# Patient Record
Sex: FEMALE | Race: WHITE | NOT HISPANIC OR LATINO | Employment: OTHER | URBAN - METROPOLITAN AREA
[De-identification: names, ages, dates, MRNs, and addresses within clinical notes are randomized per-mention and may not be internally consistent; named-entity substitution may affect disease eponyms.]

---

## 2017-02-15 ENCOUNTER — HOSPITAL ENCOUNTER (EMERGENCY)
Facility: CLINIC | Age: 67
Discharge: HOME | End: 2017-02-15
Attending: FAMILY MEDICINE | Admitting: FAMILY MEDICINE
Payer: MEDICARE

## 2017-02-15 ENCOUNTER — TELEPHONE (OUTPATIENT)
Dept: FAMILY MEDICINE | Facility: OTHER | Age: 67
End: 2017-02-15

## 2017-02-15 ENCOUNTER — APPOINTMENT (OUTPATIENT)
Dept: GENERAL RADIOLOGY | Facility: CLINIC | Age: 67
End: 2017-02-15
Attending: FAMILY MEDICINE
Payer: MEDICARE

## 2017-02-15 VITALS
RESPIRATION RATE: 14 BRPM | OXYGEN SATURATION: 96 % | HEIGHT: 64 IN | BODY MASS INDEX: 25.61 KG/M2 | TEMPERATURE: 97.4 F | WEIGHT: 150 LBS | DIASTOLIC BLOOD PRESSURE: 89 MMHG | SYSTOLIC BLOOD PRESSURE: 146 MMHG

## 2017-02-15 DIAGNOSIS — M79.642 PAIN OF LEFT HAND: ICD-10-CM

## 2017-02-15 PROCEDURE — 99282 EMERGENCY DEPT VISIT SF MDM: CPT | Performed by: FAMILY MEDICINE

## 2017-02-15 PROCEDURE — 73130 X-RAY EXAM OF HAND: CPT | Mod: TC,LT

## 2017-02-15 PROCEDURE — 99283 EMERGENCY DEPT VISIT LOW MDM: CPT

## 2017-02-15 PROCEDURE — A9270 NON-COVERED ITEM OR SERVICE: HCPCS | Mod: GY | Performed by: FAMILY MEDICINE

## 2017-02-15 PROCEDURE — 25000132 ZZH RX MED GY IP 250 OP 250 PS 637: Mod: GY | Performed by: FAMILY MEDICINE

## 2017-02-15 RX ORDER — ACETAMINOPHEN 325 MG/1
975 TABLET ORAL ONCE
Status: COMPLETED | OUTPATIENT
Start: 2017-02-15 | End: 2017-02-15

## 2017-02-15 RX ORDER — CYCLOSPORINE 0.5 MG/ML
1 EMULSION OPHTHALMIC 2 TIMES DAILY
COMMUNITY

## 2017-02-15 RX ADMIN — ACETAMINOPHEN 975 MG: 325 TABLET ORAL at 13:53

## 2017-02-15 ASSESSMENT — ENCOUNTER SYMPTOMS
VOMITING: 0
NAUSEA: 0
WEAKNESS: 0
FACIAL SWELLING: 0
DIZZINESS: 0
ARTHRALGIAS: 0
EYE PAIN: 0
DIFFICULTY URINATING: 0
LIGHT-HEADEDNESS: 0
ACTIVITY CHANGE: 0
MYALGIAS: 0
SHORTNESS OF BREATH: 0
ABDOMINAL PAIN: 0

## 2017-02-15 NOTE — DISCHARGE INSTRUCTIONS
Niesha    I recommend that you use a splint on the finger to help with the pain.  I hope that this will continue to improve with time.    Thank you for choosing our Emergency Department for your care.     Sincerely,    Dr Shane Garcia M.D.

## 2017-02-15 NOTE — ED AVS SNAPSHOT
[facility] Emergency Department    [address] [place] [address]    Phone:  [phone]    Fax:  [phone]                                       Niesha Adhikari   MRN: [id number]    Department:  [facility] Emergency Department   Date of Visit:  2/15/2017           Patient Information     Date Of Birth          1950        Your diagnoses for this visit were:     Pain of left hand        You were seen by David Garcia MD.      Follow-up Information     Schedule an appointment as soon as possible for a visit with Tanika Clark MD.    Specialty:  Family Practice    Why:  As needed    Contact information:    [facility]  [address]  [place] [address]  [phone]          Discharge Instructions       Niesha    I recommend that you use a splint on the finger to help with the pain.  I hope that this will continue to improve with time.    Thank you for choosing our Emergency Department for your care.     Sincerely,    Dr Shane Garcia M.D.      Future Appointments        Provider Department Dept Phone Center    2/22/2017 9:40 AM Apolinar Cho MD [facility] Emergency Room [phone] [facility]      24 Hour Appointment Hotline       To make an appointment at any [facility], call [phone] ([phone]). If you don't have a family doctor or clinic, we will help you find one. [facility] (formerly [facility] are conveniently located to serve the needs of you and your family.             Review of your medicines      Our records show that you are taking the medicines listed below. If these are incorrect, please call your family doctor or clinic.        Dose / Directions Last dose taken    celecoxib 200 MG capsule   Commonly known as:  celeBREX   Quantity:  180 capsule        TAKE ONE CAPSULE BY MOUTH EVERY DAY   Refills:  1        clonazePAM 0.5 MG tablet   Commonly known as:  klonoPIN   Dose:  1 mg   Quantity:  180 tablet        Take 2 tablets (1  mg) by mouth At Bedtime   Refills:  0        cycloSPORINE 0.05 % ophthalmic emulsion   Commonly known as:  RESTASIS   Dose:  1 drop        1 drop 2 times daily   Refills:  0        Fiber 0.52 G Caps   Quantity:  540 capsule        4 tabs in pm   Refills:  0        montelukast 10 MG tablet   Commonly known as:  SINGULAIR   Dose:  1 tablet   Quantity:  90 tablet        Take 1 tablet (10 mg) by mouth daily as needed Seasonal (August and September)   Refills:  0        nystatin 410950 UNIT/GM Powd   Commonly known as:  MYCOSTATIN   Quantity:  60 g        Apply topically 3 times daily as needed   Refills:  1        ondansetron 8 MG tablet   Commonly known as:  ZOFRAN   Dose:  8 mg   Quantity:  90 tablet        Take 1 tablet (8 mg) by mouth every 8 hours as needed for nausea   Refills:  1        PROVIGIL PO        Take by mouth 2 times daily Patient unsure which dose   Refills:  0        ranitidine 300 MG tablet   Commonly known as:  ZANTAC   Quantity:  90 tablet        TAKE ONE TABLET BY MOUTH EVERY DAY   Refills:  3        sertraline 100 MG tablet   Commonly known as:  ZOLOFT   Dose:  100 mg        Take 100 mg by mouth daily Takes 2 at bedtime   Refills:  0        VYVANSE 20 MG capsule   Dose:  40 mg   Quantity:  30 capsule   Generic drug:  lisdexamfetamine        Take 2 capsules (40 mg) by mouth every morning   Refills:  0                Procedures and tests performed during your visit     XR Hand Left G/E 3 Views      Orders Needing Specimen Collection     None      Pending Results     Date and Time Order Name Status Description    2/15/2017 1200 XR Hand Left G/E 3 Views Preliminary             Pending Culture Results     No orders found from 2/13/2017 to 2/16/2017.            Thank you for choosing [facility]       Thank you for choosing [facility] for your care. Our goal is always to provide you with excellent care. Hearing back from our patients is one way we can continue to improve our services. Please take a few  minutes to complete the written survey that you may receive in the mail after you visit with us. Thank you!        [employer] Information     [facility] gives you secure access to your electronic health record. If you see a primary care provider, you can also send messages to your care team and make appointments. If you have questions, please call your primary care clinic.  If you do not have a primary care provider, please call [phone] and they will assist you.        Care EveryWhere ID     This is your Care EveryWhere ID. This could be used by other organizations to access your [facility] medical records  [id number]        After Visit Summary       This is your record. Keep this with you and show to your community pharmacist(s) and doctor(s) at your next visit.

## 2017-02-15 NOTE — TELEPHONE ENCOUNTER
Reason for call:  Symptom  Reason for call:  Patient reporting a symptom    Symptom or request: fell on ground    Duration (how long have symptoms been present): last night    Have you been treated for this before? No    Additional comments: patient fell on ground last night, injured L hand and feel that they broke a finger or too. Also, has minor rib pain    Phone Number patient can be reached at:  Home number on file [phone] (home)    Best Time:  any    Can we leave a detailed message on this number:  YES    Call taken on 2/15/2017 at 9:02 AM by Rhoda Floyd

## 2017-02-15 NOTE — ED PROVIDER NOTES
"  History     Chief Complaint   Patient presents with     Hand Injury     The history is provided by the patient.     Niesha Adhikari is a 66 year old female who came in today with left hand pain.  She was walking in her house last night and tripped over the dog gate.  She fell forward and hit her left hand on the bookcase.  She does not have any other injuries.  She had no other injuries last night.  She did not have any loss of consciousness.    I have reviewed the Medications, Allergies, Past Medical and Surgical History, and Social History in the Epic system.    Review of Systems   Constitutional: Negative for activity change.   HENT: Negative for dental problem, ear pain, facial swelling and nosebleeds.    Eyes: Negative for pain.   Respiratory: Negative for shortness of breath.    Cardiovascular: Negative for chest pain.   Gastrointestinal: Negative for abdominal pain, nausea and vomiting.   Genitourinary: Negative for decreased urine volume and difficulty urinating.   Musculoskeletal: Negative for arthralgias and myalgias.   Neurological: Negative for dizziness, syncope, weakness and light-headedness.   All other systems reviewed and are negative.      Physical Exam   BP: 146/89  Heart Rate: 78  Temp: 97.4  F (36.3  C)  Resp: 14  Height: 162.6 cm (5' 4\")  Weight: 68 kg (150 lb)  SpO2: 96 %    Physical Exam   Constitutional: She is oriented to person, place, and time. She appears well-developed and well-nourished. No distress.   HENT:   Head: Normocephalic and atraumatic.   Eyes: Conjunctivae and EOM are normal.   Neck: Normal range of motion.   Pulmonary/Chest: Effort normal. No respiratory distress.   Musculoskeletal:   Exam of the left hand shows tenderness over the 4th and 5th MP joint.  She has a small, shallow skin abrasion over the 5th metatarsal with no bleeding.  She has limited range of motion of the left 5th finger because of pain.  There is no obvious deformity.  She has normal capillary refill " at the fingernails.  She has no pain of the left hand except as noted above.  No pain of the left wrist.   Neurological: She is alert and oriented to person, place, and time.   Skin: Skin is warm and dry. No rash noted.   Nursing note and vitals reviewed.      ED Course     ED Course     Procedures        Results for orders placed or performed during the hospital encounter of 02/15/17 (from the past 24 hour(s))   XR Hand Left G/E 3 Views    Narrative    HAND LEFT THREE OR MORE VIEWS February 15, 2017 12:25 PM     HISTORY: Trauma.    COMPARISON: None.      FINDINGS: There is no acute fracture, malalignment, erosion or  significant joint space loss. There is mild osteopenia.      Impression    IMPRESSION: Mild osteopenia. No acute fracture or malalignment is  identified.    TIM BEDOYA MD       Assessments & Plan (with Medical Decision Making)  Niesha came to the emergency department today after she tripped and fell last night at home.  She had minor injury to her left hand, but had quite a bit of pain.  She was concerned that she broke something or dislocated her finger.  Exam shows no deformity or significant injury.  She has a very small skin abrasion which is not bleeding.  X-ray shows no evidence of foreign body, fracture or malalignment.  We were able to reassure the patient that she does not have a fracture.  We splinted her finger in a position of comfort and she was discharged home.       I have reviewed the nursing notes.    I have reviewed the findings, diagnosis, plan and need for follow up with the patient.    Discharge Medication List as of 2/15/2017  1:35 PM          Final diagnoses:   Pain of left hand       2/15/2017   [facility] EMERGENCY DEPARTMENT     David Garcia MD  02/15/17 1726

## 2017-02-15 NOTE — ED AVS SNAPSHOT
[facility] Emergency Department    [address] [place] [address]    Phone:  [phone]    Fax:  [phone]                                       Niesha Adhikari   MRN: [id number]    Department:  [facility] Emergency Department   Date of Visit:  2/15/2017           After Visit Summary Signature Page     I have received my discharge instructions, and my questions have been answered. I have discussed any challenges I see with this plan with the nurse or doctor.    ..........................................................................................................................................  Patient/Patient Representative Signature      ..........................................................................................................................................  Patient Representative Print Name and Relationship to Patient    ..................................................               ................................................  Date                                            Time    ..........................................................................................................................................  Reviewed by Signature/Title    ...................................................              ..............................................  Date                                                            Time

## 2017-02-15 NOTE — TELEPHONE ENCOUNTER
"Niesha Adhikari is a 66 year old female who calls with hand/finger injury.    NURSING ASSESSMENT:  Description:  Fell last night around 1030 pm after tripping over a dog gate. Hit her bookshelf with her hand and fingers. Cut the fingers, bleeding; has since stopped. Pinky and ring finger on left hand affected. Painful to move, cannot bend. Wrapped with gauze and tape.   Onset/duration:  2/14  Precip. factors:  fall  Associated symptoms:  See above  Improves/worsens symptoms:  Wrapping helped.   Pain scale (0-10)   5/10  LMP/preg/breast feeding:  n/a  Last exam/Treatment:  11/25/2016  Allergies:   Allergies   Allergen Reactions     Tylenol Pm Extra Other (See Comments) and Nausea     Nausea and stomach pain       Abilify Discmelt Other (See Comments)     Disoriented     Antivert [Meclizine Hcl]      Compazine      Cymbalta Other (See Comments)     Disoriented, trouble sleeping     Effexor [Venlafaxine] Other (See Comments)     Disoriented, trouble sleeping     Elavil [Amitriptyline Hcl] Other (See Comments)     \"didn't feel right on it-med was stopped right away\"     Ferrous Sulfate Nausea and Vomiting     Food Difficulty breathing     cilantro     Indomethacin      indocin sensativity \"Severe h.a\"     Seasonal Allergies Other (See Comments) and Difficulty breathing     Philip Gold Aug-Sept, rag weed, sneezing     Thiopental Sodium      PENTOTHAL/rigidity and fight response     Animal Dander Difficulty breathing and Rash     sneezing,resp. distress     Bupropion Anxiety       MEDICATIONS:   Taking medication(s) as prescribed? N/A  Taking over the counter medication(s?) N/A  Any medication side effects? Not Applicable    Any barriers to taking medication(s) as prescribed?  N/A  Medication(s) improving/managing symptoms?  N/A  Medication reconciliation completed: N/A      NURSING PLAN: Nursing advice to patient seek care within 2-4 hrs    RECOMMENDED DISPOSITION:  See in 4 hours, another person to drive - patient " does not want to drive to other locations, no openings in [place] or [place]. Will go to local ED/UC.  Will comply with recommendation: Yes  If further questions/concerns or if symptoms do not improve, worsen or new symptoms develop, call your PCP or [facility] as soon as possible.      Guideline used:  Telephone Triage Protocols for Nurses, Fourth Edition, Deana Pollard  Hand/Writs problems  Finger and Toe Problems  Falls     Jigna Daily RN

## 2017-02-15 NOTE — ED NOTES
"Presents with pain to left hand. \"I think I broke it\" states she tripped last night and feel into the bookcase. She awoke around 0400 with increase pain to hand. Currently has a dressing over hand which she applied at home  "

## 2017-02-20 NOTE — PROGRESS NOTES
SUBJECTIVE:                                                    Niesha Adhikari is a 66 year old female who presents to clinic today for the following health issues:    Rash     Onset: x 5-7 days    Description:   Location: Under both breast and stomach, and neck and chest  Character: round, blotchy, raised, red  Itching (Pruritis): YES- severe    Progression of Symptoms:  worsening    Accompanying Signs & Symptoms:  Fever: YES- mild didn't taken temp  Body aches or joint pain: no   Sore throat symptoms: no   Recent cold symptoms: no    History:   Previous similar rash: YES    Precipitating factors:   Exposure to similar rash: no   New exposures: None   Recent travel: no     Alleviating factors:  no     Therapies Tried and outcome: Nystatin- not helping and normally it does.           Problem list and histories reviewed & adjusted, as indicated.  Additional history: as documented    Patient Active Problem List   Diagnosis     Allergic rhinitis     Esophageal reflux     Pernicious anemia     Anxiety state     Migraine     Essential and other specified forms of tremor     Fibromyalgia     Moderate recurrent major depression (H)     Advanced directives, counseling/discussion     Narcolepsy     Internal hemorrhoids with other complication     Hepatitis C     AIN (anal intraepithelial neoplasia) anal canal     Rectal bleeding     Sciatica     Senile osteoporosis     Past Surgical History   Procedure Laterality Date      colonoscopy w/wo brush/wash  08/22/05      ugi endoscopy, simple exam  08/08/07     Colonoscopy  8/25/2009      ugi endoscopy diag w biopsy  10/01/09     Colonoscopy  2/14/2011     COLONOSCOPY performed by CRISTIN LAGUNAS at  GI     Laparoscopic salpingo-oophorectomy  2/28/2011     LAPAROSCOPIC SALPINGO-OOPHORECTOMY performed by CAYLA FLOR at  OR     Cystoscopy  2/28/2011     CYSTOSCOPY performed by CAYLA FLOR at  OR     Endoscopy  05/21/12     Upper GI - [facility]     Hemorrhoidectomy  06/25/12     [facility]     Biopsy anal canal  1/21/13     [facility]      Tonsillectomy & adenoidectomy  1965     Breast biopsy, rt/lt Left 1975     Breat Biopsy RT/LT     C nonspecific procedure  1965     Removed bone left index finger knuckle, casts broken bones       Social History   Substance Use Topics     Smoking status: Former Smoker     Packs/day: 0.75     Years: 10.00     Types: Cigarettes     Start date: 11/1/1968     Quit date: 11/1/1978     Smokeless tobacco: Never Used      Comment: No exposure at home     Alcohol use No     Family History   Problem Relation Age of Onset     Hypertension Mother      Breast Cancer Mother      Coronary Artery Disease Mother      CEREBROVASCULAR DISEASE Mother      KIDNEY DISEASE Mother      Hypertension Brother      Respiratory Brother      emphysema     Lipids Brother      HEART DISEASE Brother      stents, 12/2011; has had about 6 MIs, last one 1/2014     C.A.D. Sister      MI at age 63     Hypertension Sister      GASTROINTESTINAL DISEASE Sister      gallbladder     Circulatory Sister      brain aneurysm at 63     Genitourinary Problems Sister      1 kidney/bladder     Hypertension Sister      Obesity Sister      Unknown/Adopted Paternal Uncle      Blood Disease Son      Lymes/7/11     Hypertension Father      Lymphoma Father      Glaucoma Father      Coronary Artery Disease Other 49     niece     DIABETES Other      cousin     Coronary Artery Disease Sister      Coronary Artery Disease Brother      Hyperlipidemia Brother      Hypertension Son      CEREBROVASCULAR DISEASE Maternal Grandmother      CEREBROVASCULAR DISEASE Paternal Grandmother      Liver Cancer Cousin      Glaucoma Paternal Grandfather          Current Outpatient Prescriptions   Medication Sig Dispense Refill     econazole nitrate 1 % cream Apply topically 2 times daily for 14 days 30 g 1     hydrocortisone 1 % ointment Apply sparingly to affected area three  "times daily for 14 days. 30 g 0     cycloSPORINE (RESTASIS) 0.05 % ophthalmic emulsion 1 drop 2 times daily       Modafinil (PROVIGIL PO) Take 200 mg by mouth 2 times daily Patient unsure which dose        celecoxib (CELEBREX) 200 MG capsule TAKE ONE CAPSULE BY MOUTH EVERY  capsule 1     ranitidine (ZANTAC) 300 MG tablet TAKE ONE TABLET BY MOUTH EVERY DAY 90 tablet 3     lisdexamfetamine (VYVANSE) 20 MG capsule Take 2 capsules (40 mg) by mouth every morning 30 capsule 0     nystatin (MYCOSTATIN) 354482 UNIT/GM POWD Apply topically 3 times daily as needed 60 g 1     sertraline (ZOLOFT) 100 MG tablet Take 100 mg by mouth daily Takes 2 at bedtime       clonazePAM (KLONOPIN) 0.5 MG tablet Take 1 mg by mouth At Bedtime Takes 1/8 of 0.5mg tab. 180 tablet      Psyllium (FIBER) 0.52 G CAPS 4 tabs in pm 540 capsule      vitamin D (ERGOCALCIFEROL) 68920 UNIT capsule Take 1 capsule (50,000 Units) by mouth every 7 days 12 capsule 1     montelukast (SINGULAIR) 10 MG tablet Take 1 tablet (10 mg) by mouth daily as needed Seasonal (August and September) 90 tablet 0     Allergies   Allergen Reactions     Abilify Discmelt Other (See Comments)     Disoriented     Antivert [Meclizine Hcl]      Compazine      Cymbalta Other (See Comments)     Disoriented, trouble sleeping     Diphenhydramine Nausea     And abdominal pain     Effexor [Venlafaxine] Other (See Comments)     Disoriented, trouble sleeping     Elavil [Amitriptyline Hcl] Other (See Comments)     \"didn't feel right on it-med was stopped right away\"     Ferrous Sulfate Nausea and Vomiting     Food Difficulty breathing     cilantro     Indomethacin      indocin sensativity \"Severe h.a\"     Seasonal Allergies Other (See Comments) and Difficulty breathing     Philip Gold Aug-Sept, rag weed, sneezing     Thiopental Sodium      PENTOTHAL/rigidity and fight response     Animal Dander Difficulty breathing and Rash     sneezing,resp. distress     Bupropion Anxiety     BP Readings " "from Last 3 Encounters:   02/22/17 (!) 155/91   02/21/17 126/76   02/15/17 146/89    Wt Readings from Last 3 Encounters:   02/22/17 148 lb 6.4 oz (67.3 kg)   02/21/17 148 lb 12.8 oz (67.5 kg)   02/15/17 150 lb (68 kg)                  Labs reviewed in EPIC  Problem list, Medication list, Allergies, and Medical/Social/Surgical histories reviewed in [facility] and updated as appropriate.    ROS:  C: NEGATIVE for fever, chills, change in weight  INTEGUMENTARY/SKIN: POSITIVE for rash beneath the breast     OBJECTIVE:                                                    /76 (BP Location: Right arm, Patient Position: Chair, Cuff Size: Adult Regular)  Pulse 64  Temp 98.7  F (37.1  C) (Temporal)  Resp 16  Ht 5' 4\" (1.626 m)  Wt 148 lb 12.8 oz (67.5 kg)  LMP 11/27/2003  Breastfeeding? No  BMI 25.54 kg/m2  Body mass index is 25.54 kg/(m^2).   GENERAL:  alert, well nourished, well hydrated, no distress  SKIN:  Very scant faded rash noted beneath the breast bilaterally , no inflammation noted     Diagnostic test results:  Diagnostic Test Results:  none      ASSESSMENT/PLAN:                                                    1. Rash and nonspecific skin eruption  Likely Intertrigo improving though she reports the Nystatin not helping as much . Will try a different anti fungal , otc hydrocortisone to help with itching   - econazole nitrate 1 % cream; Apply topically 2 times daily for 14 days  Dispense: 30 g; Refill: 1  - hydrocortisone 1 % ointment; Apply sparingly to affected area three times daily for 14 days.  Dispense: 30 g; Refill: 0      Follow up with Provider - terryn      Erendira Merida MD, MD  [facility] [place]    "

## 2017-02-21 ENCOUNTER — OFFICE VISIT (OUTPATIENT)
Dept: FAMILY MEDICINE | Facility: OTHER | Age: 67
End: 2017-02-21
Payer: COMMERCIAL

## 2017-02-21 VITALS
HEART RATE: 64 BPM | DIASTOLIC BLOOD PRESSURE: 76 MMHG | RESPIRATION RATE: 16 BRPM | HEIGHT: 64 IN | BODY MASS INDEX: 25.4 KG/M2 | WEIGHT: 148.8 LBS | SYSTOLIC BLOOD PRESSURE: 126 MMHG | TEMPERATURE: 98.7 F

## 2017-02-21 DIAGNOSIS — R21 RASH AND NONSPECIFIC SKIN ERUPTION: Primary | ICD-10-CM

## 2017-02-21 PROCEDURE — 99213 OFFICE O/P EST LOW 20 MIN: CPT | Performed by: FAMILY MEDICINE

## 2017-02-21 RX ORDER — DIAPER,BRIEF,INFANT-TODD,DISP
EACH MISCELLANEOUS
Qty: 30 G | Refills: 0 | Status: SHIPPED | OUTPATIENT
Start: 2017-02-21 | End: 2017-09-19

## 2017-02-21 RX ORDER — ECONAZOLE NITRATE 10 MG/G
CREAM TOPICAL 2 TIMES DAILY
Qty: 30 G | Refills: 1 | Status: SHIPPED | OUTPATIENT
Start: 2017-02-21 | End: 2017-03-07

## 2017-02-21 ASSESSMENT — PAIN SCALES - GENERAL: PAINLEVEL: MODERATE PAIN (5)

## 2017-02-21 NOTE — MR AVS SNAPSHOT
After Visit Summary   2/21/2017    Niesha Adhikari    MRN: [id number]           Patient Information     Date Of Birth          1950        Visit Information        Provider Department      2/21/2017 4:20 PM Erendira Merida MD [facility]        Today's Diagnoses     Rash and nonspecific skin eruption    -  1       Follow-ups after your visit        Your next 10 appointments already scheduled     Feb 22, 2017  9:40 AM CST   [facility] Visit with Apolinar Cho [facility]   [facility]    [address] [facility] [id number] [phone]              Who to contact     If you have questions or need follow up information about today's clinic visit or your schedule please contact [facility] directly at [phone].  Normal or non-critical lab and imaging results will be communicated to you by MyChart, letter or phone within 4 business days after the clinic has received the results. If you do not hear from us within 7 days, please contact the clinic through MyChart or phone. If you have a critical or abnormal lab result, we will notify you by phone as soon as possible.  Submit refill requests through [employer] or call your pharmacy and they will forward the refill request to us. Please allow 3 business days for your refill to be completed.          Additional Information About Your Visit        MyChart Information     [employer] gives you secure access to your electronic health record. If you see a primary care provider, you can also send messages to your care team and make appointments. If you have questions, please call your primary care clinic.  If you do not have a primary care provider, please call [phone] and they will assist you.        Care EveryWhere ID     This is your Care EveryWhere ID. This could be used by other organizations to access your [place] medical records  [id number]        Your Vitals Were     Pulse  "Temperature Respirations Height Last Period Breastfeeding?    64 98.7  F (37.1  C) (Temporal) 16 5' 4\" (1.626 m) 11/27/2003 No    BMI (Body Mass Index)                   25.54 kg/m2            Blood Pressure from Last 3 Encounters:   02/21/17 126/76   02/15/17 146/89   11/25/16 118/70    Weight from Last 3 Encounters:   02/21/17 148 lb 12.8 oz (67.5 kg)   02/15/17 150 lb (68 kg)   11/25/16 151 lb (68.5 kg)              Today, you had the following     No orders found for display         Today's Medication Changes          These changes are accurate as of: 2/21/17  4:58 PM.  If you have any questions, ask your nurse or doctor.               Start taking these medicines.        Dose/Directions    econazole nitrate 1 % cream   Used for:  Rash and nonspecific skin eruption   Started by:  Erendira Merida MD        Apply topically 2 times daily for 14 days   Quantity:  30 g   Refills:  1       hydrocortisone 1 % ointment   Used for:  Rash and nonspecific skin eruption   Started by:  Erendira Merida MD        Apply sparingly to affected area three times daily for 14 days.   Quantity:  30 g   Refills:  0            Where to get your medicines      These medications were sent to [facility]  [place] - [address]   [address] [place] [address]     Phone:  [phone]     econazole nitrate 1 % cream    hydrocortisone 1 % ointment                Primary Care Provider Office Phone # Fax #    Tanika Clark [phone]       [facility] [place] [address]  [facility] [address]        Thank you!     Thank you for choosing [facility]  for your care. Our goal is always to provide you with excellent care. Hearing back from our patients is one way we can continue to improve our services. Please take a few minutes to complete the written survey that you may receive in the mail after your visit with us. Thank you!           "   Your Updated Medication List - Protect others around you: Learn how to safely use, store and throw away your medicines at www.disposemymeds.org.          This list is accurate as of: 2/21/17  4:58 PM.  Always use your most recent med list.                   Brand Name Dispense Instructions for use    celecoxib 200 MG capsule    celeBREX    180 capsule    TAKE ONE CAPSULE BY MOUTH EVERY DAY       clonazePAM 0.5 MG tablet    klonoPIN    180 tablet    Take 1 mg by mouth At Bedtime Takes 1/8 of 0.5mg tab.       cycloSPORINE 0.05 % ophthalmic emulsion    RESTASIS     1 drop 2 times daily       econazole nitrate 1 % cream     30 g    Apply topically 2 times daily for 14 days       Fiber 0.52 G Caps     540 capsule    4 tabs in pm       hydrocortisone 1 % ointment     30 g    Apply sparingly to affected area three times daily for 14 days.       montelukast 10 MG tablet    SINGULAIR    90 tablet    Take 1 tablet (10 mg) by mouth daily as needed Seasonal (August and September)       nystatin 298567 UNIT/GM Powd    MYCOSTATIN    60 g    Apply topically 3 times daily as needed       PROVIGIL PO      Take 200 mg by mouth 2 times daily Patient unsure which dose       ranitidine 300 MG tablet    ZANTAC    90 tablet    TAKE ONE TABLET BY MOUTH EVERY DAY       sertraline 100 MG tablet    ZOLOFT     Take 100 mg by mouth daily Takes 2 at bedtime       VYVANSE 20 MG capsule   Generic drug:  lisdexamfetamine     30 capsule    Take 2 capsules (40 mg) by mouth every morning

## 2017-02-21 NOTE — NURSING NOTE
"Chief Complaint   Patient presents with     Derm Problem     Panel Management     honoring choices, phq9       Initial /76 (BP Location: Right arm, Patient Position: Chair, Cuff Size: Adult Regular)  Pulse 64  Temp 98.7  F (37.1  C) (Temporal)  Resp 16  Ht 5' 4\" (1.626 m)  Wt 148 lb 12.8 oz (67.5 kg)  LMP 11/27/2003  Breastfeeding? No  BMI 25.54 kg/m2 Estimated body mass index is 25.54 kg/(m^2) as calculated from the following:    Height as of this encounter: 5' 4\" (1.626 m).    Weight as of this encounter: 148 lb 12.8 oz (67.5 kg).  Medication Reconciliation: complete    "

## 2017-02-22 ENCOUNTER — OFFICE VISIT (OUTPATIENT)
Dept: RHEUMATOLOGY | Facility: CLINIC | Age: 67
End: 2017-02-22
Payer: COMMERCIAL

## 2017-02-22 VITALS
HEART RATE: 72 BPM | TEMPERATURE: 97.3 F | DIASTOLIC BLOOD PRESSURE: 91 MMHG | OXYGEN SATURATION: 97 % | BODY MASS INDEX: 25.33 KG/M2 | WEIGHT: 148.4 LBS | HEIGHT: 64 IN | SYSTOLIC BLOOD PRESSURE: 155 MMHG

## 2017-02-22 DIAGNOSIS — M81.0 OSTEOPOROSIS: Primary | ICD-10-CM

## 2017-02-22 PROCEDURE — 99214 OFFICE O/P EST MOD 30 MIN: CPT | Performed by: INTERNAL MEDICINE

## 2017-02-22 ASSESSMENT — ANXIETY QUESTIONNAIRES
7. FEELING AFRAID AS IF SOMETHING AWFUL MIGHT HAPPEN: MORE THAN HALF THE DAYS
3. WORRYING TOO MUCH ABOUT DIFFERENT THINGS: MORE THAN HALF THE DAYS
GAD7 TOTAL SCORE: 12
5. BEING SO RESTLESS THAT IT IS HARD TO SIT STILL: SEVERAL DAYS
1. FEELING NERVOUS, ANXIOUS, OR ON EDGE: MORE THAN HALF THE DAYS
2. NOT BEING ABLE TO STOP OR CONTROL WORRYING: MORE THAN HALF THE DAYS
6. BECOMING EASILY ANNOYED OR IRRITABLE: SEVERAL DAYS
IF YOU CHECKED OFF ANY PROBLEMS ON THIS QUESTIONNAIRE, HOW DIFFICULT HAVE THESE PROBLEMS MADE IT FOR YOU TO DO YOUR WORK, TAKE CARE OF THINGS AT HOME, OR GET ALONG WITH OTHER PEOPLE: VERY DIFFICULT

## 2017-02-22 ASSESSMENT — PATIENT HEALTH QUESTIONNAIRE - PHQ9: 5. POOR APPETITE OR OVEREATING: MORE THAN HALF THE DAYS
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
## 2017-02-22 NOTE — NURSING NOTE
"Chief Complaint   Patient presents with     Consult     patient states she feels pretty bad, has not had any medications  for a couple years, she has osteoporosis. Stiffness on right side of neck, head and skull \"clicks\" all the time, all bones hurts.       Initial BP (!) 155/91 (BP Location: Left arm, Patient Position: Chair, Cuff Size: Adult Regular)  Pulse 72  Temp 97.3  F (36.3  C) (Oral)  Ht 1.626 m (5' 4\")  Wt 67.3 kg (148 lb 6.4 oz)  LMP 11/27/2003  SpO2 97%  BMI 25.47 kg/m2 Estimated body mass index is 25.47 kg/(m^2) as calculated from the following:    Height as of this encounter: 1.626 m (5' 4\").    Weight as of this encounter: 67.3 kg (148 lb 6.4 oz).  BP completed using cuff size: regular         RAPID3 (0-30) Cumulative Score  17.0          RAPID3 Weighted Score (divide #4 by 3 and that is the weighted score)  5.67         "

## 2017-02-22 NOTE — MR AVS SNAPSHOT
After Visit Summary   2/22/2017    Niesha Adhikari    MRN: [id number]           Patient Information     Date Of Birth          1950        Visit Information        Provider Department      2/22/2017 9:40 AM Apolinar Cho MD [place] [facility]        Today's Diagnoses     Osteoporosis    -  1      Care Instructions    Schedule your bone density scan to be done at [facility]     Please schedule your blood work to be done on the same day as your bone density scan    [address] [place] [address]  [phone]        Follow-ups after your visit        Your next 10 appointments already scheduled     Aug 23, 2017  8:40 AM CDT   Return Visit with Apolinar Cho MD   [facility] [place] ([facility])    [address] [place] [address]  [place] [address] [phone]              Future tests that were ordered for you today     Open Future Orders        Priority Expected Expires Ordered    DX Hip/Pelvis/Spine Routine  2/23/2018 2/22/2017    Vitamin D Deficiency Routine 2/22/2017 4/28/2017 2/22/2017    Comprehensive metabolic panel Routine 2/22/2017 4/28/2017 2/22/2017            Who to contact     If you have questions or need follow up information about today's clinic visit or your schedule please contact [facility] [place] directly at [phone].  Normal or non-critical lab and imaging results will be communicated to you by MyChart, letter or phone within 4 business days after the clinic has received the results. If you do not hear from us within 7 days, please contact the clinic through MyChart or phone. If you have a critical or abnormal lab result, we will notify you by phone as soon as possible.  Submit refill requests through [employer] or call your pharmacy and they will forward the refill request to us. Please allow 3 business days for your refill to be completed.          Additional Information About Your Visit        [facility]  "Information     [employer]ice gives you secure access to your electronic health record. If you see a primary care provider, you can also send messages to your care team and make appointments. If you have questions, please call your primary care clinic.  If you do not have a primary care provider, please call [phone] and they will assist you.        Care EveryWhere ID     This is your Care EveryWhere ID. This could be used by other organizations to access your [facility] medical records  [id number]        Your Vitals Were     Pulse Temperature Height Last Period Pulse Oximetry BMI (Body Mass Index)    72 97.3  F (36.3  C) (Oral) 1.626 m (5' 4\") 11/27/2003 97% 25.47 kg/m2       Blood Pressure from Last 3 Encounters:   02/22/17 (!) 155/91   02/21/17 126/76   02/15/17 146/89    Weight from Last 3 Encounters:   02/22/17 67.3 kg (148 lb 6.4 oz)   02/21/17 67.5 kg (148 lb 12.8 oz)   02/15/17 68 kg (150 lb)               Primary Care Provider Office Phone # Fax #    Tanika QUIROZ MD Eduardo [phone]       [facility] [address]  [facility] [address]        Thank you!     Thank you for choosing [facility]  for your care. Our goal is always to provide you with excellent care. Hearing back from our patients is one way we can continue to improve our services. Please take a few minutes to complete the written survey that you may receive in the mail after your visit with us. Thank you!             Your Updated Medication List - Protect others around you: Learn how to safely use, store and throw away your medicines at www.disposemymeds.org.          This list is accurate as of: 2/22/17 10:27 AM.  Always use your most recent med list.                   Brand Name Dispense Instructions for use    celecoxib 200 MG capsule    celeBREX    180 capsule    TAKE ONE CAPSULE BY MOUTH EVERY DAY       clonazePAM 0.5 MG tablet    klonoPIN    180 tablet    Take 1 mg by mouth At Bedtime Takes 1/8 " of 0.5mg tab.       cycloSPORINE 0.05 % ophthalmic emulsion    RESTASIS     1 drop 2 times daily       econazole nitrate 1 % cream     30 g    Apply topically 2 times daily for 14 days       Fiber 0.52 G Caps     540 capsule    4 tabs in pm       hydrocortisone 1 % ointment     30 g    Apply sparingly to affected area three times daily for 14 days.       montelukast 10 MG tablet    SINGULAIR    90 tablet    Take 1 tablet (10 mg) by mouth daily as needed Seasonal (August and September)       nystatin 757002 UNIT/GM Powd    MYCOSTATIN    60 g    Apply topically 3 times daily as needed       PROVIGIL PO      Take 200 mg by mouth 2 times daily Patient unsure which dose       ranitidine 300 MG tablet    ZANTAC    90 tablet    TAKE ONE TABLET BY MOUTH EVERY DAY       sertraline 100 MG tablet    ZOLOFT     Take 100 mg by mouth daily Takes 2 at bedtime       VYVANSE 20 MG capsule   Generic drug:  lisdexamfetamine     30 capsule    Take 2 capsules (40 mg) by mouth every morning

## 2017-02-22 NOTE — PATIENT INSTRUCTIONS
Schedule your bone density scan to be done at [facility]     Please schedule your blood work to be done on the same day as your bone density scan    [facility]  [address] [place], [place] [id number] [phone]

## 2017-02-23 ENCOUNTER — HOSPITAL ENCOUNTER (OUTPATIENT)
Dept: BONE DENSITY | Facility: CLINIC | Age: 67
Discharge: HOME | End: 2017-02-23
Attending: INTERNAL MEDICINE | Admitting: INTERNAL MEDICINE
Payer: MEDICARE

## 2017-02-23 DIAGNOSIS — M81.0 OSTEOPOROSIS: ICD-10-CM

## 2017-02-23 LAB
ALBUMIN SERPL-MCNC: 4 G/DL (ref 3.4–5)
ALP SERPL-CCNC: 77 U/L (ref 40–150)
ALT SERPL W P-5'-P-CCNC: 25 U/L (ref 0–50)
ANION GAP SERPL CALCULATED.3IONS-SCNC: 5 MMOL/L (ref 3–14)
AST SERPL W P-5'-P-CCNC: 22 U/L (ref 0–45)
BILIRUB SERPL-MCNC: 0.5 MG/DL (ref 0.2–1.3)
BUN SERPL-MCNC: 12 MG/DL (ref 7–30)
CALCIUM SERPL-MCNC: 9 MG/DL (ref 8.5–10.1)
CHLORIDE SERPL-SCNC: 104 MMOL/L (ref 94–109)
CO2 SERPL-SCNC: 32 MMOL/L (ref 20–32)
CREAT SERPL-MCNC: 0.62 MG/DL (ref 0.52–1.04)
DEPRECATED CALCIDIOL+CALCIFEROL SERPL-MC: 10 UG/L (ref 20–75)
GFR SERPL CREATININE-BSD FRML MDRD: NORMAL ML/MIN/1.7M2
GLUCOSE SERPL-MCNC: 87 MG/DL (ref 70–99)
POTASSIUM SERPL-SCNC: 4.1 MMOL/L (ref 3.4–5.3)
PROT SERPL-MCNC: 7.4 G/DL (ref 6.8–8.8)
PTH-INTACT SERPL-MCNC: 103 PG/ML (ref 12–72)
SODIUM SERPL-SCNC: 141 MMOL/L (ref 133–144)

## 2017-02-23 PROCEDURE — 77080 DXA BONE DENSITY AXIAL: CPT

## 2017-02-23 PROCEDURE — 36415 COLL VENOUS BLD VENIPUNCTURE: CPT | Performed by: INTERNAL MEDICINE

## 2017-02-23 PROCEDURE — 80053 COMPREHEN METABOLIC PANEL: CPT | Performed by: INTERNAL MEDICINE

## 2017-02-23 PROCEDURE — 82306 VITAMIN D 25 HYDROXY: CPT | Performed by: INTERNAL MEDICINE

## 2017-02-23 PROCEDURE — 83970 ASSAY OF PARATHORMONE: CPT | Performed by: INTERNAL MEDICINE

## 2017-02-23 ASSESSMENT — ANXIETY QUESTIONNAIRES: GAD7 TOTAL SCORE: 12

## 2017-02-23 ASSESSMENT — PATIENT HEALTH QUESTIONNAIRE - PHQ9: SUM OF ALL RESPONSES TO PHQ QUESTIONS 1-9: 9

## 2017-02-26 DIAGNOSIS — E55.9 VITAMIN D DEFICIENCY: Primary | ICD-10-CM

## 2017-02-26 DIAGNOSIS — M81.0 OSTEOPOROSIS: ICD-10-CM

## 2017-02-26 RX ORDER — ERGOCALCIFEROL 1.25 MG/1
50000 CAPSULE, LIQUID FILLED ORAL
Qty: 12 CAPSULE | Refills: 1 | Status: SHIPPED | OUTPATIENT
Start: 2017-02-26 | End: 2017-08-30

## 2017-02-26 NOTE — PROGRESS NOTES
"[employer] message sent:  \"Ms. Adhikari,    Your vitamin D level is very low and needs to be replaced. Your parathyroid hormone is elevated and I suspect that this is due to the low vitamin D.   To raise your vitamin D level, I have prescribed high dose vitamin D supplementation: ergocalciferol 50,000 Units once weekly.  Please have repeat labs on approximately May 15, 2017.    Your bone density scan (DEXA) showed osteoporosis, but no significant change since the previous scan.  Because of the very low vitamin D, Boniva may not be started until the vitamin D level is normalized.      Please let me know if you have any questions.    Sincerely,  Apolinar Cho MD  2/26/2017 7:41 AM\""

## 2017-04-07 ENCOUNTER — TELEPHONE (OUTPATIENT)
Dept: FAMILY MEDICINE | Facility: OTHER | Age: 67
End: 2017-04-07

## 2017-04-07 NOTE — TELEPHONE ENCOUNTER
"Neisha Adhikari is a 66 year old female who calls with cough.    NURSING ASSESSMENT:  Description:  Dry cough,nonproductive. Chest congestion. Worse in evenings and morning. Denies fever, sob, chest pain, dizziness, ear issues, sore throat, nausea, vomiting, bowel or urination issues  Onset/duration:  Over 1 week  Precip. factors:  See above  Associated symptoms:  See above  Improves/worsens symptoms:  n/a  Pain scale (0-10)   0/10  LMP/preg/breast feeding:  n/a  Last exam/Treatment:  2/21/17  Allergies:   Allergies   Allergen Reactions     Abilify Discmelt Other (See Comments)     Disoriented     Antivert [Meclizine Hcl]      Compazine      Cymbalta Other (See Comments)     Disoriented, trouble sleeping     Diphenhydramine Nausea     And abdominal pain     Effexor [Venlafaxine] Other (See Comments)     Disoriented, trouble sleeping     Elavil [Amitriptyline Hcl] Other (See Comments)     \"didn't feel right on it-med was stopped right away\"     Ferrous Sulfate Nausea and Vomiting     Food Difficulty breathing     cilantro     Indomethacin      indocin sensativity \"Severe h.a\"     Seasonal Allergies Other (See Comments) and Difficulty breathing     Philip Gold Aug-Sept, rag weed, sneezing     Thiopental Sodium      PENTOTHAL/rigidity and fight response     Animal Dander Difficulty breathing and Rash     sneezing,resp. distress     Bupropion Anxiety       MEDICATIONS:   Taking medication(s) as prescribed? N/A  Taking over the counter medication(s?) N/A  Any medication side effects? Not Applicable    Any barriers to taking medication(s) as prescribed?  N/A  Medication(s) improving/managing symptoms?  N/A  Medication reconciliation completed: N/A      NURSING PLAN: Nursing advice to patient scheduled 4/11. home cares for the weekend. UC/ED with new/worsening symptoms during the weekend    RECOMMENDED DISPOSITION:  Home care advice - per protocol  Will comply with recommendation: Yes  If further questions/concerns or if " symptoms do not improve, worsen or new symptoms develop, call your PCP or [facility] as soon as possible.      Guideline used:  Telephone Triage Protocols for Nurses, Fourth Edition, Deana Pollard  Cough    NOTES:  Disposition was determined by the first positive assessment question, therefore all previous assessment questions were negative      Jigna Daily RN

## 2017-04-07 NOTE — TELEPHONE ENCOUNTER
RN Tickbite Protocol: Ages 8 and older  Niesha Adhikari is a 66 year old female is being assessed for indication of tick bite.     ASSESSMENT/PLAN:   1.  Patient instructions without treatment.   2.  Education: How to identify a deer tick, Important time frames related to tick bite and Preventing tick bites  3.  Follow-up:   4.  Patient/parent verbalized understanding of plan and is agreeable.     SUBJECTIVE/OBJECTIVE:  Removal of tick that has been attached at least 36 hours? no   Tick is smaller, redder, no white striping on back and more triangular in shape? yes   Tick was removed within the last 72 hours? yes   Location on body of suspected tick bite: left side   Symptoms:  none  Complicating factors:  Reports: none   Denies: Allergy to Doxycycline, Age less than 8, Breast feeding, Pregnant and Greater than 72 hours since tick removed    NURSING PLAN: Nursing advice to patient home care. watch for symptoms. no abx needed at this time    RECOMMENDED DISPOSITION:  Home care advice - per protocol  Will comply with recommendation: Yes    Encounter handled by: Nurse Triage.    Jigna Daily RN

## 2017-04-07 NOTE — TELEPHONE ENCOUNTER
Pt coming in on 04/17/17 at 11:40am.  Seeing FF for embedded deer tick and also cough and congestion since 03/24/17 and not improving.  Please triage.  Didier Mesa, CMA

## 2017-04-12 ENCOUNTER — OFFICE VISIT (OUTPATIENT)
Dept: FAMILY MEDICINE | Facility: OTHER | Age: 67
End: 2017-04-12
Payer: COMMERCIAL

## 2017-04-12 VITALS
SYSTOLIC BLOOD PRESSURE: 122 MMHG | RESPIRATION RATE: 14 BRPM | OXYGEN SATURATION: 98 % | TEMPERATURE: 98.6 F | DIASTOLIC BLOOD PRESSURE: 74 MMHG | HEIGHT: 64 IN | BODY MASS INDEX: 25.56 KG/M2 | HEART RATE: 62 BPM | WEIGHT: 149.7 LBS

## 2017-04-12 DIAGNOSIS — Z71.89 ADVANCED DIRECTIVES, COUNSELING/DISCUSSION: ICD-10-CM

## 2017-04-12 DIAGNOSIS — R05.9 COUGH: Primary | ICD-10-CM
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
## 2017-04-12 PROCEDURE — 86618 LYME DISEASE ANTIBODY: CPT | Performed by: NURSE PRACTITIONER

## 2017-04-12 PROCEDURE — 36415 COLL VENOUS BLD VENIPUNCTURE: CPT | Performed by: NURSE PRACTITIONER

## 2017-04-12 PROCEDURE — 99214 OFFICE O/P EST MOD 30 MIN: CPT | Performed by: NURSE PRACTITIONER

## 2017-04-12 ASSESSMENT — PAIN SCALES - GENERAL: PAINLEVEL: NO PAIN (0)

## 2017-04-12 NOTE — MR AVS SNAPSHOT
After Visit Summary   4/12/2017    Niesha Adhikari    MRN: [id number]           Patient Information     Date Of Birth          1950        Visit Information        Provider Department      4/12/2017 11:40 AM Jigna Ireland APRN CNP [facility]        Today's Diagnoses     Cough    -  1    Advanced directives, counseling/discussion        Tick bite, initial encounter           Follow-ups after your visit        Your next 10 appointments already scheduled     Apr 17, 2017 11:40 AM CDT   [facility] with Erendira Merida MD   [facility]    [address] [address] [phone]            Aug 23, 2017  8:40 AM CDT   [facility] Visit with [facility]olinar Cho MD   [facility]    [address]  [place] [address] [phone]              Future tests that were ordered for you today     Open Future Orders        Priority Expected Expires Ordered    **Lyme Disease Jailyn with reflex to WB Serum FUTURE 14d Routine 5/31/2017 3/26/2018 4/12/2017            Who to contact     If you have questions or need follow up information about today's clinic visit or your schedule please contact [facility] directly at [phone].  Normal or non-critical lab and imaging results will be communicated to you by MyChart, letter or phone within 4 business days after the clinic has received the results. If you do not hear from us within 7 days, please contact the clinic through MyChart or phone. If you have a critical or abnormal lab result, we will notify you by phone as soon as possible.  Submit refill requests through [employer] or call your pharmacy and they will forward the refill request to us. Please allow 3 business days for your refill to be completed.          Additional Information About Your Visit        [employer] Information     [employer] gives you secure access to your  "electronic health record. If you see a primary care provider, you can also send messages to your care team and make appointments. If you have questions, please call your primary care clinic.  If you do not have a primary care provider, please call [phone] and they will assist you.        Care EveryWhere ID     This is your Care EveryWhere ID. This could be used by other organizations to access your [facility] medical records  [id number]        Your Vitals Were     Pulse Temperature Respirations Height Last Period Pulse Oximetry    62 98.6  F (37  C) (Temporal) 14 5' 4\" (1.626 m) 11/27/2003 98%    BMI (Body Mass Index)                   25.7 kg/m2            Blood Pressure from Last 3 Encounters:   04/12/17 122/74   02/22/17 (!) 155/91   02/21/17 126/76    Weight from Last 3 Encounters:   04/12/17 149 lb 11.2 oz (67.9 kg)   02/22/17 148 lb 6.4 oz (67.3 kg)   02/21/17 148 lb 12.8 oz (67.5 kg)              We Performed the Following     Lyme Disease Jailyn with reflex to WB Serum        Primary Care Provider Office Phone # Fax #    Tanika QUIROZ MD Eduardo [phone]       [facility] [address] [address]        Thank you!     Thank you for choosing [facility]  for your care. Our goal is always to provide you with excellent care. Hearing back from our patients is one way we can continue to improve our services. Please take a few minutes to complete the written survey that you may receive in the mail after your visit with us. Thank you!             Your Updated Medication List - Protect others around you: Learn how to safely use, store and throw away your medicines at www.disposemymeds.org.          This list is accurate as of: 4/12/17  2:35 PM.  Always use your most recent med list.                   Brand Name Dispense Instructions for use    celecoxib 200 MG capsule    celeBREX    180 capsule    TAKE ONE CAPSULE BY MOUTH EVERY DAY       clonazePAM 0.5 MG " tablet    klonoPIN    180 tablet    Take 1 mg by mouth At Bedtime Reported on 4/12/2017       cycloSPORINE 0.05 % ophthalmic emulsion    RESTASIS     1 drop 2 times daily       Fiber 0.52 G Caps     540 capsule    4 tabs in pm       hydrocortisone 1 % ointment     30 g    Apply sparingly to affected area three times daily for 14 days.       montelukast 10 MG tablet    SINGULAIR    90 tablet    Take 1 tablet (10 mg) by mouth daily as needed Seasonal (August and September)       nystatin 032161 UNIT/GM Powd    MYCOSTATIN    60 g    Apply topically 3 times daily as needed       PROVIGIL PO      Take 200 mg by mouth 2 times daily Patient unsure which dose       ranitidine 300 MG tablet    ZANTAC    90 tablet    TAKE ONE TABLET BY MOUTH EVERY DAY       sertraline 100 MG tablet    ZOLOFT     Take 100 mg by mouth daily Takes 2 at bedtime       vitamin D 13605 UNIT capsule    ERGOCALCIFEROL    12 capsule    Take 1 capsule (50,000 Units) by mouth every 7 days       VYVANSE 20 MG capsule   Generic drug:  lisdexamfetamine     30 capsule    Take 2 capsules (40 mg) by mouth every morning

## 2017-04-12 NOTE — PROGRESS NOTES
SUBJECTIVE:                                                    Niesha Adhikari is a 66 year old female who presents to clinic today for the following health issues:      Acute Illness   Acute illness concerns: cough   Onset: 3 weeks ago     Fever: YES- low grade    Chills/Sweats: no     Headache (location?): YES    Sinus Pressure:YES    Conjunctivitis:  no    Ear Pain: no    Rhinorrhea: no     Congestion: YES    Sore Throat: YES     Cough: YES-non-productive    Wheeze: YES    Decreased Appetite: no     Nausea: Yes    Vomiting: no     Diarrhea:  no     Dysuria/Freq.: no     Fatigue/Achiness: YES    Sick/Strep Exposure: no      Therapies Tried and outcome: Patient has not tried anything. Patient states she is increasing fluids and running a humidifier and that has helped. Pt. Does not think it is influenza.       Patient would like a tick bite checked if time permits.  Had bite about 10 days ago on left lateral chest, scab has formed, thinks tick was on for less than 1 day. Also had another bite on LE- for a few hours.   Has fibromyalgia, no large joint complaints, chronic mild malaise. No bullseye rash or other rashes.           Problem list and histories reviewed & adjusted, as indicated.  Additional history: as documented    Patient Active Problem List   Diagnosis     Allergic rhinitis     Esophageal reflux     Pernicious anemia     Anxiety state     Migraine     Essential and other specified forms of tremor     Fibromyalgia     Moderate recurrent major depression (H)     Advanced directives, counseling/discussion     Narcolepsy     Internal hemorrhoids with other complication     Hepatitis C     AIN (anal intraepithelial neoplasia) anal canal     Rectal bleeding     Sciatica     Senile osteoporosis     Past Surgical History:   Procedure Laterality Date     BIOPSY ANAL CANAL  1/21/13    [facility]      BREAST BIOPSY, RT/LT Left 1975    Breat Biopsy RT/LT     C NONSPECIFIC PROCEDURE  1965    Removed bone  left index finger knuckle, casts broken bones     COLONOSCOPY  8/25/2009     COLONOSCOPY  2/14/2011    COLONOSCOPY performed by CRISTIN LAGUNAS at  GI     CYSTOSCOPY  2/28/2011    CYSTOSCOPY performed by CAYLA FLOR at  OR     ENDOSCOPY  05/21/12    [facility]      COLONOSCOPY W/WO BRUSH/WASH  08/22/05      UGI ENDOSCOPY DIAG W BIOPSY  10/01/09     [facility] ENDOSCOPY, SIMPLE EXAM  08/08/07     HEMORRHOIDECTOMY  06/25/12    [facility]     LAPAROSCOPIC SALPINGO-OOPHORECTOMY  2/28/2011    LAPAROSCOPIC SALPINGO-OOPHORECTOMY performed by CAYLA FLOR at  OR     TONSILLECTOMY & ADENOIDECTOMY  1965       Social History   Substance Use Topics     Smoking status: Former Smoker     Packs/day: 0.75     Years: 10.00     Types: Cigarettes     Start date: 11/1/1968     Quit date: 11/1/1978     Smokeless tobacco: Never Used      Comment: No exposure at home     Alcohol use No     Family History   Problem Relation Age of Onset     Hypertension Mother      Breast Cancer Mother      Coronary Artery Disease Mother      CEREBROVASCULAR DISEASE Mother      KIDNEY DISEASE Mother      Hypertension Brother      Respiratory Brother      emphysema     Lipids Brother      HEART DISEASE Brother      stents, 12/2011; has had about 6 MIs, last one 1/2014     C.A.D. Sister      MI at age 63     Hypertension Sister      GASTROINTESTINAL DISEASE Sister      gallbladder     Circulatory Sister      brain aneurysm at 63     Genitourinary Problems Sister      1 kidney/bladder     Hypertension Sister      Obesity Sister      Unknown/Adopted Paternal Uncle      Blood Disease Son      Lymes/7/11     Hypertension Father      Lymphoma Father      Glaucoma Father      Coronary Artery Disease Other 49     niece     DIABETES Other      cousin     Coronary Artery Disease Sister      Coronary Artery Disease Brother      Hyperlipidemia Brother      Hypertension Son      CEREBROVASCULAR DISEASE Maternal  "Grandmother      CEREBROVASCULAR DISEASE Paternal Grandmother      Liver Cancer Cousin      Glaucoma Paternal Grandfather            Reviewed and updated as needed this visit by clinical staff       Reviewed and updated as needed this visit by Provider         ROS:  Constitutional, HEENT, cardiovascular, pulmonary, gi and gu systems are negative, except as otherwise noted.    OBJECTIVE:                                                    /74  Pulse 62  Temp 98.6  F (37  C) (Temporal)  Resp 14  Ht 5' 4\" (1.626 m)  Wt 149 lb 11.2 oz (67.9 kg)  LMP 11/27/2003  SpO2 98%  BMI 25.7 kg/m2  Body mass index is 25.7 kg/(m^2).   GENERAL APPEARANCE: healthy, alert and no distress  EYES: Eyes grossly normal to inspection and conjunctivae and sclerae normal  HENT: ear canals and TM's normal, nose and mouth without ulcers or lesions and nasal mucosa edematous with mild rhinorrhea  NECK: no adenopathy, no asymmetry, masses, or scars and thyroid normal to palpation  RESP: lungs clear to auscultation - no rales, rhonchi or wheezes  CV: regular rates and rhythm, normal S1 S2, no S3 or S4 and no murmur, click or rub   SKIN: no suspicious lesions or rashes and tick bite/scab approximately 4 mm on left lateral chest , mild erythema surrounding lesion.          ASSESSMENT/PLAN:                                                        ICD-10-CM    1. Cough R05    2. Advanced directives, counseling/discussion Z71.89    3. Tick bite, initial encounter W57.XXXA Lyme Disease Jailyn with reflex to WB Serum     **Lyme Disease Jailyn with reflex to WB Serum FUTURE 14d       Cough, likely viral bronchitis. Home supportive cares.   Lyme testing- likely normal, can have repeat testing in 6 weeks as [place] endemic [place].   Advanced directive- on file, pt is updating and asked for more recent copy.   Return to clinic with any new or worsening symptoms, and as needed.     YOSEPH [facility]    "

## 2017-04-12 NOTE — NURSING NOTE
"Chief Complaint   Patient presents with     Cough     Panel Management     honoring choices       Initial /74  Pulse 62  Temp 98.6  F (37  C) (Temporal)  Resp 14  Ht 5' 4\" (1.626 m)  Wt 149 lb 11.2 oz (67.9 kg)  LMP 11/27/2003  SpO2 98%  BMI 25.7 kg/m2 Estimated body mass index is 25.7 kg/(m^2) as calculated from the following:    Height as of this encounter: 5' 4\" (1.626 m).    Weight as of this encounter: 149 lb 11.2 oz (67.9 kg).  Medication Reconciliation: complete     Sana Garcia MA    "

## 2017-04-13 LAB — B BURGDOR IGG+IGM SER QL: NORMAL (ref 0–0.89)

## 2017-05-11 DIAGNOSIS — M81.0 OSTEOPOROSIS: ICD-10-CM

## 2017-05-11 DIAGNOSIS — E55.9 VITAMIN D DEFICIENCY: ICD-10-CM
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
## 2017-05-11 LAB — PTH-INTACT SERPL-MCNC: 76 PG/ML (ref 12–72)

## 2017-05-11 PROCEDURE — 36415 COLL VENOUS BLD VENIPUNCTURE: CPT | Performed by: FAMILY MEDICINE

## 2017-05-11 PROCEDURE — 86618 LYME DISEASE ANTIBODY: CPT | Performed by: FAMILY MEDICINE

## 2017-05-11 PROCEDURE — 83970 ASSAY OF PARATHORMONE: CPT | Performed by: FAMILY MEDICINE

## 2017-05-11 PROCEDURE — 82306 VITAMIN D 25 HYDROXY: CPT | Performed by: FAMILY MEDICINE

## 2017-05-12 LAB
B BURGDOR IGG+IGM SER QL: NORMAL (ref 0–0.89)
DEPRECATED CALCIDIOL+CALCIFEROL SERPL-MC: 30 UG/L (ref 20–75)

## 2017-05-29 ENCOUNTER — MYC MEDICAL ADVICE (OUTPATIENT)
Dept: RHEUMATOLOGY | Facility: CLINIC | Age: 67
End: 2017-05-29

## 2017-05-29 DIAGNOSIS — E55.9 VITAMIN D DEFICIENCY: ICD-10-CM

## 2017-05-29 DIAGNOSIS — M81.0 OSTEOPOROSIS: Primary | ICD-10-CM

## 2017-05-29 NOTE — PROGRESS NOTES
"[employer] message sent:  \"Ms. Adhikari,    Your vitamin D is just at the normal level and should be higher prior to starting Boniva.  The parathyroid hormone is coming down appropriately but is still elevated, showing the that vitamin D needs to be higher.  Therefore, continue ergocalciferol 50,000 Units once weekly for now and have labs a few days before I see you back in August.      Please let me know if you have any questions.    Sincerely,  Apolinar Cho MD  5/29/2017 9:16 AM\""

## 2017-05-30 NOTE — TELEPHONE ENCOUNTER
RN huddled with provider.  would like to see patient in clinic for an office visit at patients earliest convenience. Patient notified via mychart and left message on voicemail. Patient wrote back stating she will schedule appointment. Appointment was made.   Closing encounter.   Mirtha Jaimes RN............. 5/30/2017....12:52 PM

## 2017-06-07 ENCOUNTER — OFFICE VISIT (OUTPATIENT)
Dept: RHEUMATOLOGY | Facility: CLINIC | Age: 67
End: 2017-06-07
Payer: COMMERCIAL

## 2017-06-07 ENCOUNTER — RADIANT APPOINTMENT (OUTPATIENT)
Dept: GENERAL RADIOLOGY | Facility: CLINIC | Age: 67
End: 2017-06-07
Attending: INTERNAL MEDICINE
Payer: COMMERCIAL

## 2017-06-07 VITALS
BODY MASS INDEX: 25.4 KG/M2 | WEIGHT: 148.8 LBS | HEART RATE: 75 BPM | OXYGEN SATURATION: 96 % | DIASTOLIC BLOOD PRESSURE: 98 MMHG | HEIGHT: 64 IN | SYSTOLIC BLOOD PRESSURE: 152 MMHG | TEMPERATURE: 98.1 F

## 2017-06-07 DIAGNOSIS — Z79.899 HIGH RISK MEDICATION USE: ICD-10-CM

## 2017-06-07 DIAGNOSIS — M06.4 INFLAMMATORY POLYARTHROPATHY (H): ICD-10-CM

## 2017-06-07 DIAGNOSIS — M06.4 INFLAMMATORY POLYARTHROPATHY (H): Primary | ICD-10-CM

## 2017-06-07 DIAGNOSIS — M81.0 OSTEOPOROSIS: ICD-10-CM

## 2017-06-07 LAB
BASOPHILS # BLD AUTO: 0 10E9/L (ref 0–0.2)
BASOPHILS NFR BLD AUTO: 0.3 %
DIFFERENTIAL METHOD BLD: ABNORMAL
EOSINOPHIL # BLD AUTO: 0.2 10E9/L (ref 0–0.7)
EOSINOPHIL NFR BLD AUTO: 4.9 %
ERYTHROCYTE [DISTWIDTH] IN BLOOD BY AUTOMATED COUNT: 14.2 % (ref 10–15)
HBV SURFACE AG SERPL QL IA: NONREACTIVE
HCT VFR BLD AUTO: 43.1 % (ref 35–47)
HCV AB SERPL QL IA: ABNORMAL
HGB BLD-MCNC: 14.1 G/DL (ref 11.7–15.7)
LYMPHOCYTES # BLD AUTO: 1.1 10E9/L (ref 0.8–5.3)
LYMPHOCYTES NFR BLD AUTO: 30.2 %
MCH RBC QN AUTO: 29.1 PG (ref 26.5–33)
MCHC RBC AUTO-ENTMCNC: 32.7 G/DL (ref 31.5–36.5)
MCV RBC AUTO: 89 FL (ref 78–100)
MONOCYTES # BLD AUTO: 0.4 10E9/L (ref 0–1.3)
MONOCYTES NFR BLD AUTO: 10 %
NEUTROPHILS # BLD AUTO: 2 10E9/L (ref 1.6–8.3)
NEUTROPHILS NFR BLD AUTO: 54.6 %
PLATELET # BLD AUTO: 156 10E9/L (ref 150–450)
RBC # BLD AUTO: 4.85 10E12/L (ref 3.8–5.2)
RHEUMATOID FACT SER NEPH-ACNC: <20 IU/ML (ref 0–20)
WBC # BLD AUTO: 3.7 10E9/L (ref 4–11)

## 2017-06-07 PROCEDURE — 87340 HEPATITIS B SURFACE AG IA: CPT | Performed by: INTERNAL MEDICINE

## 2017-06-07 PROCEDURE — 87522 HEPATITIS C REVRS TRNSCRPJ: CPT | Performed by: INTERNAL MEDICINE

## 2017-06-07 PROCEDURE — 86431 RHEUMATOID FACTOR QUANT: CPT | Performed by: INTERNAL MEDICINE

## 2017-06-07 PROCEDURE — 85025 COMPLETE CBC W/AUTO DIFF WBC: CPT | Performed by: INTERNAL MEDICINE

## 2017-06-07 PROCEDURE — 73630 X-RAY EXAM OF FOOT: CPT | Mod: LT

## 2017-06-07 PROCEDURE — 36415 COLL VENOUS BLD VENIPUNCTURE: CPT | Performed by: INTERNAL MEDICINE

## 2017-06-07 PROCEDURE — 99214 OFFICE O/P EST MOD 30 MIN: CPT | Performed by: INTERNAL MEDICINE

## 2017-06-07 PROCEDURE — 86706 HEP B SURFACE ANTIBODY: CPT | Performed by: INTERNAL MEDICINE

## 2017-06-07 PROCEDURE — 86704 HEP B CORE ANTIBODY TOTAL: CPT | Performed by: INTERNAL MEDICINE

## 2017-06-07 PROCEDURE — 86705 HEP B CORE ANTIBODY IGM: CPT | Performed by: INTERNAL MEDICINE

## 2017-06-07 PROCEDURE — 86803 HEPATITIS C AB TEST: CPT | Performed by: INTERNAL MEDICINE

## 2017-06-07 PROCEDURE — 86200 CCP ANTIBODY: CPT | Performed by: INTERNAL MEDICINE

## 2017-06-07 PROCEDURE — 73130 X-RAY EXAM OF HAND: CPT | Mod: RT

## 2017-06-07 RX ORDER — HYDROXYCHLOROQUINE SULFATE 200 MG/1
200 TABLET, FILM COATED ORAL 2 TIMES DAILY
Qty: 60 TABLET | Refills: 3 | Status: SHIPPED | OUTPATIENT
Start: 2017-06-07 | End: 2017-08-30

## 2017-06-07 RX ORDER — PREDNISONE 5 MG/1
TABLET ORAL
Qty: 120 TABLET | Refills: 0 | Status: SHIPPED | OUTPATIENT
Start: 2017-06-07 | End: 2017-11-28

## 2017-06-07 NOTE — MR AVS SNAPSHOT
After Visit Summary   6/7/2017    Niesha Adhkiari    MRN: [id number]           Patient Information     Date Of Birth          1950        Visit Information        Provider Department      6/7/2017 9:20 AM Apolinar Cho MD [facility]        Today's Diagnoses     Inflammatory polyarthropathy (H)    -  1    High risk medication use          Care Instructions    You have an inflammatory arthritis that is either hepatitis C related arthralgias or rheumatoid arthritis.      Start hydroxychloroquine (aka Plaquenil) 400mg daily.  Start prednisone taper, ending with 5mg daily.          Follow-ups after your visit        Additional Services     OPHTHALMOLOGY ADULT REFERRAL       Your provider has referred you to: Ophthalmology     Reason for referral: Hydroxychloroquine (plaquenil) toxicity monitoring.     Please be aware that coverage of these services is subject to the terms and limitations of your health insurance plan.  Call member services at your health plan with any benefit or coverage questions.      Please bring the following to your appointment:  >>   Any x-rays, CTs or MRIs which have been performed.  Contact the facility where they were done to arrange for  prior to your scheduled appointment.  Any new CT, MRI or other procedures ordered by your specialist must be performed at a [place] facility or coordinated by your clinic's referral office.    >>   List of current medications   >>   This referral request   >>   Any documents/labs given to you for this referral                  Your next 10 appointments already scheduled     Aug 18, 2017  7:45 AM CDT   LAB with NL LAB C   [facility] ([facility]    [address] [facility] [address] [phone]           Patient must bring picture ID.  Patient should be prepared to give a urine specimen  Please do not eat 10-12 hours before your appointment if you are coming in fasting for  labs on lipids, cholesterol, or glucose (sugar).  Pregnant women should follow their Care Team instructions. Water with medications is okay. Do not drink coffee or other fluids.   If you have concerns about taking  your medications, please ask at office or if scheduling via [employer], send a message by clicking on Secure Messaging, Message Your Care Team.            Aug 23, 2017  8:40 AM CDT   Return Visit with Apolinar Cho MD   [facility] [place] ([facility])    [address] [place] [address]  [place] [id number] [phone]              Future tests that were ordered for you today     Open Future Orders        Priority Expected Expires Ordered    XR Foot Bilateral G/E 3 Views Routine 6/7/2017 6/7/2018 6/7/2017    CBC with platelets differential Routine 8/1/2017 8/30/2017 6/7/2017    XR Hand Right G/E 3 Views Routine 6/7/2017 6/7/2018 6/7/2017            Who to contact     If you have questions or need follow up information about today's clinic visit or your schedule please contact [facility] directly at [phone].  Normal or non-critical lab and imaging results will be communicated to you by [employer], letter or phone within 4 business days after the clinic has received the results. If you do not hear from us within 7 days, please contact the clinic through [employer] or phone. If you have a critical or abnormal lab result, we will notify you by phone as soon as possible.  Submit refill requests through [employer] or call your pharmacy and they will forward the refill request to us. Please allow 3 business days for your refill to be completed.          Additional Information About Your Visit        [employer] Information     [employer] gives you secure access to your electronic health record. If you see a primary care provider, you can also send messages to your care team and make appointments. If you have questions, please call your primary care clinic.  If you do not have a primary care  "provider, please call [phone] and they will assist you.        Care EveryWhere ID     This is your Care EveryWhere ID. This could be used by other organizations to access your [facility] medical records  [id number]        Your Vitals Were     Pulse Temperature Height Last Period Pulse Oximetry BMI (Body Mass Index)    75 98.1  F (36.7  C) (Oral) 1.626 m (5' 4\") 11/27/2003 96% 25.54 kg/m2       Blood Pressure from Last 3 Encounters:   06/07/17 (!) 152/98   04/12/17 122/74   02/22/17 (!) 155/91    Weight from Last 3 Encounters:   06/07/17 67.5 kg (148 lb 12.8 oz)   04/12/17 67.9 kg (149 lb 11.2 oz)   02/22/17 67.3 kg (148 lb 6.4 oz)              We Performed the Following     CBC with platelets differential     Cyclic Citrullinated Peptide Antibody IgG     Hepatitis B core antibody     Hepatitis B surface antigen     Hepatitis C Screen Reflex to HCV RNA Quant and Genotype     OPHTHALMOLOGY ADULT REFERRAL     Rheumatoid factor          Today's Medication Changes          These changes are accurate as of: 6/7/17  9:54 AM.  If you have any questions, ask your nurse or doctor.               Start taking these medicines.        Dose/Directions    hydroxychloroquine 200 MG tablet   Commonly known as:  PLAQUENIL   Used for:  Inflammatory polyarthropathy (H)   Started by:  Apolinar Cho MD        Dose:  200 mg   Take 1 tablet (200 mg) by mouth 2 times daily   Quantity:  60 tablet   Refills:  3       predniSONE 5 MG tablet   Commonly known as:  DELTASONE   Used for:  Inflammatory polyarthropathy (H)   Started by:  Apolinar Cho MD        Prednisone 20mg daily x5days, then 15mg daily x5days, then 10mg daily x5days, then 5mg daily thereafter.   Quantity:  120 tablet   Refills:  0            Where to get your medicines      These medications were sent to [facility] [place], [place] - [address]  [address], [facility] [address]     Phone:  [phone]     hydroxychloroquine 200 MG tablet    " predniSONE 5 MG tablet                Primary Care Provider Office Phone # Fax #    Tanika QUIROZ MD Eduardo [phone]       [facility] [address]  [facility] [address]        Thank you!     Thank you for choosing [facility]  for your care. Our goal is always to provide you with excellent care. Hearing back from our patients is one way we can continue to improve our services. Please take a few minutes to complete the written survey that you may receive in the mail after your visit with us. Thank you!             Your Updated Medication List - Protect others around you: Learn how to safely use, store and throw away your medicines at www.disposemymeds.org.          This list is accurate as of: 6/7/17  9:54 AM.  Always use your most recent med list.                   Brand Name Dispense Instructions for use    celecoxib 200 MG capsule    celeBREX    180 capsule    TAKE ONE CAPSULE BY MOUTH EVERY DAY       clonazePAM 0.5 MG tablet    klonoPIN    180 tablet    Take 1 mg by mouth At Bedtime Reported on 4/12/2017       cycloSPORINE 0.05 % ophthalmic emulsion    RESTASIS     1 drop 2 times daily       Fiber 0.52 G Caps     540 capsule    4 tabs in pm       hydrocortisone 1 % ointment     30 g    Apply sparingly to affected area three times daily for 14 days.       hydroxychloroquine 200 MG tablet    PLAQUENIL    60 tablet    Take 1 tablet (200 mg) by mouth 2 times daily       montelukast 10 MG tablet    SINGULAIR    90 tablet    Take 1 tablet (10 mg) by mouth daily as needed Seasonal (August and September)       nystatin 734678 UNIT/GM Powd    MYCOSTATIN    60 g    Apply topically 3 times daily as needed       predniSONE 5 MG tablet    DELTASONE    120 tablet    Prednisone 20mg daily x5days, then 15mg daily x5days, then 10mg daily x5days, then 5mg daily thereafter.       PROVIGIL PO      Take 200 mg by mouth 2 times daily Patient unsure which dose       ranitidine 300 MG  tablet    ZANTAC    90 tablet    TAKE ONE TABLET BY MOUTH EVERY DAY       sertraline 100 MG tablet    ZOLOFT     Take 100 mg by mouth daily Takes 2 at bedtime       vitamin D 34761 UNIT capsule    ERGOCALCIFEROL    12 capsule    Take 1 capsule (50,000 Units) by mouth every 7 days       VYVANSE 20 MG capsule   Generic drug:  lisdexamfetamine     30 capsule    Take 40 mg by mouth every morning Patient is taking 30 mg every day

## 2017-06-07 NOTE — NURSING NOTE
Blood pressure rechecked after visit   152/98  Anabella Garcia CMA  6/7/2017 9:52 AM

## 2017-06-07 NOTE — PROGRESS NOTES
Rheumatology Clinic Visit      Niesha Adhikari MRN# [id number]   YOB: 1950 Age: 66 year old      Date of visit: 6/07/17   PCP: Dr. Tanika Clark  Hepatologist: Dr. Peres at [facility] in [place]    Chief Complaint   Patient presents with:  RECHECK: patient states all her fingers are stiff and swollen, drops things more. Also has fatigue really bad, has been falling more often      Assessment and Plan     1. Inflammatory polyarthropathy: Hepatitis C related arthralgias versus rheumatoid arthritis. Symmetric synovitis on exam today; morning stiffness >1hour.  The diagnosis and treatment options were discussed in detail. Start prednisone taper. Start hydroxychloroquine.  - Labs today: RF, CCP, CBC, hepatitis B, hepatitis C PCR  - X-rays today: Right hand, bilateral feet  - Ophthalmology referral for hydroxychloroquine toxicity monitoring  - Hydroxychloroquine 200 mg twice daily  - Start prednisone 20mg daily x5days, then 15mg daily x5days, then 10mg daily x5days, then 5mg daily thereafter  - Labs 2-3 days prior to the next rheumatology clinic visit: CBC, CMP, ESR, CRP    # Hydroxychloroquine (Plaquenil) Risks and Benefits:  The risks and benefits of hydroxychloroquine were discussed in detail and the patient verbalized understanding; the patient also verbalized agreement to get the required ophthalmologic toxicity monitoring.  The risks discussed include, but are not limited to, the risk for hypersensitivity, anaphylaxis, anaphylactoid reactions, irreversible retinal damage, rare hematologic reactions, and rare cardiomyopathy.  Patients with G6PD deficiency or hepatic impairment may be at an increased risk for adverse effects.  I encouraged reviewing the package insert and asking any questions about the medication.      # Prednisone Risks and Benefits: The risks and benefits of prednisone were discussed in detail and the patient verbalized understanding.  The risks discussed include, but are not  "limited to, weight gain, fluid retention, impaired wound healing, hyperglycemia, adrenal suppression, GI upset, peptic ulcer, hepatotoxicity, aseptic necrosis of the femoral and humeral heads, osteoporosis, myopathy, tendon rupture (particularly Achilles tendon), ocular changes including an increased intraocular pressure.  I encouraged reviewing the package insert and asking any questions about the medication.      2. Osteoporosis: Previously treated with Fosamax (GERD), PO Boniva (reportedly ineffective), and IV Boniva (reportedly effective). Prolia was being considered by a previous rheumatologist but not used because she wanted \"clearance\" from the patient's gastroenterologist because she has hepatitis C; I do not see a contraindication for Prolia in the setting of hepatitis C. The patient reports that her gastroenterologist reported no contraindication for Prolia either. Patient reports having no osteoporosis treatment for about 2 years now and her last bone density scan showed osteoporosis. She would like to restart IV Boniva. She does not want to use Prolia . DEXA was repeated on 2/23/2017 that showed osteoporosis. Vitamin D was low and therefore it is being replaced currently. She is going to have labs rechecked in August to determine if it is safe at that time to restart IV boniva.  - Labs in August: CMP, vitamin D, PTH    3. Hypertersion: Blood pressure checked this clinic visit and was elevated.  I advised her to follow-up with her PCP for management, ASAP.      4. Hepatitis C: Following with Dr. Peres at [facility] in [place]; recently had Axel but per patient her viral loads are still detected after being undetectable for a while.     Ms. Adhikari verbalized agreement with and understanding of the rational for the diagnosis and treatment plan.  All questions were answered to best of my ability and the patient's satisfaction. Ms. Adhikari was advised to contact the clinic with any questions " that may arise after the clinic visit.      Thank you for involving me in the care of the patient    Return to clinic: August 2017      HPI   Niesha Adhikari is a 66 year old female with a medical history significant for hepatitis C, hemorrhoids, narcolepsy, fibromyalgia, migraines, anxiety, pernicious anemia, GERD, allergic rhinitis, and osteoporosis who presents earlier than previously scheduled because of hand pain and stiffness.     Ms. Adihkari was previously followed in the rheumatology clinic by Dr. Luciano and Dr. Marc.  A 10/29/2015 clinic note documents osteoporosis that was first diagnosed in 2001. Initially she was on Fosamax but was limited because of GERD. Reportedly treatment failure to oral and IV Boniva. Prolia was being considered but Dr. Luciano documents that she wanted clearance from gastroenterology because of her high hepatitis C viral load.    Regarding hepatitis C, she is followed at the Hepatology Department at [facility] in [place] by Shannon Sher and Dr. Peres.  A letter dated 10/29/2016 from Shannon Sher states that Ms. Adhikari has completed a 12 week course of hepatitis C therapy with [facility] and she is responding to treatment, based on an undetectable hepatitis B viral load at the end of therapy.    Today, she presents earlier than previously scheduled because of bilateral hand pain and stiffness. She reports that her right fifth PIP started to become painful and swollen, then more stiff. This then progressed to include all of her MCPs and PIPs. Currently with morning stiffness that lasts all day, but is worse over several hours. Reduced  strength and is finding that she is dropping items. Pain is worse when she lifts heavy objects but if she has more active in general that she feels a little bit better. Inactivity worsens or stiffness. NSAIDs generally causes a rash, but she does use Celebrex on a daily basis already. Tylenol causes a rash.    Regarding  osteoporosis, she continues to use high-dose vitamin D with plans to repeat labs prior to her next clinic visit to determine if she has sufficient calcium and vitamin D to use a bisphosphonate. She reports having great dentition.     Denies fevers, chills, nausea, vomiting, constipation, diarrhea. No abdominal pain. No chest pain/pressure, palpitations, or shortness of breath. No LE swelling. No neck pain. No oral or nasal sores.  No rash.    Tobacco: quit in 1978  EtOH: none  Drugs: none  Occupation: retired    ROS   GEN: No fevers, chills, night sweats, or weight change  SKIN: No itching, rashes, sores  HEENT: No epistaxis. No oral or nasal ulcers.  CV: No chest pain, pressure, palpitations, or dyspnea on exertion.  PULM: No SOB, wheeze, cough.  GI: No nausea, vomiting, constipation, diarrhea. No blood in stool. No abdominal pain.  : No blood in urine.  MSK: See HPI.  NEURO: No numbness, tingling, or weakness.  ENDO: No heat/cold intolerance.  EXT: No LE swelling  PSYCH: See history of present illness    Active Problem List     Patient Active Problem List   Diagnosis     Allergic rhinitis     Esophageal reflux     Pernicious anemia     Anxiety state     Migraine     Essential and other specified forms of tremor     Fibromyalgia     Moderate recurrent major depression (H)     Advanced directives, counseling/discussion     Narcolepsy     Internal hemorrhoids with other complication     Hepatitis C     AIN (anal intraepithelial neoplasia) anal canal     Rectal bleeding     Sciatica     Senile osteoporosis     Past Medical History     Past Medical History:   Diagnosis Date     ABUSE BY SPOUSE/PARTNER 7/27/2005     Degeneration of lumbar or lumbosacral intervertebral disc     DDD L5/S1     HELICOBACTER PYLORI INFECTION 1/28/2005     Hepatitis C      Hypertension      Malignant neoplasm (H)     ACIN     Osteoporosis      Other and unspecified alcohol dependence, unspecified drinking behavior     Sober as 1/21/1987      "Other malaise and fatigue      Past Surgical History     Past Surgical History:   Procedure Laterality Date     BIOPSY ANAL CANAL  1/21/13    [facility]      BREAST BIOPSY, RT/LT Left 1975    Breat Biopsy RT/LT     C NONSPECIFIC PROCEDURE  1965    Removed bone left index finger knuckle, casts broken bones     COLONOSCOPY  8/25/2009     COLONOSCOPY  2/14/2011    COLONOSCOPY performed by CRISTIN LAGUNAS at  GI     CYSTOSCOPY  2/28/2011    CYSTOSCOPY performed by CAYLA FLOR at  OR     ENDOSCOPY  05/21/12    [facility]     HC COLONOSCOPY W/WO BRUSH/WASH  08/22/05     HC UGI ENDOSCOPY DIAG W BIOPSY  10/01/09     HC UGI ENDOSCOPY, SIMPLE EXAM  08/08/07     HEMORRHOIDECTOMY  06/25/12    [facility]     LAPAROSCOPIC SALPINGO-OOPHORECTOMY  2/28/2011    LAPAROSCOPIC SALPINGO-OOPHORECTOMY performed by CAYLA FLOR at  OR     TONSILLECTOMY & ADENOIDECTOMY  1965     Allergy     Allergies   Allergen Reactions     Abilify Discmelt Other (See Comments)     Disoriented     Antivert [Meclizine Hcl]      Compazine      Cymbalta Other (See Comments)     Disoriented, trouble sleeping     Diphenhydramine Nausea     And abdominal pain     Effexor [Venlafaxine] Other (See Comments)     Disoriented, trouble sleeping     Elavil [Amitriptyline Hcl] Other (See Comments)     \"didn't feel right on it-med was stopped right away\"     Ferrous Sulfate Nausea and Vomiting     Food Difficulty breathing     cilantro     Indomethacin      indocin sensativity \"Severe h.a\"     Seasonal Allergies Other (See Comments) and Difficulty breathing     Philip Gold Aug-Sept, rag weed, sneezing     Thiopental Sodium      PENTOTHAL/rigidity and fight response     Animal Dander Difficulty breathing and Rash     sneezing,resp. distress     Bupropion Anxiety     Current Medication List     Current Outpatient Prescriptions   Medication Sig     vitamin D (ERGOCALCIFEROL) 74305 UNIT capsule Take 1 capsule (50,000 " Units) by mouth every 7 days     hydrocortisone 1 % ointment Apply sparingly to affected area three times daily for 14 days.     cycloSPORINE (RESTASIS) 0.05 % ophthalmic emulsion 1 drop 2 times daily     Modafinil (PROVIGIL PO) Take 200 mg by mouth 2 times daily Patient unsure which dose      celecoxib (CELEBREX) 200 MG capsule TAKE ONE CAPSULE BY MOUTH EVERY DAY     ranitidine (ZANTAC) 300 MG tablet TAKE ONE TABLET BY MOUTH EVERY DAY     montelukast (SINGULAIR) 10 MG tablet Take 1 tablet (10 mg) by mouth daily as needed Seasonal (August and September)     lisdexamfetamine (VYVANSE) 20 MG capsule Take 40 mg by mouth every morning Patient is taking 30 mg every day     nystatin (MYCOSTATIN) 414663 UNIT/GM POWD Apply topically 3 times daily as needed     sertraline (ZOLOFT) 100 MG tablet Take 100 mg by mouth daily Takes 2 at bedtime     Psyllium (FIBER) 0.52 G CAPS 4 tabs in pm     clonazePAM (KLONOPIN) 0.5 MG tablet Take 1 mg by mouth At Bedtime Reported on 4/12/2017     No current facility-administered medications for this visit.          Social History   See HPI    Family History     Family History   Problem Relation Age of Onset     Hypertension Mother      Breast Cancer Mother      Coronary Artery Disease Mother      CEREBROVASCULAR DISEASE Mother      KIDNEY DISEASE Mother      Hypertension Brother      Respiratory Brother      emphysema     Lipids Brother      HEART DISEASE Brother      stents, 12/2011; has had about 6 MIs, last one 1/2014     C.A.D. Sister      MI at age 63     Hypertension Sister      GASTROINTESTINAL DISEASE Sister      gallbladder     Circulatory Sister      brain aneurysm at 63     Genitourinary Problems Sister      1 kidney/bladder     Hypertension Sister      Obesity Sister      Unknown/Adopted Paternal Uncle      Blood Disease Son      Lymes/7/11     Hypertension Father      Lymphoma Father      Glaucoma Father      Coronary Artery Disease Other 49     niece     DIABETES Other       "cousin     Coronary Artery Disease Sister      Coronary Artery Disease Brother      Hyperlipidemia Brother      Hypertension Son      CEREBROVASCULAR DISEASE Maternal Grandmother      CEREBROVASCULAR DISEASE Paternal Grandmother      Liver Cancer Cousin      Glaucoma Paternal Grandfather      Physical Exam     Temp Readings from Last 3 Encounters:   06/07/17 98.1  F (36.7  C) (Oral)   04/12/17 98.6  F (37  C) (Temporal)   02/22/17 97.3  F (36.3  C) (Oral)     BP Readings from Last 5 Encounters:   06/07/17 (!) 152/98   04/12/17 122/74   02/22/17 (!) 155/91   02/21/17 126/76   02/15/17 146/89     Pulse Readings from Last 1 Encounters:   06/07/17 75     Resp Readings from Last 1 Encounters:   04/12/17 14     Estimated body mass index is 25.54 kg/(m^2) as calculated from the following:    Height as of this encounter: 1.626 m (5' 4\").    Weight as of this encounter: 67.5 kg (148 lb 12.8 oz).    GEN: NAD  HEENT: MMM. No oral lesions.  Good dentition.  Anicteric, noninjected sclera  CV: S1, S2. RRR. No m/r/g.  PULM: CTA bilaterally. No w/c.  MSK: Synovial swelling and tenderness to palpation of the bilateral second-fourth MCPs in the bilateral second-fifth PIPs. Heberden's and Philippe's nodes present. Wrists, elbows, and shoulders without swelling or tenderness to palpation. Knee is mildly tender to palpation at medial joint line but no effusion, increased warmth, or overlying erythema. Ankles and feet without swelling or tenderness to palpation. Hips nontender to direct palpation.     NEURO: UE and LE strengths 5/5 and equal bilaterally.   SKIN: No rash  EXT: No LE edema  PSYCH: Alert. Appropriate.    Labs / Imaging (select studies)     CBC  Recent Labs   Lab Test  11/17/16   0755  10/07/16   0738  06/03/16   0929  05/25/16   1525  07/15/15   1607  02/06/15   0958   08/19/13   1635  07/01/13   0919   WBC   --    --    --   6.7  5.1  3.9*   < >  4.5  4.7   RBC   --    --    --   4.81  4.74  5.17   < >  4.47  4.77   HGB   " --    --    --   13.5  14.1  14.7   < >  13.5  14.5   HCT   --    --    --   41.4  42.2  43.9   < >  40.1  42.7   MCV   --    --    --   86  89  85   < >  90  90   RDW   --    --    --   13.9  13.2  13.8   < >  13.2  13.0   PLT  136*  140*  149*  139*  158  154   < >  142*  151   MCH   --    --    --   28.1  29.7  28.4   < >  30.2  30.4   MCHC   --    --    --   32.6  33.4  33.5   < >  33.7  34.0   NEUTROPHIL   --    --    --   69.3   --    --    --   49.9  50.9   LYMPH   --    --    --   19.6   --    --    --   32.5  30.9   MONOCYTE   --    --    --   8.7   --    --    --   8.6  9.9   EOSINOPHIL   --    --    --   2.1   --    --    --   8.4  7.9   BASOPHIL   --    --    --   0.3   --    --    --   0.4  0.4   ANEU   --    --    --   4.6   --    --    --   2.3  2.4   ALYM   --    --    --   1.3   --    --    --   1.5  1.4   FREDY   --    --    --   0.6   --    --    --   0.4  0.5   AEOS   --    --    --   0.1   --    --    --   0.4  0.4   ABAS   --    --    --   0.0   --    --    --   0.0  0.0    < > = values in this interval not displayed.     CMP  Recent Labs   Lab Test  02/23/17   0726  10/07/16   0738  07/15/15   1607   02/06/15   0958   NA  141  138  137   --   139   POTASSIUM  4.1  4.2  3.9   --   4.4   CHLORIDE  104  103  101   --   103   CO2  32  32  29   --   29   ANIONGAP  5  3  7   --   7   GLC  87  84  68*   --   86   BUN  12  18  11   --   11   CR  0.62  0.72  0.65   < >  0.62   GFRESTIMATED  >90  Non  GFR Calc    81  >90  Non  GFR Calc     < >  >90  Non  GFR Calc     GFRESTBLACK  >90   GFR Calc    >90   GFR Calc    >90   GFR Calc     < >  >90   GFR Calc     ELIZABETH  9.0  9.0  8.9   < >  9.1   BILITOTAL  0.5   --   0.5   --   0.5   ALBUMIN  4.0   --   4.0   --   4.0   PROTTOTAL  7.4   --   7.5   --   7.4   ALKPHOS  77   --   59   --   60   AST  22   --   29   --   30   ALT  25   --   34   --   31     < > = values in this interval not displayed.     Iron Studies  Recent Labs   Lab Test  07/15/15   1607  12/05/12   0756  08/29/12   0844   MARTÍNEZ   --   354*  10   IRON  106  119  21*   FEB  439*  315  420   IRONSAT  24  38  5*     Calcium/VitaminD  Recent Labs   Lab Test  05/11/17   0830  02/23/17   0726  10/07/16   0738  09/30/15   0951  07/15/15   1607   06/01/09   1525   ELIZABETH   --   9.0  9.0   --   8.9   < >   --    D3VIT   --    --    --    --    --    --   34   VITDT  30  10*   --   37  81*   < >   --     < > = values in this interval not displayed.     TSH/T4  Recent Labs   Lab Test  10/07/16   0738  07/15/15   1607  02/05/14   1131   03/29/11   1523  05/26/09   1206   TSH  1.90  0.86  0.84   < >  1.01  1.18   T4   --    --    --    --   0.90  0.72    < > = values in this interval not displayed.     Lipid Panel  Recent Labs   Lab Test  11/25/16   1217  10/12/11   1110  03/17/10   1021   CHOL  276*  174  200   TRIG  88  71  125   HDL  65  58  47*   LDL  193*  102  128   VLDL   --   14  25   CHOLHDLRATIO   --   3.0  4.0   NHDL  211*   --    --      Hepatitis B  Recent Labs   Lab Test  09/30/15   0951   HEPBANG  Nonreactive     Hepatitis C  Recent Labs   Lab Test  09/30/15   0951  02/06/15   0958  11/19/13   1226   10/12/11   1110   HCVAB  Reactive   A reactive result indicates one of the following 1) current HCV infection 2)   past HCV infection that has resolved or 3) false positivity. The CDC recommends   that a reactive result should be followed by Nucleic acid testing for HCV RNA.  If HCV RNA is detected, that indicates current HCV infection. If HCV RNA is not   detected, that indicates either past, resolved HCV infection, or false HCV   antibody positivity.   Assay performance characteristics have not been established for newborns,   infants, and children  *   --    --    --   Positive  High sample/cutoff ratio, confirmatory testing available.*   HCVRNA  103220*  647822*  411612*   < >   --     < > = values in  this interval not displayed.     Lyme ab screening  Recent Labs   Lab Test  05/11/17   0830  04/12/17   1211  07/24/15   1047   LYMEGM  <0.01  Negative, Absence of detectable Borrelia burdorferi antibodies. A negative   result does not exclude the possibility of Borrelia burgdorferi infection. If   early Lyme disease is suspected, a second sample should be collected and tested   2 to 4 weeks later.    <0.01  Negative, Absence of detectable Borrelia burdorferi antibodies. A negative   result does not exclude the possibility of Borrelia burgdorferi infection. If   early Lyme disease is suspected, a second sample should be collected and tested   2 to 4 weeks later.    0.11     Tuberculosis Screening  Recent Labs   Lab Test  09/30/15   0952   TBRSLT  Negative   TBAGN  0.05     UA  Recent Labs   Lab Test  08/19/13   1750  03/29/11   1524  03/09/10   1720   COLOR  Yellow  Yellow  Yellow   APPEARANCE  Clear  Clear  Clear   URINEGLC  Negative  Negative  Negative   URINEBILI  Negative  Negative  Negative   SG  1.010  1.020  1.020   URINEPH  7.0  7.0  6.0   PROTEIN  Negative  Negative  Negative   UROBILINOGEN  0.2  0.2  0.2   NITRITE  Negative  Negative  Negative   UBLD  Negative  Trace*  Negative   LEUKEST  Small*  Negative  Negative   WBCU  5-10*  O - 2   --    RBCU  O - 2  O - 2   --      Urine Microscopic  Recent Labs   Lab Test  08/19/13   1750  03/29/11   1524   WBCU  5-10*  O - 2   RBCU  O - 2  O - 2     Urine Protein  Recent Labs   Lab Test  02/27/13   1606   UCRR  119     Immunization History     Immunization History   Administered Date(s) Administered     HPVQuadrivalent 08/13/2012, 09/25/2012, 01/24/2013     Hepatitis A Vac Ped/Adol-2 Dose 04/18/2000, 09/26/2000     Hepatitis B 11/15/2011, 12/19/2011     Influenza (H1N1) 01/07/2010     Influenza (IIV3) 01/03/2005, 10/16/2006, 11/14/2007, 10/28/2008, 09/29/2009, 09/27/2010, 10/04/2011, 09/25/2012, 09/21/2015     Influenza Vaccine IM 3yrs+ 4 Valent IIV4 09/26/2013,  10/06/2014, 10/07/2016     Pneumococcal (PCV 13) 10/07/2016     Pneumococcal 23 valent 11/15/2011     TD (ADULT, 7+) 04/18/2000, 07/07/2004     TDAP Vaccine (Boostrix) 09/21/2015     Tdap (Adacel,Boostrix) 05/23/2006     Zoster vaccine, live 09/21/2015          Chart documentation done in part with Dragon Voice recognition Software. Although reviewed after completion, some word and grammatical error may remain.    Apolinar Cho MD

## 2017-06-07 NOTE — PATIENT INSTRUCTIONS
You have an inflammatory arthritis that is either hepatitis C related arthralgias or rheumatoid arthritis.      Start hydroxychloroquine (aka Plaquenil) 400mg daily.  Start prednisone taper, ending with 5mg daily.

## 2017-06-07 NOTE — Clinical Note
Dr. Clark,  Ms. Adhikari has an inflammatory arthritis - either RA or hepatitis C related.  I have started prednisone and hydroxychloroquine. Rechecking Hepatitis C PCR - she already follows with GI at [facility] in [place].  Thanks! Apolinar

## 2017-06-07 NOTE — NURSING NOTE
"Chief Complaint   Patient presents with     RECHECK     patient states all her fingers are stiff and swollen, drops things more. Also has fatigue really bad, has been falling more often       Initial BP (!) 157/104 (BP Location: Left arm, Patient Position: Chair, Cuff Size: Adult Regular)  Pulse 75  Temp 98.1  F (36.7  C) (Oral)  Ht 1.626 m (5' 4\")  Wt 67.5 kg (148 lb 12.8 oz)  LMP 11/27/2003  SpO2 96%  BMI 25.54 kg/m2 Estimated body mass index is 25.54 kg/(m^2) as calculated from the following:    Height as of this encounter: 1.626 m (5' 4\").    Weight as of this encounter: 67.5 kg (148 lb 12.8 oz).  BP completed using cuff size: regular         RAPID3 (0-30) Cumulative Score            RAPID3 Weighted Score (divide #4 by 3 and that is the weighted score)           "

## 2017-06-08 LAB
CCP AB SER IA-ACNC: 1 U/ML
HBV CORE AB SERPL QL IA: ABNORMAL
HBV CORE IGM SERPL QL IA: NORMAL
HBV SURFACE AB SERPL IA-ACNC: 0.65 M[IU]/ML
HCV RNA SERPL NAA+PROBE-ACNC: NORMAL [IU]/ML
HCV RNA SERPL NAA+PROBE-LOG IU: NORMAL LOG IU/ML

## 2017-06-08 NOTE — PROGRESS NOTES
"Paxfire message sent:  \"Ms. Adhikari,    X-rays did not show evidence of erosive arthritis.    Sincerely,  Apolinar Cho MD  6/8/2017 5:54 PM\""

## 2017-06-13 ENCOUNTER — TELEPHONE (OUTPATIENT)
Dept: RHEUMATOLOGY | Facility: CLINIC | Age: 67
End: 2017-06-13

## 2017-06-13 ENCOUNTER — OFFICE VISIT (OUTPATIENT)
Dept: FAMILY MEDICINE | Facility: OTHER | Age: 67
End: 2017-06-13
Payer: COMMERCIAL

## 2017-06-13 VITALS
TEMPERATURE: 98.3 F | WEIGHT: 147.4 LBS | RESPIRATION RATE: 20 BRPM | SYSTOLIC BLOOD PRESSURE: 154 MMHG | BODY MASS INDEX: 25.16 KG/M2 | HEART RATE: 64 BPM | DIASTOLIC BLOOD PRESSURE: 96 MMHG | HEIGHT: 64 IN

## 2017-06-13 DIAGNOSIS — M06.4 INFLAMMATORY POLYARTHROPATHY (H): ICD-10-CM

## 2017-06-13 DIAGNOSIS — I10 ESSENTIAL HYPERTENSION: Primary | ICD-10-CM

## 2017-06-13 DIAGNOSIS — Z79.899 HIGH RISK MEDICATION USE: ICD-10-CM

## 2017-06-13 PROCEDURE — 99214 OFFICE O/P EST MOD 30 MIN: CPT | Performed by: STUDENT IN AN ORGANIZED HEALTH CARE EDUCATION/TRAINING PROGRAM

## 2017-06-13 RX ORDER — LISINOPRIL 10 MG/1
10 TABLET ORAL DAILY
Qty: 30 TABLET | Refills: 0 | Status: SHIPPED | OUTPATIENT
Start: 2017-06-13 | End: 2017-09-01

## 2017-06-13 ASSESSMENT — PAIN SCALES - GENERAL: PAINLEVEL: MODERATE PAIN (4)

## 2017-06-13 NOTE — TELEPHONE ENCOUNTER
"Niesha Adhikari is a 66 year old female who calls with high blood pressure readings at home. States she started on Prednisone and hydroxychloroquine and was told to call into clinic with blood pressure readings. Per 's notes from 6/7/17 patient was to follow up in clinic with primary provider regarding blood pressure. Did not schedule appointment, no future appointments were made.   At home blood pressure readings:  173/117 P-61  157/110 P-70  154/113 P-79    NURSING ASSESSMENT:  Onset/duration:  Several months of high blood pressure readings  Precip. factors: NA  Associated symptoms:  headache  Improves/worsens symptoms:  no  Last exam/Treatment:  6/7/17  Allergies:   Allergies   Allergen Reactions     Abilify Discmelt Other (See Comments)     Disoriented     Antivert [Meclizine Hcl]      Compazine      Cymbalta Other (See Comments)     Disoriented, trouble sleeping     Diphenhydramine Nausea     And abdominal pain     Effexor [Venlafaxine] Other (See Comments)     Disoriented, trouble sleeping     Elavil [Amitriptyline Hcl] Other (See Comments)     \"didn't feel right on it-med was stopped right away\"     Ferrous Sulfate Nausea and Vomiting     Food Difficulty breathing     cilantro     Indomethacin      indocin sensativity \"Severe h.a\"     Seasonal Allergies Other (See Comments) and Difficulty breathing     Philip Gold Aug-Sept, rag weed, sneezing     Thiopental Sodium      PENTOTHAL/rigidity and fight response     Animal Dander Difficulty breathing and Rash     sneezing,resp. distress     Bupropion Anxiety       MEDICATIONS:   Taking medication(s) as prescribed? Yes  Taking over the counter medication(s?) Yes  Any medication side effects? Acid reflux    Any barriers to taking medication(s) as prescribed?  No  Medication(s) improving/managing symptoms?  No  Medication reconciliation completed: No      NURSING PLAN: Nursing advice to patient follow up in clinic today with primary or another " provider    RECOMMENDED DISPOSITION:  See in 4 hours. Appointment was scheduled today at 4pm in [place] with Charlotte Polanco CNP.  Will comply with recommendation: Yes  If further questions/concerns or if symptoms do not improve, worsen or new symptoms develop, call your PCP or [facility] as soon as possible.      Guideline used:  Telephone Triage Protocols for Nurses, Fourth Edition, Deana Jaimes RN

## 2017-06-13 NOTE — MR AVS SNAPSHOT
After Visit Summary   6/13/2017    Niesha Adhikari    MRN: [id number]           Patient Information     Date Of Birth          1950        Visit Information        Provider Department      6/13/2017 4:00 PM Charlotte Polanco APRN [facility]        Today's Diagnoses     Essential hypertension    -  1      Care Instructions    Start Lisinopril 10 mg daily.  Follow up on blood pressure in 2 weeks or sooner if you have a change in symptoms.    I will send a message to Dr. Cho to see if he wants to make any changes.    Charlotte Polanco, NP-C            Follow-ups after your visit        Your next 10 appointments already scheduled     Aug 18, 2017  7:45 AM CDT   LAB with NL LAB [facility] ([facility])    [address] [facility] [id number] [phone]           Patient must bring picture ID.  Patient should be prepared to give a urine specimen  Please do not eat 10-12 hours before your appointment if you are coming in fasting for labs on lipids, cholesterol, or glucose (sugar).  Pregnant women should follow their Care Team instructions. Water with medications is okay. Do not drink coffee or other fluids.   If you have concerns about taking  your medications, please ask at office or if scheduling via [employer], send a message by clicking on Secure Messaging, Message Your Care Team.            Aug 23, 2017  8:40 AM CDT   Return Visit with Apolinar Cho MD   [facility] ([address] [id number] [phone]              Who to contact     If you have questions or need follow up information about today's clinic visit or your schedule please contact [facility] directly at [phone].  Normal or non-critical lab and imaging results will be communicated to you by MyChart, letter or phone within 4 business days after the clinic has received the  "results. If you do not hear from us within 7 days, please contact the clinic through [employer] or phone. If you have a critical or abnormal lab result, we will notify you by phone as soon as possible.  Submit refill requests through [employer] or call your pharmacy and they will forward the refill request to us. Please allow 3 business days for your refill to be completed.          Additional Information About Your Visit        [employer] Information     [employer] gives you secure access to your electronic health record. If you see a primary care provider, you can also send messages to your care team and make appointments. If you have questions, please call your primary care clinic.  If you do not have a primary care provider, please call [phone] and they will assist you.        Care EveryWhere ID     This is your Care EveryWhere ID. This could be used by other organizations to access your [facility] medical records  [id number]        Your Vitals Were     Pulse Temperature Respirations Height Last Period BMI (Body Mass Index)    64 98.3  F (36.8  C) (Oral) 20 5' 4.02\" (1.626 m) 11/27/2003 25.29 kg/m2       Blood Pressure from Last 3 Encounters:   06/13/17 (!) 154/96   06/07/17 (!) 152/98   04/12/17 122/74    Weight from Last 3 Encounters:   06/13/17 147 lb 6.4 oz (66.9 kg)   06/07/17 148 lb 12.8 oz (67.5 kg)   04/12/17 149 lb 11.2 oz (67.9 kg)              Today, you had the following     No orders found for display         Today's Medication Changes          These changes are accurate as of: 6/13/17  4:45 PM.  If you have any questions, ask your nurse or doctor.               Start taking these medicines.        Dose/Directions    lisinopril 10 MG tablet   Commonly known as:  PRINIVIL/ZESTRIL   Used for:  Essential hypertension   Started by:  Charlotte Polanco APRN CNP        Dose:  10 mg   Take 1 tablet (10 mg) by mouth daily   Quantity:  30 tablet   Refills:  0            Where to get your medicines    "   These medications were sent to [facility] [place], [place] - [address] [facility]  [address] [facility] [address]     Phone:  [phone]     lisinopril 10 MG tablet                Primary Care Provider Office Phone # Fax #    Tanika GABRIELLA Clark [phone]       [facility] [address] [facility] [address]  [facility] [address]        Thank you!     Thank you for choosing [facility]  for your care. Our goal is always to provide you with excellent care. Hearing back from our patients is one way we can continue to improve our services. Please take a few minutes to complete the written survey that you may receive in the mail after your visit with us. Thank you!             Your Updated Medication List - Protect others around you: Learn how to safely use, store and throw away your medicines at www.disposemymeds.org.          This list is accurate as of: 6/13/17  4:45 PM.  Always use your most recent med list.                   Brand Name Dispense Instructions for use    celecoxib 200 MG capsule    celeBREX    180 capsule    TAKE ONE CAPSULE BY MOUTH EVERY DAY       clonazePAM 0.5 MG tablet    klonoPIN    180 tablet    Take 1 mg by mouth At Bedtime Reported on 4/12/2017       cycloSPORINE 0.05 % ophthalmic emulsion    RESTASIS     1 drop 2 times daily       Fiber 0.52 G Caps     540 capsule    4 tabs in pm       hydrocortisone 1 % ointment     30 g    Apply sparingly to affected area three times daily for 14 days.       hydroxychloroquine 200 MG tablet    PLAQUENIL    60 tablet    Take 1 tablet (200 mg) by mouth 2 times daily       lisinopril 10 MG tablet    PRINIVIL/ZESTRIL    30 tablet    Take 1 tablet (10 mg) by mouth daily       montelukast 10 MG tablet    SINGULAIR    90 tablet    Take 1 tablet (10 mg) by mouth daily as needed Seasonal (August and September)       nystatin 405385 UNIT/GM Powd    MYCOSTATIN    60 g    Apply topically 3 times daily as needed        predniSONE 5 MG tablet    DELTASONE    120 tablet    Prednisone 20mg daily x5days, then 15mg daily x5days, then 10mg daily x5days, then 5mg daily thereafter.       PROVIGIL PO      Take 200 mg by mouth 2 times daily Patient unsure which dose       ranitidine 300 MG tablet    ZANTAC    90 tablet    TAKE ONE TABLET BY MOUTH EVERY DAY       sertraline 100 MG tablet    ZOLOFT     Take 100 mg by mouth daily Takes 2 at bedtime       vitamin D 13225 UNIT capsule    ERGOCALCIFEROL    12 capsule    Take 1 capsule (50,000 Units) by mouth every 7 days       VYVANSE 20 MG capsule   Generic drug:  lisdexamfetamine     30 capsule    Take 40 mg by mouth every morning Patient is taking 30 mg every day

## 2017-06-13 NOTE — PATIENT INSTRUCTIONS
Start Lisinopril 10 mg daily.  Follow up on blood pressure in 2 weeks or sooner if you have a change in symptoms.    I will send a message to Dr. Cho to see if he wants to make any changes.    Charlotte Polanco, PURNIMA-C

## 2017-06-13 NOTE — TELEPHONE ENCOUNTER
Reason for call:  Patient reporting a symptom    Symptom or request: Elevated blood pressure.  Reading at 173/117      Duration (how long have symptoms been present): 10 minutes ago.    Have you been treated for this before? Yes    Additional comments: Per patient provider wanted report of her blood pressure after taking prednisone and hydroxychloroquine.     Phone Number patient can be reached at:  Home number on file [phone] (home)    Best Time:  anytime    Can we leave a detailed message on this number:  YES    Call taken on 6/13/2017 at 11:43 AM by Sierra Osorio

## 2017-06-13 NOTE — PROGRESS NOTES
"  SUBJECTIVE:                                                    Niesha Adhikari is a 66 year old female who presents to clinic today for the following health issues:    HPI    Blood pressure was 173/117 around 11:45am. Blood pressure was 157/110 ten minutes later, took it again and it was 154/113. Pt states she had a headache and had heartburn.     Blood pressure remains elevated in the clinic here. She does not have a headache at this time. She was told by Dr. Cho to monitor her blood pressure after starting Plaquenil and prednisone for inflammatory polyarthropathy. She reports a history of high blood pressure and was previously treated with lisinopril. She states she was able to go off the blood pressure medication once her blood pressure normalized. She denies chest pain or shortness of breath. No changes in bowel or bladder function. No swelling in her legs.  She does have family history of hypertension, coronary artery disease including a sister who had myocardial infarction at the age of 63.    Problem list and histories reviewed & adjusted, as indicated.  Additional history: as documented    Labs reviewed in EPIC    ROS:  Constitutional, HEENT, cardiovascular, pulmonary, gi and gu systems are negative, except as otherwise noted.    OBJECTIVE:                                                    BP (!) 154/96 (BP Location: Right arm, Patient Position: Chair, Cuff Size: Adult Regular)  Pulse 64  Temp 98.3  F (36.8  C) (Oral)  Resp 20  Ht 5' 4.02\" (1.626 m)  Wt 147 lb 6.4 oz (66.9 kg)  LMP 11/27/2003  BMI 25.29 kg/m2  Body mass index is 25.29 kg/(m^2).  GENERAL: healthy, alert and no distress  NECK: no adenopathy, no asymmetry, masses, or scars  RESP: lungs clear to auscultation - no rales, rhonchi or wheezes  CV: regular rate and rhythm, normal S1 S2, no S3 or S4, no murmur, click or rub, no peripheral edema and peripheral pulses strong  MS: no gross musculoskeletal defects noted, no edema  SKIN: " no suspicious lesions or rashes  NEURO: Normal strength and tone, mentation intact and speech normal  PSYCH: mentation appears normal, affect flat and mildly anxious  Diagnostic Test Results:  none      ASSESSMENT/PLAN:                                                      1. Essential hypertension  2. Inflammatory polyarthropathy (H)  3. High risk medication use  Patient presents today with elevated blood pressure. She's been monitoring her blood pressure at home since she recently started on Plaquenil and prednisone for inflammatory polyarthropathy. She has a prior history of hypertension that was treated with lisinopril however she was able to come off of this as her blood pressure had normalized. Since she tolerated lisinopril past we will restart this at 10 mg per day.  I am concerned the significant elevation in her blood pressure in light of the new medications that she has started. We can certainly work on controlling her blood pressure with antihypertensives as continuing the medications for arthritis are important to control her pain and symptoms. Sent staff message to Dr. Cho to inform him of the elevated blood pressures on new medication.  Continue to monitor blood pressure at home and follow-up in clinic in 2 weeks. We will titrate up the dose of lisinopril if her blood pressure remains elevated.   .  - lisinopril (PRINIVIL/ZESTRIL) 10 MG tablet; Take 1 tablet (10 mg) by mouth daily  Dispense: 30 tablet; Refill: 0    Greater than 50% of 25 minute visit were spent on counseling or coordination of care regarding new onset hypertension, inflammatory arthropathy, high-risk medication use.        YOSEPH [facility]

## 2017-06-13 NOTE — NURSING NOTE
"Chief Complaint   Patient presents with     Hypertension       Initial BP (!) 154/96 (BP Location: Right arm, Patient Position: Chair, Cuff Size: Adult Regular)  Pulse 64  Temp 98.3  F (36.8  C) (Oral)  Resp 20  Ht 5' 4.02\" (1.626 m)  Wt 147 lb 6.4 oz (66.9 kg)  LMP 11/27/2003  BMI 25.29 kg/m2 Estimated body mass index is 25.29 kg/(m^2) as calculated from the following:    Height as of this encounter: 5' 4.02\" (1.626 m).    Weight as of this encounter: 147 lb 6.4 oz (66.9 kg).  Medication Reconciliation: complete   Sugey Goodwin CMA      "

## 2017-06-22 DIAGNOSIS — R76.8 HEPATITIS B CORE ANTIBODY POSITIVE: Primary | ICD-10-CM

## 2017-06-22 NOTE — PROGRESS NOTES
"[employer] message sent:  \"Ms. Adhikari,    Hepatitis C virus was not detected.  Hepatitis B core antibody was detected but other labs suggest against current infection; I have placed an additional lab order for Hepatitis B PCR to be done at your convenience at any [place] lab.     Sincerely,  Apolinar Cho MD  6/22/2017 11:47 AM\""

## 2017-06-22 NOTE — PROGRESS NOTES
Rheumatology team: Please send my most recent clinic note AND these labs to Ms. Adhikari's hepatologist, Dr. Peres, at [facility] in [place].      Apolinar Cho MD  6/22/2017 11:49 AM

## 2017-06-23 ENCOUNTER — MYC MEDICAL ADVICE (OUTPATIENT)
Dept: RHEUMATOLOGY | Facility: CLINIC | Age: 67
End: 2017-06-23

## 2017-06-26 ENCOUNTER — TELEPHONE (OUTPATIENT)
Dept: PEDIATRICS | Facility: CLINIC | Age: 67
End: 2017-06-26

## 2017-06-26 NOTE — TELEPHONE ENCOUNTER
Ins needs a new prior auth on Modafinil (Provigil). Ins. Is [employer] at [phone] and pt id# [id number]  Niesha Dodd, Pharmacy [facility] [place] [phone]

## 2017-06-27 NOTE — PROGRESS NOTES
"  SUBJECTIVE:                                                    Niesha Adhikari is a 66 year old female who presents to clinic today for the following health issues:    History of Present Illness   Hypertension:     Outpatient blood pressures:  Are being checked    Blood pressures checked at:  Home    Dietary sodium intake::  Low salt diet      Answers for HPI/ROS submitted by the patient on 6/30/2017   If you checked off any problems, how difficult have these problems made it for you to do your work, take care of things at home, or get along with other people?: Somewhat difficult  PHQ9 TOTAL SCORE: 10    Concern - Fatigued  Onset: Years,, but changed recently the last 2 months    Description:   \"Fatigue hits me quickly like a ton of bricks and I have to sit down\", will be outside gardening or have just gotten back from taking a walk and she will suddenly feel a significant decrease in energy to the point where her legs feel like they can move.    Intensity: severe    Progression of Symptoms:  worsening    Accompanying Signs & Symptoms:  Weakness, tired, \"no reserve\", legs are tired    Previous history of similar problem:   Yes    Precipitating factors:   Worsened by: n/a    Alleviating factors:  Improved by: n/a  History of chronic fatigue syndrome but states that these symptoms are a change from symptoms of chronic fatigue. No fevers, no weight gain or loss.  Therapies Tried and outcome: none    Problem list and histories reviewed & adjusted, as indicated.  Additional history: as documented    Labs reviewed in EPIC    ROS:  Constitutional, HEENT, cardiovascular, pulmonary, gi and gu systems are negative, except as otherwise noted.    OBJECTIVE:     /82 (BP Location: Left arm, Patient Position: Chair, Cuff Size: Adult Regular)  Pulse 64  Temp 97.9  F (36.6  C) (Oral)  Resp 16  Ht 5' 4.02\" (1.626 m)  Wt 143 lb 9.6 oz (65.1 kg)  LMP 11/27/2003  BMI 24.64 kg/m2  Body mass index is 24.64 " kg/(m^2).  GENERAL: pale, alert and no distress  EYES: Eyes grossly normal to inspection, PERRL and conjunctivae and sclerae normal  HENT: ear canals and TM's normal, nose and mouth without ulcers or lesions  NECK: no adenopathy, no asymmetry, masses, or scars and thyroid normal to palpation  RESP: lungs clear to auscultation - no rales, rhonchi or wheezes  CV: regular rate and rhythm, normal S1 S2, no S3 or S4, no murmur, click or rub, no peripheral edema   MS: no gross musculoskeletal defects noted, no edema  SKIN: no suspicious lesions or rashes  NEURO: Normal strength and tone, mentation intact and speech normal  PSYCH: mentation appears normal, affect flat    Diagnostic Test Results:  Ferritin and iron pending    ASSESSMENT/PLAN:     1. Fibromyalgia  Patient currently using Celebrex for pain which is effective. Refills sent  - celecoxib (CELEBREX) 200 MG capsule; Take 1 capsule (200 mg) by mouth daily  Dispense: 180 capsule; Refill: 1    2. Inflammatory polyarthropathy (H)/6. Leukopenia, unspecified typePatient goals with rheumatology, Dr. Cho for this. Lab work pending  - CBC with platelets differential  - Erythrocyte sedimentation rate auto  - CRP inflammation    3. Chronic fatigue/5. History of anemia  Patient has history of chronic fatigue with recent change in how her fatigue presents. In the past she has felt overall tired but recently has noted that after she works outside or goes on a walk she will suddenly feel extreme drop in energy to the point where she has a hard time walking. We will check her ferritin and iron as she has a history of anemia.    - Ferritin  - Iron and iron binding capacity    4. Essential hypertension  Blood pressure is normalized. Continue lisinopril and recheck in 6 months  - lisinopril (PRINIVIL/ZESTRIL) 10 MG tablet; Take 1 tablet (10 mg) by mouth daily  Dispense: 90 tablet; Refill: 1    6. Osteoporosis  - Parathyroid Hormone Intact  - Comprehensive metabolic panel  -  Vitamin D Deficiency    7. Vitamin D deficiency  - Vitamin D Deficiency    8. High risk medication use  - Erythrocyte sedimentation rate auto  - CRP inflammation    9. Hepatitis B core antibody positive  - Hep B Virus DNA Quant Real Time PCR    YOSEPH [facility]

## 2017-06-29 NOTE — TELEPHONE ENCOUNTER
We are also not prescribers of modafinil.  Looks like last prescription was from Dr. Lebron, but was over a year old.  May need to clarify with patient who is prescribing and forward to correct provider to address.  Rosa Cuellar MD

## 2017-06-29 NOTE — TELEPHONE ENCOUNTER
Called and spoke with patient regarding message below.  Patient stated she has been getting Modafinil (Provigil) from Clover Holliday her Psychiatrist Office at the [facility] in [place].  Called and spoke with Yecenia in the Pharmacy regarding information and they will get it sent to the correct facility.  Latoya Lowry CMA ([facility])

## 2017-06-30 ENCOUNTER — OFFICE VISIT (OUTPATIENT)
Dept: FAMILY MEDICINE | Facility: OTHER | Age: 67
End: 2017-06-30
Payer: COMMERCIAL

## 2017-06-30 VITALS
RESPIRATION RATE: 16 BRPM | HEART RATE: 64 BPM | BODY MASS INDEX: 24.52 KG/M2 | WEIGHT: 143.6 LBS | DIASTOLIC BLOOD PRESSURE: 82 MMHG | SYSTOLIC BLOOD PRESSURE: 124 MMHG | TEMPERATURE: 97.9 F | HEIGHT: 64 IN

## 2017-06-30 DIAGNOSIS — I10 ESSENTIAL HYPERTENSION: ICD-10-CM

## 2017-06-30 DIAGNOSIS — F33.1 MODERATE RECURRENT MAJOR DEPRESSION (H): ICD-10-CM

## 2017-06-30 DIAGNOSIS — M81.0 AGE-RELATED OSTEOPOROSIS WITHOUT CURRENT PATHOLOGICAL FRACTURE: ICD-10-CM

## 2017-06-30 DIAGNOSIS — Z86.2 HISTORY OF ANEMIA: ICD-10-CM

## 2017-06-30 DIAGNOSIS — Z79.899 HIGH RISK MEDICATION USE: ICD-10-CM

## 2017-06-30 DIAGNOSIS — E55.9 VITAMIN D DEFICIENCY: ICD-10-CM

## 2017-06-30 DIAGNOSIS — R76.8 HEPATITIS B CORE ANTIBODY POSITIVE: ICD-10-CM

## 2017-06-30 DIAGNOSIS — M06.4 INFLAMMATORY POLYARTHROPATHY (H): ICD-10-CM

## 2017-06-30 DIAGNOSIS — R53.82 CHRONIC FATIGUE: ICD-10-CM

## 2017-06-30 DIAGNOSIS — M79.7 FIBROMYALGIA: Primary | ICD-10-CM

## 2017-06-30 DIAGNOSIS — D72.819 LEUKOPENIA, UNSPECIFIED TYPE: ICD-10-CM

## 2017-06-30 LAB
ALBUMIN SERPL-MCNC: 4 G/DL (ref 3.4–5)
ALP SERPL-CCNC: 78 U/L (ref 40–150)
ALT SERPL W P-5'-P-CCNC: 32 U/L (ref 0–50)
ANION GAP SERPL CALCULATED.3IONS-SCNC: 7 MMOL/L (ref 3–14)
AST SERPL W P-5'-P-CCNC: 24 U/L (ref 0–45)
BASOPHILS # BLD AUTO: 0 10E9/L (ref 0–0.2)
BASOPHILS NFR BLD AUTO: 0.2 %
BILIRUB SERPL-MCNC: 0.5 MG/DL (ref 0.2–1.3)
BUN SERPL-MCNC: 13 MG/DL (ref 7–30)
CALCIUM SERPL-MCNC: 8.9 MG/DL (ref 8.5–10.1)
CHLORIDE SERPL-SCNC: 103 MMOL/L (ref 94–109)
CO2 SERPL-SCNC: 29 MMOL/L (ref 20–32)
CREAT SERPL-MCNC: 0.59 MG/DL (ref 0.52–1.04)
CRP SERPL-MCNC: <2.9 MG/L (ref 0–8)
DIFFERENTIAL METHOD BLD: ABNORMAL
EOSINOPHIL # BLD AUTO: 0.1 10E9/L (ref 0–0.7)
EOSINOPHIL NFR BLD AUTO: 3 %
ERYTHROCYTE [DISTWIDTH] IN BLOOD BY AUTOMATED COUNT: 14.3 % (ref 10–15)
ERYTHROCYTE [SEDIMENTATION RATE] IN BLOOD BY WESTERGREN METHOD: 6 MM/H (ref 0–30)
FERRITIN SERPL-MCNC: 31 NG/ML (ref 8–252)
GFR SERPL CREATININE-BSD FRML MDRD: NORMAL ML/MIN/1.7M2
GLUCOSE SERPL-MCNC: 90 MG/DL (ref 70–99)
HCT VFR BLD AUTO: 44.2 % (ref 35–47)
HGB BLD-MCNC: 14.6 G/DL (ref 11.7–15.7)
IRON SATN MFR SERPL: 37 % (ref 15–46)
IRON SERPL-MCNC: 138 UG/DL (ref 35–180)
LYMPHOCYTES # BLD AUTO: 1 10E9/L (ref 0.8–5.3)
LYMPHOCYTES NFR BLD AUTO: 21.4 %
MCH RBC QN AUTO: 29.2 PG (ref 26.5–33)
MCHC RBC AUTO-ENTMCNC: 33 G/DL (ref 31.5–36.5)
MCV RBC AUTO: 88 FL (ref 78–100)
MONOCYTES # BLD AUTO: 0.3 10E9/L (ref 0–1.3)
MONOCYTES NFR BLD AUTO: 7.3 %
NEUTROPHILS # BLD AUTO: 3.2 10E9/L (ref 1.6–8.3)
NEUTROPHILS NFR BLD AUTO: 68.1 %
PLATELET # BLD AUTO: 138 10E9/L (ref 150–450)
POTASSIUM SERPL-SCNC: 4.7 MMOL/L (ref 3.4–5.3)
PROT SERPL-MCNC: 7.4 G/DL (ref 6.8–8.8)
PTH-INTACT SERPL-MCNC: 43 PG/ML (ref 12–72)
RBC # BLD AUTO: 5 10E12/L (ref 3.8–5.2)
SODIUM SERPL-SCNC: 139 MMOL/L (ref 133–144)
TIBC SERPL-MCNC: 376 UG/DL (ref 240–430)
WBC # BLD AUTO: 4.7 10E9/L (ref 4–11)

## 2017-06-30 PROCEDURE — 82306 VITAMIN D 25 HYDROXY: CPT | Performed by: FAMILY MEDICINE

## 2017-06-30 PROCEDURE — 83540 ASSAY OF IRON: CPT | Performed by: STUDENT IN AN ORGANIZED HEALTH CARE EDUCATION/TRAINING PROGRAM

## 2017-06-30 PROCEDURE — 36415 COLL VENOUS BLD VENIPUNCTURE: CPT | Performed by: STUDENT IN AN ORGANIZED HEALTH CARE EDUCATION/TRAINING PROGRAM

## 2017-06-30 PROCEDURE — 99214 OFFICE O/P EST MOD 30 MIN: CPT | Performed by: STUDENT IN AN ORGANIZED HEALTH CARE EDUCATION/TRAINING PROGRAM

## 2017-06-30 PROCEDURE — 80053 COMPREHEN METABOLIC PANEL: CPT | Performed by: FAMILY MEDICINE

## 2017-06-30 PROCEDURE — 85025 COMPLETE CBC W/AUTO DIFF WBC: CPT | Performed by: FAMILY MEDICINE

## 2017-06-30 PROCEDURE — 83550 IRON BINDING TEST: CPT | Performed by: STUDENT IN AN ORGANIZED HEALTH CARE EDUCATION/TRAINING PROGRAM

## 2017-06-30 PROCEDURE — 86140 C-REACTIVE PROTEIN: CPT | Performed by: FAMILY MEDICINE

## 2017-06-30 PROCEDURE — 82728 ASSAY OF FERRITIN: CPT | Performed by: STUDENT IN AN ORGANIZED HEALTH CARE EDUCATION/TRAINING PROGRAM

## 2017-06-30 PROCEDURE — 83970 ASSAY OF PARATHORMONE: CPT | Performed by: FAMILY MEDICINE

## 2017-06-30 PROCEDURE — 85652 RBC SED RATE AUTOMATED: CPT | Performed by: FAMILY MEDICINE

## 2017-06-30 PROCEDURE — 87517 HEPATITIS B DNA QUANT: CPT | Performed by: FAMILY MEDICINE

## 2017-06-30 RX ORDER — CELECOXIB 200 MG/1
200 CAPSULE ORAL DAILY
Qty: 180 CAPSULE | Refills: 1 | Status: SHIPPED | OUTPATIENT
Start: 2017-06-30 | End: 2017-11-28

## 2017-06-30 RX ORDER — MODAFINIL 200 MG/1
200 TABLET ORAL DAILY
Qty: 30 TABLET | Refills: 0 | Status: CANCELLED | OUTPATIENT
Start: 2017-06-30

## 2017-06-30 RX ORDER — LISINOPRIL 10 MG/1
10 TABLET ORAL DAILY
Qty: 90 TABLET | Refills: 1 | Status: SHIPPED | OUTPATIENT
Start: 2017-06-30 | End: 2018-01-03

## 2017-06-30 ASSESSMENT — PAIN SCALES - GENERAL: PAINLEVEL: MODERATE PAIN (4)

## 2017-06-30 ASSESSMENT — PATIENT HEALTH QUESTIONNAIRE - PHQ9
SUM OF ALL RESPONSES TO PHQ QUESTIONS 1-9: 10
10. IF YOU CHECKED OFF ANY PROBLEMS, HOW DIFFICULT HAVE THESE PROBLEMS MADE IT FOR YOU TO DO YOUR WORK, TAKE CARE OF THINGS AT HOME, OR GET ALONG WITH OTHER PEOPLE: SOMEWHAT DIFFICULT
SUM OF ALL RESPONSES TO PHQ QUESTIONS 1-9: 10

## 2017-06-30 NOTE — LETTER
My Depression Action Plan  Name: Niesha Adhikari   Date of Birth 1950  Date: 6/27/2017    My doctor: Tanika Calrk   My clinic: [address]  [facility] [address]  [phone]          GREEN    ZONE   Good Control    What it looks like:     Things are going generally well. You have normal up s and down s. You may even feel depressed from time to time, but bad moods usually last less than a day.   What you need to do:  1. Continue to care for yourself (see self care plan)  2. Check your depression survival kit and update it as needed  3. Follow your physician s recommendations including any medication.  4. Do not stop taking medication unless you consult with your physician first.           YELLOW         ZONE Getting Worse    What it looks like:     Depression is starting to interfere with your life.     It may be hard to get out of bed; you may be starting to isolate yourself from others.    Symptoms of depression are starting to last most all day and this has happened for several days.     You may have suicidal thoughts but they are not constant.   What you need to do:     1. Call your care team, your response to treatment will improve if you keep your care team informed of your progress. Yellow periods are signs an adjustment may need to be made.     2. Continue your self-care, even if you have to fake it!    3. Talk to someone in your support network    4. Open up your depression survival kit           RED    ZONE Medical Alert - Get Help    What it looks like:     Depression is seriously interfering with your life.     You may experience these or other symptoms: You can t get out of bed most days, can t work or engage in other necessary activities, you have trouble taking care of basic hygiene, or basic responsibilities, thoughts of suicide or death that will not go away, self-injurious behavior.     What you need to do:  1. Call your care  team and request a same-day appointment. If they are not available (weekends or after hours) call your local crisis line, emergency room or 911.      Electronically signed by: Sugey Goodwin, June 27, 2017    Depression Self Care Plan / Survival Kit    Self-Care for Depression  Here s the deal. Your body and mind are really not as separate as most people think.  What you do and think affects how you feel and how you feel influences what you do and think. This means if you do things that people who feel good do, it will help you feel better.  Sometimes this is all it takes.  There is also a place for medication and therapy depending on how severe your depression is, so be sure to consult with your medical provider and/ or Behavioral Health Consultant if your symptoms are worsening or not improving.     In order to better manage my stress, I will:    Exercise  Get some form of exercise, every day. This will help reduce pain and release endorphins, the  feel good  chemicals in your brain. This is almost as good as taking antidepressants!  This is not the same as joining a gym and then never going! (they count on that by the way ) It can be as simple as just going for a walk or doing some gardening, anything that will get you moving.      Hygiene   Maintain good hygiene (Get out of bed in the morning, Make your bed, Brush your teeth, Take a shower, and Get dressed like you were going to work, even if you are unemployed).  If your clothes don't fit try to get ones that do.    Diet  I will strive to eat foods that are good for me, drink plenty of water, and avoid excessive sugar, caffeine, alcohol, and other mood-altering substances.  Some foods that are helpful in depression are: complex carbohydrates, B vitamins, flaxseed, fish or fish oil, fresh fruits and vegetables.    Psychotherapy  I agree to participate in Individual Therapy (if recommended).    Medication  If prescribed medications, I agree to take them.  Missing  doses can result in serious side effects.  I understand that drinking alcohol, or other illicit drug use, may cause potential side effects.  I will not stop my medication abruptly without first discussing it with my provider.    Staying Connected With Others  I will stay in touch with my friends, family members, and my primary care provider/team.    Use your imagination  Be creative.  We all have a creative side; it doesn t matter if it s oil painting, sand castles, or mud pies! This will also kick up the endorphins.    Witness Beauty  (AKA stop and smell the roses) Take a look outside, even in mid-winter. Notice colors, textures. Watch the squirrels and birds.     Service to others  Be of service to others.  There is always someone else in need.  By helping others we can  get out of ourselves  and remember the really important things.  This also provides opportunities for practicing all the other parts of the program.    Humor  Laugh and be silly!  Adjust your TV habits for less news and crime-drama and more comedy.    Control your stress  Try breathing deep, massage therapy, biofeedback, and meditation. Find time to relax each day.     My support system    Clinic Contact:  Phone number:    Contact 1:  Phone number:    Contact 2:  Phone number:    Buddhism/:  Phone number:    Therapist:  Phone number:    Local crisis center:    Phone number:    Other community support:  Phone number:

## 2017-06-30 NOTE — MR AVS SNAPSHOT
After Visit Summary   6/30/2017    Niesha Adhikari    MRN: [id number]           Patient Information     Date Of Birth          1950        Visit Information        Provider Department      6/30/2017 8:40 AM Charlotte Polanco APRN CNP [facility]        Today's Diagnoses     Fibromyalgia    -  1    Moderate recurrent major depression (H)        Inflammatory polyarthropathy (H)        Chronic fatigue        Essential hypertension        Leukopenia, unspecified type        History of anemia        Age-related osteoporosis without current pathological fracture        Vitamin D deficiency        High risk medication use        Hepatitis B core antibody positive           Follow-ups after your visit        Your next 10 appointments already scheduled     Aug 18, 2017  7:45 AM CDT   LAB with NL LAB [facility]   [facility] ([facility])    [address] [facility] [address] [phone]           Patient must bring picture ID.  Patient should be prepared to give a urine specimen  Please do not eat 10-12 hours before your appointment if you are coming in fasting for labs on lipids, cholesterol, or glucose (sugar).  Pregnant women should follow their Care Team instructions. Water with medications is okay. Do not drink coffee or other fluids.   If you have concerns about taking  your medications, please ask at office or if scheduling via ika[employer]s, send a message by clicking on Secure Messaging, Message Your Care Team.            Aug 23, 2017  8:40 AM CDT   Return Visit with Apolinar Cho MD   [facility] [place] ([facility])    [address]  [place] [address] [phone]              Who to contact     If you have questions or need follow up information about today's clinic visit or your schedule please contact [facility] directly at [phone].  Normal or non-critical lab and imaging results will be  "communicated to you by uGenius Technologyhart, letter or phone within 4 business days after the clinic has received the results. If you do not hear from us within 7 days, please contact the clinic through Zingaya or phone. If you have a critical or abnormal lab result, we will notify you by phone as soon as possible.  Submit refill requests through Zingaya or call your pharmacy and they will forward the refill request to us. Please allow 3 business days for your refill to be completed.          Additional Information About Your Visit        Zingaya Information     Zingaya gives you secure access to your electronic health record. If you see a primary care provider, you can also send messages to your care team and make appointments. If you have questions, please call your primary care clinic.  If you do not have a primary care provider, please call [phone] and they will assist you.        Care EveryWhere ID     This is your Care EveryWhere ID. This could be used by other organizations to access your [facility] medical records  [id number]        Your Vitals Were     Pulse Temperature Respirations Height Last Period BMI (Body Mass Index)    64 97.9  F (36.6  C) (Oral) 16 5' 4.02\" (1.626 m) 11/27/2003 24.64 kg/m2       Blood Pressure from Last 3 Encounters:   06/30/17 124/82   06/13/17 (!) 154/96   06/07/17 (!) 152/98    Weight from Last 3 Encounters:   06/30/17 143 lb 9.6 oz (65.1 kg)   06/13/17 147 lb 6.4 oz (66.9 kg)   06/07/17 148 lb 12.8 oz (67.5 kg)              We Performed the Following     CBC with platelets differential     Comprehensive metabolic panel     CRP inflammation     Erythrocyte sedimentation rate auto     Ferritin     Hep B Virus DNA Quant Real Time PCR     Iron and iron binding capacity     Parathyroid Hormone Intact     Vitamin D Deficiency          Today's Medication Changes          These changes are accurate as of: 6/30/17  1:38 PM.  If you have any questions, ask your nurse or doctor.             "   These medicines have changed or have updated prescriptions.        Dose/Directions    celecoxib 200 MG capsule   Commonly known as:  celeBREX   This may have changed:  See the new instructions.   Used for:  Fibromyalgia   Changed by:  Charlotte Polanco APRN CNP        Dose:  200 mg   Take 1 capsule (200 mg) by mouth daily   Quantity:  180 capsule   Refills:  1       * lisinopril 10 MG tablet   Commonly known as:  PRINIVIL/ZESTRIL   This may have changed:  Another medication with the same name was added. Make sure you understand how and when to take each.   Used for:  Essential hypertension   Changed by:  Charlotte Polanco APRN CNP        Dose:  10 mg   Take 1 tablet (10 mg) by mouth daily   Quantity:  30 tablet   Refills:  0       * lisinopril 10 MG tablet   Commonly known as:  PRINIVIL/ZESTRIL   This may have changed:  You were already taking a medication with the same name, and this prescription was added. Make sure you understand how and when to take each.   Used for:  Essential hypertension   Changed by:  Charlotte Polanco APRN CNP        Dose:  10 mg   Take 1 tablet (10 mg) by mouth daily   Quantity:  90 tablet   Refills:  1       * Notice:  This list has 2 medication(s) that are the same as other medications prescribed for you. Read the directions carefully, and ask your doctor or other care provider to review them with you.         Where to get your medicines      These medications were sent to [facility] [place] - [address] [facility]  [address], [facility] [address]     Phone:  [phone]     celecoxib 200 MG capsule    lisinopril 10 MG tablet                Primary Care Provider Office Phone # Fax #    Tanikazeke Clark [phone]       [facility] [address]  [facility] [address]        Equal Access to Services     WADE STAUFFER : emery De Leon qaybta kaalmada adeegyada, waxay  neda templedora rice'aan ah. So [facility] [phone].    ATENCIÓN: Si maxwell sanchez, tiene a salazar disposición servicios gratuitos de asistencia lingüística. Ricky al [phone].    We comply with applicable federal civil rights laws and [place] laws. We do not discriminate on the basis of race, color, national origin, age, disability sex, sexual orientation or gender identity.            Thank you!     Thank you for choosing [facility]  for your care. Our goal is always to provide you with excellent care. Hearing back from our patients is one way we can continue to improve our services. Please take a few minutes to complete the written survey that you may receive in the mail after your visit with us. Thank you!             Your Updated Medication List - Protect others around you: Learn how to safely use, store and throw away your medicines at www.disposemymeds.org.          This list is accurate as of: 6/30/17  1:38 PM.  Always use your most recent med list.                   Brand Name Dispense Instructions for use Diagnosis    celecoxib 200 MG capsule    celeBREX    180 capsule    Take 1 capsule (200 mg) by mouth daily    Fibromyalgia       cycloSPORINE 0.05 % ophthalmic emulsion    RESTASIS     1 drop 2 times daily        Fiber 0.52 G Caps     540 capsule    4 tabs in pm        hydrocortisone 1 % ointment     30 g    Apply sparingly to affected area three times daily for 14 days.    Rash and nonspecific skin eruption       hydroxychloroquine 200 MG tablet    PLAQUENIL    60 tablet    Take 1 tablet (200 mg) by mouth 2 times daily    Inflammatory polyarthropathy (H)       * lisinopril 10 MG tablet    PRINIVIL/ZESTRIL    30 tablet    Take 1 tablet (10 mg) by mouth daily    Essential hypertension       * lisinopril 10 MG tablet    PRINIVIL/ZESTRIL    90 tablet    Take 1 tablet (10 mg) by mouth daily    Essential hypertension       montelukast 10 MG tablet    SINGULAIR    90 tablet    Take 1  tablet (10 mg) by mouth daily as needed Seasonal (August and September)    Other seasonal allergic rhinitis       nystatin 296198 UNIT/GM Powd    MYCOSTATIN    60 g    Apply topically 3 times daily as needed    Intertrigo       predniSONE 5 MG tablet    DELTASONE    120 tablet    Prednisone 20mg daily x5days, then 15mg daily x5days, then 10mg daily x5days, then 5mg daily thereafter.    Inflammatory polyarthropathy (H)       PROVIGIL PO      Take 200 mg by mouth 2 times daily Patient unsure which dose        ranitidine 300 MG tablet    ZANTAC    90 tablet    TAKE ONE TABLET BY MOUTH EVERY DAY    Gastroesophageal reflux disease without esophagitis       sertraline 100 MG tablet    ZOLOFT     Take 100 mg by mouth daily Takes 2 at bedtime        vitamin D 11394 UNIT capsule    ERGOCALCIFEROL    12 capsule    Take 1 capsule (50,000 Units) by mouth every 7 days    Vitamin D deficiency, Osteoporosis       VYVANSE 20 MG capsule   Generic drug:  lisdexamfetamine     30 capsule    Take 40 mg by mouth every morning Patient is taking 30 mg every day    Primary narcolepsy without cataplexy       * Notice:  This list has 2 medication(s) that are the same as other medications prescribed for you. Read the directions carefully, and ask your doctor or other care provider to review them with you.

## 2017-06-30 NOTE — NURSING NOTE
"Chief Complaint   Patient presents with     Hypertension     Follow up     Fatigue     Panel Management     DAP, PHQ9       Initial /82 (BP Location: Left arm, Patient Position: Chair, Cuff Size: Adult Regular)  Pulse 64  Temp 97.9  F (36.6  C) (Oral)  Resp 16  Ht 5' 4.02\" (1.626 m)  Wt 143 lb 9.6 oz (65.1 kg)  LMP 11/27/2003  BMI 24.64 kg/m2 Estimated body mass index is 24.64 kg/(m^2) as calculated from the following:    Height as of this encounter: 5' 4.02\" (1.626 m).    Weight as of this encounter: 143 lb 9.6 oz (65.1 kg).  Medication Reconciliation: complete   Sugey Goodwin CMA      "

## 2017-07-01 LAB — DEPRECATED CALCIDIOL+CALCIFEROL SERPL-MC: 23 UG/L (ref 20–75)

## 2017-07-01 ASSESSMENT — PATIENT HEALTH QUESTIONNAIRE - PHQ9: SUM OF ALL RESPONSES TO PHQ QUESTIONS 1-9: 10

## 2017-07-04 LAB
HBV DNA SERPL NAA+PROBE-ACNC: NORMAL [IU]/ML
HBV DNA SERPL NAA+PROBE-LOG IU: NORMAL {LOG_IU}/ML

## 2017-07-14 DIAGNOSIS — M81.0 AGE-RELATED OSTEOPOROSIS WITHOUT CURRENT PATHOLOGICAL FRACTURE: Primary | ICD-10-CM

## 2017-07-14 NOTE — PROGRESS NOTES
"Neptune message sent:  \"Ms. Adhikari,    Hepatitis B PCR was negative. Vitamin D was rechecked and was still low, so continue the high-dose vitamin D. The parathyroid hormone is now in the normal range, indicating that your vitamin D is improving overall.    Apolinar Cho MD  7/14/2017 6:48 AM\""

## 2017-08-28 DIAGNOSIS — M81.0 AGE-RELATED OSTEOPOROSIS WITHOUT CURRENT PATHOLOGICAL FRACTURE: ICD-10-CM

## 2017-08-28 PROCEDURE — 82306 VITAMIN D 25 HYDROXY: CPT | Performed by: INTERNAL MEDICINE

## 2017-08-28 PROCEDURE — 82565 ASSAY OF CREATININE: CPT | Performed by: INTERNAL MEDICINE

## 2017-08-28 PROCEDURE — 36415 COLL VENOUS BLD VENIPUNCTURE: CPT | Performed by: INTERNAL MEDICINE

## 2017-08-28 PROCEDURE — 82310 ASSAY OF CALCIUM: CPT | Performed by: INTERNAL MEDICINE

## 2017-08-29 LAB
CALCIUM SERPL-MCNC: 9.2 MG/DL (ref 8.5–10.1)
CREAT SERPL-MCNC: 0.63 MG/DL (ref 0.52–1.04)
DEPRECATED CALCIDIOL+CALCIFEROL SERPL-MC: 19 UG/L (ref 20–75)
GFR SERPL CREATININE-BSD FRML MDRD: >90 ML/MIN/1.7M2

## 2017-08-30 ENCOUNTER — OFFICE VISIT (OUTPATIENT)
Dept: RHEUMATOLOGY | Facility: CLINIC | Age: 67
End: 2017-08-30
Payer: COMMERCIAL

## 2017-08-30 ENCOUNTER — RADIANT APPOINTMENT (OUTPATIENT)
Dept: GENERAL RADIOLOGY | Facility: CLINIC | Age: 67
End: 2017-08-30
Attending: INTERNAL MEDICINE
Payer: COMMERCIAL

## 2017-08-30 VITALS
HEIGHT: 64 IN | BODY MASS INDEX: 24.52 KG/M2 | SYSTOLIC BLOOD PRESSURE: 133 MMHG | DIASTOLIC BLOOD PRESSURE: 79 MMHG | OXYGEN SATURATION: 98 % | TEMPERATURE: 97.9 F | WEIGHT: 143.6 LBS | HEART RATE: 73 BPM

## 2017-08-30 DIAGNOSIS — M54.2 NECK PAIN: ICD-10-CM

## 2017-08-30 DIAGNOSIS — R20.0 NUMBNESS IN BOTH HANDS: ICD-10-CM

## 2017-08-30 DIAGNOSIS — E55.9 VITAMIN D DEFICIENCY: ICD-10-CM

## 2017-08-30 DIAGNOSIS — M81.8 OTHER OSTEOPOROSIS, UNSPECIFIED PATHOLOGICAL FRACTURE PRESENCE: ICD-10-CM

## 2017-08-30 DIAGNOSIS — M06.4 INFLAMMATORY POLYARTHROPATHY (H): ICD-10-CM

## 2017-08-30 DIAGNOSIS — K92.1 BLACK STOOLS: ICD-10-CM

## 2017-08-30 DIAGNOSIS — R20.0 NUMBNESS IN FEET: ICD-10-CM

## 2017-08-30 DIAGNOSIS — M06.4 INFLAMMATORY POLYARTHROPATHY (H): Primary | ICD-10-CM

## 2017-08-30 DIAGNOSIS — K21.9 GASTROESOPHAGEAL REFLUX DISEASE, ESOPHAGITIS PRESENCE NOT SPECIFIED: ICD-10-CM

## 2017-08-30 LAB
BASOPHILS # BLD AUTO: 0 10E9/L (ref 0–0.2)
BASOPHILS NFR BLD AUTO: 0.5 %
DIFFERENTIAL METHOD BLD: ABNORMAL
EOSINOPHIL # BLD AUTO: 0.1 10E9/L (ref 0–0.7)
EOSINOPHIL NFR BLD AUTO: 2.6 %
ERYTHROCYTE [DISTWIDTH] IN BLOOD BY AUTOMATED COUNT: 13.8 % (ref 10–15)
HCT VFR BLD AUTO: 40.5 % (ref 35–47)
HGB BLD-MCNC: 14.1 G/DL (ref 11.7–15.7)
LYMPHOCYTES # BLD AUTO: 1.1 10E9/L (ref 0.8–5.3)
LYMPHOCYTES NFR BLD AUTO: 27.6 %
MCH RBC QN AUTO: 31.3 PG (ref 26.5–33)
MCHC RBC AUTO-ENTMCNC: 34.8 G/DL (ref 31.5–36.5)
MCV RBC AUTO: 90 FL (ref 78–100)
MONOCYTES # BLD AUTO: 0.3 10E9/L (ref 0–1.3)
MONOCYTES NFR BLD AUTO: 8.4 %
NEUTROPHILS # BLD AUTO: 2.3 10E9/L (ref 1.6–8.3)
NEUTROPHILS NFR BLD AUTO: 60.9 %
PLATELET # BLD AUTO: 154 10E9/L (ref 150–450)
RBC # BLD AUTO: 4.51 10E12/L (ref 3.8–5.2)
WBC # BLD AUTO: 3.8 10E9/L (ref 4–11)

## 2017-08-30 PROCEDURE — 99214 OFFICE O/P EST MOD 30 MIN: CPT | Performed by: INTERNAL MEDICINE

## 2017-08-30 PROCEDURE — 85025 COMPLETE CBC W/AUTO DIFF WBC: CPT | Performed by: INTERNAL MEDICINE

## 2017-08-30 PROCEDURE — 72050 X-RAY EXAM NECK SPINE 4/5VWS: CPT

## 2017-08-30 PROCEDURE — 36415 COLL VENOUS BLD VENIPUNCTURE: CPT | Performed by: INTERNAL MEDICINE

## 2017-08-30 RX ORDER — OMEPRAZOLE 40 MG/1
40 CAPSULE, DELAYED RELEASE ORAL DAILY
Qty: 30 CAPSULE | Refills: 0 | Status: SHIPPED | OUTPATIENT
Start: 2017-08-30 | End: 2017-09-29

## 2017-08-30 RX ORDER — HYDROXYCHLOROQUINE SULFATE 200 MG/1
200 TABLET, FILM COATED ORAL DAILY
Qty: 30 TABLET | Refills: 3 | Status: SHIPPED | OUTPATIENT
Start: 2017-08-30 | End: 2017-11-29

## 2017-08-30 RX ORDER — ERGOCALCIFEROL 1.25 MG/1
CAPSULE, LIQUID FILLED ORAL
Qty: 24 CAPSULE | Refills: 1 | Status: SHIPPED | OUTPATIENT
Start: 2017-08-30 | End: 2017-11-28

## 2017-08-30 NOTE — NURSING NOTE
"Chief Complaint   Patient presents with     RECHECK     patient states she is tired and has nausea. Climbs stairs and is wiped out.  Waking up in the middle of the night sick to her stomache. Hands swelling has gone down, she is able to grasp things. No apetite which is new to her. Brusing easy, ringing in ears and gets dizzy. Numbness in toes and fingers, feels like arms are falling asleep. Can't feel end of toes.       Initial /79 (BP Location: Left arm, Patient Position: Chair, Cuff Size: Adult Regular)  Pulse 73  Temp 97.9  F (36.6  C) (Oral)  Ht 1.626 m (5' 4.02\")  Wt 65.1 kg (143 lb 9.6 oz)  LMP 11/27/2003  SpO2 98%  BMI 24.63 kg/m2 Estimated body mass index is 24.63 kg/(m^2) as calculated from the following:    Height as of this encounter: 1.626 m (5' 4.02\").    Weight as of this encounter: 65.1 kg (143 lb 9.6 oz).  BP completed using cuff size: regular         RAPID3 (0-30) Cumulative Score            RAPID3 Weighted Score (divide #4 by 3 and that is the weighted score)           "

## 2017-08-30 NOTE — MR AVS SNAPSHOT
After Visit Summary   8/30/2017    Niesha Adhikari    MRN: [id number]           Patient Information     Date Of Birth          1950        Visit Information        Provider Department      8/30/2017 7:40 AM Apolinar Cho MD [facility]        Today's Diagnoses     Inflammatory polyarthropathy (H)    -  1    Black stools        Vitamin D deficiency        Other osteoporosis, unspecified pathological fracture presence        Gastroesophageal reflux disease, esophagitis presence not specified        Neck pain        Numbness in both hands        Numbness in feet          Care Instructions    Please make an appointment with your primary care doctor and with GI as soon as possible.          Follow-ups after your visit        Additional Services     NEUROLOGY ADULT REFERRAL       Your provider has referred you for the following:   Consult at [facility]: [facility] [phone]   [email or web address]  [facility]: [place] [facility] [phone]   [email or web address]  [facility]: [place] [facility] [phone]   [email or web address]    Please be aware that coverage of these services is subject to the terms and limitations of your health insurance plan.  Call member services at your health plan with any benefit or coverage questions.      Please bring the following with you to your appointment:    (1) Any X-Rays, CTs or MRIs which have been performed.  Contact the facility where they were done to arrange for  prior to your scheduled appointment.    (2) List of current medications  (3) This referral request   (4) Any documents/labs given to you for this referral                  Your next 10 appointments already scheduled     Nov 21, 2017  7:45 AM CST   LAB with NL LAB [facility]   [facility]    [address] [facility] [address]    [phone]           Patient must bring picture ID. Patient should be prepared to give a urine specimen  Please do not eat 10-12 hours before your appointment if you are coming in fasting for labs on lipids, cholesterol, or glucose (sugar). Pregnant women should follow their Care Team instructions. Water with medications is okay. Do not drink coffee or other fluids. If you have concerns about taking  your medications, please ask at office or if scheduling via [facility], send a message by clicking on Secure Messaging, Message Your Care Team.            Nov 29, 2017  8:00 AM CST   Return Visit with Apolinar Cho MD   [facility] ([facility])    [address] [address] [phone]              Future tests that were ordered for you today     Open Future Orders        Priority Expected Expires Ordered    Vitamin D Deficiency Routine 11/20/2017 11/30/2017 8/30/2017    Calcium Routine 11/20/2017 11/30/2017 8/30/2017    Basic metabolic panel  (Ca, Cl, CO2, Creat, Gluc, K, Na, BUN) Routine 11/20/2017 11/30/2017 8/30/2017    XR Cervical Spine G/E 4 Views Routine 8/30/2017 8/31/2018 8/30/2017            Who to contact     If you have questions or need follow up information about today's clinic visit or your schedule please contact [facility] directly at [phone].  Normal or non-critical lab and imaging results will be communicated to you by MyChart, letter or phone within 4 business days after the clinic has received the results. If you do not hear from us within 7 days, please contact the clinic through Angella Joyhart or phone. If you have a critical or abnormal lab result, we will notify you by phone as soon as possible.  Submit refill requests through [facility] or call your pharmacy and they will forward the refill request to us. Please allow 3 business days for your refill to be completed.          Additional Information About Your Visit        [facility]  "Information     Alice gives you secure access to your electronic health record. If you see a primary care provider, you can also send messages to your care team and make appointments. If you have questions, please call your primary care clinic.  If you do not have a primary care provider, please call [phone] and they will assist you.        Care EveryWhere ID     This is your Care EveryWhere ID. This could be used by other organizations to access your [facility] medical records  [id number]        Your Vitals Were     Pulse Temperature Height Last Period Pulse Oximetry BMI (Body Mass Index)    73 97.9  F (36.6  C) (Oral) 1.626 m (5' 4.02\") 11/27/2003 98% 24.63 kg/m2       Blood Pressure from Last 3 Encounters:   08/30/17 133/79   06/30/17 124/82   06/13/17 (!) 154/96    Weight from Last 3 Encounters:   08/30/17 65.1 kg (143 lb 9.6 oz)   06/30/17 65.1 kg (143 lb 9.6 oz)   06/13/17 66.9 kg (147 lb 6.4 oz)              We Performed the Following     CBC with platelets differential     NEUROLOGY ADULT REFERRAL          Today's Medication Changes          These changes are accurate as of: 8/30/17 10:23 AM.  If you have any questions, ask your nurse or doctor.               Start taking these medicines.        Dose/Directions    omeprazole 40 MG capsule   Commonly known as:  priLOSEC   Used for:  Black stools, Gastroesophageal reflux disease, esophagitis presence not specified   Started by:  Apolinar Cho MD        Dose:  40 mg   Take 1 capsule (40 mg) by mouth daily . Take 30-60 minutes before a meal.   Quantity:  30 capsule   Refills:  0         These medicines have changed or have updated prescriptions.        Dose/Directions    vitamin D 36473 UNIT capsule   Commonly known as:  ERGOCALCIFEROL   This may have changed:    - how much to take  - how to take this  - when to take this  - additional instructions   Used for:  Vitamin D deficiency, Other osteoporosis, unspecified pathological fracture presence "   Changed by:  Apolinar Cho MD        Take 1 capsule (50,000 Units) by mouth every Monday and Friday.   Quantity:  24 capsule   Refills:  1            Where to get your medicines      These medications were sent to [facility] [place] - [address]  [address] [facility] [address]     Phone:  [phone]     omeprazole 40 MG capsule    vitamin D 50843 UNIT capsule                Primary Care Provider Office Phone # Fax #    Tanika QUIROZ MD Eduardo [phone]       [address]  [facility] [address]        Equal Access to Services     [facility]: Hadii chely newell hadasho Soomaali, waaxda luqadaha, qaybta kaalmada adeegyada, ramses mcnulty . So [facility] [phone].    ATENCIÓN: Si habla español, tiene a salazar disposición servicios gratuitos de asistencia lingüística. LlKettering Health [facility] [phone].    We comply with applicable federal civil rights laws and [place] laws. We do not discriminate on the basis of race, color, national origin, age, disability sex, sexual orientation or gender identity.            Thank you!     Thank you for choosing [facility]  for your care. Our goal is always to provide you with excellent care. Hearing back from our patients is one way we can continue to improve our services. Please take a few minutes to complete the written survey that you may receive in the mail after your visit with us. Thank you!             Your Updated Medication List - Protect others around you: Learn how to safely use, store and throw away your medicines at www.disposemymeds.org.          This list is accurate as of: 8/30/17 10:23 AM.  Always use your most recent med list.                   Brand Name Dispense Instructions for use Diagnosis    celecoxib 200 MG capsule    celeBREX    180 capsule    Take 1 capsule (200 mg) by mouth daily    Fibromyalgia       cycloSPORINE 0.05 % ophthalmic emulsion    RESTASIS     1 drop 2 times  daily        Fiber 0.52 G Caps     540 capsule    4 tabs in pm        hydrocortisone 1 % ointment     30 g    Apply sparingly to affected area three times daily for 14 days.    Rash and nonspecific skin eruption       hydroxychloroquine 200 MG tablet    PLAQUENIL    60 tablet    Take 1 tablet (200 mg) by mouth 2 times daily    Inflammatory polyarthropathy (H)       * lisinopril 10 MG tablet    PRINIVIL/ZESTRIL    30 tablet    Take 1 tablet (10 mg) by mouth daily    Essential hypertension       * lisinopril 10 MG tablet    PRINIVIL/ZESTRIL    90 tablet    Take 1 tablet (10 mg) by mouth daily    Essential hypertension       montelukast 10 MG tablet    SINGULAIR    90 tablet    Take 1 tablet (10 mg) by mouth daily as needed Seasonal (August and September)    Other seasonal allergic rhinitis       nystatin 395398 UNIT/GM Powd    MYCOSTATIN    60 g    Apply topically 3 times daily as needed    Intertrigo       omeprazole 40 MG capsule    priLOSEC    30 capsule    Take 1 capsule (40 mg) by mouth daily . Take 30-60 minutes before a meal.    Black stools, Gastroesophageal reflux disease, esophagitis presence not specified       predniSONE 5 MG tablet    DELTASONE    120 tablet    Prednisone 20mg daily x5days, then 15mg daily x5days, then 10mg daily x5days, then 5mg daily thereafter.    Inflammatory polyarthropathy (H)       PROVIGIL PO      Take 200 mg by mouth 2 times daily Patient unsure which dose        ranitidine 300 MG tablet    ZANTAC    90 tablet    TAKE ONE TABLET BY MOUTH EVERY DAY    Gastroesophageal reflux disease without esophagitis       sertraline 100 MG tablet    ZOLOFT     Take 100 mg by mouth daily Takes 2 at bedtime        vitamin D 12070 UNIT capsule    ERGOCALCIFEROL    24 capsule    Take 1 capsule (50,000 Units) by mouth every Monday and Friday.    Vitamin D deficiency, Other osteoporosis, unspecified pathological fracture presence       VYVANSE 20 MG capsule   Generic drug:  lisdexamfetamine     30  capsule    Take 40 mg by mouth every morning Patient is taking 30 mg every day    Primary narcolepsy without cataplexy       * Notice:  This list has 2 medication(s) that are the same as other medications prescribed for you. Read the directions carefully, and ask your doctor or other care provider to review them with you.

## 2017-08-30 NOTE — PROGRESS NOTES
"Rheumatology Clinic Visit      Niesha Adhikari MRN# [id number]   YOB: 1950 Age: 67 year old      Date of visit: 8/30/17   PCP: Dr. Tanika Clark  Hepatologist: Dr. Peres at [facility] in [place]    Chief Complaint   Patient presents with:  RECHECK: patient states she is tired and has nausea. Climbs stairs and is wiped out.  Waking up in the middle of the night sick to her stomache. Hands swelling has gone down, she is able to grasp things. No apetite which is new to her. Brusing easy, ringing in ears and gets dizzy. Numbness in toes and fingers, feels like arms are falling asleep. Can't feel end of toes.      Assessment and Plan     1. Inflammatory polyarthropathy: Hepatitis C related arthralgias versus rheumatoid arthritis. Initially with symmetric synovitis on exam and morning stiffness for more than one hour. Currently with morning stiffness for no more than 30 minutes. No synovitis on exam today. Doing well with hydroxychloroquine 400 mg daily.   - Reduce to hydroxychloroquine to 200 mg daily  - Encouraged her to have the baseline Plaquenil toxicity monitoring exam    2. Osteoporosis: Previously treated with Fosamax (GERD), PO Boniva (reportedly ineffective), and IV Boniva (reportedly effective). Prolia was being considered by a previous rheumatologist but not used because she wanted \"clearance\" from the patient's gastroenterologist because she has hepatitis C; I do not see a contraindication for Prolia in the setting of hepatitis C. The patient reports that her gastroenterologist reported no contraindication for Prolia either. Patient reports having no osteoporosis treatment for about 2 years now and her last bone density scan showed osteoporosis. She would like to restart IV Boniva. She does not want to use Prolia . DEXA was repeated on 2/23/2017 that showed osteoporosis. Vitamin D was low and therefore it is being replaced currently but has not responded well. Increase ergocalciferol " today.  - Increase ergocalciferol to 50,000 units twice weekly  - Labs 2-3 days prior to her next rheumatology clinic visit: Calcium, vitamin D, BMP    3. Neck pain: Worsening neck pain and in the setting of inflammatory arthritis will check exercise of the cervical spine including flexion and extension views.  - X-ray: Cervical spine    4. Bilateral upper and lower showed a neuropathy: Present for about 2 months. Unclear etiology. Refer to neurology for evaluation.  - Neurology referral    5. Tarry Stools, fatigue, GERD: Start omeprazole 40mg daily. Check CBC.  I advised her to follow up with her PCP or gastroenterologist ASAP.  Expressed concern for GI bleed.  If worsening symptoms she is to go to the ED.     6. Hepatitis C: Following with Dr. Peres at [facility] in [place]; recently had Axel but per patient her viral loads are still detected after being undetectable for a while.     Ms. Adhikari verbalized agreement with and understanding of the rational for the diagnosis and treatment plan.  All questions were answered to best of my ability and the patient's satisfaction. Ms. Adhikari was advised to contact the clinic with any questions that may arise after the clinic visit.      Thank you for involving me in the care of the patient    Return to clinic: 3 months      HPI   Niesha Adhikari is a 67 year old female with a medical history significant for hepatitis C, hemorrhoids, narcolepsy, fibromyalgia, migraines, anxiety, pernicious anemia, GERD, allergic rhinitis, and osteoporosis who presents earlier than previously scheduled because of hand pain and stiffness.     Ms. Adhikari was previously followed in the rheumatology clinic by Dr. Luciano and Dr. Marc.  A 10/29/2015 clinic note documents osteoporosis that was first diagnosed in 2001. Initially she was on Fosamax but was limited because of GERD. Reportedly treatment failure to oral and IV Boniva. Prolia was being considered but Dr. Luciano  documents that she wanted clearance from gastroenterology because of her high hepatitis C viral load.    Regarding hepatitis C, she is followed at the Hepatology Department at [facility] in [place] by Shannon Sher and Dr. Peres.  A letter dated 10/29/2016 from Shannon Sher states that Ms. Adhikari has completed a 12 week course of hepatitis C therapy with [facility] and she is responding to treatment, based on an undetectable hepatitis B viral load at the end of therapy.    Today, she presents with several issues. She reports that she has some fatigue and nausea. She climbs stairs and feels wiped out. She has some stomach upset. Her hand swelling is gone significantly better. She has difficulty with maintaining her appetite. She also reports having black tarry stools for the last couple weeks. She uses ranitidine for GERD. She has used omeprazole in the past. She also reports having worsening neck pain. She reports having numbness and tingling in her bilateral upper and lower extremities that has been present for approximately 2 months.    Regarding osteoporosis, she continues to use high-dose vitamin D. She reports having great dentition.     Denies fevers, chills, nausea, vomiting, constipation, diarrhea. No abdominal pain. No chest pain/pressure, palpitations, or shortness of breath. No LE swelling. No neck pain. No oral or nasal sores.  No rash.    Tobacco: quit in 1978  EtOH: none  Drugs: none  Occupation: retired    ROS   GEN: No fevers, chills, night sweats, or weight change  SKIN: No itching, rashes, sores  HEENT: No epistaxis. No oral or nasal ulcers.  CV: No chest pain, pressure, palpitations, or dyspnea on exertion.  PULM: No SOB, wheeze, cough.  GI: No nausea, vomiting, constipation, diarrhea. No blood in stool. No abdominal pain.  : No blood in urine.  MSK: See HPI.  NEURO: No numbness, tingling, or weakness.  ENDO: No heat/cold intolerance.  EXT: No LE swelling  PSYCH: See history of  present illness    Active Problem List     Patient Active Problem List   Diagnosis     Allergic rhinitis     Esophageal reflux     Pernicious anemia     Anxiety state     Migraine     Essential and other specified forms of tremor     Fibromyalgia     Moderate recurrent major depression (H)     Advanced directives, counseling/discussion     Narcolepsy     Internal hemorrhoids with other complication     Hepatitis C     AIN (anal intraepithelial neoplasia) anal canal     Rectal bleeding     Sciatica     Senile osteoporosis     Osteoporosis     Inflammatory polyarthropathy (H)     High risk medication use     Past Medical History     Past Medical History:   Diagnosis Date     ABUSE BY SPOUSE/PARTNER 7/27/2005     Degeneration of lumbar or lumbosacral intervertebral disc     DDD L5/S1     HELICOBACTER PYLORI INFECTION 1/28/2005     Hepatitis C      Hypertension      Malignant neoplasm (H)     ACIN     Osteoporosis      Other and unspecified alcohol dependence, unspecified drinking behavior     Sober as 1/21/1987     Other malaise and fatigue      Past Surgical History     Past Surgical History:   Procedure Laterality Date     BIOPSY ANAL CANAL  1/21/13    [facility]      BREAST BIOPSY, RT/LT Left 1975    Breat Biopsy RT/LT     C NONSPECIFIC PROCEDURE  1965    Removed bone left index finger knuckle, casts broken bones     COLONOSCOPY  8/25/2009     COLONOSCOPY  2/14/2011    COLONOSCOPY performed by CRISTIN LAGUNAS at  GI     CYSTOSCOPY  2/28/2011    CYSTOSCOPY performed by CAYLA FLOR at  OR     ENDOSCOPY  05/21/12    Upper GI - [facility]      COLONOSCOPY W/WO BRUSH/WASH  08/22/05     [facility] UGI ENDOSCOPY DIAG W BIOPSY  10/01/09     [facility] UGI ENDOSCOPY, SIMPLE EXAM  08/08/07     HEMORRHOIDECTOMY  06/25/12    [facility]     LAPAROSCOPIC SALPINGO-OOPHORECTOMY  2/28/2011    LAPAROSCOPIC SALPINGO-OOPHORECTOMY performed by CAYLA FLOR at  OR     TONSILLECTOMY & ADENOIDECTOMY  1965  "    Allergy     Allergies   Allergen Reactions     Abilify Discmelt Other (See Comments)     Disoriented     Antivert [Meclizine Hcl]      Compazine      Cymbalta Other (See Comments)     Disoriented, trouble sleeping     Diphenhydramine Nausea     And abdominal pain     Effexor [Venlafaxine] Other (See Comments)     Disoriented, trouble sleeping     Elavil [Amitriptyline Hcl] Other (See Comments)     \"didn't feel right on it-med was stopped right away\"     Ferrous Sulfate Nausea and Vomiting     Food Difficulty breathing     cilantro     Indomethacin      indocin sensativity \"Severe h.a\"     Seasonal Allergies Other (See Comments) and Difficulty breathing     Philip Gold Aug-Sept, rag weed, sneezing     Thiopental Sodium      PENTOTHAL/rigidity and fight response     Animal Dander Difficulty breathing and Rash     sneezing,resp. distress     Bupropion Anxiety     Ibuprofen Rash     Tylenol [Acetaminophen] Rash     Current Medication List     Current Outpatient Prescriptions   Medication Sig     omeprazole (PRILOSEC) 40 MG capsule Take 1 capsule (40 mg) by mouth daily . Take 30-60 minutes before a meal.     vitamin D (ERGOCALCIFEROL) 88221 UNIT capsule Take 1 capsule (50,000 Units) by mouth every Monday and Friday.     lisinopril (PRINIVIL/ZESTRIL) 10 MG tablet Take 1 tablet (10 mg) by mouth daily     celecoxib (CELEBREX) 200 MG capsule Take 1 capsule (200 mg) by mouth daily     lisinopril (PRINIVIL/ZESTRIL) 10 MG tablet Take 1 tablet (10 mg) by mouth daily     hydroxychloroquine (PLAQUENIL) 200 MG tablet Take 1 tablet (200 mg) by mouth 2 times daily     predniSONE (DELTASONE) 5 MG tablet Prednisone 20mg daily x5days, then 15mg daily x5days, then 10mg daily x5days, then 5mg daily thereafter.     hydrocortisone 1 % ointment Apply sparingly to affected area three times daily for 14 days.     cycloSPORINE (RESTASIS) 0.05 % ophthalmic emulsion 1 drop 2 times daily     Modafinil (PROVIGIL PO) Take 200 mg by mouth 2 times " daily Patient unsure which dose      ranitidine (ZANTAC) 300 MG tablet TAKE ONE TABLET BY MOUTH EVERY DAY     montelukast (SINGULAIR) 10 MG tablet Take 1 tablet (10 mg) by mouth daily as needed Seasonal (August and September)     lisdexamfetamine (VYVANSE) 20 MG capsule Take 40 mg by mouth every morning Patient is taking 30 mg every day     nystatin (MYCOSTATIN) 868838 UNIT/GM POWD Apply topically 3 times daily as needed     sertraline (ZOLOFT) 100 MG tablet Take 100 mg by mouth daily Takes 2 at bedtime     Psyllium (FIBER) 0.52 G CAPS 4 tabs in pm     No current facility-administered medications for this visit.          Social History   See HPI    Family History     Family History   Problem Relation Age of Onset     Hypertension Mother      Breast Cancer Mother      Coronary Artery Disease Mother      CEREBROVASCULAR DISEASE Mother      KIDNEY DISEASE Mother      Hypertension Brother      Respiratory Brother      emphysema     Lipids Brother      HEART DISEASE Brother      stents, 12/2011; has had about 6 MIs, last one 1/2014     C.A.D. Sister      MI at age 63     Hypertension Sister      GASTROINTESTINAL DISEASE Sister      gallbladder     Circulatory Sister      brain aneurysm at 63     Genitourinary Problems Sister      1 kidney/bladder     Hypertension Sister      Obesity Sister      Unknown/Adopted Paternal Uncle      Blood Disease Son      Lymes/7/11     Hypertension Father      Lymphoma Father      Glaucoma Father      Coronary Artery Disease Other 49     niece     DIABETES Other      cousin     Coronary Artery Disease Sister      Coronary Artery Disease Brother      Hyperlipidemia Brother      Hypertension Son      CEREBROVASCULAR DISEASE Maternal Grandmother      CEREBROVASCULAR DISEASE Paternal Grandmother      Liver Cancer Cousin      Glaucoma Paternal Grandfather      Physical Exam     Temp Readings from Last 3 Encounters:   08/30/17 97.9  F (36.6  C) (Oral)   06/30/17 97.9  F (36.6  C) (Oral)  "  06/13/17 98.3  F (36.8  C) (Oral)     BP Readings from Last 5 Encounters:   08/30/17 133/79   06/30/17 124/82   06/13/17 (!) 154/96   06/07/17 (!) 152/98   04/12/17 122/74     Pulse Readings from Last 1 Encounters:   08/30/17 73     Resp Readings from Last 1 Encounters:   06/30/17 16     Estimated body mass index is 24.63 kg/(m^2) as calculated from the following:    Height as of this encounter: 1.626 m (5' 4.02\").    Weight as of this encounter: 65.1 kg (143 lb 9.6 oz).    GEN: NAD  HEENT: MMM. No oral lesions.  Good dentition.  Anicteric, noninjected sclera  CV: S1, S2. RRR. No m/r/g.  PULM: CTA bilaterally. No w/c.  MSK: MCPs, PIPs, wrists, elbows, shoulders, knees, ankles, and MTPs without swelling or tenderness to palpation. Heberden's and Philippe's nodes  present. Hips nontender to direct palpation.     NEURO: UE and LE strengths 5/5 and equal bilaterally.   SKIN: No rash  EXT: No LE edema  PSYCH: Alert. Appropriate.    Labs / Imaging (select studies)   RF/CCP  Recent Labs   Lab Test  06/07/17   0955   CCPIGG  1   RHF  <20     CBC  Recent Labs   Lab Test  06/30/17   0925  06/07/17   0955  11/17/16   0755   05/25/16   1525   WBC  4.7  3.7*   --    --   6.7   RBC  5.00  4.85   --    --   4.81   HGB  14.6  14.1   --    --   13.5   HCT  44.2  43.1   --    --   41.4   MCV  88  89   --    --   86   RDW  14.3  14.2   --    --   13.9   PLT  138*  156  136*   < >  139*   MCH  29.2  29.1   --    --   28.1   MCHC  33.0  32.7   --    --   32.6   NEUTROPHIL  68.1  54.6   --    --   69.3   LYMPH  21.4  30.2   --    --   19.6   MONOCYTE  7.3  10.0   --    --   8.7   EOSINOPHIL  3.0  4.9   --    --   2.1   BASOPHIL  0.2  0.3   --    --   0.3   ANEU  3.2  2.0   --    --   4.6   ALYM  1.0  1.1   --    --   1.3   FREDY  0.3  0.4   --    --   0.6   AEOS  0.1  0.2   --    --   0.1   ABAS  0.0  0.0   --    --   0.0    < > = values in this interval not displayed.     CMP  Recent Labs   Lab Test  08/28/17   1457  06/30/17   0925  " 02/23/17   0726  10/07/16   0738  07/15/15   1607   02/06/15   0958   11/19/13   1226   NA   --   139  141  138  137   --   139   --   138   POTASSIUM   --   4.7  4.1  4.2  3.9   --   4.4   --   4.4   CHLORIDE   --   103  104  103  101   --   103   --   99   CO2   --   29  32  32  29   --   29   --   31   ANIONGAP   --   7  5  3  7   --   7   --   8.4   GLC   --   90  87  84  68*   --   86   --   90   BUN   --   13  12  18  11   --   11   --   10   CR  0.63  0.59  0.62  0.72  0.65   < >  0.62   < >  0.59   GFRESTIMATED  >90  >90  Non African American GFR Calc    >90  Non  GFR Calc    81  >90  Non  GFR Calc     < >  >90  Non  GFR Calc     < >  >90   GFRESTBLACK  >90  >90  African American GFR Calc    >90   GFR Calc    >90   GFR Calc    >90   GFR Calc     < >  >90   GFR Calc     < >  >90   ELIZABETH  9.2  8.9  9.0  9.0  8.9   < >  9.1   < >  9.3   BILITOTAL   --   0.5  0.5   --   0.5   --   0.5   < >  0.6   ALBUMIN   --   4.0  4.0   --   4.0   --   4.0   < >  4.3   PROTTOTAL   --   7.4  7.4   --   7.5   --   7.4   < >  7.3   ALKPHOS   --   78  77   --   59   --   60   < >  64   AST   --   24  22   --   29   --   30   < >  52*   ALT   --   32  25   --   34   --   31   < >  44    < > = values in this interval not displayed.     Iron Studies  Recent Labs   Lab Test  06/30/17   0922  07/15/15   1607  12/05/12   0756  08/29/12   0844   MARTÍNEZ  31   --   354*  10   IRON  138  106  119  21*   FEB  376  439*  315  420   IRONSAT  37  24  38  5*     Calcium/VitaminD  Recent Labs   Lab Test  08/28/17   1457  06/30/17   0925  05/11/17   0830  02/23/17   0726   ELIZABETH  9.2  8.9   --   9.0   VITDT  19*  23  30  10*     ESR/CRP  Recent Labs   Lab Test  06/30/17   0925   SED  6   CRP  <2.9     TSH/T4  Recent Labs   Lab Test  10/07/16   0738  07/15/15   1607  02/05/14   1131   03/29/11   1523   TSH  1.90  0.86  0.84   < >  1.01   T4    --    --    --    --   0.90    < > = values in this interval not displayed.     Lipid Panel  Recent Labs   Lab Test  11/25/16   1217  10/12/11   1110  03/17/10   1021   CHOL  276*  174  200   TRIG  88  71  125   HDL  65  58  47*   LDL  193*  102  128   VLDL   --   14  25   CHOLHDLRATIO   --   3.0  4.0   NHDL  211*   --    --      Hepatitis B  Recent Labs   Lab Test  06/30/17   0925  06/07/17   0955  09/30/15   0951   AUSAB   --   0.65   --    HBCAB   --   Reactive   A reactive result indicates acute, chronic or past/resolved hepatitis B   infection.  *   --    HEPBANG   --   Nonreactive  Nonreactive   HBQLOG  Not Calculated   --    --      Hepatitis C  Recent Labs   Lab Test  06/07/17   0955  09/30/15   0951  02/06/15   0958   10/12/11   1110   HCVAB  Reactive   A reactive result indicates one of the following 1) current HCV infection 2)   past HCV infection that has resolved or 3) false positivity. The CDC recommends   that a reactive result should be followed by Nucleic acid testing for HCV RNA.  If HCV RNA is detected, that indicates current HCV infection. If HCV RNA is not   detected, that indicates either past, resolved HCV infection, or false HCV   antibody positivity.   Assay performance characteristics have not been established for newborns,   infants, and children  *  Reactive   A reactive result indicates one of the following 1) current HCV infection 2)   past HCV infection that has resolved or 3) false positivity. The CDC recommends   that a reactive result should be followed by Nucleic acid testing for HCV RNA.  If HCV RNA is detected, that indicates current HCV infection. If HCV RNA is not   detected, that indicates either past, resolved HCV infection, or false HCV   antibody positivity.   Assay performance characteristics have not been established for newborns,   infants, and children  *   --    --   Positive  High sample/cutoff ratio, confirmatory testing available.*   HCVRNA  HCV RNA Not Detected    The [employer]O AmpliPrep/LUIS ARMANDO TaqMan HCV Test is an FDA-approved in vitro nucleic   acid amplification test for the quantitation of HCV RNA in human plasma (ETDA   plasma) or serum using the [employer]O AmpliPrep Instrument for automated viral   nucleic acid extraction and the [employer]ANDO TaqMan Analyzer or [employer] ARMANDO TaqMan for   automated Real Time PCR amplification and detection of the viral nucleic acid   target.   Titer results are reported in International Units/mL (IU/mL) using the 1st WHO   International standard for HCV for Nucleic Acid Amplification based assays.    292389*  168425*   < >   --     < > = values in this interval not displayed.     Lyme ab screening  Recent Labs   Lab Test  05/11/17   0830  04/12/17   1211  07/24/15   1047   LYMEGM  <0.01  Negative, Absence of detectable Borrelia burdorferi antibodies. A negative   result does not exclude the possibility of Borrelia burgdorferi infection. If   early Lyme disease is suspected, a second sample should be collected and tested   2 to 4 weeks later.    <0.01  Negative, Absence of detectable Borrelia burdorferi antibodies. A negative   result does not exclude the possibility of Borrelia burgdorferi infection. If   early Lyme disease is suspected, a second sample should be collected and tested   2 to 4 weeks later.    0.11     Tuberculosis Screening  Recent Labs   Lab Test  09/30/15   0952   TBRSLT  Negative   TBAGN  0.05     UA  Recent Labs   Lab Test  08/19/13   1750  03/29/11   1524  03/09/10   1720   COLOR  Yellow  Yellow  Yellow   APPEARANCE  Clear  Clear  Clear   URINEGLC  Negative  Negative  Negative   URINEBILI  Negative  Negative  Negative   SG  1.010  1.020  1.020   URINEPH  7.0  7.0  6.0   PROTEIN  Negative  Negative  Negative   UROBILINOGEN  0.2  0.2  0.2   NITRITE  Negative  Negative  Negative   UBLD  Negative  Trace*  Negative   LEUKEST  Small*  Negative  Negative   WBCU  5-10*  O - 2   --    RBCU  O - 2  O - 2   --      Urine Microscopic  Recent Labs   Lab  Test  08/19/13   1750  03/29/11   1524   WBCU  5-10*  O - 2   RBCU  O - 2  O - 2     Urine Protein  Recent Labs   Lab Test  02/27/13   1606   UCRR  119     Immunization History     Immunization History   Administered Date(s) Administered     HPVQuadrivalent 08/13/2012, 09/25/2012, 01/24/2013     HepA-Ped 2 dose 04/18/2000, 09/26/2000     HepB-Peds 11/15/2011, 12/19/2011     Influenza (H1N1) 01/07/2010     Influenza (IIV3) 01/03/2005, 10/16/2006, 11/14/2007, 10/28/2008, 09/29/2009, 09/27/2010, 10/04/2011, 09/25/2012, 09/21/2015     Influenza Vaccine IM 3yrs+ 4 Valent IIV4 09/26/2013, 10/06/2014, 10/07/2016     Pneumococcal (PCV 13) 10/07/2016     Pneumococcal 23 valent 11/15/2011     TD (ADULT, 7+) 04/18/2000, 07/07/2004     TDAP Vaccine (Boostrix) 09/21/2015     Tdap (Adacel,Boostrix) 05/23/2006     Zoster vaccine, live 09/21/2015          Chart documentation done in part with Dragon Voice recognition Software. Although reviewed after completion, some word and grammatical error may remain.    Apolinar Cho MD

## 2017-08-30 NOTE — Clinical Note
GI bleed?  Having tarry stools.  I started a PPI and she says that she will see either you or her gastroenterologist.  She appears stable. Apolinar

## 2017-08-31 ENCOUNTER — MYC MEDICAL ADVICE (OUTPATIENT)
Dept: RHEUMATOLOGY | Facility: CLINIC | Age: 67
End: 2017-08-31

## 2017-08-31 DIAGNOSIS — M54.2 NECK PAIN: Primary | ICD-10-CM

## 2017-08-31 NOTE — PROGRESS NOTES
"Applyful message sent:  \"Ms. Adhikari,    Your hemoglobin is stable.  Platelets are at the low end of the normal range.  White blood cell count is slightly low, similar to previous labs.    The C-spine x-rays showed degenerative changes so I have referred you to physical therapy; they should call you within the next day to schedule an appointment.  If you do not get a call please let us know.    Please ensure that you are still seen for the GI issue / black stool that are concerning for bleeding.    Sincerely,  Apolinar Cho MD  8/31/2017 6:41 AM\""

## 2017-08-31 NOTE — PROGRESS NOTES
SUBJECTIVE:                                                    Niesha Adhikari is a 67 year old female who presents to clinic today for the following health issues:      History of Present Illness   Arthritis     Patient has not been able to restart Boniva as Vit D is low, is replacing this so then she can restart Boniva.  Saw Rheumatology a few days ago and is following her for this.  She remains on Plaquenil and prednisone 5mg daily. She has found that it has helped with the swelling and pain.    She has also been having numbness and tingling in her extremities, dizzy spells, dark bruising, and aural fullness. Rheumatology has recommended she return to Neurology.      She complains of a few months of nausea, wakes up in the middle of the night with foul taste in her mouth, dark stools.  Will be scheduling an appointment with colorectal specialist.    Still following in [place] for Hep C, currently not taking any medications for it.  Still has a viral load and still be monitoring and will be following up in October.    Still following with Psychiatry and psychology (every 6 weeks with Dr. Holliday), feels she is staying on top of it.    Despite all her health issues, she states she is feeling well.    Problem list and histories reviewed & adjusted, as indicated.  Additional history: as documented    Gastrointestinal symptoms      Duration: multiple years     Description:           Tarry stools    Intensity:  moderate    Accompanying signs and symptoms:  nausea    History  Previous {similar problem: YES  Previous evaluation:  GI consultation    Aggravating factors: none    Alleviating factors: lifestyle change including healthy diet/exercise    Other Therapies tried: None        Patient Active Problem List   Diagnosis     Allergic rhinitis     Esophageal reflux     Pernicious anemia     Anxiety state     Migraine     Essential and other specified forms of tremor     Fibromyalgia     Moderate recurrent major  depression (H)     Advanced directives, counseling/discussion     Narcolepsy     Internal hemorrhoids with other complication     Hepatitis C     AIN (anal intraepithelial neoplasia) anal canal     Rectal bleeding     Sciatica     Senile osteoporosis     Osteoporosis     Inflammatory polyarthropathy (H)     High risk medication use     Past Surgical History:   Procedure Laterality Date     BIOPSY ANAL CANAL  1/21/13    [facility]      BREAST BIOPSY, RT/LT Left 1975    Breat Biopsy RT/LT     C NONSPECIFIC PROCEDURE  1965    Removed bone left index finger knuckle, casts broken bones     COLONOSCOPY  8/25/2009     COLONOSCOPY  2/14/2011    COLONOSCOPY performed by CRISTIN LAGUNAS at  GI     CYSTOSCOPY  2/28/2011    CYSTOSCOPY performed by CAYLA FLOR at  OR     ENDOSCOPY  05/21/12    Upper GI - [facility]     HC COLONOSCOPY W/WO BRUSH/WASH  08/22/05     HC UGI ENDOSCOPY DIAG W BIOPSY  10/01/09      UGI ENDOSCOPY, SIMPLE EXAM  08/08/07     HEMORRHOIDECTOMY  06/25/12    [facility]     LAPAROSCOPIC SALPINGO-OOPHORECTOMY  2/28/2011    LAPAROSCOPIC SALPINGO-OOPHORECTOMY performed by CAYLA FLOR at  OR     TONSILLECTOMY & ADENOIDECTOMY  1965       Social History   Substance Use Topics     Smoking status: Former Smoker     Packs/day: 0.75     Years: 10.00     Types: Cigarettes     Start date: 11/1/1968     Quit date: 11/1/1978     Smokeless tobacco: Never Used      Comment: No exposure at home     Alcohol use No     Family History   Problem Relation Age of Onset     Hypertension Mother      Breast Cancer Mother      Coronary Artery Disease Mother      CEREBROVASCULAR DISEASE Mother      KIDNEY DISEASE Mother      Hypertension Brother      Respiratory Brother      emphysema     Lipids Brother      HEART DISEASE Brother      stents, 12/2011; has had about 6 MIs, last one 1/2014     C.A.D. Sister      MI at age 63     Hypertension Sister      GASTROINTESTINAL DISEASE Sister       gallbladder     Circulatory Sister      brain aneurysm at 63     Genitourinary Problems Sister      1 kidney/bladder     Hypertension Sister      Obesity Sister      Unknown/Adopted Paternal Uncle      Blood Disease Son      Lymes/7/11     Hypertension Father      Lymphoma Father      Glaucoma Father      Coronary Artery Disease Other 49     niece     DIABETES Other      cousin     Coronary Artery Disease Sister      Coronary Artery Disease Brother      Hyperlipidemia Brother      Hypertension Son      CEREBROVASCULAR DISEASE Maternal Grandmother      CEREBROVASCULAR DISEASE Paternal Grandmother      Liver Cancer Cousin      Glaucoma Paternal Grandfather            ROS:  C: NEGATIVE for fever, chills, change in weight  E/M: NEGATIVE for ear, mouth and throat problems  R: NEGATIVE for significant cough or SOB  CV: NEGATIVE for chest pain, palpitations or peripheral edema  GI: as above    OBJECTIVE:     /64 (BP Location: Left arm, Patient Position: Chair, Cuff Size: Adult Regular)  Pulse 70  Temp 98.6  F (37  C) (Temporal)  Resp 18  Wt 141 lb (64 kg)  LMP 11/27/2003  SpO2 97%  BMI 24.19 kg/m2  Body mass index is 24.19 kg/(m^2).  Gen: no apparent distress  NECK: no adenopathy, no asymmetry, no masses  Chest: clear to auscultation without wheeze, rale or rhonchi  Cor: regular rate and rhythm without murmur  ABDOMEN: soft, nontender, no masses and bowel sounds normal  Ext: warm and dry without edema  Psych: Alert and oriented times 3; coherent speech, normal   rate and volume, able to articulate logical thoughts, able   to abstract reason, no tangential thoughts, no hallucinations   or delusions  Her affect is neutral     ASSESSMENT/PLAN:     1. Benign essential hypertension  Well controlled on lisinopril.    2. Chronic hepatitis C without hepatic coma (H)  Following with specialist in [place], will be having repeat labs in October.    3. Rectal bleeding  Ongoing for months, states stools are dark and  lanie, also has history of AIN, hemoglobin normal and stable, will return her back to Colorectal surgery for further evaluation.    - COLORECTAL SURGERY REFERRAL    4. Inflammatory polyarthropathy (H)  Following with Rheumatology     5. Age-related osteoporosis without current pathological fracture  Following with Rheumatology     6. Pernicious anemia  Hasn't had B12 checked in a couple years, will recheck.    - Vitamin B12    7. Moderate recurrent major depression (H)  Stable, following closely with psychology and Psychiatry     Tanika Clark MD  [facility]

## 2017-09-01 ENCOUNTER — TELEPHONE (OUTPATIENT)
Dept: FAMILY MEDICINE | Facility: CLINIC | Age: 67
End: 2017-09-01

## 2017-09-01 ENCOUNTER — OFFICE VISIT (OUTPATIENT)
Dept: FAMILY MEDICINE | Facility: OTHER | Age: 67
End: 2017-09-01
Payer: COMMERCIAL

## 2017-09-01 VITALS
RESPIRATION RATE: 18 BRPM | TEMPERATURE: 98.6 F | SYSTOLIC BLOOD PRESSURE: 110 MMHG | OXYGEN SATURATION: 97 % | DIASTOLIC BLOOD PRESSURE: 64 MMHG | WEIGHT: 141 LBS | HEART RATE: 70 BPM | BODY MASS INDEX: 24.19 KG/M2

## 2017-09-01 DIAGNOSIS — I10 BENIGN ESSENTIAL HYPERTENSION: Primary | ICD-10-CM

## 2017-09-01 DIAGNOSIS — B18.2 CHRONIC HEPATITIS C WITHOUT HEPATIC COMA (H): ICD-10-CM

## 2017-09-01 DIAGNOSIS — D51.0 PERNICIOUS ANEMIA: ICD-10-CM

## 2017-09-01 DIAGNOSIS — K62.5 RECTAL BLEEDING: ICD-10-CM

## 2017-09-01 DIAGNOSIS — M06.4 INFLAMMATORY POLYARTHROPATHY (H): ICD-10-CM

## 2017-09-01 DIAGNOSIS — M81.0 AGE-RELATED OSTEOPOROSIS WITHOUT CURRENT PATHOLOGICAL FRACTURE: ICD-10-CM

## 2017-09-01 DIAGNOSIS — F33.1 MODERATE RECURRENT MAJOR DEPRESSION (H): ICD-10-CM

## 2017-09-01 LAB — VIT B12 SERPL-MCNC: 156 PG/ML (ref 193–986)

## 2017-09-01 PROCEDURE — 36415 COLL VENOUS BLD VENIPUNCTURE: CPT | Performed by: FAMILY MEDICINE

## 2017-09-01 PROCEDURE — 82607 VITAMIN B-12: CPT | Performed by: FAMILY MEDICINE

## 2017-09-01 PROCEDURE — 99214 OFFICE O/P EST MOD 30 MIN: CPT | Performed by: FAMILY MEDICINE

## 2017-09-01 NOTE — MR AVS SNAPSHOT
After Visit Summary   9/1/2017    Niesha Adhikari    MRN: [id number]           Patient Information     Date Of Birth          1950        Visit Information        Provider Department      9/1/2017 7:20 AM Tanika Clark MD [facility]        Today's Diagnoses     Benign essential hypertension    -  1    Chronic hepatitis C without hepatic coma (H)        Rectal bleeding        Inflammatory polyarthropathy (H)        Age-related osteoporosis without current pathological fracture        Pernicious anemia        Moderate recurrent major depression (H)           Follow-ups after your visit        Additional Services     COLORECTAL SURGERY REFERRAL       Your provider has referred you to: [facility]: Colon and Rectal Surgery Clinic [facility] [phone]   [email or web address]    Referral Reason(s): Rectal Bleeding  Special Concerns:   This referral is: Elective  It is OK to leave a message on patient's voicemail.    Please be aware that coverage of these services is subject to the terms and limitations of your health insurance plan.  Call member services at your health plan with any benefit or coverage questions.      Please bring the following with you to your appointment:    (1) Any X-Rays, CTs or MRIs which have been performed.  Contact the facility where they were done to arrange for  prior to your scheduled appointment.    (2) List of current medications  (3) This referral request   (4) Any documents/labs given to you for this referral                  Your next 10 appointments already scheduled     Nov 21, 2017  7:45 AM CST   LAB with [facility]   [facility]    [address] [facility] [address] [phone]           Patient must bring picture ID. Patient should be prepared to give a urine specimen  Please do not eat 10-12 hours before your appointment if you are  coming in fasting for labs on lipids, cholesterol, or glucose (sugar). Pregnant women should follow their Care Team instructions. Water with medications is okay. Do not drink coffee or other fluids. If you have concerns about taking  your medications, please ask at office or if scheduling via [employer], send a message by clicking on Secure Messaging, Message Your Care Team.            Nov 29, 2017  8:00 AM CST   Return Visit with Apolinar Cho MD   [facility] ([facility])    [address]  [place] [address] [phone]              Who to contact     If you have questions or need follow up information about today's clinic visit or your schedule please contact [facility] [place] directly at [phone].  Normal or non-critical lab and imaging results will be communicated to you by TeamDynamixhart, letter or phone within 4 business days after the clinic has received the results. If you do not hear from us within 7 days, please contact the clinic through Team[employer] or phone. If you have a critical or abnormal lab result, we will notify you by phone as soon as possible.  Submit refill requests through [employer] or call your pharmacy and they will forward the refill request to us. Please allow 3 business days for your refill to be completed.          Additional Information About Your Visit        [employer] Information     [employer] gives you secure access to your electronic health record. If you see a primary care provider, you can also send messages to your care team and make appointments. If you have questions, please call your primary care clinic.  If you do not have a primary care provider, please call [phone] and they will assist you.        Care EveryWhere ID     This is your Care EveryWhere ID. This could be used by other organizations to access your [place] medical records  [id number]        Your Vitals Were     Pulse Temperature Respirations Last Period Pulse Oximetry  BMI (Body Mass Index)    70 98.6  F (37  C) (Temporal) 18 11/27/2003 97% 24.19 kg/m2       Blood Pressure from Last 3 Encounters:   09/01/17 110/64   08/30/17 133/79   06/30/17 124/82    Weight from Last 3 Encounters:   09/01/17 141 lb (64 kg)   08/30/17 143 lb 9.6 oz (65.1 kg)   06/30/17 143 lb 9.6 oz (65.1 kg)              We Performed the Following     COLORECTAL SURGERY REFERRAL     Vitamin B12        Primary Care Provider Office Phone # Fax #    Tanika QUIROZ MD Eduardo [phone]       [address]  [facility] [address]        Equal Access to Services     WADE STAUFFER : Lonny Rao, waaxda luqadaha, qaybta kaalmada adeegyabonnie, ramses mcnulty . So [facility] [phone].    ATENCIÓN: Si habla español, tiene a salazar disposición servicios gratuitos de asistencia lingüística. [facility] [phone].    We comply with applicable federal civil rights laws and [place] laws. We do not discriminate on the basis of race, color, national origin, age, disability sex, sexual orientation or gender identity.            Thank you!     Thank you for choosing [facility]  for your care. Our goal is always to provide you with excellent care. Hearing back from our patients is one way we can continue to improve our services. Please take a few minutes to complete the written survey that you may receive in the mail after your visit with us. Thank you!             Your Updated Medication List - Protect others around you: Learn how to safely use, store and throw away your medicines at www.disposemymeds.org.          This list is accurate as of: 9/1/17  8:00 AM.  Always use your most recent med list.                   Brand Name Dispense Instructions for use Diagnosis    celecoxib 200 MG capsule    celeBREX    180 capsule    Take 1 capsule (200 mg) by mouth daily    Fibromyalgia       cycloSPORINE 0.05 % ophthalmic emulsion    RESTASIS     1 drop 2 times daily         Fiber 0.52 G Caps     540 capsule    4 tabs in pm        hydrocortisone 1 % ointment     30 g    Apply sparingly to affected area three times daily for 14 days.    Rash and nonspecific skin eruption       hydroxychloroquine 200 MG tablet    PLAQUENIL    30 tablet    Take 1 tablet (200 mg) by mouth daily    Inflammatory polyarthropathy (H)       lisinopril 10 MG tablet    PRINIVIL/ZESTRIL    90 tablet    Take 1 tablet (10 mg) by mouth daily    Essential hypertension       montelukast 10 MG tablet    SINGULAIR    90 tablet    Take 1 tablet (10 mg) by mouth daily as needed Seasonal (August and September)    Other seasonal allergic rhinitis       nystatin 528256 UNIT/GM Powd    MYCOSTATIN    60 g    Apply topically 3 times daily as needed    Intertrigo       omeprazole 40 MG capsule    priLOSEC    30 capsule    Take 1 capsule (40 mg) by mouth daily . Take 30-60 minutes before a meal.    Black stools, Gastroesophageal reflux disease, esophagitis presence not specified       predniSONE 5 MG tablet    DELTASONE    120 tablet    Prednisone 20mg daily x5days, then 15mg daily x5days, then 10mg daily x5days, then 5mg daily thereafter.    Inflammatory polyarthropathy (H)       PROVIGIL PO      Take 200 mg by mouth 2 times daily Patient unsure which dose        sertraline 100 MG tablet    ZOLOFT     Take 100 mg by mouth daily Takes 2 at bedtime        vitamin D 56459 UNIT capsule    ERGOCALCIFEROL    24 capsule    Take 1 capsule (50,000 Units) by mouth every Monday and Friday.    Vitamin D deficiency, Other osteoporosis, unspecified pathological fracture presence       VYVANSE 20 MG capsule   Generic drug:  lisdexamfetamine     30 capsule    Take 40 mg by mouth every morning Patient is taking 30 mg every day    Primary narcolepsy without cataplexy

## 2017-09-01 NOTE — LETTER
My Depression Action Plan  Name: Niesha Adhikari   Date of Birth 1950  Date: 8/31/2017    My doctor: Tanika Clark   My clinic: [address]  [facility] [address]  [phone]          GREEN    ZONE   Good Control    What it looks like:     Things are going generally well. You have normal up s and down s. You may even feel depressed from time to time, but bad moods usually last less than a day.   What you need to do:  1. Continue to care for yourself (see self care plan)  2. Check your depression survival kit and update it as needed  3. Follow your physician s recommendations including any medication.  4. Do not stop taking medication unless you consult with your physician first.           YELLOW         ZONE Getting Worse    What it looks like:     Depression is starting to interfere with your life.     It may be hard to get out of bed; you may be starting to isolate yourself from others.    Symptoms of depression are starting to last most all day and this has happened for several days.     You may have suicidal thoughts but they are not constant.   What you need to do:     1. Call your care team, your response to treatment will improve if you keep your care team informed of your progress. Yellow periods are signs an adjustment may need to be made.     2. Continue your self-care, even if you have to fake it!    3. Talk to someone in your support network    4. Open up your depression survival kit           RED    ZONE Medical Alert - Get Help    What it looks like:     Depression is seriously interfering with your life.     You may experience these or other symptoms: You can t get out of bed most days, can t work or engage in other necessary activities, you have trouble taking care of basic hygiene, or basic responsibilities, thoughts of suicide or death that will not go away, self-injurious behavior.     What you need to do:  1. Call your care  team and request a same-day appointment. If they are not available (weekends or after hours) call your local crisis line, emergency room or 911.      Electronically signed by: Tammy Sandhu, August 31, 2017    Depression Self Care Plan / Survival Kit    Self-Care for Depression  Here s the deal. Your body and mind are really not as separate as most people think.  What you do and think affects how you feel and how you feel influences what you do and think. This means if you do things that people who feel good do, it will help you feel better.  Sometimes this is all it takes.  There is also a place for medication and therapy depending on how severe your depression is, so be sure to consult with your medical provider and/ or Behavioral Health Consultant if your symptoms are worsening or not improving.     In order to better manage my stress, I will:    Exercise  Get some form of exercise, every day. This will help reduce pain and release endorphins, the  feel good  chemicals in your brain. This is almost as good as taking antidepressants!  This is not the same as joining a gym and then never going! (they count on that by the way ) It can be as simple as just going for a walk or doing some gardening, anything that will get you moving.      Hygiene   Maintain good hygiene (Get out of bed in the morning, Make your bed, Brush your teeth, Take a shower, and Get dressed like you were going to work, even if you are unemployed).  If your clothes don't fit try to get ones that do.    Diet  I will strive to eat foods that are good for me, drink plenty of water, and avoid excessive sugar, caffeine, alcohol, and other mood-altering substances.  Some foods that are helpful in depression are: complex carbohydrates, B vitamins, flaxseed, fish or fish oil, fresh fruits and vegetables.    Psychotherapy  I agree to participate in Individual Therapy (if recommended).    Medication  If prescribed medications, I agree to take them.  Missing  doses can result in serious side effects.  I understand that drinking alcohol, or other illicit drug use, may cause potential side effects.  I will not stop my medication abruptly without first discussing it with my provider.    Staying Connected With Others  I will stay in touch with my friends, family members, and my primary care provider/team.    Use your imagination  Be creative.  We all have a creative side; it doesn t matter if it s oil painting, sand castles, or mud pies! This will also kick up the endorphins.    Witness Beauty  (AKA stop and smell the roses) Take a look outside, even in mid-winter. Notice colors, textures. Watch the squirrels and birds.     Service to others  Be of service to others.  There is always someone else in need.  By helping others we can  get out of ourselves  and remember the really important things.  This also provides opportunities for practicing all the other parts of the program.    Humor  Laugh and be silly!  Adjust your TV habits for less news and crime-drama and more comedy.    Control your stress  Try breathing deep, massage therapy, biofeedback, and meditation. Find time to relax each day.     My support system    Clinic Contact:  Phone number:    Contact 1:  Phone number:    Contact 2:  Phone number:    Religion/:  Phone number:    Therapist:  Phone number:    Local crisis center:    Phone number:    Other community support:  Phone number:

## 2017-09-01 NOTE — NURSING NOTE
"Chief Complaint   Patient presents with     Referral     Panel Management       Initial /64 (BP Location: Left arm, Patient Position: Chair, Cuff Size: Adult Regular)  Pulse 70  Temp 98.6  F (37  C) (Temporal)  Resp 18  Wt 141 lb (64 kg)  LMP 11/27/2003  SpO2 97%  BMI 24.19 kg/m2 Estimated body mass index is 24.19 kg/(m^2) as calculated from the following:    Height as of 8/30/17: 5' 4.02\" (1.626 m).    Weight as of this encounter: 141 lb (64 kg).  Medication Reconciliation: complete   Joceline Faust CMA      "

## 2017-09-06 ENCOUNTER — THERAPY VISIT (OUTPATIENT)
Dept: PHYSICAL THERAPY | Facility: CLINIC | Age: 67
End: 2017-09-06
Payer: MEDICARE

## 2017-09-06 DIAGNOSIS — M54.2 CERVICALGIA: Primary | ICD-10-CM

## 2017-09-06 PROCEDURE — 97110 THERAPEUTIC EXERCISES: CPT | Mod: GP | Performed by: PHYSICAL THERAPIST

## 2017-09-06 PROCEDURE — G8982 BODY POS GOAL STATUS: HCPCS | Mod: GP | Performed by: PHYSICAL THERAPIST

## 2017-09-06 PROCEDURE — 97161 PT EVAL LOW COMPLEX 20 MIN: CPT | Mod: GP | Performed by: PHYSICAL THERAPIST

## 2017-09-06 PROCEDURE — G8981 BODY POS CURRENT STATUS: HCPCS | Mod: GP | Performed by: PHYSICAL THERAPIST

## 2017-09-06 NOTE — PROGRESS NOTES
[facility] Initial Evaluation      Subjective:    Patient is a 67 year old female presenting with rehab cervical spine hpi.   Niesha Adhikari is a 67 year old female with a cervical spine condition.  Occurance: been getting sharp twinges in her neck with rotating her head on a regular basis. Has had multiple head and neck injuries in past.   Condition occurred: for unknown reasons.  This is a chronic condition  8/31/17 Date of MD order.    Patient reports pain:  Cervical left side and cervical right side (base of skull, ).  Radiates to:  Shoulder right and shoulder left.  Pain is described as sharp and is intermittent and reported as 9/10.  Associated symptoms:  Tingling, loss of motion/stiffness and dizziness. Pain is the same all the time.  Exacerbated by: rotating head to the left,  Relieved by: relaxation, pacing self with physical activities.   Since onset symptoms are gradually worsening.  Special tests:  X-ray (DDD C5-C6 and C4-C5).      General health as reported by patient is fair.  Pertinent medical history includes:  Rheumatoid arthritis, osteoporosis, history of fractures, cancer, high blood pressure, depression, fibromyalgia, anemia, hepatitis and sleep disorder/apnea (low WBS and platelets.).  Medical allergies: yes (rag weed, petroleum products).  Other surgeries include:  Cancer surgery, orthopedic surgery and other (ACIN, hemmroids, broken leg , borken ankle, tonsil and adenoids removed. ).  Current medications:  High blood pressure medication, anti-depressants and steroids.  Current occupation is Retired .    Employment tasks: daily household activities, cutting wood,     Barriers include:  None as reported by the patient.    Red flags:  None as reported by the patient.                        Objective:    System              Cervical/Thoracic Evaluation      Cervical Myotomes:  normal                  DTR's:  normal          Cervical Dermatomes:   normal                    Cervical Palpation:    Tenderness present at Left:    Sternocleidomstoid; Rhomboids; Upper Trap; Levator and Suboccipitals  Tenderness not present at Left:   Scalenes or Erector Spinae  Tenderness present at Right:    Sternocleidomstoid; Rhomboids; Upper Trap; Levator and Suboccipitals  Tenderness not present at Right:      Scalenes or Erector Spinae                                                  [facility] Cervical Evaluation    Posture:  Sitting: poor  Standing: fair  Protruding Head: yes  Wry Neck: no  Correction of Posture: no effect    Movement Loss:  Protrusion (PRO): nil  Flexion (Flex): nil  Retraction (RET): nil  Extension (EXT): mod and pain  Lateral Flexion Right (LF R): mod  Lateral Flexion Left (LF L): mod  Rotation Right (ROT R): min  Rotation Left (ROT L): min  Test Movements:  Pretest Pain Sitting: general stiffness throughout anterior and posterior cervical spine.   PRO: During: no effect  After: no effect    Repeat PRO: During: no effect  After: no effect    RET: During: no effect  After: no effect  Mechanical Response: no effect  Repeat RET: During: no effect  After: no effect  Mechanical Response: IncROM  RET EXT: During: increases  After: no worse  Mechanical Response: no effect  Repeat RET EXT: During: increases  After: worse  Mechanical Response: no effect                        Conclusion: derangement                                           ROS    Assessment/Plan:      Patient is a 67 year old female with cervical complaints.    Patient has the following significant findings with corresponding treatment plan.                Diagnosis 1:  Cervical pain   Pain -  hot/cold therapy, electric stimulation, manual therapy, self management, education, directional preference exercise and home program  Decreased ROM/flexibility - manual therapy, therapeutic exercise, therapeutic activity and home program  Decreased joint mobility - manual therapy, therapeutic exercise,  therapeutic activity and home program  Decreased function - therapeutic activities and home program  Impaired posture - neuro re-education, therapeutic activities and home program    Therapy Evaluation Codes:   1) History comprised of:   Personal factors that impact the plan of care:      Age and Anxiety.    Comorbidity factors that impact the plan of care are:      High blood pressure and Mental illness.     Medications impacting care: High blood pressure and Muscle relaxant.  2) Examination of Body Systems comprised of:   Body structures and functions that impact the plan of care:      Cervical spine and Shoulder.   Activity limitations that impact the plan of care are:      Driving.  3) Clinical presentation characteristics are:   Stable/Uncomplicated.  4) Decision-Making    Low complexity using standardized patient assessment instrument and/or measureable assessment of functional outcome.  Cumulative Therapy Evaluation is: Low complexity.    Previous and current functional limitations:  (See Goal Flow Sheet for this information)    Short term and Long term goals: (See Goal Flow Sheet for this information)     Communication ability:  Patient appears to be able to clearly communicate and understand verbal and written communication and follow directions correctly.  Treatment Explanation - The following has been discussed with the patient:   RX ordered/plan of care  Anticipated outcomes  Possible risks and side effects  This patient would benefit from PT intervention to resume normal activities.   Rehab potential is good.    Frequency:  1 X week, once daily  Duration:  for 8 weeks  Discharge Plan:  Achieve all LTG.  Independent in home treatment program.  Reach maximal therapeutic benefit.    Please refer to the daily flowsheet for treatment today, total treatment time and time spent performing 1:1 timed codes.

## 2017-09-06 NOTE — LETTER
DEPARTMENT OF HEALTH AND HUMAN SERVICES  CENTERS FOR MEDICARE & MEDICAID SERVICES    PLAN/UPDATED PLAN OF PROGRESS FOR OUTPATIENT REHABILITATION    PATIENTS NAME:  Niesha Adhikari     : 1950    PROVIDER NUMBER:    [id number]    [id number]     PROVIDER NAME: [facility]    MEDICAL RECORD NUMBER: [id number]     START OF CARE DATE:  SOC Date: 17   TYPE:  PT    PRIMARY/TREATMENT DIAGNOSIS: (Pertinent Medical Diagnosis)  Cervicalgia    VISITS FROM START OF CARE:  Rxs Used: 1     [facility] Initial Evaluation    Subjective:    Patient is a 67 year old female presenting with rehab cervical spine hpi.   Niesha Adhikari is a 67 year old female with a cervical spine condition.  Occurance: been getting sharp twinges in her neck with rotating her head on a regular basis. Has had multiple head and neck injuries in past.   Condition occurred: for unknown reasons.  This is a chronic condition  17 Date of MD order.    Patient reports pain:  Cervical left side and cervical right side (base of skull, ).  Radiates to:  Shoulder right and shoulder left.  Pain is described as sharp and is intermittent and reported as 9/10.  Associated symptoms:  Tingling, loss of motion/stiffness and dizziness. Pain is the same all the time.  Exacerbated by: rotating head to the left,  Relieved by: relaxation, pacing self with physical activities.   Since onset symptoms are gradually worsening.  Special tests:  X-ray (DDD C5-C6 and C4-C5).      General health as reported by patient is fair.  Pertinent medical history includes:  Rheumatoid arthritis, osteoporosis, history of fractures, cancer, high blood pressure, depression, fibromyalgia, anemia, hepatitis and sleep disorder/apnea (low WBS and platelets.).  Medical allergies: yes (rag weed, petroleum products).  Other surgeries include:  Cancer surgery, orthopedic surgery and other (ACIN, hemmroids, broken  leg , borken ankle, tonsil and adenoids removed. ).  Current medications:  High blood pressure medication, anti-depressants and steroids.  Current occupation is Retired .    Employment tasks: daily household activities, cutting wood,     Barriers include:  None as reported by the patient.    Red flags:  None as reported by the patient.                        Objective:       Cervical/Thoracic Evaluation    Cervical Myotomes:  normal    DTR's:  normal    Cervical Dermatomes:  normal    Cervical Palpation:    Tenderness present at Left:    Sternocleidomstoid; Rhomboids; Upper Trap; Levator and Suboccipitals  Tenderness not present at Left:   Scalenes or Erector Spinae  Tenderness present at Right:    Sternocleidomstoid; Rhomboids; Upper Trap; Levator and Suboccipitals  Tenderness not present at Right:      Scalenes or Erector Spinae    [facility] Cervical Evaluation    Posture:  Sitting: poor  Standing: fair  Protruding Head: yes  Wry Neck: no  Correction of Posture: no effect    Movement Loss:  Protrusion (PRO): nil  Flexion (Flex): nil  Retraction (RET): nil  Extension (EXT): mod and pain  Lateral Flexion Right (LF R): mod  Lateral Flexion Left (LF L): mod  Rotation Right (ROT R): min  Rotation Left (ROT L): min    Test Movements:  Pretest Pain Sitting: general stiffness throughout anterior and posterior cervical spine.   PRO: During: no effect  After: no effect    Repeat PRO: During: no effect  After: no effect    RET: During: no effect  After: no effect  Mechanical Response: no effect  Repeat RET: During: no effect  After: no effect  Mechanical Response: IncROM  RET EXT: During: increases  After: no worse  Mechanical Response: no effect  Repeat RET EXT: During: increases  After: worse  Mechanical Response: no effect    Conclusion: derangement      Assessment/Plan:      Patient is a 67 year old female with cervical complaints.    Patient has the following significant findings with corresponding treatment  plan.                Diagnosis 1:  Cervical pain   Pain -  hot/cold therapy, electric stimulation, manual therapy, self management, education, directional preference exercise and home program  Decreased ROM/flexibility - manual therapy, therapeutic exercise, therapeutic activity and home program  Decreased joint mobility - manual therapy, therapeutic exercise, therapeutic activity and home program  Decreased function - therapeutic activities and home program  Impaired posture - neuro re-education, therapeutic activities and home program    Therapy Evaluation Codes:   1) History comprised of:   Personal factors that impact the plan of care:      Age and Anxiety.    Comorbidity factors that impact the plan of care are:      High blood pressure and Mental illness.     Medications impacting care: High blood pressure and Muscle relaxant.  2) Examination of Body Systems comprised of:   Body structures and functions that impact the plan of care:      Cervical spine and Shoulder.   Activity limitations that impact the plan of care are:      Driving.  3) Clinical presentation characteristics are:   Stable/Uncomplicated.  4) Decision-Making    Low complexity using standardized patient assessment instrument and/or measureable assessment of functional outcome.  Cumulative Therapy Evaluation is: Low complexity.    Previous and current functional limitations:  (See Goal Flow Sheet for this information)    Short term and Long term goals: (See Goal Flow Sheet for this information)     Communication ability:  Patient appears to be able to clearly communicate and understand verbal and written communication and follow directions correctly.  Treatment Explanation - The following has been discussed with the patient:   RX ordered/plan of care  Anticipated outcomes  Possible risks and side effects  This patient would benefit from PT intervention to resume normal activities.   Rehab potential is good.    Frequency:  1 X week, once  "daily  Duration:  for 8 weeks  Discharge Plan:  Achieve all LTG.  Independent in home treatment program.  Reach maximal therapeutic benefit.    Caregiver Signature/Credentials _____________________________ Date ________       Treating Provider: Laith Jaimes DPT   I have reviewed and certified the need for these services and plan of treatment while under my care.        PHYSICIAN'S SIGNATURE:   _________________________________________  Date___________   Apolinar Cho MD    Certification period:  Beginning of Cert date period: 09/06/17 to  End of Cert period date: 12/04/17     Functional Level Progress Report: Please see attached \"Goal Flow sheet for Functional level.\"    ____X____ Continue Services or       ________ DC Services                Service dates: From  SOC Date: 09/06/17 date to present                         "

## 2017-09-06 NOTE — MR AVS SNAPSHOT
After Visit Summary   9/6/2017    Niesha Adhikari    MRN: [id number]           Patient Information     Date Of Birth          1950        Visit Information        Provider Department      9/6/2017 9:30 AM Laith Jaimes PT [facility]        Today's Diagnoses     Cervicalgia    -  1       Follow-ups after your visit        Your next 10 appointments already scheduled     Sep 13, 2017  8:10 AM CDT   CONCETTA Spine with Laith Jaimes PT   [facility] ([facility]  )    [address]  [facility] [address]   [phone]            Sep 20, 2017  8:10 AM CDT   CONCETTA Spine with Laith Jaimes PT   [facility] ([facility]  )    [address]  [facility] [address]   [phone]            Nov 21, 2017  7:45 AM CST   LAB with NL LAB [facility] ([facility])    [address] [facility] [address]   [phone]           Patient must bring picture ID. Patient should be prepared to give a urine specimen  Please do not eat 10-12 hours before your appointment if you are coming in fasting for labs on lipids, cholesterol, or glucose (sugar). Pregnant women should follow their Care Team instructions. Water with medications is okay. Do not drink coffee or other fluids. If you have concerns about taking  your medications, please ask at office or if scheduling via [facility], send a message by clicking on Secure Messaging, Message Your Care Team.            Nov 29, 2017  8:00 AM CST   Return Visit with Apolinar Cho MD   [facility] [place] ([facility])    [address] [place] [address]  [place] [id number] [phone]              Who to contact     If you have questions or need follow up information about today's clinic visit or your schedule please contact [facility] directly at [phone].  Normal or non-critical lab and imaging results will be communicated to you by MyChart, letter or phone within 4 business days after the clinic has received the results. If you do not hear from us within 7 days, please contact the clinic through zandahart or phone. If you have a critical or abnormal lab result, we will notify you by phone as soon as possible.  Submit refill requests through [email or web address] or call your pharmacy and they will forward the refill request to us. Please allow 3 business days for your refill to be completed.          Additional Information About Your Visit        zandaharblinkbox music Information     [email or web address] gives you secure access to your electronic health record. If you see a primary care provider, you can also send messages to your care team and make appointments. If you have questions, please call your primary care clinic.  If you do not have a primary care provider, please call [phone] and they will assist you.        Care EveryWhere ID     This is your Care EveryWhere ID. This could be used by other organizations to access your [facility] medical records  [id number]        Your Vitals Were     Last Period                   11/27/2003            Blood Pressure from Last 3 Encounters:   09/01/17 110/64   08/30/17 133/79   06/30/17 124/82    Weight from Last 3 Encounters:   09/01/17 64 kg (141 lb)   08/30/17 65.1 kg (143 lb 9.6 oz)   06/30/17 65.1 kg (143 lb 9.6 oz)              We Performed the Following     HC PT EVAL, LOW COMPLEXITY     CONCETTA CERT REPORT     CONCETTA INITIAL EVAL REPORT     THERAPEUTIC EXERCISES        Primary Care Provider Office Phone # Fax #    Tanika QUIROZ MD Eduardo [phone]       [address]  [facility] [address]        Equal Access to Services     WADE STAUFFER : Lonny Rao, emery fernández, ramses funes. So [facility]  [phone].    ATENCIÓN: Si maxwell sanchez, tiene a salazar disposición servicios gratuitos de asistencia lingüística. Ricky bernard [phone].    We comply with applicable federal civil rights laws and [place] laws. We do not discriminate on the basis of race, color, national origin, age, disability sex, sexual orientation or gender identity.            Thank you!     Thank you for choosing [place] [facility]  for your care. Our goal is always to provide you with excellent care. Hearing back from our patients is one way we can continue to improve our services. Please take a few minutes to complete the written survey that you may receive in the mail after your visit with us. Thank you!             Your Updated Medication List - Protect others around you: Learn how to safely use, store and throw away your medicines at www.disposemymeds.org.          This list is accurate as of: 9/6/17  2:00 PM.  Always use your most recent med list.                   Brand Name Dispense Instructions for use Diagnosis    celecoxib 200 MG capsule    celeBREX    180 capsule    Take 1 capsule (200 mg) by mouth daily    Fibromyalgia       cycloSPORINE 0.05 % ophthalmic emulsion    RESTASIS     1 drop 2 times daily        Fiber 0.52 G Caps     540 capsule    4 tabs in pm        hydrocortisone 1 % ointment     30 g    Apply sparingly to affected area three times daily for 14 days.    Rash and nonspecific skin eruption       hydroxychloroquine 200 MG tablet    PLAQUENIL    30 tablet    Take 1 tablet (200 mg) by mouth daily    Inflammatory polyarthropathy (H)       lisinopril 10 MG tablet    PRINIVIL/ZESTRIL    90 tablet    Take 1 tablet (10 mg) by mouth daily    Essential hypertension       montelukast 10 MG tablet    SINGULAIR    90 tablet    Take 1 tablet (10 mg) by mouth daily as needed Seasonal (August and September)    Other seasonal allergic rhinitis       nystatin 210527 UNIT/GM Powd    MYCOSTATIN     60 g    Apply topically 3 times daily as needed    Intertrigo       omeprazole 40 MG capsule    priLOSEC    30 capsule    Take 1 capsule (40 mg) by mouth daily . Take 30-60 minutes before a meal.    Black stools, Gastroesophageal reflux disease, esophagitis presence not specified       predniSONE 5 MG tablet    DELTASONE    120 tablet    Prednisone 20mg daily x5days, then 15mg daily x5days, then 10mg daily x5days, then 5mg daily thereafter.    Inflammatory polyarthropathy (H)       PROVIGIL PO      Take 200 mg by mouth 2 times daily Patient unsure which dose        sertraline 100 MG tablet    ZOLOFT     Take 100 mg by mouth daily Takes 2 at bedtime        vitamin D 94814 UNIT capsule    ERGOCALCIFEROL    24 capsule    Take 1 capsule (50,000 Units) by mouth every Monday and Friday.    Vitamin D deficiency, Other osteoporosis, unspecified pathological fracture presence       VYVANSE 20 MG capsule   Generic drug:  lisdexamfetamine     30 capsule    Take 40 mg by mouth every morning Patient is taking 30 mg every day    Primary narcolepsy without cataplexy

## 2017-09-11 DIAGNOSIS — J30.2 OTHER SEASONAL ALLERGIC RHINITIS: ICD-10-CM

## 2017-09-11 NOTE — TELEPHONE ENCOUNTER
singulair      Last Written Prescription Date: 08/19/16  Last Fill Quantity: 90,  # refills: 0   Last Office Visit with [facility], [facility] or [facility] prescribing provider: 09/01/17                                         Next 5 appointments (look out 90 days)     Nov 29, 2017  8:00 AM CST   Return Visit with Apolinar Cho MD   [facility]    [address] [facility]  [place] [address]   [phone]

## 2017-09-13 ENCOUNTER — THERAPY VISIT (OUTPATIENT)
Dept: PHYSICAL THERAPY | Facility: CLINIC | Age: 67
End: 2017-09-13
Payer: MEDICARE

## 2017-09-13 DIAGNOSIS — M54.2 CERVICALGIA: ICD-10-CM

## 2017-09-13 PROCEDURE — 97140 MANUAL THERAPY 1/> REGIONS: CPT | Mod: GP | Performed by: PHYSICAL THERAPIST

## 2017-09-13 PROCEDURE — 97110 THERAPEUTIC EXERCISES: CPT | Mod: GP | Performed by: PHYSICAL THERAPIST

## 2017-09-13 RX ORDER — MONTELUKAST SODIUM 10 MG/1
TABLET ORAL
Qty: 90 TABLET | Refills: 0 | Status: SHIPPED | OUTPATIENT
Start: 2017-09-13 | End: 2019-08-09

## 2017-09-13 NOTE — MR AVS SNAPSHOT
After Visit Summary   9/13/2017    Niesha Adhikari    MRN: [id number]           Patient Information     Date Of Birth          1950        Visit Information        Provider Department      9/13/2017 8:10 AM Laith Jamies, PT [facility]        Today's Diagnoses     Cervicalgia           Follow-ups after your visit        Your next 10 appointments already scheduled     Sep 20, 2017  8:10 AM CDT   CON[facility]ET[facility] Spine with Laith Jaimes PT   [facility] ([facility]  )    [address]  [facility] [address]   [phone]            Sep 25, 2017  8:10 AM CDT   CONCETTA Spine with Laith Jaimes PT   [facility] ([facility]  )    [address]  [facility] [address]   [phone]            Oct 04, 2017  8:10 AM CDT   CONCETTA Spine with Laith Jaimes PT   [facility] [facility] ([facility]  )    [address]  [facility] [address]   [phone]            Oct 11, 2017  8:10 AM CDT   CONCETTA Spine with Laith Jaimes PT   [facility] ([facility]  )    [address]  [facility] [address]   [phone]            Oct 18, 2017  8:10 AM CDT   CONCETTA Spine with Laith Jaimes PT   [place] [facility] ([facility]  )    [address]  [facility] [address]   [phone]            Nov 21, 2017  7:45 AM CST   LAB with NL LAB [facility] ([facility])    [address] [facility] [address]   [phone]           Patient must bring picture ID. Patient should be prepared to give a urine specimen  Please do not eat 10-12 hours before your appointment if you are coming in fasting for labs on lipids, cholesterol, or glucose  (sugar). Pregnant women should follow their Care Team instructions. Water with medications is okay. Do not drink coffee or other fluids. If you have concerns about taking  your medications, please ask at office or if scheduling via [employer], send a message by clicking on Secure Messaging, Message Your Care Team.            Nov 29, 2017  8:00 AM CST   Return Visit with Apolinar Cho MD   [facility] ([facility])    [address]  [place] [address] [phone]              Who to contact     If you have questions or need follow up information about today's clinic visit or your schedule please contact [facility] directly at [phone].  Normal or non-critical lab and imaging results will be communicated to you by [employer] The[employer]ies[employer], letter or phone within 4 business days after the clinic has received the results. If you do not hear from us within 7 days, please contact the clinic through [employer] or phone. If you have a critical or abnormal lab result, we will notify you by phone as soon as possible.  Submit refill requests through [employer] or call your pharmacy and they will forward the refill request to us. Please allow 3 business days for your refill to be completed.          Additional Information About Your Visit        [employer] Information     [employer] gives you secure access to your electronic health record. If you see a primary care provider, you can also send messages to your care team and make appointments. If you have questions, please call your primary care clinic.  If you do not have a primary care provider, please call [phone] and they will assist you.        Care EveryWhere ID     This is your Care EveryWhere ID. This could be used by other organizations to access your [facility] medical records  [id number]        Your Vitals Were     Last Period                   11/27/2003            Blood Pressure from Last 3  Encounters:   09/01/17 110/64   08/30/17 133/79   06/30/17 124/82    Weight from Last 3 Encounters:   09/01/17 64 kg (141 lb)   08/30/17 65.1 kg (143 lb 9.6 oz)   06/30/17 65.1 kg (143 lb 9.6 oz)              We Performed the Following     MANUAL THER TECH,1+REGIONS,EA 15 MIN     THERAPEUTIC EXERCISES        Primary Care Provider Office Phone # Fax #    Tanika GABRIELLA Clark [phone]       [address]  [facility] [address]        Equal Access to Services     [facility]: Hadii aad ku hadasho Soomaali, waaxda luqadaha, qaybta kaalmada adeegyada, ramses mcnulty . So [facility] [phone].    ATENCIÓN: Si habla español, tiene a salazar disposición servicios gratuitos de asistencia lingüística. Llame al [phone].    We comply with applicable federal civil rights laws and [place] laws. We do not discriminate on the basis of race, color, national origin, age, disability sex, sexual orientation or gender identity.            Thank you!     Thank you for choosing [facility]  for your care. Our goal is always to provide you with excellent care. Hearing back from our patients is one way we can continue to improve our services. Please take a few minutes to complete the written survey that you may receive in the mail after your visit with us. Thank you!             Your Updated Medication List - Protect others around you: Learn how to safely use, store and throw away your medicines at www.disposemymeds.org.          This list is accurate as of: 9/13/17  9:12 AM.  Always use your most recent med list.                   Brand Name Dispense Instructions for use Diagnosis    celecoxib 200 MG capsule    celeBREX    180 capsule    Take 1 capsule (200 mg) by mouth daily    Fibromyalgia       cycloSPORINE 0.05 % ophthalmic emulsion    RESTASIS     1 drop 2 times daily        Fiber 0.52 G Caps     540 capsule    4 tabs in pm         hydrocortisone 1 % ointment     30 g    Apply sparingly to affected area three times daily for 14 days.    Rash and nonspecific skin eruption       hydroxychloroquine 200 MG tablet    PLAQUENIL    30 tablet    Take 1 tablet (200 mg) by mouth daily    Inflammatory polyarthropathy (H)       lisinopril 10 MG tablet    PRINIVIL/ZESTRIL    90 tablet    Take 1 tablet (10 mg) by mouth daily    Essential hypertension       montelukast 10 MG tablet    SINGULAIR    90 tablet    TAKE ONE TABLET BY MOUTH EVERY DAY AS NEEDED SEASONALLY (AUGUST AND SEPTEMBER)    Other seasonal allergic rhinitis       nystatin 456576 UNIT/GM Powd    MYCOSTATIN    60 g    Apply topically 3 times daily as needed    Intertrigo       omeprazole 40 MG capsule    priLOSEC    30 capsule    Take 1 capsule (40 mg) by mouth daily . Take 30-60 minutes before a meal.    Black stools, Gastroesophageal reflux disease, esophagitis presence not specified       predniSONE 5 MG tablet    DELTASONE    120 tablet    Prednisone 20mg daily x5days, then 15mg daily x5days, then 10mg daily x5days, then 5mg daily thereafter.    Inflammatory polyarthropathy (H)       PROVIGIL PO      Take 200 mg by mouth 2 times daily Patient unsure which dose        sertraline 100 MG tablet    ZOLOFT     Take 100 mg by mouth daily Takes 2 at bedtime        vitamin D 03046 UNIT capsule    ERGOCALCIFEROL    24 capsule    Take 1 capsule (50,000 Units) by mouth every Monday and Friday.    Vitamin D deficiency, Other osteoporosis, unspecified pathological fracture presence       VYVANSE 20 MG capsule   Generic drug:  lisdexamfetamine     30 capsule    Take 40 mg by mouth every morning Patient is taking 30 mg every day    Primary narcolepsy without cataplexy

## 2017-09-13 NOTE — LETTER
[facility]  [address]  [facility] [address] [phone]    2017    Re: Niesha Adhikari   :   1950  MRN:  [id number]   REFERRING PHYSICIAN:   Apolinar Cho    [facility]    Date of Initial Evaluation:  ***  Visits:  Rxs Used: 2  Reason for Referral:  Cervicalgia    EVALUATION SUMMARY    Subjective:    HPI                    Objective:    System    Physical Exam    General     ROS    Assessment/Plan:      DISCHARGE REPORT    Progress reporting period is from 17 to 17.       SUBJECTIVE  Patient reports pulling lindsey down from tree so that they don't kill her trees her neck is a little more sore. seated retraction / extension seems to be making her nauseas.     Current Pain level: 2/10.     Initial Pain level: 7/10.   Changes in function:  None  Adverse reaction to treatment or activity: None    OBJECTIVE  Changes noted in objective findings:  Patient has failed to return to therapy so current objective findings are unknown.  Objective as of 17: CROM see below: Patient reporting fullness in ears and feeling slightly dizzy. Has notified her doctor and is scheduled with neurologist and GI doctor in beginning of October. + palpation throughout bilateral erector and subocciptals, dermatomes and myotomes remain normal.      ASSESSMENT/PLAN  Updated problem list and treatment plan: Diagnosis 1:  Cervical pain  Pain -  home program  Decreased ROM/flexibility - home program  Decreased joint mobility - home program  Decreased function - home program  Impaired posture - home program  STG/LTGs have been met or progress has been made towards goals:  None  Assessment of Progress: Patient has not returned to therapy.  Current status is unknown and discharge G code cannot be reported.  Self Management Plans:  Patient is independent in a home treatment program.  I have  re-evaluated this patient and find that the nature, scope, duration and intensity of the therapy is appropriate for the medical condition of the patient.  Niesha continues to require the following intervention to meet STG and LTG's:  HEP    Recommendations:  Discharge Physical Therapy with home exercise program.           Thank you for your referral.    INQUIRIES  Therapist:   [facility]  [address]  [facility] [address]  Phone: [phone]  Fax: [phone]

## 2017-09-13 NOTE — TELEPHONE ENCOUNTER
Prescription approved per [facility] Refill Protocol.  Elena Giles, RN, BSN

## 2017-09-17 ENCOUNTER — MYC MEDICAL ADVICE (OUTPATIENT)
Dept: FAMILY MEDICINE | Facility: OTHER | Age: 67
End: 2017-09-17

## 2017-09-17 DIAGNOSIS — R21 RASH AND NONSPECIFIC SKIN ERUPTION: ICD-10-CM

## 2017-09-17 DIAGNOSIS — L30.4 INTERTRIGO: ICD-10-CM

## 2017-09-19 RX ORDER — DIAPER,BRIEF,INFANT-TODD,DISP
EACH MISCELLANEOUS
Qty: 30 G | Refills: 0 | Status: SHIPPED | OUTPATIENT
Start: 2017-09-19 | End: 2017-11-28

## 2017-09-19 RX ORDER — NYSTATIN 100000 [USP'U]/G
POWDER TOPICAL 3 TIMES DAILY PRN
Qty: 60 G | Refills: 1 | Status: SHIPPED | OUTPATIENT
Start: 2017-09-19 | End: 2019-05-13

## 2017-10-12 ENCOUNTER — TELEPHONE (OUTPATIENT)
Dept: FAMILY MEDICINE | Facility: CLINIC | Age: 67
End: 2017-10-12

## 2017-10-12 NOTE — TELEPHONE ENCOUNTER
You placed a referral for patient to colon and rectal at [facility] on 9/1/17.  Patient has not scheduled as of yet.      Please review and forward to team if follow up with the patient is needed.     Thank you!  Niesha/Clinic Referrals Dyad II

## 2017-10-17 ENCOUNTER — ALLIED HEALTH/NURSE VISIT (OUTPATIENT)
Dept: FAMILY MEDICINE | Facility: OTHER | Age: 67
End: 2017-10-17
Payer: COMMERCIAL

## 2017-10-17 DIAGNOSIS — Z23 NEED FOR VACCINATION: Primary | ICD-10-CM

## 2017-10-17 PROCEDURE — 90732 PPSV23 VACC 2 YRS+ SUBQ/IM: CPT

## 2017-10-17 PROCEDURE — G0009 ADMIN PNEUMOCOCCAL VACCINE: HCPCS

## 2017-10-17 NOTE — PROGRESS NOTES
Screening Questionnaire for Adult Immunization    Are you sick today?   No   Do you have allergies to medications, food, a vaccine component or latex?   No   Have you ever had a serious reaction after receiving a vaccination?   No   Do you have a long-term health problem with heart disease, lung disease, asthma, kidney disease, metabolic disease (e.g. diabetes), anemia, or other blood disorder?   No   Do you have cancer, leukemia, HIV/AIDS, or any other immune system problem?   No   In the past 3 months, have you taken medications that affect  your immune system, such as prednisone, other steroids, or anticancer drugs; drugs for the treatment of rheumatoid arthritis, Crohn s disease, or psoriasis; or have you had radiation treatments?   Yes, prednisone for one month almost 3 months ago    Have you had a seizure, or a brain or other nervous system problem?   No   During the past year, have you received a transfusion of blood or blood     products, or been given immune (gamma) globulin or antiviral drug?   No   For women: Are you pregnant or is there a chance you could become        pregnant during the next month?   No   Have you received any vaccinations in the past 4 weeks?   No     Immunization questionnaire was positive for at least one answer.  Notified TC, was given the ok to administer.      Patient instructed to remain in clinic for 15 minutes afterwards, and to report any adverse reaction to me immediately.    Prior to injection verified patient identity using patient's name and date of birth.     Screening performed by Pily Kennedy on 10/17/2017 at 10:23 AM.

## 2017-10-17 NOTE — MR AVS SNAPSHOT
After Visit Summary   10/17/2017    Niesha Adhikari    MRN: [id number]           Patient Information     Date Of Birth          1950        Visit Information        Provider Department      10/17/2017 10:00 AM NL FLOAT TEAM B, [facility]        Today's Diagnoses     Need for vaccination    -  1       Follow-ups after your visit        Your next 10 appointments already scheduled     Nov 21, 2017  7:45 AM CST   LAB with NL LAB [facility] ([facility])    [address] [id number] [phone]           Patient must bring picture ID. Patient should be prepared to give a urine specimen  Please do not eat 10-12 hours before your appointment if you are coming in fasting for labs on lipids, cholesterol, or glucose (sugar). Pregnant women should follow their Care Team instructions. Water with medications is okay. Do not drink coffee or other fluids. If you have concerns about taking  your medications, please ask at office or if scheduling via [employer], send a message by clicking on Secure Messaging, Message Your Care Team.            Nov 28, 2017  8:40 AM CST   Office Visit with Tanika Clark MD   [facility]    [address] [address] [phone]           Bring a current list of meds and any records pertaining to this visit. For Physicals, please bring immunization records and any forms needing to be filled out. Please arrive 10 minutes early to complete paperwork.            Nov 29, 2017  8:00 AM CST   Return Visit with Apolinar Cho MD   [facility] ([facility] [place])    [address]  [place] [address] [phone]              Who to contact     If you have questions or need follow up information about today's clinic visit or your schedule please contact [facility] directly at [phone].  Normal  or non-critical lab and imaging results will be communicated to you by MyChart, letter or phone within 4 business days after the clinic has received the results. If you do not hear from us within 7 days, please contact the clinic through [employer] or phone. If you have a critical or abnormal lab result, we will notify you by phone as soon as possible.  Submit refill requests through [employer] or call your pharmacy and they will forward the refill request to us. Please allow 3 business days for your refill to be completed.          Additional Information About Your Visit        [employer] Information     [employer] gives you secure access to your electronic health record. If you see a primary care provider, you can also send messages to your care team and make appointments. If you have questions, please call your primary care clinic.  If you do not have a primary care provider, please call [phone] and they will assist you.        Care EveryWhere ID     This is your Care EveryWhere ID. This could be used by other organizations to access your [facility] medical records  [id number]        Your Vitals Were     Last Period                   11/27/2003            Blood Pressure from Last 3 Encounters:   09/01/17 110/64   08/30/17 133/79   06/30/17 124/82    Weight from Last 3 Encounters:   09/01/17 141 lb (64 kg)   08/30/17 143 lb 9.6 oz (65.1 kg)   06/30/17 143 lb 9.6 oz (65.1 kg)              We Performed the Following     ADMIN: Vaccine, Initial (50410)     Pneumococcal vaccine 23 valent PPSV23  (Pneumovax) [42887]        Primary Care Provider Office Phone # Fax #    Tanika QUIROZ MD Eduardo [phone]       [address]  [facility] [address]        Equal Access to Services     [facility]: Hadii chely Rao, emery fernández, sunithaybramses oates. So [facility] [phone].    ATENCIÓN: Si habla español, tiene a salazar disposición servicios gratuitos  de asistencia lingüística. Ricky bernard [phone].    We comply with applicable federal civil rights laws and [place] laws. We do not discriminate on the basis of race, color, national origin, age, disability, sex, sexual orientation, or gender identity.            Thank you!     Thank you for choosing [facility]  for your care. Our goal is always to provide you with excellent care. Hearing back from our patients is one way we can continue to improve our services. Please take a few minutes to complete the written survey that you may receive in the mail after your visit with us. Thank you!             Your Updated Medication List - Protect others around you: Learn how to safely use, store and throw away your medicines at www.disposemymeds.org.          This list is accurate as of: 10/17/17 10:25 AM.  Always use your most recent med list.                   Brand Name Dispense Instructions for use Diagnosis    celecoxib 200 MG capsule    celeBREX    180 capsule    Take 1 capsule (200 mg) by mouth daily    Fibromyalgia       cycloSPORINE 0.05 % ophthalmic emulsion    RESTASIS     1 drop 2 times daily        Fiber 0.52 G Caps     540 capsule    4 tabs in pm        hydrocortisone 1 % ointment     30 g    Apply sparingly to affected area three times daily for 14 days.    Rash and nonspecific skin eruption       hydroxychloroquine 200 MG tablet    PLAQUENIL    30 tablet    Take 1 tablet (200 mg) by mouth daily    Inflammatory polyarthropathy (H)       lisinopril 10 MG tablet    PRINIVIL/ZESTRIL    90 tablet    Take 1 tablet (10 mg) by mouth daily    Essential hypertension       montelukast 10 MG tablet    SINGULAIR    90 tablet    TAKE ONE TABLET BY MOUTH EVERY DAY AS NEEDED SEASONALLY (AUGUST AND SEPTEMBER)    Other seasonal allergic rhinitis       nystatin 589763 UNIT/GM Powd    MYCOSTATIN    60 g    Apply topically 3 times daily as needed    Intertrigo       predniSONE 5 MG tablet    DELTASONE    120  tablet    Prednisone 20mg daily x5days, then 15mg daily x5days, then 10mg daily x5days, then 5mg daily thereafter.    Inflammatory polyarthropathy (H)       PROVIGIL PO      Take 200 mg by mouth 2 times daily Patient unsure which dose        sertraline 100 MG tablet    ZOLOFT     Take 100 mg by mouth daily Takes 2 at bedtime        vitamin D 49273 UNIT capsule    ERGOCALCIFEROL    24 capsule    Take 1 capsule (50,000 Units) by mouth every Monday and Friday.    Vitamin D deficiency, Other osteoporosis, unspecified pathological fracture presence       VYVANSE 20 MG capsule   Generic drug:  lisdexamfetamine     30 capsule    Take 40 mg by mouth every morning Patient is taking 30 mg every day    Primary narcolepsy without cataplexy

## 2017-10-23 ENCOUNTER — TRANSFERRED RECORDS (OUTPATIENT)
Dept: HEALTH INFORMATION MANAGEMENT | Facility: CLINIC | Age: 67
End: 2017-10-23

## 2017-10-23 LAB — HEP C HIM: NORMAL

## 2017-11-01 ENCOUNTER — TRANSFERRED RECORDS (OUTPATIENT)
Dept: HEALTH INFORMATION MANAGEMENT | Facility: CLINIC | Age: 67
End: 2017-11-01

## 2017-11-21 DIAGNOSIS — E55.9 VITAMIN D DEFICIENCY: ICD-10-CM

## 2017-11-21 DIAGNOSIS — M81.8 OTHER OSTEOPOROSIS, UNSPECIFIED PATHOLOGICAL FRACTURE PRESENCE: ICD-10-CM

## 2017-11-21 LAB
ANION GAP SERPL CALCULATED.3IONS-SCNC: 6 MMOL/L (ref 3–14)
BUN SERPL-MCNC: 10 MG/DL (ref 7–30)
CALCIUM SERPL-MCNC: 9.4 MG/DL (ref 8.5–10.1)
CHLORIDE SERPL-SCNC: 101 MMOL/L (ref 94–109)
CO2 SERPL-SCNC: 30 MMOL/L (ref 20–32)
CREAT SERPL-MCNC: 0.58 MG/DL (ref 0.52–1.04)
DEPRECATED CALCIDIOL+CALCIFEROL SERPL-MC: 37 UG/L (ref 20–75)
GFR SERPL CREATININE-BSD FRML MDRD: >90 ML/MIN/1.7M2
GLUCOSE SERPL-MCNC: 82 MG/DL (ref 70–99)
POTASSIUM SERPL-SCNC: 4.3 MMOL/L (ref 3.4–5.3)
SODIUM SERPL-SCNC: 137 MMOL/L (ref 133–144)

## 2017-11-21 PROCEDURE — 36415 COLL VENOUS BLD VENIPUNCTURE: CPT | Performed by: INTERNAL MEDICINE

## 2017-11-21 PROCEDURE — 80048 BASIC METABOLIC PNL TOTAL CA: CPT | Performed by: INTERNAL MEDICINE

## 2017-11-21 PROCEDURE — 82306 VITAMIN D 25 HYDROXY: CPT | Performed by: INTERNAL MEDICINE

## 2017-11-21 NOTE — PROGRESS NOTES
SUBJECTIVE:                                                    Niesha Adhikari is a 67 year old female who presents to clinic today for the following health issues:      History of Present Illness     Diet:  Low salt, Low fat/cholesterol and Vegetarian/vegan  Frequency of exercise:  6-7 days/week  Duration of exercise:  Greater than 60 minutes  Taking medications regularly:  Yes  Medication side effects:  Other  Additional concerns today:  YES    Patient would like to update provider on multiple health concerns including a rash under both breasts that does not respond to medication, a bad toe, and her general health/medications.     10-17-17 had Pneumovax and arm was red and swollen for 1 week  11-1-17 saw [facility] surgeon for anoscopy and told doesn't need to be seen for 5 years.  Bleeding has greatly decreased.  11-3-17 was told her Hepatitis C has resolved.  Cleared the virus, test results will be scanned, no further follow up needed.  11-27-17 Vit D back to normal  11-29-17 will be seeing Rheumatology, considering restarting Boniva    Concerns--skin under breasts, seen in [place] and treated for mastitis and rash.  States the rash doesn't get better.  Has been using hydrocortisone and nystatin powder.    Also has pain of left second toe, it is swollen, no drainage, no known injury.  She tried to pop it, but nothing happened.  Has callosities of all toes, states this is a chronic issue for her as she is a runner.    Answers for HPI/ROS submitted by the patient on 11/23/2017   PHQ-2 Score: 1  If you checked off any problems, how difficult have these problems made it for you to do your work, take care of things at home, or get along with other people?: Somewhat difficult  PHQ9 TOTAL SCORE: 7      Problem list and histories reviewed & adjusted, as indicated.  Additional history: as documented    Patient Active Problem List   Diagnosis     Allergic rhinitis     Esophageal reflux     Pernicious anemia      Anxiety state     Migraine     Essential and other specified forms of tremor     Fibromyalgia     Moderate recurrent major depression (H)     Advanced directives, counseling/discussion     Narcolepsy     Internal hemorrhoids with other complication     AIN (anal intraepithelial neoplasia) anal canal     Sciatica     Osteoporosis     Inflammatory polyarthropathy (H)     High risk medication use     Benign essential hypertension     Past Surgical History:   Procedure Laterality Date     BIOPSY ANAL CANAL  1/21/13    [facility]      BREAST BIOPSY, RT/LT Left 1975    Breat Biopsy RT/LT     C NONSPECIFIC PROCEDURE  1965    Removed bone left index finger knuckle, casts broken bones     COLONOSCOPY  8/25/2009     COLONOSCOPY  2/14/2011    COLONOSCOPY performed by CRISTIN LAGUNAS at  GI     CYSTOSCOPY  2/28/2011    CYSTOSCOPY performed by CAYLA FLOR at  OR     ENDOSCOPY  05/21/12    [facility] - [facility]     HC COLONOSCOPY W/WO BRUSH/WASH  08/22/05     HC UGI ENDOSCOPY DIAG W BIOPSY  10/01/09      UGI ENDOSCOPY, SIMPLE EXAM  08/08/07     HEMORRHOIDECTOMY  06/25/12    [facility]     LAPAROSCOPIC SALPINGO-OOPHORECTOMY  2/28/2011    LAPAROSCOPIC SALPINGO-OOPHORECTOMY performed by CAYLA FLOR at  OR     TONSILLECTOMY & ADENOIDECTOMY  1965       Social History   Substance Use Topics     Smoking status: Former Smoker     Packs/day: 0.75     Years: 10.00     Types: Cigarettes     Start date: 11/1/1968     Quit date: 11/1/1978     Smokeless tobacco: Never Used      Comment: No exposure at home     Alcohol use No     Family History   Problem Relation Age of Onset     Hypertension Mother      Breast Cancer Mother      Coronary Artery Disease Mother      CEREBROVASCULAR DISEASE Mother      KIDNEY DISEASE Mother      Hypertension Brother      Respiratory Brother      emphysema     Lipids Brother      HEART DISEASE Brother      stents, 12/2011; has had about 6 MIs, last one 1/2014      C.AJennD. Sister      MI at age 63     Hypertension Sister      GASTROINTESTINAL DISEASE Sister      gallbladder     Circulatory Sister      brain aneurysm at 63     Genitourinary Problems Sister      1 kidney/bladder     Hypertension Sister      Obesity Sister      Coronary Artery Disease Sister      had valve surgery, MI, CHF     Unknown/Adopted Paternal Uncle      Blood Disease Son      Lymes/7/11     Hypertension Son      Hypertension Father      Lymphoma Father      Glaucoma Father      Coronary Artery Disease Other 49     niece     DIABETES Other      cousin     CEREBROVASCULAR DISEASE Maternal Grandmother      CEREBROVASCULAR DISEASE Paternal Grandmother      Liver Cancer Cousin      Glaucoma Paternal Grandfather              ROS:  C: NEGATIVE for fever, chills, change in weight  E/M: NEGATIVE for ear, mouth and throat problems  R: NEGATIVE for significant cough or SOB  CV: NEGATIVE for chest pain, palpitations or peripheral edema    OBJECTIVE:     /64 (BP Location: Right arm, Patient Position: Chair, Cuff Size: Adult Regular)  Pulse 70  Temp 97.8  F (36.6  C) (Temporal)  Resp 14  Wt 141 lb (64 kg)  LMP 11/27/2003  SpO2 97%  BMI 24.19 kg/m2  Body mass index is 24.19 kg/(m^2).  Gen: no apparent distress  Skin:  Under both breasts there are flat, intensely inflamed, well-defined, clustered bright red macules.  On left shoulder there is a 2 cm scaly area with a black center/foreign body (on questioning she states she had a tick there a few months ago and just wasn't able to remove it completely)--the black area was removed with a needle, no warmth, redness or purulent drainage  Toes:  Callosities on tips of all toes.  Left second toe, tip appears more swollen, no redness, no warmth, no blister, no drainage.  Capillary refill immediate.    ASSESSMENT/PLAN:     1. Pernicious anemia  She is taking oral supplement, will check B12 level.    - Vitamin B12    2. Cutaneous candidiasis  Stop the steroid cream,  will use clotrimazole instead.    - clotrimazole (LOTRIMIN) 1 % cream; Apply topically 2 times daily  Dispense: 30 g; Refill: 3    3. Tick bite, sequela  Suspect scarring/inflammation, use hydrocortisone on this area.    4. Pain of toe of left foot  Unclear reason for pain, will refer to Podiatry.     - ORTHO  REFERRAL    Tanika Clark MD  [facility]

## 2017-11-23 ASSESSMENT — PATIENT HEALTH QUESTIONNAIRE - PHQ9
SUM OF ALL RESPONSES TO PHQ QUESTIONS 1-9: 7
10. IF YOU CHECKED OFF ANY PROBLEMS, HOW DIFFICULT HAVE THESE PROBLEMS MADE IT FOR YOU TO DO YOUR WORK, TAKE CARE OF THINGS AT HOME, OR GET ALONG WITH OTHER PEOPLE: SOMEWHAT DIFFICULT
SUM OF ALL RESPONSES TO PHQ QUESTIONS 1-9: 7

## 2017-11-24 ASSESSMENT — PATIENT HEALTH QUESTIONNAIRE - PHQ9: SUM OF ALL RESPONSES TO PHQ QUESTIONS 1-9: 7

## 2017-11-27 PROBLEM — M54.2 CERVICALGIA: Status: RESOLVED | Noted: 2017-09-06 | Resolved: 2017-11-27

## 2017-11-27 NOTE — PROGRESS NOTES
Subjective:    HPI                    Objective:    System    Physical Exam    General     ROS    Assessment/Plan:      DISCHARGE REPORT    Progress reporting period is from 9/6/17 to 9/13/17.       SUBJECTIVE  Patient reports pulling lindsey down from tree so that they don't kill her trees her neck is a little more sore. seated retraction / extension seems to be making her nauseas.     Current Pain level: 2/10.     Initial Pain level: 7/10.   Changes in function:  None  Adverse reaction to treatment or activity: None    OBJECTIVE  Changes noted in objective findings:  Patient has failed to return to therapy so current objective findings are unknown.  Objective as of 9/13/17: CROM see below: Patient reporting fullness in ears and feeling slightly dizzy. Has notified her doctor and is scheduled with neurologist and GI doctor in beginning of October. + palpation throughout bilateral erector and subocciptals, dermatomes and myotomes remain normal.      ASSESSMENT/PLAN  Updated problem list and treatment plan: Diagnosis 1:  Cervical pain  Pain -  home program  Decreased ROM/flexibility - home program  Decreased joint mobility - home program  Decreased function - home program  Impaired posture - home program  STG/LTGs have been met or progress has been made towards goals:  None  Assessment of Progress: Patient has not returned to therapy.  Current status is unknown and discharge G code cannot be reported.  Self Management Plans:  Patient is independent in a home treatment program.  I have re-evaluated this patient and find that the nature, scope, duration and intensity of the therapy is appropriate for the medical condition of the patient.  Niesha continues to require the following intervention to meet STG and LTG's:  HEP    Recommendations:  Discharge Physical Therapy with home exercise program.       Please refer to the daily flowsheet for treatment today, total treatment time and time spent performing 1:1 timed  codes.

## 2017-11-28 ENCOUNTER — OFFICE VISIT (OUTPATIENT)
Dept: FAMILY MEDICINE | Facility: OTHER | Age: 67
End: 2017-11-28
Payer: COMMERCIAL

## 2017-11-28 VITALS
HEART RATE: 70 BPM | DIASTOLIC BLOOD PRESSURE: 64 MMHG | OXYGEN SATURATION: 97 % | SYSTOLIC BLOOD PRESSURE: 124 MMHG | RESPIRATION RATE: 14 BRPM | TEMPERATURE: 97.8 F | BODY MASS INDEX: 24.19 KG/M2 | WEIGHT: 141 LBS
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
## 2017-11-28 DIAGNOSIS — B37.2 CUTANEOUS CANDIDIASIS: ICD-10-CM

## 2017-11-28 DIAGNOSIS — D51.0 PERNICIOUS ANEMIA: Primary | ICD-10-CM

## 2017-11-28 DIAGNOSIS — M79.675 PAIN OF TOE OF LEFT FOOT: ICD-10-CM

## 2017-11-28 LAB — VIT B12 SERPL-MCNC: 616 PG/ML (ref 193–986)

## 2017-11-28 PROCEDURE — 82607 VITAMIN B-12: CPT | Performed by: FAMILY MEDICINE

## 2017-11-28 PROCEDURE — 99214 OFFICE O/P EST MOD 30 MIN: CPT | Performed by: FAMILY MEDICINE

## 2017-11-28 PROCEDURE — 36415 COLL VENOUS BLD VENIPUNCTURE: CPT | Performed by: FAMILY MEDICINE

## 2017-11-28 RX ORDER — IBUPROFEN 200 MG
200-800 TABLET ORAL
COMMUNITY
End: 2018-09-05

## 2017-11-28 RX ORDER — LANOLIN ALCOHOL/MO/W.PET/CERES
1000 CREAM (GRAM) TOPICAL EVERY OTHER DAY
COMMUNITY
End: 2018-11-16

## 2017-11-28 RX ORDER — CLOTRIMAZOLE 1 %
CREAM (GRAM) TOPICAL 2 TIMES DAILY
Qty: 30 G | Refills: 3 | Status: SHIPPED | OUTPATIENT
Start: 2017-11-28 | End: 2018-09-23

## 2017-11-28 ASSESSMENT — ANXIETY QUESTIONNAIRES
GAD7 TOTAL SCORE: 8
5. BEING SO RESTLESS THAT IT IS HARD TO SIT STILL: NOT AT ALL
3. WORRYING TOO MUCH ABOUT DIFFERENT THINGS: MORE THAN HALF THE DAYS
4. TROUBLE RELAXING: SEVERAL DAYS
7. FEELING AFRAID AS IF SOMETHING AWFUL MIGHT HAPPEN: SEVERAL DAYS
6. BECOMING EASILY ANNOYED OR IRRITABLE: SEVERAL DAYS
GAD7 TOTAL SCORE: 8
1. FEELING NERVOUS, ANXIOUS, OR ON EDGE: SEVERAL DAYS
7. FEELING AFRAID AS IF SOMETHING AWFUL MIGHT HAPPEN: SEVERAL DAYS
GAD7 TOTAL SCORE: 8
2. NOT BEING ABLE TO STOP OR CONTROL WORRYING: MORE THAN HALF THE DAYS

## 2017-11-28 NOTE — MR AVS SNAPSHOT
After Visit Summary   11/28/2017    Niesha Adhikari    MRN: [id number]           Patient Information     Date Of Birth          1950        Visit Information        Provider Department      11/28/2017 8:40 AM Tanika Clark MD [facility]        Today's Diagnoses     Pernicious anemia    -  1    Cutaneous candidiasis        Tick bite, sequela        Pain of toe of left foot           Follow-ups after your visit        Additional Services     ORTHO  REFERRAL       [facility] is referring you to the Orthopedic  Services at [facility].       The  Representative will assist you in the coordination of your Orthopedic and Musculoskeletal Care as prescribed by your physician.    The  Representative will call you within 1 business day to help schedule your appointment, or you may contact the  Representative at:    All areas ~ [phone]     Type of Referral : [facility]       Timeframe requested: Routine    Coverage of these services is subject to the terms and limitations of your health insurance plan.  Please call member services at your health plan with any benefit or coverage questions.      If X-rays, CT or MRI's have been performed, please contact the facility where they were done to arrange for , prior to your scheduled appointment.  Please bring this referral request to your appointment and present it to your specialist.                  Your next 10 appointments already scheduled     Nov 29, 2017  8:00 AM CST   Return Visit with MD G[facility] ([facility])    [address]  [place] [address] [phone]              Who to contact     If you have questions or need follow up information about today's clinic visit or your schedule please contact [facility] directly at  [phone].  Normal or non-critical lab and imaging results will be communicated to you by [employer], letter or phone within 4 business days after the clinic has received the results. If you do not hear from us within 7 days, please contact the clinic through [employer] or phone. If you have a critical or abnormal lab result, we will notify you by phone as soon as possible.  Submit refill requests through [employer] or call your pharmacy and they will forward the refill request to us. Please allow 3 business days for your refill to be completed.          Additional Information About Your Visit        [employer] Information     [employer] gives you secure access to your electronic health record. If you see a primary care provider, you can also send messages to your care team and make appointments. If you have questions, please call your primary care clinic.  If you do not have a primary care provider, please call [phone] and they will assist you.        Care EveryWhere ID     This is your Care EveryWhere ID. This could be used by other organizations to access your [facility] medical records  [id number]        Your Vitals Were     Pulse Temperature Respirations Last Period Pulse Oximetry BMI (Body Mass Index)    70 97.8  F (36.6  C) (Temporal) 14 11/27/2003 97% 24.19 kg/m2       Blood Pressure from Last 3 Encounters:   11/28/17 124/64   09/01/17 110/64   08/30/17 133/79    Weight from Last 3 Encounters:   11/28/17 141 lb (64 kg)   09/01/17 141 lb (64 kg)   08/30/17 143 lb 9.6 oz (65.1 kg)              We Performed the Following     ORTHO  REFERRAL     Vitamin B12          Today's Medication Changes          These changes are accurate as of: 11/28/17  9:30 AM.  If you have any questions, ask your nurse or doctor.               Start taking these medicines.        Dose/Directions    clotrimazole 1 % cream   Commonly known as:  LOTRIMIN   Used for:  Cutaneous candidiasis   Started by:  Tanika Clark MD         Apply topically 2 times daily   Quantity:  30 g   Refills:  3         Stop taking these medicines if you haven't already. Please contact your care team if you have questions.     celecoxib 200 MG capsule   Commonly known as:  celeBREX   Stopped by:  Tanika Clark MD           hydrocortisone 1 % ointment   Stopped by:  Tanika Clark MD                Where to get your medicines      These medications [place] [facility] [place] [address]  [address], [facility] [address]     Phone:  [phone]     clotrimazole 1 % cream                Primary Care Provider Office Phone # Fax #    Tanika Clark [phone]       [address]  [place] [address]        Equal Access to Services     BRIDGETT [facility] CenterMADELINE : Lonny rico Soprasanna, waaxda luqadaha, qaybta kaalmada adeegyada, ramses mcnulty . So [facility] [phone].    ATENCIÓN: Si habla español, tiene a salazar disposición servicios gratuitos de asistencia lingüística. LlThe [facility] [phone].    We comply with applicable federal civil rights laws and [place] laws. We do not discriminate on the basis of race, color, national origin, age, disability, sex, sexual orientation, or gender identity.            Thank you!     Thank you for choosing [facility]  for your care. Our goal is always to provide you with excellent care. Hearing back from our patients is one way we can continue to improve our services. Please take a few minutes to complete the written survey that you may receive in the mail after your visit with us. Thank you!             Your Updated Medication List - Protect others around you: Learn how to safely use, store and throw away your medicines at www.disposemymeds.org.          This list is accurate as of: 11/28/17  9:30 AM.  Always use your most recent med list.                   Brand Name Dispense Instructions for use Diagnosis     clotrimazole 1 % cream    LOTRIMIN    30 g    Apply topically 2 times daily    Cutaneous candidiasis       cyanocobalamin 1000 MCG tablet    vitamin  B-12     Take by mouth daily        cycloSPORINE 0.05 % ophthalmic emulsion    RESTASIS     1 drop 2 times daily        Fiber 0.52 G Caps     540 capsule    4 tabs in pm        hydroxychloroquine 200 MG tablet    PLAQUENIL    30 tablet    Take 1 tablet (200 mg) by mouth daily    Inflammatory polyarthropathy (H)       ibuprofen 200 MG tablet    ADVIL/MOTRIN     Take 200-600 mg by mouth nightly as needed        lisinopril 10 MG tablet    PRINIVIL/ZESTRIL    90 tablet    Take 1 tablet (10 mg) by mouth daily    Essential hypertension       montelukast 10 MG tablet    SINGULAIR    90 tablet    TAKE ONE TABLET BY MOUTH EVERY DAY AS NEEDED SEASONALLY (AUGUST AND SEPTEMBER)    Other seasonal allergic rhinitis       nystatin 887004 UNIT/GM Powd    MYCOSTATIN    60 g    Apply topically 3 times daily as needed    Intertrigo       PROVIGIL PO      Take 200 mg by mouth 2 times daily Patient unsure which dose        sertraline 100 MG tablet    ZOLOFT     Take 100 mg by mouth daily Takes 2 at bedtime        VYVANSE 20 MG capsule   Generic drug:  lisdexamfetamine     30 capsule    Take 20 mg by mouth every morning    Primary narcolepsy without cataplexy

## 2017-11-28 NOTE — NURSING NOTE
"Chief Complaint   Patient presents with     Derm Problem     Panel Management     phq9, csa       Initial /64 (BP Location: Right arm, Patient Position: Chair, Cuff Size: Adult Regular)  Pulse 70  Temp 97.8  F (36.6  C) (Temporal)  Resp 14  Wt 141 lb (64 kg)  LMP 11/27/2003  SpO2 97%  BMI 24.19 kg/m2 Estimated body mass index is 24.19 kg/(m^2) as calculated from the following:    Height as of 8/30/17: 5' 4.02\" (1.626 m).    Weight as of this encounter: 141 lb (64 kg).  Medication Reconciliation: complete   Joceline Faust CMA      "

## 2017-11-29 ENCOUNTER — OFFICE VISIT (OUTPATIENT)
Dept: RHEUMATOLOGY | Facility: CLINIC | Age: 67
End: 2017-11-29
Payer: COMMERCIAL

## 2017-11-29 VITALS
BODY MASS INDEX: 24.52 KG/M2 | OXYGEN SATURATION: 98 % | HEART RATE: 72 BPM | SYSTOLIC BLOOD PRESSURE: 137 MMHG | HEIGHT: 64 IN | WEIGHT: 143.6 LBS | DIASTOLIC BLOOD PRESSURE: 87 MMHG

## 2017-11-29 DIAGNOSIS — M06.4 INFLAMMATORY POLYARTHROPATHY (H): Primary | ICD-10-CM

## 2017-11-29 DIAGNOSIS — M81.8 OTHER OSTEOPOROSIS, UNSPECIFIED PATHOLOGICAL FRACTURE PRESENCE: ICD-10-CM

## 2017-11-29 DIAGNOSIS — E55.9 VITAMIN D DEFICIENCY: ICD-10-CM

## 2017-11-29 PROCEDURE — 99214 OFFICE O/P EST MOD 30 MIN: CPT | Performed by: INTERNAL MEDICINE

## 2017-11-29 RX ORDER — HYDROXYCHLOROQUINE SULFATE 200 MG/1
TABLET, FILM COATED ORAL
Qty: 135 TABLET | Refills: 1 | Status: SHIPPED | OUTPATIENT
Start: 2017-11-29 | End: 2018-04-05

## 2017-11-29 RX ORDER — ERGOCALCIFEROL 1.25 MG/1
CAPSULE, LIQUID FILLED ORAL
Qty: 24 CAPSULE | Refills: 1 | Status: SHIPPED | OUTPATIENT
Start: 2017-11-29 | End: 2018-04-05

## 2017-11-29 RX ORDER — HYDROXYCHLOROQUINE SULFATE 200 MG/1
200 TABLET, FILM COATED ORAL DAILY
Qty: 90 TABLET | Refills: 1 | Status: SHIPPED | OUTPATIENT
Start: 2017-11-29 | End: 2017-11-29

## 2017-11-29 ASSESSMENT — ANXIETY QUESTIONNAIRES: GAD7 TOTAL SCORE: 8

## 2017-11-29 NOTE — NURSING NOTE
"Chief Complaint   Patient presents with     RECHECK     patient states she is doing good. Still has swelling and numbness in digits in the morning especially in the morning also numbness in toes every day. Seen PCP yesterday and was referred to foot doctor       Initial /87 (BP Location: Left arm, Patient Position: Chair, Cuff Size: Adult Regular)  Pulse 72  Ht 1.626 m (5' 4.02\")  Wt 65.1 kg (143 lb 9.6 oz)  LMP 11/27/2003  SpO2 98%  BMI 24.63 kg/m2 Estimated body mass index is 24.63 kg/(m^2) as calculated from the following:    Height as of this encounter: 1.626 m (5' 4.02\").    Weight as of this encounter: 65.1 kg (143 lb 9.6 oz).  BP completed using cuff size: regular         RAPID3 (0-30) Cumulative Score            RAPID3 Weighted Score (divide #4 by 3 and that is the weighted score)           "

## 2017-11-29 NOTE — MR AVS SNAPSHOT
After Visit Summary   2017    Niesha Adhikari    MRN: [id number]           Patient Information     Date Of Birth          1950        Visit Information        Provider Department      2017 8:00 AM Apolinar Cho MD [facility]        Today's Diagnoses     Inflammatory polyarthropathy (H)    -  1    Vitamin D deficiency        Other osteoporosis, unspecified pathological fracture presence          Care Instructions    [facility]  [address] [facility] [place] [address]  Ph: [phone]  Monday - Friday 8am-430pm      Dr. Cho s Rheumatology Clinics  Locations Clinic Hours Telephone Number     [facility]  [address]  [place] [address]     Wednesday: 7:20AM - 4:00PM  Thursday:     7:20AM - 4:00PM     Friday:          7:20AM - 11:00AM       To schedule an appointment with  Dr. Cho,  please contact  Specialty [phone]       [facility]  [address]  [place] [address]       Monday:       7:20AM - 4:00PM        [facility]  [address] [place] [address]       Tuesday:      7:20AM - 4:00PM          Thank you!    Anabella Garcia CMA              Follow-ups after your visit        Your next 10 appointments already scheduled     Mar 07, 2018  8:00 AM CST   Return Visit with Apolinar Cho MD   [facility] ([facility])    [address] [facility] [facility] [id number] [phone]              Who to contact     If you have questions or need follow up information about today's clinic visit or your schedule please contact [facility] directly at [phone].  Normal or non-critical lab and imaging results will be communicated to you by MyChart, letter or phone within 4 business days after the clinic has received the results. If you do not hear from us within 7 days, please contact the clinic through MyChart or phone. If you have a  "critical or abnormal lab result, we will notify you by phone as soon as possible.  Submit refill requests through [employer] or call your pharmacy and they will forward the refill request to us. Please allow 3 business days for your refill to be completed.          Additional Information About Your Visit        [employer] Information     [employer] gives you secure access to your electronic health record. If you see a primary care provider, you can also send messages to your care team and make appointments. If you have questions, please call your primary care clinic.  If you do not have a primary care provider, please call [phone] and they will assist you.        Care EveryWhere ID     This is your Care EveryWhere ID. This could be used by other organizations to access your [facility] medical records  [id number]        Your Vitals Were     Pulse Height Last Period Pulse Oximetry BMI (Body Mass Index)       72 1.626 m (5' 4.02\") 11/27/2003 98% 24.63 kg/m2        Blood Pressure from Last 3 Encounters:   11/29/17 137/87   11/28/17 124/64   09/01/17 110/64    Weight from Last 3 Encounters:   11/29/17 65.1 kg (143 lb 9.6 oz)   11/28/17 64 kg (141 lb)   09/01/17 64 kg (141 lb)              Today, you had the following     No orders found for display         Today's Medication Changes          These changes are accurate as of: 11/29/17  8:43 AM.  If you have any questions, ask your nurse or doctor.               Start taking these medicines.        Dose/Directions    hydroxychloroquine 200 MG tablet   Commonly known as:  PLAQUENIL   Used for:  Inflammatory polyarthropathy (H)   Started by:  Aoplinar Cho MD        Hydroxychloroquine 200mg in the AM and 100mg in the PM.   Quantity:  135 tablet   Refills:  1       vitamin D 20575 UNIT capsule   Commonly known as:  ERGOCALCIFEROL   Used for:  Vitamin D deficiency, Other osteoporosis, unspecified pathological fracture presence   Started by:  Apolinar Cho MD        Take 1 " capsule (50,000 Units) by mouth every Monday and Friday.   Quantity:  24 capsule   Refills:  1            Where to get your medicines      These medications were sent to [facility] [place], [place] - [address] [place] [address]  [address] [facility] [address] [address]     Phone:  [phone]     hydroxychloroquine 200 MG tablet    vitamin D 86639 UNIT capsule                Primary Care Provider Office Phone # Fax #    Tanika Clark [phone]       [address]  [facility] [address]        Equal Access to Services     [facility]: Hadii chely ku hadasho Soomaali, waaxda luqadaha, qaybta kaalmada adeegyada, ramses mcnulty . So [facility] [phone].    ATENCIÓN: Si habla español, tiene a salazar disposición servicios gratuitos de asistencia lingüística. [facility] [phone].    We comply with applicable federal civil rights laws and [place] laws. We do not discriminate on the basis of race, color, national origin, age, disability, sex, sexual orientation, or gender identity.            Thank you!     Thank you for choosing [facility]  for your care. Our goal is always to provide you with excellent care. Hearing back from our patients is one way we can continue to improve our services. Please take a few minutes to complete the written survey that you may receive in the mail after your visit with us. Thank you!             Your Updated Medication List - Protect others around you: Learn how to safely use, store and throw away your medicines at www.disposemymeds.org.          This list is accurate as of: 11/29/17  8:43 AM.  Always use your most recent med list.                   Brand Name Dispense Instructions for use Diagnosis    clotrimazole 1 % cream    LOTRIMIN    30 g    Apply topically 2 times daily    Cutaneous candidiasis       cyanocobalamin 1000 MCG tablet    vitamin  B-12     Take by mouth daily        cycloSPORINE 0.05 % ophthalmic emulsion     RESTASIS     1 drop 2 times daily        Fiber 0.52 G Caps     540 capsule    4 tabs in pm        hydroxychloroquine 200 MG tablet    PLAQUENIL    135 tablet    Hydroxychloroquine 200mg in the AM and 100mg in the PM.    Inflammatory polyarthropathy (H)       ibuprofen 200 MG tablet    ADVIL/MOTRIN     Take 200-600 mg by mouth nightly as needed        lisinopril 10 MG tablet    PRINIVIL/ZESTRIL    90 tablet    Take 1 tablet (10 mg) by mouth daily    Essential hypertension       montelukast 10 MG tablet    SINGULAIR    90 tablet    TAKE ONE TABLET BY MOUTH EVERY DAY AS NEEDED SEASONALLY (AUGUST AND SEPTEMBER)    Other seasonal allergic rhinitis       nystatin 180490 UNIT/GM Powd    MYCOSTATIN    60 g    Apply topically 3 times daily as needed    Intertrigo       PROVIGIL PO      Take 200 mg by mouth 2 times daily Patient unsure which dose        sertraline 100 MG tablet    ZOLOFT     Take 100 mg by mouth daily Takes 2 at bedtime        vitamin D 69461 UNIT capsule    ERGOCALCIFEROL    24 capsule    Take 1 capsule (50,000 Units) by mouth every Monday and Friday.    Vitamin D deficiency, Other osteoporosis, unspecified pathological fracture presence       VYVANSE 20 MG capsule   Generic drug:  lisdexamfetamine     30 capsule    Take 20 mg by mouth every morning    Primary narcolepsy without cataplexy

## 2017-11-29 NOTE — PATIENT INSTRUCTIONS
[facility]  [address] [place] [address]  Ph: [phone]  Monday - Friday 8am-430pm      Dr. Cho s Rheumatology Clinics  Locations Clinic Hours Telephone Number     [facility]  [address] [place] [address]     Wednesday: 7:20AM - 4:00PM  Thursday:     7:20AM - 4:00PM     Friday:          7:20AM - 11:00AM       To schedule an appointment with  Dr. Cho,  please contact  Specialty [phone]       [facility] [place]  [address]  [place] [address]       Monday:       7:20AM - 4:00PM        [facility]  [address] [place] [address]       Tuesday:      7:20AM - 4:00PM          Thank you!    Anabella Garcia, RAJ

## 2017-11-29 NOTE — PROGRESS NOTES
"Rheumatology Clinic Visit      Niesha Adhikari MRN# [id number]   YOB: 1950 Age: 67 year old      Date of visit: 11/29/17   PCP: Dr. Tanika Clark  Hepatologist: Dr. Peres at [facility] in [place]  Ophthalmology: Dr. Higgins, [facility]    Chief Complaint   Patient presents with:  RECHECK: patient states she is doing good. Still has swelling and numbness in digits in the morning especially in the morning also numbness in toes every day. Seen PCP yesterday and was referred to foot doctor      Assessment and Plan     1. Inflammatory polyarthropathy: Hepatitis C related arthralgias versus rheumatoid arthritis (RF and CCP negative); hepatitis C has since been cleared. Initially with symmetric synovitis on exam and morning stiffness for more than one hour. She did very well with hydroxychloroquine 400 mg daily, with no joint pain/swelling, and morning stiffness for no more than 30 minutes. Hydroxychloroquine is reduced from 400 mg daily to 200 mg daily with worsening of her morning stiffness to 2 hours. Therefore, increase hydroxychloroquine to 200 mg in the morning and 100mg in the evening.   - Increase hydroxychloroquine to 200 mg in the AM and 100mg in the PM  - Request hydroxychloroquine toxicity monitoring records from Dr. Higgins, [facility], where she says that the hydroxychloroquine toxicity monitoring was performed and reportedly showed no evidence of toxicity.    2. Osteoporosis: Previously treated with Fosamax (GERD), PO Boniva (reportedly ineffective), and IV Boniva (reportedly effective). Prolia was being considered by a previous rheumatologist but not used because she wanted \"clearance\" from the patient's gastroenterologist because she has hepatitis C; I do not see a contraindication for Prolia in the setting of hepatitis C. The patient reports that her gastroenterologist reported no contraindication for Prolia either. Patient reports having no osteoporosis " treatment for about 2 years now and her last bone density scan showed osteoporosis. She would like to restart IV Boniva. She does not want to use Prolia . DEXA was repeated on 2/23/2017 that showed osteoporosis. Vitamin D was low and therefore has successfully been repleted.  Start boniva. Plan to recheck DEXA in 12/2019.  - Continue ergocalciferol 50,000 units twice weekly  - Start boniva 3mg IV every 90 days (she plans to received at the [facility])    # Bisphosphonate risks and side effects:  Risks and side effects include esophageal irritation, heartburn, osteonecrosis of the jaw (most often in people with dental disease or a recent dental procedure), and atypical femoral fractures.  IV bisphosphonates can cause a flu-like illness and bone pain lasting up to 2-3 days; premedication with acetaminophen will often prevent or lessen these symptoms. Unusual side effects that have been reported include occular symptoms such as uveitis, keartitis, optic neuritis, and orbital swelling.  Oral bisphosphonates should not be used in patients with esophageal problems (such as strictures, achalasia, or severe dysmotility, varices), malabsorption, or the inability to sit upright.  Oral and IV bisphosphonates should not be used if the CrCl is less than 25-40mL/min, or the patient is pregnant or breastfeeding.  Prior to starting a biosphosphonate, invasive dental work should be completed if needed and she says that she does not have any dental work coming up, and vitamin D level should be adequate as has been done.    3. Neck pain: Worsening neck pain and in the setting of inflammatory arthritis so x-rays were checked and she was referred to PT.  She is doing the exercises inconsistently at home with some improvement. Encouraged continued PT exercises.     4. Bilateral upper and lower showed a neuropathy: Present for about 5 months. Unclear etiology. She has not gone to neurology yet and was recently also  referred to podiatry for this issue.     Ms. Adhikari verbalized agreement with and understanding of the rational for the diagnosis and treatment plan.  All questions were answered to best of my ability and the patient's satisfaction. Ms. Adhikari was advised to contact the clinic with any questions that may arise after the clinic visit.      Thank you for involving me in the care of the patient    Return to clinic: 3 months      HPI   Niesha Adhikari is a 67 year old female with a medical history significant for hepatitis C, hemorrhoids, narcolepsy, fibromyalgia, migraines, anxiety, pernicious anemia, GERD, allergic rhinitis, and osteoporosis who presents earlier than previously scheduled because of hand pain and stiffness.     Ms. Adhikari was previously followed in the rheumatology clinic by Dr. Luciano and Dr. Marc.  A 10/29/2015 clinic note documents osteoporosis that was first diagnosed in 2001. Initially she was on Fosamax but was limited because of GERD. Reportedly treatment failure to oral and IV Boniva. Prolia was being considered but Dr. Lucaino documents that she wanted clearance from gastroenterology because of her high hepatitis C viral load.    Regarding hepatitis C, she is followed at the Hepatology Department at [facility] in [place] by Shannon Sher and Dr. Peres.  A letter dated 10/29/2016 from Shannon Sher states that Ms. Adhikari has completed a 12 week course of hepatitis C therapy with [facility] and she is responding to treatment, based on an undetectable hepatitis C viral load at the end of therapy.    Today, she reports that since reducing hydroxychloroquine from 400 mg daily to 200 mg daily, she had has had a slight increase in her joint symptoms. No joint pain or joint swelling, but her morning stiffness is lasting for approximately 2 hours now. Stiffness improves with activity and worsens with inactivity. She hopes to start on even if possible. She has been taking ergocalciferol  50,000 units twice weekly.  No longer having blood in her stools; no black stools; no GERD; no longer requiring omeprazole.     Denies fevers, chills, nausea, vomiting, constipation, diarrhea. No abdominal pain. No chest pain/pressure, palpitations, or shortness of breath. No LE swelling. No neck pain. No oral or nasal sores.  No rash.    Tobacco: quit in 1978  EtOH: none  Drugs: none  Occupation: retired    ROS   GEN: No fevers, chills, night sweats, or weight change  SKIN: No itching, rashes, sores  HEENT: No epistaxis. No oral or nasal ulcers.  CV: No chest pain, pressure, palpitations, or dyspnea on exertion.  PULM: No SOB, wheeze, cough.  GI: No nausea, vomiting, constipation, diarrhea. No blood in stool. No abdominal pain.  : No blood in urine.  MSK: See HPI.  NEURO: No numbness, tingling, or weakness.  EXT: No LE swelling  PSYCH: See history of present illness    Active Problem List     Patient Active Problem List   Diagnosis     Allergic rhinitis     Esophageal reflux     Pernicious anemia     Anxiety state     Migraine     Essential and other specified forms of tremor     Fibromyalgia     Moderate recurrent major depression (H)     Advanced directives, counseling/discussion     Narcolepsy     Internal hemorrhoids with other complication     AIN (anal intraepithelial neoplasia) anal canal     Sciatica     Osteoporosis     Inflammatory polyarthropathy (H)     High risk medication use     Benign essential hypertension     Past Medical History     Past Medical History:   Diagnosis Date     ABUSE BY SPOUSE/PARTNER 7/27/2005     Degeneration of lumbar or lumbosacral intervertebral disc     DDD L5/S1     HELICOBACTER PYLORI INFECTION 1/28/2005     Hepatitis C      Hypertension      Malignant neoplasm (H)     ACIN     Osteoporosis      Other and unspecified alcohol dependence, unspecified drinking behavior     Sober as 1/21/1987     Other malaise and fatigue      Past Surgical History     Past Surgical History:  "  Procedure Laterality Date     BIOPSY ANAL CANAL  1/21/13    [facility]      BREAST BIOPSY, RT/LT Left 1975    Breat Biopsy RT/LT     C NONSPECIFIC PROCEDURE  1965    Removed bone left index finger knuckle, casts broken bones     COLONOSCOPY  8/25/2009     COLONOSCOPY  2/14/2011    COLONOSCOPY performed by CRISTIN LAGUNAS at  GI     CYSTOSCOPY  2/28/2011    CYSTOSCOPY performed by CAYLA FLOR at  OR     ENDOSCOPY  05/21/12    Upper GI - [facility]      COLONOSCOPY W/WO BRUSH/WASH  08/22/05     [facility] UGI ENDOSCOPY DIAG W BIOPSY  10/01/09     HC UGI ENDOSCOPY, SIMPLE EXAM  08/08/07     HEMORRHOIDECTOMY  06/25/12    [facility]     LAPAROSCOPIC SALPINGO-OOPHORECTOMY  2/28/2011    LAPAROSCOPIC SALPINGO-OOPHORECTOMY performed by CAYLA FLOR at  OR     TONSILLECTOMY & ADENOIDECTOMY  1965     Allergy     Allergies   Allergen Reactions     Abilify Discmelt Other (See Comments)     Disoriented     Antivert [Meclizine Hcl]      Compazine      Cymbalta Other (See Comments)     Disoriented, trouble sleeping     Diphenhydramine Nausea     And abdominal pain     Effexor [Venlafaxine] Other (See Comments)     Disoriented, trouble sleeping     Elavil [Amitriptyline Hcl] Other (See Comments)     \"didn't feel right on it-med was stopped right away\"     Ferrous Sulfate Nausea and Vomiting     Food Difficulty breathing     cilantro     Indomethacin      indocin sensativity \"Severe h.a\"     Seasonal Allergies Other (See Comments) and Difficulty breathing     Philip Gold Aug-Sept, rag weed, sneezing     Thiopental Sodium      PENTOTHAL/rigidity and fight response     Animal Dander Difficulty breathing and Rash     sneezing,resp. distress     Bupropion Anxiety     Ibuprofen Rash     Tylenol [Acetaminophen] Rash     Current Medication List     Current Outpatient Prescriptions   Medication Sig     cyanocobalamin (VITAMIN  B-12) 1000 MCG tablet Take by mouth daily     clotrimazole (LOTRIMIN) 1 % " cream Apply topically 2 times daily     ibuprofen (ADVIL/MOTRIN) 200 MG tablet Take 200-600 mg by mouth nightly as needed     nystatin (MYCOSTATIN) 178231 UNIT/GM POWD Apply topically 3 times daily as needed     montelukast (SINGULAIR) 10 MG tablet TAKE ONE TABLET BY MOUTH EVERY DAY AS NEEDED SEASONALLY (AUGUST AND SEPTEMBER)     hydroxychloroquine (PLAQUENIL) 200 MG tablet Take 1 tablet (200 mg) by mouth daily     lisinopril (PRINIVIL/ZESTRIL) 10 MG tablet Take 1 tablet (10 mg) by mouth daily     cycloSPORINE (RESTASIS) 0.05 % ophthalmic emulsion 1 drop 2 times daily     Modafinil (PROVIGIL PO) Take 200 mg by mouth 2 times daily Patient unsure which dose      lisdexamfetamine (VYVANSE) 20 MG capsule Take 20 mg by mouth every morning     sertraline (ZOLOFT) 100 MG tablet Take 100 mg by mouth daily Takes 2 at bedtime     Psyllium (FIBER) 0.52 G CAPS 4 tabs in pm     No current facility-administered medications for this visit.          Social History   See HPI    Family History     Family History   Problem Relation Age of Onset     Hypertension Mother      Breast Cancer Mother      Coronary Artery Disease Mother      CEREBROVASCULAR DISEASE Mother      KIDNEY DISEASE Mother      Hypertension Brother      Respiratory Brother      emphysema     Lipids Brother      HEART DISEASE Brother      stents, 12/2011; has had about 6 MIs, last one 1/2014     C.A.D. Sister      MI at age 63     Hypertension Sister      GASTROINTESTINAL DISEASE Sister      gallbladder     Circulatory Sister      brain aneurysm at 63     Genitourinary Problems Sister      1 kidney/bladder     Hypertension Sister      Obesity Sister      Coronary Artery Disease Sister      had valve surgery, MI, CHF     Unknown/Adopted Paternal Uncle      Blood Disease Son      Lymes/7/11     Hypertension Son      Hypertension Father      Lymphoma Father      Glaucoma Father      Coronary Artery Disease Other 49     niece     DIABETES Other      cousin      "CEREBROVASCULAR DISEASE Maternal Grandmother      CEREBROVASCULAR DISEASE Paternal Grandmother      Liver Cancer Cousin      Glaucoma Paternal Grandfather      Physical Exam     Temp Readings from Last 3 Encounters:   11/28/17 97.8  F (36.6  C) (Temporal)   09/01/17 98.6  F (37  C) (Temporal)   08/30/17 97.9  F (36.6  C) (Oral)     BP Readings from Last 5 Encounters:   11/29/17 137/87   11/28/17 124/64   09/01/17 110/64   08/30/17 133/79   06/30/17 124/82     Pulse Readings from Last 1 Encounters:   11/29/17 72     Resp Readings from Last 1 Encounters:   11/28/17 14     Estimated body mass index is 24.63 kg/(m^2) as calculated from the following:    Height as of this encounter: 1.626 m (5' 4.02\").    Weight as of this encounter: 65.1 kg (143 lb 9.6 oz).    GEN: NAD  HEENT: MMM. No oral lesions. Good dentition.  Anicteric, noninjected sclera  CV: S1, S2. RRR. No m/r/g.  PULM: CTA bilaterally. No w/c.  MSK: MCPs, PIPs, wrists, elbows, shoulders, knees, ankles, and MTPs without swelling or tenderness to palpation. Heberden's and Philippe's nodes  present. Hips nontender to direct palpation.     NEURO: UE and LE strengths 5/5 and equal bilaterally.   SKIN: No rash  EXT: No LE edema  PSYCH: Alert. Appropriate.    Labs / Imaging (select studies)   RF/CCP  Recent Labs   Lab Test  06/07/17   0955   CCPIGG  1   RHF  <20     CBC  Recent Labs   Lab Test  08/30/17   1028  06/30/17   0925  06/07/17   0955   WBC  3.8*  4.7  3.7*   RBC  4.51  5.00  4.85   HGB  14.1  14.6  14.1   HCT  40.5  44.2  43.1   MCV  90  88  89   RDW  13.8  14.3  14.2   PLT  154  138*  156   MCH  31.3  29.2  29.1   MCHC  34.8  33.0  32.7   NEUTROPHIL  60.9  68.1  54.6   LYMPH  27.6  21.4  30.2   MONOCYTE  8.4  7.3  10.0   EOSINOPHIL  2.6  3.0  4.9   BASOPHIL  0.5  0.2  0.3   ANEU  2.3  3.2  2.0   ALYM  1.1  1.0  1.1   FREDY  0.3  0.3  0.4   AEOS  0.1  0.1  0.2   ABAS  0.0  0.0  0.0     CMP  Recent Labs   Lab Test  11/21/17   0744  08/28/17   1457  06/30/17   " 0925  02/23/17   0726  10/07/16   0738  07/15/15   1607   02/06/15   0958   NA  137   --   139  141  138  137   --   139   POTASSIUM  4.3   --   4.7  4.1  4.2  3.9   --   4.4   CHLORIDE  101   --   103  104  103  101   --   103   CO2  30   --   29  32  32  29   --   29   ANIONGAP  6   --   7  5  3  7   --   7   GLC  82   --   90  87  84  68*   --   86   BUN  10   --   13  12  18  11   --   11   CR  0.58  0.63  0.59  0.62  0.72  0.65   < >  0.62   GFRESTIMATED  >90  >90  >90  Non African American GFR Calc    >90  Non  GFR Calc    81  >90  Non  GFR Calc     < >  >90  Non  GFR Calc     GFRESTBLACK  >90  >90  >90  African American GFR Calc    >90   GFR Calc    >90   GFR Calc    >90   GFR Calc     < >  >90   GFR Calc     ELIZABETH  9.4  9.2  8.9  9.0  9.0  8.9   < >  9.1   BILITOTAL   --    --   0.5  0.5   --   0.5   --   0.5   ALBUMIN   --    --   4.0  4.0   --   4.0   --   4.0   PROTTOTAL   --    --   7.4  7.4   --   7.5   --   7.4   ALKPHOS   --    --   78  77   --   59   --   60   AST   --    --   24  22   --   29   --   30   ALT   --    --   32  25   --   34   --   31    < > = values in this interval not displayed.     Iron Studies  Recent Labs   Lab Test  06/30/17   0922  07/15/15   1607  12/05/12   0756  08/29/12   0844   MARTÍNEZ  31   --   354*  10   IRON  138  106  119  21*   FEB  376  439*  315  420   IRONSAT  37  24  38  5*     Calcium/VitaminD  Recent Labs   Lab Test  11/21/17   0744  08/28/17   1457  06/30/17   0925   ELIZABETH  9.4  9.2  8.9   VITDT  37  19*  23     ESR/CRP  Recent Labs   Lab Test  06/30/17   0925   SED  6   CRP  <2.9     TSH/T4  Recent Labs   Lab Test  10/07/16   0738  07/15/15   1607  02/05/14   1131   03/29/11   1523   TSH  1.90  0.86  0.84   < >  1.01   T4   --    --    --    --   0.90    < > = values in this interval not displayed.     Hepatitis B  Recent Labs   Lab Test  06/30/17   0909   06/07/17   0955  09/30/15   0951   AUSAB   --   0.65   --    HBCAB   --   Reactive   A reactive result indicates acute, chronic or past/resolved hepatitis B   infection.  *   --    HEPBANG   --   Nonreactive  Nonreactive   HBQLOG  Not Calculated   --    --      Hepatitis C  Recent Labs   Lab Test  06/07/17   0955  09/30/15   0951  02/06/15   0958   10/12/11   1110   HCVAB  Reactive   A reactive result indicates one of the following 1) current HCV infection 2)   past HCV infection that has resolved or 3) false positivity. The CDC recommends   that a reactive result should be followed by Nucleic acid testing for HCV RNA.  If HCV RNA is detected, that indicates current HCV infection. If HCV RNA is not   detected, that indicates either past, resolved HCV infection, or false HCV   antibody positivity.   Assay performance characteristics have not been established for newborns,   infants, and children  *  Reactive   A reactive result indicates one of the following 1) current HCV infection 2)   past HCV infection that has resolved or 3) false positivity. The CDC recommends   that a reactive result should be followed by Nucleic acid testing for HCV RNA.  If HCV RNA is detected, that indicates current HCV infection. If HCV RNA is not   detected, that indicates either past, resolved HCV infection, or false HCV   antibody positivity.   Assay performance characteristics have not been established for newborns,   infants, and children  *   --    --   Positive  High sample/cutoff ratio, confirmatory testing available.*   HCVRNA  HCV RNA Not Detected   The LUIS ARMANDO AmpliPrep/LUIS ARMANDO TaqMan HCV Test is an FDA-approved in vitro nucleic   acid amplification test for the quantitation of HCV RNA in human plasma (ETDA   plasma) or serum using the LUIS ARMANDO AmpliPrep Instrument for automated viral   nucleic acid extraction and the LUIS ARMANDO TaqMan Analyzer or Fixed - [employer] TaqMan for   automated Real Time PCR amplification and detection of the viral nucleic  acid   target.   Titer results are reported in International Units/mL (IU/mL) using the 1st WHO   International standard for HCV for Nucleic Acid Amplification based assays.    453308*  [id number]*   < >   --     < > = values in this interval not displayed.     Lyme ab screening  Recent Labs   Lab Test  05/11/17   0830  04/12/17   1211  07/24/15   1047   LYMEGM  <0.01  Negative, Absence of detectable Borrelia burdorferi antibodies. A negative   result does not exclude the possibility of Borrelia burgdorferi infection. If   early Lyme disease is suspected, a second sample should be collected and tested   2 to 4 weeks later.    <0.01  Negative, Absence of detectable Borrelia burdorferi antibodies. A negative   result does not exclude the possibility of Borrelia burgdorferi infection. If   early Lyme disease is suspected, a second sample should be collected and tested   2 to 4 weeks later.    0.11     Tuberculosis Screening  Recent Labs   Lab Test  09/30/15   0952   TBRSLT  Negative   TBAGN  0.05     Immunization History     Immunization History   Administered Date(s) Administered     HEPA 04/18/2000, 09/26/2000     HPV 08/13/2012, 09/25/2012, 01/24/2013     HepB 11/15/2011, 12/19/2011     Influenza (H1N1) 01/07/2010     Influenza (IIV3) PF 01/03/2005, 10/16/2006, 11/14/2007, 10/28/2008, 09/29/2009, 09/27/2010, 10/04/2011, 09/25/2012, 09/21/2015     Influenza Vaccine IM 3yrs+ 4 Valent IIV4 09/26/2013, 10/06/2014, 10/07/2016, 09/08/2017     Pneumococcal (PCV 13) 10/07/2016     Pneumococcal 23 valent 11/15/2011, 10/17/2017     TD (ADULT, 7+) 04/18/2000, 07/07/2004     TDAP Vaccine (Boostrix) 09/21/2015     Tdap (Adacel,Boostrix) 05/23/2006     Zoster vaccine, live 09/21/2015          Chart documentation done in part with Dragon Voice recognition Software. Although reviewed after completion, some word and grammatical error may remain.    Apolinar Cho MD

## 2017-12-01 DIAGNOSIS — Z92.89 PERSONAL HISTORY OF OTHER MEDICAL TREATMENT (CODE): Primary | ICD-10-CM

## 2017-12-05 ENCOUNTER — INFUSION THERAPY VISIT (OUTPATIENT)
Dept: INFUSION THERAPY | Facility: CLINIC | Age: 67
End: 2017-12-05
Attending: INTERNAL MEDICINE
Payer: MEDICARE

## 2017-12-05 VITALS
RESPIRATION RATE: 16 BRPM | BODY MASS INDEX: 24.24 KG/M2 | SYSTOLIC BLOOD PRESSURE: 134 MMHG | HEIGHT: 64 IN | WEIGHT: 142 LBS | TEMPERATURE: 98.8 F | DIASTOLIC BLOOD PRESSURE: 70 MMHG | OXYGEN SATURATION: 96 % | HEART RATE: 70 BPM

## 2017-12-05 DIAGNOSIS — Z92.89 PERSONAL HISTORY OF OTHER MEDICAL TREATMENT (CODE): Primary | ICD-10-CM

## 2017-12-05 DIAGNOSIS — M81.0 AGE-RELATED OSTEOPOROSIS WITHOUT CURRENT PATHOLOGICAL FRACTURE: ICD-10-CM

## 2017-12-05 LAB
CALCIUM SERPL-MCNC: 9.6 MG/DL (ref 8.5–10.1)
CREAT SERPL-MCNC: 0.64 MG/DL (ref 0.52–1.04)
GFR SERPL CREATININE-BSD FRML MDRD: >90 ML/MIN/1.7M2

## 2017-12-05 PROCEDURE — 82565 ASSAY OF CREATININE: CPT | Performed by: INTERNAL MEDICINE

## 2017-12-05 PROCEDURE — 36415 COLL VENOUS BLD VENIPUNCTURE: CPT | Performed by: INTERNAL MEDICINE

## 2017-12-05 PROCEDURE — 25000128 H RX IP 250 OP 636: Performed by: INTERNAL MEDICINE

## 2017-12-05 PROCEDURE — 82310 ASSAY OF CALCIUM: CPT | Performed by: INTERNAL MEDICINE

## 2017-12-05 PROCEDURE — 96374 THER/PROPH/DIAG INJ IV PUSH: CPT

## 2017-12-05 RX ORDER — IBANDRONATE SODIUM 3 MG/3 ML
3 SYRINGE (ML) INTRAVENOUS ONCE
Status: COMPLETED | OUTPATIENT
Start: 2017-12-05 | End: 2017-12-05

## 2017-12-05 RX ORDER — IBANDRONATE SODIUM 3 MG/3 ML
3 SYRINGE (ML) INTRAVENOUS ONCE
Status: CANCELLED | OUTPATIENT
Start: 2018-03-05 | End: 2018-03-05

## 2017-12-05 RX ADMIN — IBANDRONATE SODIUM 3 MG: 3 INJECTION, SOLUTION INTRAVENOUS at 14:58

## 2017-12-05 ASSESSMENT — PAIN SCALES - GENERAL: PAINLEVEL: NO PAIN (0)

## 2017-12-05 NOTE — PATIENT INSTRUCTIONS
Pt to return on 03/05/18 for Boniva. Copies of medication list and upcoming appointments given prior to discharge.

## 2017-12-05 NOTE — PROGRESS NOTES
Patient here for Boniva infusion today. Creat-0.64 and Ca-9.6. Infusion given and patient tolerated well. VSS and discharged to home in stable condition.

## 2017-12-05 NOTE — MR AVS SNAPSHOT
After Visit Summary   12/5/2017    Niesha Adhikari    MRN: [id number]           Patient Information     Date Of Birth          1950        Visit Information        Provider Department      12/5/2017 2:30 PM NL INFUSION CHAIR 5 [facility]        Today's Diagnoses     Personal history of other medical treatment (CODE)    -  1    Age-related osteoporosis without current pathological fracture          Care Instructions    Pt to return on 03/05/18 for [place]. Copies of medication list and upcoming appointments given prior to discharge.           Follow-ups after your visit        Follow-up notes from your care team     Return in about 3 months (around 3/5/2018).      Your next 10 appointments already scheduled     Mar 05, 2018  8:00 AM CST   Level 1 with NL INFUSION CHAIR 5   [facility] ([address] [place] [id number] [phone]            Mar 07, 2018  8:00 AM CST   Return Visit with Apolinar Cho MD   [facility] ([address]  [place] [id number] [phone]              Who to contact     If you have questions or need follow up information about today's clinic visit or your schedule please contact [facility] IN[facility]ION [facility] directly at [phone].  Normal or non-critical lab and imaging results will be communicated to you by MyChart, letter or phone within 4 business days after the clinic has received the results. If you do not hear from us within 7 days, please contact the clinic through MyChart or phone. If you have a critical or abnormal lab result, we will notify you by phone as soon as possible.  Submit refill requests through [email or web address] or call your pharmacy and they will forward the refill request to us. Please allow 3 business days for your refill to be completed.          Additional Information About Your Visit        [facility]  "Information     Alice gives you secure access to your electronic health record. If you see a primary care provider, you can also send messages to your care team and make appointments. If you have questions, please call your primary care clinic.  If you do not have a primary care provider, please call [phone] and they will assist you.        Care EveryWhere ID     This is your Care EveryWhere ID. This could be used by other organizations to access your [place] medical records  [id number]        Your Vitals Were     Pulse Temperature Respirations Height Last Period Pulse Oximetry    70 98.8  F (37.1  C) (Temporal) 16 1.626 m (5' 4\") 11/27/2003 96%    BMI (Body Mass Index)                   24.37 kg/m2            Blood Pressure from Last 3 Encounters:   12/05/17 134/70   11/29/17 137/87   11/28/17 124/64    Weight from Last 3 Encounters:   12/05/17 64.4 kg (142 lb)   11/29/17 65.1 kg (143 lb 9.6 oz)   11/28/17 64 kg (141 lb)              We Performed the Following     Calcium     Creatinine        Primary Care Provider Office Phone # Fax #    Tanika Clark [phone]       [address] [address]        Equal Access to Services     BRIDGETT STAUFFER : Hadii chely ku hadasho Soomaali, waaxda luqadaha, qaybta kaalmada adeegyada, ramsse mcnulty . So [facility] [phone].    ATENCIÓN: Si habla español, tiene a salazar disposición servicios gratuitos de asistencia lingüística. ame al [phone].    We comply with applicable federal civil rights laws and [place] laws. We do not discriminate on the basis of race, color, national origin, age, disability, sex, sexual orientation, or gender identity.            Thank you!     Thank you for choosing [facility]  for your care. Our goal is always to provide you with excellent care. Hearing back from our patients is one way we can continue to improve our services. Please take a few " minutes to complete the written survey that you may receive in the mail after your visit with us. Thank you!             Your Updated Medication List - Protect others around you: Learn how to safely use, store and throw away your medicines at www.disposemymeds.org.          This list is accurate as of: 12/5/17  4:24 PM.  Always use your most recent med list.                   Brand Name Dispense Instructions for use Diagnosis    clotrimazole 1 % cream    LOTRIMIN    30 g    Apply topically 2 times daily    Cutaneous candidiasis       cyanocobalamin 1000 MCG tablet    vitamin  B-12     Take by mouth daily        cycloSPORINE 0.05 % ophthalmic emulsion    RESTASIS     1 drop 2 times daily        Fiber 0.52 G Caps     540 capsule    2 tabs in am and pm        hydroxychloroquine 200 MG tablet    PLAQUENIL    135 tablet    Hydroxychloroquine 200mg in the AM and 100mg in the PM.    Inflammatory polyarthropathy (H)       ibuprofen 200 MG tablet    ADVIL/MOTRIN     Take 200-600 mg by mouth nightly as needed        lisinopril 10 MG tablet    PRINIVIL/ZESTRIL    90 tablet    Take 1 tablet (10 mg) by mouth daily    Essential hypertension       montelukast 10 MG tablet    SINGULAIR    90 tablet    TAKE ONE TABLET BY MOUTH EVERY DAY AS NEEDED SEASONALLY (AUGUST AND SEPTEMBER)    Other seasonal allergic rhinitis       nystatin 650729 UNIT/GM Powd    MYCOSTATIN    60 g    Apply topically 3 times daily as needed    Intertrigo       PROVIGIL PO      Take 100 mg by mouth Takes 1 1/2 tabs bid (150 mg bid)        sertraline 100 MG tablet    ZOLOFT     Take 100 mg by mouth daily Takes 2 at bedtime        vitamin D 32815 UNIT capsule    ERGOCALCIFEROL    24 capsule    Take 1 capsule (50,000 Units) by mouth every Monday and Friday.    Vitamin D deficiency, Other osteoporosis, unspecified pathological fracture presence       VYVANSE 20 MG capsule   Generic drug:  lisdexamfetamine     30 capsule    Take 20 mg by mouth every morning     Primary narcolepsy without cataplexy

## 2018-01-03 ENCOUNTER — TELEPHONE (OUTPATIENT)
Dept: FAMILY MEDICINE | Facility: OTHER | Age: 68
End: 2018-01-03

## 2018-01-03 DIAGNOSIS — I10 ESSENTIAL HYPERTENSION: ICD-10-CM

## 2018-01-03 NOTE — TELEPHONE ENCOUNTER
Summary:    Patient is due/failing the following:   PHQ9    Action needed:   Patient needs to do PHQ9.    Type of outreach:    Sent Cadigo message.    Questions for provider review:    None                                                                                                                                    Rebeca Roberto       Chart routed to Care Team .    Panel Management Review      Patient has the following on her problem list:     Depression / Dysthymia review    Measure:  Needs PHQ-9 score of 4 or less during index window.  Administer PHQ-9 and if score is 5 or more, send encounter to provider for next steps.        PHQ-9 SCORE 2/22/2017 6/30/2017 11/23/2017   Total Score - - -   Total Score MyChart - 10 (Moderate depression) 7 (Mild depression)   Total Score 9 10 7       If PHQ-9 recheck is 5 or more, route to provider for next steps.    Patient is due for:  PHQ9    Hypertension   Last three blood pressure readings:  BP Readings from Last 3 Encounters:   12/05/17 134/70   11/29/17 137/87   11/28/17 124/64     Blood pressure: Passed    HTN Guidelines:  Age 18-59 BP range:  Less than 140/90  Age 60-85 with Diabetes:  Less than 140/90  Age 60-85 without Diabetes:  less than 150/90          Composite cancer screening  Chart review shows that this patient is due/due soon for the following None

## 2018-01-04 RX ORDER — LISINOPRIL 10 MG/1
TABLET ORAL
Qty: 90 TABLET | Refills: 2 | Status: SHIPPED | OUTPATIENT
Start: 2018-01-04 | End: 2018-10-16

## 2018-01-04 NOTE — TELEPHONE ENCOUNTER
Requested Prescriptions   Pending Prescriptions Disp Refills     lisinopril (PRINIVIL/ZESTRIL) 10 MG tablet [Pharmacy Med Name: LISINOPRIL 10MG TABS] 90 tablet 1     Sig: TAKE 1 TABLET BY MOUTH DAILY    ACE Inhibitors (Including Combos) Protocol Passed    1/3/2018 11:35 AM       Passed - Blood pressure under 140/90    BP Readings from Last 3 Encounters:   12/05/17 134/70   11/29/17 137/87   11/28/17 124/64          Passed - Recent or future visit with authorizing provider's specialty    Patient had office visit in the last year or has a visit in the next 30 days with authorizing provider.  See chart review.          Passed - Patient is age 18 or older       Passed - No active pregnancy on record       Passed - Normal serum creatinine on file in past 12 months    Recent Labs   Lab Test  12/05/17   1409   CR  0.64          Passed - Normal serum potassium on file in past 12 months    Recent Labs   Lab Test  11/21/17   0744   POTASSIUM  4.3          Passed - No positive pregnancy test in past 12 months        lisinopril (PRINIVIL/ZESTRIL) 10 MG tablet  Prescription approved per [facility] Refill Protocol.    Wandy Fisher, RN, BSN

## 2018-01-20 DIAGNOSIS — R21 RASH AND NONSPECIFIC SKIN ERUPTION: ICD-10-CM

## 2018-01-20 RX ORDER — DIAPER,BRIEF,INFANT-TODD,DISP
EACH MISCELLANEOUS
Qty: 28 G | Refills: 0 | Status: SHIPPED | OUTPATIENT
Start: 2018-01-20 | End: 2018-11-26

## 2018-01-30 ENCOUNTER — RADIANT APPOINTMENT (OUTPATIENT)
Dept: GENERAL RADIOLOGY | Facility: CLINIC | Age: 68
End: 2018-01-30
Attending: PHYSICAL MEDICINE & REHABILITATION
Payer: COMMERCIAL

## 2018-01-30 ENCOUNTER — OFFICE VISIT (OUTPATIENT)
Dept: ORTHOPEDICS | Facility: CLINIC | Age: 68
End: 2018-01-30
Payer: COMMERCIAL

## 2018-01-30 VITALS — BODY MASS INDEX: 24.92 KG/M2 | WEIGHT: 146 LBS | HEIGHT: 64 IN

## 2018-01-30 DIAGNOSIS — S69.91XA: ICD-10-CM

## 2018-01-30 DIAGNOSIS — S62.650A CLOSED NONDISPLACED FRACTURE OF MIDDLE PHALANX OF RIGHT INDEX FINGER, INITIAL ENCOUNTER: Primary | ICD-10-CM

## 2018-01-30 PROCEDURE — 99203 OFFICE O/P NEW LOW 30 MIN: CPT | Performed by: PHYSICAL MEDICINE & REHABILITATION

## 2018-01-30 PROCEDURE — 73140 X-RAY EXAM OF FINGER(S): CPT | Mod: TC

## 2018-01-30 NOTE — MR AVS SNAPSHOT
After Visit Summary   1/30/2018    Niesha Adhikari    MRN: [id number]           Patient Information     Date Of Birth          1950        Visit Information        Provider Department      1/30/2018 1:20 PM Charlotte Rouse MD [facility]        Today's Diagnoses     Injury of right index finger    -  1      Care Instructions    Today's Plan of Care:  -Splint and buddy tape for 1 week  -Ice or ice massage 15-20 minutes for pain relief or swelling as needed  -Patient's preferred OTC medication as directed on packaging.    Follow Up: 1 week or sooner if symptoms fail to improve or worsen. Please call with any questions or concerns.             Follow-ups after your visit        Your next 10 appointments already scheduled     Mar 05, 2018  8:00 AM CST   Level 1 with NL INFUSION CHAIR 5   [facility] ([address] [place] [id number] [phone]            Mar 07, 2018  8:00 AM CST   Return Visit with Apolinar Cho MD   [facility] ([place] [id number] [facility]  [place] [id number] [phone]              Who to contact     If you have questions or need follow up information about today's clinic visit or your schedule please contact [facility] directly at [phone].  Normal or non-critical lab and imaging results will be communicated to you by MyChart, letter or phone within 4 business days after the clinic has received the results. If you do not hear from us within 7 days, please contact the clinic through MyChart or phone. If you have a critical or abnormal lab result, we will notify you by phone as soon as possible.  Submit refill requests through Revolights or call your pharmacy and they will forward the refill request to us. Please allow 3 business days for your refill to be completed.          Additional Information About Your Visit       "  MyChart Information     [employer] gives you secure access to your electronic health record. If you see a primary care provider, you can also send messages to your care team and make appointments. If you have questions, please call your primary care clinic.  If you do not have a primary care provider, please call [phone] and they will assist you.        Care EveryWhere ID     This is your Care EveryWhere ID. This could be used by other organizations to access your [place] medical records  [id number]        Your Vitals Were     Height Last Period BMI (Body Mass Index)             5' 4.02\" (1.626 m) 11/27/2003 25.05 kg/m2          Blood Pressure from Last 3 Encounters:   12/05/17 134/70   11/29/17 137/87   11/28/17 124/64    Weight from Last 3 Encounters:   01/30/18 146 lb (66.2 kg)   12/05/17 142 lb (64.4 kg)   11/29/17 143 lb 9.6 oz (65.1 kg)               Primary Care Provider Office Phone # Fax #    Tanika QUIROZ MD Eduardo [phone]       [address] [facility] [address]  [place] [address]        Equal Access to Services     BRIDGETT STAUFFER AH: Hadii aad ku hadasho Soomaali, waaxda luqadaha, qaybta kaalmada adeegyada, ramses phillipsn ah. So [facility] [phone].    ATENCIÓN: Si habla español, tiene a salazar disposición servicios gratuitos de asistencia lingüística. [facility] [phone].    We comply with applicable federal civil rights laws and [place] laws. We do not discriminate on the basis of race, color, national origin, age, disability, sex, sexual orientation, or gender identity.            Thank you!     Thank you for choosing [facility]  for your care. Our goal is always to provide you with excellent care. Hearing back from our patients is one way we can continue to improve our services. Please take a few minutes to complete the written survey that you may receive in the mail after your visit with us. Thank you!             Your Updated Medication List - " Protect others around you: Learn how to safely use, store and throw away your medicines at www.disposemymeds.org.          This list is accurate as of 1/30/18  1:52 PM.  Always use your most recent med list.                   Brand Name Dispense Instructions for use Diagnosis    clotrimazole 1 % cream    LOTRIMIN    30 g    Apply topically 2 times daily    Cutaneous candidiasis       cyanocobalamin 1000 MCG tablet    vitamin  B-12     Take by mouth daily        cycloSPORINE 0.05 % ophthalmic emulsion    RESTASIS     1 drop 2 times daily        Fiber 0.52 G Caps     540 capsule    2 tabs in am and pm        hydrocortisone 1 % ointment     28 g    APPLY SPARINGLY TO AFFECTED AREA THREE TIMES A DAY FOR 14 DAYS    Rash and nonspecific skin eruption       hydroxychloroquine 200 MG tablet    PLAQUENIL    135 tablet    Hydroxychloroquine 200mg in the AM and 100mg in the PM.    Inflammatory polyarthropathy (H)       ibuprofen 200 MG tablet    ADVIL/MOTRIN     Take 200-600 mg by mouth nightly as needed        lisinopril 10 MG tablet    PRINIVIL/ZESTRIL    90 tablet    TAKE 1 TABLET BY MOUTH DAILY    Essential hypertension       montelukast 10 MG tablet    SINGULAIR    90 tablet    TAKE ONE TABLET BY MOUTH EVERY DAY AS NEEDED SEASONALLY (AUGUST AND SEPTEMBER)    Other seasonal allergic rhinitis       nystatin 712894 UNIT/GM Powd    MYCOSTATIN    60 g    Apply topically 3 times daily as needed    Intertrigo       PROVIGIL PO      Take 100 mg by mouth Takes 1 1/2 tabs bid (150 mg bid)        sertraline 100 MG tablet    ZOLOFT     Take 100 mg by mouth daily Takes 2 at bedtime        vitamin D 54508 UNIT capsule    ERGOCALCIFEROL    24 capsule    Take 1 capsule (50,000 Units) by mouth every Monday and Friday.    Vitamin D deficiency, Other osteoporosis, unspecified pathological fracture presence       VYVANSE 20 MG capsule   Generic drug:  lisdexamfetamine     30 capsule    Take 20 mg by mouth every morning    Primary narcolepsy  without cataplexy

## 2018-01-30 NOTE — PATIENT INSTRUCTIONS
Today's Plan of Care:  -Splint and buddy tape for 1 week  -Ice or ice massage 15-20 minutes for pain relief or swelling as needed  -Patient's preferred OTC medication as directed on packaging.    Follow Up: 1 week or sooner if symptoms fail to improve or worsen. Please call with any questions or concerns.
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
## 2018-02-06 ENCOUNTER — RADIANT APPOINTMENT (OUTPATIENT)
Dept: GENERAL RADIOLOGY | Facility: CLINIC | Age: 68
End: 2018-02-06
Attending: PHYSICAL MEDICINE & REHABILITATION
Payer: COMMERCIAL

## 2018-02-06 ENCOUNTER — OFFICE VISIT (OUTPATIENT)
Dept: ORTHOPEDICS | Facility: CLINIC | Age: 68
End: 2018-02-06
Payer: COMMERCIAL

## 2018-02-06 VITALS
DIASTOLIC BLOOD PRESSURE: 77 MMHG | SYSTOLIC BLOOD PRESSURE: 125 MMHG | HEIGHT: 64 IN | BODY MASS INDEX: 24.92 KG/M2 | WEIGHT: 146 LBS | HEART RATE: 62 BPM

## 2018-02-06 DIAGNOSIS — S62.650D CLOSED NONDISPLACED FRACTURE OF MIDDLE PHALANX OF RIGHT INDEX FINGER WITH ROUTINE HEALING, SUBSEQUENT ENCOUNTER: Primary | ICD-10-CM

## 2018-02-06 DIAGNOSIS — S62.650A CLOSED NONDISPLACED FRACTURE OF MIDDLE PHALANX OF RIGHT INDEX FINGER, INITIAL ENCOUNTER: ICD-10-CM

## 2018-02-06 PROCEDURE — 73130 X-RAY EXAM OF HAND: CPT | Mod: TC

## 2018-02-06 PROCEDURE — 99213 OFFICE O/P EST LOW 20 MIN: CPT | Performed by: PHYSICAL MEDICINE & REHABILITATION

## 2018-02-06 NOTE — PATIENT INSTRUCTIONS
Today's Plan of Care:  -Continue splint and buddy tape  -Ice or ice massage 15-20 minutes for pain relief or swelling as needed  -Patient's preferred OTC medication as directed on packaging.    Follow Up: 4 weeks or sooner if symptoms fail to improve or worsen. Please call with any questions or concerns.
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
## 2018-03-02 ENCOUNTER — TELEPHONE (OUTPATIENT)
Dept: RHEUMATOLOGY | Facility: CLINIC | Age: 68
End: 2018-03-02

## 2018-03-02 NOTE — TELEPHONE ENCOUNTER
Reason for Call: Request for an order or referral:    Order or referral being requested: patient asking for a creatnin order. Patient is getting her blood drawn at 8AM at the [place] office. Please advise.    Date needed: as soon as possible    Has the patient been seen by the PCP for this problem? Not Applicable    Additional comments: NA    Phone number Patient can be reached at:  Home number on file [phone] (home)    Best Time:  Any time    Can we leave a detailed message on this number?  YES    Call taken on 3/2/2018 at 4:56 PM by Mary Valdez

## 2018-03-05 ENCOUNTER — INFUSION THERAPY VISIT (OUTPATIENT)
Dept: INFUSION THERAPY | Facility: CLINIC | Age: 68
End: 2018-03-05
Attending: INTERNAL MEDICINE
Payer: MEDICARE

## 2018-03-05 VITALS
TEMPERATURE: 98.9 F | OXYGEN SATURATION: 98 % | SYSTOLIC BLOOD PRESSURE: 123 MMHG | DIASTOLIC BLOOD PRESSURE: 64 MMHG | RESPIRATION RATE: 18 BRPM | HEART RATE: 70 BPM

## 2018-03-05 DIAGNOSIS — Z92.89 PERSONAL HISTORY OF OTHER MEDICAL TREATMENT (CODE): ICD-10-CM

## 2018-03-05 DIAGNOSIS — M81.0 AGE-RELATED OSTEOPOROSIS WITHOUT CURRENT PATHOLOGICAL FRACTURE: Primary | ICD-10-CM

## 2018-03-05 LAB
CALCIUM SERPL-MCNC: 9.1 MG/DL (ref 8.5–10.1)
CREAT SERPL-MCNC: 0.51 MG/DL (ref 0.52–1.04)
GFR SERPL CREATININE-BSD FRML MDRD: >90 ML/MIN/1.7M2

## 2018-03-05 PROCEDURE — 82565 ASSAY OF CREATININE: CPT | Performed by: INTERNAL MEDICINE

## 2018-03-05 PROCEDURE — 36415 COLL VENOUS BLD VENIPUNCTURE: CPT | Performed by: INTERNAL MEDICINE

## 2018-03-05 PROCEDURE — 25000128 H RX IP 250 OP 636: Performed by: INTERNAL MEDICINE

## 2018-03-05 PROCEDURE — 82310 ASSAY OF CALCIUM: CPT | Performed by: INTERNAL MEDICINE

## 2018-03-05 PROCEDURE — 96374 THER/PROPH/DIAG INJ IV PUSH: CPT

## 2018-03-05 RX ORDER — IBANDRONATE SODIUM 3 MG/3 ML
3 SYRINGE (ML) INTRAVENOUS ONCE
Status: COMPLETED | OUTPATIENT
Start: 2018-03-05 | End: 2018-03-05

## 2018-03-05 RX ORDER — IBANDRONATE SODIUM 3 MG/3 ML
3 SYRINGE (ML) INTRAVENOUS ONCE
Status: CANCELLED | OUTPATIENT
Start: 2018-03-05 | End: 2018-03-05

## 2018-03-05 RX ADMIN — IBANDRONATE SODIUM 3 MG: 3 INJECTION, SOLUTION INTRAVENOUS at 09:09

## 2018-03-05 ASSESSMENT — PAIN SCALES - GENERAL: PAINLEVEL: MODERATE PAIN (5)

## 2018-03-05 NOTE — PATIENT INSTRUCTIONS
Pt to return on 6/4/18 for lab/Boniva. Copies of medication list and upcoming appointments given prior to discharge.

## 2018-03-05 NOTE — MR AVS SNAPSHOT
After Visit Summary   3/5/2018    Niesha Adhikari    MRN: [id number]           Patient Information     Date Of Birth          1950        Visit Information        Provider Department      3/5/2018 8:00 AM NL INFUSION CHAIR 5 [facility]        Today's Diagnoses     Age-related osteoporosis without current pathological fracture    -  1    Personal history of other medical treatment (CODE)          Care Instructions    Pt to return on 6/4/18 for lab/Boniva. Copies of medication list and upcoming appointments given prior to discharge.           Follow-ups after your visit        Your next 10 appointments already scheduled     Mar 06, 2018  8:00 AM CST   [facility]    [address] [address] [phone]            Mar 07, 2018  8:00 AM CST   Return Visit with Apolinar Cho MD   [facility]    [address] [place] [address]  [place] [address]   [phone]            Jun 04, 2018  8:00 AM CDT   Level 1 with NL INFUSION CHAIR 3   [facility] ([facility])    [address] [place] [id number] [phone]              Who to contact     If you have questions or need follow up information about today's clinic visit or your schedule please contact [facility] directly at [phone].  Normal or non-critical lab and imaging results will be communicated to you by MyChart, letter or phone within 4 business days after the clinic has received the results. If you do not hear from us within 7 days, please contact the clinic through MyChart or phone. If you have a critical or abnormal lab result, we will notify you by phone as soon as possible.  Submit refill requests through [employer] or call your pharmacy and they will forward the refill request to us. Please  allow 3 business days for your refill to be completed.          Additional Information About Your Visit        MyChart Information     [employer] gives you secure access to your electronic health record. If you see a primary care provider, you can also send messages to your care team and make appointments. If you have questions, please call your primary care clinic.  If you do not have a primary care provider, please call [phone] and they will assist you.        Care EveryWhere ID     This is your Care EveryWhere ID. This could be used by other organizations to access your [place] medical records  [id number]        Your Vitals Were     Pulse Temperature Respirations Last Period Pulse Oximetry       70 98.9  F (37.2  C) (Temporal) 18 11/27/2003 98%        Blood Pressure from Last 3 Encounters:   03/05/18 123/64   02/06/18 125/77   12/05/17 134/70    Weight from Last 3 Encounters:   02/06/18 66.2 kg (146 lb)   01/30/18 66.2 kg (146 lb)   12/05/17 64.4 kg (142 lb)              We Performed the Following     Calcium     Creatinine        Primary Care Provider Office Phone # Fax #    Tanika QUIROZ MD Eduardo [phone]       [address] [address]        Equal Access to Services     WADE STAUFFER : Hadii chely ku hadasho Solucyali, waaxda luqadaha, qaybta kaalmada adeegyada, ramses mcnulty . So [facility] [phone].    ATENCIÓN: Si habla español, tiene a salazar disposición servicios gratuitos de asistencia lingüística. ame al [phone].    We comply with applicable federal civil rights laws and [place] laws. We do not discriminate on the basis of race, color, national origin, age, disability, sex, sexual orientation, or gender identity.            Thank you!     Thank you for choosing [facility]  for your care. Our goal is always to provide you with excellent care. Hearing back from our patients is one way we can continue to improve  our services. Please take a few minutes to complete the written survey that you may receive in the mail after your visit with us. Thank you!             Your Updated Medication List - Protect others around you: Learn how to safely use, store and throw away your medicines at www.disposemymeds.org.          This list is accurate as of 3/5/18 11:48 AM.  Always use your most recent med list.                   Brand Name Dispense Instructions for use Diagnosis    clotrimazole 1 % cream    LOTRIMIN    30 g    Apply topically 2 times daily    Cutaneous candidiasis       cyanocobalamin 1000 MCG tablet    vitamin  B-12     Take by mouth daily        cycloSPORINE 0.05 % ophthalmic emulsion    RESTASIS     1 drop 2 times daily        Fiber 0.52 G Caps     540 capsule    2 tabs in am and pm        hydrocortisone 1 % ointment     28 g    APPLY SPARINGLY TO AFFECTED AREA THREE TIMES A DAY FOR 14 DAYS    Rash and nonspecific skin eruption       hydroxychloroquine 200 MG tablet    PLAQUENIL    135 tablet    Hydroxychloroquine 200mg in the AM and 100mg in the PM.    Inflammatory polyarthropathy (H)       ibuprofen 200 MG tablet    ADVIL/MOTRIN     Take 200-600 mg by mouth nightly as needed        lisinopril 10 MG tablet    PRINIVIL/ZESTRIL    90 tablet    TAKE 1 TABLET BY MOUTH DAILY    Essential hypertension       montelukast 10 MG tablet    SINGULAIR    90 tablet    TAKE ONE TABLET BY MOUTH EVERY DAY AS NEEDED SEASONALLY (AUGUST AND SEPTEMBER)    Other seasonal allergic rhinitis       nystatin 971580 UNIT/GM Powd    MYCOSTATIN    60 g    Apply topically 3 times daily as needed    Intertrigo       PROVIGIL PO      Take 100 mg by mouth Takes 1 1/2 tabs bid (150 mg bid)        sertraline 100 MG tablet    ZOLOFT     Take 100 mg by mouth daily Takes 2 at bedtime        vitamin D 50651 UNIT capsule    ERGOCALCIFEROL    24 capsule    Take 1 capsule (50,000 Units) by mouth every Monday and Friday.    Vitamin D deficiency, Other  osteoporosis, unspecified pathological fracture presence       VYVANSE 20 MG capsule   Generic drug:  lisdexamfetamine     30 capsule    Take 20 mg by mouth every morning    Primary narcolepsy without cataplexy

## 2018-03-05 NOTE — PROGRESS NOTES
Patient here for Boniva. Labs done. Calcium-9.1 and Creatinine-0.51 Tolerated boniva well. Observed for 15 minutes after. Discharged in stable condition.

## 2018-03-06 ENCOUNTER — OFFICE VISIT (OUTPATIENT)
Dept: ORTHOPEDICS | Facility: CLINIC | Age: 68
End: 2018-03-06
Payer: COMMERCIAL

## 2018-03-06 ENCOUNTER — RADIANT APPOINTMENT (OUTPATIENT)
Dept: GENERAL RADIOLOGY | Facility: CLINIC | Age: 68
End: 2018-03-06
Attending: PHYSICAL MEDICINE & REHABILITATION
Payer: COMMERCIAL

## 2018-03-06 ENCOUNTER — TELEPHONE (OUTPATIENT)
Dept: RHEUMATOLOGY | Facility: CLINIC | Age: 68
End: 2018-03-06

## 2018-03-06 VITALS
DIASTOLIC BLOOD PRESSURE: 73 MMHG | SYSTOLIC BLOOD PRESSURE: 114 MMHG | WEIGHT: 142 LBS | HEIGHT: 64 IN | HEART RATE: 77 BPM | BODY MASS INDEX: 24.24 KG/M2

## 2018-03-06 DIAGNOSIS — S62.650D CLOSED NONDISPLACED FRACTURE OF MIDDLE PHALANX OF RIGHT INDEX FINGER WITH ROUTINE HEALING, SUBSEQUENT ENCOUNTER: ICD-10-CM

## 2018-03-06 DIAGNOSIS — M81.0 AGE-RELATED OSTEOPOROSIS WITHOUT CURRENT PATHOLOGICAL FRACTURE: ICD-10-CM

## 2018-03-06 DIAGNOSIS — M06.4 INFLAMMATORY POLYARTHROPATHY (H): ICD-10-CM

## 2018-03-06 DIAGNOSIS — S62.650D CLOSED NONDISPLACED FRACTURE OF MIDDLE PHALANX OF RIGHT INDEX FINGER WITH ROUTINE HEALING, SUBSEQUENT ENCOUNTER: Primary | ICD-10-CM

## 2018-03-06 DIAGNOSIS — Z79.899 HIGH RISK MEDICATION USE: ICD-10-CM

## 2018-03-06 PROCEDURE — 99213 OFFICE O/P EST LOW 20 MIN: CPT | Performed by: PHYSICAL MEDICINE & REHABILITATION

## 2018-03-06 PROCEDURE — 73140 X-RAY EXAM OF FINGER(S): CPT | Mod: TC
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
## 2018-03-06 NOTE — PATIENT INSTRUCTIONS
Today's Plan of Care:  -Discontinue use of splint. Use for protection as needed.   -Provided home exercises. Please do 5-6 days of exercises per week.    -We also discussed other future treatment options:  Formal therapy    Follow Up: 3-4 weeks or sooner if symptoms fail to improve or worsen. Please call with any questions or concerns.

## 2018-03-06 NOTE — MR AVS SNAPSHOT
After Visit Summary   3/6/2018    Niesha Adhikari    MRN: [id number]           Patient Information     Date Of Birth          1950        Visit Information        Provider Department      3/6/2018 1:20 PM Charlotte Rouse MD [place] [facility]        Today's Diagnoses     Closed nondisplaced fracture of middle phalanx of right index finger with routine healing, subsequent encounter    -  1      Care Instructions    Today's Plan of Care:  -Discontinue use of splint. Use for protection as needed.   -Provided home exercises. Please do 5-6 days of exercises per week.    -We also discussed other future treatment options:  Formal therapy    Follow Up: 3-4 weeks or sooner if symptoms fail to improve or worsen. Please call with any questions or concerns.               Follow-ups after your visit        Your next 10 appointments already scheduled     Apr 05, 2018 10:00 AM CDT   Return Visit with Apolinar Cho MD   [facility] ([address]  [place] [address] [phone]            Jun 04, 2018  8:00 AM CDT   Level 1 with NL INFUSION CHAIR 3   [facility] ([facility])    [address] [place] [id number] [phone]              Who to contact     If you have questions or need follow up information about today's clinic visit or your schedule please contact [facility] directly at [phone].  Normal or non-critical lab and imaging results will be communicated to you by MyChart, letter or phone within 4 business days after the clinic has received the results. If you do not hear from us within 7 days, please contact the clinic through MyChart or phone. If you have a critical or abnormal lab result, we will notify you by phone as soon as possible.  Submit refill requests through [employer] or call your pharmacy and they will forward the refill request to us. Please  "allow 3 business days for your refill to be completed.          Additional Information About Your Visit        MyChart Information     [facility] gives you secure access to your electronic health record. If you see a primary care provider, you can also send messages to your care team and make appointments. If you have questions, please call your primary care clinic.  If you do not have a primary care provider, please call [phone] and they will assist you.        Care EveryWhere ID     This is your Care EveryWhere ID. This could be used by other organizations to access your [place] medical records  [id number]        Your Vitals Were     Pulse Height Last Period BMI (Body Mass Index)          77 5' 4.02\" (1.626 m) 11/27/2003 24.36 kg/m2         Blood Pressure from Last 3 Encounters:   03/06/18 114/73   03/05/18 123/64   02/06/18 125/77    Weight from Last 3 Encounters:   03/06/18 142 lb (64.4 kg)   02/06/18 146 lb (66.2 kg)   01/30/18 146 lb (66.2 kg)               Primary Care Provider Office Phone # Fax #    Tanika QUIROZ MD [facility] [phone]       [address] [address]        Equal Access to Services     WADE STAUFFER AH: Hadii aad ku hadasho Soomaali, waaxda luqadaha, qaybta kaalmada adeegyada, waxay neda hayraffyn maria alejandra mohr lasuad . So [facility] [phone].    ATENCIÓN: Si habla español, tiene a salazar disposición servicios gratuitos de asistencia lingüística. Llame al [phone].    We comply with applicable federal civil rights laws and [place] laws. We do not discriminate on the basis of race, color, national origin, age, disability, sex, sexual orientation, or gender identity.            Thank you!     Thank you for choosing [facility]  for your care. Our goal is always to provide you with excellent care. Hearing back from our patients is one way we can continue to improve our services. Please take a few minutes to complete the written survey that you may " receive in the mail after your visit with us. Thank you!             Your Updated Medication List - Protect others around you: Learn how to safely use, store and throw away your medicines at www.disposemymeds.org.          This list is accurate as of 3/6/18  1:47 PM.  Always use your most recent med list.                   Brand Name Dispense Instructions for use Diagnosis    clotrimazole 1 % cream    LOTRIMIN    30 g    Apply topically 2 times daily    Cutaneous candidiasis       cyanocobalamin 1000 MCG tablet    vitamin  B-12     Take by mouth daily        cycloSPORINE 0.05 % ophthalmic emulsion    RESTASIS     1 drop 2 times daily        Fiber 0.52 G Caps     540 capsule    2 tabs in am and pm        hydrocortisone 1 % ointment     28 g    APPLY SPARINGLY TO AFFECTED AREA THREE TIMES A DAY FOR 14 DAYS    Rash and nonspecific skin eruption       hydroxychloroquine 200 MG tablet    PLAQUENIL    135 tablet    Hydroxychloroquine 200mg in the AM and 100mg in the PM.    Inflammatory polyarthropathy (H)       ibuprofen 200 MG tablet    ADVIL/MOTRIN     Take 200-600 mg by mouth nightly as needed        lisinopril 10 MG tablet    PRINIVIL/ZESTRIL    90 tablet    TAKE 1 TABLET BY MOUTH DAILY    Essential hypertension       montelukast 10 MG tablet    SINGULAIR    90 tablet    TAKE ONE TABLET BY MOUTH EVERY DAY AS NEEDED SEASONALLY (AUGUST AND SEPTEMBER)    Other seasonal allergic rhinitis       nystatin 001500 UNIT/GM Powd    MYCOSTATIN    60 g    Apply topically 3 times daily as needed    Intertrigo       PROVIGIL PO      Take 100 mg by mouth Takes 1 1/2 tabs bid (150 mg bid)        sertraline 100 MG tablet    ZOLOFT     Take 100 mg by mouth daily Takes 2 at bedtime        vitamin D 00483 UNIT capsule    ERGOCALCIFEROL    24 capsule    Take 1 capsule (50,000 Units) by mouth every Monday and Friday.    Vitamin D deficiency, Other osteoporosis, unspecified pathological fracture presence       VYVANSE 20 MG capsule   Generic  drug:  lisdexamfetamine     30 capsule    Take 20 mg by mouth every morning    Primary narcolepsy without cataplexy
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
## 2018-03-06 NOTE — TELEPHONE ENCOUNTER
Reason for Call:  Order    Detailed comments: Patient is requesting a lab order be entered so that she can be seen at the [facility] prior to seeing you on 4-5-2018. Please call.    Phone Number Patient can be reached at: Home number on file [phone] (home)    Best Time: any    Can we leave a detailed message on this number? YES    Call taken on 3/6/2018 at 11:17 AM by Padmini Westfall

## 2018-03-07 NOTE — TELEPHONE ENCOUNTER
Contacted Pt letting her know lab orders have been placed, and that she can obtain them at any [place] lab.  No questions or concerns, Pt agreed and understood.     Dayan Guthrie CMA  3/7/2018  8:41 AM

## 2018-03-12 DIAGNOSIS — Z79.899 HIGH RISK MEDICATION USE: ICD-10-CM

## 2018-03-12 DIAGNOSIS — M06.4 INFLAMMATORY POLYARTHROPATHY (H): ICD-10-CM

## 2018-03-12 DIAGNOSIS — M81.0 AGE-RELATED OSTEOPOROSIS WITHOUT CURRENT PATHOLOGICAL FRACTURE: ICD-10-CM

## 2018-03-12 LAB
ALBUMIN SERPL-MCNC: 4.1 G/DL (ref 3.4–5)
ALP SERPL-CCNC: 78 U/L (ref 40–150)
ALT SERPL W P-5'-P-CCNC: 25 U/L (ref 0–50)
AST SERPL W P-5'-P-CCNC: 27 U/L (ref 0–45)
BASOPHILS # BLD AUTO: 0 10E9/L (ref 0–0.2)
BASOPHILS NFR BLD AUTO: 0.3 %
BILIRUB DIRECT SERPL-MCNC: 0.1 MG/DL (ref 0–0.2)
BILIRUB SERPL-MCNC: 0.4 MG/DL (ref 0.2–1.3)
DIFFERENTIAL METHOD BLD: NORMAL
EOSINOPHIL # BLD AUTO: 0.1 10E9/L (ref 0–0.7)
EOSINOPHIL NFR BLD AUTO: 2.8 %
ERYTHROCYTE [DISTWIDTH] IN BLOOD BY AUTOMATED COUNT: 13.1 % (ref 10–15)
HCT VFR BLD AUTO: 40.5 % (ref 35–47)
HGB BLD-MCNC: 13.8 G/DL (ref 11.7–15.7)
LYMPHOCYTES # BLD AUTO: 1.2 10E9/L (ref 0.8–5.3)
LYMPHOCYTES NFR BLD AUTO: 31 %
MCH RBC QN AUTO: 30.9 PG (ref 26.5–33)
MCHC RBC AUTO-ENTMCNC: 34.1 G/DL (ref 31.5–36.5)
MCV RBC AUTO: 91 FL (ref 78–100)
MONOCYTES # BLD AUTO: 0.4 10E9/L (ref 0–1.3)
MONOCYTES NFR BLD AUTO: 10.8 %
NEUTROPHILS # BLD AUTO: 2.2 10E9/L (ref 1.6–8.3)
NEUTROPHILS NFR BLD AUTO: 55.1 %
PLATELET # BLD AUTO: 152 10E9/L (ref 150–450)
PROT SERPL-MCNC: 7.4 G/DL (ref 6.8–8.8)
RBC # BLD AUTO: 4.46 10E12/L (ref 3.8–5.2)
WBC # BLD AUTO: 4 10E9/L (ref 4–11)

## 2018-03-12 PROCEDURE — 82306 VITAMIN D 25 HYDROXY: CPT | Performed by: INTERNAL MEDICINE

## 2018-03-12 PROCEDURE — 85025 COMPLETE CBC W/AUTO DIFF WBC: CPT | Performed by: INTERNAL MEDICINE

## 2018-03-12 PROCEDURE — 36415 COLL VENOUS BLD VENIPUNCTURE: CPT | Performed by: INTERNAL MEDICINE

## 2018-03-12 PROCEDURE — 80076 HEPATIC FUNCTION PANEL: CPT | Performed by: INTERNAL MEDICINE

## 2018-03-13 LAB — DEPRECATED CALCIDIOL+CALCIFEROL SERPL-MC: 41 UG/L (ref 20–75)

## 2018-04-02 ENCOUNTER — RADIANT APPOINTMENT (OUTPATIENT)
Dept: GENERAL RADIOLOGY | Facility: CLINIC | Age: 68
End: 2018-04-02
Attending: PHYSICAL MEDICINE & REHABILITATION
Payer: COMMERCIAL

## 2018-04-02 ENCOUNTER — OFFICE VISIT (OUTPATIENT)
Dept: ORTHOPEDICS | Facility: CLINIC | Age: 68
End: 2018-04-02
Payer: MEDICARE

## 2018-04-02 VITALS — HEIGHT: 64 IN | SYSTOLIC BLOOD PRESSURE: 144 MMHG | DIASTOLIC BLOOD PRESSURE: 82 MMHG

## 2018-04-02 DIAGNOSIS — S62.650G: Primary | ICD-10-CM

## 2018-04-02 DIAGNOSIS — S62.650D CLOSED NONDISPLACED FRACTURE OF MIDDLE PHALANX OF RIGHT INDEX FINGER WITH ROUTINE HEALING, SUBSEQUENT ENCOUNTER: ICD-10-CM

## 2018-04-02 PROCEDURE — 73140 X-RAY EXAM OF FINGER(S): CPT | Mod: TC

## 2018-04-02 PROCEDURE — 99213 OFFICE O/P EST LOW 20 MIN: CPT | Performed by: PHYSICAL MEDICINE & REHABILITATION

## 2018-04-02 NOTE — PATIENT INSTRUCTIONS
Today's Plan of Care:  -Referral to an Hand Surgeon.   -Buddy tape and splint as needed.     Follow Up: As needed. Please call with any questions or concerns.

## 2018-04-02 NOTE — MR AVS SNAPSHOT
After Visit Summary   4/2/2018    Niesha Adhikari    MRN: [id number]           Patient Information     Date Of Birth          1950        Visit Information        Provider Department      4/2/2018 10:00 AM Charlotte Rouse MD [facility]        Today's Diagnoses     Closed nondisplaced fracture of middle phalanx of right index finger with routine healing, subsequent encounter    -  1      Care Instructions    Today's Plan of Care:  -Referral to an Hand Surgeon.   -Buddy tape and splint as needed.     Follow Up: As needed. Please call with any questions or concerns.               Follow-ups after your visit        Additional Services     ORTHO  REFERRAL       [facility] is referring you to the Orthopedic  Services at [facility].       The  Representative will assist you in the coordination of your Orthopedic and Musculoskeletal Care as prescribed by your physician.    The  Representative will call you within 24 hours to help schedule your appointment, or you may contact the  Representative at:    [facility] ~ [phone]  [facility] ~ [phone]  [facility] ~ [phone]    Type of Referral : Surgical / Specialist - Hand      Timeframe requested: 3 - 5 days     Coverage of these services is subject to the terms and limitations of your health insurance plan.  Please call member services at your health plan with any benefit or coverage questions.      If X-rays, CT or MRI's have been performed, please contact the facility where they were done to arrange for , prior to your scheduled appointment.  Please bring this referral request to your appointment and present it to your specialist.                  Your next 10 appointments already scheduled     Apr 05, 2018 10:00 AM CDT   Return Visit with Apolinar Cho MD   [facility]  [place] ([facility] [place])    [address]  [place] [address]   [phone]            Jun 04, 2018  8:00 AM CDT   Level 1 with NL INFUSION CHAIR 3   [facility] ([facility])    [address] [place] [id number] [phone]              Who to contact     If you have questions or need follow up information about today's clinic visit or your schedule please contact [facility] directly at [phone].  Normal or non-critical lab and imaging results will be communicated to you by [employer], letter or phone within 4 business days after the clinic has received the results. If you do not hear from us within 7 days, please contact the clinic through [employer] or phone. If you have a critical or abnormal lab result, we will notify you by phone as soon as possible.  Submit refill requests through [employer] or call your pharmacy and they will forward the refill request to us. Please allow 3 business days for your refill to be completed.          Additional Information About Your Visit        MyChart Information     [employer] gives you secure access to your electronic health record. If you see a primary care provider, you can also send messages to your care team and make appointments. If you have questions, please call your primary care clinic.  If you do not have a primary care provider, please call [phone] and they will assist you.        Care EveryWhere ID     This is your Care EveryWhere ID. This could be used by other organizations to access your [facility] medical records  [id number]        Your Vitals Were     Height Last Period                5' 4.02\" (1.626 m) 11/27/2003           Blood Pressure from Last 3 Encounters:   04/02/18 144/82   03/06/18 114/73   03/05/18 123/64    Weight from Last 3 Encounters:   03/06/18 142 lb (64.4 kg)   02/06/18 146 lb (66.2 kg)   01/30/18 146 lb (66.2 kg)              We Performed the Following     " [facility] REFERRAL        Primary Care Provider Office Phone # Fax #    Tanika QUIROZ MD Eduardo [phone]       [address] [place] [address] [address]  [facility] [address]        Equal Access to Services     WADE STAUFFER : Lonny newell jacky Soprasanna, waaxda luqadaha, qaybta kaalmada adeegyada, ramses mohr pricila abreu. So [facility] [phone].    ATENCIÓN: Si habla español, tiene a salazar disposición servicios gratuitos de asistencia lingüística. Llame al [phone].    We comply with applicable federal civil rights laws and [place] laws. We do not discriminate on the basis of race, color, national origin, age, disability, sex, sexual orientation, or gender identity.            Thank you!     Thank you for choosing [facility]  for your care. Our goal is always to provide you with excellent care. Hearing back from our patients is one way we can continue to improve our services. Please take a few minutes to complete the written survey that you may receive in the mail after your visit with us. Thank you!             Your Updated Medication List - Protect others around you: Learn how to safely use, store and throw away your medicines at www.disposemymeds.org.          This list is accurate as of 4/2/18 10:49 AM.  Always use your most recent med list.                   Brand Name Dispense Instructions for use Diagnosis    clotrimazole 1 % cream    LOTRIMIN    30 g    Apply topically 2 times daily    Cutaneous candidiasis       cyanocobalamin 1000 MCG tablet    vitamin  B-12     Take by mouth daily        cycloSPORINE 0.05 % ophthalmic emulsion    RESTASIS     1 drop 2 times daily        Fiber 0.52 G Caps     540 capsule    2 tabs in am and pm        hydrocortisone 1 % ointment     28 g    APPLY SPARINGLY TO AFFECTED AREA THREE TIMES A DAY FOR 14 DAYS    Rash and nonspecific skin eruption       hydroxychloroquine 200 MG tablet    PLAQUENIL    135 tablet    Hydroxychloroquine 200mg in  the AM and 100mg in the PM.    Inflammatory polyarthropathy (H)       ibuprofen 200 MG tablet    ADVIL/MOTRIN     Take 200-600 mg by mouth nightly as needed        lisinopril 10 MG tablet    PRINIVIL/ZESTRIL    90 tablet    TAKE 1 TABLET BY MOUTH DAILY    Essential hypertension       montelukast 10 MG tablet    SINGULAIR    90 tablet    TAKE ONE TABLET BY MOUTH EVERY DAY AS NEEDED SEASONALLY (AUGUST AND SEPTEMBER)    Other seasonal allergic rhinitis       nystatin 818019 UNIT/GM Powd    MYCOSTATIN    60 g    Apply topically 3 times daily as needed    Intertrigo       PROVIGIL PO      Take 100 mg by mouth Takes 1 1/2 tabs bid (150 mg bid)        sertraline 100 MG tablet    ZOLOFT     Take 100 mg by mouth daily Takes 2 at bedtime        vitamin D 01767 UNIT capsule    ERGOCALCIFEROL    24 capsule    Take 1 capsule (50,000 Units) by mouth every Monday and Friday.    Vitamin D deficiency, Other osteoporosis, unspecified pathological fracture presence       VYVANSE 20 MG capsule   Generic drug:  lisdexamfetamine     30 capsule    Take 20 mg by mouth every morning    Primary narcolepsy without cataplexy
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
## 2018-04-05 ENCOUNTER — OFFICE VISIT (OUTPATIENT)
Dept: RHEUMATOLOGY | Facility: CLINIC | Age: 68
End: 2018-04-05
Payer: COMMERCIAL

## 2018-04-05 VITALS
WEIGHT: 144.2 LBS | SYSTOLIC BLOOD PRESSURE: 130 MMHG | OXYGEN SATURATION: 95 % | RESPIRATION RATE: 14 BRPM | HEART RATE: 71 BPM | BODY MASS INDEX: 24.62 KG/M2 | HEIGHT: 64 IN | DIASTOLIC BLOOD PRESSURE: 78 MMHG

## 2018-04-05 DIAGNOSIS — Z79.899 HIGH RISK MEDICATION USE: ICD-10-CM

## 2018-04-05 DIAGNOSIS — M06.4 INFLAMMATORY POLYARTHROPATHY (H): Primary | ICD-10-CM

## 2018-04-05 DIAGNOSIS — M81.8 OTHER OSTEOPOROSIS, UNSPECIFIED PATHOLOGICAL FRACTURE PRESENCE: ICD-10-CM

## 2018-04-05 DIAGNOSIS — E55.9 VITAMIN D DEFICIENCY: ICD-10-CM

## 2018-04-05 PROCEDURE — 99213 OFFICE O/P EST LOW 20 MIN: CPT | Performed by: INTERNAL MEDICINE

## 2018-04-05 RX ORDER — HYDROXYCHLOROQUINE SULFATE 200 MG/1
TABLET, FILM COATED ORAL
Qty: 135 TABLET | Refills: 1 | Status: SHIPPED | OUTPATIENT
Start: 2018-04-05 | End: 2018-10-05

## 2018-04-05 RX ORDER — ERGOCALCIFEROL 1.25 MG/1
CAPSULE, LIQUID FILLED ORAL
Qty: 24 CAPSULE | Refills: 1 | Status: SHIPPED | OUTPATIENT
Start: 2018-04-05 | End: 2018-10-05
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
## 2018-04-05 NOTE — PATIENT INSTRUCTIONS
Rheumatology    Dr. Apolinar Cho         [facility]   (Monday)  [address]  [place] [address]       [facility]   (Tuesday)  [address]  [place] [address]    [facility]   (Wed., Thurs., and Friday)  [address] [place] [address]    Phone number: [phone]  Thank you for choosing [facility].  Anabella Garcia CMA

## 2018-04-05 NOTE — MR AVS SNAPSHOT
After Visit Summary   4/5/2018    Niesha Adhikari    MRN: [id number]           Patient Information     Date Of Birth          1950        Visit Information        Provider Department      4/5/2018 10:00 AM Apolinar Cho MD [facility]        Today's Diagnoses     Inflammatory polyarthropathy (H)    -  1    High risk medication use        Vitamin D deficiency        Other osteoporosis, unspecified pathological fracture presence          Care Instructions    Rheumatology    Dr. Apolinar Daniels [facility]   (Monday)  [address]  [place] [address]       [facility]   (Tuesday)  [address] [place] [address]    [facility]   (Wed., Thurs., and Friday)  [address]  [place] [address]    Phone number: [phone]  Thank you for choosing [place].  Anabella Garcia CMA            Follow-ups after your visit        Additional Services     OPHTHALMOLOGY ADULT REFERRAL       Your provider has referred you to:  N: [facility]  [place] [phone]  [email or web address]    Reason for referral: Hydroxychloroquine (plaquenil) toxicity monitoring.     Please be aware that coverage of these services is subject to the terms and limitations of your health insurance plan.  Call member services at your health plan with any benefit or coverage questions.      Please bring the following to your appointment:  >>   Any x-rays, CTs or MRIs which have been performed.  Contact the facility where they were done to arrange for  prior to your scheduled appointment.  Any new CT, MRI or other procedures ordered by your specialist must be performed at a [facility] or coordinated by your clinic's referral office.    >>   List of current medications   >>   This referral request   >>   Any documents/labs given to you for this referral                  Your next 10 appointments already scheduled     Apr 23,  2018 10:00 AM CDT   New Visit with Rosa Villarreal MD   [facility]    [address] [place] [id number] [phone]            Jun 04, 2018  8:00 AM CDT   Level 1 with NL INFUSION CHAIR 3   [facility] ([facility])    [address] [place] [address] [phone]            Sep 26, 2018  8:45 AM CDT   LAB with NL [facility]   [facility]    [address]  [facility] [address] [phone]           Please do not eat 10-12 hours before your appointment if you are coming in fasting for labs on lipids, cholesterol, or glucose (sugar). This does not apply to pregnant women. Water, hot tea and black coffee (with nothing added) are okay. Do not drink other fluids, diet soda or chew gum.            Oct 03, 2018 10:00 AM CDT   Return Visit with Apolinar Cho MD   [facility] [place] ([facility])    [address] [address] [phone]              Future tests that were ordered for you today     Open Future Orders        Priority Expected Expires Ordered    CBC with platelets differential Routine 10/1/2018 10/26/2018 4/5/2018    Comprehensive metabolic panel Routine 10/1/2018 10/26/2018 4/5/2018    Vitamin D Deficiency Routine 10/1/2018 10/26/2018 4/5/2018            Who to contact     If you have questions or need follow up information about today's clinic visit or your schedule please contact [facility] [place] directly at [phone].  Normal or non-critical lab and imaging results will be communicated to you by MyChart, letter or phone within 4 business days after the clinic has received the results. If you do not hear from us within 7 days, please contact the clinic through MyChart or phone. If you have a critical or abnormal lab result, we will notify you by phone as soon as possible.  Submit refill  "requests through RACTIV or call your pharmacy and they will forward the refill request to us. Please allow 3 business days for your refill to be completed.          Additional Information About Your Visit        DalloulNWhart Information     RACTIV gives you secure access to your electronic health record. If you see a primary care provider, you can also send messages to your care team and make appointments. If you have questions, please call your primary care clinic.  If you do not have a primary care provider, please call [phone] and they will assist you.        Care EveryWhere ID     This is your Care EveryWhere ID. This could be used by other organizations to access your [facility] medical records  [id number]        Your Vitals Were     Pulse Respirations Height Last Period Pulse Oximetry BMI (Body Mass Index)    71 14 1.626 m (5' 4.02\") 11/27/2003 95% 24.74 kg/m2       Blood Pressure from Last 3 Encounters:   04/05/18 140/84   04/02/18 144/82   03/06/18 114/73    Weight from Last 3 Encounters:   04/05/18 65.4 kg (144 lb 3.2 oz)   03/06/18 64.4 kg (142 lb)   02/06/18 66.2 kg (146 lb)              We Performed the Following     OPHTHALMOLOGY ADULT REFERRAL          Where to get your medicines      These medications were sent to [facility] [place], [place] - [address] [place] [address]  [address] [facility] [address]     Phone:  [phone]     hydroxychloroquine 200 MG tablet    vitamin D 85151 UNIT capsule          Primary Care Provider Office Phone # Fax #    Tanika GABRIELLA Clark [phone]       [address]  [place] [address]        Equal Access to Services     [facility] XIOMY : Hadii chely Rao, emery fernández, ramses funes. So [facility] [phone].    ATENCIÓN: Si habla español, tiene a salazar disposición servicios gratuitos de asistencia lingüística. Llame al [phone].    We comply with applicable federal " civil rights laws and [place] laws. We do not discriminate on the basis of race, color, national origin, age, disability, sex, sexual orientation, or gender identity.            Thank you!     Thank you for choosing [facility]  for your care. Our goal is always to provide you with excellent care. Hearing back from our patients is one way we can continue to improve our services. Please take a few minutes to complete the written survey that you may receive in the mail after your visit with us. Thank you!             Your Updated Medication List - Protect others around you: Learn how to safely use, store and throw away your medicines at www.disposemymeds.org.          This list is accurate as of 4/5/18 10:58 AM.  Always use your most recent med list.                   Brand Name Dispense Instructions for use Diagnosis    clotrimazole 1 % cream    LOTRIMIN    30 g    Apply topically 2 times daily    Cutaneous candidiasis       cyanocobalamin 1000 MCG tablet    vitamin  B-12     Take by mouth daily        cycloSPORINE 0.05 % ophthalmic emulsion    RESTASIS     1 drop 2 times daily        Fiber 0.52 G Caps     540 capsule    2 tabs in am and pm        hydrocortisone 1 % ointment     28 g    APPLY SPARINGLY TO AFFECTED AREA THREE TIMES A DAY FOR 14 DAYS    Rash and nonspecific skin eruption       hydroxychloroquine 200 MG tablet    PLAQUENIL    135 tablet    Hydroxychloroquine 200mg in the AM and 100mg in the PM.    Inflammatory polyarthropathy (H)       ibuprofen 200 MG tablet    ADVIL/MOTRIN     Take 200-600 mg by mouth nightly as needed        lisinopril 10 MG tablet    PRINIVIL/ZESTRIL    90 tablet    TAKE 1 TABLET BY MOUTH DAILY    Essential hypertension       montelukast 10 MG tablet    SINGULAIR    90 tablet    TAKE ONE TABLET BY MOUTH EVERY DAY AS NEEDED SEASONALLY (AUGUST AND SEPTEMBER)    Other seasonal allergic rhinitis       nystatin 872879 UNIT/GM Powd    MYCOSTATIN    60 g    Apply topically 3  times daily as needed    Intertrigo       PROVIGIL PO      Take 100 mg by mouth Takes 1 1/2 tabs bid (150 mg bid)        sertraline 100 MG tablet    ZOLOFT     Take 100 mg by mouth daily Takes 2 at bedtime        vitamin D 78115 UNIT capsule    ERGOCALCIFEROL    24 capsule    Take 1 capsule (50,000 Units) by mouth every Monday and Friday.    Vitamin D deficiency, Other osteoporosis, unspecified pathological fracture presence       VYVANSE 20 MG capsule   Generic drug:  lisdexamfetamine     30 capsule    Take 20 mg by mouth every morning    Primary narcolepsy without cataplexy

## 2018-04-05 NOTE — NURSING NOTE
Blood pressure rechecked after visit     130/78  Anabella Garcia CMA  4/5/2018 11:03 AM

## 2018-04-05 NOTE — NURSING NOTE
"Chief Complaint   Patient presents with     RECHECK     Patient states she is having some pain. Has fallen in January       Initial /84  Pulse 71  Resp 14  Ht 1.626 m (5' 4.02\")  Wt 65.4 kg (144 lb 3.2 oz)  LMP 11/27/2003  SpO2 95%  BMI 24.74 kg/m2 Estimated body mass index is 24.74 kg/(m^2) as calculated from the following:    Height as of this encounter: 1.626 m (5' 4.02\").    Weight as of this encounter: 65.4 kg (144 lb 3.2 oz).  BP completed using cuff size: regular         RAPID3 (0-30) Cumulative Score  11.5          RAPID3 Weighted Score (divide #4 by 3 and that is the weighted score)  3.83         "

## 2018-04-08 ENCOUNTER — MYC MEDICAL ADVICE (OUTPATIENT)
Dept: ORTHOPEDICS | Facility: CLINIC | Age: 68
End: 2018-04-08

## 2018-04-13 ENCOUNTER — TRANSFERRED RECORDS (OUTPATIENT)
Dept: HEALTH INFORMATION MANAGEMENT | Facility: CLINIC | Age: 68
End: 2018-04-13

## 2018-04-23 ENCOUNTER — OFFICE VISIT (OUTPATIENT)
Dept: ORTHOPEDICS | Facility: CLINIC | Age: 68
End: 2018-04-23
Payer: COMMERCIAL

## 2018-04-23 VITALS
BODY MASS INDEX: 24.59 KG/M2 | HEIGHT: 64 IN | HEART RATE: 68 BPM | WEIGHT: 144 LBS | SYSTOLIC BLOOD PRESSURE: 118 MMHG | OXYGEN SATURATION: 98 % | DIASTOLIC BLOOD PRESSURE: 70 MMHG

## 2018-04-23 DIAGNOSIS — S62.650A CLOSED NONDISPLACED FRACTURE OF MIDDLE PHALANX OF RIGHT INDEX FINGER, INITIAL ENCOUNTER: Primary | ICD-10-CM

## 2018-04-23 DIAGNOSIS — R20.0 NUMBNESS OF RIGHT HAND: ICD-10-CM

## 2018-04-23 PROCEDURE — 99203 OFFICE O/P NEW LOW 30 MIN: CPT | Performed by: ORTHOPAEDIC SURGERY

## 2018-04-23 ASSESSMENT — PAIN SCALES - GENERAL: PAINLEVEL: MODERATE PAIN (5)

## 2018-04-23 NOTE — PATIENT INSTRUCTIONS
Thanks for coming today.  Ortho/Sports Medicine Clinic  [address] [place] [address]    To schedule future appointments in Ortho Clinic, you may call [phone].    To schedule ordered imaging by your provider:   Call Central [facility] [phone]    To schedule an injection ordered by your provider:  Call [facility] Injection scheduling line: [phone]  Remedifyhart available online at:  [email or web address]    Please call if any further questions or concerns ([phone]).  Clinic hours 8 am to 5 pm.    Return to clinic (call) if symptoms worsen or fail to improve.

## 2018-04-23 NOTE — MR AVS SNAPSHOT
After Visit Summary   2018    Niesha Adhikari    MRN: [id number]           Patient Information     Date Of Birth          1950        Visit Information        Provider Department      2018 10:00 AM Rosa Villarreal MD [facility]        Today's Diagnoses     Closed nondisplaced fracture of middle phalanx of right index finger, initial encounter    -  1    Numbness of right hand          Care Instructions    Thanks for coming today.  Ortho[facility]Sports Medicine Clinic  [address] [place] [address]    To schedule future appointments in Ortho Clinic, you may call [phone].    To schedule ordered imaging by your provider:   Call [facility] [phone]    To schedule an injection ordered by your provider:  Call [facility] Injection scheduling line: [phone]  MyChart available online at:  [email or web address]    Please call if any further questions or concerns ([phone]).  Clinic hours 8 am to 5 pm.    Return to clinic (call) if symptoms worsen or fail to improve.            Follow-ups after your visit        Your next 10 appointments already scheduled     2018  8:00 AM CDT   Level 1 with NL INFUSION CHAIR 3   [facility] ([facility])    [address] [place] [address] [place] [id number] [phone]            2018  1:00 PM CDT   EMG with David Hameed MD   [facility] ([facility])    [address] [place] [address] [phone]            Sep 26, 2018  8:45 AM CDT   LAB with NL LAB [facility] ([facility])    [address] [facility] [id number] [phone]           Please do not eat 10-12 hours before your appointment if you are coming in fasting for labs on lipids, cholesterol, or glucose (sugar). This does not apply to pregnant women. Water, hot tea and black coffee (with  nothing added) are okay. Do not drink other fluids, diet soda or chew gum.            Oct 05, 2018 10:00 AM CDT   Return Visit with Apolinar Cho MD   [facility] [place] ([facility])    [address]  [place] [id number] [phone]              Who to contact     If you have questions or need follow up information about today's clinic visit or your schedule please contact [facility] directly at [phone].  Normal or non-critical lab and imaging results will be communicated to you by Surma Enterprisehart, letter or phone within 4 business days after the clinic has received the results. If you do not hear from us within 7 days, please contact the clinic through Txt4t or phone. If you have a critical or abnormal lab result, we will notify you by phone as soon as possible.  Submit refill requests through NXE or call your pharmacy and they will forward the refill request to us. Please allow 3 business days for your refill to be completed.          Additional Information About Your Visit        NXE Information     NXE gives you secure access to your electronic health record. If you see a primary care provider, you can also send messages to your care team and make appointments. If you have questions, please call your primary care clinic.  If you do not have a primary care provider, please call [phone] and they will assist you.      NXE is an electronic gateway that provides easy, online access to your medical records. With NXE, you can request a clinic appointment, read your test results, renew a prescription or communicate with your care team.     To access your existing account, please contact your [facility] or call [phone] for assistance.        Care EveryWhere ID     This is your Care EveryWhere ID. This could be used by other organizations to access your [facility] medical records  [id number]        Your Vitals  "Were     Pulse Height Last Period Pulse Oximetry BMI (Body Mass Index)       68 1.626 m (5' 4\") 11/27/2003 98% 24.72 kg/m2        Blood Pressure from Last 3 Encounters:   04/23/18 118/70   04/05/18 130/78   04/02/18 144/82    Weight from Last 3 Encounters:   04/23/18 65.3 kg (144 lb)   04/05/18 65.4 kg (144 lb 3.2 oz)   03/06/18 64.4 kg (142 lb)               Primary Care Provider Office Phone # Fax #    Tanika QUIROZ MD Eduardo [phone]       [address]  [facility] [address]        Equal Access to Services     WADE STAUFFER : Lonny maurero Soprasanna, waaxda luqadaha, qaybta kaalmada adeegyada, ramses mcnulty . So [facility] [phone].    ATENCIÓN: Si habla español, tiene a salazar disposición servicios gratuitos de asistencia lingüística. [facility] [phone].    We comply with applicable federal civil rights laws and [place] laws. We do not discriminate on the basis of race, color, national origin, age, disability, sex, sexual orientation, or gender identity.            Thank you!     Thank you for choosing [facility]  for your care. Our goal is always to provide you with excellent care. Hearing back from our patients is one way we can continue to improve our services. Please take a few minutes to complete the written survey that you may receive in the mail after your visit with us. Thank you!             Your Updated Medication List - Protect others around you: Learn how to safely use, store and throw away your medicines at www.disposemymeds.org.          This list is accurate as of 4/23/18 11:59 PM.  Always use your most recent med list.                   Brand Name Dispense Instructions for use Diagnosis    clotrimazole 1 % cream    LOTRIMIN    30 g    Apply topically 2 times daily    Cutaneous candidiasis       cyanocobalamin 1000 MCG tablet    vitamin  B-12     Take by mouth daily        cycloSPORINE 0.05 % ophthalmic emulsion    RESTASIS "     1 drop 2 times daily        Fiber 0.52 g Caps     540 capsule    2 tabs in am and pm        hydrocortisone 1 % ointment     28 g    APPLY SPARINGLY TO AFFECTED AREA THREE TIMES A DAY FOR 14 DAYS    Rash and nonspecific skin eruption       hydroxychloroquine 200 MG tablet    PLAQUENIL    135 tablet    Hydroxychloroquine 200mg in the AM and 100mg in the PM.    Inflammatory polyarthropathy (H)       ibuprofen 200 MG tablet    ADVIL/MOTRIN     Take 200-600 mg by mouth nightly as needed        lisinopril 10 MG tablet    PRINIVIL/ZESTRIL    90 tablet    TAKE 1 TABLET BY MOUTH DAILY    Essential hypertension       montelukast 10 MG tablet    SINGULAIR    90 tablet    TAKE ONE TABLET BY MOUTH EVERY DAY AS NEEDED SEASONALLY (AUGUST AND SEPTEMBER)    Other seasonal allergic rhinitis       nystatin 849314 UNIT/GM Powd    MYCOSTATIN    60 g    Apply topically 3 times daily as needed    Intertrigo       PROVIGIL PO      Take 100 mg by mouth Takes 1 1/2 tabs bid (150 mg bid)        sertraline 100 MG tablet    ZOLOFT     Take 100 mg by mouth daily Takes 2 at bedtime        vitamin D 23085 UNIT capsule    ERGOCALCIFEROL    24 capsule    Take 1 capsule (50,000 Units) by mouth every Monday and Friday.    Vitamin D deficiency, Other osteoporosis, unspecified pathological fracture presence       VYVANSE 20 MG capsule   Generic drug:  lisdexamfetamine     30 capsule    Take 20 mg by mouth every morning    Primary narcolepsy without cataplexy
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
## 2018-05-23 ENCOUNTER — TELEPHONE (OUTPATIENT)
Dept: RHEUMATOLOGY | Facility: CLINIC | Age: 68
End: 2018-05-23

## 2018-05-23 NOTE — TELEPHONE ENCOUNTER
Reason for Call:  appointment    Detailed comments: Patient stated she received a [email or web address] message from you requesting that she schedule an appointment next week to see you. No appointments available. Please call patient to advise.    Phone Number Patient can be reached at: Home number on file [phone] (home)    Best Time: any    Can we leave a detailed message on this number? YES    Call taken on 5/23/2018 at 7:52 AM by Marlena Terrell

## 2018-05-24 NOTE — TELEPHONE ENCOUNTER
Left message for patient to call me at [phone]. Have an appointment on hold for Thursday May 31st at 11:40 AM [facility].  Anabella Garcia CMA  5/24/2018 9:34 AM

## 2018-05-31 ENCOUNTER — OFFICE VISIT (OUTPATIENT)
Dept: RHEUMATOLOGY | Facility: CLINIC | Age: 68
End: 2018-05-31
Payer: COMMERCIAL

## 2018-05-31 VITALS
SYSTOLIC BLOOD PRESSURE: 134 MMHG | TEMPERATURE: 97.1 F | BODY MASS INDEX: 24.31 KG/M2 | HEIGHT: 64 IN | DIASTOLIC BLOOD PRESSURE: 78 MMHG | HEART RATE: 62 BPM | WEIGHT: 142.4 LBS | OXYGEN SATURATION: 97 %

## 2018-05-31 DIAGNOSIS — R20.0 NUMBNESS AND TINGLING OF RIGHT ARM: Primary | ICD-10-CM

## 2018-05-31 DIAGNOSIS — R20.2 NUMBNESS AND TINGLING OF RIGHT ARM: Primary | ICD-10-CM

## 2018-05-31 PROCEDURE — 99213 OFFICE O/P EST LOW 20 MIN: CPT | Performed by: INTERNAL MEDICINE

## 2018-05-31 ASSESSMENT — PAIN SCALES - GENERAL: PAINLEVEL: EXTREME PAIN (8)

## 2018-05-31 NOTE — NURSING NOTE
"Chief Complaint   Patient presents with     RECHECK       Initial /84  Pulse 62  Temp 97.1  F (36.2  C) (Oral)  Ht 1.618 m (5' 3.7\")  Wt 64.6 kg (142 lb 6.4 oz)  LMP 11/27/2003  SpO2 97%  BMI 24.67 kg/m2 Estimated body mass index is 24.67 kg/(m^2) as calculated from the following:    Height as of this encounter: 1.618 m (5' 3.7\").    Weight as of this encounter: 64.6 kg (142 lb 6.4 oz).  BP completed using cuff size: large         RAPID3 (0-30) Cumulative Score  15.2          RAPID3 Weighted Score (divide #4 by 3 and that is the weighted score)  5.06         "

## 2018-05-31 NOTE — MR AVS SNAPSHOT
After Visit Summary   5/31/2018    Niesha Adhikari    MRN: [id number]           Patient Information     Date Of Birth          1950        Visit Information        Provider Department      5/31/2018 11:40 AM Apolinar Cho MD [facility]        Care Instructions    Rheumatology    Dr. Apolinar [facility]   (Monday)  [address]  [place] [address]       [facility]   (Tuesday)  [address] [place] [address]    [facility]   (Wed., Thurs., and Friday)  [address] [facility] [place] [address]    Phone number: [phone]  Thank you for choosing [facility].  Anabella Garcia CMA            Follow-ups after your visit        Your next 10 appointments already scheduled     Jun 04, 2018  8:00 AM CDT   Level 1 with NL INFUSION CHAIR 3   [facility] ([facility])    [address] [place] [address] [address] [place] [address]   [phone]            Jun 08, 2018  1:00 PM CDT   [facility]    [address] [place] [id number] [phone]            Sep 26, 2018  8:45 AM CDT   LAB with NL LAB [facility] ([facility])    [address] [facility] [address]   [phone]           Please do not eat 10-12 hours before your appointment if you are coming in fasting for labs on lipids, cholesterol, or glucose (sugar). This does not apply to pregnant women. Water, hot tea and black coffee (with nothing added) are okay. Do not drink other fluids, diet soda or chew gum.            Oct 05, 2018 10:00 AM CDT   Return Visit with MD Gigi Aritaview Daryl Cortés ([facility])    [address] [facility] [address]   [phone]              Who to contact     If you have questions or need follow up information about today's clinic visit or your schedule please  "contact [facility] directly at [phone].  Normal or non-critical lab and imaging results will be communicated to you by MyChart, letter or phone within 4 business days after the clinic has received the results. If you do not hear from us within 7 days, please contact the clinic through MyChart or phone. If you have a critical or abnormal lab result, we will notify you by phone as soon as possible.  Submit refill requests through [employer] or call your pharmacy and they will forward the refill request to us. Please allow 3 business days for your refill to be completed.          Additional Information About Your Visit        [employer] Information     [employer] gives you secure access to your electronic health record. If you see a primary care provider, you can also send messages to your care team and make appointments. If you have questions, please call your primary care clinic.  If you do not have a primary care provider, please call [phone] and they will assist you.        Care EveryWhere ID     This is your Care EveryWhere ID. This could be used by other organizations to access your [facility] medical records  [id number]        Your Vitals Were     Pulse Temperature Height Last Period Pulse Oximetry BMI (Body Mass Index)    62 97.1  F (36.2  C) (Oral) 1.618 m (5' 3.7\") 11/27/2003 97% 24.67 kg/m2       Blood Pressure from Last 3 Encounters:   05/31/18 142/84   04/23/18 118/70   04/05/18 130/78    Weight from Last 3 Encounters:   05/31/18 64.6 kg (142 lb 6.4 oz)   04/23/18 65.3 kg (144 lb)   04/05/18 65.4 kg (144 lb 3.2 oz)              Today, you had the following     No orders found for display       Primary Care Provider Office Phone # Fax #    Tanika Clark [phone]       [address] [facility] [address]  [facility] [address]        Equal Access to Services     WADE STAUFFER AH: Lonny maurero Soprasanna, waaxda luqadaha, qaybta rani [place], ramses bess " rosykevinjeanne benjaminaamiley ah. So [facility] [phone].    ATENCIÓN: Si maxwell sanchez, tiene a salazar disposición servicios gratuitos de asistencia lingüística. Ricky bernard [phone].    We comply with applicable federal civil rights laws and [place] laws. We do not discriminate on the basis of race, color, national origin, age, disability, sex, sexual orientation, or gender identity.            Thank you!     Thank you for choosing [facility]  for your care. Our goal is always to provide you with excellent care. Hearing back from our patients is one way we can continue to improve our services. Please take a few minutes to complete the written survey that you may receive in the mail after your visit with us. Thank you!             Your Updated Medication List - Protect others around you: Learn how to safely use, store and throw away your medicines at www.disposemymeds.org.          This list is accurate as of 5/31/18 12:04 PM.  Always use your most recent med list.                   Brand Name Dispense Instructions for use Diagnosis    clotrimazole 1 % cream    LOTRIMIN    30 g    Apply topically 2 times daily    Cutaneous candidiasis       cyanocobalamin 1000 MCG tablet    vitamin  B-12     Take by mouth daily        cycloSPORINE 0.05 % ophthalmic emulsion    RESTASIS     1 drop 2 times daily        Fiber 0.52 g Caps     540 capsule    2 tabs in am and pm        hydrocortisone 1 % ointment     28 g    APPLY SPARINGLY TO AFFECTED AREA THREE TIMES A DAY FOR 14 DAYS    Rash and nonspecific skin eruption       hydroxychloroquine 200 MG tablet    PLAQUENIL    135 tablet    Hydroxychloroquine 200mg in the AM and 100mg in the PM.    Inflammatory polyarthropathy (H)       ibuprofen 200 MG tablet    ADVIL/MOTRIN     Take 200-600 mg by mouth nightly as needed        lisinopril 10 MG tablet    PRINIVIL/ZESTRIL    90 tablet    TAKE 1 TABLET BY MOUTH DAILY    Essential hypertension       montelukast 10 MG tablet    SINGULAIR    90  tablet    TAKE ONE TABLET BY MOUTH EVERY DAY AS NEEDED SEASONALLY (AUGUST AND SEPTEMBER)    Other seasonal allergic rhinitis       nystatin 255566 UNIT/GM Powd    MYCOSTATIN    60 g    Apply topically 3 times daily as needed    Intertrigo       PROVIGIL PO      Take 100 mg by mouth Takes 1 1/2 tabs bid (150 mg bid)        sertraline 100 MG tablet    ZOLOFT     Take 100 mg by mouth daily Takes 2 at bedtime        vitamin D 90883 UNIT capsule    ERGOCALCIFEROL    24 capsule    Take 1 capsule (50,000 Units) by mouth every Monday and Friday.    Vitamin D deficiency, Other osteoporosis, unspecified pathological fracture presence       VYVANSE 20 MG capsule   Generic drug:  lisdexamfetamine     30 capsule    Take 20 mg by mouth every morning    Primary narcolepsy without cataplexy

## 2018-05-31 NOTE — NURSING NOTE
Blood pressure rechecked after visit      134/78  Anabella Garcia CMA  5/31/2018 12:09 PM

## 2018-05-31 NOTE — PATIENT INSTRUCTIONS
Rheumatology    Dr. Apolinar Cho         [facility]   (Monday)  [address]  [place] [address]       [facility]   (Tuesday)  [address]  [place] [address]    [facility]   (Wed., Thurs., and Friday)  [address] [place] [address]    Phone number: [phone]  Thank you for choosing [facility].  Anabella Garcia CMA

## 2018-05-31 NOTE — PROGRESS NOTES
"Rheumatology Clinic Visit      Niesha Adhikari MRN# [id number]   YOB: 1950 Age: 67 year old      Date of visit: 5/31/18   PCP: Dr. Tanika Clark  Hepatologist: Dr. Peres at [facility] in [place]  Ophthalmology: Dr. Higgins, [facility]    Chief Complaint   Patient presents with:  RECHECK      Assessment and Plan     1. Inflammatory polyarthropathy: Hepatitis C related arthralgias versus rheumatoid arthritis (RF and CCP negative); hepatitis C has since been cleared. Initially with symmetric synovitis on exam and morning stiffness for more than one hour. NSAIDs and Tylenol associated with rash.  She did well with hydroxychloroquine 400 mg daily, with no joint pain/swelling, and morning stiffness for no more than 30 minutes. Hydroxychloroquine was reduced from 400 mg daily to 200 mg daily with worsening of her morning stiffness to 2 hours so it was increased to 300mg daily with improvement.   - Continue hydroxychloroquine 300 mg daily   - Hydroxychloroquine toxicity monitoring records from Dr. Higgins, [facility], have not been received.  I again asked the patient to get the records and send them to us.  I also gave her a referral in case it is needed.    -Labs 2-3 days prior to f/u in 6 mo: CBC, CMP    2. Osteoporosis: Previously treated with Fosamax (GERD), PO Boniva (reportedly ineffective), and IV Boniva (reportedly effective). Prolia was being considered by a previous rheumatologist but not used because she wanted \"clearance\" from the patient's gastroenterologist because she has hepatitis C; I do not see a contraindication for Prolia in the setting of hepatitis C. The patient reports that her gastroenterologist reported no contraindication for Prolia either.  She had not been on osteoporosis treatment for at least 2 years before restarting Boniva.  Boniva was restarted with the first dose given on 12/5/2017.  Plan to recheck DEXA in 12/2019.  - Continue " ergocalciferol 50,000 units twice weekly  - Calcium 1200 mg daily  - Continue Boniva 3mg IV every 90 days (she receives at the [facility])    3. Neck pain: Worsening neck pain and in the setting of inflammatory arthritis so x-rays were checked and she was referred to PT. she improves as long as she does physical therapy exercises that she was taught    4. Right index finger pain: s/p middle phalanx fx of the right index finger with continued pain and swelling. Numbness and tingling with writing that started after fx. Wearing a splint. Has followed with Dr. Rouse and Dr. Villarreal.  Concern for neuropathy but no obvious carpal tunnel or ulnar neuropathy based on exam or history.  She is scheduled to have a nerve conduction study next week that was ordered by her orthopedic surgeon.  No evidence of an active inflammatory arthritis on exam today to suggest that synovial swelling is causing a compressive neuropathy, and with her symptoms worsening during activities such as gripping the steering wheel or writing do not argue for an inflammatory arthritis etiology.  If no etiology found based on the nerve conduction study, could also consider an MRI to evaluate for active synovitis that was not apparent on exam today.  Overall she said that she felt much better talking about it today.    Ms. Adhikari verbalized agreement with and understanding of the rational for the diagnosis and treatment plan.  All questions were answered to best of my ability and the patient's satisfaction. Ms. Adhikari was advised to contact the clinic with any questions that may arise after the clinic visit.      Thank you for involving me in the care of the patient    Return to clinic: 6 months, sooner if needed      HPI   Niesha Adhikari is a 67 year old female with a medical history significant for hepatitis C, hemorrhoids, narcolepsy, fibromyalgia, migraines, anxiety, pernicious anemia, GERD, allergic rhinitis, and  osteoporosis who presents earlier than previously scheduled because of hand pain.     Ms. Adhikari was previously followed in the rheumatology clinic by Dr. Luciano and Dr. Marc.  A 10/29/2015 clinic note documents osteoporosis that was first diagnosed in 2001. Initially she was on Fosamax but was limited because of GERD. Reportedly treatment failure to oral and IV Boniva. Prolia was being considered but Dr. Luciano documents that she wanted clearance from gastroenterology because of her high hepatitis C viral load.    Regarding hepatitis C, she is followed at the Hepatology Department at [facility] in [place] by Shannon Sher and Dr. Peres.  A letter dated 10/29/2016 from Shannon Sher states that Ms. Adhikari has completed a 12 week course of hepatitis C therapy with [facility] and she is responding to treatment, based on an undetectable hepatitis C viral load at the end of therapy.    Previously, she reported that she was doing well with hydroxychloroquine and today she says she is still tolerating it well with good control of her arthritis.    Today, she reports that she has pain between the PIP and DIP of the right second finger where she had a fracture but now she is having numbness and tingling of that digit and of the entire hand with a tingling/burning/painful sensation all the way up to her right shoulder if she is doing any prolonged activity with her right hand such as gripping the steering wheel.  She is scheduled to have a nerve conduction study next week that was ordered by her orthopedic surgeon.    Denies fevers, chills, nausea, vomiting, constipation, diarrhea. No abdominal pain. No chest pain/pressure, palpitations, or shortness of breath. No LE swelling. No neck pain. No oral or nasal sores.  No rash.    Tobacco: quit in 1978  EtOH: none  Drugs: none  Occupation: retired    ROS   GEN: No fevers, chills, night sweats, or weight change  SKIN: No itching, rashes, sores  HEENT: No epistaxis.  No oral or nasal ulcers.  CV: No chest pain, pressure, palpitations, or dyspnea on exertion.  PULM: No SOB, wheeze, cough.  GI: No nausea, vomiting, constipation, diarrhea. No blood in stool. No abdominal pain.  : No blood in urine.  MSK: See HPI.  NEURO: See HPI  EXT: No LE swelling  PSYCH: See history of present illness    Active Problem List     Patient Active Problem List   Diagnosis     Allergic rhinitis     Esophageal reflux     Pernicious anemia     Anxiety state     Migraine     Essential and other specified forms of tremor     Fibromyalgia     Moderate recurrent major depression (H)     Advanced directives, counseling/discussion     Narcolepsy     Internal hemorrhoids with other complication     AIN (anal intraepithelial neoplasia) anal canal     Sciatica     Osteoporosis     Inflammatory polyarthropathy (H)     High risk medication use     Benign essential hypertension     Personal history of other medical treatment (CODE)     Past Medical History     Past Medical History:   Diagnosis Date     ABUSE BY SPOUSE/PARTNER 7/27/2005     Degeneration of lumbar or lumbosacral intervertebral disc     DDD L5/S1     HELICOBACTER PYLORI INFECTION 1/28/2005     Hepatitis C      Hypertension      Malignant neoplasm (H)     ACIN     Osteoporosis      Other and unspecified alcohol dependence, unspecified drinking behavior     Sober as 1/21/1987     Other malaise and fatigue      Past Surgical History     Past Surgical History:   Procedure Laterality Date     BIOPSY ANAL CANAL  1/21/13    [facility]      BREAST BIOPSY, RT/LT Left 1975    Breat Biopsy RT/LT     C NONSPECIFIC PROCEDURE  1965    Removed bone left index finger knuckle, casts broken bones     COLONOSCOPY  8/25/2009     COLONOSCOPY  2/14/2011    COLONOSCOPY performed by CRISTIN LAGUNAS at  GI     CYSTOSCOPY  2/28/2011    CYSTOSCOPY performed by CAYLA FLOR at  OR     ENDOSCOPY  05/21/12    Upper GI - [facility]       "COLONOSCOPY W/WO BRUSH/WASH  08/22/05      UGI ENDOSCOPY DIAG W BIOPSY  10/01/09      UGI ENDOSCOPY, SIMPLE EXAM  08/08/07     HEMORRHOIDECTOMY  06/25/12    [facility]     LAPAROSCOPIC SALPINGO-OOPHORECTOMY  2/28/2011    LAPAROSCOPIC SALPINGO-OOPHORECTOMY performed by CAYLA FLOR at  OR     TONSILLECTOMY & ADENOIDECTOMY  1965     Allergy     Allergies   Allergen Reactions     Abilify Discmelt Other (See Comments)     Disoriented     Antivert [Meclizine Hcl]      Compazine      Cymbalta Other (See Comments)     Disoriented, trouble sleeping     Diphenhydramine Nausea     And abdominal pain     Effexor [Venlafaxine] Other (See Comments)     Disoriented, trouble sleeping     Elavil [Amitriptyline Hcl] Other (See Comments)     \"didn't feel right on it-med was stopped right away\"     Ferrous Sulfate Nausea and Vomiting     Food Difficulty breathing     cilantro     Indomethacin      indocin sensativity \"Severe h.a\"     Seasonal Allergies Other (See Comments) and Difficulty breathing     Philip Gold Aug-Sept, rag weed, sneezing     Thiopental Sodium      PENTOTHAL/rigidity and fight response     Animal Dander Difficulty breathing and Rash     sneezing,resp. distress     Bupropion Anxiety     Tylenol [Acetaminophen] Rash     Current Medication List     Current Outpatient Prescriptions   Medication Sig     clotrimazole (LOTRIMIN) 1 % cream Apply topically 2 times daily     cyanocobalamin (VITAMIN  B-12) 1000 MCG tablet Take by mouth daily     cycloSPORINE (RESTASIS) 0.05 % ophthalmic emulsion 1 drop 2 times daily     hydrocortisone 1 % ointment APPLY SPARINGLY TO AFFECTED AREA THREE TIMES A DAY FOR 14 DAYS     hydroxychloroquine (PLAQUENIL) 200 MG tablet Hydroxychloroquine 200mg in the AM and 100mg in the PM.     ibuprofen (ADVIL/MOTRIN) 200 MG tablet Take 200-600 mg by mouth nightly as needed     lisdexamfetamine (VYVANSE) 20 MG capsule Take 20 mg by mouth every morning     lisinopril (PRINIVIL/ZESTRIL) 10 " MG tablet TAKE 1 TABLET BY MOUTH DAILY     Modafinil (PROVIGIL PO) Take 100 mg by mouth Takes 1 1/2 tabs bid (150 mg bid)     montelukast (SINGULAIR) 10 MG tablet TAKE ONE TABLET BY MOUTH EVERY DAY AS NEEDED SEASONALLY (AUGUST AND SEPTEMBER)     nystatin (MYCOSTATIN) 887684 UNIT/GM POWD Apply topically 3 times daily as needed     Psyllium (FIBER) 0.52 G CAPS 2 tabs in am and pm     sertraline (ZOLOFT) 100 MG tablet Take 100 mg by mouth daily Takes 2 at bedtime     vitamin D (ERGOCALCIFEROL) 82254 UNIT capsule Take 1 capsule (50,000 Units) by mouth every Monday and Friday.     No current facility-administered medications for this visit.          Social History   See HPI    Family History     Family History   Problem Relation Age of Onset     Hypertension Mother      Breast Cancer Mother      Coronary Artery Disease Mother      CEREBROVASCULAR DISEASE Mother      KIDNEY DISEASE Mother      Hypertension Brother      Respiratory Brother      emphysema     Lipids Brother      HEART DISEASE Brother      stents, 12/2011; has had about 6 MIs, last one 1/2014     C.A.D. Sister      MI at age 63     Hypertension Sister      GASTROINTESTINAL DISEASE Sister      gallbladder     Circulatory Sister      brain aneurysm at 63     Genitourinary Problems Sister      1 kidney/bladder     Hypertension Sister      Obesity Sister      Coronary Artery Disease Sister      had valve surgery, MI, CHF     Unknown/Adopted Paternal Uncle      Blood Disease Son      Lymes/7/11     Hypertension Son      Hypertension Father      Lymphoma Father      Glaucoma Father      Coronary Artery Disease Other 49     niece     DIABETES Other      cousin     CEREBROVASCULAR DISEASE Maternal Grandmother      CEREBROVASCULAR DISEASE Paternal Grandmother      Liver Cancer Cousin      Glaucoma Paternal Grandfather      Physical Exam     Temp Readings from Last 3 Encounters:   05/31/18 97.1  F (36.2  C) (Oral)   03/05/18 98.9  F (37.2  C) (Temporal)   12/05/17  "98.8  F (37.1  C) (Temporal)     BP Readings from Last 5 Encounters:   05/31/18 134/78   04/23/18 118/70   04/05/18 130/78   04/02/18 144/82   03/06/18 114/73     Pulse Readings from Last 1 Encounters:   05/31/18 62     Resp Readings from Last 1 Encounters:   04/05/18 14     Estimated body mass index is 24.67 kg/(m^2) as calculated from the following:    Height as of this encounter: 1.618 m (5' 3.7\").    Weight as of this encounter: 64.6 kg (142 lb 6.4 oz).    GEN: NAD  HEENT: MMM. No oral lesions. Good dentition.  Anicteric, noninjected sclera  CV: S1, S2. RRR. No m/r/g.  PULM: CTA bilaterally. No w/c.  MSK: The right second finger is swollen and tender to palpation between the PIP and DIP.  Palpation of the other fingers did not reveal synovial swelling or tenderness to palpation at the MCPs or PIPs.  Wrists, elbows, shoulders, knees, ankles, and MTPs without swelling or tenderness to palpation.  Hips nontender to direct palpation.     NEURO: UE and LE strengths 5/5 and equal bilaterally (except for the right 2nd finger that was not tested). Negative Tinel's on the right at the carpal tunnel.   SKIN: No rash  EXT: No LE edema  PSYCH: Alert. Appropriate.    Labs / Imaging (select studies)   RF/CCP  Recent Labs   Lab Test  06/07/17   0955   CCPIGG  1   RHF  <20     CBC  Recent Labs   Lab Test  03/12/18   1605  08/30/17   1028  06/30/17   0925   WBC  4.0  3.8*  4.7   RBC  4.46  4.51  5.00   HGB  13.8  14.1  14.6   HCT  40.5  40.5  44.2   MCV  91  90  88   RDW  13.1  13.8  14.3   PLT  152  154  138*   MCH  30.9  31.3  29.2   MCHC  34.1  34.8  33.0   NEUTROPHIL  55.1  60.9  68.1   LYMPH  31.0  27.6  21.4   MONOCYTE  10.8  8.4  7.3   EOSINOPHIL  2.8  2.6  3.0   BASOPHIL  0.3  0.5  0.2   ANEU  2.2  2.3  3.2   ALYM  1.2  1.1  1.0   FREDY  0.4  0.3  0.3   AEOS  0.1  0.1  0.1   ABAS  0.0  0.0  0.0     CMP  Recent Labs   Lab Test  03/12/18   1605  03/05/18   0811  12/05/17   1409  11/21/17   0744   06/30/17   0925  02/23/17   " 0726   NA   --    --    --   137   --   139  141   POTASSIUM   --    --    --   4.3   --   4.7  4.1   CHLORIDE   --    --    --   101   --   103  104   CO2   --    --    --   30   --   29  32   ANIONGAP   --    --    --   6   --   7  5   GLC   --    --    --   82   --   90  87   BUN   --    --    --   10   --   13  12   CR   --   0.51*  0.64  0.58   < >  0.59  0.62   GFRESTIMATED   --   >90  >90  >90   < >  >90  Non  GFR Calc    >90  Non  GFR Calc     GFRESTBLACK   --   >90  >90  >90   < >  >90   GFR Calc    >90   GFR Calc     ELIZABETH   --   9.1  9.6  9.4   < >  8.9  9.0   BILITOTAL  0.4   --    --    --    --   0.5  0.5   ALBUMIN  4.1   --    --    --    --   4.0  4.0   PROTTOTAL  7.4   --    --    --    --   7.4  7.4   ALKPHOS  78   --    --    --    --   78  77   AST  27   --    --    --    --   24  22   ALT  25   --    --    --    --   32  25    < > = values in this interval not displayed.     Calcium/VitaminD  Recent Labs   Lab Test  03/12/18   1605  03/05/18   0811  12/05/17   1409  11/21/17   0744  08/28/17   1457   ELIZABETH   --   9.1  9.6  9.4  9.2   VITDT  41   --    --   37  19*     ESR/CRP  Recent Labs   Lab Test  06/30/17   0925   SED  6   CRP  <2.9     Lipid Panel  Recent Labs   Lab Test  11/25/16   1217  10/12/11   1110  03/17/10   1021   CHOL  276*  174  200   TRIG  88  71  125   HDL  65  58  47*   LDL  193*  102  128   VLDL   --   14  25   CHOLHDLRATIO   --   3.0  4.0   NHDL  211*   --    --      Hepatitis B  Recent Labs   Lab Test  06/30/17   0925  06/07/17   0955  09/30/15   0951   AUSAB   --   0.65   --    HBCAB   --   Reactive   A reactive result indicates acute, chronic or past/resolved hepatitis B   infection.  *   --    HBCM   --   Nonreactive   A nonreactive result suggests lack of recent exposure to the virus in the   preceding 6 months.     --    HEPBANG   --   Nonreactive  Nonreactive   HBQLOG  Not Calculated   --    --      Hepatitis  C  Recent Labs   Lab Test  06/07/17   0955  09/30/15   0951  02/06/15   0958   10/12/11   1110   HCVAB  Reactive   A reactive result indicates one of the following 1) current HCV infection 2)   past HCV infection that has resolved or 3) false positivity. The CDC recommends   that a reactive result should be followed by Nucleic acid testing for HCV RNA.  If HCV RNA is detected, that indicates current HCV infection. If HCV RNA is not   detected, that indicates either past, resolved HCV infection, or false HCV   antibody positivity.   Assay performance characteristics have not been established for newborns,   infants, and children  *  Reactive   A reactive result indicates one of the following 1) current HCV infection 2)   past HCV infection that has resolved or 3) false positivity. The CDC recommends   that a reactive result should be followed by Nucleic acid testing for HCV RNA.  If HCV RNA is detected, that indicates current HCV infection. If HCV RNA is not   detected, that indicates either past, resolved HCV infection, or false HCV   antibody positivity.   Assay performance characteristics have not been established for newborns,   infants, and children  *   --    --   Positive  High sample/cutoff ratio, confirmatory testing available.*   HCVRNA  HCV RNA Not Detected   The [employer]O AmpliPrep/LUIS ARMANDO TaqMan HCV Test is an FDA-approved in vitro nucleic   acid amplification test for the quantitation of HCV RNA in human plasma (ETDA   plasma) or serum using the [employer] AmpliPrep Instrument for automated viral   nucleic acid extraction and the [employer]O TaqMan Analyzer or [employer]ANDO TaqMan for   automated Real Time PCR amplification and detection of the viral nucleic acid   target.   Titer results are reported in International Units/mL (IU/mL) using the 1st WHO   International standard for HCV for Nucleic Acid Amplification based assays.    [id number]*  [id number]*   < >   --     < > = values in this interval not displayed.     Lyme ab  screening  Recent Labs   Lab Test  05/11/17   0830  04/12/17   1211  07/24/15   1047   LYMEGM  <0.01  Negative, Absence of detectable Borrelia burdorferi antibodies. A negative   result does not exclude the possibility of Borrelia burgdorferi infection. If   early Lyme disease is suspected, a second sample should be collected and tested   2 to 4 weeks later.    <0.01  Negative, Absence of detectable Borrelia burdorferi antibodies. A negative   result does not exclude the possibility of Borrelia burgdorferi infection. If   early Lyme disease is suspected, a second sample should be collected and tested   2 to 4 weeks later.    0.11     Tuberculosis Screening  Recent Labs   Lab Test  09/30/15   0952   TBRSLT  Negative   TBAGN  0.05     Immunization History     Immunization History   Administered Date(s) Administered     HEPA 04/18/2000, 09/26/2000     HPV 08/13/2012, 09/25/2012, 01/24/2013     HepB 11/15/2011, 12/19/2011     Influenza (H1N1) 01/07/2010     Influenza (IIV3) PF 01/03/2005, 10/16/2006, 11/14/2007, 10/28/2008, 09/29/2009, 09/27/2010, 10/04/2011, 09/25/2012, 09/21/2015     Influenza Vaccine IM 3yrs+ 4 Valent IIV4 09/26/2013, 10/06/2014, 10/07/2016, 09/08/2017     Pneumo Conj 13-V (2010&after) 10/07/2016     Pneumococcal 23 valent 11/15/2011, 10/17/2017     TD (ADULT, 7+) 04/18/2000, 07/07/2004     TDAP Vaccine (Boostrix) 09/21/2015     Tdap (Adacel,Boostrix) 05/23/2006     Zoster vaccine, live 09/21/2015          Chart documentation done in part with Dragon Voice recognition Software. Although reviewed after completion, some word and grammatical error may remain.    Apolinar Cho MD

## 2018-06-04 ENCOUNTER — INFUSION THERAPY VISIT (OUTPATIENT)
Dept: INFUSION THERAPY | Facility: CLINIC | Age: 68
End: 2018-06-04
Attending: FAMILY MEDICINE
Payer: MEDICARE

## 2018-06-04 VITALS
DIASTOLIC BLOOD PRESSURE: 64 MMHG | SYSTOLIC BLOOD PRESSURE: 111 MMHG | BODY MASS INDEX: 24.78 KG/M2 | TEMPERATURE: 98.9 F | RESPIRATION RATE: 18 BRPM | OXYGEN SATURATION: 98 % | WEIGHT: 143 LBS | HEART RATE: 73 BPM

## 2018-06-04 DIAGNOSIS — Z92.89 PERSONAL HISTORY OF OTHER MEDICAL TREATMENT (CODE): Primary | ICD-10-CM

## 2018-06-04 DIAGNOSIS — M81.0 AGE-RELATED OSTEOPOROSIS WITHOUT CURRENT PATHOLOGICAL FRACTURE: ICD-10-CM

## 2018-06-04 LAB
CALCIUM SERPL-MCNC: 9.1 MG/DL (ref 8.5–10.1)
CREAT SERPL-MCNC: 0.65 MG/DL (ref 0.52–1.04)
GFR SERPL CREATININE-BSD FRML MDRD: >90 ML/MIN/1.7M2

## 2018-06-04 PROCEDURE — 82565 ASSAY OF CREATININE: CPT | Performed by: INTERNAL MEDICINE

## 2018-06-04 PROCEDURE — 96374 THER/PROPH/DIAG INJ IV PUSH: CPT

## 2018-06-04 PROCEDURE — 36415 COLL VENOUS BLD VENIPUNCTURE: CPT | Performed by: INTERNAL MEDICINE

## 2018-06-04 PROCEDURE — 25000128 H RX IP 250 OP 636: Performed by: INTERNAL MEDICINE

## 2018-06-04 PROCEDURE — 82310 ASSAY OF CALCIUM: CPT | Performed by: INTERNAL MEDICINE

## 2018-06-04 RX ORDER — IBANDRONATE SODIUM 3 MG/3 ML
3 SYRINGE (ML) INTRAVENOUS ONCE
Status: CANCELLED | OUTPATIENT
Start: 2018-06-04 | End: 2018-06-04

## 2018-06-04 RX ORDER — IBANDRONATE SODIUM 3 MG/3 ML
3 SYRINGE (ML) INTRAVENOUS ONCE
Status: COMPLETED | OUTPATIENT
Start: 2018-06-04 | End: 2018-06-04

## 2018-06-04 RX ADMIN — IBANDRONATE SODIUM 3 MG: 3 INJECTION, SOLUTION INTRAVENOUS at 08:36

## 2018-06-04 ASSESSMENT — PAIN SCALES - GENERAL: PAINLEVEL: MODERATE PAIN (5)

## 2018-06-04 NOTE — MR AVS SNAPSHOT
After Visit Summary   6/4/2018    Niesha Adhikari    MRN: [id number]           Patient Information     Date Of Birth          1950        Visit Information        Provider Department      6/4/2018 8:00 AM NL INFUSION CHAIR 3 [facility]        Today's Diagnoses     Personal history of other medical treatment (CODE)    -  1    Age-related osteoporosis without current pathological fracture           Follow-ups after your visit        Your next 10 appointments already scheduled     Jun 08, 2018  1:00 PM CDT   EMG with David Hameed MD, PROC RM 1 PEDS   [facility] ([facility])    [address] [place] [address]   [phone]            Sep 05, 2018  8:00 AM CDT   Level 1 with NL INFUSION CHAIR 4   [facility] ([facility])    [address] [place] [address] [place] [id number] [phone]            Sep 26, 2018  8:45 AM CDT   LAB with NL LAB [facility] ([facility])    [address] [facility] [address] [phone]           Please do not eat 10-12 hours before your appointment if you are coming in fasting for labs on lipids, cholesterol, or glucose (sugar). This does not apply to pregnant women. Water, hot tea and black coffee (with nothing added) are okay. Do not drink other fluids, diet soda or chew gum.            Oct 05, 2018 10:00 AM CDT   Return Visit with Apolinar Cho MD   [facility] ([facility]    [address] [place] [address]  [place] [id number] [phone]              Who to contact     If you have questions or need follow up information about today's clinic visit or your schedule please contact [facility] INFUSION SERVICES directly at [phone].  Normal or non-critical lab and imaging results will be communicated to you by MyChart, letter or phone within 4 business days after the clinic has  received the results. If you do not hear from us within 7 days, please contact the clinic through [employer] or phone. If you have a critical or abnormal lab result, we will notify you by phone as soon as possible.  Submit refill requests through [employer] or call your pharmacy and they will forward the refill request to us. Please allow 3 business days for your refill to be completed.          Additional Information About Your Visit        [email or web address] Information     [employer] gives you secure access to your electronic health record. If you see a primary care provider, you can also send messages to your care team and make appointments. If you have questions, please call your primary care clinic.  If you do not have a primary care provider, please call [phone] and they will assist you.        Care EveryWhere ID     This is your Care EveryWhere ID. This could be used by other organizations to access your [facility] medical records  [id number]        Your Vitals Were     Pulse Temperature Respirations Last Period Pulse Oximetry BMI (Body Mass Index)    73 98.9  F (37.2  C) (Temporal) 18 11/27/2003 98% 24.78 kg/m2       Blood Pressure from Last 3 Encounters:   06/04/18 111/64   05/31/18 134/78   04/23/18 118/70    Weight from Last 3 Encounters:   06/04/18 64.9 kg (143 lb)   05/31/18 64.6 kg (142 lb 6.4 oz)   04/23/18 65.3 kg (144 lb)              We Performed the Following     Calcium     Creatinine        Primary Care Provider Office Phone # Fax #    Tanika GABRIELLA MD Eduardo [phone]       [address] [place] [address] 100  [facility] [address]        Equal Access to Services     [facility]: Hadii chely newell hadasho Soprasanna, waaxda luqadaha, qaybta kaalmada ramses [place]. So [facility] [phone].    ATENCIÓN: Si habla español, tiene a salazar disposición servicios gratuitos de asistencia lingüística. Ricky al [phone].    We comply with applicable federal civil rights laws and  [place] laws. We do not discriminate on the basis of race, color, national origin, age, disability, sex, sexual orientation, or gender identity.            Thank you!     Thank you for choosing [facility]  for your care. Our goal is always to provide you with excellent care. Hearing back from our patients is one way we can continue to improve our services. Please take a few minutes to complete the written survey that you may receive in the mail after your visit with us. Thank you!             Your Updated Medication List - Protect others around you: Learn how to safely use, store and throw away your medicines at www.disposemymeds.org.          This list is accurate as of 6/4/18  9:05 AM.  Always use your most recent med list.                   Brand Name Dispense Instructions for use Diagnosis    clotrimazole 1 % cream    LOTRIMIN    30 g    Apply topically 2 times daily    Cutaneous candidiasis       cyanocobalamin 1000 MCG tablet    vitamin  B-12     Take by mouth daily        cycloSPORINE 0.05 % ophthalmic emulsion    RESTASIS     1 drop 2 times daily        Fiber 0.52 g Caps     540 capsule    2 tabs in am and pm        hydrocortisone 1 % ointment     28 g    APPLY SPARINGLY TO AFFECTED AREA THREE TIMES A DAY FOR 14 DAYS    Rash and nonspecific skin eruption       hydroxychloroquine 200 MG tablet    PLAQUENIL    135 tablet    Hydroxychloroquine 200mg in the AM and 100mg in the PM.    Inflammatory polyarthropathy (H)       ibuprofen 200 MG tablet    ADVIL/MOTRIN     Take 200-800 mg by mouth nightly as needed        lisinopril 10 MG tablet    PRINIVIL/ZESTRIL    90 tablet    TAKE 1 TABLET BY MOUTH DAILY    Essential hypertension       montelukast 10 MG tablet    SINGULAIR    90 tablet    TAKE ONE TABLET BY MOUTH EVERY DAY AS NEEDED SEASONALLY (AUGUST AND SEPTEMBER)    Other seasonal allergic rhinitis       nystatin 419399 UNIT/GM Powd    MYCOSTATIN    60 g    Apply topically 3 times  daily as needed    Intertrigo       PROVIGIL PO      Take 100 mg by mouth Takes 1 1/2 tabs bid (150 mg bid)        sertraline 100 MG tablet    ZOLOFT     Take 100 mg by mouth daily Takes 2 at bedtime        vitamin D 04341 UNIT capsule    ERGOCALCIFEROL    24 capsule    Take 1 capsule (50,000 Units) by mouth every Monday and Friday.    Vitamin D deficiency, Other osteoporosis, unspecified pathological fracture presence       VYVANSE 20 MG capsule   Generic drug:  lisdexamfetamine     30 capsule    Take 30 mg by mouth every morning    Primary narcolepsy without cataplexy

## 2018-06-04 NOTE — PROGRESS NOTES
Infusion Nursing Note:  Niesha Adhikari presents today for Labs/Boniva.    Patient seen by provider today: No   present during visit today: Not Applicable.    Note: Labs done today. Calcium-9.1 and creat-0.65    Intravenous Access:  Peripheral IV placed.    Treatment Conditions:  Lab Results   Component Value Date     11/21/2017                   Lab Results   Component Value Date    POTASSIUM 4.3 11/21/2017           Lab Results   Component Value Date    MAG 1.7 12/05/2006            Lab Results   Component Value Date    CR 0.65 06/04/2018                   Lab Results   Component Value Date    ELIZABETH 9.1 06/04/2018                Lab Results   Component Value Date    BILITOTAL 0.4 03/12/2018           Lab Results   Component Value Date    ALBUMIN 4.1 03/12/2018                    Lab Results   Component Value Date    ALT 25 03/12/2018           Lab Results   Component Value Date    AST 27 03/12/2018       Results reviewed, labs MET treatment parameters, ok to proceed with treatment.      Post Infusion Assessment:  Patient tolerated infusion without incident.  Patient observed for 15 minutes post Boniva per protocol.  Site patent and intact, free from redness, edema or discomfort.  Access discontinued per protocol.    Discharge Plan:   Discharge instructions reviewed with: Patient.  Patient and/or family verbalized understanding of discharge instructions and all questions answered.  Copy of AVS reviewed with patient and/or family.  Patient will return 9/5/18  for next appointment. Ambulated out.    Monika Garcia RN

## 2018-06-08 ENCOUNTER — OFFICE VISIT (OUTPATIENT)
Dept: NEUROLOGY | Facility: CLINIC | Age: 68
End: 2018-06-08
Payer: COMMERCIAL

## 2018-06-08 DIAGNOSIS — G56.03 BILATERAL CARPAL TUNNEL SYNDROME: Primary | ICD-10-CM

## 2018-06-08 DIAGNOSIS — R20.0 NUMBNESS OF RIGHT HAND: ICD-10-CM

## 2018-06-08 PROCEDURE — 95911 NRV CNDJ TEST 9-10 STUDIES: CPT | Performed by: PSYCHIATRY & NEUROLOGY

## 2018-06-08 PROCEDURE — 95886 MUSC TEST DONE W/N TEST COMP: CPT | Performed by: PSYCHIATRY & NEUROLOGY

## 2018-06-08 NOTE — MR AVS SNAPSHOT
After Visit Summary   6/8/2018    Niesha Adhikari    MRN: [id number]           Patient Information     Date Of Birth          1950        Visit Information        Provider Department      6/8/2018 1:00 PM David Hameed MD; PROC RM 1 PEDS [facility]        Today's Diagnoses     Bilateral carpal tunnel syndrome    -  1       Follow-ups after your visit        Your next 10 appointments already scheduled     Sep 05, 2018  8:00 AM CDT   Level 1 with NL INFUSION CHAIR 4   [facility] ([facility])    [address] [facility] [address] [place] [address]   [phone]            Sep 26, 2018  8:45 AM CDT   LAB with NL LAB [facility]   [facility]    [address] [facility] [id number] [phone]           Please do not eat 10-12 hours before your appointment if you are coming in fasting for labs on lipids, cholesterol, or glucose (sugar). This does not apply to pregnant women. Water, hot tea and black coffee (with nothing added) are okay. Do not drink other fluids, diet soda or chew gum.            Oct 05, 2018 10:00 AM CDT   Return Visit with Apolinar Cho MD   [facility]    [address] [place] [id number] [phone]              Future tests that were ordered for you today     Open Future Orders        Priority Expected Expires Ordered    Needle EMG Each Extremity w/Paraspinal Area Complete (94632) Routine  6/8/2019 6/8/2018    NCS Motor with or without F-Wave, 7-8 nerves Routine  6/8/2019 6/8/2018            Who to contact     If you have questions or need follow up information about today's clinic visit or your schedule please contact [facility] directly at [phone].  Normal or non-critical lab and imaging results will be communicated to you by MyChart, letter or phone within 4 business days after the clinic has received the  results. If you do not hear from us within 7 days, please contact the clinic through [employer] or phone. If you have a critical or abnormal lab result, we will notify you by phone as soon as possible.  Submit refill requests through [employer] or call your pharmacy and they will forward the refill request to us. Please allow 3 business days for your refill to be completed.          Additional Information About Your Visit        [employer] Information     [employer] gives you secure access to your electronic health record. If you see a primary care provider, you can also send messages to your care team and make appointments. If you have questions, please call your primary care clinic.  If you do not have a primary care provider, please call [phone] and they will assist you.      [employer]Loop is an electronic gateway that provides easy, online access to your medical records. With [employer], you can request a clinic appointment, read your test results, renew a prescription or communicate with your care team.     To access your existing account, please contact your [facility] or call [phone] for assistance.        Care EveryWhere ID     This is your Care EveryWhere ID. This could be used by other organizations to access your [place] [facility] medical records  [id number]        Your Vitals Were     Last Period                   11/27/2003            Blood Pressure from Last 3 Encounters:   06/04/18 111/64   05/31/18 134/78   04/23/18 118/70    Weight from Last 3 Encounters:   06/04/18 64.9 kg (143 lb)   05/31/18 64.6 kg (142 lb 6.4 oz)   04/23/18 65.3 kg (144 lb)               Primary Care Provider Office Phone # Fax #    Tanika Clark [phone]       [address]  [address]  [facility] [address]        Equal Access to Services     [facility] XIOMY : Lonny [place], emery fernández, ramses funes. So [facility]  [phone].    ATENCIÓN: Si maxwell sanchez, tiene a salazar disposición servicios gratuitos de asistencia lingüística. Ricky bernard [phone].    We comply with applicable federal civil rights laws and [place] laws. We do not discriminate on the basis of race, color, national origin, age, disability, sex, sexual orientation, or gender identity.            Thank you!     Thank you for choosing [facility]  for your care. Our goal is always to provide you with excellent care. Hearing back from our patients is one way we can continue to improve our services. Please take a few minutes to complete the written survey that you may receive in the mail after your visit with us. Thank you!             Your Updated Medication List - Protect others around you: Learn how to safely use, store and throw away your medicines at www.disposemymeds.org.          This list is accurate as of 6/8/18  2:50 PM.  Always use your most recent med list.                   Brand Name Dispense Instructions for use Diagnosis    clotrimazole 1 % cream    LOTRIMIN    30 g    Apply topically 2 times daily    Cutaneous candidiasis       cyanocobalamin 1000 MCG tablet    vitamin  B-12     Take by mouth daily        cycloSPORINE 0.05 % ophthalmic emulsion    RESTASIS     1 drop 2 times daily        Fiber 0.52 g Caps     540 capsule    2 tabs in am and pm        hydrocortisone 1 % ointment     28 g    APPLY SPARINGLY TO AFFECTED AREA THREE TIMES A DAY FOR 14 DAYS    Rash and nonspecific skin eruption       hydroxychloroquine 200 MG tablet    PLAQUENIL    135 tablet    Hydroxychloroquine 200mg in the AM and 100mg in the PM.    Inflammatory polyarthropathy (H)       ibuprofen 200 MG tablet    ADVIL/MOTRIN     Take 200-800 mg by mouth nightly as needed        lisinopril 10 MG tablet    PRINIVIL/ZESTRIL    90 tablet    TAKE 1 TABLET BY MOUTH DAILY    Essential hypertension       montelukast 10 MG tablet    SINGULAIR    90 tablet    TAKE ONE  TABLET BY MOUTH EVERY DAY AS NEEDED SEASONALLY (AUGUST AND SEPTEMBER)    Other seasonal allergic rhinitis       nystatin 627396 UNIT/GM Powd    MYCOSTATIN    60 g    Apply topically 3 times daily as needed    Intertrigo       PROVIGIL PO      Take 100 mg by mouth Takes 1 1/2 tabs bid (150 mg bid)        sertraline 100 MG tablet    ZOLOFT     Take 100 mg by mouth daily Takes 2 at bedtime        vitamin D 09997 UNIT capsule    ERGOCALCIFEROL    24 capsule    Take 1 capsule (50,000 Units) by mouth every Monday and Friday.    Vitamin D deficiency, Other osteoporosis, unspecified pathological fracture presence       VYVANSE 20 MG capsule   Generic drug:  lisdexamfetamine     30 capsule    Take 30 mg by mouth every morning    Primary narcolepsy without cataplexy

## 2018-06-08 NOTE — PROGRESS NOTES
[facility] EMG Laboratory      Nerve Conduction & EMG Report          Patient:       Niesha Adhikari  Patient ID:    [id number]  Gender:        Female  YOB: 1950  Age:           67 Years 10 Months      History and Examination:  Niesha Adhikari is a 67 year old woman with sensory symptoms in the right hand after a fracture of the second digit. At night symptoms spread to involve much of the hand and extend into the arm and forearm. She is referred for evaluation of possible median neuropathy at the wrist.     Techniques:  Motor conduction studies were done with surface recording electrodes. Sensory conduction studies were performed with surface electrodes, unless indicated otherwise by (n), designating the use of subdermal recording electrodes. Temperature was monitored and recorded throughout the study. Upper extremities were maintained at a temperature of 32 degrees Centigrade or higher. EMG was done with a concentric needle electrode.     Results:  Median sensory conduction studies demonstrated moderately severe slowing of conduction and mild to moderate attenuation of amplitude bilaterally. Bilateral ulnar sensory conduction studies were normal. Median motor conduction studies demonstrated mild to moderate prolongation of distal latencies, greater on the right. Bilateral ulnar motor conduction studies demonstrated mild conduction slowing across the right elbow and were otherwise normal. Electromyography was normal.     Interpretation:  This is an abnormal study, demonstrating electrophysiologic evidence of the followin. 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   2. 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     David Hameed MD                    Sensory NCS      Nerve / Sites Rec. Site Onset Peak NP Amp Ref. PP Amp Dist Marvin Ref. Temp     ms ms  V  V  V cm m/s m/s  C   R MEDIAN - Dig II      Dig II Wrist 3.59 4.69 8.9 10.0 11.7 14 39.0 48.0 32.3      Palm Wrist 2.66 3.28  2.0  11.5 8 30.1 48.0 32.3   L MEDIAN - Dig II      Dig II Wrist 3.59 4.27 5.6 10.0 8.8 14 39.0 48.0 32.6   R ULNAR - Dig V      Dig V Wrist 2.55 3.33 9.5 8.0 12.2 12.5 49.0 48.0 32.3      Palm Wrist 1.46 2.03 29.3  55.8 8 54.9 48.0 32.5   L ULNAR - Dig V      Dig V Wrist 2.34 3.23 8.1 8.0 10.3 12.5 53.3 48.0 32.3      Palm Wrist 1.46 2.03 14.2  29.9 8 54.9 48.0 32.3       Motor NCS      Nerve / Sites Rec. Site Lat Ref. Amp Ref. Rel Amp Dist Marvin Ref. Dur. Area Temp.     ms ms mV mV % cm m/s m/s ms %  C   R MEDIAN - APB      Wrist APB 5.57 4.40 6.2 5.0 100 8   6.30 100 32.3      Elbow APB 9.58  6.3  101 20.5 51.1 48.0 6.35 103 32.3   L MEDIAN - APB      Wrist APB 4.43 4.40 9.4 5.0 100 8   5.63 100 33.3      Elbow APB 9.43  9.2  97.8 22 44.0 48.0 5.68 92 33.3      Wrist APB 5.10  9.0  95.4    5.83 90.7 33.6      Elbow APB 9.32  8.9  94.5 20 47.4  5.94 86.7 33.1   R ULNAR - ADM      Wrist ADM 2.97 3.50 12.0 5.0 100 8   6.25 100 32      B.Elbow ADM 5.78  11.7  97.4 17 60.4 48.0 6.46 99.3 32      A.Elbow ADM 8.07  11.6  96.6 10 43.6 48.0 6.67 95.3 32   L ULNAR - ADM      Wrist ADM 3.07 3.50 10.9 5.0 100 8   6.41 100 33.3      B.Elbow ADM 6.25  10.2  93.4 19 59.8 48.0 6.72 98.7 33.3      A.Elbow ADM 8.13  9.5  87.3 10 53.3 48.0 6.61 89.2 33.4   R ULNAR - FDI      Wrist FDI 3.54  11.5  100    5.26 100 32.1      B.Elbow FDI 6.51  11.3  98 17.5 58.9  5.42 103 32      A.Elbow FDI 8.59  11.1  96.2 10 48.0  5.57 102 32       EMG Summary Table     Spontaneous MUAP Recruitment    IA Fib PSW Fasc H.F. Amp Dur. PPP Pattern   R. PRON TERES N None None None None N N N N   R. BICEPS N None None None None N N N N   R. EXT DIG COMM N None None None None N N N N   R. FIRST D INTEROSS N None None None None N N N N   R. DELTOID N None None None None N N N N   R. C6 PSP N None None None None N N N N

## 2018-06-08 NOTE — LETTER
2018         RE: Niesha Adhikari  [address] [facility] [address]        Dear Colleague,    Thank you for referring your patient, Niesha Adhikari, to the [facility]. Please see a copy of my visit note below.    [facility] EMG Laboratory      Nerve Conduction & EMG Report          Patient:       Niesha Adhikari  Patient ID:    [id number]  Gender:        Female  YOB: 1950  Age:           67 Years 10 Months      History and Examination:  Niesha Adhikari is a 67 year old woman with sensory symptoms in the right hand after a fracture of the second digit. At night symptoms spread to involve much of the hand and extend into the arm and forearm. She is referred for evaluation of possible median neuropathy at the wrist.     Techniques:  Motor conduction studies were done with surface recording electrodes. Sensory conduction studies were performed with surface electrodes, unless indicated otherwise by (n), designating the use of subdermal recording electrodes. Temperature was monitored and recorded throughout the study. Upper extremities were maintained at a temperature of 32 degrees Centigrade or higher. EMG was done with a concentric needle electrode.     Results:  Median sensory conduction studies demonstrated moderately severe slowing of conduction and mild to moderate attenuation of amplitude bilaterally. Bilateral ulnar sensory conduction studies were normal. Median motor conduction studies demonstrated mild to moderate prolongation of distal latencies, greater on the right. Bilateral ulnar motor conduction studies demonstrated mild conduction slowing across the right elbow and were otherwise normal. Electromyography was normal.     Interpretation:  This is an abnormal study, demonstrating electrophysiologic evidence of the followin. 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   2. 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       MD Zari                    Sensory NCS      Nerve / Sites Rec. Site Onset Peak NP Amp Ref. PP Amp Dist Marvin Ref. Temp     ms ms  V  V  V cm m/s m/s  C   R MEDIAN - Dig II      Dig II Wrist 3.59 4.69 8.9 10.0 11.7 14 39.0 48.0 32.3      Palm Wrist 2.66 3.28 2.0  11.5 8 30.1 48.0 32.3   L MEDIAN - Dig II      Dig II Wrist 3.59 4.27 5.6 10.0 8.8 14 39.0 48.0 32.6   R ULNAR - Dig V      Dig V Wrist 2.55 3.33 9.5 8.0 12.2 12.5 49.0 48.0 32.3      Palm Wrist 1.46 2.03 29.3  55.8 8 54.9 48.0 32.5   L ULNAR - Dig V      Dig V Wrist 2.34 3.23 8.1 8.0 10.3 12.5 53.3 48.0 32.3      Palm Wrist 1.46 2.03 14.2  29.9 8 54.9 48.0 32.3       Motor NCS      Nerve / Sites Rec. Site Lat Ref. Amp Ref. Rel Amp Dist Marvin Ref. Dur. Area Temp.     ms ms mV mV % cm m/s m/s ms %  C   R MEDIAN - APB      Wrist APB 5.57 4.40 6.2 5.0 100 8   6.30 100 32.3      Elbow APB 9.58  6.3  101 20.5 51.1 48.0 6.35 103 32.3   L MEDIAN - APB      Wrist APB 4.43 4.40 9.4 5.0 100 8   5.63 100 33.3      Elbow APB 9.43  9.2  97.8 22 44.0 48.0 5.68 92 33.3      Wrist APB 5.10  9.0  95.4    5.83 90.7 33.6      Elbow APB 9.32  8.9  94.5 20 47.4  5.94 86.7 33.1   R ULNAR - ADM      Wrist ADM 2.97 3.50 12.0 5.0 100 8   6.25 100 32      B.Elbow ADM 5.78  11.7  97.4 17 60.4 48.0 6.46 99.3 32      A.Elbow ADM 8.07  11.6  96.6 10 43.6 48.0 6.67 95.3 32   L ULNAR - ADM      Wrist ADM 3.07 3.50 10.9 5.0 100 8   6.41 100 33.3      B.Elbow ADM 6.25  10.2  93.4 19 59.8 48.0 6.72 98.7 33.3      A.Elbow ADM 8.13  9.5  87.3 10 53.3 48.0 6.61 89.2 33.4   R ULNAR - FDI      Wrist FDI 3.54  11.5  100    5.26 100 32.1      B.Elbow FDI 6.51  11.3  98 17.5 58.9  5.42 103 32      A.Elbow FDI 8.59  11.1  96.2 10 48.0  5.57 102 32       EMG Summary Table     Spontaneous MUAP Recruitment    IA Fib PSW Fasc H.F. Amp Dur. PPP Pattern   R. PRON TERES N None None None None N N N N   R. BICEPS N None None None None N N N N   R. EXT DIG COMM N None None None None N N N N   R. FIRST D INTEROSS N  None None None None N N N N   R. DELTOID N None None None None N N N N   R. C6 PSP N None None None None N N N N                              Again, thank you for allowing me to participate in the care of your patient.        Sincerely,        David Hameed MD

## 2018-07-09 ENCOUNTER — OFFICE VISIT (OUTPATIENT)
Dept: ORTHOPEDICS | Facility: CLINIC | Age: 68
End: 2018-07-09
Payer: COMMERCIAL

## 2018-07-09 VITALS — DIASTOLIC BLOOD PRESSURE: 78 MMHG | HEART RATE: 66 BPM | OXYGEN SATURATION: 98 % | SYSTOLIC BLOOD PRESSURE: 127 MMHG

## 2018-07-09 DIAGNOSIS — R21 RASH AND NONSPECIFIC SKIN ERUPTION: ICD-10-CM

## 2018-07-09 DIAGNOSIS — G56.03 BILATERAL CARPAL TUNNEL SYNDROME: Primary | ICD-10-CM

## 2018-07-09 DIAGNOSIS — G56.21 LESION OF RIGHT ULNAR NERVE: ICD-10-CM

## 2018-07-09 PROCEDURE — 99214 OFFICE O/P EST MOD 30 MIN: CPT | Mod: 25 | Performed by: ORTHOPAEDIC SURGERY

## 2018-07-09 PROCEDURE — 20605 DRAIN/INJ JOINT/BURSA W/O US: CPT | Mod: RT | Performed by: ORTHOPAEDIC SURGERY

## 2018-07-09 NOTE — MR AVS SNAPSHOT
After Visit Summary   7/9/2018    Niesha Adhikari    MRN: [id number]           Patient Information     Date Of Birth          1950        Visit Information        Provider Department      7/9/2018 8:15 AM Rosa Villarreal MD [facility]        Today's Diagnoses     Bilateral carpal tunnel syndrome    -  1    Lesion of right ulnar nerve           Follow-ups after your visit        Your next 10 appointments already scheduled     Jul 27, 2018  9:20 AM CDT   [facility]    [address] [address]   [phone]           Bring a current list of meds and any records pertaining to this visit. For Physicals, please bring immunization records and any forms needing to be filled out. Please arrive 10 minutes early to complete paperwork.            Sep 05, 2018  8:00 AM CDT   Level 1 with NL INFUSION CHAIR 4   [facility] ([facility])    [address] [place] [address]   [phone]            Sep 26, 2018  8:45 AM CDT   LAB with NL [facility]   [facility]    [address] [address] [phone]           Please do not eat 10-12 hours before your appointment if you are coming in fasting for labs on lipids, cholesterol, or glucose (sugar). This does not apply to pregnant women. Water, hot tea and black coffee (with nothing added) are okay. Do not drink other fluids, diet soda or chew gum.            Oct 05, 2018 10:00 AM CDT   Return Visit with Apolinar Cho MD   [facility] ([facility])    [address]  [place] [address] [phone]              Who to contact     If you have questions or need follow up information about today's clinic visit or your schedule please contact [facility] directly at  [phone].  Normal or non-critical lab and imaging results will be communicated to you by [employer], letter or phone within 4 business days after the clinic has received the results. If you do not hear from us within 7 days, please contact the clinic through [employer] or phone. If you have a critical or abnormal lab result, we will notify you by phone as soon as possible.  Submit refill requests through [employer] or call your pharmacy and they will forward the refill request to us. Please allow 3 business days for your refill to be completed.          Additional Information About Your Visit        [employer] Information     [employer] gives you secure access to your electronic health record. If you see a primary care provider, you can also send messages to your care team and make appointments. If you have questions, please call your primary care clinic.  If you do not have a primary care provider, please call [phone] and they will assist you.      [employer] is an electronic gateway that provides easy, online access to your medical records. With [employer], you can request a clinic appointment, read your test results, renew a prescription or communicate with your care team.     To access your existing account, please contact your [facility] or call [phone] for assistance.        Care EveryWhere ID     This is your Care EveryWhere ID. This could be used by other organizations to access your [facility] medical records  [id number]        Your Vitals Were     Pulse Last Period Pulse Oximetry             66 11/27/2003 98%          Blood Pressure from Last 3 Encounters:   07/09/18 127/78   06/04/18 111/64   05/31/18 134/78    Weight from Last 3 Encounters:   06/04/18 64.9 kg (143 lb)   05/31/18 64.6 kg (142 lb 6.4 oz)   04/23/18 65.3 kg (144 lb)              Today, you had the following     No orders found for display       Primary Care Provider Office Phone # Fax #    Tanika Clark MD  [phone]       [address] [place] [address] [address]  [facility] [address]        Equal Access to Services     WADE STAUFFER : Hadii aad ku hadjacialejandra Taeali, adamada chinmaypmea, tarah karoland washington, ramses justicein hayaan maverickdora mohr laYumiodell arbeu. So [facility] [phone].    ATENCIÓN: Si habla español, tiene a salazar disposición servicios gratuitos de asistencia lingüística. Llame al [phone].    We comply with applicable federal civil rights laws and [place] laws. We do not discriminate on the basis of race, color, national origin, age, disability, sex, sexual orientation, or gender identity.            Thank you!     Thank you for choosing [facility]  for your care. Our goal is always to provide you with excellent care. Hearing back from our patients is one way we can continue to improve our services. Please take a few minutes to complete the written survey that you may receive in the mail after your visit with us. Thank you!             Your Updated Medication List - Protect others around you: Learn how to safely use, store and throw away your medicines at www.disposemymeds.org.          This list is accurate as of 7/9/18 11:59 PM.  Always use your most recent med list.                   Brand Name Dispense Instructions for use Diagnosis    clotrimazole 1 % cream    LOTRIMIN    30 g    Apply topically 2 times daily    Cutaneous candidiasis       cyanocobalamin 1000 MCG tablet    vitamin  B-12     Take by mouth daily        cycloSPORINE 0.05 % ophthalmic emulsion    RESTASIS     1 drop 2 times daily        Fiber 0.52 g Caps     540 capsule    2 tabs in am and pm        hydrocortisone 1 % ointment     28 g    APPLY SPARINGLY TO AFFECTED AREA THREE TIMES A DAY FOR 14 DAYS    Rash and nonspecific skin eruption       hydroxychloroquine 200 MG tablet    PLAQUENIL    135 tablet    Hydroxychloroquine 200mg in the AM and 100mg in the PM.    Inflammatory polyarthropathy (H)       ibuprofen 200 MG tablet     ADVIL/MOTRIN     Take 200-800 mg by mouth nightly as needed        lisinopril 10 MG tablet    PRINIVIL/ZESTRIL    90 tablet    TAKE 1 TABLET BY MOUTH DAILY    Essential hypertension       montelukast 10 MG tablet    SINGULAIR    90 tablet    TAKE ONE TABLET BY MOUTH EVERY DAY AS NEEDED SEASONALLY (AUGUST AND SEPTEMBER)    Other seasonal allergic rhinitis       nystatin 987831 UNIT/GM Powd    MYCOSTATIN    60 g    Apply topically 3 times daily as needed    Intertrigo       PROVIGIL PO      Take 100 mg by mouth Takes 1 1/2 tabs bid (150 mg bid)        sertraline 100 MG tablet    ZOLOFT     Take 100 mg by mouth daily Takes 2 at bedtime        vitamin D 88208 UNIT capsule    ERGOCALCIFEROL    24 capsule    Take 1 capsule (50,000 Units) by mouth every Monday and Friday.    Vitamin D deficiency, Other osteoporosis, unspecified pathological fracture presence       VYVANSE 20 MG capsule   Generic drug:  lisdexamfetamine     30 capsule    Take 30 mg by mouth every morning    Primary narcolepsy without cataplexy

## 2018-07-09 NOTE — PROGRESS NOTES
Date of Service: Jul 9, 2018    Chief Complaint:   Chief Complaint   Patient presents with     Right Hand - Numbness       History of Present Illness: Niesha Adhikari is a 67 year old, right handed female who presents today for further evaluation persistent numbness and review of electrodiagnostic study performed with Dr. Hameed on 6/8/18.  Per chart review, the patient was last seen on 4/23/18 for a right index finger middle phalanx fracture, now healed. Today, she reports that she has numbness in her right index finger that radiates to the rest of the digits and hand with use. She states that she has a tingling sensation in her hand most of the time. She states that symptoms worsen with use, especially driving, writing and opening jars. She reports that the numbness wakes her up at night and that she does not use a brace. She did not have any numbness in her digits before the injury, but states she has a history of hand problems secondary to her arthritis. She notes the numbness spreads from the injured digit into her dorsal hand and forearm. She states she wakes up nearly every night, but is unsure whether this is related to her hand symptoms.    The patient also has a history of Hepatitis C related inflammatory polyarthopy versus rheumatoid arthritis (negative RF and CCP). She is followed by Dr. Cho in rheumatology and is currently on hydroxychloroquine 200 mg in the AM and 100 mg in the PM. She is also being treated by Dr. Cho for osteoporosis with ergocalciferol 50,000 units twice weekly, calcium 1200 mg daily, and Boniva 3 mg IV every 90 days.    Physical examination:  VITALS: /78 (BP Location: Left arm)  Pulse 66  LMP 11/27/2003  SpO2 98%  Pain is rated 8 out of 10 on the visual analog scale.  QUICKDASH: 56.82   Strength:  strength at level three is 40 on the right and 50 on the left.   GENERAL: Healthy-appearing adult female in no acute distress.  Alert and oriented times  three.  Respiratory: Breathing regular and non-labored.  Left upper extremity: Full shoulder, elbow, forearm, and wrist range of motion. . 5/5 finger abduction, EPL and FPL intact, and patient's neurovascular exam is intact with capillary refill less than 3 seconds.  Right upper extremity:  Full shoulder, elbow, forearm, and wrist range of motion. Negative for Tinel's at the wrist. Positive for carpal compression. Two point discrimination is 6 mm in thumb, index and small finger and 5 mm in the ring and long finger. 5/5 finger abduction, EPL and FPL intact, and patient's neurovascular exam is intact with capillary refill less than 3 seconds.  Skin: Intact without any rashes or abrasions.    ELECTRODIAGNOSTIC STUDIES: An electrodiagnostic study of the bilateral extremities was available for review today.  This showed mild bilateral median neuropathy at the wrist and mild right ulnar neuropathy at the elbow.    Assessment: 67 year old, right handed female with mild bilateral carpal tunnel syndrome and right cubital tunnel syndrome.    Plan: The patient and I discussed her injury, ongoing recovery and electrodiagnostic study. I explained that her symptoms are secondary to carpal tunnel syndrome based on her electrodiagnostic study and physical exam. We discussed typical treatment options for carpal tunnel syndrome starting with continuing symptomatic management using a brace occasionally and anti-inflammatories. I explained that a corticosteroid injection is typically effective at providing symptomatic relief and could take 1-2 weeks for the steroid to take effect. In addition, I explained that the injection would be useful to discriminate which symptoms are due to her injury and which are due to carpal tunnel syndrome. At this time, the patient would like to proceed with a corticosteroid injection to the right carpal tunnel. I will provide the patient with bilateral wrist braces to wear at nighttime. Risks include  bleeding, infection, ineffectiveness, skin pigment changes and elevated blood sugars.  The patient is agreeable to the plan and all of her questions were answered at this time.     Procedure:   After written informed consent was obtained, the patient's right volar wrist was prepped with chloraprep.  A combination of 20 mg of Depo-Medrol and 1cc 1% lidocaine plain were injected into the patient's right carpal tunnel through a volar approach just ulnar to palmaris longus.  There were no immediate complications.  Band-aid was applied.    I, Rosa Villarreal MD, have reviewed the above note and agree with the scribe's notation as written.   I, Virgen Crane, am serving as a scribe to document services personally performed by Rosa Villarreal MD, based upon my observations and the provider's statements to me. All documentation has been reviewed by the aforementioned doctor prior to being entered into the official medical record.

## 2018-07-09 NOTE — NURSING NOTE
Niesha Adhikari's chief complaint for this visit includes:  Chief Complaint   Patient presents with     Right Hand - Numbness     PCP: Tanika Clark    Referring Provider:  No referring provider defined for this encounter.    /78 (BP Location: Left arm)  Pulse 66  LMP 11/27/2003  SpO2 98%  Data Unavailable     Do you need any medication refills at today's visit? No    Hand Evaluation    Pain Score (1-10):    Hand Dominance:    QuickDASH Disability Score: 56.82      Key Force:       Pinch Force:        Force:   R hand  level 3 force: 18.1 kg (40 lb)  L hand  level 3 force: 22.7 kg (50 lb)    Celi Kellogg CMA

## 2018-07-10 RX ORDER — DIAPER,BRIEF,INFANT-TODD,DISP
EACH MISCELLANEOUS
Qty: 28 G | Refills: 0 | OUTPATIENT
Start: 2018-07-10

## 2018-07-10 NOTE — TELEPHONE ENCOUNTER
Per TC last note she recommended:  2. Cutaneous candidiasis  Stop the steroid cream, will use clotrimazole instead.     - clotrimazole (LOTRIMIN) 1 % cream; Apply topically 2 times daily  Dispense: 30 g; Refill: 3    But also see that she had refilled it in January.  Recommend follow-up in clinic regarding this rash for further refills.     Morgan Guillen PA-C

## 2018-07-10 NOTE — TELEPHONE ENCOUNTER
Called and spoke with patient regarding message below.  Patient verbalized understanding.  Scheduled patient with TC on 7/27/18 at 9:20am.  Latoya Lowry CMA (AAMA)

## 2018-07-10 NOTE — TELEPHONE ENCOUNTER
Hydrocortisone 1% ointment     Routing refill request to provider for review/approval because:  Drug not on the [facility] refill protocol     Alesia Abbasi RN, BSN

## 2018-07-23 NOTE — PROGRESS NOTES
SUBJECTIVE:   Niesha Adhikari is a 67 year old female who presents to clinic today for the following health issues:      HPI  Rash  Onset: Cutaneous candidiasis     Description:   Location: under bilateral breasts  Character: round, raised, red  Itching (Pruritis): YES    Progression of Symptoms:  intermittent    Accompanying Signs & Symptoms:  Fever: no   Body aches or joint pain: no   Sore throat symptoms: no   Recent cold symptoms: no     History:   Previous similar rash: YES    Precipitating factors:   Exposure to similar rash: no   New exposures: None   Recent travel: no     Alleviating factors:  Clotrimazole     Therapies Tried and outcome: Clotrimazole and nystatin.  Some days are better on some days are worse.    She also complains of fatigue and would like to have blood work done.  She does have a diagnosis of narcolepsy.      Problem list and histories reviewed & adjusted, as indicated.  Additional history: as documented    Patient Active Problem List   Diagnosis     Allergic rhinitis     Esophageal reflux     Pernicious anemia     Anxiety state     Migraine     Essential and other specified forms of tremor     Fibromyalgia     Moderate recurrent major depression (H)     Advanced directives, counseling/discussion     Narcolepsy     Internal hemorrhoids with other complication     AIN (anal intraepithelial neoplasia) anal canal     Sciatica     Osteoporosis     Inflammatory polyarthropathy (H)     Benign essential hypertension     Personal history of other medical treatment (CODE)     Past Surgical History:   Procedure Laterality Date     BIOPSY ANAL CANAL  1/21/13    [facility]      BREAST BIOPSY, RT/LT Left 1975    Breat Biopsy RT/LT     C NONSPECIFIC PROCEDURE  1965    Removed bone left index finger knuckle, casts broken bones     COLONOSCOPY  8/25/2009     COLONOSCOPY  2/14/2011    COLONOSCOPY performed by CRISTIN LAGUNAS at  GI     CYSTOSCOPY  2/28/2011    CYSTOSCOPY performed by  CAYLA FLOR at  OR     ENDOSCOPY  05/21/12    Upper GI - [facility]     HC COLONOSCOPY W/WO BRUSH/WASH  08/22/05     HC UGI ENDOSCOPY DIAG W BIOPSY  10/01/09      UGI ENDOSCOPY, SIMPLE EXAM  08/08/07     HEMORRHOIDECTOMY  06/25/12    [facility]     LAPAROSCOPIC SALPINGO-OOPHORECTOMY  2/28/2011    LAPAROSCOPIC SALPINGO-OOPHORECTOMY performed by CAYLA FLOR at  OR     TONSILLECTOMY & ADENOIDECTOMY  1965       Social History   Substance Use Topics     Smoking status: Former Smoker     Packs/day: 0.75     Years: 10.00     Types: Cigarettes     Start date: 11/1/1968     Quit date: 11/1/1978     Smokeless tobacco: Never Used      Comment: No exposure at home     Alcohol use No     Family History   Problem Relation Age of Onset     Hypertension Mother      Breast Cancer Mother      Coronary Artery Disease Mother      Cerebrovascular Disease Mother      KIDNEY DISEASE Mother      Hypertension Brother      Respiratory Brother      emphysema     Lipids Brother      HEART DISEASE Brother      stents, 12/2011; has had about 6 MIs, last one 1/2014     C.A.D. Sister      MI at age 63     Hypertension Sister      GASTROINTESTINAL DISEASE Sister      gallbladder     Circulatory Sister      brain aneurysm at 63     Genitourinary Problems Sister      1 kidney/bladder     Hypertension Sister      Obesity Sister      Coronary Artery Disease Sister      had valve surgery, MI, CHF     Unknown/Adopted Paternal Uncle      Blood Disease Son      Lymes/7/11     Hypertension Son      Hypertension Father      Lymphoma Father      Glaucoma Father      Coronary Artery Disease Other 49     niece     Diabetes Other      cousin     Cerebrovascular Disease Maternal Grandmother      Cerebrovascular Disease Paternal Grandmother      Liver Cancer Cousin      Glaucoma Paternal Grandfather            ROS:  CONSTITUTIONAL: NEGATIVE for fever, chills, change in weight  ENT/MOUTH: NEGATIVE for ear, mouth and throat  problems  RESP: NEGATIVE for significant cough or shortness of breath  CV: NEGATIVE for chest pain, palpitations or peripheral edema    OBJECTIVE:     /72 (BP Location: Right arm, Patient Position: Chair, Cuff Size: Adult Regular)  Pulse 72  Temp 98.2  F (36.8  C) (Temporal)  Resp 16  Wt 142 lb (64.4 kg)  LMP 11/27/2003  SpO2 99%  BMI 24.6 kg/m2  Body mass index is 24.6 kg/(m^2).  Gen: no apparent distress  NECK: no adenopathy, no asymmetry, no masses  Chest: clear to auscultation without wheeze, rale or rhonchi  Cor: regular rate and rhythm without murmur  ABDOMEN: soft, nontender, no masses and bowel sounds normal  Ext: warm and dry without edema  Skin: Faint light pink rash under her right breast.  Otherwise skin is clear.  Psych: Alert and oriented times 3; coherent speech, normal   rate and volume, able to articulate logical thoughts, able   to abstract reason, no tangential thoughts, no hallucinations   or delusions  Her affect is neutral    ASSESSMENT/PLAN:     1. Benign essential hypertension  Well-controlled.  Labs were drawn.    - CBC with platelets  - Vitamin B12  - Vitamin D Deficiency  - TSH with free T4 reflex  - Comprehensive metabolic panel    2. Moderate recurrent major depression (H)  She continues to follow with psychiatry.  Medications could be playing a role in her fatigue.      - CBC with platelets  - Vitamin B12  - Vitamin D Deficiency  - TSH with free T4 reflex  - Comprehensive metabolic panel    3. Pernicious anemia  She takes over-the-counter supplement.  Labs are drawn    - CBC with platelets  - Vitamin B12  - Vitamin D Deficiency  - TSH with free T4 reflex  - Comprehensive metabolic panel    4. Encounter for screening mammogram for breast cancer    - MA SCREENING DIGITAL BILAT - Future  (s+30); Future      Tanika Clark MD  [facility]

## 2018-07-25 ENCOUNTER — MYC MEDICAL ADVICE (OUTPATIENT)
Dept: FAMILY MEDICINE | Facility: OTHER | Age: 68
End: 2018-07-25

## 2018-07-26 ENCOUNTER — MYC MEDICAL ADVICE (OUTPATIENT)
Dept: FAMILY MEDICINE | Facility: OTHER | Age: 68
End: 2018-07-26

## 2018-07-26 NOTE — TELEPHONE ENCOUNTER
"TC - please review/advise patient message. Pended orders.  Luann Purcell CMA      Per [employer] message yesterday: \"Dr. Sagastume and team:  Could I arrive earlier and have some labs done before my 9:20 a.m. appointment. Suggest CBC panel with platelets including B12, Calcium and Vitamin D. I could arrive at 8 or 8:30 or 9. Hope to hear back. Thanks. Niesha QUICK\"  "

## 2018-07-27 ENCOUNTER — OFFICE VISIT (OUTPATIENT)
Dept: FAMILY MEDICINE | Facility: OTHER | Age: 68
End: 2018-07-27
Payer: COMMERCIAL

## 2018-07-27 VITALS
TEMPERATURE: 98.2 F | WEIGHT: 142 LBS | HEART RATE: 72 BPM | RESPIRATION RATE: 16 BRPM | SYSTOLIC BLOOD PRESSURE: 112 MMHG | BODY MASS INDEX: 24.6 KG/M2 | OXYGEN SATURATION: 99 % | DIASTOLIC BLOOD PRESSURE: 72 MMHG

## 2018-07-27 DIAGNOSIS — F33.1 MODERATE RECURRENT MAJOR DEPRESSION (H): ICD-10-CM

## 2018-07-27 DIAGNOSIS — D51.0 PERNICIOUS ANEMIA: ICD-10-CM

## 2018-07-27 DIAGNOSIS — I10 BENIGN ESSENTIAL HYPERTENSION: Primary | ICD-10-CM

## 2018-07-27 DIAGNOSIS — Z12.31 ENCOUNTER FOR SCREENING MAMMOGRAM FOR BREAST CANCER: ICD-10-CM

## 2018-07-27 LAB
ALBUMIN SERPL-MCNC: 3.9 G/DL (ref 3.4–5)
ALP SERPL-CCNC: 71 U/L (ref 40–150)
ALT SERPL W P-5'-P-CCNC: 28 U/L (ref 0–50)
ANION GAP SERPL CALCULATED.3IONS-SCNC: 11 MMOL/L (ref 3–14)
AST SERPL W P-5'-P-CCNC: 26 U/L (ref 0–45)
BILIRUB SERPL-MCNC: 0.5 MG/DL (ref 0.2–1.3)
BUN SERPL-MCNC: 8 MG/DL (ref 7–30)
CALCIUM SERPL-MCNC: 8.9 MG/DL (ref 8.5–10.1)
CHLORIDE SERPL-SCNC: 96 MMOL/L (ref 94–109)
CO2 SERPL-SCNC: 25 MMOL/L (ref 20–32)
CREAT SERPL-MCNC: 0.56 MG/DL (ref 0.52–1.04)
ERYTHROCYTE [DISTWIDTH] IN BLOOD BY AUTOMATED COUNT: 12.5 % (ref 10–15)
GFR SERPL CREATININE-BSD FRML MDRD: >90 ML/MIN/1.7M2
GLUCOSE SERPL-MCNC: 90 MG/DL (ref 70–99)
HCT VFR BLD AUTO: 42.1 % (ref 35–47)
HGB BLD-MCNC: 14.1 G/DL (ref 11.7–15.7)
MCH RBC QN AUTO: 30.6 PG (ref 26.5–33)
MCHC RBC AUTO-ENTMCNC: 33.5 G/DL (ref 31.5–36.5)
MCV RBC AUTO: 91 FL (ref 78–100)
PLATELET # BLD AUTO: 161 10E9/L (ref 150–450)
POTASSIUM SERPL-SCNC: 4.4 MMOL/L (ref 3.4–5.3)
PROT SERPL-MCNC: 7.2 G/DL (ref 6.8–8.8)
RBC # BLD AUTO: 4.61 10E12/L (ref 3.8–5.2)
SODIUM SERPL-SCNC: 132 MMOL/L (ref 133–144)
TSH SERPL DL<=0.005 MIU/L-ACNC: 1.25 MU/L (ref 0.4–4)
VIT B12 SERPL-MCNC: 3334 PG/ML (ref 193–986)
WBC # BLD AUTO: 4 10E9/L (ref 4–11)

## 2018-07-27 PROCEDURE — 36415 COLL VENOUS BLD VENIPUNCTURE: CPT | Performed by: FAMILY MEDICINE

## 2018-07-27 PROCEDURE — 85027 COMPLETE CBC AUTOMATED: CPT | Performed by: FAMILY MEDICINE

## 2018-07-27 PROCEDURE — 99214 OFFICE O/P EST MOD 30 MIN: CPT | Performed by: FAMILY MEDICINE

## 2018-07-27 PROCEDURE — 84443 ASSAY THYROID STIM HORMONE: CPT | Performed by: FAMILY MEDICINE

## 2018-07-27 PROCEDURE — 82607 VITAMIN B-12: CPT | Performed by: FAMILY MEDICINE

## 2018-07-27 PROCEDURE — 80053 COMPREHEN METABOLIC PANEL: CPT | Performed by: FAMILY MEDICINE

## 2018-07-27 PROCEDURE — 82306 VITAMIN D 25 HYDROXY: CPT | Performed by: FAMILY MEDICINE

## 2018-07-27 RX ORDER — IBANDRONATE SODIUM 150 MG/1
3 TABLET, FILM COATED ORAL
COMMUNITY
End: 2023-01-31

## 2018-07-27 ASSESSMENT — ANXIETY QUESTIONNAIRES
3. WORRYING TOO MUCH ABOUT DIFFERENT THINGS: MORE THAN HALF THE DAYS
2. NOT BEING ABLE TO STOP OR CONTROL WORRYING: MORE THAN HALF THE DAYS
7. FEELING AFRAID AS IF SOMETHING AWFUL MIGHT HAPPEN: NOT AT ALL
5. BEING SO RESTLESS THAT IT IS HARD TO SIT STILL: SEVERAL DAYS
GAD7 TOTAL SCORE: 9
IF YOU CHECKED OFF ANY PROBLEMS ON THIS QUESTIONNAIRE, HOW DIFFICULT HAVE THESE PROBLEMS MADE IT FOR YOU TO DO YOUR WORK, TAKE CARE OF THINGS AT HOME, OR GET ALONG WITH OTHER PEOPLE: SOMEWHAT DIFFICULT
1. FEELING NERVOUS, ANXIOUS, OR ON EDGE: MORE THAN HALF THE DAYS
6. BECOMING EASILY ANNOYED OR IRRITABLE: SEVERAL DAYS

## 2018-07-27 ASSESSMENT — PATIENT HEALTH QUESTIONNAIRE - PHQ9: 5. POOR APPETITE OR OVEREATING: SEVERAL DAYS

## 2018-07-27 NOTE — MR AVS SNAPSHOT
After Visit Summary   7/27/2018    Niesha Adhikari    MRN: [id number]           Patient Information     Date Of Birth          1950        Visit Information        Provider Department      7/27/2018 9:20 AM Tanika Clark MD [facility]        Today's Diagnoses     Benign essential hypertension    -  1    Moderate recurrent major depression (H)        Pernicious anemia        Encounter for screening mammogram for breast cancer           Follow-ups after your visit        Your next 10 appointments already scheduled     Sep 05, 2018  8:00 AM CDT   Level 1 with NL INFUSION CHAIR 4   [facility] ([facility])    [address] [place] [address]   [phone]            Sep 26, 2018  8:45 AM CDT   LAB with NL LAB EMC   [facility] ([facility])    [address] [facility] [address]   [phone]           Please do not eat 10-12 hours before your appointment if you are coming in fasting for labs on lipids, cholesterol, or glucose (sugar). This does not apply to pregnant women. Water, hot tea and black coffee (with nothing added) are okay. Do not drink other fluids, diet soda or chew gum.            Oct 05, 2018 10:00 AM CDT   Return Visit with Apolinar Cho MD   [facility] ([facility])    [address] [place] [address]   [phone]              Future tests that were ordered for you today     Open Future Orders        Priority Expected Expires Ordered    MA SCREENING DIGITAL BILAT - Future  (s+30) Routine  7/23/2019 7/27/2018            Who to contact     If you have questions or need follow up information about today's clinic visit or your schedule please contact [facility] directly at [phone].  Normal or non-critical lab and imaging results will be communicated to you by MyChart, letter or phone within 4 business days after  the clinic has received the results. If you do not hear from us within 7 days, please contact the clinic through Umbel or phone. If you have a critical or abnormal lab result, we will notify you by phone as soon as possible.  Submit refill requests through Umbel or call your pharmacy and they will forward the refill request to us. Please allow 3 business days for your refill to be completed.          Additional Information About Your Visit        Umbel Information     Umbel gives you secure access to your electronic health record. If you see a primary care provider, you can also send messages to your care team and make appointments. If you have questions, please call your primary care clinic.  If you do not have a primary care provider, please call [phone] and they will assist you.        Care EveryWhere ID     This is your Care EveryWhere ID. This could be used by other organizations to access your [place] medical records  [id number]        Your Vitals Were     Pulse Temperature Respirations Last Period Pulse Oximetry BMI (Body Mass Index)    72 98.2  F (36.8  C) (Temporal) 16 11/27/2003 99% 24.6 kg/m2       Blood Pressure from Last 3 Encounters:   07/27/18 112/72   07/09/18 127/78   06/04/18 111/64    Weight from Last 3 Encounters:   07/27/18 142 lb (64.4 kg)   06/04/18 143 lb (64.9 kg)   05/31/18 142 lb 6.4 oz (64.6 kg)              We Performed the Following     CBC with platelets     Comprehensive metabolic panel     TSH with free T4 reflex     Vitamin B12     Vitamin D Deficiency        Primary Care Provider Office Phone # Fax #    Tanika QUIROZ MD [place]ardo [phone]       [address]  [facility] [address]        Equal Access to Services     [facility]: Hadii chely Rao, wakimberlyda lynadaha, qaybta kaalmada padmini, ramses mcnulty . So [facility] [phone].    ATENCIÓN: Si habla español, tiene a salazar disposición servicios gratuitos de  asistencia lingüística. Ricky al [phone].    We comply with applicable federal civil rights laws and [place] laws. We do not discriminate on the basis of race, color, national origin, age, disability, sex, sexual orientation, or gender identity.            Thank you!     Thank you for choosing [facility]  for your care. Our goal is always to provide you with excellent care. Hearing back from our patients is one way we can continue to improve our services. Please take a few minutes to complete the written survey that you may receive in the mail after your visit with us. Thank you!             Your Updated Medication List - Protect others around you: Learn how to safely use, store and throw away your medicines at www.disposemymeds.org.          This list is accurate as of 7/27/18 10:03 AM.  Always use your most recent med list.                   Brand Name Dispense Instructions for use Diagnosis    CALCIUM 1200 PO           clotrimazole 1 % cream    LOTRIMIN    30 g    Apply topically 2 times daily    Cutaneous candidiasis       cyanocobalamin 1000 MCG tablet    vitamin  B-12     Take by mouth daily        cycloSPORINE 0.05 % ophthalmic emulsion    RESTASIS     1 drop 2 times daily        Fiber 0.52 g Caps     540 capsule    2 tabs in am and pm        hydrocortisone 1 % ointment     28 g    APPLY SPARINGLY TO AFFECTED AREA THREE TIMES A DAY FOR 14 DAYS    Rash and nonspecific skin eruption       hydroxychloroquine 200 MG tablet    PLAQUENIL    135 tablet    Hydroxychloroquine 200mg in the AM and 100mg in the PM.    Inflammatory polyarthropathy (H)       IBANdronate 150 MG tablet    BONIVA     Take 150 mg by mouth every 30 days        ibuprofen 200 MG tablet    ADVIL/MOTRIN     Take 200-800 mg by mouth nightly as needed        lisinopril 10 MG tablet    PRINIVIL/ZESTRIL    90 tablet    TAKE 1 TABLET BY MOUTH DAILY    Essential hypertension       montelukast 10 MG tablet    SINGULAIR    90 tablet     TAKE ONE TABLET BY MOUTH EVERY DAY AS NEEDED SEASONALLY (AUGUST AND SEPTEMBER)    Other seasonal allergic rhinitis       nystatin 493796 UNIT/GM Powd    MYCOSTATIN    60 g    Apply topically 3 times daily as needed    Intertrigo       PROVIGIL PO      Take 100 mg by mouth Takes 1 1/2 tabs bid (150 mg bid)        sertraline 100 MG tablet    ZOLOFT     Take 100 mg by mouth daily Takes 2 at bedtime        vitamin D 18424 UNIT capsule    ERGOCALCIFEROL    24 capsule    Take 1 capsule (50,000 Units) by mouth every Monday and Friday.    Vitamin D deficiency, Other osteoporosis, unspecified pathological fracture presence       VYVANSE 20 MG capsule   Generic drug:  lisdexamfetamine     30 capsule    Take 30 mg by mouth every morning    Primary narcolepsy without cataplexy

## 2018-07-28 ASSESSMENT — PATIENT HEALTH QUESTIONNAIRE - PHQ9: SUM OF ALL RESPONSES TO PHQ QUESTIONS 1-9: 11

## 2018-07-28 ASSESSMENT — ANXIETY QUESTIONNAIRES: GAD7 TOTAL SCORE: 9

## 2018-07-29 PROBLEM — Z79.899 HIGH RISK MEDICATION USE: Status: RESOLVED | Noted: 2017-06-07 | Resolved: 2018-07-29

## 2018-07-29 LAB — DEPRECATED CALCIDIOL+CALCIFEROL SERPL-MC: 44 UG/L (ref 20–75)

## 2018-08-13 ENCOUNTER — HOSPITAL ENCOUNTER (OUTPATIENT)
Dept: MAMMOGRAPHY | Facility: CLINIC | Age: 68
Discharge: HOME | End: 2018-08-13
Attending: FAMILY MEDICINE | Admitting: FAMILY MEDICINE
Payer: MEDICARE

## 2018-08-13 DIAGNOSIS — Z12.31 VISIT FOR SCREENING MAMMOGRAM: ICD-10-CM

## 2018-08-13 DIAGNOSIS — Z12.31 ENCOUNTER FOR SCREENING MAMMOGRAM FOR BREAST CANCER: ICD-10-CM

## 2018-08-13 PROCEDURE — 77067 SCR MAMMO BI INCL CAD: CPT

## 2018-09-05 ENCOUNTER — INFUSION THERAPY VISIT (OUTPATIENT)
Dept: INFUSION THERAPY | Facility: CLINIC | Age: 68
End: 2018-09-05
Attending: INTERNAL MEDICINE
Payer: MEDICARE

## 2018-09-05 VITALS
HEART RATE: 69 BPM | SYSTOLIC BLOOD PRESSURE: 127 MMHG | RESPIRATION RATE: 16 BRPM | BODY MASS INDEX: 24.9 KG/M2 | DIASTOLIC BLOOD PRESSURE: 77 MMHG | OXYGEN SATURATION: 97 % | WEIGHT: 143.7 LBS | TEMPERATURE: 97.6 F

## 2018-09-05 DIAGNOSIS — M81.0 AGE-RELATED OSTEOPOROSIS WITHOUT CURRENT PATHOLOGICAL FRACTURE: ICD-10-CM

## 2018-09-05 DIAGNOSIS — Z92.89 PERSONAL HISTORY OF OTHER MEDICAL TREATMENT (CODE): Primary | ICD-10-CM

## 2018-09-05 LAB
CALCIUM SERPL-MCNC: 9.2 MG/DL (ref 8.5–10.1)
CREAT SERPL-MCNC: 0.58 MG/DL (ref 0.52–1.04)
GFR SERPL CREATININE-BSD FRML MDRD: >90 ML/MIN/1.7M2

## 2018-09-05 PROCEDURE — 36415 COLL VENOUS BLD VENIPUNCTURE: CPT | Performed by: INTERNAL MEDICINE

## 2018-09-05 PROCEDURE — 96374 THER/PROPH/DIAG INJ IV PUSH: CPT

## 2018-09-05 PROCEDURE — 25000128 H RX IP 250 OP 636: Performed by: INTERNAL MEDICINE

## 2018-09-05 PROCEDURE — 82565 ASSAY OF CREATININE: CPT | Performed by: INTERNAL MEDICINE

## 2018-09-05 PROCEDURE — 82310 ASSAY OF CALCIUM: CPT | Performed by: INTERNAL MEDICINE

## 2018-09-05 RX ORDER — IBANDRONATE SODIUM 3 MG/3 ML
3 SYRINGE (ML) INTRAVENOUS ONCE
Status: CANCELLED | OUTPATIENT
Start: 2018-09-05 | End: 2018-09-05

## 2018-09-05 RX ORDER — IBANDRONATE SODIUM 3 MG/3 ML
3 SYRINGE (ML) INTRAVENOUS ONCE
Status: COMPLETED | OUTPATIENT
Start: 2018-09-05 | End: 2018-09-05

## 2018-09-05 RX ADMIN — IBANDRONATE SODIUM 3 MG: 3 INJECTION, SOLUTION INTRAVENOUS at 09:22

## 2018-09-05 ASSESSMENT — PAIN SCALES - GENERAL: PAINLEVEL: NO PAIN (0)

## 2018-09-05 NOTE — PROGRESS NOTES
Infusion Nursing Note:  Niesha Adhikari presents today for Boniva.    Patient seen by provider today: No   present during visit today: Not Applicable.    Note: N/A.    Intravenous Access:  Peripheral IV placed.    Treatment Conditions:  Lab Results   Component Value Date     07/27/2018                   Lab Results   Component Value Date    POTASSIUM 4.4 07/27/2018           Lab Results   Component Value Date    MAG 1.7 12/05/2006            Lab Results   Component Value Date    CR 0.58 09/05/2018                   Lab Results   Component Value Date    ELIZABETH 9.2 09/05/2018                Lab Results   Component Value Date    BILITOTAL 0.5 07/27/2018           Lab Results   Component Value Date    ALBUMIN 3.9 07/27/2018                    Lab Results   Component Value Date    ALT 28 07/27/2018           Lab Results   Component Value Date    AST 26 07/27/2018           Post Infusion Assessment:  Patient tolerated injection without incident.  Patient observed for 15 minutes post Boniva per protocol.  Blood return noted pre and post infusion.  Site patent and intact, free from redness, edema or discomfort.  No evidence of extravasations.  Access discontinued per protocol.    Discharge Plan:   Discharge instructions reviewed with: Patient.  Patient discharged in stable condition accompanied by: self.  Departure Mode: Ambulatory.    Amisha Cobb RN

## 2018-09-05 NOTE — MR AVS SNAPSHOT
After Visit Summary   9/5/2018    Niesha Adhikari    MRN: [id number]           Patient Information     Date Of Birth          1950        Visit Information        Provider Department      9/5/2018 8:00 AM NL INFUSION CHAIR 4 [facility]        Today's Diagnoses     Personal history of other medical treatment (CODE)    -  1    Age-related osteoporosis without current pathological fracture           Follow-ups after your visit        Follow-up notes from your care team     Return in about 3 months (around 12/5/2018).      Your next 10 appointments already scheduled     Sep 26, 2018  8:45 AM CDT   LAB with NL LAB EMC   [facility]    [address]  [facility] [id number] [phone]           Please do not eat 10-12 hours before your appointment if you are coming in fasting for labs on lipids, cholesterol, or glucose (sugar). This does not apply to pregnant women. Water, hot tea and black coffee (with nothing added) are okay. Do not drink other fluids, diet soda or chew gum.            Oct 05, 2018 10:00 AM CDT   Return Visit with Apolinar Cho MD   [facility]    [address] [place] [id number] [phone]            Dec 05, 2018  8:00 AM CST   Level 1 with NL INFUSION CHAIR 4   [facility] ([facility])    [address] [place] [id number] [phone]              Who to contact     If you have questions or need follow up information about today's clinic visit or your schedule please contact [facility] directly at [phone].  Normal or non-critical lab and imaging results will be communicated to you by MyChart, letter or phone within 4 business days after the clinic has received the results. If you do not hear from us within 7 days, please contact the clinic through MyChart or phone. If  you have a critical or abnormal lab result, we will notify you by phone as soon as possible.  Submit refill requests through Zilift or call your pharmacy and they will forward the refill request to us. Please allow 3 business days for your refill to be completed.          Additional Information About Your Visit        Enlightedhart Information     Zilift gives you secure access to your electronic health record. If you see a primary care provider, you can also send messages to your care team and make appointments. If you have questions, please call your primary care clinic.  If you do not have a primary care provider, please call [phone] and they will assist you.        Care EveryWhere ID     This is your Care EveryWhere ID. This could be used by other organizations to access your [place] medical records  [id number]        Your Vitals Were     Pulse Temperature Respirations Last Period Pulse Oximetry BMI (Body Mass Index)    69 97.6  F (36.4  C) (Temporal) 16 11/27/2003 97% 24.9 kg/m2       Blood Pressure from Last 3 Encounters:   09/05/18 127/77   07/27/18 112/72   07/09/18 127/78    Weight from Last 3 Encounters:   09/05/18 65.2 kg (143 lb 11.2 oz)   07/27/18 64.4 kg (142 lb)   06/04/18 64.9 kg (143 lb)              We Performed the Following     Calcium     Creatinine          Today's Medication Changes          These changes are accurate as of 9/5/18  1:10 PM.  If you have any questions, ask your nurse or doctor.               Stop taking these medicines if you haven't already. Please contact your care team if you have questions.     CALCIUM 1200 PO                    Primary Care Provider Office Phone # Fax #    Tanika GABRIELLA MD [place] [phone]       [address] [facility] [address]  [facility] [address]        Equal Access to Services     [facility] STAUFFER : Lonny Rao, emery fernández, ramses funes. So [facility]  [phone].    ATENCIÓN: Si maxwell sanchez, tiene a salazar disposición servicios gratuitos de asistencia lingüística. Ricky bernard [phone].    We comply with applicable federal civil rights laws and [place] laws. We do not discriminate on the basis of race, color, national origin, age, disability, sex, sexual orientation, or gender identity.            Thank you!     Thank you for choosing [facility]  for your care. Our goal is always to provide you with excellent care. Hearing back from our patients is one way we can continue to improve our services. Please take a few minutes to complete the written survey that you may receive in the mail after your visit with us. Thank you!             Your Updated Medication List - Protect others around you: Learn how to safely use, store and throw away your medicines at www.disposemymeds.org.          This list is accurate as of 9/5/18  1:10 PM.  Always use your most recent med list.                   Brand Name Dispense Instructions for use Diagnosis    clotrimazole 1 % cream    LOTRIMIN    30 g    Apply topically 2 times daily    Cutaneous candidiasis       cyanocobalamin 1000 MCG tablet    vitamin  B-12     Take 1,000 mcg by mouth every other day        cycloSPORINE 0.05 % ophthalmic emulsion    RESTASIS     Place 1 drop into both eyes 2 times daily        Fiber 0.52 g Caps     540 capsule    2 tabs in am and pm        hydrocortisone 1 % ointment     28 g    APPLY SPARINGLY TO AFFECTED AREA THREE TIMES A DAY FOR 14 DAYS    Rash and nonspecific skin eruption       hydroxychloroquine 200 MG tablet    PLAQUENIL    135 tablet    Hydroxychloroquine 200mg in the AM and 100mg in the PM.    Inflammatory polyarthropathy (H)       IBANdronate 150 MG tablet    BONIVA     Inject 3 mg into the vein every 3 months        lisinopril 10 MG tablet    PRINIVIL/ZESTRIL    90 tablet    TAKE 1 TABLET BY MOUTH DAILY    Essential hypertension       montelukast 10 MG tablet     SINGULAIR    90 tablet    TAKE ONE TABLET BY MOUTH EVERY DAY AS NEEDED SEASONALLY (AUGUST AND SEPTEMBER)    Other seasonal allergic rhinitis       nystatin 873607 UNIT/GM Powd    MYCOSTATIN    60 g    Apply topically 3 times daily as needed    Intertrigo       PROVIGIL PO      Take 100 mg by mouth Takes 1 1/2 tabs bid (150 mg bid) morning and noon        sertraline 100 MG tablet    ZOLOFT     Take 100 mg by mouth daily Takes 2 at bedtime        vitamin D 75210 UNIT capsule    ERGOCALCIFEROL    24 capsule    Take 1 capsule (50,000 Units) by mouth every Monday and Friday.    Vitamin D deficiency, Other osteoporosis, unspecified pathological fracture presence       VYVANSE 20 MG capsule   Generic drug:  lisdexamfetamine     30 capsule    Take 30 mg by mouth every morning    Primary narcolepsy without cataplexy

## 2018-09-23 DIAGNOSIS — B37.2 CUTANEOUS CANDIDIASIS: ICD-10-CM

## 2018-09-24 RX ORDER — THERMOMETER, ELECTRONIC,ORAL
EACH MISCELLANEOUS
Qty: 28.35 G | Refills: 3 | Status: SHIPPED | OUTPATIENT
Start: 2018-09-24 | End: 2019-08-09

## 2018-09-26 DIAGNOSIS — Z79.899 HIGH RISK MEDICATION USE: ICD-10-CM

## 2018-09-26 DIAGNOSIS — M06.4 INFLAMMATORY POLYARTHROPATHY (H): ICD-10-CM

## 2018-09-26 DIAGNOSIS — M81.8 OTHER OSTEOPOROSIS, UNSPECIFIED PATHOLOGICAL FRACTURE PRESENCE: ICD-10-CM

## 2018-09-26 DIAGNOSIS — E55.9 VITAMIN D DEFICIENCY: ICD-10-CM

## 2018-09-26 LAB
ALBUMIN SERPL-MCNC: 3.7 G/DL (ref 3.4–5)
ALP SERPL-CCNC: 92 U/L (ref 40–150)
ALT SERPL W P-5'-P-CCNC: 25 U/L (ref 0–50)
ANION GAP SERPL CALCULATED.3IONS-SCNC: 6 MMOL/L (ref 3–14)
AST SERPL W P-5'-P-CCNC: 23 U/L (ref 0–45)
BASOPHILS # BLD AUTO: 0 10E9/L (ref 0–0.2)
BASOPHILS NFR BLD AUTO: 0.5 %
BILIRUB SERPL-MCNC: 0.4 MG/DL (ref 0.2–1.3)
BUN SERPL-MCNC: 13 MG/DL (ref 7–30)
CALCIUM SERPL-MCNC: 8.8 MG/DL (ref 8.5–10.1)
CHLORIDE SERPL-SCNC: 102 MMOL/L (ref 94–109)
CO2 SERPL-SCNC: 31 MMOL/L (ref 20–32)
CREAT SERPL-MCNC: 0.41 MG/DL (ref 0.52–1.04)
DIFFERENTIAL METHOD BLD: ABNORMAL
EOSINOPHIL # BLD AUTO: 0.3 10E9/L (ref 0–0.7)
EOSINOPHIL NFR BLD AUTO: 7.6 %
ERYTHROCYTE [DISTWIDTH] IN BLOOD BY AUTOMATED COUNT: 12.9 % (ref 10–15)
GFR SERPL CREATININE-BSD FRML MDRD: >90 ML/MIN/1.7M2
GLUCOSE SERPL-MCNC: 83 MG/DL (ref 70–99)
HCT VFR BLD AUTO: 42.3 % (ref 35–47)
HGB BLD-MCNC: 13.8 G/DL (ref 11.7–15.7)
LYMPHOCYTES # BLD AUTO: 1.2 10E9/L (ref 0.8–5.3)
LYMPHOCYTES NFR BLD AUTO: 29.5 %
MCH RBC QN AUTO: 30.5 PG (ref 26.5–33)
MCHC RBC AUTO-ENTMCNC: 32.6 G/DL (ref 31.5–36.5)
MCV RBC AUTO: 94 FL (ref 78–100)
MONOCYTES # BLD AUTO: 0.4 10E9/L (ref 0–1.3)
MONOCYTES NFR BLD AUTO: 9.6 %
NEUTROPHILS # BLD AUTO: 2.1 10E9/L (ref 1.6–8.3)
NEUTROPHILS NFR BLD AUTO: 52.8 %
PLATELET # BLD AUTO: 149 10E9/L (ref 150–450)
POTASSIUM SERPL-SCNC: 4.7 MMOL/L (ref 3.4–5.3)
PROT SERPL-MCNC: 7.3 G/DL (ref 6.8–8.8)
RBC # BLD AUTO: 4.52 10E12/L (ref 3.8–5.2)
SODIUM SERPL-SCNC: 139 MMOL/L (ref 133–144)
WBC # BLD AUTO: 4 10E9/L (ref 4–11)

## 2018-09-26 PROCEDURE — 36415 COLL VENOUS BLD VENIPUNCTURE: CPT | Performed by: INTERNAL MEDICINE

## 2018-09-26 PROCEDURE — 82306 VITAMIN D 25 HYDROXY: CPT | Performed by: INTERNAL MEDICINE

## 2018-09-26 PROCEDURE — 85025 COMPLETE CBC W/AUTO DIFF WBC: CPT | Performed by: INTERNAL MEDICINE

## 2018-09-26 PROCEDURE — 80053 COMPREHEN METABOLIC PANEL: CPT | Performed by: INTERNAL MEDICINE

## 2018-09-27 LAB — DEPRECATED CALCIDIOL+CALCIFEROL SERPL-MC: 46 UG/L (ref 20–75)

## 2018-10-03 ENCOUNTER — MYC MEDICAL ADVICE (OUTPATIENT)
Dept: RHEUMATOLOGY | Facility: CLINIC | Age: 68
End: 2018-10-03

## 2018-10-05 ENCOUNTER — RADIANT APPOINTMENT (OUTPATIENT)
Dept: GENERAL RADIOLOGY | Facility: CLINIC | Age: 68
End: 2018-10-05
Attending: INTERNAL MEDICINE
Payer: COMMERCIAL

## 2018-10-05 ENCOUNTER — OFFICE VISIT (OUTPATIENT)
Dept: RHEUMATOLOGY | Facility: CLINIC | Age: 68
End: 2018-10-05
Payer: COMMERCIAL

## 2018-10-05 VITALS
HEIGHT: 64 IN | WEIGHT: 143 LBS | BODY MASS INDEX: 24.41 KG/M2 | SYSTOLIC BLOOD PRESSURE: 136 MMHG | HEART RATE: 72 BPM | OXYGEN SATURATION: 99 % | DIASTOLIC BLOOD PRESSURE: 84 MMHG

## 2018-10-05 DIAGNOSIS — M25.551 RIGHT HIP PAIN: ICD-10-CM

## 2018-10-05 DIAGNOSIS — M06.4 INFLAMMATORY POLYARTHROPATHY (H): Primary | ICD-10-CM

## 2018-10-05 DIAGNOSIS — E55.9 VITAMIN D DEFICIENCY: ICD-10-CM

## 2018-10-05 DIAGNOSIS — M81.8 OTHER OSTEOPOROSIS, UNSPECIFIED PATHOLOGICAL FRACTURE PRESENCE: ICD-10-CM

## 2018-10-05 DIAGNOSIS — M18.0 PRIMARY OSTEOARTHRITIS OF BOTH FIRST CARPOMETACARPAL JOINTS: ICD-10-CM

## 2018-10-05 DIAGNOSIS — Z79.899 HIGH RISK MEDICATION USE: ICD-10-CM

## 2018-10-05 PROCEDURE — 99214 OFFICE O/P EST MOD 30 MIN: CPT | Performed by: INTERNAL MEDICINE

## 2018-10-05 PROCEDURE — 73502 X-RAY EXAM HIP UNI 2-3 VIEWS: CPT

## 2018-10-05 RX ORDER — HYDROXYCHLOROQUINE SULFATE 200 MG/1
TABLET, FILM COATED ORAL
Qty: 135 TABLET | Refills: 1 | Status: SHIPPED | OUTPATIENT
Start: 2018-10-05 | End: 2019-04-12

## 2018-10-05 RX ORDER — ERGOCALCIFEROL 1.25 MG/1
CAPSULE, LIQUID FILLED ORAL
Qty: 24 CAPSULE | Refills: 1 | Status: SHIPPED | OUTPATIENT
Start: 2018-10-05 | End: 2019-04-12

## 2018-10-05 NOTE — TELEPHONE ENCOUNTER
Seeing this message now.  Will review with Ms. Adhikari today in clinic.    Apolinar Cho MD  10/5/2018 6:07 AM
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
## 2018-10-05 NOTE — MR AVS SNAPSHOT
After Visit Summary   10/5/2018    Niesha Adhikari    MRN: [id number]           Patient Information     Date Of Birth          1950        Visit Information        Provider Department      10/5/2018 10:00 AM Apolinar Cho MD [facility]        Today's Diagnoses     Right hip pain    -  1    Inflammatory polyarthropathy (H)        High risk medication use        Vitamin D deficiency        Other osteoporosis, unspecified pathological fracture presence        Primary osteoarthritis of both first carpometacarpal joints           Follow-ups after your visit        Additional Services     OPHTHALMOLOGY ADULT REFERRAL       Your provider has referred you to:  Ophthalmology    Reason for referral: Hydroxychloroquine (plaquenil) toxicity monitoring.   Please fax note to Dr. Apolinar Cho at fax # [phone]    Please be aware that coverage of these services is subject to the terms and limitations of your health insurance plan.  Call member services at your health plan with any benefit or coverage questions.      Please bring the following to your appointment:  >>   Any x-rays, CTs or MRIs which have been performed.  Contact the facility where they were done to arrange for  prior to your scheduled appointment.  Any new CT, MRI or other procedures ordered by your specialist must be performed at a [place] facility or coordinated by your clinic's referral office.    >>   List of current medications   >>   This referral request   >>   Any documents/labs given to you for this referral                  Your next 10 appointments already scheduled     Oct 26, 2018  7:40 AM CDT   [facility]    [address] [place] [id number] [phone]           Bring a current list of meds and any records pertaining to this visit. For Physicals, please bring immunization records and any forms  needing to be filled out. Please arrive 10 minutes early to complete paperwork.            Dec 05, 2018  8:00 AM CST   Level 1 with NL INFUSION CHAIR 4   [facility] ([facility])    [address] [place] [address]   [phone]            Apr 12, 2019 10:20 AM CDT   Return Visit with Apolinar Cho MD   [facility]    [address]  [place] [address] [phone]              Future tests that were ordered for you today     Open Future Orders        Priority Expected Expires Ordered    XR Hip Right 2-3 Views Routine 10/5/2018 10/5/2019 10/5/2018            Who to contact     If you have questions or need follow up information about today's clinic visit or your schedule please contact [facility] directly at [phone].  Normal or non-critical lab and imaging results will be communicated to you by Curemarkhart, letter or phone within 4 business days after the clinic has received the results. If you do not hear from us within 7 days, please contact the clinic through Curemarkhart or phone. If you have a critical or abnormal lab result, we will notify you by phone as soon as possible.  Submit refill requests through ReFashioner or call your pharmacy and they will forward the refill request to us. Please allow 3 business days for your refill to be completed.          Additional Information About Your Visit        CuremarkharBluechilli Information     ReFashioner gives you secure access to your electronic health record. If you see a primary care provider, you can also send messages to your care team and make appointments. If you have questions, please call your primary care clinic.  If you do not have a primary care provider, please call [phone] and they will assist you.        Care EveryWhere ID     This is your Care EveryWhere ID. This could be used by other organizations to access your [facility]  "records  [id number]        Your Vitals Were     Pulse Height Last Period Pulse Oximetry BMI (Body Mass Index)       72 1.626 m (5' 4\") 11/27/2003 99% 24.55 kg/m2        Blood Pressure from Last 3 Encounters:   10/05/18 136/84   09/05/18 127/77   07/27/18 112/72    Weight from Last 3 Encounters:   10/05/18 64.9 kg (143 lb)   09/05/18 65.2 kg (143 lb 11.2 oz)   07/27/18 64.4 kg (142 lb)              We Performed the Following     OPHTHALMOLOGY ADULT REFERRAL          Today's Medication Changes          These changes are accurate as of 10/5/18 10:53 AM.  If you have any questions, ask your nurse or doctor.               Start taking these medicines.        Dose/Directions    diclofenac 1 % Gel topical gel   Commonly known as:  VOLTAREN   Used for:  Primary osteoarthritis of both first carpometacarpal joints   Started by:  Apolinar Cho MD        Apply topically 4 times daily as needed for moderate pain PRN, up to 4 times per day, no more than 2g per application to the upper extremity   Quantity:  200 g   Refills:  3         These medicines have changed or have updated prescriptions.        Dose/Directions    hydroxychloroquine 200 MG tablet   Commonly known as:  PLAQUENIL   This may have changed:  additional instructions   Used for:  Inflammatory polyarthropathy (H)   Changed by:  Apolinar Cho MD        Hydroxychloroquine 200mg daily; and an additional 200mg every other day.   Quantity:  135 tablet   Refills:  1            Where to get your medicines      These medications were sent to [place], [place] - [address]  [address] [address]     Phone:  [phone]     diclofenac 1 % Gel topical gel    hydroxychloroquine 200 MG tablet    vitamin D 19056 UNIT capsule                Primary Care Provider Office Phone # Fax #    Tanika Clark [phone]       [address]  [place] [address]        Equal Access to Services     WADE STAUFFER " AH: Hadii chely gojimbo Rao, waaxda luqadaha, qaybta karoland washington, ramses justicein hayaamiley templedora mohr pricila abreu. So [facility] [phone].    ATENCIÓN: Si angelyla laura, tiene a salazar disposición servicios gratuitos de asistencia lingüística. Llame al [phone].    We comply with applicable federal civil rights laws and [place] laws. We do not discriminate on the basis of race, color, national origin, age, disability, sex, sexual orientation, or gender identity.            Thank you!     Thank you for choosing [facility]  for your care. Our goal is always to provide you with excellent care. Hearing back from our patients is one way we can continue to improve our services. Please take a few minutes to complete the written survey that you may receive in the mail after your visit with us. Thank you!             Your Updated Medication List - Protect others around you: Learn how to safely use, store and throw away your medicines at www.disposemymeds.org.          This list is accurate as of 10/5/18 10:53 AM.  Always use your most recent med list.                   Brand Name Dispense Instructions for use Diagnosis    CLOTRIMAZOLE ANTI-FUNGAL 1 % cream   Generic drug:  clotrimazole     28.35 g    APPLY TOPICALLY TWO TIMES A DAY    Cutaneous candidiasis       cyanocobalamin 1000 MCG tablet    vitamin  B-12     Take 1,000 mcg by mouth every other day        cycloSPORINE 0.05 % ophthalmic emulsion    RESTASIS     Place 1 drop into both eyes 2 times daily        diclofenac 1 % Gel topical gel    VOLTAREN    200 g    Apply topically 4 times daily as needed for moderate pain PRN, up to 4 times per day, no more than 2g per application to the upper extremity    Primary osteoarthritis of both first carpometacarpal joints       Fiber 0.52 g Caps     540 capsule    2 tabs in am and pm        hydrocortisone 1 % ointment     28 g    APPLY SPARINGLY TO AFFECTED AREA THREE TIMES A DAY FOR 14 DAYS    Rash and  nonspecific skin eruption       hydroxychloroquine 200 MG tablet    PLAQUENIL    135 tablet    Hydroxychloroquine 200mg daily; and an additional 200mg every other day.    Inflammatory polyarthropathy (H)       IBANdronate 150 MG tablet    BONIVA     Inject 3 mg into the vein every 3 months        lisinopril 10 MG tablet    PRINIVIL/ZESTRIL    90 tablet    TAKE 1 TABLET BY MOUTH DAILY    Essential hypertension       montelukast 10 MG tablet    SINGULAIR    90 tablet    TAKE ONE TABLET BY MOUTH EVERY DAY AS NEEDED SEASONALLY (AUGUST AND SEPTEMBER)    Other seasonal allergic rhinitis       nystatin 374848 UNIT/GM Powd    MYCOSTATIN    60 g    Apply topically 3 times daily as needed    Intertrigo       OLANZapine 2.5 mg half-tablet    zyPREXA     Take by mouth At Bedtime        PROVIGIL PO      Take 100 mg by mouth Takes 1 1/2 tabs bid (150 mg bid) morning and noon        sertraline 100 MG tablet    ZOLOFT     Take 100 mg by mouth daily Takes 2 at bedtime        vitamin D 52467 UNIT capsule    ERGOCALCIFEROL    24 capsule    Take 1 capsule (50,000 Units) by mouth every Monday and Friday.    Vitamin D deficiency, Other osteoporosis, unspecified pathological fracture presence       VYVANSE 20 MG capsule   Generic drug:  lisdexamfetamine     30 capsule    Take 30 mg by mouth every morning    Primary narcolepsy without cataplexy

## 2018-10-08 ENCOUNTER — TELEPHONE (OUTPATIENT)
Dept: FAMILY MEDICINE | Facility: OTHER | Age: 68
End: 2018-10-08

## 2018-10-08 NOTE — TELEPHONE ENCOUNTER
PA needed for:generic Voltaren gel 1%  Insurance:[employer]  Insur phone:[phone]  Patient ID:[id number]  Please let us know when PA is granted/denied. Thank you!  Niesha Dodd, Pharmacy [facility] [place] [phone]

## 2018-10-09 ENCOUNTER — HOSPITAL ENCOUNTER (EMERGENCY)
Facility: CLINIC | Age: 68
Discharge: HOME | End: 2018-10-09
Attending: EMERGENCY MEDICINE | Admitting: EMERGENCY MEDICINE
Payer: MEDICARE

## 2018-10-09 VITALS
SYSTOLIC BLOOD PRESSURE: 133 MMHG | HEART RATE: 67 BPM | WEIGHT: 142 LBS | HEIGHT: 64 IN | OXYGEN SATURATION: 97 % | TEMPERATURE: 98.6 F | BODY MASS INDEX: 24.24 KG/M2 | RESPIRATION RATE: 16 BRPM | DIASTOLIC BLOOD PRESSURE: 90 MMHG

## 2018-10-09 DIAGNOSIS — M70.61 GREATER TROCHANTERIC BURSITIS, RIGHT: ICD-10-CM

## 2018-10-09 PROCEDURE — 99284 EMERGENCY DEPT VISIT MOD MDM: CPT | Mod: 25 | Performed by: EMERGENCY MEDICINE

## 2018-10-09 PROCEDURE — 20610 DRAIN/INJ JOINT/BURSA W/O US: CPT | Mod: RT | Performed by: EMERGENCY MEDICINE

## 2018-10-09 PROCEDURE — 20610 DRAIN/INJ JOINT/BURSA W/O US: CPT | Performed by: EMERGENCY MEDICINE

## 2018-10-09 RX ORDER — TRIAMCINOLONE ACETONIDE 40 MG/ML
40 INJECTION, SUSPENSION INTRA-ARTICULAR; INTRAMUSCULAR ONCE
Status: DISCONTINUED | OUTPATIENT
Start: 2018-10-09 | End: 2018-10-09 | Stop reason: HOSPADM

## 2018-10-09 RX ORDER — BUPIVACAINE HYDROCHLORIDE 5 MG/ML
4 INJECTION, SOLUTION EPIDURAL; INTRACAUDAL ONCE
Status: DISCONTINUED | OUTPATIENT
Start: 2018-10-09 | End: 2018-10-09 | Stop reason: HOSPADM

## 2018-10-09 ASSESSMENT — ENCOUNTER SYMPTOMS
JOINT SWELLING: 1
ARTHRALGIAS: 1

## 2018-10-09 NOTE — ED TRIAGE NOTES
"States started having right hip pain on Sept 22nd. No injury. Hx osteoporosis. Saw MD at clinic last Friday for this. Told if it got worse to let him know. To try aleve and \" a cream but it did not help\"  She did and was told to go to the ED. Last took two aleve around 0600. Able to walk slowly but more pain with ambulation. Got back from a trip to [place] Sept 16th. Was carrying a suitcase around a lot.  "

## 2018-10-09 NOTE — TELEPHONE ENCOUNTER
Prior Authorization Approval    Authorization Effective Date: 7/11/2018  Authorization Expiration Date: 10/9/2019  Medication: voltaren gel-APPROVED  Approved Dose/Quantity:    Reference #:     Insurance Company: [email or web address] [phone] Fax [phone]  Expected CoPay:       CoPay Card Available:      Foundation Assistance Needed:    Which Pharmacy is filling the prescription (Not needed for infusion/clinic administered): [facility] [place] [address]  Pharmacy Notified: Yes  Patient Notified: Yes

## 2018-10-09 NOTE — ED AVS SNAPSHOT
[facility]    [address] [place] [address]    Phone:  [phone]    Fax:  [phone]                                       Niesha Adhikari   MRN: [id number]    Department:  [facility]   Date of Visit:  10/9/2018           Patient Information     Date Of Birth          1950        Your diagnoses for this visit were:     Greater trochanteric bursitis, right        You were seen by Clayton Hines MD.      Follow-up Information     Follow up with Tanika Clark MD.    Specialty:  Family Practice    Why:  As needed    Contact information:    [address]  [place] [address]  [phone]        Discharge References/Attachments     TROCHANTERIC BURSITIS, UNDERSTANDING (ENGLISH)      Your next 10 appointments already scheduled     Oct 26, 2018  7:40 AM CDT   [facility]    [address]  [place] [id number] [phone]           Bring a current list of meds and any records pertaining to this visit. For Physicals, please bring immunization records and any forms needing to be filled out. Please arrive 10 minutes early to complete paperwork.            Dec 05, 2018  8:00 AM CST   Level 1 with NL INFUSION CHAIR 4   [facility] ([facility])    [address] [place] [address] [place] [id number] [phone]            Apr 12, 2019 10:20 AM CDT   Return Visit with [facility]   [facility] [id number]  [place] [address] [phone]              24 Hour Appointment Hotline       To make an appointment at any [facility], call [phone] ([phone]). If you don't have a family doctor or clinic, we will help you find one. [facility] are conveniently located to serve the needs of you and your  family.             Review of your medicines      Our records show that you are taking the medicines listed below. If these are incorrect, please call your family doctor or clinic.        Dose / Directions Last dose taken    CLOTRIMAZOLE ANTI-FUNGAL 1 % cream   Quantity:  28.35 g   Generic drug:  clotrimazole        APPLY TOPICALLY TWO TIMES A DAY   Refills:  3        cyanocobalamin 1000 MCG tablet   Commonly known as:  vitamin  B-12   Dose:  1000 mcg        Take 1,000 mcg by mouth every other day   Refills:  0        cycloSPORINE 0.05 % ophthalmic emulsion   Commonly known as:  RESTASIS   Dose:  1 drop        Place 1 drop into both eyes 2 times daily   Refills:  0        diclofenac 1 % Gel topical gel   Commonly known as:  VOLTAREN   Quantity:  200 g        Apply topically 4 times daily as needed for moderate pain PRN, up to 4 times per day, no more than 2g per application to the upper extremity   Refills:  3        Fiber 0.52 g Caps   Quantity:  540 capsule        2 tabs in am and pm   Refills:  0        hydrocortisone 1 % ointment   Quantity:  28 g        APPLY SPARINGLY TO AFFECTED AREA THREE TIMES A DAY FOR 14 DAYS   Refills:  0        hydroxychloroquine 200 MG tablet   Commonly known as:  PLAQUENIL   Quantity:  135 tablet        Hydroxychloroquine 200mg daily; and an additional 200mg every other day.   Refills:  1        IBANdronate 150 MG tablet   Commonly known as:  BONIVA   Dose:  3 mg        Inject 3 mg into the vein every 3 months   Refills:  0        lisinopril 10 MG tablet   Commonly known as:  PRINIVIL/ZESTRIL   Quantity:  90 tablet        TAKE 1 TABLET BY MOUTH DAILY   Refills:  2        montelukast 10 MG tablet   Commonly known as:  SINGULAIR   Quantity:  90 tablet        TAKE ONE TABLET BY MOUTH EVERY DAY AS NEEDED SEASONALLY (AUGUST AND SEPTEMBER)   Refills:  0        nystatin 154951 UNIT/GM Powd   Commonly known as:  MYCOSTATIN   Quantity:  60 g        Apply topically 3 times daily as needed    Refills:  1        OLANZapine 2.5 mg half-tablet   Commonly known as:  zyPREXA        Take by mouth At Bedtime   Refills:  0        PROVIGIL PO   Dose:  100 mg   Indication:  takes 150 mg bid (1 1/2 tabs)        Take 100 mg by mouth Takes 1 1/2 tabs bid (150 mg bid) morning and noon   Refills:  0        sertraline 100 MG tablet   Commonly known as:  ZOLOFT   Dose:  100 mg        Take 100 mg by mouth daily Takes 2 at bedtime   Refills:  0        vitamin D 05232 UNIT capsule   Commonly known as:  ERGOCALCIFEROL   Quantity:  24 capsule        Take 1 capsule (50,000 Units) by mouth every Monday and Friday.   Refills:  1        VYVANSE 20 MG capsule   Dose:  30 mg   Quantity:  30 capsule   Generic drug:  lisdexamfetamine        Take 30 mg by mouth every morning   Refills:  0                Procedures and tests performed during your visit     Obtain affirmation of informed consent      Orders Needing Specimen Collection     None      Pending Results     No orders found from 10/7/2018 to 10/10/2018.            Pending Culture Results     No orders found from 10/7/2018 to 10/10/2018.            Pending Results Instructions     If you had any lab results that were not finalized at the time of your Discharge, you can call the ED Lab Result RN at [phone]. You will be contacted by this team for any positive Lab results or changes in treatment. The nurses are available 7 days a week from 10A to 6:30P.  You can leave a message 24 hours per day and they will return your call.        Thank you for choosing [facility]       Thank you for choosing [facility] for your care. Our goal is always to provide you with excellent care. Hearing back from our patients is one way we can continue to improve our services. Please take a few minutes to complete the written survey that you may receive in the mail after you visit with us. Thank you!        Hydrocisionhart Information     [employer] gives you secure access to your electronic health record. If  you see a primary care provider, you can also send messages to your care team and make appointments. If you have questions, please call your primary care clinic.  If you do not have a primary care provider, please call [phone] and they will assist you.        Care EveryWhere ID     This is your Care EveryWhere ID. This could be used by other organizations to access your [place] medical records  [id number]        Equal Access to Services     WADE STAUFFER : Lonny Rao, wacandida fernández, tarah phanalmabonnie washington, ramses abreu. So [facility] [phone].    ATENCIÓN: Si angelyla laura, tiene a salazar disposición servicios gratuitos de asistencia lingüística. Llame al [phone].    We comply with applicable federal civil rights laws and [place] laws. We do not discriminate on the basis of race, color, national origin, age, disability, sex, sexual orientation, or gender identity.            After Visit Summary       This is your record. Keep this with you and show to your community pharmacist(s) and doctor(s) at your next visit.

## 2018-10-09 NOTE — TELEPHONE ENCOUNTER
Central Prior Authorization Team   Phone: [phone]    PA Initiation    Medication: voltaren gel  Insurance Company: [employer] - Phone [phone] Fax [phone]  Pharmacy [place]illing the Rx: [facility] [place] [address] [place] [address]  Filling Pharmacy Phone: [phone]  Filling Pharmacy Fax: [phone]  Start Date: 10/9/2018

## 2018-10-09 NOTE — ED AVS SNAPSHOT
[facility] Emergency Department    [address] [place] [address]    Phone:  [phone]    Fax:  [phone]                                       Niesha Adhikari   MRN: [id number]    Department:  [facility] Emergency Department   Date of Visit:  10/9/2018           After Visit Summary Signature Page     I have received my discharge instructions, and my questions have been answered. I have discussed any challenges I see with this plan with the nurse or doctor.    ..........................................................................................................................................  Patient/Patient Representative Signature      ..........................................................................................................................................  Patient Representative Print Name and Relationship to Patient    ..................................................               ................................................  Date                                   Time    ..........................................................................................................................................  Reviewed by Signature/Title    ...................................................              ..............................................  Date                                               Time          22EPIC Rev 08/18

## 2018-10-11 NOTE — PROGRESS NOTES
"MyCSilenseedt message sent:  \" Ms. Adhikari,    Right hip x-ray is normal    Sincerely,  Apolinar Cho MD  10/11/2018 9:51 AM\""

## 2018-10-16 DIAGNOSIS — I10 ESSENTIAL HYPERTENSION: ICD-10-CM

## 2018-10-16 RX ORDER — LISINOPRIL 10 MG/1
10 TABLET ORAL DAILY
Qty: 90 TABLET | Refills: 0 | Status: SHIPPED | OUTPATIENT
Start: 2018-10-16 | End: 2018-11-16

## 2018-10-16 NOTE — TELEPHONE ENCOUNTER
TC patient. Has upcoming appointment.   Blanca refill given.     William Craft PA-C  [facility]

## 2018-10-16 NOTE — TELEPHONE ENCOUNTER
"Requested Prescriptions   Pending Prescriptions Disp Refills     lisinopril (PRINIVIL/ZESTRIL) 10 MG tablet 90 tablet 2     Sig: Take 1 tablet (10 mg) by mouth daily    ACE Inhibitors (Including Combos) Protocol Failed    10/16/2018 10:41 AM       Failed - Blood pressure under 140/90 in past 12 months    BP Readings from Last 3 Encounters:   10/09/18 133/90   10/05/18 136/84   09/05/18 127/77          Failed - Normal serum creatinine on file in past 12 months    Recent Labs   Lab Test  09/26/18   0836   08/24/11   1148   CR  0.41*   < >   --    CREAT   --    --   0.8    < > = values in this interval not displayed.          Passed - Recent (12 mo) or future (30 days) visit within the authorizing provider's specialty    Patient had office visit in the last 12 months or has a visit in the next 30 days with authorizing provider or within the authorizing provider's specialty.  See \"Patient Info\" tab in inbasket, or \"Choose Columns\" in Meds & Orders section of the refill encounter.           Passed - Patient is age 18 or older       Passed - No active pregnancy on record       Passed - Normal serum potassium on file in past 12 months    Recent Labs   Lab Test  09/26/18   0836   POTASSIUM  4.7          Passed - No positive pregnancy test in past 12 months        lisinopril (PRINIVIL/ZESTRIL) 10 MG tablet  Routing refill request to provider for review/approval because:  Labs out of range:  Creatinine and blood pressure    Wandy Fisher RN, BSN           "

## 2018-11-12 ENCOUNTER — TRANSFERRED RECORDS (OUTPATIENT)
Dept: HEALTH INFORMATION MANAGEMENT | Facility: CLINIC | Age: 68
End: 2018-11-12

## 2018-11-12 NOTE — PROGRESS NOTES
"  SUBJECTIVE:   Niesha Adhikari is a 68 year old female who presents to clinic today for the following health issues:      History of Present Illness     Diet:  Vegetarian/vegan  Frequency of exercise:  6-7 days/week  Duration of exercise:  45-60 minutes  Taking medications regularly:  Yes  Additional concerns today:  YES   Answers for HPI/ROS submitted by the patient on 11/6/2018   PHQ-2 Score: 1    Patient here to discuss her hearing and right \"inguinal gland\" that is \"swollen and painful    Was seen in ED for trochanteric bursitis, given an injection, doesn't feel it helped.  Then developed another pain that radiated into her groin and developed a hard lump, now the lump has dissipated and has a wide area of pain, sensitive to touch--even clothing hurts it--, feels bloated.  No skin changes.  States she is constipated, just started Miralax 3 days ago at 1/2 capful.  She is also frustrated that she has gained weight ( 3 pounds over the last month)    Problem list and histories reviewed & adjusted, as indicated.  Additional history: as documented    Patient Active Problem List   Diagnosis     Allergic rhinitis     Esophageal reflux     Pernicious anemia     Anxiety state     Migraine     Essential and other specified forms of tremor     Fibromyalgia     Moderate recurrent major depression (H)     Advanced directives, counseling/discussion     Narcolepsy     Internal hemorrhoids with other complication     AIN (anal intraepithelial neoplasia) anal canal     Sciatica     Osteoporosis     Inflammatory polyarthropathy (H)     Benign essential hypertension     Personal history of other medical treatment (CODE)     Past Surgical History:   Procedure Laterality Date     BIOPSY ANAL CANAL  1/21/13    [facility]      BREAST BIOPSY, RT/LT Left 1975    Breat Biopsy RT/LT     C NONSPECIFIC PROCEDURE  1965    Removed bone left index finger knuckle, casts broken bones     COLONOSCOPY  8/25/2009     COLONOSCOPY  " 2/14/2011    COLONOSCOPY performed by CRISTIN LAGUNAS at  GI     CYSTOSCOPY  2/28/2011    CYSTOSCOPY performed by CAYLA FLOR at  OR     ENDOSCOPY  05/21/12    [facility] - [facility] COLONOSCOPY W/WO BRUSH/WASH  08/22/05      [facility] ENDOSCOPY DIAG W BIOPSY  10/01/09      [facility] ENDOSCOPY, SIMPLE EXAM  08/08/07     HEMORRHOIDECTOMY  06/25/12    [facility]     LAPAROSCOPIC SALPINGO-OOPHORECTOMY  2/28/2011    LAPAROSCOPIC SALPINGO-OOPHORECTOMY performed by CAYLA FLOR at  OR     TONSILLECTOMY & ADENOIDECTOMY  1965       Social History   Substance Use Topics     Smoking status: Former Smoker     Packs/day: 0.75     Years: 10.00     Types: Cigarettes     Start date: 11/1/1968     Quit date: 11/1/1978     Smokeless tobacco: Never Used      Comment: No exposure at home     Alcohol use No     Family History   Problem Relation Age of Onset     Hypertension Mother      Breast Cancer Mother      Coronary Artery Disease Mother      Cerebrovascular Disease Mother      KIDNEY DISEASE Mother      Hypertension Brother      Respiratory Brother      emphysema     Lipids Brother      HEART DISEASE Brother      stents, 12/2011; has had about 6 MIs, last one 1/2014     C.A.D. Sister      MI at age 63     Hypertension Sister      GASTROINTESTINAL DISEASE Sister      gallbladder     Circulatory Sister      brain aneurysm at 63     Genitourinary Problems Sister      1 kidney/bladder     Hypertension Sister      Obesity Sister      Coronary Artery Disease Sister      had valve surgery, MI, CHF     Unknown/Adopted Paternal Uncle      Blood Disease Son      Lymes/7/11     Hypertension Son      Hypertension Father      Lymphoma Father      Glaucoma Father      Coronary Artery Disease Other 49     niece     Diabetes Other      cousin     Cerebrovascular Disease Maternal Grandmother      Cerebrovascular Disease Paternal Grandmother      Liver Cancer Cousin      Glaucoma Paternal Grandfather             ROS:  CONSTITUTIONAL: NEGATIVE for fever, chills  RESP: NEGATIVE for significant cough or shortness of breath   CV: NEGATIVE for chest pain, palpitations or peripheral edema    OBJECTIVE:     /66 (BP Location: Left arm, Patient Position: Chair, Cuff Size: Adult Regular)  Pulse 80  Temp 98  F (36.7  C) (Temporal)  Resp 16  Wt 146 lb (66.2 kg)  LMP 11/27/2003  SpO2 100%  BMI 25.06 kg/m2  Body mass index is 25.06 kg/(m^2).  Gen: no apparent distress  ABDOMEN: soft, no masses and bowel sounds normal  Right groin:  Has full range of motion of hip.  No skin blisters.  No erythema.  No inguinal lymphadenopathy. She is tender over the inguinal area and right side of mons pubis.  No appreciable hernia found on exam, but exam is limited secondary to pain.    Ext: warm and dry without edema  Psych: Alert and oriented times 3; coherent speech, normal   rate and volume, able to articulate logical thoughts, able   to abstract reason, no tangential thoughts, no hallucinations   or delusions  Her affect is anxious     ASSESSMENT/PLAN:       1. Right groin pain  Normal objective exam, but having significant and distressing pain.  Will obtain pelvic CT to further delineate the etiology.    - CT Pelvis Soft Tissue w Contrast; Future    2. Essential hypertension  Well controlled, continue lisinopril.    - lisinopril (PRINIVIL/ZESTRIL) 10 MG tablet; Take 1 tablet (10 mg) by mouth daily  Dispense: 90 tablet; Refill: 3    3. Constipation, unspecified constipation type  Increase Miralax to 1 capful daily.    4. Alcohol dependence in remission (H)  Remains sober.      Tanika Clark MD  [facility]

## 2018-11-16 ENCOUNTER — HOSPITAL ENCOUNTER (OUTPATIENT)
Dept: CT IMAGING | Facility: CLINIC | Age: 68
Discharge: HOME | End: 2018-11-16
Attending: FAMILY MEDICINE | Admitting: FAMILY MEDICINE
Payer: MEDICARE

## 2018-11-16 ENCOUNTER — OFFICE VISIT (OUTPATIENT)
Dept: FAMILY MEDICINE | Facility: OTHER | Age: 68
End: 2018-11-16
Payer: COMMERCIAL

## 2018-11-16 VITALS
RESPIRATION RATE: 16 BRPM | BODY MASS INDEX: 25.06 KG/M2 | DIASTOLIC BLOOD PRESSURE: 66 MMHG | HEART RATE: 80 BPM | TEMPERATURE: 98 F | WEIGHT: 146 LBS | OXYGEN SATURATION: 100 % | SYSTOLIC BLOOD PRESSURE: 116 MMHG

## 2018-11-16 DIAGNOSIS — R10.31 RIGHT GROIN PAIN: Primary | ICD-10-CM

## 2018-11-16 DIAGNOSIS — I10 ESSENTIAL HYPERTENSION: ICD-10-CM

## 2018-11-16 DIAGNOSIS — R10.31 RIGHT GROIN PAIN: ICD-10-CM

## 2018-11-16 DIAGNOSIS — K40.90 RIGHT INGUINAL HERNIA: Primary | ICD-10-CM

## 2018-11-16 DIAGNOSIS — K59.00 CONSTIPATION, UNSPECIFIED CONSTIPATION TYPE: ICD-10-CM

## 2018-11-16 DIAGNOSIS — F10.21 ALCOHOL DEPENDENCE IN REMISSION (H): ICD-10-CM

## 2018-11-16 PROCEDURE — 25000125 ZZHC RX 250: Performed by: RADIOLOGY

## 2018-11-16 PROCEDURE — 25000128 H RX IP 250 OP 636: Performed by: RADIOLOGY

## 2018-11-16 PROCEDURE — 99213 OFFICE O/P EST LOW 20 MIN: CPT | Performed by: FAMILY MEDICINE

## 2018-11-16 PROCEDURE — 72193 CT PELVIS W/DYE: CPT

## 2018-11-16 RX ORDER — LISINOPRIL 10 MG/1
10 TABLET ORAL DAILY
Qty: 90 TABLET | Refills: 3 | Status: SHIPPED | OUTPATIENT
Start: 2018-11-16 | End: 2019-08-09

## 2018-11-16 RX ORDER — IOPAMIDOL 755 MG/ML
500 INJECTION, SOLUTION INTRAVASCULAR ONCE
Status: COMPLETED | OUTPATIENT
Start: 2018-11-16 | End: 2018-11-16

## 2018-11-16 RX ADMIN — IOPAMIDOL 70 ML: 755 INJECTION, SOLUTION INTRAVENOUS at 11:19

## 2018-11-16 RX ADMIN — SODIUM CHLORIDE 60 ML: 9 INJECTION, SOLUTION INTRAVENOUS at 11:19

## 2018-11-16 ASSESSMENT — ANXIETY QUESTIONNAIRES
1. FEELING NERVOUS, ANXIOUS, OR ON EDGE: MORE THAN HALF THE DAYS
5. BEING SO RESTLESS THAT IT IS HARD TO SIT STILL: NOT AT ALL
6. BECOMING EASILY ANNOYED OR IRRITABLE: SEVERAL DAYS
3. WORRYING TOO MUCH ABOUT DIFFERENT THINGS: MORE THAN HALF THE DAYS
7. FEELING AFRAID AS IF SOMETHING AWFUL MIGHT HAPPEN: NOT AT ALL
GAD7 TOTAL SCORE: 8
GAD7 TOTAL SCORE: 8
2. NOT BEING ABLE TO STOP OR CONTROL WORRYING: MORE THAN HALF THE DAYS
4. TROUBLE RELAXING: SEVERAL DAYS
7. FEELING AFRAID AS IF SOMETHING AWFUL MIGHT HAPPEN: NOT AT ALL
GAD7 TOTAL SCORE: 8

## 2018-11-16 ASSESSMENT — PATIENT HEALTH QUESTIONNAIRE - PHQ9
SUM OF ALL RESPONSES TO PHQ QUESTIONS 1-9: 6
10. IF YOU CHECKED OFF ANY PROBLEMS, HOW DIFFICULT HAVE THESE PROBLEMS MADE IT FOR YOU TO DO YOUR WORK, TAKE CARE OF THINGS AT HOME, OR GET ALONG WITH OTHER PEOPLE: SOMEWHAT DIFFICULT
SUM OF ALL RESPONSES TO PHQ QUESTIONS 1-9: 6

## 2018-11-16 NOTE — MR AVS SNAPSHOT
After Visit Summary   11/16/2018    Niesha Adhikari    MRN: [id number]           Patient Information     Date Of Birth          1950        Visit Information        Provider Department      11/16/2018 8:40 AM Tanika Clark MD [facility]        Today's Diagnoses     Right groin pain    -  1    Essential hypertension        Constipation, unspecified constipation type        Alcohol dependence in remission (H)           Follow-ups after your visit        Your next 10 appointments already scheduled     Nov 16, 2018 11:30 AM CST   CT PELVIS SOFT TISSUE W CONTRAST with PHCT1   [facility] CT Scan ([facility])    [address] [place] [address] [phone]           How do I prepare for my exam? (Food and drink instructions) To prepare: Do not eat or drink for 2 hours before your exam. If you need to take medicine, you may take it with small sips of water. (We may ask you to take liquid medicine as well.)  How do I prepare for my exam? (Other instructions) Please arrive 30 minutes early for your CT.  Once in the department you might be asked to drink water 15-20 minutes prior to your exam.  If indicated you may be asked to drink an oral contrast in advance of your CT.  If this is the case, the imaging team will let you know or be in contact with you prior to your appointment  Patients over 70 or patients with diabetes or kidney problems: If you haven t had a blood test (creatinine test) within the last 30 days, the Cardiologist/Radiologist may require you to get this test prior to your exam.  If you have diabetes:  Continue to take your metformin medication on the day of your exam  What should I wear: Please wear loose clothing, such as a sweat suit or jogging clothes. Avoid snaps, zippers and other metal. We may ask you to undress and put on a hospital gown.  How long does the exam take: Most scans take less than 20 minutes.  What should  I bring: Please bring any scans or X-rays taken at other hospitals, if similar tests were done. Also bring a list of your medicines, including vitamins, minerals and over-the-counter drugs. It is safest to leave personal items at home.  Do I need a : No  is needed.  What do I need to tell my doctor? Be sure to tell your doctor: * If you have any allergies. * If there s any chance you are pregnant. * If you are breastfeeding.  What should I do after the exam: No restrictions, You may resume normal activities.  What is this test: A CT (computed tomography) scan is a series of pictures that allows us to look inside your body. The scanner creates images of the body in cross sections, much like slices of bread. This helps us see any problems more clearly. You may receive contrast (X-ray dye) before or during your scan. You will be asked to drink the contrast.  Who should I call with questions: If you have any questions, please call the Imaging Department where you will have your exam. Directions, parking instructions, and other information is available on our website, [email or web address].            Dec 05, 2018  8:00 AM CST   Level 1 with NL INFUSION CHAIR 4   [facility] ([facility])    [address] [place] [address]   [phone]            Apr 12, 2019 10:20 AM CDT   Return Visit with Apolinar Cho MD   [facility]    [address] [place] [address]  [place] [address] [phone]              Future tests that were ordered for you today     Open Future Orders        Priority Expected Expires Ordered    CT Pelvis Soft Tissue w Contrast Routine  11/16/2019 11/16/2018            Who to contact     If you have questions or need follow up information about today's clinic visit or your schedule please contact [facility] directly at [phone].  Normal or non-critical lab and imaging results will be  communicated to you by [employer], letter or phone within 4 business days after the clinic has received the results. If you do not hear from us within 7 days, please contact the clinic through [employer] or phone. If you have a critical or abnormal lab result, we will notify you by phone as soon as possible.  Submit refill requests through [employer] or call your pharmacy and they will forward the refill request to us. Please allow 3 business days for your refill to be completed.          Additional Information About Your Visit        [employer] Information     [employer] gives you secure access to your electronic health record. If you see a primary care provider, you can also send messages to your care team and make appointments. If you have questions, please call your primary care clinic.  If you do not have a primary care provider, please call [phone] and they will assist you.        Care EveryWhere ID     This is your Care EveryWhere ID. This could be used by other organizations to access your [facility] medical records  [id number]        Your Vitals Were     Pulse Temperature Respirations Last Period Pulse Oximetry BMI (Body Mass Index)    80 98  F (36.7  C) (Temporal) 16 11/27/2003 100% 25.06 kg/m2       Blood Pressure from Last 3 Encounters:   11/16/18 116/66   10/09/18 133/90   10/05/18 136/84    Weight from Last 3 Encounters:   11/16/18 146 lb (66.2 kg)   10/09/18 142 lb (64.4 kg)   10/05/18 143 lb (64.9 kg)                 Where to get your medicines      These medications were sent to [facility] [place], [place] - [address]  [address] [address]     Phone:  [phone]     lisinopril 10 MG tablet          Primary Care Provider Office Phone # Fax #    Tanika Clark [phone]       [address]  [facility] [address]        Equal Access to Services     WADE STAUFFER : Lonny Rao, emery fernández, ramses funes  neda templedora rice'aan ah. So [facility] [phone].    ATENCIÓN: Si maxwell sanchez, tiene a salazar disposición servicios gratuitos de asistencia lingüística. Ricky al [phone].    We comply with applicable federal civil rights laws and [place] laws. We do not discriminate on the basis of race, color, national origin, age, disability, sex, sexual orientation, or gender identity.            Thank you!     Thank you for choosing [facility]  for your care. Our goal is always to provide you with excellent care. Hearing back from our patients is one way we can continue to improve our services. Please take a few minutes to complete the written survey that you may receive in the mail after your visit with us. Thank you!             Your Updated Medication List - Protect others around you: Learn how to safely use, store and throw away your medicines at www.disposemymeds.org.          This list is accurate as of 11/16/18  9:16 AM.  Always use your most recent med list.                   Brand Name Dispense Instructions for use Diagnosis    CLOTRIMAZOLE ANTI-FUNGAL 1 % cream   Generic drug:  clotrimazole     28.35 g    APPLY TOPICALLY TWO TIMES A DAY    Cutaneous candidiasis       cycloSPORINE 0.05 % ophthalmic emulsion    RESTASIS     Place 1 drop into both eyes 2 times daily        Fiber 0.52 g Caps     540 capsule    2 tabs in am and pm        hydrocortisone 1 % ointment     28 g    APPLY SPARINGLY TO AFFECTED AREA THREE TIMES A DAY FOR 14 DAYS    Rash and nonspecific skin eruption       hydroxychloroquine 200 MG tablet    PLAQUENIL    135 tablet    Hydroxychloroquine 200mg daily; and an additional 200mg every other day.    Inflammatory polyarthropathy (H)       IBANdronate 150 MG tablet    BONIVA     Inject 3 mg into the vein every 3 months        lisinopril 10 MG tablet    PRINIVIL/ZESTRIL    90 tablet    Take 1 tablet (10 mg) by mouth daily    Essential hypertension       montelukast 10 MG tablet     SINGULAIR    90 tablet    TAKE ONE TABLET BY MOUTH EVERY DAY AS NEEDED SEASONALLY (AUGUST AND SEPTEMBER)    Other seasonal allergic rhinitis       nystatin 227041 UNIT/GM Powd    MYCOSTATIN    60 g    Apply topically 3 times daily as needed    Intertrigo       OLANZapine 2.5 mg half-tablet    zyPREXA     Take by mouth At Bedtime        PROVIGIL PO      Take 100 mg by mouth Takes 1 1/2 tabs bid (150 mg bid) morning and noon        sertraline 100 MG tablet    ZOLOFT     Take 50 mg by mouth At Bedtime        vitamin D2 75885 UNIT capsule    ERGOCALCIFEROL    24 capsule    Take 1 capsule (50,000 Units) by mouth every Monday and Friday.    Vitamin D deficiency, Other osteoporosis, unspecified pathological fracture presence       VYVANSE 20 MG capsule   Generic drug:  lisdexamfetamine     30 capsule    Take 30 mg by mouth every morning    Primary narcolepsy without cataplexy

## 2018-11-17 ASSESSMENT — PATIENT HEALTH QUESTIONNAIRE - PHQ9: SUM OF ALL RESPONSES TO PHQ QUESTIONS 1-9: 6

## 2018-11-17 ASSESSMENT — ANXIETY QUESTIONNAIRES: GAD7 TOTAL SCORE: 8

## 2018-11-20 ENCOUNTER — TELEPHONE (OUTPATIENT)
Dept: FAMILY MEDICINE | Facility: OTHER | Age: 68
End: 2018-11-20

## 2018-11-20 NOTE — ADDENDUM NOTE
Encounter addended by: Tanika Clark MD on: 11/20/2018 12:47 PM<BR>     Actions taken: Visit diagnoses modified,  activity accessed, Diagnosis association updated

## 2018-11-20 NOTE — TELEPHONE ENCOUNTER
Notes Recorded by Tanika Clark MD on 11/20/2018 at 12:47 PM  Ziptr message copied below--please help with scheduling    You do have a small hernia, this could be consistent with a lump that was present and then disappeared.  I would like you to consult with a surgeon.  I will have someone assist you in scheduling.    Electronically signed by Tanika Clark MD on 11/20/2018

## 2018-11-26 ENCOUNTER — HOSPITAL ENCOUNTER (OUTPATIENT)
Facility: CLINIC | Age: 68
End: 2018-11-26
Attending: SURGERY | Admitting: SURGERY

## 2018-11-26 ENCOUNTER — TELEPHONE (OUTPATIENT)
Dept: SURGERY | Facility: CLINIC | Age: 68
End: 2018-11-26

## 2018-11-26 ENCOUNTER — OFFICE VISIT (OUTPATIENT)
Dept: SURGERY | Facility: CLINIC | Age: 68
End: 2018-11-26
Payer: COMMERCIAL

## 2018-11-26 VITALS
WEIGHT: 144 LBS | HEIGHT: 64 IN | DIASTOLIC BLOOD PRESSURE: 78 MMHG | TEMPERATURE: 98.4 F | SYSTOLIC BLOOD PRESSURE: 130 MMHG | BODY MASS INDEX: 24.59 KG/M2

## 2018-11-26 DIAGNOSIS — K40.20 NON-RECURRENT BILATERAL INGUINAL HERNIA WITHOUT OBSTRUCTION OR GANGRENE: Primary | ICD-10-CM

## 2018-11-26 PROCEDURE — 99204 OFFICE O/P NEW MOD 45 MIN: CPT | Performed by: SURGERY

## 2018-11-26 RX ORDER — CEFAZOLIN SODIUM 1 G/50ML
1 SOLUTION INTRAVENOUS SEE ADMIN INSTRUCTIONS
Status: CANCELLED | OUTPATIENT
Start: 2018-11-26

## 2018-11-26 RX ORDER — CEFAZOLIN SODIUM 2 G/100ML
2 INJECTION, SOLUTION INTRAVENOUS
Status: CANCELLED | OUTPATIENT
Start: 2018-11-26

## 2018-11-26 NOTE — MR AVS SNAPSHOT
After Visit Summary   11/26/2018    Niesha Adhikari    MRN: [id number]           Patient Information     Date Of Birth          1950        Visit Information        Provider Department      11/26/2018 9:00 AM Marlon Hamilton DO [facility]        Today's Diagnoses     Non-recurrent bilateral inguinal hernia without obstruction or gangrene    -  1       Follow-ups after your visit        Your next 10 appointments already scheduled     Nov 30, 2018  1:00 PM CST   [facility]    [address]  [facility] [address]   [phone]            Dec 05, 2018  8:00 AM CST   Level 1 with NL INFUSION CHAIR 4   [facility] ([facility])    [address] [place] [address]  [place] [address] [phone]            Dec 19, 2018  9:00 AM CST   Return Visit with Marlon Hamilton DO   [facility]    [address] [address] [phone]            Apr 12, 2019 10:20 AM CDT   [facility]    [address] [place] [address]  [place] [id number] [phone]              Who to contact     If you have questions or need follow up information about today's clinic visit or your schedule please contact [facility] directly at [phone].  Normal or non-critical lab and imaging results will be communicated to you by MyChart, letter or phone within 4 business days after the clinic has received the results. If you do not hear from us within 7 days, please contact the clinic through [employer] or phone. If you have a critical or abnormal lab result, we will notify you by phone as soon as possible.  Submit refill requests through [employer] or call your pharmacy and they will forward the refill request to us.  "Please allow 3 business days for your refill to be completed.          Additional Information About Your Visit        MyChart Information     [employer] gives you secure access to your electronic health record. If you see a primary care provider, you can also send messages to your care team and make appointments. If you have questions, please call your primary care clinic.  If you do not have a primary care provider, please call [phone] and they will assist you.        Care EveryWhere ID     This is your Care EveryWhere ID. This could be used by other organizations to access your [facility] medical records  [id number]        Your Vitals Were     Temperature Height Last Period BMI (Body Mass Index)          98.4  F (36.9  C) (Temporal) 1.626 m (5' 4\") 11/27/2003 24.72 kg/m2         Blood Pressure from Last 3 Encounters:   11/26/18 130/78   11/16/18 116/66   10/09/18 133/90    Weight from Last 3 Encounters:   11/26/18 65.3 kg (144 lb)   11/16/18 66.2 kg (146 lb)   10/09/18 64.4 kg (142 lb)              We Performed the Following     [facility]hat-Operative Worksheet - Laparoscopic Inguinal Hernia Repair        Primary Care Provider Office Phone # Fax #    Tanika QUIROZ MD Eduardo [phone]       [address] [place] [id number]        Equal Access to Services     [facility]: Hadii aad ku hadasho Soomaali, waaxda luqadaha, qaybta kaalmada adeegyada, ramses mcnulty . So [facility] [phone].    ATENCIÓN: Si habla español, tiene a salazar disposición servicios gratuitos de asistencia lingüística. Llame al [phone].    We comply with applicable federal civil rights laws and [place] laws. We do not discriminate on the basis of race, color, national origin, age, disability, sex, sexual orientation, or gender identity.            Thank you!     Thank you for choosing [facility]  for your care. Our goal is always to provide you with excellent care. Hearing back " from our patients is one way we can continue to improve our services. Please take a few minutes to complete the written survey that you may receive in the mail after your visit with us. Thank you!             Your Updated Medication List - Protect others around you: Learn how to safely use, store and throw away your medicines at www.disposemymeds.org.          This list is accurate as of 11/26/18 10:26 AM.  Always use your most recent med list.                   Brand Name Dispense Instructions for use Diagnosis    CLOTRIMAZOLE ANTI-FUNGAL 1 % cream   Generic drug:  clotrimazole     28.35 g    APPLY TOPICALLY TWO TIMES A DAY    Cutaneous candidiasis       cycloSPORINE 0.05 % ophthalmic emulsion    RESTASIS     Place 1 drop into both eyes 2 times daily        Fiber 0.52 g Caps     540 capsule    2 tabs in am and pm        hydroxychloroquine 200 MG tablet    PLAQUENIL    135 tablet    Hydroxychloroquine 200mg daily; and an additional 200mg every other day.    Inflammatory polyarthropathy (H)       IBANdronate 150 MG tablet    BONIVA     Inject 3 mg into the vein every 3 months        lisinopril 10 MG tablet    PRINIVIL/ZESTRIL    90 tablet    Take 1 tablet (10 mg) by mouth daily    Essential hypertension       montelukast 10 MG tablet    SINGULAIR    90 tablet    TAKE ONE TABLET BY MOUTH EVERY DAY AS NEEDED SEASONALLY (AUGUST AND SEPTEMBER)    Other seasonal allergic rhinitis       nystatin 577349 UNIT/GM Powd    MYCOSTATIN    60 g    Apply topically 3 times daily as needed    Intertrigo       OLANZapine 2.5 mg half-tablet    zyPREXA     Take by mouth nightly as needed        PROVIGIL PO      Take 100 mg by mouth Takes 1 1/2 tabs bid (150 mg bid) morning and noon        vitamin D2 60228 UNIT capsule    ERGOCALCIFEROL    24 capsule    Take 1 capsule (50,000 Units) by mouth every Monday and Friday.    Vitamin D deficiency, Other osteoporosis, unspecified pathological fracture presence       VYVANSE 20 MG capsule    Generic drug:  lisdexamfetamine     30 capsule    Take 30 mg by mouth every other day    Primary narcolepsy without cataplexy

## 2018-11-26 NOTE — LETTER
11/26/2018         RE: Niesha Adhikari  [address] [facility] [address]        Dear Colleague,    Thank you for referring your patient, Niesha Adhikari, to the [facility]. Please see a copy of my visit note below.    Patient seen in consultation for inguinal hernia by Tanika Clark    HPI:  Patient is a 68 year old female with several week history of right groin pain and bulge. She states that there was at one point a painful bulge in the right groin and that with rest the bulge went away but the pain has persisted. She also has some mild pain in the left groin as well. 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 She was seen by her PCP and a CT was ordered which demonstrated bilateral fat containing inguinal hernias R>L. Today, she is still having the pain. She also complains of constipation and bloating.     Review Of Systems    Skin: negative  Ears/Nose/Throat: negative  Respiratory: No shortness of breath, dyspnea on exertion, cough, or hemoptysis  Cardiovascular: negative  Gastrointestinal: as above  Genitourinary: negative  Musculoskeletal: as above  Neurologic: negative  Hematologic/Lymphatic/Immunologic: negative  Endocrine: negative      Past Medical History:   Diagnosis Date     ABUSE BY SPOUSE/PARTNER 7/27/2005     Degeneration of lumbar or lumbosacral intervertebral disc     DDD L5/S1     HELICOBACTER PYLORI INFECTION 1/28/2005     Hepatitis C      Hypertension      Malignant neoplasm (H)     ACIN     Osteoporosis      Other and unspecified alcohol dependence, unspecified drinking behavior     Sober as 1/21/1987     Other malaise and fatigue        Past Surgical History:   Procedure Laterality Date     BIOPSY ANAL CANAL  1/21/13    [facility]      BREAST BIOPSY, RT/LT Left 1975    Breat Biopsy RT/LT     C NONSPECIFIC PROCEDURE  1965    Removed bone  left index finger knuckle, casts broken bones     COLONOSCOPY  8/25/2009     COLONOSCOPY  2/14/2011    COLONOSCOPY performed by CRISTIN LAGUNAS at  GI     CYSTOSCOPY  2/28/2011    CYSTOSCOPY performed by CAYLA FLOR at  OR     ENDOSCOPY  05/21/12    Upper GI - [facility]      COLONOSCOPY W/WO BRUSH/WASH  08/22/05      UGI ENDOSCOPY DIAG W BIOPSY  10/01/09      UGI ENDOSCOPY, SIMPLE EXAM  08/08/07     HEMORRHOIDECTOMY  06/25/12    [facility]     LAPAROSCOPIC SALPINGO-OOPHORECTOMY  2/28/2011    LAPAROSCOPIC SALPINGO-OOPHORECTOMY performed by CAYLA FLOR at  OR     TONSILLECTOMY & ADENOIDECTOMY  1965       Family History   Problem Relation Age of Onset     Hypertension Mother      Breast Cancer Mother      Coronary Artery Disease Mother      Cerebrovascular Disease Mother      KIDNEY DISEASE Mother      Hypertension Brother      Respiratory Brother      emphysema     Lipids Brother      HEART DISEASE Brother      stents, 12/2011; has had about 6 MIs, last one 1/2014     C.A.D. Sister      MI at age 63     Hypertension Sister      GASTROINTESTINAL DISEASE Sister      gallbladder     Circulatory Sister      brain aneurysm at 63     Genitourinary Problems Sister      1 kidney/bladder     Hypertension Sister      Obesity Sister      Coronary Artery Disease Sister      had valve surgery, MI, CHF     Unknown/Adopted Paternal Uncle      Blood Disease Son      Lymes/7/11     Hypertension Son      Hypertension Father      Lymphoma Father      Glaucoma Father      Coronary Artery Disease Other 49     niece     Diabetes Other      cousin     Cerebrovascular Disease Maternal Grandmother      Cerebrovascular Disease Paternal Grandmother      Liver Cancer Cousin      Glaucoma Paternal Grandfather        Social History     Social History     Marital status:      Spouse name: N/A     Number of children: N/A     Years of education: N/A     Occupational History     Not on file.      Social History Main Topics     Smoking status: Former Smoker     Packs/day: 0.75     Years: 10.00     Types: Cigarettes     Start date: 11/1/1968     Quit date: 11/1/1978     Smokeless tobacco: Never Used      Comment: No exposure at home     Alcohol use No     Drug use: No     Sexual activity: No     Other Topics Concern     Parent/Sibling W/ Cabg, Mi Or Angioplasty Before 65f 55m? Yes     Brother- 44, Sister-60      Service No     Blood Transfusions No     Caffeine Concern No     Occupational Exposure No     Hobby Hazards No     Sleep Concern No     Stress Concern Yes     Weight Concern Yes     Special Diet No     Back Care No     Exercise No     Bike Helmet Yes     Seat Belt Yes     Self-Exams Yes     Social History Narrative       Current Outpatient Prescriptions   Medication Sig Dispense Refill     CLOTRIMAZOLE ANTI-FUNGAL 1 % cream APPLY TOPICALLY TWO TIMES A DAY 28.35 g 3     cycloSPORINE (RESTASIS) 0.05 % ophthalmic emulsion Place 1 drop into both eyes 2 times daily        hydroxychloroquine (PLAQUENIL) 200 MG tablet Hydroxychloroquine 200mg daily; and an additional 200mg every other day. 135 tablet 1     IBANdronate (BONIVA) 150 MG tablet Inject 3 mg into the vein every 3 months        lisdexamfetamine (VYVANSE) 20 MG capsule Take 30 mg by mouth every other day  30 capsule 0     lisinopril (PRINIVIL/ZESTRIL) 10 MG tablet Take 1 tablet (10 mg) by mouth daily 90 tablet 3     Modafinil (PROVIGIL PO) Take 100 mg by mouth Takes 1 1/2 tabs bid (150 mg bid) morning and noon       montelukast (SINGULAIR) 10 MG tablet TAKE ONE TABLET BY MOUTH EVERY DAY AS NEEDED SEASONALLY (AUGUST AND SEPTEMBER) 90 tablet 0     nystatin (MYCOSTATIN) 934141 UNIT/GM POWD Apply topically 3 times daily as needed 60 g 1     OLANZapine (ZYPREXA) 2.5 mg half-tablet Take by mouth nightly as needed        Psyllium (FIBER) 0.52 G CAPS 2 tabs in am and pm 540 capsule      vitamin D (ERGOCALCIFEROL) 54584 UNIT capsule Take 1 capsule  "(50,000 Units) by mouth every Monday and Friday. 24 capsule 1       Medications and history reviewed    Physical exam:  Vitals: /78  Temp 98.4  F (36.9  C) (Temporal)  Ht 1.626 m (5' 4\")  Wt 65.3 kg (144 lb)  LMP 11/27/2003  BMI 24.72 kg/m2  BMI= Body mass index is 24.72 kg/(m^2).    Constitutional: Healthy, alert, non-distressed   Head: Normo-cephalic, atraumatic, no lesions, masses or tenderness   Cardiovascular: RRR, no new murmurs, +S1, +S2 heart sounds, no clicks, rubs or gallops   Respiratory: CTAB, no rales, rhonchi or wheezing, equal chest rise, good respiratory effort   Gastrointestinal: Soft, non-tender, non distended, no rebound rigidity or guarding, no masses or hernias palpated   Groin: Right side with protrusion with valsalva and pain with palpation. Left side with pain and only mild weakness, no irreducible bulging on either side  : Deferred  Musculoskeletal: Moves all extremities, normal  strength, no deformities noted   Skin: No suspicious lesions or rashes   Psychiatric: Mentation appears normal, affect appropriate   Hematologic/Lymphatic/Immunologic: Normal cervical and supraclavicular lymph nodes   Patient able to get up on table without difficulty.    Labs show:  No results found. However, due to the size of the patient record, not all encounters were searched. Please check Results Review for a complete set of results.    Imaging shows:  Recent Results (from the past 744 hour(s))   CT Pelvis Soft Tissue w Contrast    Narrative    CT PELVIS SOFT TISSUE WITH CONTRAST   11/16/2018 11:26 AM     HISTORY: 2 weeks of pain over right inguinal area, at first had lump,  lump is gone, but whole area is tender to touch, no skin issue. Right  groin pain.    TECHNIQUE:  CT pelvis with IV contrast, 70 mL Isovue-370. Transverse  and coronal reformatted images were obtained from the source data.  Radiation dose for this scan was reduced using automated exposure  control, adjustment of the mA " and/or kV according to patient size, or  iterative reconstruction technique.    COMPARISON: CT abdomen and pelvis dated 8/24/2011.    FINDINGS: Intrapelvic structures are grossly unremarkable. No mass,  adenopathy, free fluid or free air is identified. There are  innumerable diverticuli in the sigmoid colon without obvious  pericolonic inflammatory change to suggest acute diverticulitis. There  is mild nonaneurysmal atherosclerosis.    No aggressive osseous lesions or fracture are seen. Degenerative  changes are noted in the spine.    There are tiny bilateral fat-containing inguinal hernias, slightly  larger on the right. These are similar in appearance to the prior  study dated 8/24/2011 and the hernia on the right is grossly in the  area of symptoms. No marker was placed on the skin at the site of  symptoms, however, the technologist came in and showed me on the image  about where the symptoms were located.      Impression    IMPRESSION:  1. Small bilateral fat-containing inguinal hernias, slightly larger on  the right, are stable since 2011. The one on the right could cause the  palpable finding in the region of the right symphysis pubis.  2. Colonic diverticulosis (worsened since the prior study).  Nonaneurysmal atherosclerosis. No other significant abnormalities.    TIM BEDOYA MD         Assessment:     ICD-10-CM    1. Non-recurrent bilateral inguinal hernia without obstruction or gangrene K40.20 [facility]-Operative Worksheet - Laparoscopic Inguinal Hernia Repair     Plan: Given her symptoms, I recommend at least a laparoscopic right, probable left inguinal hernia repair with mesh. We discussed the procedure in detail. We also discussed the risks, benefits, alternatives and post-op care and restrictions. After our informed discussion we decided to proceed with the proposed surgery.  Risks of surgery discussed including, but not limited to bleeding, infection, recurrence, damage to nerves and what is in the hernia  sac.  Risks of anesthesia also discussed..  Although mesh is a better long term repair if it gets infected it must be removed.  If there is evidence of an infection at time of surgery it will be cancelled and rescheduled for when infection has resolved. Discussed massaging hernia back in and using ice if becomes more painful.  If not able to reduce then go to emergency room.      Marlon Hamilton, DO      Again, thank you for allowing me to participate in the care of your patient.        Sincerely,        Marlon Hamilton, DO

## 2018-11-26 NOTE — TELEPHONE ENCOUNTER
Type of surgery: Laparoscopic right inguinal hernia repair with mesh, probable left  Location of surgery: [facility] OR  Date and time of surgery: 12/10  Surgeon: Alfonso  Pre-Op Appt Date: 11/30  Post-Op Appt Date: 12/19   Packet sent out: Yes  Pre-cert/Authorization completed:  Not Applicable  Date: NA

## 2018-11-26 NOTE — PROGRESS NOTES
Patient seen in consultation for inguinal hernia by Tanika Clark    HPI:  Patient is a 68 year old female with several week history of right groin pain and bulge. She states that there was at one point a painful bulge in the right groin and that with rest the bulge went away but the pain has persisted. She also has some mild pain in the left groin as well. 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 She was seen by her PCP and a CT was ordered which demonstrated bilateral fat containing inguinal hernias R>L. Today, she is still having the pain. She also complains of constipation and bloating.     Review Of Systems    Skin: negative  Ears/Nose/Throat: negative  Respiratory: No shortness of breath, dyspnea on exertion, cough, or hemoptysis  Cardiovascular: negative  Gastrointestinal: as above  Genitourinary: negative  Musculoskeletal: as above  Neurologic: negative  Hematologic/Lymphatic/Immunologic: negative  Endocrine: negative      Past Medical History:   Diagnosis Date     ABUSE BY SPOUSE/PARTNER 7/27/2005     Degeneration of lumbar or lumbosacral intervertebral disc     DDD L5/S1     HELICOBACTER PYLORI INFECTION 1/28/2005     Hepatitis C      Hypertension      Malignant neoplasm (H)     ACIN     Osteoporosis      Other and unspecified alcohol dependence, unspecified drinking behavior     Sober as 1/21/1987     Other malaise and fatigue        Past Surgical History:   Procedure Laterality Date     BIOPSY ANAL CANAL  1/21/13    [facility]      BREAST BIOPSY, RT/LT Left 1975    Breat Biopsy RT/LT     C NONSPECIFIC PROCEDURE  1965    Removed bone left index finger knuckle, casts broken bones     COLONOSCOPY  8/25/2009     COLONOSCOPY  2/14/2011    COLONOSCOPY performed by CRISTIN LAGUNAS at  GI     CYSTOSCOPY  2/28/2011    CYSTOSCOPY performed by CAYLA FLOR at  OR     ENDOSCOPY  05/21/12     Upper GI - [facility]      COLONOSCOPY W/WO BRUSH/WASH  08/22/05      UGI ENDOSCOPY DIAG W BIOPSY  10/01/09      UGI ENDOSCOPY, SIMPLE EXAM  08/08/07     HEMORRHOIDECTOMY  06/25/12    [facility]     LAPAROSCOPIC SALPINGO-OOPHORECTOMY  2/28/2011    LAPAROSCOPIC SALPINGO-OOPHORECTOMY performed by CAYLA FLOR at  OR     TONSILLECTOMY & ADENOIDECTOMY  1965       Family History   Problem Relation Age of Onset     Hypertension Mother      Breast Cancer Mother      Coronary Artery Disease Mother      Cerebrovascular Disease Mother      KIDNEY DISEASE Mother      Hypertension Brother      Respiratory Brother      emphysema     Lipids Brother      HEART DISEASE Brother      stents, 12/2011; has had about 6 MIs, last one 1/2014     C.A.D. Sister      MI at age 63     Hypertension Sister      GASTROINTESTINAL DISEASE Sister      gallbladder     Circulatory Sister      brain aneurysm at 63     Genitourinary Problems Sister      1 kidney/bladder     Hypertension Sister      Obesity Sister      Coronary Artery Disease Sister      had valve surgery, MI, CHF     Unknown/Adopted Paternal Uncle      Blood Disease Son      Lymes/7/11     Hypertension Son      Hypertension Father      Lymphoma Father      Glaucoma Father      Coronary Artery Disease Other 49     niece     Diabetes Other      cousin     Cerebrovascular Disease Maternal Grandmother      Cerebrovascular Disease Paternal Grandmother      Liver Cancer Cousin      Glaucoma Paternal Grandfather        Social History     Social History     Marital status:      Spouse name: N/A     Number of children: N/A     Years of education: N/A     Occupational History     Not on file.     Social History Main Topics     Smoking status: Former Smoker     Packs/day: 0.75     Years: 10.00     Types: Cigarettes     Start date: 11/1/1968     Quit date: 11/1/1978     Smokeless tobacco: Never Used      Comment: No exposure at home     Alcohol use No  "    Drug use: No     Sexual activity: No     Other Topics Concern     Parent/Sibling W/ Cabg, Mi Or Angioplasty Before 65f 55m? Yes     Brother- 44, Sister-60      Service No     Blood Transfusions No     Caffeine Concern No     Occupational Exposure No     Hobby Hazards No     Sleep Concern No     Stress Concern Yes     Weight Concern Yes     Special Diet No     Back Care No     Exercise No     Bike Helmet Yes     Seat Belt Yes     Self-Exams Yes     Social History Narrative       Current Outpatient Prescriptions   Medication Sig Dispense Refill     CLOTRIMAZOLE ANTI-FUNGAL 1 % cream APPLY TOPICALLY TWO TIMES A DAY 28.35 g 3     cycloSPORINE (RESTASIS) 0.05 % ophthalmic emulsion Place 1 drop into both eyes 2 times daily        hydroxychloroquine (PLAQUENIL) 200 MG tablet Hydroxychloroquine 200mg daily; and an additional 200mg every other day. 135 tablet 1     IBANdronate (BONIVA) 150 MG tablet Inject 3 mg into the vein every 3 months        lisdexamfetamine (VYVANSE) 20 MG capsule Take 30 mg by mouth every other day  30 capsule 0     lisinopril (PRINIVIL/ZESTRIL) 10 MG tablet Take 1 tablet (10 mg) by mouth daily 90 tablet 3     Modafinil (PROVIGIL PO) Take 100 mg by mouth Takes 1 1/2 tabs bid (150 mg bid) morning and noon       montelukast (SINGULAIR) 10 MG tablet TAKE ONE TABLET BY MOUTH EVERY DAY AS NEEDED SEASONALLY (AUGUST AND SEPTEMBER) 90 tablet 0     nystatin (MYCOSTATIN) 087612 UNIT/GM POWD Apply topically 3 times daily as needed 60 g 1     OLANZapine (ZYPREXA) 2.5 mg half-tablet Take by mouth nightly as needed        Psyllium (FIBER) 0.52 G CAPS 2 tabs in am and pm 540 capsule      vitamin D (ERGOCALCIFEROL) 02810 UNIT capsule Take 1 capsule (50,000 Units) by mouth every Monday and Friday. 24 capsule 1       Medications and history reviewed    Physical exam:  Vitals: /78  Temp 98.4  F (36.9  C) (Temporal)  Ht 1.626 m (5' 4\")  Wt 65.3 kg (144 lb)  LMP 11/27/2003  BMI 24.72 kg/m2  BMI= " Body mass index is 24.72 kg/(m^2).    Constitutional: Healthy, alert, non-distressed   Head: Normo-cephalic, atraumatic, no lesions, masses or tenderness   Cardiovascular: RRR, no new murmurs, +S1, +S2 heart sounds, no clicks, rubs or gallops   Respiratory: CTAB, no rales, rhonchi or wheezing, equal chest rise, good respiratory effort   Gastrointestinal: Soft, non-tender, non distended, no rebound rigidity or guarding, no masses or hernias palpated   Groin: Right side with protrusion with valsalva and pain with palpation. Left side with pain and only mild weakness, no irreducible bulging on either side  : Deferred  Musculoskeletal: Moves all extremities, normal  strength, no deformities noted   Skin: No suspicious lesions or rashes   Psychiatric: Mentation appears normal, affect appropriate   Hematologic/Lymphatic/Immunologic: Normal cervical and supraclavicular lymph nodes   Patient able to get up on table without difficulty.    Labs show:  No results found. However, due to the size of the patient record, not all encounters were searched. Please check Results Review for a complete set of results.    Imaging shows:  Recent Results (from the past 744 hour(s))   CT Pelvis Soft Tissue w Contrast    Narrative    CT PELVIS SOFT TISSUE WITH CONTRAST   11/16/2018 11:26 AM     HISTORY: 2 weeks of pain over right inguinal area, at first had lump,  lump is gone, but whole area is tender to touch, no skin issue. Right  groin pain.    TECHNIQUE:  CT pelvis with IV contrast, 70 mL Isovue-370. Transverse  and coronal reformatted images were obtained from the source data.  Radiation dose for this scan was reduced using automated exposure  control, adjustment of the mA and/or kV according to patient size, or  iterative reconstruction technique.    COMPARISON: CT abdomen and pelvis dated 8/24/2011.    FINDINGS: Intrapelvic structures are grossly unremarkable. No mass,  adenopathy, free fluid or free air is identified. There  are  innumerable diverticuli in the sigmoid colon without obvious  pericolonic inflammatory change to suggest acute diverticulitis. There  is mild nonaneurysmal atherosclerosis.    No aggressive osseous lesions or fracture are seen. Degenerative  changes are noted in the spine.    There are tiny bilateral fat-containing inguinal hernias, slightly  larger on the right. These are similar in appearance to the prior  study dated 8/24/2011 and the hernia on the right is grossly in the  area of symptoms. No marker was placed on the skin at the site of  symptoms, however, the technologist came in and showed me on the image  about where the symptoms were located.      Impression    IMPRESSION:  1. Small bilateral fat-containing inguinal hernias, slightly larger on  the right, are stable since 2011. The one on the right could cause the  palpable finding in the region of the right symphysis pubis.  2. Colonic diverticulosis (worsened since the prior study).  Nonaneurysmal atherosclerosis. No other significant abnormalities.    TIM BEDOYA MD         Assessment:     ICD-10-CM    1. Non-recurrent bilateral inguinal hernia without obstruction or gangrene K40.20 [facility]-Operative Worksheet - Laparoscopic Inguinal Hernia Repair     Plan: Given her symptoms, I recommend at least a laparoscopic right, probable left inguinal hernia repair with mesh. We discussed the procedure in detail. We also discussed the risks, benefits, alternatives and post-op care and restrictions. After our informed discussion we decided to proceed with the proposed surgery.  Risks of surgery discussed including, but not limited to bleeding, infection, recurrence, damage to nerves and what is in the hernia sac.  Risks of anesthesia also discussed..  Although mesh is a better long term repair if it gets infected it must be removed.  If there is evidence of an infection at time of surgery it will be cancelled and rescheduled for when infection has resolved.  Discussed massaging hernia back in and using ice if becomes more painful.  If not able to reduce then go to emergency room.      Marlon Hamilton, DO
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
## 2018-11-30 ENCOUNTER — OFFICE VISIT (OUTPATIENT)
Dept: FAMILY MEDICINE | Facility: OTHER | Age: 68
End: 2018-11-30
Payer: COMMERCIAL

## 2018-11-30 VITALS
SYSTOLIC BLOOD PRESSURE: 128 MMHG | HEIGHT: 63 IN | TEMPERATURE: 98.1 F | HEART RATE: 82 BPM | DIASTOLIC BLOOD PRESSURE: 82 MMHG | BODY MASS INDEX: 25.48 KG/M2 | WEIGHT: 143.8 LBS | OXYGEN SATURATION: 100 %

## 2018-11-30 DIAGNOSIS — Z01.818 PREOP GENERAL PHYSICAL EXAM: Primary | ICD-10-CM

## 2018-11-30 DIAGNOSIS — K40.20 BILATERAL INGUINAL HERNIA WITHOUT OBSTRUCTION OR GANGRENE, RECURRENCE NOT SPECIFIED: ICD-10-CM

## 2018-11-30 PROCEDURE — 99214 OFFICE O/P EST MOD 30 MIN: CPT | Performed by: FAMILY MEDICINE

## 2018-11-30 PROCEDURE — 93000 ELECTROCARDIOGRAM COMPLETE: CPT | Performed by: FAMILY MEDICINE

## 2018-11-30 ASSESSMENT — PAIN SCALES - GENERAL: PAINLEVEL: MODERATE PAIN (5)

## 2018-11-30 NOTE — MR AVS SNAPSHOT
After Visit Summary   11/30/2018    Niesha Adhikari    MRN: [id number]           Patient Information     Date Of Birth          1950        Visit Information        Provider Department      11/30/2018 1:00 PM Tanika Clark MD [facility]        Today's Diagnoses     Preop general physical exam    -  1    Bilateral inguinal hernia without obstruction or gangrene, recurrence not specified          Care Instructions      Before Your Surgery      Call your surgeon if there is any change in your health. This includes signs of a cold or flu (such as a sore throat, runny nose, cough, rash or fever).    Do not smoke, drink alcohol or take over the counter medicine (unless your surgeon or primary care doctor tells you to) for the 24 hours before and after surgery.    If you take prescribed drugs: Follow your doctor s orders about which medicines to take and which to stop until after surgery.    Eating and drinking prior to surgery: follow the instructions from your surgeon    Take a shower or bath the night before surgery. Use the soap your surgeon gave you to gently clean your skin. If you do not have soap from your surgeon, use your regular soap. Do not shave or scrub the surgery site.  Wear clean pajamas and have clean sheets on your bed.           Follow-ups after your visit        Your next 10 appointments already scheduled     Dec 05, 2018  8:00 AM CST   Level 1 with NL INFUSION CHAIR 4   [facility] ([facility])    [address] [place] [address]   [phone]            Dec 14, 2018   Procedure with Marlon Hamilton,    [facility] ([facility])    [address] [place] [address]   [phone]           From [address]: Exit at [address] on [place] side of [place]. Turn right on [address]. Turn left at stoplight on [address]. [facility] will  "be in view two blocks ahead            Dec 28, 2018  9:00 AM CST   Return Visit with Marlon Hamilton DO   [facility]    [address] [id number] [phone]            Apr 12, 2019 10:20 AM CDT   Return Visit with Apolinar Cho MD   [facility] ([facility])    [address]  [place] [address] [phone]              Who to contact     If you have questions or need follow up information about today's clinic visit or your schedule please contact [facility] [place] directly at [phone].  Normal or non-critical lab and imaging results will be communicated to you by MyChart, letter or phone within 4 business days after the clinic has received the results. If you do not hear from us within 7 days, please contact the clinic through [employer] or phone. If you have a critical or abnormal lab result, we will notify you by phone as soon as possible.  Submit refill requests through [facility] or call your pharmacy and they will forward the refill request to us. Please allow 3 business days for your refill to be completed.          Additional Information About Your Visit        [employer] Information     [facility] gives you secure access to your electronic health record. If you see a primary care provider, you can also send messages to your care team and make appointments. If you have questions, please call your primary care clinic.  If you do not have a primary care provider, please call [phone] and they will assist you.        Care EveryWhere ID     This is your Care EveryWhere ID. This could be used by other organizations to access your [place] medical records  [id number]        Your Vitals Were     Pulse Temperature Height Last Period Pulse Oximetry BMI (Body Mass Index)    82 98.1  F (36.7  C) (Temporal) 5' 3.39\" (1.61 m) 11/27/2003 100% 25.16 kg/m2       Blood Pressure from Last 3 Encounters: "   11/30/18 128/82   11/26/18 130/78   11/16/18 116/66    Weight from Last 3 Encounters:   11/30/18 143 lb 12.8 oz (65.2 kg)   11/26/18 144 lb (65.3 kg)   11/16/18 146 lb (66.2 kg)              We Performed the Following     EKG 12-lead complete w/read - Clinics        Primary Care Provider Office Phone # Fax #    Tanika QUIROZ MD Eduardo [phone]       [address]  [facility] [address]        Equal Access to Services     [facility]: Hadii aad ku hadasho Soomaali, waaxda luqadaha, qaybta kaalmada adeegyada, ramses mcnulty . So [facility] [phone].    ATENCIÓN: Si habla español, tiene a salazar disposición servicios gratuitos de asistencia lingüística. [facility] [phone].    We comply with applicable federal civil rights laws and [place] laws. We do not discriminate on the basis of race, color, national origin, age, disability, sex, sexual orientation, or gender identity.            Thank you!     Thank you for choosing [facility]  for your care. Our goal is always to provide you with excellent care. Hearing back from our patients is one way we can continue to improve our services. Please take a few minutes to complete the written survey that you may receive in the mail after your visit with us. Thank you!             Your Updated Medication List - Protect others around you: Learn how to safely use, store and throw away your medicines at www.disposemymeds.org.          This list is accurate as of 11/30/18  1:57 PM.  Always use your most recent med list.                   Brand Name Dispense Instructions for use Diagnosis    CLOTRIMAZOLE ANTI-FUNGAL 1 % external cream   Generic drug:  clotrimazole     28.35 g    APPLY TOPICALLY TWO TIMES A DAY    Cutaneous candidiasis       cycloSPORINE 0.05 % ophthalmic emulsion    RESTASIS     Place 1 drop into both eyes 2 times daily        Fiber 0.52 g Caps     540 capsule    2 tabs in am and pm         hydroxychloroquine 200 MG tablet    PLAQUENIL    135 tablet    Hydroxychloroquine 200mg daily; and an additional 200mg every other day.    Inflammatory polyarthropathy (H)       IBANdronate 150 MG tablet    BONIVA     Inject 3 mg into the vein every 3 months        lisinopril 10 MG tablet    PRINIVIL/ZESTRIL    90 tablet    Take 1 tablet (10 mg) by mouth daily    Essential hypertension       montelukast 10 MG tablet    SINGULAIR    90 tablet    TAKE ONE TABLET BY MOUTH EVERY DAY AS NEEDED SEASONALLY (AUGUST AND SEPTEMBER)    Other seasonal allergic rhinitis       nystatin 941441 UNIT/GM external powder    MYCOSTATIN    60 g    Apply topically 3 times daily as needed    Intertrigo       OLANZapine 2.5 mg half-tablet    zyPREXA     Take by mouth nightly as needed        PROVIGIL PO      Take 100 mg by mouth Takes 1 1/2 tabs bid (150 mg bid) morning and noon        vitamin D2 42530 units (1250 mcg) capsule    ERGOCALCIFEROL    24 capsule    Take 1 capsule (50,000 Units) by mouth every Monday and Friday.    Vitamin D deficiency, Other osteoporosis, unspecified pathological fracture presence       VYVANSE 20 MG capsule   Generic drug:  lisdexamfetamine     30 capsule    Take 30 mg by mouth every other day    Primary narcolepsy without cataplexy

## 2018-12-05 ENCOUNTER — INFUSION THERAPY VISIT (OUTPATIENT)
Dept: INFUSION THERAPY | Facility: CLINIC | Age: 68
End: 2018-12-05
Attending: INTERNAL MEDICINE
Payer: MEDICARE

## 2018-12-05 VITALS
RESPIRATION RATE: 16 BRPM | HEART RATE: 66 BPM | SYSTOLIC BLOOD PRESSURE: 138 MMHG | OXYGEN SATURATION: 97 % | DIASTOLIC BLOOD PRESSURE: 81 MMHG | BODY MASS INDEX: 25.58 KG/M2 | WEIGHT: 146.2 LBS | TEMPERATURE: 97.6 F

## 2018-12-05 DIAGNOSIS — Z92.89 PERSONAL HISTORY OF OTHER MEDICAL TREATMENT: ICD-10-CM

## 2018-12-05 DIAGNOSIS — M81.0 OSTEOPOROSIS, UNSPECIFIED OSTEOPOROSIS TYPE, UNSPECIFIED PATHOLOGICAL FRACTURE PRESENCE: Primary | ICD-10-CM

## 2018-12-05 DIAGNOSIS — M81.0 AGE-RELATED OSTEOPOROSIS WITHOUT CURRENT PATHOLOGICAL FRACTURE: ICD-10-CM

## 2018-12-05 LAB
ALBUMIN SERPL-MCNC: 3.8 G/DL (ref 3.4–5)
CALCIUM SERPL-MCNC: 8.9 MG/DL (ref 8.5–10.1)
CREAT SERPL-MCNC: 0.57 MG/DL (ref 0.52–1.04)
GFR SERPL CREATININE-BSD FRML MDRD: >90 ML/MIN/1.7M2

## 2018-12-05 PROCEDURE — 25000128 H RX IP 250 OP 636: Performed by: INTERNAL MEDICINE

## 2018-12-05 PROCEDURE — 82040 ASSAY OF SERUM ALBUMIN: CPT | Performed by: INTERNAL MEDICINE

## 2018-12-05 PROCEDURE — 36415 COLL VENOUS BLD VENIPUNCTURE: CPT | Performed by: INTERNAL MEDICINE

## 2018-12-05 PROCEDURE — 96374 THER/PROPH/DIAG INJ IV PUSH: CPT

## 2018-12-05 PROCEDURE — 82310 ASSAY OF CALCIUM: CPT | Performed by: INTERNAL MEDICINE

## 2018-12-05 PROCEDURE — 82565 ASSAY OF CREATININE: CPT | Performed by: INTERNAL MEDICINE

## 2018-12-05 RX ORDER — IBANDRONATE SODIUM 3 MG/3 ML
3 SYRINGE (ML) INTRAVENOUS ONCE
Status: CANCELLED | OUTPATIENT
Start: 2018-12-05 | End: 2018-12-05

## 2018-12-05 RX ORDER — IBANDRONATE SODIUM 3 MG/3 ML
3 SYRINGE (ML) INTRAVENOUS ONCE
Status: COMPLETED | OUTPATIENT
Start: 2018-12-05 | End: 2018-12-05

## 2018-12-05 RX ADMIN — IBANDRONATE SODIUM 3 MG: 3 INJECTION, SOLUTION INTRAVENOUS at 10:21

## 2018-12-05 ASSESSMENT — PAIN SCALES - GENERAL: PAINLEVEL: MODERATE PAIN (4)

## 2018-12-05 NOTE — MR AVS SNAPSHOT
After Visit Summary   12/5/2018    Niesha Adhikari    MRN: [id number]           Patient Information     Date Of Birth          1950        Visit Information        Provider Department      12/5/2018 8:00 AM NL INFUSION CHAIR 4 [facility]        Today's Diagnoses     Osteoporosis, unspecified osteoporosis type, unspecified pathological fracture presence    -  1    Age-related osteoporosis without current pathological fracture        Personal history of other medical treatment        Personal history of other medical treatment           Follow-ups after your visit        Follow-up notes from your care team     Return in about 3 months (around 3/6/2019).      Your next 10 appointments already scheduled     Dec 14, 2018   Procedure with Marlon Hamilton DO   [facility] ([facility])    [address] [place] [address] [address] [place] [address]   [phone]           From [address]: Exit at [employer] on south side of [place]. Turn right on [address]. Turn left at stoplight on [address]. [facility] will be in view two blocks ahead            Dec 28, 2018  9:00 AM CST   [facility] DO   [facility]    [address] [facility] [address]   [phone]            Mar 06, 2019  8:00 AM CST   Level 1 with NL INFUSION CHAIR 3   [facility] ([facility])    [address] [place] [address] Dr [place] [address]   [phone]            Apr 12, 2019 10:20 AM CDT   Return Visit with Apolinar Coh MD   [facility] ([facility])    [address]  [place] [address]   [phone]              Who to contact     If you have questions or need follow up information about today's clinic visit or your schedule please contact [facility] directly at  [phone].  Normal or non-critical lab and imaging results will be communicated to you by MyChart, letter or phone within 4 business days after the clinic has received the results. If you do not hear from us within 7 days, please contact the clinic through [employer] or phone. If you have a critical or abnormal lab result, we will notify you by phone as soon as possible.  Submit refill requests through [employer] or call your pharmacy and they will forward the refill request to us. Please allow 3 business days for your refill to be completed.          Additional Information About Your Visit        [employer] Information     [employer] gives you secure access to your electronic health record. If you see a primary care provider, you can also send messages to your care team and make appointments. If you have questions, please call your primary care clinic.  If you do not have a primary care provider, please call [phone] and they will assist you.        Care EveryWhere ID     This is your Care EveryWhere ID. This could be used by other organizations to access your [place] medical records  [id number]        Your Vitals Were     Pulse Temperature Respirations Last Period Pulse Oximetry BMI (Body Mass Index)    66 97.6  F (36.4  C) (Temporal) 16 11/27/2003 97% 25.58 kg/m2       Blood Pressure from Last 3 Encounters:   12/05/18 138/81   11/30/18 128/82   11/26/18 130/78    Weight from Last 3 Encounters:   12/05/18 66.3 kg (146 lb 3.2 oz)   11/30/18 65.2 kg (143 lb 12.8 oz)   11/26/18 65.3 kg (144 lb)              We Performed the Following     Albumin level     Calcium     Creatinine        Primary Care Provider Office Phone # Fax #    Tanika GABRIELLA MD Eduardo [phone]       [address]  [facility] [address]        Equal Access to Services     WADE STAUFFER : Lonny Rao, wacandida nicholsonadaha, qaybta kaalivonne washington, ramses abreu. So [facility]  [phone].    ATENCIÓN: Si maxwell sanchez, tiene a salazar disposición servicios gratuitos de asistencia lingüística. Ricky bernard [phone].    We comply with applicable federal civil rights laws and [place] laws. We do not discriminate on the basis of race, color, national origin, age, disability, sex, sexual orientation, or gender identity.            Thank you!     Thank you for choosing [facility]  for your care. Our goal is always to provide you with excellent care. Hearing back from our patients is one way we can continue to improve our services. Please take a few minutes to complete the written survey that you may receive in the mail after your visit with us. Thank you!             Your Updated Medication List - Protect others around you: Learn how to safely use, store and throw away your medicines at www.disposemymeds.org.          This list is accurate as of 12/5/18 11:04 AM.  Always use your most recent med list.                   Brand Name Dispense Instructions for use Diagnosis    CLOTRIMAZOLE ANTI-FUNGAL 1 % external cream   Generic drug:  clotrimazole     28.35 g    APPLY TOPICALLY TWO TIMES A DAY    Cutaneous candidiasis       cycloSPORINE 0.05 % ophthalmic emulsion    RESTASIS     Place 1 drop into both eyes 2 times daily        Fiber 0.52 g Caps     540 capsule    2 tabs in am and pm        hydroxychloroquine 200 MG tablet    PLAQUENIL    135 tablet    Hydroxychloroquine 200mg daily; and an additional 200mg every other day.    Inflammatory polyarthropathy (H)       IBANdronate 150 MG tablet    BONIVA     Inject 3 mg into the vein every 3 months        lisinopril 10 MG tablet    PRINIVIL/ZESTRIL    90 tablet    Take 1 tablet (10 mg) by mouth daily    Essential hypertension       montelukast 10 MG tablet    SINGULAIR    90 tablet    TAKE ONE TABLET BY MOUTH EVERY DAY AS NEEDED SEASONALLY (AUGUST AND SEPTEMBER)    Other seasonal allergic rhinitis       nystatin 448466 UNIT/GM  external powder    MYCOSTATIN    60 g    Apply topically 3 times daily as needed    Intertrigo       OLANZapine 2.5 mg half-tablet    zyPREXA     Take by mouth nightly as needed        PROVIGIL PO      Take 100 mg by mouth Takes 1 1/2 tabs bid (150 mg bid) morning and noon        vitamin D2 10628 units (1250 mcg) capsule    ERGOCALCIFEROL    24 capsule    Take 1 capsule (50,000 Units) by mouth every Monday and Friday.    Vitamin D deficiency, Other osteoporosis, unspecified pathological fracture presence       VYVANSE 20 MG capsule   Generic drug:  lisdexamfetamine     30 capsule    Take 30 mg by mouth every other day    Primary narcolepsy without cataplexy

## 2018-12-05 NOTE — PROGRESS NOTES
Infusion Nursing Note:  Niesha Adhikari presents today for labs/Boniva.    Patient seen by provider today: No   present during visit today: Not Applicable.    Note: N/A.    Intravenous Access:  Peripheral IV placed.    Treatment Conditions:  Lab Results   Component Value Date     09/26/2018                   Lab Results   Component Value Date    POTASSIUM 4.7 09/26/2018           Lab Results   Component Value Date    MAG 1.7 12/05/2006            Lab Results   Component Value Date    CR 0.57 12/05/2018                   Lab Results   Component Value Date    ELIZABETH 8.9 12/05/2018                Lab Results   Component Value Date    BILITOTAL 0.4 09/26/2018           Lab Results   Component Value Date    ALBUMIN 3.8 12/05/2018                    Lab Results   Component Value Date    ALT 25 09/26/2018           Lab Results   Component Value Date    AST 23 09/26/2018       Results reviewed, labs MET treatment parameters, ok to proceed with treatment.      Post Infusion Assessment:  Patient tolerated injection without incident.  Patient observed for 15 minutes post boniva per protocol.  Site patent and intact, free from redness, edema or discomfort.  Access discontinued per protocol.    Discharge Plan:   Discharge instructions reviewed with: Patient.  Patient and/or family verbalized understanding of discharge instructions and all questions answered.  Copy of AVS reviewed with patient and/or family.  Patient will return 3 months for next appointment.  Patient discharged in stable condition accompanied by: self.  Departure Mode: Ambulatory.    Monika Garcia RN

## 2018-12-06 ENCOUNTER — TELEPHONE (OUTPATIENT)
Dept: FAMILY MEDICINE | Facility: OTHER | Age: 68
End: 2018-12-06

## 2018-12-06 RX ORDER — IBANDRONATE SODIUM 3 MG/3 ML
3 SYRINGE (ML) INTRAVENOUS ONCE
Status: CANCELLED | OUTPATIENT
Start: 2019-03-05 | End: 2019-03-05

## 2018-12-06 NOTE — TELEPHONE ENCOUNTER
Summary:    Patient is due/failing the following:   PHQ9    Action needed:   Patient needs to do PHQ9.    Type of outreach:    Sent [employer] message.    Questions for provider review:    None                                                                                                                                    Rebeca Roberto       Chart routed to Care Team .          Panel Management Review      Patient has the following on her problem list:     Depression / Dysthymia review    Measure:  Needs PHQ-9 score of 4 or less during index window.  Administer PHQ-9 and if score is 5 or more, send encounter to provider for next steps.        PHQ-9 SCORE 1/4/2018 7/27/2018 11/16/2018   PHQ-9 Total Score - - -   PHQ-9 Total Score MyChart 8 (Mild depression) - 6 (Mild depression)   PHQ-9 Total Score 8 11 6       If PHQ-9 recheck is 5 or more, route to provider for next steps.    Patient is due for:  PHQ9    Hypertension   Last three blood pressure readings:  BP Readings from Last 3 Encounters:   12/05/18 138/81   11/30/18 128/82   11/26/18 130/78     Blood pressure: Passed    HTN Guidelines:  Age 18-59 BP range:  Less than 140/90  Age 60-85 with Diabetes:  Less than 140/90  Age 60-85 without Diabetes:  less than 150/90      Composite cancer screening  Chart review shows that this patient is due/due soon for the following None

## 2018-12-12 ENCOUNTER — TELEPHONE (OUTPATIENT)
Dept: SURGERY | Facility: CLINIC | Age: 68
End: 2018-12-12

## 2018-12-12 NOTE — TELEPHONE ENCOUNTER
PATIENT CALL:  Nausea is getting worse daily. Started 1 week ago. Surgery scheduled Friday. Cannot tolerate ANY foods or liquids. Nausea is overwhelming causing her to lay down and fall asleep. Has tried ondansetron in the past (unsure of timeframe) but it was ineffective. Denies bulge or pain at hernia site.    feeling constipated and bloated. Last Bowel Movement this AM. Was Small, a couple of teaspoons of liquid. Taking mirilax and fiber regularly. BMs are normally all liquid per patient.    Pharmacy [facility].    I called Alfonso who doesn't feel this is r/t the small hernia and recommends her primary provider review and address this. Routing to Dr. Clark.    Rosa Ocasio RN on 12/12/2018 at 3:06 PM

## 2018-12-12 NOTE — TELEPHONE ENCOUNTER
Reason for Call:  Other call back    Detailed comments: Patient has surgery scheduled this Friday and is experiencing worse nausea and wants to speak with a nurse.  Please call    Phone Number Patient can be reached at: Home number on file [phone] (home)    Best Time: any    Can we leave a detailed message on this number? YES    Call taken on 12/12/2018 at 2:41 PM by Alicia Bragg

## 2018-12-13 NOTE — TELEPHONE ENCOUNTER
Spoke with patient.  Does feel a little bit better today then yesterday.      Has nauseated all day long.  No vomiting. Constipation: has been taking miralax and fiber.  Has not been eating.  Everything smells bad.    Has only been eating crackers and water and a little bit a fruit. States that she has had very little stools that a runny, couple a teaspoons a day for that last 8/9 days. Not passing gas.  Still urinating with no issues there.     Surgery is planned for the afternoon tomorrow.    Advised that she be seen.    RECOMMENDED DISPOSITION:  See in 24 hours -   Will comply with recommendation: yes   If further questions/concerns or if Sx do not improve, worsen or new Sx develop, call your PCP or [facility] as soon as possible.    NOTES:  Disposition was determined by the first positive assessment question, therefore all previous assessment questions were negative.     Guideline used:  Telephone Triage Protocols for Nurses, Fifth Edition, Deana Briggs RN, BSN

## 2018-12-13 NOTE — PROGRESS NOTES
SUBJECTIVE:   Niesha Adhikari is a 68 year old female who presents to clinic today for the following health issues:      HPI     Triage note: 12/13/18  Spoke with patient.  Does feel a little bit better today then yesterday.       Has nauseated all day long.  No vomiting. Constipation: has been taking miralax and fiber.  Has not been eating.  Everything smells bad.     Has only been eating crackers and water and a little bit a fruit. States that she has had very little stools that a runny, couple a teaspoons a day for that last 8/9 days. Not passing gas.  Still urinating with no issues there.      Surgery is planned for the afternoon tomorrow.     Advised that she be seen.    Patient reports continued severe nausea and bloating, last bowel movement was yesterday morning. States her stools have been soft/mush for years.  However, the last 10 days has only been passing a little liquid stool, not her usual amount.  Using Miralax three times daily.  Last colonoscopy in 2013.  Passing flatus, but only a little, does feel better after passing it.  Drinking fluids, not having much for an appetitie.  Denies abdominal pain, just bloating and nausea.      Problem list and histories reviewed & adjusted, as indicated.  Additional history: as documented    Patient Active Problem List   Diagnosis     Allergic rhinitis     Esophageal reflux     Pernicious anemia     Anxiety state     Migraine     Essential and other specified forms of tremor     Fibromyalgia     Moderate recurrent major depression (H)     Advanced directives, counseling/discussion     Narcolepsy     Internal hemorrhoids with other complication     AIN (anal intraepithelial neoplasia) anal canal     Sciatica     Osteoporosis     Inflammatory polyarthropathy (H)     Benign essential hypertension     Alcohol dependence in remission (H)     Personal history of other medical treatment     Past Surgical History:   Procedure Laterality Date     BIOPSY ANAL CANAL   13    [facility]      BREAST BIOPSY, RT/LT Left 1975    Breat Biopsy RT/LT     C NONSPECIFIC PROCEDURE  1965    Removed bone left index finger knuckle, casts broken bones     COLONOSCOPY  2009     COLONOSCOPY  2011    COLONOSCOPY performed by CRISTIN LAGUNAS at  GI     CYSTOSCOPY  2011    CYSTOSCOPY performed by CAYLA FLOR at  OR     ENDOSCOPY  12    [facility]      COLONOSCOPY W/WO BRUSH/WASH  05     [facility] ENDOSCOPY DIAG W BIOPSY  10/01/09      UGI ENDOSCOPY, SIMPLE EXAM  07     HEMORRHOIDECTOMY  12    [facility]     LAPAROSCOPIC SALPINGO-OOPHORECTOMY  2011    LAPAROSCOPIC SALPINGO-OOPHORECTOMY performed by CAYLA FLOR at  OR     TONSILLECTOMY & ADENOIDECTOMY         Social History     Tobacco Use     Smoking status: Former Smoker     Packs/day: 0.75     Years: 10.00     Pack years: 7.50     Types: Cigarettes     Start date: 1968     Last attempt to quit: 1978     Years since quittin.1     Smokeless tobacco: Never Used     Tobacco comment: No exposure at home   Substance Use Topics     Alcohol use: No     Alcohol/week: 0.0 oz     Family History   Problem Relation Age of Onset     Hypertension Mother      Breast Cancer Mother      Coronary Artery Disease Mother      Cerebrovascular Disease Mother      Kidney Disease Mother      Hypertension Brother      Respiratory Brother         emphysema     Lipids Brother      Heart Disease Brother         stents, 2011; has had about 6 MIs, last one 2014     C.A.D. Sister         MI at age 63     Hypertension Sister      Gastrointestinal Disease Sister         gallbladder     Circulatory Sister         brain aneurysm at 63     Genitourinary Problems Sister         1 kidney/bladder     Hypertension Sister      Obesity Sister      Coronary Artery Disease Sister         had valve surgery, MI, CHF     Unknown/Adopted Paternal Uncle      Blood  Disease Son         Lymes/7/11     Hypertension Son      Hypertension Father      Lymphoma Father      Glaucoma Father      Coronary Artery Disease Other 49        niece     Diabetes Other         cousin     Cerebrovascular Disease Maternal Grandmother      Cerebrovascular Disease Paternal Grandmother      Liver Cancer Cousin      Glaucoma Paternal Grandfather            ROS:  CONSTITUTIONAL: NEGATIVE for fever, chills, change in weight  ENT/MOUTH: NEGATIVE for ear, mouth and throat problems  RESP: NEGATIVE for significant cough or shortness of breath   CV: NEGATIVE for chest pain, palpitations or peripheral edema    OBJECTIVE:     /70 (BP Location: Left arm, Patient Position: Chair, Cuff Size: Adult Regular)   Pulse 68   Temp 98  F (36.7  C) (Temporal)   Resp 16   Wt 65.3 kg (144 lb)   LMP 11/27/2003   SpO2 100%   BMI 25.20 kg/m    Body mass index is 25.2 kg/m .  Gen: no apparent distress  NECK: no adenopathy, no asymmetry, no masses  Chest: clear to auscultation without wheeze, rale or rhonchi  Cor: regular rate and rhythm without murmur  ABDOMEN: soft, nontender, no masses and bowel sounds normal  Ext: warm and dry without edema  Psych: Alert and oriented times 3; coherent speech, normal   rate and volume, able to articulate logical thoughts, able   to abstract reason, no tangential thoughts, no hallucinations   or delusions  Her affect is flat    AXR:  No air/fluid levels, lots of stool, Radiology review pending    ASSESSMENT/PLAN:     1. Nausea  Most likely secondary to constipation.  Awaiting labs.    - XR Abdomen 2 Views; Future  - Basic metabolic panel  - CBC with platelets    2. Constipation, unspecified constipation type  Will try magnesium citrate.  She is due for colonoscopy, she would like this done at [place] and would like it prior to hernia surgery.    - XR Abdomen 2 Views; Future  - Basic metabolic panel  - GASTROENTEROLOGY ADULT REF PROCEDURE ONLY [facility]  [phone]    3. Special screening for malignant neoplasms, colon    - GASTROENTEROLOGY ADULT REF PROCEDURE ONLY [facility] [phone]    I have recommended that she postpone surgery until she is feeling better.  She would also like to see if the surgeon does colonoscopies and could perform hers.    Tanika Clark MD  [facility]

## 2018-12-14 ENCOUNTER — OFFICE VISIT (OUTPATIENT)
Dept: FAMILY MEDICINE | Facility: OTHER | Age: 68
End: 2018-12-14
Payer: COMMERCIAL

## 2018-12-14 ENCOUNTER — ANCILLARY PROCEDURE (OUTPATIENT)
Dept: GENERAL RADIOLOGY | Facility: OTHER | Age: 68
End: 2018-12-14
Attending: FAMILY MEDICINE
Payer: COMMERCIAL

## 2018-12-14 ENCOUNTER — TELEPHONE (OUTPATIENT)
Dept: FAMILY MEDICINE | Facility: OTHER | Age: 68
End: 2018-12-14

## 2018-12-14 VITALS
DIASTOLIC BLOOD PRESSURE: 70 MMHG | WEIGHT: 144 LBS | SYSTOLIC BLOOD PRESSURE: 112 MMHG | HEART RATE: 68 BPM | TEMPERATURE: 98 F | OXYGEN SATURATION: 100 % | RESPIRATION RATE: 16 BRPM | BODY MASS INDEX: 25.2 KG/M2

## 2018-12-14 DIAGNOSIS — K59.00 CONSTIPATION, UNSPECIFIED CONSTIPATION TYPE: ICD-10-CM

## 2018-12-14 DIAGNOSIS — R11.0 NAUSEA: Primary | ICD-10-CM

## 2018-12-14 DIAGNOSIS — R11.0 NAUSEA: ICD-10-CM

## 2018-12-14 DIAGNOSIS — Z12.11 SPECIAL SCREENING FOR MALIGNANT NEOPLASMS, COLON: ICD-10-CM

## 2018-12-14 LAB
ANION GAP SERPL CALCULATED.3IONS-SCNC: 5 MMOL/L (ref 3–14)
BUN SERPL-MCNC: 11 MG/DL (ref 7–30)
CALCIUM SERPL-MCNC: 9.2 MG/DL (ref 8.5–10.1)
CHLORIDE SERPL-SCNC: 105 MMOL/L (ref 94–109)
CO2 SERPL-SCNC: 30 MMOL/L (ref 20–32)
CREAT SERPL-MCNC: 0.66 MG/DL (ref 0.52–1.04)
ERYTHROCYTE [DISTWIDTH] IN BLOOD BY AUTOMATED COUNT: 13 % (ref 10–15)
GFR SERPL CREATININE-BSD FRML MDRD: >90 ML/MIN/1.7M2
GLUCOSE SERPL-MCNC: 84 MG/DL (ref 70–99)
HCT VFR BLD AUTO: 40.9 % (ref 35–47)
HGB BLD-MCNC: 13.7 G/DL (ref 11.7–15.7)
MCH RBC QN AUTO: 30.4 PG (ref 26.5–33)
MCHC RBC AUTO-ENTMCNC: 33.5 G/DL (ref 31.5–36.5)
MCV RBC AUTO: 91 FL (ref 78–100)
PLATELET # BLD AUTO: 150 10E9/L (ref 150–450)
POTASSIUM SERPL-SCNC: 4.7 MMOL/L (ref 3.4–5.3)
RBC # BLD AUTO: 4.51 10E12/L (ref 3.8–5.2)
SODIUM SERPL-SCNC: 140 MMOL/L (ref 133–144)
WBC # BLD AUTO: 3.9 10E9/L (ref 4–11)

## 2018-12-14 PROCEDURE — 99214 OFFICE O/P EST MOD 30 MIN: CPT | Performed by: FAMILY MEDICINE

## 2018-12-14 PROCEDURE — 36415 COLL VENOUS BLD VENIPUNCTURE: CPT | Performed by: FAMILY MEDICINE

## 2018-12-14 PROCEDURE — 85027 COMPLETE CBC AUTOMATED: CPT | Performed by: FAMILY MEDICINE

## 2018-12-14 PROCEDURE — 80048 BASIC METABOLIC PNL TOTAL CA: CPT | Performed by: FAMILY MEDICINE

## 2018-12-14 PROCEDURE — 74019 RADEX ABDOMEN 2 VIEWS: CPT

## 2018-12-14 NOTE — TELEPHONE ENCOUNTER
Date of colonoscopy/EGD: 1/8  Surgeon: Dr. Talavera  Prep:Miralax  Packet:Colonoscopy/EGD instructions mailed to patient's home address.   Date: 12/14/2018      Surgery Scheduler

## 2018-12-14 NOTE — LETTER
[address]  [facility] [address]  [phone]        December 14, 2018    Niesha Adhikari  [address] [address] [place] [id number]

## 2018-12-14 NOTE — TELEPHONE ENCOUNTER
Surgery canceled per PCP due to patient illness.    Spoke to patient, she would like to get colonoscopy done first. She will then decide on rescheduling surgery

## 2018-12-14 NOTE — LETTER

## 2019-01-07 ENCOUNTER — ANESTHESIA EVENT (OUTPATIENT)
Dept: GASTROENTEROLOGY | Facility: CLINIC | Age: 69
End: 2019-01-07
Payer: MEDICARE

## 2019-01-07 ASSESSMENT — LIFESTYLE VARIABLES: TOBACCO_USE: 1
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
## 2019-01-08 ENCOUNTER — HOSPITAL ENCOUNTER (OUTPATIENT)
Facility: CLINIC | Age: 69
Discharge: HOME | End: 2019-01-08
Attending: SPECIALIST | Admitting: SPECIALIST
Payer: MEDICARE

## 2019-01-08 ENCOUNTER — ANESTHESIA (OUTPATIENT)
Dept: GASTROENTEROLOGY | Facility: CLINIC | Age: 69
End: 2019-01-08
Payer: MEDICARE

## 2019-01-08 VITALS
WEIGHT: 144 LBS | OXYGEN SATURATION: 100 % | BODY MASS INDEX: 24.59 KG/M2 | HEIGHT: 64 IN | RESPIRATION RATE: 16 BRPM | DIASTOLIC BLOOD PRESSURE: 93 MMHG | SYSTOLIC BLOOD PRESSURE: 150 MMHG | TEMPERATURE: 98.4 F | HEART RATE: 74 BPM

## 2019-01-08 LAB — COLONOSCOPY: NORMAL

## 2019-01-08 PROCEDURE — 40000299 ZZH STATISTIC ENDO RECOVERY CLASS 1:3 EA ADD HOUR: Performed by: SPECIALIST

## 2019-01-08 PROCEDURE — 45380 COLONOSCOPY AND BIOPSY: CPT | Mod: PT | Performed by: SPECIALIST

## 2019-01-08 PROCEDURE — 88305 TISSUE EXAM BY PATHOLOGIST: CPT | Performed by: SPECIALIST

## 2019-01-08 PROCEDURE — 40000296 ZZH STATISTIC ENDO RECOVERY CLASS 1:2 FIRST HOUR: Performed by: SPECIALIST

## 2019-01-08 PROCEDURE — 25000125 ZZHC RX 250: Performed by: NURSE ANESTHETIST, CERTIFIED REGISTERED

## 2019-01-08 PROCEDURE — 25000128 H RX IP 250 OP 636: Performed by: NURSE ANESTHETIST, CERTIFIED REGISTERED

## 2019-01-08 PROCEDURE — 88305 TISSUE EXAM BY PATHOLOGIST: CPT | Mod: 26 | Performed by: SPECIALIST

## 2019-01-08 PROCEDURE — 37000008 ZZH ANESTHESIA TECHNICAL FEE, 1ST 30 MIN: Performed by: SPECIALIST

## 2019-01-08 PROCEDURE — 37000009 ZZH ANESTHESIA TECHNICAL FEE, EACH ADDTL 15 MIN: Performed by: SPECIALIST

## 2019-01-08 RX ORDER — SODIUM CHLORIDE 9 MG/ML
INJECTION, SOLUTION INTRAVENOUS CONTINUOUS
Status: DISCONTINUED | OUTPATIENT
Start: 2019-01-08 | End: 2019-01-08 | Stop reason: HOSPADM

## 2019-01-08 RX ORDER — NALOXONE HYDROCHLORIDE 0.4 MG/ML
.1-.4 INJECTION, SOLUTION INTRAMUSCULAR; INTRAVENOUS; SUBCUTANEOUS
Status: DISCONTINUED | OUTPATIENT
Start: 2019-01-08 | End: 2019-01-08 | Stop reason: HOSPADM

## 2019-01-08 RX ORDER — PROPOFOL 10 MG/ML
INJECTION, EMULSION INTRAVENOUS CONTINUOUS PRN
Status: DISCONTINUED | OUTPATIENT
Start: 2019-01-08 | End: 2019-01-08

## 2019-01-08 RX ORDER — CEFAZOLIN SODIUM 2 G/100ML
2 INJECTION, SOLUTION INTRAVENOUS
Status: DISCONTINUED | OUTPATIENT
Start: 2019-01-08 | End: 2019-01-08 | Stop reason: HOSPADM

## 2019-01-08 RX ORDER — PROPOFOL 10 MG/ML
INJECTION, EMULSION INTRAVENOUS PRN
Status: DISCONTINUED | OUTPATIENT
Start: 2019-01-08 | End: 2019-01-08

## 2019-01-08 RX ORDER — CEFAZOLIN SODIUM 1 G/3ML
1 INJECTION, POWDER, FOR SOLUTION INTRAMUSCULAR; INTRAVENOUS SEE ADMIN INSTRUCTIONS
Status: DISCONTINUED | OUTPATIENT
Start: 2019-01-08 | End: 2019-01-08 | Stop reason: HOSPADM

## 2019-01-08 RX ORDER — SODIUM CHLORIDE, SODIUM LACTATE, POTASSIUM CHLORIDE, CALCIUM CHLORIDE 600; 310; 30; 20 MG/100ML; MG/100ML; MG/100ML; MG/100ML
INJECTION, SOLUTION INTRAVENOUS CONTINUOUS
Status: DISCONTINUED | OUTPATIENT
Start: 2019-01-08 | End: 2019-01-08

## 2019-01-08 RX ORDER — ONDANSETRON 2 MG/ML
4 INJECTION INTRAMUSCULAR; INTRAVENOUS EVERY 30 MIN PRN
Status: DISCONTINUED | OUTPATIENT
Start: 2019-01-08 | End: 2019-01-08 | Stop reason: HOSPADM

## 2019-01-08 RX ORDER — LIDOCAINE HYDROCHLORIDE 20 MG/ML
INJECTION, SOLUTION INFILTRATION; PERINEURAL PRN
Status: DISCONTINUED | OUTPATIENT
Start: 2019-01-08 | End: 2019-01-08

## 2019-01-08 RX ORDER — SODIUM CHLORIDE, SODIUM LACTATE, POTASSIUM CHLORIDE, CALCIUM CHLORIDE 600; 310; 30; 20 MG/100ML; MG/100ML; MG/100ML; MG/100ML
INJECTION, SOLUTION INTRAVENOUS CONTINUOUS
Status: DISCONTINUED | OUTPATIENT
Start: 2019-01-08 | End: 2019-01-08 | Stop reason: HOSPADM

## 2019-01-08 RX ORDER — LIDOCAINE 40 MG/G
CREAM TOPICAL
Status: DISCONTINUED | OUTPATIENT
Start: 2019-01-08 | End: 2019-01-08 | Stop reason: HOSPADM

## 2019-01-08 RX ORDER — ONDANSETRON 4 MG/1
4 TABLET, ORALLY DISINTEGRATING ORAL EVERY 30 MIN PRN
Status: DISCONTINUED | OUTPATIENT
Start: 2019-01-08 | End: 2019-01-08 | Stop reason: HOSPADM

## 2019-01-08 RX ADMIN — PROPOFOL 50 MG: 10 INJECTION, EMULSION INTRAVENOUS at 14:31

## 2019-01-08 RX ADMIN — SODIUM CHLORIDE, POTASSIUM CHLORIDE, SODIUM LACTATE AND CALCIUM CHLORIDE: 600; 310; 30; 20 INJECTION, SOLUTION INTRAVENOUS at 13:36

## 2019-01-08 RX ADMIN — LIDOCAINE HYDROCHLORIDE 40 MG: 20 INJECTION, SOLUTION INFILTRATION; PERINEURAL at 14:31

## 2019-01-08 RX ADMIN — PROPOFOL 150 MCG/KG/MIN: 10 INJECTION, EMULSION INTRAVENOUS at 14:33

## 2019-01-08 RX ADMIN — LIDOCAINE HYDROCHLORIDE 1 ML: 10 INJECTION, SOLUTION EPIDURAL; INFILTRATION; INTRACAUDAL; PERINEURAL at 13:36

## 2019-01-08 RX ADMIN — PROPOFOL 30 MG: 10 INJECTION, EMULSION INTRAVENOUS at 14:38

## 2019-01-08 RX ADMIN — PROPOFOL 50 MG: 10 INJECTION, EMULSION INTRAVENOUS at 14:32

## 2019-01-08 ASSESSMENT — MIFFLIN-ST. JEOR: SCORE: 1173.18

## 2019-01-08 NOTE — ANESTHESIA CARE TRANSFER NOTE
Patient: Niesha Adhikari    Procedure(s):  Colonscopy, Biopsies by Biopsy    Diagnosis: Constipation,  Diagnosis Additional Information: No value filed.    Anesthesia Type:   MAC     Note:  Airway :Room Air  Patient transferred to:Phase II  Handoff Report: Identifed the Patient, Identified the Reponsible Provider, Reviewed the pertinent medical history, Discussed the surgical course, Reviewed Intra-OP anesthesia mangement and issues during anesthesia, Set expectations for post-procedure period and Allowed opportunity for questions and acknowledgement of understanding      Vitals: (Last set prior to Anesthesia Care Transfer)    CRNA VITALS  1/8/2019 1426 - 1/8/2019 1526      1/8/2019             Pulse:  80    SpO2:  98 %                Electronically Signed By: YOSEPH Montoya CRNA  January 8, 2019  3:32 PM

## 2019-01-08 NOTE — ANESTHESIA POSTPROCEDURE EVALUATION
Patient: Niesha Adhikari    Procedure(s):  Colonscopy, Biopsies by Biopsy    Diagnosis:Constipation,  Diagnosis Additional Information: No value filed.    Anesthesia Type:  MAC    Note:  Anesthesia Post Evaluation    Patient location during evaluation: PACU  Patient participation: Able to fully participate in evaluation  Level of consciousness: awake  Pain management: adequate  Airway patency: patent  Cardiovascular status: acceptable and hemodynamically stable  Respiratory status: acceptable, room air and nonlabored ventilation  Hydration status: stable  PONV: none     Anesthetic complications: None    Comments: Patient was happy with the anesthesia care received and no anesthesia related complications were noted.  I will follow up with the patient again if it is needed.        Last vitals:  Vitals:    01/08/19 1327 01/08/19 1500 01/08/19 1515   BP: 135/85 131/86 138/89   Pulse:  71 72   Resp: 18     Temp: 98.4  F (36.9  C)     SpO2:  100% 99%         Electronically Signed By: YOSEPH Montoya CRNA  January 8, 2019  3:36 PM

## 2019-01-08 NOTE — DISCHARGE INSTRUCTIONS
[facility]    Home Care Following Endoscopy          Activity:    You have just undergone an endoscopic procedure usually performed with conscious sedation.  Do not work or operate machinery (including a car) for at least 12 hours.      I encourage you to walk and attempt to pass this air as soon as possible.    Diet:    Return to the diet you were on before your procedure but eat lightly for the first 12-24 hours.    Drink plenty of water.    Resume any regular medications unless otherwise advised by your physician.  Please begin any new medication prescribed as a result of your procedure as directed by your physician.     If you had any biopsy or polyp removed please refrain from aspirin or aspirin products for 2 days.  If on Coumadin please restart as instructed by your physician.   Pain:    You may take Tylenol as needed for pain.  Expected Recovery:    You can expect some mild abdominal fullness and/or discomfort due to the air used to inflate your intestinal tract.     Call Your Physician if You Have:      After Colonoscopy:  o Worsening persisting abdominal pain which is worse with activity.  o Fevers (>101 degrees F), chills or shakes.  o Passage of continued blood with bowel movements.     Any questions or concerns about your recovery, please call [phone] or after hours [phone] Nurse Advice Line.    Follow-up Care:    You should receive a call or letter with your results within 1 week. Please call if you have not received a notification of your results.  If asked to return to clinic please make an appointment 1 week after your procedure.  Call [phone].
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
## 2019-01-10 LAB — COPATH REPORT: NORMAL

## 2019-01-15 ENCOUNTER — MYC MEDICAL ADVICE (OUTPATIENT)
Dept: FAMILY MEDICINE | Facility: OTHER | Age: 69
End: 2019-01-15

## 2019-03-06 ENCOUNTER — INFUSION THERAPY VISIT (OUTPATIENT)
Dept: INFUSION THERAPY | Facility: CLINIC | Age: 69
End: 2019-03-06
Attending: FAMILY MEDICINE
Payer: MEDICARE

## 2019-03-06 VITALS
BODY MASS INDEX: 24.27 KG/M2 | SYSTOLIC BLOOD PRESSURE: 128 MMHG | OXYGEN SATURATION: 96 % | DIASTOLIC BLOOD PRESSURE: 76 MMHG | WEIGHT: 141.4 LBS | HEART RATE: 68 BPM | RESPIRATION RATE: 16 BRPM | TEMPERATURE: 99.2 F

## 2019-03-06 DIAGNOSIS — M81.0 OSTEOPOROSIS, UNSPECIFIED OSTEOPOROSIS TYPE, UNSPECIFIED PATHOLOGICAL FRACTURE PRESENCE: ICD-10-CM

## 2019-03-06 DIAGNOSIS — Z92.89 PERSONAL HISTORY OF OTHER MEDICAL TREATMENT: Primary | ICD-10-CM

## 2019-03-06 LAB
CALCIUM SERPL-MCNC: 8.5 MG/DL (ref 8.5–10.1)
CREAT SERPL-MCNC: 0.6 MG/DL (ref 0.52–1.04)
GFR SERPL CREATININE-BSD FRML MDRD: >90 ML/MIN/{1.73_M2}

## 2019-03-06 PROCEDURE — 36415 COLL VENOUS BLD VENIPUNCTURE: CPT | Performed by: INTERNAL MEDICINE

## 2019-03-06 PROCEDURE — 82565 ASSAY OF CREATININE: CPT | Performed by: INTERNAL MEDICINE

## 2019-03-06 PROCEDURE — 82310 ASSAY OF CALCIUM: CPT | Performed by: INTERNAL MEDICINE

## 2019-03-06 PROCEDURE — 96374 THER/PROPH/DIAG INJ IV PUSH: CPT

## 2019-03-06 PROCEDURE — 25000128 H RX IP 250 OP 636: Performed by: INTERNAL MEDICINE

## 2019-03-06 RX ORDER — IBANDRONATE SODIUM 3 MG/3 ML
3 SYRINGE (ML) INTRAVENOUS ONCE
Status: COMPLETED | OUTPATIENT
Start: 2019-03-06 | End: 2019-03-06

## 2019-03-06 RX ORDER — IBANDRONATE SODIUM 3 MG/3 ML
3 SYRINGE (ML) INTRAVENOUS ONCE
Status: CANCELLED | OUTPATIENT
Start: 2019-03-06 | End: 2019-03-06

## 2019-03-06 RX ADMIN — IBANDRONATE SODIUM 3 MG: 3 INJECTION, SOLUTION INTRAVENOUS at 09:01

## 2019-03-06 ASSESSMENT — PAIN SCALES - GENERAL: PAINLEVEL: SEVERE PAIN (6)

## 2019-03-06 NOTE — PROGRESS NOTES
Infusion Nursing Note:  Niesha Adhikari presents today for [employer].    Patient seen by provider today: No   present during visit today: Not Applicable.    Note: labs drawn prior to arrival.    Intravenous Access:  Peripheral IV placed.    Treatment Conditions:  Results reviewed, labs MET treatment parameters, ok to proceed with treatment. Calcium level 8.5mg/dL today.      Post Infusion Assessment:  Patient tolerated infusion without incident.  Patient observed for 30 minutes post IV infusion per protocol.  Site patent and intact, free from redness, edema or discomfort.  No evidence of extravasations.  Access discontinued per protocol.    Discharge Plan:   Copy of AVS reviewed with patient and/or family.  Patient will return 6/6 for next appointment.  Patient discharged in stable condition accompanied by: self.  Departure Mode: Ambulatory.    Veronica Christensen RN

## 2019-04-05 ENCOUNTER — TELEPHONE (OUTPATIENT)
Dept: RHEUMATOLOGY | Facility: CLINIC | Age: 69
End: 2019-04-05

## 2019-04-05 NOTE — TELEPHONE ENCOUNTER
Called patient and advised that she does not need labs done prior to her appointment. Recommended that she bring a copy of her most recent eye exam for Dr. Cho. Patient verbalized understanding.    Landen Richardson RN....4/5/2019 2:33 PM

## 2019-04-05 NOTE — TELEPHONE ENCOUNTER
Reason for call:  Order needs to be placed for [place] lab appointment on 4-8-19  Patient called regarding (reason for call): appointment  Additional comments: Patient scheduled appointment for lab work in [place] and was told to call to have orders placed prior.     Phone number to reach patient:  Home number on file [phone] (home)    Best Time:  any    Can we leave a detailed message on this number?  YES

## 2019-04-05 NOTE — TELEPHONE ENCOUNTER
Dr. Cho is out today, do you think she should have labs done or wait until appointment? Previous labs for Dr. Cho was calcium and creatinine.    Thank you!  Anabella Garcia [facility]  4/5/2019 10:00 AM

## 2019-04-12 ENCOUNTER — OFFICE VISIT (OUTPATIENT)
Dept: RHEUMATOLOGY | Facility: CLINIC | Age: 69
End: 2019-04-12
Payer: MEDICARE

## 2019-04-12 VITALS
WEIGHT: 139.8 LBS | BODY MASS INDEX: 23.87 KG/M2 | DIASTOLIC BLOOD PRESSURE: 74 MMHG | HEART RATE: 72 BPM | SYSTOLIC BLOOD PRESSURE: 119 MMHG | HEIGHT: 64 IN | OXYGEN SATURATION: 97 %

## 2019-04-12 DIAGNOSIS — M06.4 INFLAMMATORY POLYARTHROPATHY (H): Primary | ICD-10-CM

## 2019-04-12 DIAGNOSIS — M79.642 PAIN IN BOTH HANDS: ICD-10-CM

## 2019-04-12 DIAGNOSIS — M54.50 CHRONIC MIDLINE LOW BACK PAIN WITHOUT SCIATICA: ICD-10-CM

## 2019-04-12 DIAGNOSIS — E55.9 VITAMIN D DEFICIENCY: ICD-10-CM

## 2019-04-12 DIAGNOSIS — M25.552 LEFT HIP PAIN: ICD-10-CM

## 2019-04-12 DIAGNOSIS — M79.641 PAIN IN BOTH HANDS: ICD-10-CM

## 2019-04-12 DIAGNOSIS — G89.29 CHRONIC MIDLINE LOW BACK PAIN WITHOUT SCIATICA: ICD-10-CM

## 2019-04-12 DIAGNOSIS — M81.8 OTHER OSTEOPOROSIS, UNSPECIFIED PATHOLOGICAL FRACTURE PRESENCE: ICD-10-CM

## 2019-04-12 LAB
ALBUMIN SERPL-MCNC: 4.1 G/DL (ref 3.4–5)
ALP SERPL-CCNC: 74 U/L (ref 40–150)
ALT SERPL W P-5'-P-CCNC: 37 U/L (ref 0–50)
AST SERPL W P-5'-P-CCNC: 30 U/L (ref 0–45)
BILIRUB DIRECT SERPL-MCNC: 0.2 MG/DL (ref 0–0.2)
BILIRUB SERPL-MCNC: 0.5 MG/DL (ref 0.2–1.3)
DEPRECATED CALCIDIOL+CALCIFEROL SERPL-MC: 55 UG/L (ref 20–75)
PROT SERPL-MCNC: 7.2 G/DL (ref 6.8–8.8)

## 2019-04-12 PROCEDURE — 80076 HEPATIC FUNCTION PANEL: CPT | Performed by: INTERNAL MEDICINE

## 2019-04-12 PROCEDURE — 99214 OFFICE O/P EST MOD 30 MIN: CPT | Performed by: INTERNAL MEDICINE

## 2019-04-12 PROCEDURE — 36415 COLL VENOUS BLD VENIPUNCTURE: CPT | Performed by: INTERNAL MEDICINE

## 2019-04-12 PROCEDURE — 82306 VITAMIN D 25 HYDROXY: CPT | Performed by: INTERNAL MEDICINE

## 2019-04-12 RX ORDER — ERGOCALCIFEROL 1.25 MG/1
CAPSULE, LIQUID FILLED ORAL
Qty: 24 CAPSULE | Refills: 2 | Status: SHIPPED | OUTPATIENT
Start: 2019-04-12 | End: 2019-12-17

## 2019-04-12 RX ORDER — HYDROXYCHLOROQUINE SULFATE 200 MG/1
TABLET, FILM COATED ORAL
Qty: 135 TABLET | Refills: 2 | Status: SHIPPED | OUTPATIENT
Start: 2019-04-12 | End: 2019-12-19

## 2019-04-12 ASSESSMENT — MIFFLIN-ST. JEOR: SCORE: 1154.13

## 2019-04-12 NOTE — PROGRESS NOTES
"Rheumatology Clinic Visit      Niesha Adhikari MRN# [id number]   YOB: 1950 Age: 68 year old      Date of visit: 4/12/19   PCP: Dr. Tanika Clark  Hepatologist: Dr. Peres at [facility] in [place] at [place]  Ophthalmology: Dr. Higgins, [facility]    Chief Complaint   Patient presents with:  RECHECK: patient is having a hard time bending over, hips and back do not like to bend. Hands are also bad.      Assessment and Plan     1. Inflammatory polyarthropathy: Hepatitis C related arthralgias versus rheumatoid arthritis (RF and CCP negative); hepatitis C has since been cleared. Initially with symmetric synovitis on exam and morning stiffness for more than one hour. NSAIDs and Tylenol associated with rash.  She did well with hydroxychloroquine 400 mg daily, with no joint pain/swelling, and morning stiffness for no more than 30 minutes. Hydroxychloroquine was reduced from 400 mg daily to 200 mg daily with worsening of her morning stiffness to 2 hours so it was increased to 300mg daily with improvement.  Doing well today with regard to inflammatory arthritis.  - Continue hydroxychloroquine 300 mg daily (Toxicity monitoring up to date; last done on 11/12/18 at the [facility])  - Labs: vitamin D, hepatic panel    2. Osteoporosis: Previously treated with Fosamax (GERD), PO Boniva (reportedly ineffective), and IV Boniva (reportedly effective). Prolia was being considered by a previous rheumatologist but not used because she wanted \"clearance\" from the patient's gastroenterologist because she has hepatitis C; I do not see a contraindication for Prolia in the setting of hepatitis C. The patient reports that her gastroenterologist reported no contraindication for Prolia either.  She had not been on osteoporosis treatment for at least 2 years before restarting Boniva.  Boniva was restarted with the first dose given on 12/5/2017.  Plan to recheck DEXA in 12/2019.  - Continue ergocalciferol " 50,000 units twice weekly  - Calcium 1200 mg daily  - Continue Boniva 3mg IV every 3 months (she receives at the [facility])    3. Lower back pain and left hip pain: Ms. Adhikari had a CT pelvis on 11/16/2018 that showed severe degenerative changes of the L-spine based on my review.  Left hip does not appear narrowed.  Advised PT for the lower back and left hip and if no improvement then she is to discuss with her PCP and/or see a spine specialist.  - PT referral    4. Bilateral hand pain/weakness: no abnormalities seen today. Refer to hand therapy.  - hand therapy referral    5. 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  She had injections by orthopedic surgery in the past with some improvement.  Repeat injections can also be considered in the future if needed.     Ms. Adhikari verbalized agreement with and understanding of the rational for the diagnosis and treatment plan.  All questions were answered to best of my ability and the patient's satisfaction. Ms. Adhikari was advised to contact the clinic with any questions that may arise after the clinic visit.      Thank you for involving me in the care of the patient    Return to clinic: 6 months, sooner if needed      HPI   Niesha Adhikari is a 68 year old female with a medical history significant for hepatitis C, hemorrhoids, narcolepsy, fibromyalgia, migraines, anxiety, pernicious anemia, GERD, allergic rhinitis, and osteoporosis who presents for follow-up of inflammatory arthritis.    Ms. Adhikari was previously followed in the rheumatology clinic by Dr. Luciano and Dr. Marc.  A 10/29/2015 clinic note documents osteoporosis that was first diagnosed in 2001. Initially she was on Fosamax but was limited because of GERD. Reportedly treatment failure to oral and IV Boniva. Prolia was being considered but Dr. Luciano documents that she wanted clearance from  gastroenterology because of her high hepatitis C viral load.    Regarding hepatitis C, she is followed at the Hepatology Department at [facility] in [place] by Shannon Sher and Dr. Peres.  A letter dated 10/29/2016 from Shannon Sher states that Ms. Adhikari has completed a 12 week course of hepatitis C therapy with [facility] and she is responding to treatment, based on an undetectable hepatitis C viral load at the end of therapy.    Previously, she reported that she was doing well with hydroxychloroquine and today she says she is still tolerating it well with good control of her arthritis.    Today, Ms. Adhikari reports that she is doing okay but feels like she has some hand weakness and pain; she is not dropping items or having a prevent her daily activities.  Occasional constipation but none right now.  She did have severe constipation requiring colonoscopy that delayed her inguinal hernia surgical repair but she is planning to call for this again to schedule.  Lower back pain is worsening and she has left hip pain; both are worse with activity and improved with rest; no radiation of the lower back or left hip pain.  Not experience any numbness or tingling in her fingers; she uses a wrist splint and felt that she improved with the steroid injection given by hand surgery in the past.  She brought records with her from the [facility] [place] showing that she had adequate toxicity monitoring eye exam for Plaquenil on 11/12/2018.    Denies fevers, chills, nausea, vomiting, constipation, diarrhea. No abdominal pain. No chest pain/pressure, palpitations, or shortness of breath. No LE swelling. No neck pain. No oral or nasal sores.  No rash.    Tobacco: quit in 1978  EtOH: none  Drugs: none  Occupation: retired    ROS   GEN: No fevers, chills, night sweats, or weight change  SKIN: No itching, rashes, sores  HEENT: No epistaxis. No oral or nasal ulcers.  CV: No chest pain, pressure, palpitations, or dyspnea  on exertion.  PULM: No SOB, wheeze, cough.  GI: No nausea, vomiting, constipation, diarrhea. No blood in stool. No abdominal pain.  : No blood in urine.  MSK: See HPI.  NEURO: See HPI  EXT: No LE swelling  PSYCH: See history of present illness    Active Problem List     Patient Active Problem List   Diagnosis     Allergic rhinitis     Esophageal reflux     Pernicious anemia     Anxiety state     Migraine     Essential and other specified forms of tremor     Fibromyalgia     Moderate recurrent major depression (H)     Advanced directives, counseling/discussion     Narcolepsy     Internal hemorrhoids with other complication     AIN (anal intraepithelial neoplasia) anal canal     Sciatica     Osteoporosis     Inflammatory polyarthropathy (H)     Benign essential hypertension     Alcohol dependence in remission (H)     Personal history of other medical treatment     Past Medical History     Past Medical History:   Diagnosis Date     ABUSE BY SPOUSE/PARTNER 7/27/2005     Degeneration of lumbar or lumbosacral intervertebral disc     DDD L5/S1     HELICOBACTER PYLORI INFECTION 1/28/2005     Hepatitis C      Hypertension      Malignant neoplasm (H)     ACIN     Osteoporosis      Other and unspecified alcohol dependence, unspecified drinking behavior     Sober as 1/21/1987     Other malaise and fatigue      Past Surgical History     Past Surgical History:   Procedure Laterality Date     BIOPSY ANAL CANAL  1/21/13    [facility]      BREAST BIOPSY, RT/LT Left 1975    Breat Biopsy RT/LT     C NONSPECIFIC PROCEDURE  1965    Removed bone left index finger knuckle, casts broken bones     COLONOSCOPY  8/25/2009     COLONOSCOPY  2/14/2011    COLONOSCOPY performed by CRISTIN LAGUNAS at  GI     COLONOSCOPY N/A 1/8/2019    Procedure: Colonscopy, Biopsies by Biopsy;  Surgeon: Omega Talavera MD;  Location:  GI     CYSTOSCOPY  2/28/2011    CYSTOSCOPY performed by CAYLA FLOR at  OR     ENDOSCOPY  05/21/12    Upper  "[facility]      COLONOSCOPY W/WO BRUSH/WASH  08/22/05     [facility] UGI ENDOSCOPY DIAG W BIOPSY  10/01/09      UGI ENDOSCOPY, SIMPLE EXAM  08/08/07     HEMORRHOIDECTOMY  06/25/12    [facility]     LAPAROSCOPIC SALPINGO-OOPHORECTOMY  2/28/2011    LAPAROSCOPIC SALPINGO-OOPHORECTOMY performed by CAYLA FLOR at  OR     TONSILLECTOMY & ADENOIDECTOMY  1965     Allergy     Allergies   Allergen Reactions     Abilify Discmelt Other (See Comments)     Disoriented     Antivert [Meclizine Hcl]      Chamomile      Compazine      Cymbalta Other (See Comments)     Disoriented, trouble sleeping     Diphenhydramine Nausea     And abdominal pain     Effexor [Venlafaxine] Other (See Comments)     Disoriented, trouble sleeping     Elavil [Amitriptyline Hcl] Other (See Comments)     \"didn't feel right on it-med was stopped right away\"     Ferrous Sulfate Nausea and Vomiting     Food Difficulty breathing     cilantro     Indomethacin      indocin sensativity \"Severe h.a\"     Seasonal Allergies Other (See Comments) and Difficulty breathing     Philip Gold Aug-Sept, rag weed, sneezing     Thiopental Sodium      PENTOTHAL/rigidity and fight response     Animal Dander Difficulty breathing and Rash     sneezing,resp. distress     Bupropion Anxiety     Tylenol [Acetaminophen] Rash     Current Medication List     Current Outpatient Medications   Medication Sig     CLOTRIMAZOLE ANTI-FUNGAL 1 % cream APPLY TOPICALLY TWO TIMES A DAY     cycloSPORINE (RESTASIS) 0.05 % ophthalmic emulsion Place 1 drop into both eyes 2 times daily      hydroxychloroquine (PLAQUENIL) 200 MG tablet Hydroxychloroquine 200mg daily; and an additional 200mg every other day.     IBANdronate (BONIVA) 150 MG tablet Inject 3 mg into the vein every 3 months      lisdexamfetamine (VYVANSE) 20 MG capsule Take 30 mg by mouth every other day      lisinopril (PRINIVIL/ZESTRIL) 10 MG tablet Take 1 tablet (10 mg) by mouth daily     Modafinil (PROVIGIL " PO) Take 100 mg by mouth Takes 1 1/2 tabs bid (150 mg bid) morning and noon     nystatin (MYCOSTATIN) 676976 UNIT/GM POWD Apply topically 3 times daily as needed     Psyllium (FIBER) 0.52 G CAPS 2 tabs in am and pm     vitamin D2 (ERGOCALCIFEROL) 00175 units (1250 mcg) capsule Take 1 capsule (50,000 Units) by mouth every Monday and Friday.     montelukast (SINGULAIR) 10 MG tablet TAKE ONE TABLET BY MOUTH EVERY DAY AS NEEDED SEASONALLY (AUGUST AND SEPTEMBER) (Patient not taking: Reported on 3/6/2019)     No current facility-administered medications for this visit.          Social History   See HPI    Family History     Family History   Problem Relation Age of Onset     Hypertension Mother      Breast Cancer Mother      Coronary Artery Disease Mother      Cerebrovascular Disease Mother      Kidney Disease Mother      Hypertension Brother      Respiratory Brother         emphysema     Lipids Brother      Heart Disease Brother         stents, 12/2011; has had about 6 MIs, last one 1/2014     C.A.D. Sister         MI at age 63     Hypertension Sister      Gastrointestinal Disease Sister         gallbladder     Circulatory Sister         brain aneurysm at 63     Genitourinary Problems Sister         1 kidney/bladder     Hypertension Sister      Obesity Sister      Coronary Artery Disease Sister         had valve surgery, MI, CHF     Unknown/Adopted Paternal Uncle      Blood Disease Son         Lymes/7/11     Hypertension Son      Hypertension Father      Lymphoma Father      Glaucoma Father      Coronary Artery Disease Other 49        niece     Diabetes Other         cousin     Cerebrovascular Disease Maternal Grandmother      Cerebrovascular Disease Paternal Grandmother      Liver Cancer Cousin      Glaucoma Paternal Grandfather      Physical Exam     Temp Readings from Last 3 Encounters:   03/06/19 99.2  F (37.3  C) (Temporal)   01/08/19 98.4  F (36.9  C) (Oral)   12/14/18 98  F (36.7  C) (Temporal)     BP Readings from  "Last 5 Encounters:   04/12/19 119/74   03/06/19 128/76   01/08/19 (!) 150/93   12/14/18 112/70   12/05/18 138/81     Pulse Readings from Last 1 Encounters:   04/12/19 72     Resp Readings from Last 1 Encounters:   03/06/19 16     Estimated body mass index is 24 kg/m  as calculated from the following:    Height as of this encounter: 1.626 m (5' 4\").    Weight as of this encounter: 63.4 kg (139 lb 12.8 oz).    GEN: NAD  HEENT: MMM. No oral lesions. Good dentition.  Anicteric, noninjected sclera  CV: S1, S2. RRR. No m/r/g.  PULM: CTA bilaterally. No w/c.  MSK: MCPs, PIPs, wrists, elbows, shoulders, knees, and ankles without swelling or tenderness to palpation.  Negative MCP and MTP squeeze.  Squaring of the bilateral 1st CMCs. Hips nontender to palpation or with ROM.  Point tenderness of the lower lumbar spine.   NEURO: UE and LE strengths 5/5 and equal bilaterally.   SKIN: No rash  EXT: No LE edema  PSYCH: Alert. Appropriate.    Labs / Imaging (select studies)   RF/CCP  Recent Labs   Lab Test 06/07/17  0955   CCPIGG 1   RHF <20     CBC  Recent Labs   Lab Test 12/14/18  0753 09/26/18  0836 07/27/18  1007 03/12/18  1605 08/30/17  1028   WBC 3.9* 4.0 4.0 4.0 3.8*   RBC 4.51 4.52 4.61 4.46 4.51   HGB 13.7 13.8 14.1 13.8 14.1   HCT 40.9 42.3 42.1 40.5 40.5   MCV 91 94 91 91 90   RDW 13.0 12.9 12.5 13.1 13.8    149* 161 152 154   MCH 30.4 30.5 30.6 30.9 31.3   MCHC 33.5 32.6 33.5 34.1 34.8   NEUTROPHIL  --  52.8  --  55.1 60.9   LYMPH  --  29.5  --  31.0 27.6   MONOCYTE  --  9.6  --  10.8 8.4   EOSINOPHIL  --  7.6  --  2.8 2.6   BASOPHIL  --  0.5  --  0.3 0.5   ANEU  --  2.1  --  2.2 2.3   ALYM  --  1.2  --  1.2 1.1   FREDY  --  0.4  --  0.4 0.3   AEOS  --  0.3  --  0.1 0.1   ABAS  --  0.0  --  0.0 0.0     CMP  Recent Labs   Lab Test 03/06/19  0807 12/14/18  0753 12/05/18  0820 09/26/18  0836  07/27/18  1007  03/12/18  1605   NA  --  140  --  139  --  132*  --   --    POTASSIUM  --  4.7  --  4.7  --  4.4  --   --  "   CHLORIDE  --  105  --  102  --  96  --   --    CO2  --  30  --  31  --  25  --   --    ANIONGAP  --  5  --  6  --  11  --   --    GLC  --  84  --  83  --  90  --   --    BUN  --  11  --  13  --  8  --   --    CR 0.60 0.66 0.57 0.41*   < > 0.56   < >  --    GFRESTIMATED >90 >90 >90 >90   < > >90   < >  --    GFRESTBLACK >90 >90 >90 >90   < > >90   < >  --    ELIZABETH 8.5 9.2 8.9 8.8   < > 8.9   < >  --    BILITOTAL  --   --   --  0.4  --  0.5  --  0.4   ALBUMIN  --   --  3.8 3.7  --  3.9  --  4.1   PROTTOTAL  --   --   --  7.3  --  7.2  --  7.4   ALKPHOS  --   --   --  92  --  71  --  78   AST  --   --   --  23  --  26  --  27   ALT  --   --   --  25  --  28  --  25    < > = values in this interval not displayed.     Calcium/VitaminD  Recent Labs   Lab Test 03/06/19  0807 12/14/18  0753 12/05/18  0820 09/26/18  0836  07/27/18  1007  03/12/18  1605   ELIZABETH 8.5 9.2 8.9 8.8   < > 8.9   < >  --    VITDT  --   --   --  46  --  44  --  41    < > = values in this interval not displayed.     ESR/CRP  Recent Labs   Lab Test 06/30/17  0925   SED 6   CRP <2.9     Lipid Panel  Recent Labs   Lab Test 11/25/16  1217 10/12/11  1110   CHOL 276* 174   TRIG 88 71   HDL 65 58   * 102   VLDL  --  14   CHOLHDLRATIO  --  3.0   NHDL 211*  --      Hepatitis B  Recent Labs   Lab Test 06/30/17  0925 06/07/17  0955 09/30/15  0951   AUSAB  --  0.65  --    HBCAB  --  Reactive   A reactive result indicates acute, chronic or past/resolved hepatitis B   infection.  *  --    HBCM  --  Nonreactive   A nonreactive result suggests lack of recent exposure to the virus in the   preceding 6 months.    --    HEPBANG  --  Nonreactive Nonreactive   HBQLOG Not Calculated  --   --      Hepatitis C  Recent Labs   Lab Test 06/07/17  0955 09/30/15  0951 02/06/15  0958  10/12/11  1110   HCVAB Reactive   A reactive result indicates one of the following 1) current HCV infection 2)   past HCV infection that has resolved or 3) false positivity. The CDC recommends    that a reactive result should be followed by Nucleic acid testing for HCV RNA.  If HCV RNA is detected, that indicates current HCV infection. If HCV RNA is not   detected, that indicates either past, resolved HCV infection, or false HCV   antibody positivity.   Assay performance characteristics have not been established for newborns,   infants, and children  * Reactive   A reactive result indicates one of the following 1) current HCV infection 2)   past HCV infection that has resolved or 3) false positivity. The CDC recommends   that a reactive result should be followed by Nucleic acid testing for HCV RNA.  If HCV RNA is detected, that indicates current HCV infection. If HCV RNA is not   detected, that indicates either past, resolved HCV infection, or false HCV   antibody positivity.   Assay performance characteristics have not been established for newborns,   infants, and children  *  --   --  Positive  High sample/cutoff ratio, confirmatory testing available.*   HCVRNA HCV RNA Not Detected   The LUIS ARMANDO AmpliPrep/LUIS ARMANDO TaqMan HCV Test is an FDA-approved in vitro nucleic   acid amplification test for the quantitation of HCV RNA in human plasma (ETDA   plasma) or serum using the LUIS ARMANDO AmpliPrep Instrument for automated viral   nucleic acid extraction and the LUIS ARMANDO TaqMan Analyzer or Packet Digital TaqMan for   automated Real Time PCR amplification and detection of the viral nucleic acid   target.   Titer results are reported in International Units/mL (IU/mL) using the 1st WHO   International standard for HCV for Nucleic Acid Amplification based assays.   578,544* 236,200*   < >  --     < > = values in this interval not displayed.     Lyme ab screening  Recent Labs   Lab Test 05/11/17  0830 04/12/17  1211 07/24/15  1047   LYMEGM <0.01  Negative, Absence of detectable Borrelia burdorferi antibodies. A negative   result does not exclude the possibility of Borrelia burgdorferi infection. If   early Lyme disease is suspected, a  second sample should be collected and tested   2 to 4 weeks later.   <0.01  Negative, Absence of detectable Borrelia burdorferi antibodies. A negative   result does not exclude the possibility of Borrelia burgdorferi infection. If   early Lyme disease is suspected, a second sample should be collected and tested   2 to 4 weeks later.   0.11     Lyme confirmation testing by Western Blot  No results for input(s): LYWG, LYWM in the last 69445 hours.  Tuberculosis Screening  Recent Labs   Lab Test 09/30/15  0952   TBRSLT Negative   TBAGN 0.05     HIV Screening  No results for input(s): HIAGAB in the last 22790 hours.      Immunization History     Immunization History   Administered Date(s) Administered     HEPA 04/18/2000, 09/26/2000     HPV 08/13/2012, 09/25/2012, 01/24/2013     HepB 11/15/2011, 12/19/2011     Influenza (H1N1) 01/07/2010     Influenza (High Dose) 3 valent vaccine 08/30/2018     Influenza (IIV3) PF 01/03/2005, 10/16/2006, 11/14/2007, 10/28/2008, 09/29/2009, 09/27/2010, 10/04/2011, 09/25/2012, 09/21/2015     Influenza Vaccine IM 3yrs+ 4 Valent IIV4 09/26/2013, 10/06/2014, 10/06/2016, 09/08/2017     Pneumo Conj 13-V (2010&after) 10/06/2016     Pneumococcal 23 valent 11/15/2011, 10/17/2017     TD (ADULT, 7+) 04/18/2000, 07/07/2004     TDAP Vaccine (Boostrix) 09/21/2015     Tdap (Adacel,Boostrix) 05/23/2006     Zoster vaccine recombinant adjuvanted (SHINGRIX) 06/14/2018, 08/24/2018     Zoster vaccine, live 09/21/2015          Chart documentation done in part with Dragon Voice recognition Software. Although reviewed after completion, some word and grammatical error may remain.    Apolinar Cho MD

## 2019-04-12 NOTE — NURSING NOTE
RAPID3 (0-30) Cumulative Score  15.3          RAPID3 Weighted Score (divide #4 by 3 and that is the weighted score)  5.1     Anabella Garcia [facility] Rheumatology  4/12/2019 10:37 AM

## 2019-04-12 NOTE — Clinical Note
FYI - If PT doesn't help her back then she may reach out to you - I would have her see a spine surgeon considering how back the lower lumbar spine is on the CT pelvis she had

## 2019-04-12 NOTE — PATIENT INSTRUCTIONS
Rheumatology    Dr. Apolinar Cho         [facility]   (Monday)  [address]  [place] [address]       [facility]   (Tuesday)  [address]  [place] [address]    [facility]   (Wed., Thurs., and Friday)  [address] [place] [address]    Phone number: [phone]  Thank you for choosing [facility].  Anabella Garcia CMA

## 2019-05-01 ENCOUNTER — HOSPITAL ENCOUNTER (OUTPATIENT)
Dept: PHYSICAL THERAPY | Facility: CLINIC | Age: 69
Setting detail: THERAPIES SERIES
End: 2019-05-01
Attending: INTERNAL MEDICINE
Payer: MEDICARE

## 2019-05-01 DIAGNOSIS — M54.50 CHRONIC MIDLINE LOW BACK PAIN WITHOUT SCIATICA: ICD-10-CM

## 2019-05-01 DIAGNOSIS — G89.29 CHRONIC MIDLINE LOW BACK PAIN WITHOUT SCIATICA: ICD-10-CM

## 2019-05-01 DIAGNOSIS — M25.552 LEFT HIP PAIN: ICD-10-CM

## 2019-05-01 PROCEDURE — 97162 PT EVAL MOD COMPLEX 30 MIN: CPT | Mod: GP | Performed by: PHYSICAL THERAPIST

## 2019-05-01 NOTE — PROGRESS NOTES
[facility]          OUTPATIENT PHYSICAL THERAPY ORTHOPEDIC EVALUATION  PLAN OF TREATMENT FOR OUTPATIENT REHABILITATION  (COMPLETE FOR INITIAL CLAIMS ONLY)  Patient's Last Name, First Name, M.I.  YOB: 1950  Niesha Adhikari    Provider s Name:  [facility]   Medical Record No.  [id number]   Start of Care Date:  05/01/19   Onset Date:  01/01/75   Type:     _X__PT   ___OT   ___SLP Medical Diagnosis:  Chronic midline low back pain without sciatica; Left hip pain     PT Diagnosis:  B low back pain, B hip pain, reduced mobility, weakness   Visits from SOC:  1      _________________________________________________________________________________  Plan of Treatment/Functional Goals:  gait training, manual therapy, joint mobilization, neuromuscular re-education, ROM, strengthening, stretching        Modalities as needed for symptom relief  Goals  Goal Identifier: 1  Goal Description: Patient will demonstrate independence and compliance with HEP in order to maintain strength after discharge from therapy  Target Date: 06/26/19    Goal Identifier: 2  Goal Description: Patient will report ability to ascend/descend stairs without low back/hip/buttock pain in order to facilitate increased participation with functional activities and ADLs  Target Date: 06/26/19    Goal Identifier: 3  Goal Description: Patient will demonstrate correct technique with lifting 25 lbs in order to prevent future injury   Target Date: 06/26/19    Therapy Frequency:  2 times/Week  Predicted Duration of Therapy Intervention:  90 days    Chandni Martin, PT, DPT, CLT                 I CERTIFY THE NEED FOR THESE SERVICES FURNISHED UNDER        THIS PLAN OF TREATMENT AND WHILE UNDER MY CARE     (Physician co-signature of this document indicates review and certification of the therapy plan).                       Certification Date From:  05/01/19   Certification Date To:  07/30/19    Referring  Provider:  Dr. Apolinar Cho MD    Initial Assessment        See Epic Evaluation Start of Care Date: 05/01/19

## 2019-05-01 NOTE — PROGRESS NOTES
05/01/19 0821   General Information   Type of Visit Initial OP Ortho PT Evaluation   Start of Care Date 05/01/19   Referring Physician Dr. Apolinar Cho MD   Patient/Family Goals Statement To resolve neck pain and shoulder/neck stiffness that is constantly present, also to take preventive action on lower back so it doesn't deteriorate and find a compromise/balance between the things she has to do without causing further harm   Orders Evaluate and Treat   Orders Comment Degenerative changes of the lower back seen on CT abd/pelvis; left hip pain but no OA seen   Date of Order 04/12/19   Insurance Type Medicare;[employer]  (Medicare, [facility])   Medical Diagnosis Chronic midline low back pain without sciatica; Left hip pain   Surgical/Medical history reviewed Yes   Precautions/Limitations no known precautions/limitations   Weight-Bearing Status - LUE full weight-bearing   Weight-Bearing Status - RUE full weight-bearing   Weight-Bearing Status - LLE full weight-bearing   Weight-Bearing Status - RLE full weight-bearing   General Information Comments PMH significant for hepatitis C, hemorrhoids, narcolepsy, fibromyalgia, migraines, anxiety, pernicious anemia, GERD, allergic rhinitis, and osteoporosis       Present No   Body Part(s)   Body Part(s) Lumbar Spine/SI   Presentation and Etiology   Pertinent history of current problem (include personal factors and/or comorbidities that impact the POC) Patient reports she fell when 5 months pregnant over 20 years ago - she was sitting in a chair and the bottom fell out of the chair.  Due to her pregnancy, x-rays were unable to be taken, so it was never determined if there was a fracture or other injury.  She reports her hips have been painful ever since.  She reports her pain is most problematic when she wakes up in the morning - she is very stiff and barely able to shuffle into the kitchen to make coffee.  After about an hour, she's okay.  She is  "careful to watch her weight because any additional weight will increase her pain.  She lives alone in a house with a walkout basement.  Laundry and storage are located in the basement and she has to go up/down the stairs several times a day.  She does not have family or friends nearby to provide assistance and she has to do everything herself - indoor and outdoor work, yardwork, lifting, hauling recycling and garbage to the end of the driveway, etc.  She has two full sized Dachsunds who each weigh about 25 lbs and she walks them everyday, which was hazardous in the icy winter and resulted in a few slips and falls.  She reports she has two inguinal hernias - was supposed to have surgery in December 2018, but she developed intestinal issues and she has not yet rescheduled the procedure.  At her most recent MD appt, she was informed of degenerative changes in her lumbar spine and a referral was made for PT.  She reports that her worst pain and stiffness is in her neck and shoulders and she would like to focus therapy on relieving that pain in addition to developing HEP for low back/hips.     Impairments A. Pain;D. Decreased ROM;E. Decreased flexibility;F. Decreased strength and endurance;H. Impaired gait;G. Impaired balance   Functional Limitations perform activities of daily living;perform desired leisure / sports activities   Symptom Location 'Go past the end of the tailbone and then there's a gap and that's the area of 'ouch'.     How/Where did it occur With a fall   Onset date of current episode/exacerbation 01/01/75   Chronicity Chronic   Pain rating (0-10 point scale) Best (/10);Worst (/10)   Best (/10) Buttocks - 3/10, R hip - 3/10, L hip - 3/10   Worst (/10) Buttocks - 7/10, R hip - 7/10, L hip - 7/10   Pain quality B. Dull;C. Aching;H. Other   Pain quality comment \"Constant, stiff, just won't move\"   Frequency of pain/symptoms B. Intermittent   Pain/symptoms are: Worse in the morning   Pain/symptoms " "exacerbated by I. Bending;C. Lifting;D. Carrying;K. Home tasks   Pain/symptoms eased by K. Other   Pain eased by comment \"Moving around, not sitting or standing for a long time, doesn't like ice, occasionally uses a heating pad, doesn't take any medications unless in extreme pain (in which case she will take a few Aleve)   Progression of symptoms since onset: Worsened   Current / Previous Interventions   Diagnostic Tests: CT scan   CT Results Results   CT results Degenerative changes of lumbar spine   Prior Level of Function   Prior Level of Function-Mobility Independent   Prior Level of Function-ADLs Independent   Current Level of Function   Patient role/employment history F. Retired   Living environment House/townhome   Home/community accessibility Walkout basement with stairs present   Current equipment-Gait/Locomotion None  (Does own a cane and will occasionally use it )   Fall Risk Screen   Fall screen completed by PT   Have you fallen 2 or more times in the past year? Yes   Have you fallen and had an injury in the past year? No  (\"Just bruising, nothing broken\")   Is patient a fall risk? Yes   Fall screen comments Will complete during next session   System Outcome Measures   Outcome Measures Low Back Pain (see Oswestry and Lilo)   Lumbar Spine/SI Objective Findings   Gait/Locomotion Unequal weight distribution, increased B lateral lean    Flexion ROM WNL, knees bent at end range    Extension ROM WNL   Repeated Extension-Standing ROM A little bit of pain, more on the right side, afraid of dizziness/passing out    Repeated Flexion-Standing ROM Stiff, \"I can feel it pulling\", no pain, just tight, knees bending    Pelvic Screen R PSIS higher than L, they move at the same time, deviates to the L when forward bending    Hip Flexion (L2) Strength L - 4/5, R - 4/5   Hip Abduction Strength B 5/5   Hip Adduction Strength B 5/5   Knee Flexion Strength L - 4-/5, R 4/5   Knee Extension (L3) Strength L - 4-/5, 4/5   Ankle " Dorsiflexion (L4) Strength B - 4/5   Planned Therapy Interventions   Planned Therapy Interventions gait training;manual therapy;joint mobilization;neuromuscular re-education;ROM;strengthening;stretching   Planned Modality Interventions   Planned Modality Interventions Comments Modalities as needed for symptom relief   Clinical Impression   Criteria for Skilled Therapeutic Interventions Met yes, treatment indicated   PT Diagnosis B low back pain, B hip pain, reduced mobility, weakness   Influenced by the following impairments B low back pain, B hip pain, reduced mobility, weakness   Functional limitations due to impairments Bending over to tie shoes or pick things up, carrying heavy items (ex. large bag of dog food), going up/down stairs while carrying stuff   Clinical Presentation Evolving/Changing   Clinical Presentation Rationale Based on observation, history, evaluation and clinical judgment.    Clinical Decision Making (Complexity) Moderate complexity   Therapy Frequency 2 times/Week   Predicted Duration of Therapy Intervention (days/wks) 90 days   Risk & Benefits of therapy have been explained Yes   Patient, Family & other staff in agreement with plan of care Yes   Clinical Impression Comments Patient presents with signs and symptoms consistent with referring diagnosis of chronic low back pain and hip pain.  In addition to pain, she demonstrates reduced mobility and weakness.  Functionally, she has increased pain and difficulty with bending over to tie shoes or pick things up, carrying heavy items (ex. large bag of dog food), going up/down stairs while carrying stuff.  Patient will benefit from skilled physical therapy interventions to address physical impairments for return to functional activities.    Education Assessment   Preferred Learning Style Listening;Reading;Demonstration;Pictures/video   Barriers to Learning No barriers   ORTHO GOALS   PT Ortho Eval Goals 1;2;3   Ortho Goal 1   Goal Identifier 1    Goal Description Patient will demonstrate independence and compliance with HEP in order to maintain strength after discharge from therapy   Target Date 06/26/19   Ortho Goal 2   Goal Identifier 2   Goal Description Patient will report ability to ascend/descend stairs without low back/hip/buttock pain in order to facilitate increased participation with functional activities and ADLs   Target Date 06/26/19   Ortho Goal 3   Goal Identifier 3   Goal Description Patient will demonstrate correct technique with lifting 25 lbs in order to prevent future injury    Target Date 06/26/19   Total Evaluation Time   PT Eval, Moderate Complexity Minutes (20693) 60   Therapy Certification   Certification date from 05/01/19   Certification date to 07/30/19   Medical Diagnosis Chronic midline low back pain without sciatica; Left hip pain         Thank you for your referral.    Chandni Martin, PT, DPT, CLT  [phone]  [facility]

## 2019-05-13 DIAGNOSIS — L30.4 INTERTRIGO: ICD-10-CM

## 2019-05-13 RX ORDER — NYSTATIN 100000 [USP'U]/G
POWDER TOPICAL 3 TIMES DAILY PRN
Qty: 60 G | Refills: 1 | Status: SHIPPED | OUTPATIENT
Start: 2019-05-13 | End: 2022-04-26

## 2019-05-13 NOTE — TELEPHONE ENCOUNTER
Prescription approved per [facility] Refill Protocol.    Gabby Art, RN, BSN

## 2019-05-16 ENCOUNTER — HOSPITAL ENCOUNTER (OUTPATIENT)
Dept: PHYSICAL THERAPY | Facility: CLINIC | Age: 69
Setting detail: THERAPIES SERIES
End: 2019-05-16
Attending: INTERNAL MEDICINE
Payer: MEDICARE

## 2019-05-16 PROCEDURE — 97110 THERAPEUTIC EXERCISES: CPT | Mod: GP | Performed by: PHYSICAL THERAPIST

## 2019-05-21 ENCOUNTER — TELEPHONE (OUTPATIENT)
Dept: RHEUMATOLOGY | Facility: CLINIC | Age: 69
End: 2019-05-21

## 2019-05-21 DIAGNOSIS — M54.2 NECK PAIN: Primary | ICD-10-CM

## 2019-05-22 ENCOUNTER — TELEPHONE (OUTPATIENT)
Dept: RHEUMATOLOGY | Facility: CLINIC | Age: 69
End: 2019-05-22

## 2019-05-22 NOTE — TELEPHONE ENCOUNTER
Contacted Pt, notified Ms. Adhikari that her physical therapist suspects that neck pain is contributing to the lower back pain and asked that an order to neck PT be placed. Dr. Cho has placed this order for PT to include the neck.  However, if neck pain is present in the setting of an inflammatory arthritis, then she also needs to get a cervical spine x-ray that includes flexion and extension views, and preferably have this done prior to starting PT for her neck. Asked that she have this x-ray done, she obliged [facility]. Pt had no questions or concerns, agrees and understands.    Dayan Guthrie, RAJ  5/22/2019  2:47 PM

## 2019-05-22 NOTE — TELEPHONE ENCOUNTER
Reason for call:  Calling Landen back   Patient called regarding (reason for call): call back  Additional comments: Patient is returning Landen's call from earlier today. Please call again. Thank you.    Phone number to reach patient:  Home number on file [phone] (home)    Best Time:  any    Can we leave a detailed message on this number?  YES

## 2019-05-22 NOTE — TELEPHONE ENCOUNTER
Left message for patient to return call to [phone].    Landen Richardson RN....5/22/2019 8:42 AM

## 2019-05-22 NOTE — TELEPHONE ENCOUNTER
Rheumatology team: Please call to notify Ms. Adhikari that her physical therapist suspects that neck pain is contributing to the lower back pain and asked that an order to neck PT be placed. I have placed this order for PT to include the neck.  However, if neck pain is present in the setting of an inflammatory arthritis, then she also needs to get a cervical spine x-ray that includes flexion and extension views, and preferably have this done prior to starting PT for her neck.  Please ask that she have this x-ray done.      Apolinar Cho MD  5/21/2019 10:52 PM

## 2019-05-29 ENCOUNTER — HOSPITAL ENCOUNTER (OUTPATIENT)
Dept: PHYSICAL THERAPY | Facility: CLINIC | Age: 69
Setting detail: THERAPIES SERIES
End: 2019-05-29
Attending: INTERNAL MEDICINE
Payer: MEDICARE

## 2019-05-29 PROCEDURE — 97110 THERAPEUTIC EXERCISES: CPT | Mod: GP | Performed by: PHYSICAL THERAPIST

## 2019-05-31 ENCOUNTER — ANCILLARY PROCEDURE (OUTPATIENT)
Dept: GENERAL RADIOLOGY | Facility: OTHER | Age: 69
End: 2019-05-31
Attending: INTERNAL MEDICINE
Payer: MEDICARE

## 2019-05-31 DIAGNOSIS — M54.2 NECK PAIN: ICD-10-CM

## 2019-05-31 PROCEDURE — 72050 X-RAY EXAM NECK SPINE 4/5VWS: CPT

## 2019-06-03 ENCOUNTER — HOSPITAL ENCOUNTER (OUTPATIENT)
Dept: OCCUPATIONAL THERAPY | Facility: CLINIC | Age: 69
Setting detail: THERAPIES SERIES
End: 2019-06-03
Attending: INTERNAL MEDICINE
Payer: MEDICARE

## 2019-06-03 ENCOUNTER — HOSPITAL ENCOUNTER (OUTPATIENT)
Dept: PHYSICAL THERAPY | Facility: CLINIC | Age: 69
Setting detail: THERAPIES SERIES
End: 2019-06-03
Attending: INTERNAL MEDICINE
Payer: MEDICARE

## 2019-06-03 DIAGNOSIS — M79.642 PAIN IN BOTH HANDS: ICD-10-CM

## 2019-06-03 DIAGNOSIS — M79.641 PAIN IN BOTH HANDS: ICD-10-CM

## 2019-06-03 PROCEDURE — 97535 SELF CARE MNGMENT TRAINING: CPT | Mod: GO

## 2019-06-03 PROCEDURE — 97110 THERAPEUTIC EXERCISES: CPT | Mod: GP | Performed by: PHYSICAL THERAPIST

## 2019-06-03 PROCEDURE — 97167 OT EVAL HIGH COMPLEX 60 MIN: CPT | Mod: GO

## 2019-06-03 NOTE — PROGRESS NOTES
[facility]      OUTPATIENT OCCUPATIONAL THERAPY HAND EVALUATION  PLAN OF TREATMENT FOR OUTPATIENT REHABILITATION  (COMPLETE FOR INITIAL CLAIMS ONLY)  Patient's Last Name, First Name, M.I.  YOB: 1950  ZeynepNiesha Montoya                        Provider s Name: [facility] Medical Record No.  [id number]     Onset Date: 04/12/19    Start of Care Date: 06/03/19   Type:     ___PT  _X_OT   ___SLP    Medical Diagnosis: Pain in both (M79.641; M79.642)   Occupational Therapy Diagnosis:  Decreased safety and ability to complete BADL, IADL, leisure activities.     Visits from SOC: 1      _________________________________________________________________________________  Plan of Treatment/Functional Goals:  Planned Therapy Interventions:  Ultrasound, Light Therapy, Paraffin, Strengthening, Manual Therapy, Education of splint wear, care, fit and precautions, Splinting, Desensitization, Self Care/Home Management, Adaptive Equipment Training, Energy Conservation/Work Simplifacation Training, Ergonomic Patient Education, Joint Protection Instruction, Home Program     Goals        1. Goal Target Task: Button shirt, Botton pants, Tie shoes, Zipping       Goal Target Performance Level: Mild difficulty       Due Date: 08/31/19      2. Goal Target Task: Hold and use vacuum , Sweep floors, Open a tight or new jar       Goal Target Performance Level: No difficulty       Due Date: 08/31/19           3. Goal Target Task:  steering wheel, Turn car key to start car       Goal Target Performance Level: No difficulty       Due Date: 08/31/19           4. Goal Target Task: Hold &  handlebars,  hammer       Goal Target Performance Level: No difficulty       Due Date: 08/31/19       5.Enter Other Activity Here-STG Target Task: holding a book for 10+ minutes; legibly write for a duration of 5+  minutes       Enter Other Activity Here-STG Target Performance Level: No difficulty       Enter Other Activity Here-Due Date: 08/31/19  6.Enter Other Activity Here-LTG Target Task/Performance: Hold on large book without pain/numbness for 30 minutes; will write 1 page of legible sentences without numbness/pain        Enter Other Activity Here-Due Date: 11/30/19    Treatment Frequency: Therapy Frequency: 1x/week  Predicated Duration of Therapy Intervention:  Predicted Duration of Therapy Intervention (days/wks): 12 weeks    Kirstin Rangel OT         I CERTIFY THE NEED FOR THESE SERVICES FURNISHED UNDER        THIS PLAN OF TREATMENT AND WHILE UNDER MY CARE     (Physician co-signature of this document indicates review and certification of the therapy plan).                Certification Period:  06/03/19 to 08/31/19            Referring Physician:  Dr. Apolinar Cho    Initial Assessment        See Epic Evaluation Start of Care Date: 06/03/19

## 2019-06-03 NOTE — PROGRESS NOTES
06/03/19 0834   Quick Adds   Quick Adds Certification   Therapy Certification   Certification date from 06/03/19   Certification date to 08/31/19   Medical Diagnosis Pain in both (M79.641; M79.642)   Certification I certify the need for these services furnished under this plan of treatment and while under my care.  (Physician co-signature of this document indicates review and certification of the therapy plan).   General Information/History   Start Of Care Date 06/03/19   Referring Physician Dr. Apolinar Cho   Orders Evaluate And Treat As Indicated   Orders Date 04/12/19   Medical Diagnosis Pain in both (M79.641; M79.642)   Additional Occupational Profile Info/Pertinent history of current problem Patient is a 68 year old female who presents with OT orders for pain in bilateral hands.  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   Hepatitis C, narcolepsy, fibromyalgia, migraines, anxiety, pernicious anemia, GERD, osteoporosis inflammatory arthritis.  She has felt improvement with wrist splint and steroid injection. She is taking Plaquenil that has been helping.  Boniva every 3 months at [facility].      Previous treatment or current condition Steroid shot in right hand she thought last June.  Patient reported in January a few years ago she dropped a log on right hand in broke index finger.     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   Hepatitis C, narcolepsy, fibromyalgia, migraines, anxiety, pernicious anemia, GERD, osteoporosis inflammatory arthritis.   How/Where did it occur From Degenerative Joint Disease;From insidious onset;During contact with another person   Onset date of current episode/exacerbation 04/12/19   Chronicity Chronic   Hand Dominance Right   Affected side Bilateral   Functional limitations perform activities of daily living;perform  desired leisure / sports activities   Reported Symptoms Pain;Loss of Motion/Stiffness;Tingling;Numbness;Loss of strength   Prior level of function Independent ADL;Independent IADL   Important Activities Gardening, home management, reading, writing   Living environment House/[facility]   Patient role/Employment history Retired   Patient/Family goals statement To learn how to write and hold a book for extended periods of time with less pain, and decrease difficulty of driving (holding onto steering wheel)   General observations 35/80 UEFI; 9-hole peg test Right 28 seconds; Left 24 seconds   Fall Risk Screen   Fall screen completed by PT   ROM   ROM AROM   AROM   Comments AROM of hand WFL   Strength   Strength Strength    Avg - Left 52    Avg - Right 53    Elbow Extended Avg - Left 54    Elbow Extended Avg - Right 58   Lateral Pinch - Left 15   Lateral Pinch - Right 15   3 Point Pinch - Left 13   3 Point Pinch - Right 14   Therapy Interventions   Planned Therapy Interventions Ultrasound;Light Therapy;Paraffin;Strengthening;Manual Therapy;Education of splint wear, care, fit and precautions;Splinting;Desensitization;Self Care/Home Management;Adaptive Equipment Training;Energy Conservation/Work Simplifacation Training;Ergonomic Patient Education;Joint Protection Instruction;Home Program   Clinical Impression   Criteria for Skilled Therapeutic Interventions Met yes;treatment indicated   OT Diagnosis Decreased safety and ability to complete BADL, IADL, leisure activities.    Influenced by the following impairments Pain;Decreased strength   Assessment of Occupational Performance 5 or more Performance Deficits   Identified Performance Deficits Dressing, home management, leisure, recreational activity, driving, yardwork    Clinical Decision Making (Complexity) High complexity   Therapy Frequency 1x/week   Predicted Duration of Therapy Intervention (days/wks) 12 weeks   Risks and Benefits of Treatment have been  explained. Yes   Patient, Family & other staff in agreement with plan of care Yes   Hand Goals   Hand Goals Dressing;Household Chores;Driving;Sports/Recreation;Enter Other Activity Here   Dressing   Current Functional Task Buttoning;Tying;Zipping   Previous Performance Level Independent   Current Performance Level Moderate difficulty   Goal Target Task Button shirt;Botton pants;Tie shoes;Zipping   Goal Target Performance Level Mild difficulty   Due Date 08/31/19   Household Chores   Current Functional Task Vacuuming;Sweeping;Cooking;Gripping;Carrying;Tidying up   Previous Performance Level Independent   Current Performance Level Moderate difficulty   Goal Target Task Hold and use vacuum ;Sweep floors;Open a tight or new jar   Goal Target Performance Level No difficulty   Due Date 08/31/19   Driving   Current Functional Task Holding steering wheel;Turning car key   Previous Performance Level Independent   Current Performance Level Moderate difficulty   Goal Target Task  steering wheel;Turn car key to start car   Goal Target Performance Level No difficulty   Due Date 08/31/19   Sports/Recreation   Current Functional Task Playing;Holding;Bicycling;Gripping   Previous Performance Level Independent   Curent Performance Level Moderate difficulty   Goal Target Task Hold &  handlebars; hammer   Goal Target Performance Level No difficulty   Due Date 08/31/19   Enter Other Activity Here   Current Functional Task Reading(holding onto book) and writing (manipulating pencil with less pain/numbness)    Previous Performance Level Independent   Current Performance Level moderate diffciulty   STG Target Task holding a book for 10+ minutes; legibly write for a duration of 5+ minutes   STG Target Performance Level No difficulty   Due Date 08/31/19   LTG Target Task/Performance Hold on large book without pain/numbness for 30 minutes; will write 1 page of legible sentences without numbness/pain    Due Date 11/30/19    Total Evaluation Time   OT Eval, High Complexity Minutes (54488) 24     Thank you for the referral to Occupational Therapy!    KATIA Denise/L  [facility]  [phone]

## 2019-06-05 ENCOUNTER — HOSPITAL ENCOUNTER (OUTPATIENT)
Dept: PHYSICAL THERAPY | Facility: CLINIC | Age: 69
Setting detail: THERAPIES SERIES
End: 2019-06-05
Attending: INTERNAL MEDICINE
Payer: MEDICARE

## 2019-06-05 PROCEDURE — 97110 THERAPEUTIC EXERCISES: CPT | Mod: GP | Performed by: PHYSICAL THERAPIST

## 2019-06-05 NOTE — RESULT ENCOUNTER NOTE
"[employer] message sent:  \"Ms. Adhikari,    Cervical spine x-rays showed a slight increase in motion of C3 on C4, and of C4 on C5; but the motion is still considered to be within the normal limits.      Degenerative disc disease was seen from C3 through C7.    Sincerely,  Apolinar Cho MD  6/5/2019 1:15 AM\""

## 2019-06-06 ENCOUNTER — INFUSION THERAPY VISIT (OUTPATIENT)
Dept: INFUSION THERAPY | Facility: CLINIC | Age: 69
End: 2019-06-06
Attending: INTERNAL MEDICINE
Payer: MEDICARE

## 2019-06-06 VITALS
HEART RATE: 66 BPM | BODY MASS INDEX: 23.95 KG/M2 | DIASTOLIC BLOOD PRESSURE: 67 MMHG | TEMPERATURE: 98 F | RESPIRATION RATE: 16 BRPM | OXYGEN SATURATION: 100 % | SYSTOLIC BLOOD PRESSURE: 117 MMHG | WEIGHT: 139.5 LBS

## 2019-06-06 DIAGNOSIS — Z92.89 PERSONAL HISTORY OF OTHER MEDICAL TREATMENT: Primary | ICD-10-CM

## 2019-06-06 DIAGNOSIS — M81.0 OSTEOPOROSIS, UNSPECIFIED OSTEOPOROSIS TYPE, UNSPECIFIED PATHOLOGICAL FRACTURE PRESENCE: ICD-10-CM

## 2019-06-06 LAB
CALCIUM SERPL-MCNC: 9.2 MG/DL (ref 8.5–10.1)
CREAT SERPL-MCNC: 0.68 MG/DL (ref 0.52–1.04)
GFR SERPL CREATININE-BSD FRML MDRD: 90 ML/MIN/{1.73_M2}

## 2019-06-06 PROCEDURE — 25000128 H RX IP 250 OP 636: Performed by: INTERNAL MEDICINE

## 2019-06-06 PROCEDURE — 36415 COLL VENOUS BLD VENIPUNCTURE: CPT | Performed by: INTERNAL MEDICINE

## 2019-06-06 PROCEDURE — 82565 ASSAY OF CREATININE: CPT | Performed by: INTERNAL MEDICINE

## 2019-06-06 PROCEDURE — 96374 THER/PROPH/DIAG INJ IV PUSH: CPT

## 2019-06-06 PROCEDURE — 82310 ASSAY OF CALCIUM: CPT | Performed by: INTERNAL MEDICINE

## 2019-06-06 RX ORDER — IBANDRONATE SODIUM 3 MG/3 ML
3 SYRINGE (ML) INTRAVENOUS ONCE
Status: COMPLETED | OUTPATIENT
Start: 2019-06-06 | End: 2019-06-06

## 2019-06-06 RX ORDER — IBANDRONATE SODIUM 3 MG/3 ML
3 SYRINGE (ML) INTRAVENOUS ONCE
Status: CANCELLED | OUTPATIENT
Start: 2019-09-04

## 2019-06-06 RX ADMIN — IBANDRONATE SODIUM 3 MG: 3 INJECTION, SOLUTION INTRAVENOUS at 08:53

## 2019-06-06 ASSESSMENT — PAIN SCALES - GENERAL: PAINLEVEL: NO PAIN (0)

## 2019-06-06 NOTE — PROGRESS NOTES
Infusion Nursing Note:  Niesha Adhikari presents today for Labs/Boniva.    Patient seen by provider today: No   present during visit today: Not Applicable.    Note: N/A.    Intravenous Access:  Labs drawn without difficulty.  Peripheral IV placed.    Treatment Conditions:  Lab Results   Component Value Date     12/14/2018                   Lab Results   Component Value Date    POTASSIUM 4.7 12/14/2018           Lab Results   Component Value Date    MAG 1.7 12/05/2006            Lab Results   Component Value Date    CR 0.68 06/06/2019                   Lab Results   Component Value Date    ELIZABETH 9.2 06/06/2019                Lab Results   Component Value Date    BILITOTAL 0.5 04/12/2019           Lab Results   Component Value Date    ALBUMIN 4.1 04/12/2019                    Lab Results   Component Value Date    ALT 37 04/12/2019           Lab Results   Component Value Date    AST 30 04/12/2019       Results reviewed, labs MET treatment parameters, ok to proceed with treatment.      Post Infusion Assessment:  Patient tolerated IV push without incident. IV flushed following medication.  Patient observed for 15 minutes post injection per protocol.  Site patent and intact, free from redness, edema or discomfort.  No evidence of extravasations.  Peripheral IV access discontinued per protocol.       Discharge Plan:   Copy of AVS reviewed with patient and/or family.  Patient will return 9/6 for next appointment.  Patient discharged in stable condition accompanied by: self.  Departure Mode: Ambulatory.    Veronica Christensen RN

## 2019-06-12 ENCOUNTER — HOSPITAL ENCOUNTER (OUTPATIENT)
Dept: OCCUPATIONAL THERAPY | Facility: CLINIC | Age: 69
Setting detail: THERAPIES SERIES
End: 2019-06-12
Attending: INTERNAL MEDICINE
Payer: MEDICARE

## 2019-06-12 PROCEDURE — 97035 APP MDLTY 1+ULTRASOUND EA 15: CPT | Mod: GO

## 2019-06-12 PROCEDURE — 97535 SELF CARE MNGMENT TRAINING: CPT | Mod: GO

## 2019-06-13 ENCOUNTER — HOSPITAL ENCOUNTER (OUTPATIENT)
Dept: PHYSICAL THERAPY | Facility: CLINIC | Age: 69
Setting detail: THERAPIES SERIES
End: 2019-06-13
Attending: INTERNAL MEDICINE
Payer: MEDICARE

## 2019-06-13 PROCEDURE — 97110 THERAPEUTIC EXERCISES: CPT | Mod: GP | Performed by: PHYSICAL THERAPIST

## 2019-07-03 ENCOUNTER — HOSPITAL ENCOUNTER (OUTPATIENT)
Dept: OCCUPATIONAL THERAPY | Facility: CLINIC | Age: 69
Setting detail: THERAPIES SERIES
End: 2019-07-03
Attending: INTERNAL MEDICINE
Payer: MEDICARE

## 2019-07-03 PROCEDURE — 97035 APP MDLTY 1+ULTRASOUND EA 15: CPT | Mod: GO

## 2019-07-03 PROCEDURE — 97535 SELF CARE MNGMENT TRAINING: CPT | Mod: GO

## 2019-07-10 ENCOUNTER — HOSPITAL ENCOUNTER (OUTPATIENT)
Dept: OCCUPATIONAL THERAPY | Facility: CLINIC | Age: 69
Setting detail: THERAPIES SERIES
End: 2019-07-10
Attending: INTERNAL MEDICINE
Payer: MEDICARE

## 2019-07-10 PROCEDURE — 97110 THERAPEUTIC EXERCISES: CPT | Mod: GO | Performed by: OCCUPATIONAL THERAPIST

## 2019-07-10 PROCEDURE — 97035 APP MDLTY 1+ULTRASOUND EA 15: CPT | Mod: GO | Performed by: OCCUPATIONAL THERAPIST

## 2019-07-10 PROCEDURE — 97535 SELF CARE MNGMENT TRAINING: CPT | Mod: GO | Performed by: OCCUPATIONAL THERAPIST

## 2019-07-18 ENCOUNTER — HOSPITAL ENCOUNTER (OUTPATIENT)
Dept: OCCUPATIONAL THERAPY | Facility: CLINIC | Age: 69
Setting detail: THERAPIES SERIES
End: 2019-07-18
Attending: INTERNAL MEDICINE
Payer: MEDICARE

## 2019-07-18 PROCEDURE — 97035 APP MDLTY 1+ULTRASOUND EA 15: CPT | Mod: GO | Performed by: OCCUPATIONAL THERAPIST

## 2019-08-05 NOTE — PROGRESS NOTES
"SUBJECTIVE:   Niesha Adhikari is a 69 year old female who presents for Preventive Visit.  Are you in the first 12 months of your Medicare coverage?  No    Healthy Habits:     In general, how would you rate your overall health?  Good    Frequency of exercise:  6-7 days/week    Duration of exercise:  45-60 minutes    Do you usually eat at least 4 servings of fruit and vegetables a day, include whole grains    & fiber and avoid regularly eating high fat or \"junk\" foods?  Yes    Taking medications regularly:  Yes    Medication side effects:  Not applicable    Ability to successfully perform activities of daily living:  No assistance needed    Home Safety:  No safety concerns identified    Hearing Impairment:  Difficulty following a conversation in a noisy restaurant or crowded room, need to ask people to speak up or repeat themselves and difficulty understanding soft or whispered speech    In the past 6 months, have you been bothered by leaking of urine?  No    In general, how would you rate your overall mental or emotional health?  Fair      PHQ-2 Total Score: 3    Additional concerns today:  No    Sister , she is having trouble coping with this.  Also, her dog bit someone and it was reported to the police.  Dog was temporarily quarantined, she has them back now.  Having issues with her son.  Working through this with counseling and Psychiatry.    Do you have a Health Care Directive? Yes: Advance Directive has been received and scanned.      Fall risk    Has fallen on ice and when walking doggies..    Cognitive Screening   1) Repeat 3 items (Leader, Season, Table)    2) Clock draw: NORMAL  3) 3 item recall: Recalls 3 objects  Results: 3 items recalled: COGNITIVE IMPAIRMENT LESS LIKELY    Mini-CogTM Copyright KISHA Gutierrez. Licensed by the author for use in [facility]; reprinted with permission (viktoriya@.[facility]). All rights reserved.      Do you have sleep apnea, excessive snoring or daytime drowsiness?: no  "  She is fatigued from being awake at night but she feels the Atarax is helping    Reviewed and updated as needed this visit by clinical staff  Tobacco  Allergies  Meds  Problems         Reviewed and updated as needed this visit by Provider  Allergies  Meds  Problems        Social History     Tobacco Use     Smoking status: Former Smoker     Packs/day: 0.75     Years: 10.00     Pack years: 7.50     Types: Cigarettes     Start date: 1968     Last attempt to quit: 1978     Years since quittin.8     Smokeless tobacco: Never Used     Tobacco comment: No exposure at home   Substance Use Topics     Alcohol use: No     Alcohol/week: 0.0 oz         Alcohol Use 2019   Prescreen: >3 drinks/day or >7 drinks/week? Not Applicable   Prescreen: >3 drinks/day or >7 drinks/week? -       Current providers sharing in care for this patient include:   Patient Care Team:  Tanika Clark MD as PCP - Dolores Brantley APRN CNP (Nurse Practitioner)  Tanika Clark MD as Assigned PCP    The following health maintenance items are reviewed in Epic and correct as of today:  Health Maintenance   Topic Date Due     FALL RISK ASSESSMENT  2018     INFLUENZA VACCINE (1) 2019     PHQ-9  2020     MEDICARE ANNUAL WELLNESS VISIT  2020     BMP  2020     ALFREDO ASSESSMENT  2020     MAMMO SCREENING  2020     ADVANCE CARE PLANNING  2022     LIPID  2024     DTAP/TDAP/TD IMMUNIZATION (5 - Td) 2025     COLONOSCOPY  2029     DEXA  Completed     MIGRAINE ACTION PLAN  Completed     ZOSTER IMMUNIZATION  Completed     DEPRESSION ACTION PLAN  Addressed     IPV IMMUNIZATION  Aged Out     MENINGITIS IMMUNIZATION  Aged Out         Review of Systems   Gastrointestinal: Positive for constipation.   Breasts:  Negative for tenderness and breast mass.   Genitourinary: Negative for pelvic pain, vaginal bleeding and vaginal discharge.   Musculoskeletal: Positive for  "arthralgias and myalgias.         OBJECTIVE:   /68   Pulse 58   Temp 98.4  F (36.9  C)   Resp 16   Ht 1.613 m (5' 3.5\")   Wt 63.5 kg (140 lb)   LMP 11/27/2003   SpO2 99%   BMI 24.41 kg/m   Estimated body mass index is 24.41 kg/m  as calculated from the following:    Height as of this encounter: 1.613 m (5' 3.5\").    Weight as of this encounter: 63.5 kg (140 lb).  Physical Exam   Constitutional: She is oriented to person, place, and time. She appears well-developed and well-nourished. No distress.   HENT:   Right Ear: Tympanic membrane and external ear normal.   Left Ear: Tympanic membrane and external ear normal.   Nose: Nose normal.   Mouth/Throat: Oropharynx is clear and moist. No oropharyngeal exudate.   Eyes: Pupils are equal, round, and reactive to light. Conjunctivae are normal. Right eye exhibits no discharge. Left eye exhibits no discharge.   Neck: Neck supple. No tracheal deviation present. No thyromegaly present.   Cardiovascular: Normal rate, regular rhythm, S1 normal, S2 normal, normal heart sounds and normal pulses. Exam reveals no S3, no S4 and no friction rub.   No murmur heard.  Pulmonary/Chest: Effort normal and breath sounds normal. No respiratory distress. She has no wheezes. She has no rales. Right breast exhibits no mass, no nipple discharge and no tenderness. Left breast exhibits no mass, no nipple discharge and no tenderness.   Abdominal: Soft. Bowel sounds are normal. She exhibits no mass. There is no hepatosplenomegaly. There is no tenderness.   Musculoskeletal: Normal range of motion. She exhibits no edema.   Lymphadenopathy:     She has no cervical adenopathy.   Neurological: She is alert and oriented to person, place, and time. She has normal strength and normal reflexes. She exhibits normal muscle tone.   Skin: Skin is warm and dry. No rash noted.   On her left upper thigh near her groin there is a stuck on appearing lesion measuring about 7 mm.   Psychiatric: She has a " normal mood and affect. Judgment and thought content normal. Cognition and memory are normal.       ASSESSMENT / PLAN:   1. Encounter for routine adult health examination without abnormal findings    2. Multiple joint pain  She requests Lyme screening.  She knows she has multiple joint aches but would like to see if there is anything that is being missed.  She denies any specific deer tick bites.    - Lyme Disease Jailyn with reflex to WB Serum    3. Fatigue, unspecified type  She realize she is under a lot of stress the.  She is following with psychiatry and psychology.  Will obtain CBC.    - Lyme Disease Jailyn with reflex to WB Serum  - CBC with platelets    4. Seborrheic keratosis  She has a seborrheic keratosis on her left upper thigh.  She requests cryotherapy.  Discussed that this may be considered cosmetic and she prefers to check with her insurance.    5. Hearing loss, unspecified hearing loss type, unspecified laterality  She request a referral for audiology evaluation.    - AUDIOLOGY ADULT REFERRAL    6. Alcohol dependence in remission (H)  She has been sober for many years.    - CBC with platelets    7. Moderate recurrent major depression (H)  She denies being suicidal.  She has thoughts of death which is distressing and she is following closely with counseling and psychiatry.    - CBC with platelets  - TSH with free T4 reflex    8. Other seasonal allergic rhinitis  Stable.  Refills are given.    - montelukast (SINGULAIR) 10 MG tablet; TAKE ONE TABLET BY MOUTH EVERY DAY AS NEEDED SEASONALLY (AUGUST AND SEPTEMBER)  Dispense: 90 tablet; Refill: 0    9. Cutaneous candidiasis  Rash under her breasts.  Will treat with clotrimazole.    - clotrimazole (CLOTRIMAZOLE ANTI-FUNGAL) 1 % external cream; APPLY TOPICALLY TWO TIMES A DAY  Dispense: 28.35 g; Refill: 3    10. Essential hypertension  Well-controlled.  Will continue lisinopril.    - lisinopril (PRINIVIL/ZESTRIL) 10 MG tablet; Take 1 tablet (10 mg) by mouth daily   "Dispense: 90 tablet; Refill: 2    End of Life Planning:  Patient currently has an advanced directive: No.  I have verified the patient's ablity to prepare an advanced directive/make health care decisions.  Literature was provided to assist patient in preparing an advanced directive.    COUNSELING:  Reviewed preventive health counseling, as reflected in patient instructions    Estimated body mass index is 24.41 kg/m  as calculated from the following:    Height as of this encounter: 1.613 m (5' 3.5\").    Weight as of this encounter: 63.5 kg (140 lb).       reports that she quit smoking about 40 years ago. Her smoking use included cigarettes. She started smoking about 50 years ago. She has a 7.50 pack-year smoking history. She has never used smokeless tobacco.      Appropriate preventive services were discussed with this patient, including applicable screening as appropriate for cardiovascular disease, diabetes, osteopenia/osteoporosis, and glaucoma.  As appropriate for age/gender, discussed screening for colorectal cancer, prostate cancer, breast cancer, and cervical cancer. Checklist reviewing preventive services available has been given to the patient.    Reviewed patients plan of care and provided an AVS. The Basic Care Plan (routine screening as documented in Health Maintenance) for Niesha meets the Care Plan requirement. This Care Plan has been established and reviewed with the Patient.    Counseling Resources:  ATP IV Guidelines  Pooled Cohorts Equation Calculator  Breast Cancer Risk Calculator  FRAX Risk Assessment  ICSI Preventive Guidelines  Dietary Guidelines for Americans, 2010  USDA's MyPlate  ASA Prophylaxis  Lung CA Screening    Tanika Clark MD  [facility]    Identified Health Risks:  Answers for HPI/ROS submitted by the patient on 8/6/2019   Annual Exam:  If you checked off any problems, how difficult have these problems made it for you to do your work, take care of things at " home, or get along with other people?: Very difficult  PHQ9 TOTAL SCORE: 12  ALFREDO 7 TOTAL SCORE: 9      The patient was provided with written information regarding signs of hearing loss.  The patient was provided with suggestions to help her develop a healthy emotional lifestyle.

## 2019-08-06 ASSESSMENT — ANXIETY QUESTIONNAIRES
2. NOT BEING ABLE TO STOP OR CONTROL WORRYING: MORE THAN HALF THE DAYS
4. TROUBLE RELAXING: SEVERAL DAYS
6. BECOMING EASILY ANNOYED OR IRRITABLE: SEVERAL DAYS
GAD7 TOTAL SCORE: 9
7. FEELING AFRAID AS IF SOMETHING AWFUL MIGHT HAPPEN: SEVERAL DAYS
1. FEELING NERVOUS, ANXIOUS, OR ON EDGE: MORE THAN HALF THE DAYS
5. BEING SO RESTLESS THAT IT IS HARD TO SIT STILL: NOT AT ALL
GAD7 TOTAL SCORE: 9
3. WORRYING TOO MUCH ABOUT DIFFERENT THINGS: MORE THAN HALF THE DAYS
7. FEELING AFRAID AS IF SOMETHING AWFUL MIGHT HAPPEN: SEVERAL DAYS
GAD7 TOTAL SCORE: 9

## 2019-08-06 ASSESSMENT — ENCOUNTER SYMPTOMS
MYALGIAS: 1
CONSTIPATION: 1
ARTHRALGIAS: 1
BREAST MASS: 0

## 2019-08-06 ASSESSMENT — PATIENT HEALTH QUESTIONNAIRE - PHQ9
10. IF YOU CHECKED OFF ANY PROBLEMS, HOW DIFFICULT HAVE THESE PROBLEMS MADE IT FOR YOU TO DO YOUR WORK, TAKE CARE OF THINGS AT HOME, OR GET ALONG WITH OTHER PEOPLE: VERY DIFFICULT
SUM OF ALL RESPONSES TO PHQ QUESTIONS 1-9: 12
SUM OF ALL RESPONSES TO PHQ QUESTIONS 1-9: 12

## 2019-08-06 ASSESSMENT — ACTIVITIES OF DAILY LIVING (ADL): CURRENT_FUNCTION: NO ASSISTANCE NEEDED

## 2019-08-07 ASSESSMENT — PATIENT HEALTH QUESTIONNAIRE - PHQ9: SUM OF ALL RESPONSES TO PHQ QUESTIONS 1-9: 12

## 2019-08-07 ASSESSMENT — ANXIETY QUESTIONNAIRES: GAD7 TOTAL SCORE: 9

## 2019-08-09 ENCOUNTER — OFFICE VISIT (OUTPATIENT)
Dept: FAMILY MEDICINE | Facility: OTHER | Age: 69
End: 2019-08-09
Payer: MEDICARE

## 2019-08-09 VITALS
HEART RATE: 58 BPM | SYSTOLIC BLOOD PRESSURE: 108 MMHG | BODY MASS INDEX: 23.9 KG/M2 | WEIGHT: 140 LBS | DIASTOLIC BLOOD PRESSURE: 68 MMHG | TEMPERATURE: 98.4 F | OXYGEN SATURATION: 99 % | RESPIRATION RATE: 16 BRPM | HEIGHT: 64 IN

## 2019-08-09 DIAGNOSIS — F10.21 ALCOHOL DEPENDENCE IN REMISSION (H): ICD-10-CM

## 2019-08-09 DIAGNOSIS — H91.90 HEARING LOSS, UNSPECIFIED HEARING LOSS TYPE, UNSPECIFIED LATERALITY: ICD-10-CM

## 2019-08-09 DIAGNOSIS — M25.50 MULTIPLE JOINT PAIN: ICD-10-CM

## 2019-08-09 DIAGNOSIS — J30.2 OTHER SEASONAL ALLERGIC RHINITIS: ICD-10-CM

## 2019-08-09 DIAGNOSIS — I10 ESSENTIAL HYPERTENSION: ICD-10-CM

## 2019-08-09 DIAGNOSIS — Z00.00 ENCOUNTER FOR ROUTINE ADULT HEALTH EXAMINATION WITHOUT ABNORMAL FINDINGS: Primary | ICD-10-CM

## 2019-08-09 DIAGNOSIS — R53.83 FATIGUE, UNSPECIFIED TYPE: ICD-10-CM

## 2019-08-09 DIAGNOSIS — I10 BENIGN ESSENTIAL HYPERTENSION: ICD-10-CM

## 2019-08-09 DIAGNOSIS — B37.2 CUTANEOUS CANDIDIASIS: ICD-10-CM

## 2019-08-09 DIAGNOSIS — F33.1 MODERATE RECURRENT MAJOR DEPRESSION (H): ICD-10-CM

## 2019-08-09 DIAGNOSIS — L82.1 SEBORRHEIC KERATOSIS: ICD-10-CM

## 2019-08-09 PROBLEM — M50.30 DDD (DEGENERATIVE DISC DISEASE), CERVICAL: Status: ACTIVE | Noted: 2019-08-09

## 2019-08-09 PROBLEM — M25.552 LEFT HIP PAIN: Status: ACTIVE | Noted: 2019-08-09

## 2019-08-09 PROBLEM — R11.0 NAUSEA: Status: ACTIVE | Noted: 2019-08-09

## 2019-08-09 PROBLEM — K59.00 CONSTIPATION: Status: ACTIVE | Noted: 2019-08-09

## 2019-08-09 LAB
ANION GAP SERPL CALCULATED.3IONS-SCNC: 10 MMOL/L (ref 3–14)
BUN SERPL-MCNC: 11 MG/DL (ref 7–30)
CALCIUM SERPL-MCNC: 9.5 MG/DL (ref 8.5–10.1)
CHLORIDE SERPL-SCNC: 102 MMOL/L (ref 94–109)
CHOLEST SERPL-MCNC: 209 MG/DL
CO2 SERPL-SCNC: 30 MMOL/L (ref 20–32)
CREAT SERPL-MCNC: 0.58 MG/DL (ref 0.52–1.04)
ERYTHROCYTE [DISTWIDTH] IN BLOOD BY AUTOMATED COUNT: 12.6 % (ref 10–15)
GFR SERPL CREATININE-BSD FRML MDRD: >90 ML/MIN/{1.73_M2}
GLUCOSE SERPL-MCNC: 86 MG/DL (ref 70–99)
HCT VFR BLD AUTO: 42.9 % (ref 35–47)
HDLC SERPL-MCNC: 82 MG/DL
HGB BLD-MCNC: 14.5 G/DL (ref 11.7–15.7)
LDLC SERPL CALC-MCNC: 112 MG/DL
MCH RBC QN AUTO: 30.7 PG (ref 26.5–33)
MCHC RBC AUTO-ENTMCNC: 33.8 G/DL (ref 31.5–36.5)
MCV RBC AUTO: 91 FL (ref 78–100)
NONHDLC SERPL-MCNC: 127 MG/DL
PLATELET # BLD AUTO: 147 10E9/L (ref 150–450)
POTASSIUM SERPL-SCNC: 4.4 MMOL/L (ref 3.4–5.3)
RBC # BLD AUTO: 4.72 10E12/L (ref 3.8–5.2)
SODIUM SERPL-SCNC: 142 MMOL/L (ref 133–144)
TRIGL SERPL-MCNC: 74 MG/DL
TSH SERPL DL<=0.005 MIU/L-ACNC: 1.02 MU/L (ref 0.4–4)
WBC # BLD AUTO: 5.1 10E9/L (ref 4–11)

## 2019-08-09 PROCEDURE — 80061 LIPID PANEL: CPT | Performed by: FAMILY MEDICINE

## 2019-08-09 PROCEDURE — G0438 PPPS, INITIAL VISIT: HCPCS | Performed by: FAMILY MEDICINE

## 2019-08-09 PROCEDURE — 85027 COMPLETE CBC AUTOMATED: CPT | Performed by: FAMILY MEDICINE

## 2019-08-09 PROCEDURE — 86618 LYME DISEASE ANTIBODY: CPT | Performed by: FAMILY MEDICINE

## 2019-08-09 PROCEDURE — 36415 COLL VENOUS BLD VENIPUNCTURE: CPT | Performed by: FAMILY MEDICINE

## 2019-08-09 PROCEDURE — 80048 BASIC METABOLIC PNL TOTAL CA: CPT | Performed by: FAMILY MEDICINE

## 2019-08-09 PROCEDURE — 84443 ASSAY THYROID STIM HORMONE: CPT | Performed by: FAMILY MEDICINE

## 2019-08-09 RX ORDER — HYDROXYZINE HYDROCHLORIDE 10 MG/1
50 TABLET, FILM COATED ORAL AT BEDTIME
COMMUNITY
Start: 2019-07-01 | End: 2021-04-30

## 2019-08-09 RX ORDER — CLOTRIMAZOLE 1 %
CREAM (GRAM) TOPICAL
Qty: 28.35 G | Refills: 3 | Status: SHIPPED | OUTPATIENT
Start: 2019-08-09 | End: 2020-09-14

## 2019-08-09 RX ORDER — POLYETHYLENE GLYCOL 3350 17 G/17G
1 POWDER, FOR SOLUTION ORAL DAILY PRN
COMMUNITY
End: 2024-09-04

## 2019-08-09 RX ORDER — LISINOPRIL 10 MG/1
10 TABLET ORAL DAILY
Qty: 90 TABLET | Refills: 2 | Status: SHIPPED | OUTPATIENT
Start: 2019-08-09 | End: 2020-07-08

## 2019-08-09 RX ORDER — MONTELUKAST SODIUM 10 MG/1
TABLET ORAL
Qty: 90 TABLET | Refills: 0 | Status: SHIPPED | OUTPATIENT
Start: 2019-08-09 | End: 2020-08-13

## 2019-08-09 ASSESSMENT — ENCOUNTER SYMPTOMS
CONSTIPATION: 1
ARTHRALGIAS: 1
MYALGIAS: 1
BREAST MASS: 0

## 2019-08-09 ASSESSMENT — PAIN SCALES - GENERAL: PAINLEVEL: MILD PAIN (2)

## 2019-08-09 ASSESSMENT — MIFFLIN-ST. JEOR: SCORE: 1142.1

## 2019-08-09 ASSESSMENT — ACTIVITIES OF DAILY LIVING (ADL): CURRENT_FUNCTION: NO ASSISTANCE NEEDED

## 2019-08-09 NOTE — PATIENT INSTRUCTIONS
You have a seborrheic keratosis that you would like cryotherapy (frozen off)--this may be considered cosmetic.      Patient Education   Personalized Prevention Plan  You are due for the preventive services outlined below.  Your care team is available to assist you in scheduling these services.  If you have already completed any of these items, please share that information with your care team to update in your medical record.  Health Maintenance Due   Topic Date Due     FALL RISK ASSESSMENT  09/01/2018       Signs of Hearing Loss     Hearing much better with one ear can be a sign of hearing loss.     Hearing loss is a problem shared by many people. In fact, it is one of the most common health conditions, particularly as people age. Most people over age 65 have some hearing loss, and by age 80, almost everyone does. Because hearing loss usually occurs slowly over the years, you may not realize your hearing ability has gotten worse.  Have your hearing checked  Contact your healthcare provider if you:    Have to strain to hear normal conversation    Have to watch other people s faces very carefully to follow what they re saying    Need to ask people to repeat what they ve said    Often misunderstand what people are saying    Turn the volume of the television or radio up so high that others complain    Feel that people are mumbling when they re talking to you    Find that the effort to hear leaves you feeling tired and irritated    Notice, when using the phone, that you hear better with one ear than the other  Date Last Reviewed: 12/1/2016 2000-2018 [employer]. [address] [place] [address]. All rights reserved. This information is not intended as a substitute for professional medical care. Always follow your healthcare professional's instructions.        Your Health Risk Assessment indicates you feel you are not in good emotional health.    Recreation   Recreation is not limited to sports  and team events. It includes any activity that provides relaxation, interest, enjoyment, and exercise. Recreation provides an outlet for physical, mental, and social energy. It can give a sense of worth and achievement. It can help you stay healthy.    Mental Exercise and Social Involvement  Mental and emotional health is as important as physical health. Keep in touch with friends and family. Stay as active as possible. Continue to learn and challenge yourself.   Things you can do to stay mentally active are:    Learn something new, like a foreign language or musical instrument.     Play SCRABBLE or do crossword puzzles. If you cannot find people to play these games with you at home, you can play them with others on your computer through the Internet.     Join a games club--anything from card games to chess or checkers or lawn bowling.     Start a new hobby.     Go back to school.     Volunteer.     Read.   Keep up with world events.    Depression and Suicide in Older Adults    Nearly 2 million older Americans have some type of depression. Sadly, some of them even take their own lives. Yet depression among older adults is often ignored. Learn the warning signs. You may help spare a loved one needless pain. You may also save a life.  What is depression?  Depression is a serious illness that affects the way you think and feel. It is not a normal part of aging, nor is it a sign of weakness, a character flaw, or something you can snap out of. Most people with depression need treatment to get better. The most common symptom is a feeling of deep sadness. People who are depressed also may seem tired and listless. And nothing seems to give them pleasure. It s normal to grieve or be sad sometimes. But sadness lessens or passes with time. Depression rarely goes away or improves on its own. Other symptoms of depression are:    Sleeping more or less than normal    Eating more or less than normal    Having headaches,  stomachaches, or other pains that don t go away    Feeling nervous,  empty,  or worthless    Crying a great deal    Thinking or talking about suicide or death    Feeling confused or forgetful  What causes it?  The causes of depression aren t fully known. But, it is thought to result from a complex interaction of biochemistry, genetics, environmental factors, and personality. Certain chemicals in the brain play a role. Depression does run in families. And life stresses can also trigger depression in some people. Older adults often face many stressors, such as death of friends or a spouse, health problems, and financial concerns.  How you can help  Often, depressed people may not want to ask for help. When they do, they may be ignored. Or, they may receive the wrong treatment. You can help by showing parents and older friends love and support. If they seem depressed, help them find the right treatment. Talk to your doctor. Or contact a local mental health center, social service agency, or hospital. With modern treatment, no one has to suffer from depression.  If your older friend or family member agrees, you can be an advocate for him or her in the healthcare setting. Many times, older adults have other chronic illnesses such as diabetes, heart disease, or cancer that can cause symptoms of depression. Medicine side effects can also contribute to certain behaviors and feelings. It is important that the older adult's healthcare provider listens and sorts out the causes of any symptoms of depression and makes referrals to mental health specialists when needed. Untreated depression can result in misdiagnosis, including brain disorders such as dementia and Alzheimer's. If the health professional does not take the issue of depression seriously, ask your family member or friend to consider finding another provider.  Resources    National Suicide Prevention Lifeline (crisis hotline)876-132-WQSV (1930)    National [place] of  Mental Iacmfm902-330-1577lqk.[facility].nih.gov    National [place] on Mental Wifebrn434-750-6196jre.diego.org    Mental Health Mjrgywz256-966-8243clo.[facility].org    National Suicide Jstrwrl433-375-8134 (800-SUICIDE)   Date Last Reviewed: 2/1/2017 2000-2018 [employer] [address] [place] [address], [place] [address]. All rights reserved. This information is not intended as a substitute for professional medical care. Always follow your healthcare professional's instructions.

## 2019-08-12 LAB — B BURGDOR IGG+IGM SER QL: 0.05 (ref 0–0.89)

## 2019-08-22 ENCOUNTER — TELEPHONE (OUTPATIENT)
Dept: FAMILY MEDICINE | Facility: OTHER | Age: 69
End: 2019-08-22

## 2019-08-22 NOTE — TELEPHONE ENCOUNTER
You placed a referral for patient to 8/9/19 on audiology.  Patient has not scheduled as of yet.      Please review and forward to team if follow up with the patient is needed.     Thank you!  Niesha/Clinic Referrals Dyad II

## 2019-09-06 ENCOUNTER — INFUSION THERAPY VISIT (OUTPATIENT)
Dept: INFUSION THERAPY | Facility: CLINIC | Age: 69
End: 2019-09-06
Attending: INTERNAL MEDICINE
Payer: MEDICARE

## 2019-09-06 VITALS
TEMPERATURE: 98.9 F | WEIGHT: 141 LBS | DIASTOLIC BLOOD PRESSURE: 73 MMHG | OXYGEN SATURATION: 99 % | SYSTOLIC BLOOD PRESSURE: 124 MMHG | BODY MASS INDEX: 24.59 KG/M2 | RESPIRATION RATE: 16 BRPM | HEART RATE: 69 BPM

## 2019-09-06 DIAGNOSIS — M81.0 OSTEOPOROSIS, UNSPECIFIED OSTEOPOROSIS TYPE, UNSPECIFIED PATHOLOGICAL FRACTURE PRESENCE: ICD-10-CM

## 2019-09-06 DIAGNOSIS — Z92.89 PERSONAL HISTORY OF OTHER MEDICAL TREATMENT: Primary | ICD-10-CM

## 2019-09-06 LAB
ALBUMIN SERPL-MCNC: 3.8 G/DL (ref 3.4–5)
CALCIUM SERPL-MCNC: 8.9 MG/DL (ref 8.5–10.1)
CREAT SERPL-MCNC: 0.64 MG/DL (ref 0.52–1.04)
GFR SERPL CREATININE-BSD FRML MDRD: >90 ML/MIN/{1.73_M2}

## 2019-09-06 PROCEDURE — 96374 THER/PROPH/DIAG INJ IV PUSH: CPT

## 2019-09-06 PROCEDURE — 82040 ASSAY OF SERUM ALBUMIN: CPT | Performed by: INTERNAL MEDICINE

## 2019-09-06 PROCEDURE — 82310 ASSAY OF CALCIUM: CPT | Performed by: INTERNAL MEDICINE

## 2019-09-06 PROCEDURE — 36415 COLL VENOUS BLD VENIPUNCTURE: CPT | Performed by: INTERNAL MEDICINE

## 2019-09-06 PROCEDURE — 82565 ASSAY OF CREATININE: CPT | Performed by: INTERNAL MEDICINE

## 2019-09-06 PROCEDURE — 25000128 H RX IP 250 OP 636: Performed by: INTERNAL MEDICINE

## 2019-09-06 RX ORDER — IBANDRONATE SODIUM 3 MG/3 ML
3 SYRINGE (ML) INTRAVENOUS ONCE
Status: CANCELLED | OUTPATIENT
Start: 2019-09-06

## 2019-09-06 RX ORDER — IBANDRONATE SODIUM 3 MG/3 ML
3 SYRINGE (ML) INTRAVENOUS ONCE
Status: COMPLETED | OUTPATIENT
Start: 2019-09-06 | End: 2019-09-06

## 2019-09-06 RX ADMIN — IBANDRONATE SODIUM 3 MG: 3 INJECTION, SOLUTION INTRAVENOUS at 09:10

## 2019-09-06 ASSESSMENT — PAIN SCALES - GENERAL: PAINLEVEL: MODERATE PAIN (4)

## 2019-09-06 NOTE — PROGRESS NOTES
Infusion Nursing Note:  Niesha Adhikari presents today for Labs/Boniva.    Patient seen by provider today: No   present during visit today: Not Applicable.    Note: N/A.    Intravenous Access:  Labs drawn without difficulty in lab dept. prior to arrival.  Peripheral IV placed.    Treatment Conditions:  Lab Results   Component Value Date     08/09/2019                   Lab Results   Component Value Date    POTASSIUM 4.4 08/09/2019           Lab Results   Component Value Date    MAG 1.7 12/05/2006            Lab Results   Component Value Date    CR 0.64 09/06/2019                   Lab Results   Component Value Date    ELIZABETH 8.9 09/06/2019                Lab Results   Component Value Date    BILITOTAL 0.5 04/12/2019           Lab Results   Component Value Date    ALBUMIN 3.8 09/06/2019                    Lab Results   Component Value Date    ALT 37 04/12/2019           Lab Results   Component Value Date    AST 30 04/12/2019       Results reviewed, labs MET treatment parameters, ok to proceed with treatment. Calcium level 8.9 today, Creatinine 0.64, GFR >90.      Post Infusion Assessment:  Patient tolerated IV push without incident. Good blood return from IV, Flushed with Saline. Site asymptomatic.  Patient observed for 10 minutes post Boniva per protocol.  Site patent and intact, free from redness, edema or discomfort.  No evidence of extravasations.  Access discontinued per protocol.       Discharge Plan:   Copy of AVS reviewed with patient and/or family.  Patient will return 12/4 for next appointment.  Patient discharged in stable condition accompanied by: self.  Departure Mode: Ambulatory.    Veronica Christensen, RN, RN

## 2019-10-02 NOTE — ADDENDUM NOTE
Encounter addended by: Monika Fisher OT on: 10/2/2019 10:26 AM   Actions taken: Episode resolved, Clinical Note Signed, Flowsheet accepted

## 2019-10-02 NOTE — PROGRESS NOTES
Occupational Therapy Discharge Progress Note         07/18/19 0932   Notes   Note Type Discharge Summary   Signing Clinician's Name / Credentials   Signing clinician's name / credentials RUTH Yanez  (Evonne Mccracken, OT)   Session Number   Session Number 5   Providers   Referring Physician Dr. Apolinar Cho   Progress Note/Recertification   Recertification Due Date 08/31/19   General Information   Medical Diagnosis Pain in both hands (M79.641; M79.642)   Orders Evaluate And Treat As Indicated   Start Of Care Date 06/03/19   Onset date of current episode/exacerbation 04/12/19   Subjective Measures   Subjective The patient canceled all furher OT session, due to other personal situations.  OT discussed and pt agreed to holding OT chart open for 30 days, til 9/18/19.  Chart was held until 10/1/19, she has not made any further OT appointments as of this date.   Initial Pain level 4/10   Modalities   Modalities Ultrasound   Ultrasound -Type 2 cm sound head;Continuous   Ultrasound, Minutes (05163) 23 Minutes  (12 minutes each hand)   Skilled Interventions To Increase Blood Flow For Improved Healing;To Enhance Tissue Repair;To Enhance Joint Rom;To Decrease Pain   Intensity 0.8w/cm2   Frequency 3 MHz   Location bilateral volar surface with thenar and right index finger with MCP area.    Positioning sitting   Manual   Manual STM   Manual Therapy Minutes (47507) 8   Skilled Interventions To Increase Blood Flow For Improved Healing;To Decrease Pain;To Decrease Inflammation   STM 1st dc;Thenars  (palms)   Position Sitting   Description/ Technique Bilaterally provided STM to palms and thenars, mild to moderate stroke pattern in ossilation and transverse movement.  Recommended and pt. reported will be having a massage therapist provide neck and shoulder treatment.   Hand Goals   Hand Goals Dressing;Household Chores;Driving;Sports/Recreation;Enter Other Activity Here   Dressing   Current Functional Task  Buttoning;Tying;Zipping   Previous Performance Level Independent   Current Performance Level Moderate difficulty   Goal Target Task Button shirt;[place] pants;Tie shoes;Zipping   Goal Target Performance Level Mild difficulty   Due Date 08/31/19   If goal not met, Why? Not available for final testing   Household Chores   Current Functional Task Vacuuming;Sweeping;Cooking;Gripping;Carrying;Tidying up   Previous Performance Level Independent   Current Performance Level Moderate difficulty   Goal Target Task Hold and use vacuum ;Sweep floors;Open a tight or new jar   Goal Target Performance Level No difficulty   If goal not met, Why? Not available for final testing   Driving   Current Functional Task Holding steering wheel;Turning car key   Previous Performance Level Independent   Current Performance Level Moderate difficulty   Goal Target Task  steering wheel;Turn car key to start car   Goal Target Performance Level No difficulty   Due Date 08/31/19   If goal not met, Why? Not available for final testing   Sports/Recreation   Current Functional Task Playing;Holding;Bicycling;Gripping   Previous Performance Level Independent   Curent Performance Level Moderate difficulty   Goal Target Task Hold &  handlebars; hammer   Goal Target Performance Level No difficulty   Due Date 08/31/19   If goal not met, Why? Not available for final testing   Assessment   Clinical Impression(s) Comments Patient has good understanding of previous education and materials to implement at home to reduce pain and increase pain free functional use of the hands.   Plan   Home program continue HEP and OT recommendations.   Updates to plan of care Hold 30 days 9/18/19.  Discharge as of 10/1/19   Total Session Time   Timed Code Treatment Minutes 31   Total Treatment Time (sum of timed and untimed services) 31       Thank you for referring Niesha  To OT services to assist with decrease hand pain and improve functional use.    If you  have any questions or concerns, please contact me at [phone].    Monika Fisher MA, OTR/L  [facility]

## 2019-10-11 ENCOUNTER — OFFICE VISIT (OUTPATIENT)
Dept: RHEUMATOLOGY | Facility: CLINIC | Age: 69
End: 2019-10-11
Payer: MEDICARE

## 2019-10-11 VITALS
DIASTOLIC BLOOD PRESSURE: 72 MMHG | BODY MASS INDEX: 24.04 KG/M2 | OXYGEN SATURATION: 98 % | WEIGHT: 140.8 LBS | HEART RATE: 65 BPM | HEIGHT: 64 IN | SYSTOLIC BLOOD PRESSURE: 130 MMHG

## 2019-10-11 DIAGNOSIS — M81.0 AGE RELATED OSTEOPOROSIS, UNSPECIFIED PATHOLOGICAL FRACTURE PRESENCE: ICD-10-CM

## 2019-10-11 DIAGNOSIS — M06.4 INFLAMMATORY POLYARTHROPATHY (H): Primary | ICD-10-CM

## 2019-10-11 DIAGNOSIS — Z79.899 HIGH RISK MEDICATION USE: ICD-10-CM

## 2019-10-11 PROCEDURE — 99214 OFFICE O/P EST MOD 30 MIN: CPT | Performed by: INTERNAL MEDICINE

## 2019-10-11 RX ORDER — LAMOTRIGINE 50 MG/1
50 TABLET, EXTENDED RELEASE ORAL DAILY
COMMUNITY
End: 2020-03-04

## 2019-10-11 ASSESSMENT — MIFFLIN-ST. JEOR: SCORE: 1145.72
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
## 2019-10-11 NOTE — PATIENT INSTRUCTIONS
Rheumatology    Dr. Apolinar Cho         [facility]   (Monday)  [address]  [place] [address]       [facility]   (Tuesday)  [address] [place] [address]    [facility]   (Wed., Thurs., and Friday)  [address] [place] [address]    Phone number: [phone]  Thank you for choosing [facility].  RAJ Schmidt RMA

## 2019-11-07 ENCOUNTER — OFFICE VISIT (OUTPATIENT)
Dept: ORTHOPEDICS | Facility: OTHER | Age: 69
End: 2019-11-07
Payer: MEDICARE

## 2019-11-07 ENCOUNTER — ANCILLARY PROCEDURE (OUTPATIENT)
Dept: GENERAL RADIOLOGY | Facility: OTHER | Age: 69
End: 2019-11-07
Attending: ORTHOPAEDIC SURGERY
Payer: MEDICARE

## 2019-11-07 VITALS
HEART RATE: 76 BPM | WEIGHT: 141 LBS | SYSTOLIC BLOOD PRESSURE: 149 MMHG | RESPIRATION RATE: 14 BRPM | HEIGHT: 64 IN | DIASTOLIC BLOOD PRESSURE: 82 MMHG | BODY MASS INDEX: 24.07 KG/M2

## 2019-11-07 DIAGNOSIS — M89.8X6 PAIN IN LEFT TIBIA: Primary | ICD-10-CM

## 2019-11-07 DIAGNOSIS — M89.8X6 PAIN IN LEFT TIBIA: ICD-10-CM

## 2019-11-07 PROCEDURE — 99213 OFFICE O/P EST LOW 20 MIN: CPT | Performed by: ORTHOPAEDIC SURGERY

## 2019-11-07 PROCEDURE — 73590 X-RAY EXAM OF LOWER LEG: CPT | Mod: LT

## 2019-11-07 ASSESSMENT — PAIN SCALES - GENERAL: PAINLEVEL: EXTREME PAIN (8)

## 2019-11-07 ASSESSMENT — MIFFLIN-ST. JEOR: SCORE: 1146.63
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
## 2019-11-28 RX ORDER — IBANDRONATE SODIUM 3 MG/3 ML
3 SYRINGE (ML) INTRAVENOUS ONCE
Status: CANCELLED | OUTPATIENT
Start: 2019-12-04

## 2019-12-04 ENCOUNTER — INFUSION THERAPY VISIT (OUTPATIENT)
Dept: INFUSION THERAPY | Facility: CLINIC | Age: 69
End: 2019-12-04
Attending: OPHTHALMOLOGY
Payer: MEDICARE

## 2019-12-04 VITALS
WEIGHT: 142.1 LBS | RESPIRATION RATE: 16 BRPM | BODY MASS INDEX: 24.26 KG/M2 | TEMPERATURE: 98.6 F | HEIGHT: 64 IN | HEART RATE: 72 BPM | DIASTOLIC BLOOD PRESSURE: 76 MMHG | SYSTOLIC BLOOD PRESSURE: 133 MMHG

## 2019-12-04 DIAGNOSIS — M81.0 OSTEOPOROSIS, UNSPECIFIED OSTEOPOROSIS TYPE, UNSPECIFIED PATHOLOGICAL FRACTURE PRESENCE: ICD-10-CM

## 2019-12-04 DIAGNOSIS — Z92.89 PERSONAL HISTORY OF OTHER MEDICAL TREATMENT: ICD-10-CM

## 2019-12-04 DIAGNOSIS — M06.4 INFLAMMATORY POLYARTHROPATHY (H): ICD-10-CM

## 2019-12-04 DIAGNOSIS — Z79.899 HIGH RISK MEDICATION USE: ICD-10-CM

## 2019-12-04 DIAGNOSIS — M81.0 AGE RELATED OSTEOPOROSIS, UNSPECIFIED PATHOLOGICAL FRACTURE PRESENCE: Primary | ICD-10-CM

## 2019-12-04 LAB
ALBUMIN SERPL-MCNC: 3.8 G/DL (ref 3.4–5)
ALP SERPL-CCNC: 84 U/L (ref 40–150)
ALT SERPL W P-5'-P-CCNC: 22 U/L (ref 0–50)
AST SERPL W P-5'-P-CCNC: 19 U/L (ref 0–45)
BASOPHILS # BLD AUTO: 0 10E9/L (ref 0–0.2)
BASOPHILS NFR BLD AUTO: 0.9 %
BILIRUB DIRECT SERPL-MCNC: 0.1 MG/DL (ref 0–0.2)
BILIRUB SERPL-MCNC: 0.4 MG/DL (ref 0.2–1.3)
CALCIUM SERPL-MCNC: 9.1 MG/DL (ref 8.5–10.1)
CREAT SERPL-MCNC: 0.63 MG/DL (ref 0.52–1.04)
DEPRECATED CALCIDIOL+CALCIFEROL SERPL-MC: 53 UG/L (ref 20–75)
DIFFERENTIAL METHOD BLD: ABNORMAL
EOSINOPHIL NFR BLD AUTO: 7.2 %
ERYTHROCYTE [DISTWIDTH] IN BLOOD BY AUTOMATED COUNT: 12.1 % (ref 10–15)
GFR SERPL CREATININE-BSD FRML MDRD: >90 ML/MIN/{1.73_M2}
HCT VFR BLD AUTO: 43 % (ref 35–47)
HGB BLD-MCNC: 14.3 G/DL (ref 11.7–15.7)
IMM GRANULOCYTES # BLD: 0 10E9/L (ref 0–0.4)
IMM GRANULOCYTES NFR BLD: 0.3 %
LYMPHOCYTES # BLD AUTO: 1.1 10E9/L (ref 0.8–5.3)
LYMPHOCYTES NFR BLD AUTO: 32.4 %
MCH RBC QN AUTO: 30.5 PG (ref 26.5–33)
MCHC RBC AUTO-ENTMCNC: 33.3 G/DL (ref 31.5–36.5)
MCV RBC AUTO: 92 FL (ref 78–100)
MONOCYTES # BLD AUTO: 0.4 10E9/L (ref 0–1.3)
MONOCYTES NFR BLD AUTO: 10.4 %
NEUTROPHILS # BLD AUTO: 1.7 10E9/L (ref 1.6–8.3)
NEUTROPHILS NFR BLD AUTO: 48.8 %
NRBC # BLD AUTO: 0 10*3/UL
NRBC BLD AUTO-RTO: 0 /100
PLATELET # BLD AUTO: 159 10E9/L (ref 150–450)
PROT SERPL-MCNC: 7.1 G/DL (ref 6.8–8.8)
RBC # BLD AUTO: 4.69 10E12/L (ref 3.8–5.2)
WBC # BLD AUTO: 3.5 10E9/L (ref 4–11)

## 2019-12-04 PROCEDURE — 25000128 H RX IP 250 OP 636: Performed by: INTERNAL MEDICINE

## 2019-12-04 PROCEDURE — 82565 ASSAY OF CREATININE: CPT | Performed by: INTERNAL MEDICINE

## 2019-12-04 PROCEDURE — 36415 COLL VENOUS BLD VENIPUNCTURE: CPT | Performed by: INTERNAL MEDICINE

## 2019-12-04 PROCEDURE — 25800030 ZZH RX IP 258 OP 636: Performed by: INTERNAL MEDICINE

## 2019-12-04 PROCEDURE — 82306 VITAMIN D 25 HYDROXY: CPT | Performed by: INTERNAL MEDICINE

## 2019-12-04 PROCEDURE — 96374 THER/PROPH/DIAG INJ IV PUSH: CPT

## 2019-12-04 PROCEDURE — 82310 ASSAY OF CALCIUM: CPT | Performed by: INTERNAL MEDICINE

## 2019-12-04 PROCEDURE — 80076 HEPATIC FUNCTION PANEL: CPT | Performed by: INTERNAL MEDICINE

## 2019-12-04 PROCEDURE — 85025 COMPLETE CBC W/AUTO DIFF WBC: CPT | Performed by: INTERNAL MEDICINE

## 2019-12-04 RX ORDER — IBANDRONATE SODIUM 3 MG/3 ML
3 SYRINGE (ML) INTRAVENOUS ONCE
Status: CANCELLED | OUTPATIENT
Start: 2020-03-03

## 2019-12-04 RX ORDER — IBANDRONATE SODIUM 3 MG/3 ML
3 SYRINGE (ML) INTRAVENOUS ONCE
Status: COMPLETED | OUTPATIENT
Start: 2019-12-04 | End: 2019-12-04

## 2019-12-04 RX ADMIN — SODIUM CHLORIDE 250 ML: 9 INJECTION, SOLUTION INTRAVENOUS at 09:05

## 2019-12-04 RX ADMIN — IBANDRONATE SODIUM 3 MG: 3 INJECTION, SOLUTION INTRAVENOUS at 09:12

## 2019-12-04 ASSESSMENT — MIFFLIN-ST. JEOR: SCORE: 1151.62

## 2019-12-04 ASSESSMENT — PAIN SCALES - GENERAL: PAINLEVEL: MODERATE PAIN (5)

## 2019-12-04 NOTE — PROGRESS NOTES
Infusion Nursing Note:  Niesha Adhikari presents today for [employer].    Patient seen by provider today: No   present during visit today: Not Applicable.    Note: Patient has no new complaints today.    Intravenous Access:  Peripheral IV placed.    Treatment Conditions:  Ca-9.1  Creat-0.63.      Post Infusion Assessment:  Patient tolerated infusion without incident.  Patient observed for 15 minutes post infusion per protocol.  Blood return noted pre and post infusion.  Site patent and intact, free from redness, edema or discomfort.  No evidence of extravasations.  Access discontinued per protocol.       Discharge Plan:   Discharge instructions reviewed with: Patient.  Patient and/or family verbalized understanding of discharge instructions and all questions answered.  Patient discharged in stable condition accompanied by: self.  Departure Mode: Ambulatory.    Daiana Burton RN

## 2019-12-04 NOTE — PATIENT INSTRUCTIONS
Pt to return on 03/04/20 for Boniva. Copies of medication list and upcoming appointments given prior to discharge.

## 2019-12-16 DIAGNOSIS — M81.8 OTHER OSTEOPOROSIS, UNSPECIFIED PATHOLOGICAL FRACTURE PRESENCE: ICD-10-CM

## 2019-12-16 DIAGNOSIS — E55.9 VITAMIN D DEFICIENCY: ICD-10-CM

## 2019-12-17 DIAGNOSIS — E55.9 VITAMIN D DEFICIENCY: ICD-10-CM

## 2019-12-17 DIAGNOSIS — M81.8 OTHER OSTEOPOROSIS, UNSPECIFIED PATHOLOGICAL FRACTURE PRESENCE: ICD-10-CM

## 2019-12-17 RX ORDER — ERGOCALCIFEROL 1.25 MG/1
CAPSULE, LIQUID FILLED ORAL
Qty: 24 CAPSULE | Refills: 1 | Status: SHIPPED | OUTPATIENT
Start: 2019-12-17 | End: 2020-06-04

## 2019-12-17 RX ORDER — ERGOCALCIFEROL 1.25 MG/1
CAPSULE, LIQUID FILLED ORAL
Qty: 24 CAPSULE | Refills: 1 | Status: SHIPPED | OUTPATIENT
Start: 2019-12-17 | End: 2019-12-17

## 2019-12-17 NOTE — TELEPHONE ENCOUNTER
Medication:   Vitamin D2   Prescribed:   92016 Units   Quantity:   24   Refills:   2    No show:     Last office visit:   10/11/2019  Next office visit:   04/24/20  Last labs:   12/04/2019    Dayan Guthrie CMA  12/17/2019  9:50 AM

## 2019-12-17 NOTE — TELEPHONE ENCOUNTER
Rheumatology team: Please call to notify Ms. Adhikari that ergocalciferol has been refilled.  Apolinar Cho MD  12/17/2019 5:47 PM

## 2019-12-18 NOTE — TELEPHONE ENCOUNTER
Called and recommended patient come in for an apt tomorrow for evaluation.  Patient accepted and was scheduled an apt at 8:40 AM.    Landen Richardson RN....12/18/2019 10:20 AM

## 2019-12-18 NOTE — TELEPHONE ENCOUNTER
Contacted Pt, notified Ms. Adhikari that ergocalciferol has been refilled.  Pt agrees and understands, however Pt has stated for 1.5 mths Pt has been experiencing  pain and swelling, and the inability to use her Right hand in the Am's.      Dayan Guthrie, RAJ  12/18/2019  10:01 AM

## 2019-12-19 ENCOUNTER — OFFICE VISIT (OUTPATIENT)
Dept: RHEUMATOLOGY | Facility: CLINIC | Age: 69
End: 2019-12-19
Payer: MEDICARE

## 2019-12-19 VITALS
DIASTOLIC BLOOD PRESSURE: 78 MMHG | HEART RATE: 67 BPM | OXYGEN SATURATION: 99 % | HEIGHT: 64 IN | WEIGHT: 144 LBS | BODY MASS INDEX: 24.59 KG/M2 | SYSTOLIC BLOOD PRESSURE: 136 MMHG | TEMPERATURE: 97.7 F

## 2019-12-19 DIAGNOSIS — M06.09 RHEUMATOID ARTHRITIS OF MULTIPLE SITES WITH NEGATIVE RHEUMATOID FACTOR (H): Primary | ICD-10-CM

## 2019-12-19 DIAGNOSIS — Z79.899 HIGH RISK MEDICATION USE: ICD-10-CM

## 2019-12-19 PROCEDURE — 99214 OFFICE O/P EST MOD 30 MIN: CPT | Performed by: INTERNAL MEDICINE

## 2019-12-19 RX ORDER — HYDROXYCHLOROQUINE SULFATE 200 MG/1
TABLET, FILM COATED ORAL
Qty: 135 TABLET | Refills: 1 | Status: SHIPPED | OUTPATIENT
Start: 2019-12-19 | End: 2020-08-14

## 2019-12-19 RX ORDER — SULFASALAZINE 500 MG/1
500 TABLET, DELAYED RELEASE ORAL 2 TIMES DAILY
Qty: 60 TABLET | Refills: 3 | Status: SHIPPED | OUTPATIENT
Start: 2019-12-19 | End: 2020-01-06

## 2019-12-19 RX ORDER — PREDNISONE 5 MG/1
TABLET ORAL
Qty: 50 TABLET | Refills: 0 | Status: SHIPPED | OUTPATIENT
Start: 2019-12-19 | End: 2020-03-04

## 2019-12-19 ASSESSMENT — MIFFLIN-ST. JEOR: SCORE: 1160.24
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
## 2019-12-19 NOTE — NURSING NOTE
RAPID3 (0-30) Cumulative Score  18.8          RAPID3 Weighted Score (divide #4 by 3 and that is the weighted score)  6.2       Anabella Garcia [facility]  12/19/2019 8:55 AM

## 2020-01-20 DIAGNOSIS — M06.09 RHEUMATOID ARTHRITIS OF MULTIPLE SITES WITH NEGATIVE RHEUMATOID FACTOR (H): ICD-10-CM

## 2020-01-20 DIAGNOSIS — Z79.899 HIGH RISK MEDICATION USE: ICD-10-CM

## 2020-01-20 LAB
ALBUMIN SERPL-MCNC: 3.7 G/DL (ref 3.4–5)
ALP SERPL-CCNC: 75 U/L (ref 40–150)
ALT SERPL W P-5'-P-CCNC: 23 U/L (ref 0–50)
ANION GAP SERPL CALCULATED.3IONS-SCNC: 4 MMOL/L (ref 3–14)
AST SERPL W P-5'-P-CCNC: 25 U/L (ref 0–45)
BASOPHILS # BLD AUTO: 0 10E9/L (ref 0–0.2)
BASOPHILS NFR BLD AUTO: 0 %
BILIRUB DIRECT SERPL-MCNC: 0.1 MG/DL (ref 0–0.2)
BILIRUB SERPL-MCNC: 0.5 MG/DL (ref 0.2–1.3)
BUN SERPL-MCNC: 8 MG/DL (ref 7–30)
CALCIUM SERPL-MCNC: 8.8 MG/DL (ref 8.5–10.1)
CHLORIDE SERPL-SCNC: 104 MMOL/L (ref 94–109)
CO2 SERPL-SCNC: 30 MMOL/L (ref 20–32)
CREAT SERPL-MCNC: 0.62 MG/DL (ref 0.52–1.04)
CRP SERPL-MCNC: <2.9 MG/L (ref 0–8)
DIFFERENTIAL METHOD BLD: ABNORMAL
EOSINOPHIL # BLD AUTO: 0.2 10E9/L (ref 0–0.7)
EOSINOPHIL NFR BLD AUTO: 7.6 %
ERYTHROCYTE [DISTWIDTH] IN BLOOD BY AUTOMATED COUNT: 13.2 % (ref 10–15)
ERYTHROCYTE [SEDIMENTATION RATE] IN BLOOD BY WESTERGREN METHOD: 6 MM/H (ref 0–30)
GFR SERPL CREATININE-BSD FRML MDRD: >90 ML/MIN/{1.73_M2}
GLUCOSE SERPL-MCNC: 75 MG/DL (ref 70–99)
HCT VFR BLD AUTO: 40.5 % (ref 35–47)
HGB BLD-MCNC: 13.2 G/DL (ref 11.7–15.7)
LYMPHOCYTES # BLD AUTO: 1.1 10E9/L (ref 0.8–5.3)
LYMPHOCYTES NFR BLD AUTO: 37.2 %
MCH RBC QN AUTO: 29.9 PG (ref 26.5–33)
MCHC RBC AUTO-ENTMCNC: 32.6 G/DL (ref 31.5–36.5)
MCV RBC AUTO: 92 FL (ref 78–100)
MONOCYTES # BLD AUTO: 0.4 10E9/L (ref 0–1.3)
MONOCYTES NFR BLD AUTO: 13.4 %
NEUTROPHILS # BLD AUTO: 1.2 10E9/L (ref 1.6–8.3)
NEUTROPHILS NFR BLD AUTO: 41.8 %
PLATELET # BLD AUTO: 146 10E9/L (ref 150–450)
POTASSIUM SERPL-SCNC: 4.2 MMOL/L (ref 3.4–5.3)
PROT SERPL-MCNC: 6.7 G/DL (ref 6.8–8.8)
RBC # BLD AUTO: 4.41 10E12/L (ref 3.8–5.2)
SODIUM SERPL-SCNC: 138 MMOL/L (ref 133–144)
WBC # BLD AUTO: 2.9 10E9/L (ref 4–11)

## 2020-01-20 PROCEDURE — 36415 COLL VENOUS BLD VENIPUNCTURE: CPT | Performed by: INTERNAL MEDICINE

## 2020-01-20 PROCEDURE — 80053 COMPREHEN METABOLIC PANEL: CPT | Performed by: INTERNAL MEDICINE

## 2020-01-20 PROCEDURE — 86140 C-REACTIVE PROTEIN: CPT | Performed by: INTERNAL MEDICINE

## 2020-01-20 PROCEDURE — 87522 HEPATITIS C REVRS TRNSCRPJ: CPT | Performed by: INTERNAL MEDICINE

## 2020-01-20 PROCEDURE — 85025 COMPLETE CBC W/AUTO DIFF WBC: CPT | Performed by: INTERNAL MEDICINE

## 2020-01-20 PROCEDURE — 85652 RBC SED RATE AUTOMATED: CPT | Performed by: INTERNAL MEDICINE

## 2020-01-20 PROCEDURE — 82248 BILIRUBIN DIRECT: CPT | Performed by: INTERNAL MEDICINE

## 2020-01-23 LAB
HCV RNA SERPL NAA+PROBE-ACNC: NORMAL [IU]/ML
HCV RNA SERPL NAA+PROBE-LOG IU: NORMAL LOG IU/ML

## 2020-02-06 NOTE — PROGRESS NOTES
Outpatient Physical Therapy Discharge Note     Patient: Niesha Adhikari  : 1950    Beginning/End Dates of Reporting Period:  2019 to 2019    Referring Provider: Dr. Apolinar Cho MD    Therapy Diagnosis: B low back pain, B hip weakness, reduced mobility, weakness     Client Self Report: Patient reports she's been having trouble with her right foot.  She's been woken up around 3 am with throbbing pain - describes feeling a hard ball type sensation in the bottom of her foot that will start throbbing, compares them to contractions during labor (every 2-3 minutes comes back and doesn't stop).  Hasn't taken anything for it, but has stopped doing the foot exercises because she feels something is wrong.  First noticed this the night of - when she woke up with pain.  This was last Thursday-Friday, and then it bothered her all weekend.  She didn't stop running or walking or doing any of her other normal activities, just stopped the foot exercises.      Goals:  Goal Identifier 1   Goal Description Patient will demonstrate independence and compliance with HEP in order to maintain strength after discharge from therapy   Target Date 19   Date Met      Progress:     Goal Identifier 2   Goal Description Patient will report ability to ascend/descend stairs without low back/hip/buttock pain in order to facilitate increased participation with functional activities and ADLs   Target Date 19   Date Met      Progress:     Goal Identifier 3   Goal Description Patient will demonstrate correct technique with lifting 25 lbs in order to prevent future injury    Target Date 19   Date Met      Progress:       Progress Toward Goals:   Not assessed this period.  Patient canceled future appointments and failed to make any more appointments.           Plan:  Discharge from therapy.    Discharge:    Reason for Discharge: Patient has failed to schedule further appointments.    Equipment Issued:  N/A    Discharge Plan: Patient to continue home program.

## 2020-02-06 NOTE — ADDENDUM NOTE
Encounter addended by: Chandni Martin, PT on: 2/6/2020 11:13 AM   Actions taken: Clinical Note Signed, Episode resolved

## 2020-02-17 DIAGNOSIS — M06.09 RHEUMATOID ARTHRITIS OF MULTIPLE SITES WITH NEGATIVE RHEUMATOID FACTOR (H): ICD-10-CM

## 2020-02-17 LAB
ALBUMIN SERPL-MCNC: 3.9 G/DL (ref 3.4–5)
ALP SERPL-CCNC: 82 U/L (ref 40–150)
ALT SERPL W P-5'-P-CCNC: 25 U/L (ref 0–50)
AST SERPL W P-5'-P-CCNC: 27 U/L (ref 0–45)
BASOPHILS # BLD AUTO: 0 10E9/L (ref 0–0.2)
BASOPHILS NFR BLD AUTO: 0.5 %
BILIRUB DIRECT SERPL-MCNC: 0.1 MG/DL (ref 0–0.2)
BILIRUB SERPL-MCNC: 0.4 MG/DL (ref 0.2–1.3)
CREAT SERPL-MCNC: 0.63 MG/DL (ref 0.52–1.04)
DIFFERENTIAL METHOD BLD: ABNORMAL
EOSINOPHIL # BLD AUTO: 0.2 10E9/L (ref 0–0.7)
EOSINOPHIL NFR BLD AUTO: 4.7 %
ERYTHROCYTE [DISTWIDTH] IN BLOOD BY AUTOMATED COUNT: 13.4 % (ref 10–15)
GFR SERPL CREATININE-BSD FRML MDRD: >90 ML/MIN/{1.73_M2}
HCT VFR BLD AUTO: 42.8 % (ref 35–47)
HGB BLD-MCNC: 14.3 G/DL (ref 11.7–15.7)
LYMPHOCYTES # BLD AUTO: 1.4 10E9/L (ref 0.8–5.3)
LYMPHOCYTES NFR BLD AUTO: 35.7 %
MCH RBC QN AUTO: 30.4 PG (ref 26.5–33)
MCHC RBC AUTO-ENTMCNC: 33.4 G/DL (ref 31.5–36.5)
MCV RBC AUTO: 91 FL (ref 78–100)
MONOCYTES # BLD AUTO: 0.6 10E9/L (ref 0–1.3)
MONOCYTES NFR BLD AUTO: 14.6 %
NEUTROPHILS # BLD AUTO: 1.7 10E9/L (ref 1.6–8.3)
NEUTROPHILS NFR BLD AUTO: 44.5 %
PLATELET # BLD AUTO: 140 10E9/L (ref 150–450)
PROT SERPL-MCNC: 7 G/DL (ref 6.8–8.8)
RBC # BLD AUTO: 4.7 10E12/L (ref 3.8–5.2)
WBC # BLD AUTO: 3.8 10E9/L (ref 4–11)

## 2020-02-17 PROCEDURE — 36415 COLL VENOUS BLD VENIPUNCTURE: CPT | Performed by: INTERNAL MEDICINE

## 2020-02-17 PROCEDURE — 85025 COMPLETE CBC W/AUTO DIFF WBC: CPT | Performed by: INTERNAL MEDICINE

## 2020-02-17 PROCEDURE — 80076 HEPATIC FUNCTION PANEL: CPT | Performed by: INTERNAL MEDICINE

## 2020-02-17 PROCEDURE — 82565 ASSAY OF CREATININE: CPT | Performed by: INTERNAL MEDICINE

## 2020-03-04 ENCOUNTER — INFUSION THERAPY VISIT (OUTPATIENT)
Dept: INFUSION THERAPY | Facility: CLINIC | Age: 70
End: 2020-03-04
Attending: INTERNAL MEDICINE
Payer: MEDICARE

## 2020-03-04 VITALS
WEIGHT: 141 LBS | OXYGEN SATURATION: 99 % | BODY MASS INDEX: 24.59 KG/M2 | SYSTOLIC BLOOD PRESSURE: 125 MMHG | DIASTOLIC BLOOD PRESSURE: 73 MMHG | RESPIRATION RATE: 16 BRPM | HEART RATE: 64 BPM | TEMPERATURE: 98.8 F

## 2020-03-04 DIAGNOSIS — M81.0 OSTEOPOROSIS, UNSPECIFIED OSTEOPOROSIS TYPE, UNSPECIFIED PATHOLOGICAL FRACTURE PRESENCE: ICD-10-CM

## 2020-03-04 DIAGNOSIS — M06.09 RHEUMATOID ARTHRITIS OF MULTIPLE SITES WITH NEGATIVE RHEUMATOID FACTOR (H): ICD-10-CM

## 2020-03-04 DIAGNOSIS — Z92.89 PERSONAL HISTORY OF OTHER MEDICAL TREATMENT: Primary | ICD-10-CM

## 2020-03-04 LAB
BASOPHILS # BLD AUTO: 0 10E9/L (ref 0–0.2)
BASOPHILS NFR BLD AUTO: 0.9 %
CALCIUM SERPL-MCNC: 9.5 MG/DL (ref 8.5–10.1)
CREAT SERPL-MCNC: 0.66 MG/DL (ref 0.52–1.04)
DIFFERENTIAL METHOD BLD: ABNORMAL
EOSINOPHIL NFR BLD AUTO: 3.8 %
ERYTHROCYTE [DISTWIDTH] IN BLOOD BY AUTOMATED COUNT: 12.7 % (ref 10–15)
GFR SERPL CREATININE-BSD FRML MDRD: >90 ML/MIN/{1.73_M2}
HCT VFR BLD AUTO: 42.6 % (ref 35–47)
HGB BLD-MCNC: 14.3 G/DL (ref 11.7–15.7)
IMM GRANULOCYTES # BLD: 0 10E9/L (ref 0–0.4)
IMM GRANULOCYTES NFR BLD: 0.3 %
LYMPHOCYTES # BLD AUTO: 1.2 10E9/L (ref 0.8–5.3)
LYMPHOCYTES NFR BLD AUTO: 38.5 %
MCH RBC QN AUTO: 30.5 PG (ref 26.5–33)
MCHC RBC AUTO-ENTMCNC: 33.6 G/DL (ref 31.5–36.5)
MCV RBC AUTO: 91 FL (ref 78–100)
MONOCYTES # BLD AUTO: 0.4 10E9/L (ref 0–1.3)
MONOCYTES NFR BLD AUTO: 13.6 %
NEUTROPHILS # BLD AUTO: 1.4 10E9/L (ref 1.6–8.3)
NEUTROPHILS NFR BLD AUTO: 42.9 %
NRBC # BLD AUTO: 0 10*3/UL
NRBC BLD AUTO-RTO: 0 /100
PLATELET # BLD AUTO: 160 10E9/L (ref 150–450)
RBC # BLD AUTO: 4.69 10E12/L (ref 3.8–5.2)
WBC # BLD AUTO: 3.2 10E9/L (ref 4–11)

## 2020-03-04 PROCEDURE — 36415 COLL VENOUS BLD VENIPUNCTURE: CPT | Performed by: INTERNAL MEDICINE

## 2020-03-04 PROCEDURE — 25000128 H RX IP 250 OP 636: Performed by: INTERNAL MEDICINE

## 2020-03-04 PROCEDURE — 82310 ASSAY OF CALCIUM: CPT | Performed by: INTERNAL MEDICINE

## 2020-03-04 PROCEDURE — 82565 ASSAY OF CREATININE: CPT | Performed by: INTERNAL MEDICINE

## 2020-03-04 PROCEDURE — 85025 COMPLETE CBC W/AUTO DIFF WBC: CPT | Performed by: INTERNAL MEDICINE

## 2020-03-04 PROCEDURE — 96374 THER/PROPH/DIAG INJ IV PUSH: CPT

## 2020-03-04 RX ORDER — IBANDRONATE SODIUM 3 MG/3 ML
3 SYRINGE (ML) INTRAVENOUS ONCE
Status: COMPLETED | OUTPATIENT
Start: 2020-03-04 | End: 2020-03-04

## 2020-03-04 RX ORDER — IBANDRONATE SODIUM 3 MG/3 ML
3 SYRINGE (ML) INTRAVENOUS ONCE
Status: CANCELLED | OUTPATIENT
Start: 2020-06-01

## 2020-03-04 RX ADMIN — IBANDRONATE SODIUM 3 MG: 3 INJECTION, SOLUTION INTRAVENOUS at 08:24

## 2020-03-04 ASSESSMENT — PAIN SCALES - GENERAL: PAINLEVEL: NO PAIN (0)

## 2020-03-04 NOTE — PROGRESS NOTES
Infusion Nursing Note:  Niesha Adhikari presents today for Boniva.    Patient seen by provider today: No   present during visit today: Not Applicable.    Note: N/A.    Intravenous Access:  Peripheral IV placed.    Treatment Conditions:  Lab Results   Component Value Date     01/20/2020                   Lab Results   Component Value Date    POTASSIUM 4.2 01/20/2020           Lab Results   Component Value Date    MAG 1.7 12/05/2006            Lab Results   Component Value Date    CR 0.66 03/04/2020                   Lab Results   Component Value Date    ELIZABETH 9.5 03/04/2020                Lab Results   Component Value Date    BILITOTAL 0.4 02/17/2020           Lab Results   Component Value Date    ALBUMIN 3.9 02/17/2020                    Lab Results   Component Value Date    ALT 25 02/17/2020           Lab Results   Component Value Date    AST 27 02/17/2020           Post Infusion Assessment:  Patient tolerated injection without incident.  Patient observed for 30 minutes post Boniva per protocol.  No evidence of extravasations.  Access discontinued per protocol.       Discharge Plan:   Discharge instructions reviewed with: Patient.  Patient discharged in stable condition accompanied by: self.  Departure Mode: Ambulatory.    Amisha Cobb RN

## 2020-03-09 NOTE — RESULT ENCOUNTER NOTE
RN: Please call to notify Ms. Adhikari that the neutrophil count is reduced.  Therefore, please advise her to discontinue sulfasalazine and call her pharmacy to cancel any remaining refills.  She will need to come in to discuss alternative treatments.  I have scheduled her at 8:00 AM on Thursday, March 19, 2020 at the [place] location. Please confirm this appointment with her or cancel it and reschedule.     Apolinar Cho MD  3/8/2020 8:53 PM

## 2020-03-17 ENCOUNTER — TELEPHONE (OUTPATIENT)
Dept: RHEUMATOLOGY | Facility: CLINIC | Age: 70
End: 2020-03-17

## 2020-03-17 NOTE — TELEPHONE ENCOUNTER
Pt is coming in for lab work prior to her next appointment.  Please order anything as necessary and place as future.    Thanks  [facility] staff

## 2020-03-18 NOTE — TELEPHONE ENCOUNTER
Called patient and informed her of the message below.  Cancelled her lab apt.    Landen Richardson RN....3/18/2020 11:12 AM

## 2020-03-18 NOTE — TELEPHONE ENCOUNTER
RN: please clarify with Ms. Adhikari and lab: no additional labs prior to appointment.    Apolinar Cho MD  3/18/2020 10:05 AM     100.7

## 2020-04-14 ENCOUNTER — MYC MEDICAL ADVICE (OUTPATIENT)
Dept: RHEUMATOLOGY | Facility: CLINIC | Age: 70
End: 2020-04-14

## 2020-04-24 ENCOUNTER — VIRTUAL VISIT (OUTPATIENT)
Dept: RHEUMATOLOGY | Facility: CLINIC | Age: 70
End: 2020-04-24
Payer: MEDICARE

## 2020-04-24 DIAGNOSIS — M06.09 RHEUMATOID ARTHRITIS OF MULTIPLE SITES WITH NEGATIVE RHEUMATOID FACTOR (H): Primary | ICD-10-CM

## 2020-04-24 DIAGNOSIS — Z79.899 HIGH RISK MEDICATION USE: ICD-10-CM

## 2020-04-24 DIAGNOSIS — R76.8 HEPATITIS B CORE ANTIBODY POSITIVE: ICD-10-CM

## 2020-04-24 PROCEDURE — 99443 ZZC PHYSICIAN TELEPHONE EVALUATION 21-30 MIN: CPT | Performed by: INTERNAL MEDICINE

## 2020-04-24 NOTE — PATIENT INSTRUCTIONS
Please call to schedule a gastroenterology appointment for hepatitis B.  You will need hepatitis B treatment while on a biologic DMARD for your rheumatoid arthritis.  When you are on treatment for hepatitis B then we can start the treatment for rheumatoid arthritis    Gastroenterology phone numbers: [phone], [phone]      When you go for labs, please get the tuberculosis lab and a chest x-ray

## 2020-04-24 NOTE — PROGRESS NOTES
"Niesha Adhikari is a 69 year old female who is being evaluated via a billable telephone visit.      The patient has been notified of following:     \"This telephone visit will be conducted via a call between you and your physician/provider. We have found that certain health care needs can be provided without the need for a physical exam.  This service lets us provide the care you need with a short phone conversation.  If a prescription is necessary we can send it directly to your pharmacy.  If lab work is needed we can place an order for that and you can then stop by our lab to have the test done at a later time.    Telephone visits are billed at different rates depending on your insurance coverage. During this emergency period, for some insurers they may be billed the same as an in-person visit.  Please reach out to your insurance provider with any questions.    If during the course of the call the physician/provider feels a telephone visit is not appropriate, you will not be charged for this service.\"    Patient has given verbal consent for Telephone visit?  Yes        Rheumatology Telephone/Telehealth Visit      Niesha Adhikari MRN# [id number]   YOB: 1950 Age: 69 year old      Date of visit: 4/24/20   PCP: Dr. Tanika Clark  Hepatologist: Dr. Peres at [facility] in [place]  Ophthalmology: Dr. Higgins, [facility]    Chief Complaint   Patient presents with:  Arthritis: RA.    Assessment and Plan     1. Rheumatoid arthritis: Hepatitis C related arthralgias versus rheumatoid arthritis (RF and CCP negative); hepatitis C has since been cleared. Initially with symmetric synovitis on exam and morning stiffness for more than one hour. NSAIDs and Tylenol associated with rash.  She did well with hydroxychloroquine 400 mg daily for a while but more arthritis symptoms so SSZ was added but associated with cytopenia and GI upset so that was stopped.  Avoiding MTX and leflunomide because of " "hepatitis C hx and +HBV core.  Discussed starting Humira and will do so after starting HBV tx.; refer to GI.    - Continue hydroxychloroquine 300 mg daily (Toxicity monitoring up to date; last done on 11/12/18 at the [facility]; reminded her to get yearly eye exams)     # Adalimumab (Humira) Risks and Benefits: The risks and benefits of adalimumab were discussed in detail and the patient verbalized understanding.  The risks discussed include, but are not limited to, the risk for hypersensitivity, anaphylaxis, anaphylactoid reactions, an increased risk for serious infections leading to hospitalization or death, a possible increased risk for lymphoma and other malignancies, a possible worsening of demyelinating diseases, a possible worsening of heart failure, risk for cytopenias, risk for drug induced lupus, possible reactivation of hepatitis B, and possible reactivation of latent tuberculosis.  Subcutaneous injections may result in injection site reactions and/or pain at the site of injection.  The most common adverse reactions are infections, injection site reactions, headache, and rash.  It was discussed that the medication would need to be discontinued if a serious infection develops.  It was discussed that live vaccinations should not be received while using adalimumab or within 30 days prior to starting adalimumab.  I encouraged reviewing the package insert and asking any questions about the medication.      2. Osteoporosis: Previously treated with Fosamax (GERD), PO Boniva (reportedly ineffective), and IV Boniva (reportedly effective). Prolia was being considered by a previous rheumatologist but not used because she wanted \"clearance\" from the patient's gastroenterologist because she has hepatitis C; I do not see a contraindication for Prolia in the setting of hepatitis C. The patient reports that her gastroenterologist reported no contraindication for Prolia either.  She had not been on " osteoporosis treatment for at least 2 years before restarting Boniva.  Boniva was restarted with the first dose given on 12/5/2017.  Plan to recheck DEXA in 12/2019.  - Continue ergocalciferol 50,000 units twice weekly  - Calcium 1200 mg daily  - Continue Boniva 3mg IV every 3 months (she receives at the [facility])    3. Lower back pain and left hip pain: Ms. Adhikari had a CT pelvis on 11/16/2018 that showed severe degenerative changes of the L-spine based on my review.  Left hip does not appear narrowed.  Improves with PT exercises, but often doesn't do them.     4. 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  She had injections by orthopedic surgery in the past with some improvement.  Repeat injections can also be considered in the future if needed. Not an issue today.     5. HBV core ab positive: needs HBV tx prior to starting a bDMARD for her RA.   - GI referral     Ms. Adhikari verbalized agreement with and understanding of the rational for the diagnosis and treatment plan.  All questions were answered to best of my ability and the patient's satisfaction. Ms. Adhikari was advised to contact the clinic with any questions that may arise after the clinic visit.      Thank you for involving me in the care of the patient    Return to clinic: 1-2 months      HPI   Niesha Adhikari is a 69 year old female with a medical history significant for hepatitis C, hemorrhoids, narcolepsy, fibromyalgia, migraines, anxiety, pernicious anemia, GERD, allergic rhinitis, and osteoporosis who presents for follow-up of inflammatory arthritis.    Ms. Adhikari was previously followed in the rheumatology clinic by Dr. Luciano and Dr. Marc.  A 10/29/2015 clinic note documents osteoporosis that was first diagnosed in 2001. Initially she was on Fosamax but was limited because of GERD. Reportedly treatment failure to oral and IV Boniva.  Prolia was being considered but Dr. Luciano documents that she wanted clearance from gastroenterology because of her high hepatitis C viral load.    Regarding hepatitis C, she is followed at the Hepatology Department at [facility] in [place] by Shannon Sher and Dr. Peres.  A letter dated 10/29/2016 from Shannon Sher states that Ms. Adhikari has completed a 12 week course of hepatitis C therapy with [facility] and she is responding to treatment, based on an undetectable hepatitis C viral load at the end of therapy.    Previously, she reported that she was doing well with hydroxychloroquine and today she said she was still tolerating it well with good control of her arthritis.    12/19/2019: Ms. Adhikari came in sooner than previously scheduled because of pain in the right hand PIPs that is worse in the AM and improves with time and activity.  Recalls right 1st CMC joint injection many years ago that helped.  Pain in the right hand is worse in the AM and improves with time and activity.  Morning stiffness for about 2 hours.     Today, 4/24/2020: she says that her entire hand is achy. Trouble gripping things in the AM.  Hand symptoms are worse in the AM.  Stiffness all day; worse in the AM. Numbness and tingling in all fingers, comes and goes; not associated with neck position; can move her fingers in and out and this will iprove.  Note that SSZ was stopped because of cytopenias.     Denies fevers, chills, nausea, vomiting, constipation, diarrhea. No abdominal pain. No chest pain/pressure, palpitations, or shortness of breath. No LE swelling. No neck pain. No oral or nasal sores.  No rash.    Tobacco: quit in 1978  EtOH: none  Drugs: none  Occupation: retired    ROS   GEN: No fevers, chills, night sweats, or weight change  SKIN: No itching, rashes, sores  HEENT: No epistaxis. No oral or nasal ulcers.  CV: No chest pain, pressure, palpitations, or dyspnea on exertion.  PULM: See HPI  GI: No nausea, vomiting,  constipation, diarrhea. No blood in stool. No abdominal pain.  : No blood in urine.  MSK: See HPI.  NEURO: See HPI  EXT: No LE swelling  PSYCH: See history of present illness    Active Problem List     Patient Active Problem List   Diagnosis     Allergic rhinitis     Esophageal reflux     Pernicious anemia     Anxiety state     Migraine     Essential and other specified forms of tremor     Fibromyalgia     Moderate recurrent major depression (H)     Advanced directives, counseling/discussion     Narcolepsy     Internal hemorrhoids with other complication     AIN (anal intraepithelial neoplasia) anal canal     Sciatica     Osteoporosis     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)     Benign essential hypertension     Alcohol dependence in remission (H)     Personal history of other medical treatment     Nausea     Left hip pain     Constipation     DDD (degenerative disc disease), cervical     Past Medical History     Past Medical History:   Diagnosis Date     ABUSE BY SPOUSE/PARTNER 7/27/2005     Degeneration of lumbar or lumbosacral intervertebral disc     DDD L5/S1     HELICOBACTER PYLORI INFECTION 1/28/2005     Hepatitis C      Hypertension      Malignant neoplasm (H)     ACIN     Osteoporosis      Other and unspecified alcohol dependence, unspecified drinking behavior     Sober as 1/21/1987     Other malaise and fatigue      Past Surgical History     Past Surgical History:   Procedure Laterality Date     BIOPSY ANAL CANAL  1/21/13    [facility]      BREAST BIOPSY, RT/LT Left 1975    Breat Biopsy RT/LT     C NONSPECIFIC PROCEDURE  1965    Removed bone left index finger knuckle, casts broken bones     COLONOSCOPY  8/25/2009     COLONOSCOPY  2/14/2011    COLONOSCOPY performed by CRISTIN LAGUNAS at  GI     COLONOSCOPY N/A 1/8/2019    Procedure: Colonscopy, Biopsies by Biopsy;  Surgeon: Omega Talavera MD;  Location:  GI     CYSTOSCOPY  2/28/2011    CYSTOSCOPY  "performed by CAYLA FLOR at  OR     ENDOSCOPY  05/21/12    Upper GI - [facility] COLONOSCOPY W/WO BRUSH/WASH  08/22/05      UGI ENDOSCOPY DIAG W BIOPSY  10/01/09      UGI ENDOSCOPY, SIMPLE EXAM  08/08/07     HEMORRHOIDECTOMY  06/25/12    [facility]     LAPAROSCOPIC SALPINGO-OOPHORECTOMY  2/28/2011    LAPAROSCOPIC SALPINGO-OOPHORECTOMY performed by CAYLA FLOR at  OR     TONSILLECTOMY & ADENOIDECTOMY  1965     Allergy     Allergies   Allergen Reactions     Telaprevir Other (See Comments) and Rash     Rectal bleeding, anemia     Abilify Discmelt Other (See Comments)     Disoriented     Antivert [Meclizine Hcl]      Chamomile      Compazine      Cymbalta Other (See Comments)     Disoriented, trouble sleeping     Diphenhydramine Nausea     And abdominal pain     Effexor [Venlafaxine] Other (See Comments)     Disoriented, trouble sleeping     Elavil [Amitriptyline Hcl] Other (See Comments)     \"didn't feel right on it-med was stopped right away\"     Ferrous Sulfate Nausea and Vomiting     Food Difficulty breathing     cilantro     Indomethacin      indocin sensativity \"Severe h.a\"     Seasonal Allergies Other (See Comments) and Difficulty breathing     Philip Gold Aug-Sept, rag weed, sneezing     Sulfa Drugs      Thiopental Sodium      PENTOTHAL/rigidity and fight response     Animal Dander Difficulty breathing and Rash     sneezing,resp. distress     Bupropion Anxiety     Tylenol [Acetaminophen] Rash     Current Medication List     Current Outpatient Medications   Medication Sig     clotrimazole (CLOTRIMAZOLE ANTI-FUNGAL) 1 % external cream APPLY TOPICALLY TWO TIMES A DAY     cycloSPORINE (RESTASIS) 0.05 % ophthalmic emulsion Place 1 drop into both eyes 2 times daily      hydroxychloroquine (PLAQUENIL) 200 MG tablet Hydroxychloroquine 200mg daily; and an additional 200mg every other day.     hydrOXYzine (ATARAX) 10 MG tablet      IBANdronate (BONIVA) 150 MG tablet Inject 3 " mg into the vein every 3 months      lisdexamfetamine (VYVANSE) 20 MG capsule Take 30 mg by mouth every other day      lisinopril (PRINIVIL/ZESTRIL) 10 MG tablet Take 1 tablet (10 mg) by mouth daily     Modafinil (PROVIGIL PO) Take 100 mg by mouth Takes 1 1/2 tabs bid (150 mg bid) morning and noon     montelukast (SINGULAIR) 10 MG tablet TAKE ONE TABLET BY MOUTH EVERY DAY AS NEEDED SEASONALLY (AUGUST AND SEPTEMBER)     nystatin (MYCOSTATIN) 825207 UNIT/GM external powder Apply topically 3 times daily as needed     polyethylene glycol (MIRALAX) powder      Psyllium (FIBER) 0.52 G CAPS 2 tabs in am and pm     vitamin D2 (ERGOCALCIFEROL) 40276 units (1250 mcg) capsule Take 1 capsule (50,000 Units) by mouth every Monday and Friday.     No current facility-administered medications for this visit.          Social History   See HPI    Family History     Family History   Problem Relation Age of Onset     Hypertension Mother      Breast Cancer Mother      Coronary Artery Disease Mother      Cerebrovascular Disease Mother      Kidney Disease Mother      Hypertension Brother      Respiratory Brother         emphysema     Lipids Brother      Heart Disease Brother         stents, 12/2011; has had about 6 MIs, last one 1/2014     C.A.D. Sister         MI at age 63     Hypertension Sister      Gastrointestinal Disease Sister         gallbladder     Circulatory Sister         brain aneurysm at 63     Genitourinary Problems Sister         1 kidney/bladder     Hypertension Sister      Obesity Sister      Coronary Artery Disease Sister         had valve surgery, MI, CHF     Unknown/Adopted Paternal Uncle      Blood Disease Son         Lymes/7/11     Hypertension Son      Hypertension Father      Lymphoma Father      Glaucoma Father      Coronary Artery Disease Other 49        niece     Diabetes Other         cousin     Cerebrovascular Disease Maternal Grandmother      Cerebrovascular Disease Paternal Grandmother      Liver Cancer  "Cousin      Glaucoma Paternal Grandfather      Physical Exam     Temp Readings from Last 3 Encounters:   03/04/20 98.8  F (37.1  C) (Temporal)   12/19/19 97.7  F (36.5  C) (Oral)   12/04/19 98.6  F (37  C) (Temporal)     BP Readings from Last 5 Encounters:   03/04/20 125/73   12/19/19 136/78   12/04/19 133/76   11/07/19 (!) 149/82   10/11/19 130/72     Pulse Readings from Last 1 Encounters:   03/04/20 64     Resp Readings from Last 1 Encounters:   03/04/20 16     Estimated body mass index is 24.59 kg/m  as calculated from the following:    Height as of 12/19/19: 1.613 m (5' 3.5\").    Weight as of 3/4/20: 64 kg (141 lb).    Telephone visit    Labs / Imaging (select studies)   RF/CCP  Recent Labs   Lab Test 06/07/17  0955   CCPIGG 1   RHF <20     CBC  Recent Labs   Lab Test 12/04/19  0823 08/09/19  1228 12/14/18  0753 09/26/18  0836  03/12/18  1605 08/30/17  1028   WBC 3.5* 5.1 3.9* 4.0   < > 4.0 3.8*   RBC 4.69 4.72 4.51 4.52   < > 4.46 4.51   HGB 14.3 14.5 13.7 13.8   < > 13.8 14.1   HCT 43.0 42.9 40.9 42.3   < > 40.5 40.5   MCV 92 91 91 94   < > 91 90   RDW 12.1 12.6 13.0 12.9   < > 13.1 13.8    147* 150 149*   < > 152 154   MCH 30.5 30.7 30.4 30.5   < > 30.9 31.3   MCHC 33.3 33.8 33.5 32.6   < > 34.1 34.8   NEUTROPHIL 48.8  --   --  52.8  --  55.1 60.9   LYMPH 32.4  --   --  29.5  --  31.0 27.6   MONOCYTE 10.4  --   --  9.6  --  10.8 8.4   EOSINOPHIL 7.2  --   --  7.6  --  2.8 2.6   BASOPHIL 0.9  --   --  0.5  --  0.3 0.5   ANEU 1.7  --   --  2.1  --  2.2 2.3   ALYM 1.1  --   --  1.2  --  1.2 1.1   FREDY 0.4  --   --  0.4  --  0.4 0.3   AEOS  --   --   --  0.3  --  0.1 0.1   ABAS 0.0  --   --  0.0  --  0.0 0.0    < > = values in this interval not displayed.     CMP  Recent Labs   Lab Test 12/04/19  0823 09/06/19  0757 08/09/19  1228  04/12/19  1109  12/14/18  0753  09/26/18  0836   NA  --   --  142  --   --   --  140  --  139   POTASSIUM  --   --  4.4  --   --   --  4.7  --  4.7   CHLORIDE  --   --  102  --  "  --   --  105  --  102   CO2  --   --  30  --   --   --  30  --  31   ANIONGAP  --   --  10  --   --   --  5  --  6   GLC  --   --  86  --   --   --  84  --  83   BUN  --   --  11  --   --   --  11  --  13   CR 0.63 0.64 0.58   < >  --    < > 0.66   < > 0.41*   GFRESTIMATED >90 >90 >90   < >  --    < > >90   < > >90   GFRESTBLACK >90 >90 >90   < >  --    < > >90   < > >90   ELIZABETH 9.1 8.9 9.5   < >  --    < > 9.2   < > 8.8   BILITOTAL 0.4  --   --   --  0.5  --   --   --  0.4   ALBUMIN 3.8 3.8  --   --  4.1  --   --    < > 3.7   PROTTOTAL 7.1  --   --   --  7.2  --   --   --  7.3   ALKPHOS 84  --   --   --  74  --   --   --  92   AST 19  --   --   --  30  --   --   --  23   ALT 22  --   --   --  37  --   --   --  25    < > = values in this interval not displayed.     Calcium/VitaminD  Recent Labs   Lab Test 12/04/19 0823 09/06/19  0757 08/09/19  1228  04/12/19  1109  09/26/18  0836   ELIZABETH 9.1 8.9 9.5   < >  --    < > 8.8   VITDT 53  --   --   --  55  --  46    < > = values in this interval not displayed.     ESR/CRP  Recent Labs   Lab Test 06/30/17  0925   SED 6   CRP <2.9     Lipid Panel  Recent Labs   Lab Test 08/09/19  1228 11/25/16  1217 10/12/11  1110   CHOL 209* 276* 174   TRIG 74 88 71   HDL 82 65 58   * 193* 102   VLDL  --   --  14   CHOLHDLRATIO  --   --  3.0   NHDL 127 211*  --      Hepatitis B  Recent Labs   Lab Test 06/30/17  0925 06/07/17  0955 09/30/15  0951   AUSAB  --  0.65  --    HBCAB  --  Reactive   A reactive result indicates acute, chronic or past/resolved hepatitis B   infection.  *  --    HBCM  --  Nonreactive   A nonreactive result suggests lack of recent exposure to the virus in the   preceding 6 months.    --    HEPBANG  --  Nonreactive Nonreactive   HBQLOG Not Calculated  --   --      Hepatitis C  Recent Labs   Lab Test 06/07/17  0955 09/30/15  0951 02/06/15  0958  10/12/11  1110   HCVAB Reactive   A reactive result indicates one of the following 1) current HCV infection 2)   past HCV  infection that has resolved or 3) false positivity. The CDC recommends   that a reactive result should be followed by Nucleic acid testing for HCV RNA.  If HCV RNA is detected, that indicates current HCV infection. If HCV RNA is not   detected, that indicates either past, resolved HCV infection, or false HCV   antibody positivity.   Assay performance characteristics have not been established for newborns,   infants, and children  * Reactive   A reactive result indicates one of the following 1) current HCV infection 2)   past HCV infection that has resolved or 3) false positivity. The CDC recommends   that a reactive result should be followed by Nucleic acid testing for HCV RNA.  If HCV RNA is detected, that indicates current HCV infection. If HCV RNA is not   detected, that indicates either past, resolved HCV infection, or false HCV   antibody positivity.   Assay performance characteristics have not been established for newborns,   infants, and children  *  --   --  Positive  High sample/cutoff ratio, confirmatory testing available.*   HCVRNA HCV RNA Not Detected   The [employer]O AmpliPrep/LUIS ARMANDO TaqMan HCV Test is an FDA-approved in vitro nucleic   acid amplification test for the quantitation of HCV RNA in human plasma (ETDA   plasma) or serum using the [employer]O AmpliPrep Instrument for automated viral   nucleic acid extraction and the [employer]ANDO TaqMan Analyzer or [employer] ARMANDO TaqMan for   automated Real Time PCR amplification and detection of the viral nucleic acid   target.   Titer results are reported in International Units/mL (IU/mL) using the 1st WHO   International standard for HCV for Nucleic Acid Amplification based assays.   578,544* 236,200*   < >  --     < > = values in this interval not displayed.     Lyme ab screening  Recent Labs   Lab Test 08/09/19  1228 05/11/17  0830 04/12/17  1211   LYMEGM 0.05 <0.01  Negative, Absence of detectable Borrelia burdorferi antibodies. A negative   result does not exclude the possibility  of Borrelia burgdorferi infection. If   early Lyme disease is suspected, a second sample should be collected and tested   2 to 4 weeks later.   <0.01  Negative, Absence of detectable Borrelia burdorferi antibodies. A negative   result does not exclude the possibility of Borrelia burgdorferi infection. If   early Lyme disease is suspected, a second sample should be collected and tested   2 to 4 weeks later.       Tuberculosis Screening  Recent Labs   Lab Test 09/30/15  0952   TBRSLT Negative   TBAGN 0.05       Immunization History     Immunization History   Administered Date(s) Administered     HEPA 04/18/2000, 09/26/2000     HPV 08/13/2012, 09/25/2012, 01/24/2013     HepB 11/15/2011, 12/19/2011     Influenza (H1N1) 01/07/2010     Influenza (High Dose) 3 valent vaccine 08/30/2018, 09/26/2019     Influenza (IIV3) PF 01/03/2005, 10/16/2006, 11/14/2007, 10/28/2008, 09/29/2009, 09/27/2010, 10/04/2011, 09/25/2012, 09/21/2015     Influenza Vaccine IM > 6 months Valent IIV4 09/26/2013, 10/06/2014, 10/06/2016, 09/08/2017     Pneumo Conj 13-V (2010&after) 10/06/2016     Pneumococcal 23 valent 11/15/2011, 10/17/2017     TD (ADULT, 7+) 04/18/2000, 07/07/2004     TDAP Vaccine (Boostrix) 09/21/2015     Tdap (Adacel,Boostrix) 05/23/2006     Zoster vaccine recombinant adjuvanted (SHINGRIX) 06/14/2018, 08/24/2018     Zoster vaccine, live 09/21/2015          Chart documentation done in part with Dragon Voice recognition Software. Although reviewed after completion, some word and grammatical error may remain.    Phone call start time: 9:51 AM  Phone call end time: 10:12 AM    This visit is equivalent to a [id number] visit    Location of patient: home  Location of provider: home    Follow up:  follow up appointment scheduled to be in late may    Apolinar Cho MD  4/24/2020

## 2020-04-28 ENCOUNTER — MYC MEDICAL ADVICE (OUTPATIENT)
Dept: FAMILY MEDICINE | Facility: OTHER | Age: 70
End: 2020-04-28

## 2020-04-28 ENCOUNTER — VIRTUAL VISIT (OUTPATIENT)
Dept: FAMILY MEDICINE | Facility: OTHER | Age: 70
End: 2020-04-28
Payer: MEDICARE
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
## 2020-04-28 PROCEDURE — 99213 OFFICE O/P EST LOW 20 MIN: CPT | Mod: 95 | Performed by: FAMILY MEDICINE

## 2020-04-28 RX ORDER — DOXYCYCLINE 100 MG/1
100 TABLET ORAL 2 TIMES DAILY
Qty: 20 TABLET | Refills: 0 | Status: SHIPPED | OUTPATIENT
Start: 2020-04-28 | End: 2020-05-08

## 2020-04-28 NOTE — PROGRESS NOTES
"Niesha Adhikari is a 69 year old female who is being evaluated via a billable video visit.      The patient has been notified of following:     \"This video visit will be conducted via a call between you and your physician/provider. We have found that certain health care needs can be provided without the need for an in-person physical exam.  This service lets us provide the care you need with a video conversation.  If a prescription is necessary we can send it directly to your pharmacy.  If lab work is needed we can place an order for that and you can then stop by our lab to have the test done at a later time.    Video visits are billed at different rates depending on your insurance coverage.  Please reach out to your insurance provider with any questions.    If during the course of the call the physician/provider feels a video visit is not appropriate, you will not be charged for this service.\"    Patient has given verbal consent for Video visit? Yes    How would you like to obtain your AVS? NandiniKiln    Patient would like the video invitation sent by: Text to cell phone: [phone]     Will anyone else be joining your video visit? No      Subjective     Niesha Adhikari is a 69 year old female who presents to clinic today for the following health issues:    HPI     Tick Bite  Onset: Unsure     Description:   Location: right side  Character: raised, painful, red - \"extremely red, has a ring around it\"  Itching (Pruritis): no     Progression of Symptoms:  same    Accompanying Signs & Symptoms:  Fever: no   Body aches or joint pain: no   Sore throat symptoms: no   Recent cold symptoms: no     Precipitating factors:   Exposure to similar rash: no   New exposures: Deer Tick   Recent travel: no     Alleviating factors:  no    Therapies Tried and outcome: nothing     Her son pulled the tick off of her as it was in place she can get to easily.  She states it is quite tender.  They both stated it was a deer " tick.    Video Start Time: 2:33      Patient Active Problem List   Diagnosis     Allergic rhinitis     Esophageal reflux     Pernicious anemia     Anxiety state     Migraine     Essential and other specified forms of tremor     Fibromyalgia     Moderate recurrent major depression (H)     Advanced directives, counseling/discussion     Narcolepsy     Internal hemorrhoids with other complication     AIN (anal intraepithelial neoplasia) anal canal     Sciatica     Osteoporosis     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)     Benign essential hypertension     Alcohol dependence in remission (H)     Personal history of other medical treatment     Nausea     Left hip pain     Constipation     DDD (degenerative disc disease), cervical     Past Surgical History:   Procedure Laterality Date     BIOPSY ANAL CANAL  1/21/13    [facility]      BREAST BIOPSY, RT/LT Left 1975    Breat Biopsy RT/LT     C NONSPECIFIC PROCEDURE  1965    Removed bone left index finger knuckle, casts broken bones     COLONOSCOPY  8/25/2009     COLONOSCOPY  2/14/2011    COLONOSCOPY performed by CRISTIN LAGUNAS at  GI     COLONOSCOPY N/A 1/8/2019    Procedure: Colonscopy, Biopsies by Biopsy;  Surgeon: Omega Talavera MD;  Location:  GI     CYSTOSCOPY  2/28/2011    CYSTOSCOPY performed by CAYLA FLOR at  OR     ENDOSCOPY  05/21/12    Upper GI - [facility]     HC COLONOSCOPY W/WO BRUSH/WASH  08/22/05     HC UGI ENDOSCOPY DIAG W BIOPSY  10/01/09      UGI ENDOSCOPY, SIMPLE EXAM  08/08/07     HEMORRHOIDECTOMY  06/25/12    [facility]     LAPAROSCOPIC SALPINGO-OOPHORECTOMY  2/28/2011    LAPAROSCOPIC SALPINGO-OOPHORECTOMY performed by CAYLA FLOR at  OR     TONSILLECTOMY & ADENOIDECTOMY  1965       Social History     Tobacco Use     Smoking status: Former Smoker     Packs/day: 0.75     Years: 10.00     Pack years: 7.50     Types: Cigarettes     Start date: 11/1/1968     Last  "attempt to quit: 1978     Years since quittin.5     Smokeless tobacco: Never Used     Tobacco comment: No exposure at home   Substance Use Topics     Alcohol use: No     Alcohol/week: 0.0 standard drinks     Family History   Problem Relation Age of Onset     Hypertension Mother      Breast Cancer Mother      Coronary Artery Disease Mother      Cerebrovascular Disease Mother      Kidney Disease Mother      Hypertension Brother      Respiratory Brother         emphysema     Lipids Brother      Heart Disease Brother         stents, 2011; has had about 6 MIs, last one 2014     C.A.D. Sister         MI at age 63     Hypertension Sister      Gastrointestinal Disease Sister         gallbladder     Circulatory Sister         brain aneurysm at 63     Genitourinary Problems Sister         1 kidney/bladder     Hypertension Sister      Obesity Sister      Coronary Artery Disease Sister         had valve surgery, MI, CHF     Unknown/Adopted Paternal Uncle      Blood Disease Son         Lymes/     Hypertension Son      Hypertension Father      Lymphoma Father      Glaucoma Father      Coronary Artery Disease Other 49        niece     Diabetes Other         cousin     Cerebrovascular Disease Maternal Grandmother      Cerebrovascular Disease Paternal Grandmother      Liver Cancer Cousin      Glaucoma Paternal Grandfather            Reviewed and updated as needed this visit by Provider  Tobacco  Allergies  Meds  Problems  Med Hx  Surg Hx  Fam Hx         Review of Systems    CONSTITUTIONAL: NEGATIVE for fever, chills, change in weight  RESP: NEGATIVE for significant cough or shortness of breath   CV: NEGATIVE for chest pain, palpitations or peripheral edema      Objective    LMP 2003   Estimated body mass index is 24.59 kg/m  as calculated from the following:    Height as of 19: 1.613 m (5' 3.5\").    Weight as of 3/4/20: 64 kg (141 lb).  Physical Exam     GENERAL: healthy, alert and no " distress  EYES: Eyes grossly normal to inspection, conjunctivae and sclerae normal  RESP: no audible wheeze, cough, or visible cyanosis.  No visible retractions or increased work of breathing.  Able to speak fully in complete sentences.  SKIN: Right side there is a darker red area of about 1 to 2 mm surrounded by 6 mm diameter redness.  It appears puffy.  NEURO: Cranial nerves grossly intact, mentation intact and speech normal  PSYCH: mentation appears normal, affect normal/bright, judgement and insight intact, normal speech and appearance well-groomed        Assessment & Plan     1. Tick bite, initial encounter  Tick was present for less than 24 hours but the area is red and tender.  Suspect she has a cellulitis from the bite.  Will treat with doxycycline for 10 days.    - doxycycline monohydrate (ADOXA) 100 MG tablet; Take 1 tablet (100 mg) by mouth 2 times daily for 10 days  Dispense: 20 tablet; Refill: 0       Tanika Clark MD  [facility]      Video-Visit Details    Type of service:  Video Visit    Video End Time:2:41    Originating Location (pt. Location): Home    Distant Location (provider location):  [facility]     Mode of Communication:  Video Conference via [employer]    No follow-ups on file.       Tanika Clark MD

## 2020-04-28 NOTE — PROGRESS NOTES
"Niesha Adhikari is a 69 year old female who is being evaluated via a billable telephone visit.      The patient has been notified of following:     \"This telephone visit will be conducted via a call between you and your physician/provider. We have found that certain health care needs can be provided without the need for a physical exam.  This service lets us provide the care you need with a short phone conversation.  If a prescription is necessary we can send it directly to your pharmacy.  If lab work is needed we can place an order for that and you can then stop by our lab to have the test done at a later time.    Telephone visits are billed at different rates depending on your insurance coverage. During this emergency period, for some insurers they may be billed the same as an in-person visit.  Please reach out to your insurance provider with any questions.    If during the course of the call the physician/provider feels a telephone visit is not appropriate, you will not be charged for this service.\"    Patient has given verbal consent for Telephone visit?  Yes    How would you like to obtain your AVS? MyChart    Subjective     Niesha Adhikari is a 69 year old female who presents to clinic today for the following health issues:    HPI     Tick Bite  Onset: Unsure     Description:   Location: right side  Character: raised, painful, red - \"extremely red, has a ring around it\"  Itching (Pruritis): no     Progression of Symptoms:  same    Accompanying Signs & Symptoms:  Fever: no   Body aches or joint pain: no   Sore throat symptoms: no   Recent cold symptoms: no     Precipitating factors:   Exposure to similar rash: no   New exposures: Deer Tick   Recent travel: no     Alleviating factors:  no    Therapies Tried and outcome: nothing       {PEDS Chronic and Acute Problems (Optional):349956}     {additonal problems for provider to add (Optional):744034}    {HIST REVIEW/ LINKS 2 (Optional):797787}    Reviewed and " "updated as needed this visit by Provider         Review of Systems   {ROS COMP (Optional):080470}       Objective   Reported vitals:  LMP 11/27/2003    {GENERAL APPEARANCE:50::\"healthy\",\"alert\",\"no distress\"}  PSYCH: Alert and oriented times 3; coherent speech, normal   rate and volume, able to articulate logical thoughts, able   to abstract reason, no tangential thoughts, no hallucinations   or delusions  Her affect is { :3511085::\"normal\"}  RESP: No cough, no audible wheezing, able to talk in full sentences  Remainder of exam unable to be completed due to telephone visits    {Diagnostic Test Results (Optional):168988::\"Diagnostic Test Results:\",\"Labs reviewed in Epic\"}        Assessment/Plan:  {Diagnosis, Associated Orders and Comment:056614}    No follow-ups on file.      Phone call duration:  *** minutes    {signature options:106302}        "

## 2020-04-29 NOTE — TELEPHONE ENCOUNTER
RECORDS RECEIVED FROM: Internal - Hep B/ per pt   Appt Date: 05.04.2020   NOTES STATUS DETAILS   OFFICE NOTE from referring provider Internal 04.24.2020  Consult Apolinar Cho MD [facility]   OFFICE NOTES from other specialists N/A    DISCHARGE SUMMARY from hospital N/A    MEDICATION LIST Internal    LIVER BIOSPY (IF APPLICABLE)      PATHOLOGY REPORTS  N/A    IMAGING     ENDOSCOPY (IF AVAILABLE) N/A    COLONOSCOPY (IF AVAILABLE) Internal 01.08.2019   ULTRASOUND LIVER N/A    CT OF ABDOMEN N/A    MRI OF LIVER N/A    FIBROSCAN, US ELASTOGRAPHY, FIBROSIS SCAN, MR ELASTOGRAPHY N/A    LABS     HEPATIC PANEL (LIVER PANEL) Internal 02.17.2020   BASIC METABOLIC PANEL Internal 08.09.2019   COMPLETE METABOLIC PANEL N/A    COMPLETE BLOOD COUNT (CBC) Internal 03.04.2020   INTERNATIONAL NORMALIZED RATIO (INR) N/A    HEPATITIS C ANTIBODY N/A    HEPATITIS C VIRAL LOAD/PCR N/A    HEPATITIS C GENOTYPE N/A    HEPATITIS B SURFACE ANTIGEN Internal 06.07.2017   HEPATITIS B SURFACE ANTIBODY Internal 06.07.2017   HEPATITIS B DNA QUANT LEVEL N/A    HEPATITIS B CORE ANTIBODY Internal 06.07.2017

## 2020-05-04 ENCOUNTER — PRE VISIT (OUTPATIENT)
Dept: GASTROENTEROLOGY | Facility: CLINIC | Age: 70
End: 2020-05-04

## 2020-05-04 ENCOUNTER — VIRTUAL VISIT (OUTPATIENT)
Dept: GASTROENTEROLOGY | Facility: CLINIC | Age: 70
End: 2020-05-04
Attending: PHYSICIAN ASSISTANT
Payer: MEDICARE

## 2020-05-04 DIAGNOSIS — Z11.59 ENCOUNTER FOR SCREENING FOR OTHER VIRAL DISEASES: ICD-10-CM

## 2020-05-04 DIAGNOSIS — R76.8 HEPATITIS B CORE ANTIBODY POSITIVE: ICD-10-CM

## 2020-05-04 RX ORDER — NAPROXEN SODIUM 220 MG
220-440 TABLET ORAL DAILY PRN
COMMUNITY
End: 2024-09-04

## 2020-05-04 RX ORDER — TENOFOVIR DISOPROXIL FUMARATE 300 MG/1
300 TABLET, FILM COATED ORAL DAILY
Qty: 30 TABLET | Refills: 11 | Status: SHIPPED | OUTPATIENT
Start: 2020-05-04 | End: 2021-05-07

## 2020-05-04 ASSESSMENT — PAIN SCALES - GENERAL: PAINLEVEL: MODERATE PAIN (5)

## 2020-05-04 NOTE — LETTER
5/4/2020       RE: Niesha Adhikari  [address] [facility] [address]     Dear Colleague,    Thank you for referring your patient, Niesha Adhikari, to the [facility] HEPATOLOGY at [facility]. Please see a copy of my visit note below.    Hepatology Clinic note  Niesha Adhikari   Date of Birth 1950  Date of Service 5/3/2020    REASON FOR CONSULTATION: Positive Hep B core antibody   REFERRING PROVIDER: Apolinar Cho         Assessment/plan:   Niesha Adhikari is a 69 year old female with history of chronic hepatitis C, who achieved SVR in 2017 with Harvoni and also history of positive Hepatitis B core antibody and low Hep B surface antibody in the setting of potential biology therapy for rheumatoid arthritis. We discussed the moderate potential for Hepatitis B reactivation with Humira and recommendation to start a daily Hep B antiviral to prevent reactivation. No risk of Hepatitis C reactivation. Liver biopsy in 2012 did not show any significant fibrosis. She has low normal platelets, otherwise normal liver function.     # Start Hepatitis B reactivation prophylaxis: tenofovir  mg daily when starting Humira   # Fibrosis scan in the future   # History of AIN III:   - recommend routine follow up with color-rectal specialist for routine surveillance   # Follow-up in clinic in six months or sooner as needed    Tristan Chua PA-C   [facility] Hepatology     -----------------------------------------------------       HPI:   Niesha Adhikari is a 69 year old female  presenting for the evaluation of positive Hep B core antibody.     Hep B core antibody, surface antibody   - Risk factors for reactivation: possible Humira start    History of Hep C, achieved SVR in 2017  Genotype 1  Liver biopsy: 2012, Stage 1   - Telapravir/PEG interferon + Ribavirin stopped due to skin reaction  - Tx: Harvoni x 12 weeks, achieved SVR    Patient was diagnosed with  Hepatitis B in the 1980's. She was also diagnosed with Hepatitis C. She had Telapravir in 2013 and developed a severe skin reaction and it was discontinued. She then had Hep C treatment with Harvoni x 3 months and achieved SVR om in 2017. She has history of positive Hepatitis B core antibody and low Hepatitis B surface antibody of 0.65 in 2017. Outside records show liver biopsy showing Stage 1 fibrosis in 2012.  She may been starting Humira for rheumatoid arthritis.      Her appetite is currently okay as she has been experiencing some nausea since starting Doxycycline for deer tick bite. Her weight is up about 4 lbs since she states she has been less active over the past month. She otherwise states that she exercises every day. She has struggled with constipation and Miralax ansd psyllium fiber to help have regular bowel movements.     Patient denies jaundice, lower extremity edema, abdominal distension or confusion.  Patient also denies melena, hematochezia or hematemesis. Patient denies weight loss, fevers, sweats or chills. No coughing    PMH: rheumatoid arthritis, osteoporosis, HTN, anxiety, depression, history of Hepatitis C (achieved SVR). AIN III, GERD     SMH: s/p Hemorrhoidectomy in 2012     Medications:   See below     She does not smoke cigarettes. History of alcohol abuse, sober since 1987. History of IV/IN in the 1960's. History of blood transfusion in the 1960's. She is retired from working for [employer]. She currently lives at home with her son. No known family history of liver disease or liver cancer.     Previous work-up:  HCV antibody positive   HCV RNA - non detectable - 2017 & 2020  HAV Ab IgG  HAV Ab IgM   HBV SAb 0.65   HBV SAg nonreactive  HBV CAb positive      Medical hx Surgical hx   Past Medical History:   Diagnosis Date     ABUSE BY SPOUSE/PARTNER 7/27/2005     Degeneration of lumbar or lumbosacral intervertebral disc      HELICOBACTER PYLORI INFECTION 1/28/2005     Hepatitis C       Hypertension      Malignant neoplasm (H)      Osteoporosis      Other and unspecified alcohol dependence, unspecified drinking behavior      Other malaise and fatigue     Past Surgical History:   Procedure Laterality Date     BIOPSY ANAL CANAL  1/21/13    [facility]      BREAST BIOPSY, RT/LT Left 1975    Breat Biopsy RT/LT     C NONSPECIFIC PROCEDURE  1965    Removed bone left index finger knuckle, casts broken bones     COLONOSCOPY  8/25/2009     COLONOSCOPY  2/14/2011    COLONOSCOPY performed by CRISTIN LAGUNAS at  GI     COLONOSCOPY N/A 1/8/2019    Procedure: Colonscopy, Biopsies by Biopsy;  Surgeon: Omega Talavera MD;  Location:  GI     CYSTOSCOPY  2/28/2011    CYSTOSCOPY performed by CAYLA FOLR at  OR     ENDOSCOPY  05/21/12    Upper GI - [facility]     HC COLONOSCOPY W/WO BRUSH/WASH  08/22/05      UGI ENDOSCOPY DIAG W BIOPSY  10/01/09      UGI ENDOSCOPY, SIMPLE EXAM  08/08/07     HEMORRHOIDECTOMY  06/25/12    [facility]     LAPAROSCOPIC SALPINGO-OOPHORECTOMY  2/28/2011    LAPAROSCOPIC SALPINGO-OOPHORECTOMY performed by CAYLA FLOR at  OR     TONSILLECTOMY & ADENOIDECTOMY  1965                 Medications:     Current Outpatient Medications   Medication     clotrimazole (CLOTRIMAZOLE ANTI-FUNGAL) 1 % external cream     cycloSPORINE (RESTASIS) 0.05 % ophthalmic emulsion     doxycycline monohydrate (ADOXA) 100 MG tablet     hydroxychloroquine (PLAQUENIL) 200 MG tablet     hydrOXYzine (ATARAX) 10 MG tablet     IBANdronate (BONIVA) 150 MG tablet     lisdexamfetamine (VYVANSE) 20 MG capsule     lisinopril (PRINIVIL/ZESTRIL) 10 MG tablet     Modafinil (PROVIGIL PO)     montelukast (SINGULAIR) 10 MG tablet     nystatin (MYCOSTATIN) 735066 UNIT/GM external powder     polyethylene glycol (MIRALAX) powder     Psyllium (FIBER) 0.52 G CAPS     vitamin D2 (ERGOCALCIFEROL) 96680 units (1250 mcg) capsule     No current facility-administered medications for this visit.  "            Allergies:     Allergies   Allergen Reactions     Telaprevir Other (See Comments) and Rash     Rectal bleeding, anemia     Abilify Discmelt Other (See Comments)     Disoriented     Antivert [Meclizine Hcl]      Chamomile      Compazine      Cymbalta Other (See Comments)     Disoriented, trouble sleeping     Diphenhydramine Nausea     And abdominal pain     Effexor [Venlafaxine] Other (See Comments)     Disoriented, trouble sleeping     Elavil [Amitriptyline Hcl] Other (See Comments)     \"didn't feel right on it-med was stopped right away\"     Ferrous Sulfate Nausea and Vomiting     Food Difficulty breathing     cilantro     Indomethacin      indocin sensativity \"Severe h.a\"     Seasonal Allergies Other (See Comments) and Difficulty breathing     Philip Gold Aug-Sept, rag weed, sneezing     Sulfa Drugs      Thiopental Sodium      PENTOTHAL/rigidity and fight response     Animal Dander Difficulty breathing and Rash     sneezing,resp. distress     Bupropion Anxiety     Tylenol [Acetaminophen] Rash            Social History:     Social History     Socioeconomic History     Marital status:      Spouse name: Not on file     Number of children: Not on file     Years of education: Not on file     Highest education level: Not on file   Occupational History     Not on file   Social Needs     Financial resource strain: Not on file     Food insecurity     Worry: Not on file     Inability: Not on file     Transportation needs     Medical: Not on file     Non-medical: Not on file   Tobacco Use     Smoking status: Former Smoker     Packs/day: 0.75     Years: 10.00     Pack years: 7.50     Types: Cigarettes     Start date: 1968     Last attempt to quit: 1978     Years since quittin.5     Smokeless tobacco: Never Used     Tobacco comment: No exposure at home   Substance and Sexual Activity     Alcohol use: No     Alcohol/week: 0.0 standard drinks     Drug use: No     Sexual activity: Never     " Partners: Male     Birth control/protection: Surgical   Lifestyle     Physical activity     Days per week: Not on file     Minutes per session: Not on file     Stress: Not on file   Relationships     Social connections     Talks on phone: Not on file     Gets together: Not on file     Attends Church service: Not on file     Active member of club or organization: Not on file     Attends meetings of clubs or organizations: Not on file     Relationship status: Not on file     Intimate partner violence     Fear of current or ex partner: Not on file     Emotionally abused: Not on file     Physically abused: Not on file     Forced sexual activity: Not on file   Other Topics Concern     Parent/sibling w/ CABG, MI or angioplasty before 65F 55M? Yes     Comment: Brother- 44, Sister-60      Service No     Blood Transfusions No     Caffeine Concern No     Occupational Exposure No     Hobby Hazards No     Sleep Concern No     Stress Concern Yes     Weight Concern Yes     Special Diet No     Back Care No     Exercise No     Bike Helmet Yes     Seat Belt Yes     Self-Exams Yes   Social History Narrative     Not on file            Family History:     Family History   Problem Relation Age of Onset     Hypertension Mother      Breast Cancer Mother      Coronary Artery Disease Mother      Cerebrovascular Disease Mother      Kidney Disease Mother      Hypertension Brother      Respiratory Brother         emphysema     Lipids Brother      Heart Disease Brother         stents, 12/2011; has had about 6 MIs, last one 1/2014     C.A.D. Sister         MI at age 63     Hypertension Sister      Gastrointestinal Disease Sister         gallbladder     Circulatory Sister         brain aneurysm at 63     Genitourinary Problems Sister         1 kidney/bladder     Hypertension Sister      Obesity Sister      Coronary Artery Disease Sister         had valve surgery, MI, CHF     Unknown/Adopted Paternal Uncle      Blood Disease Son          Lymes/7/11     Hypertension Son      Hypertension Father      Lymphoma Father      Glaucoma Father      Coronary Artery Disease Other 49        niece     Diabetes Other         cousin     Cerebrovascular Disease Maternal Grandmother      Cerebrovascular Disease Paternal Grandmother      Liver Cancer Cousin      Glaucoma Paternal Grandfather               Review of Systems:   Gen: See HPI     HEENT: No change in vision or hearing, mouth sores, dysphagia, lymph nodes  Resp: No shortness of breath, coughing, hx of asthma  CV: No chest pain, palpitations, syncope   GI: See HPI  : No dysuria, history of stones, urine color    Skin: No rash; no pruritus or psoriasis  MS: No arthralgias, myalgias, joint swelling  Neuro: No memory changes, confusion, numbness    Heme: No difficulty clotting, bruising, bleeding  Psych:  No anxiety, depression, agitation          Physical Exam:     GENERAL: healthy, alert and no distress  EYES: Eyes grossly normal to inspection, conjunctivae and sclerae normal  RESP: no audible wheeze, cough, or visible cyanosis.  No visible retractions or increased work of breathing.  Able to speak fully in complete sentences  NEURO: Cranial nerves grossly intact, mentation intact and speech normal  PSYCH: mentation appears normal, affect normal/bright, judgement and insight intact, normal speech and appearance well-groomed         Data:   Reviewed in person and significant for:    Lab Results   Component Value Date     01/20/2020      Lab Results   Component Value Date    POTASSIUM 4.2 01/20/2020     Lab Results   Component Value Date    CHLORIDE 104 01/20/2020     Lab Results   Component Value Date    CO2 30 01/20/2020     Lab Results   Component Value Date    BUN 8 01/20/2020     Lab Results   Component Value Date    CR 0.66 03/04/2020       Lab Results   Component Value Date    WBC 3.2 03/04/2020     Lab Results   Component Value Date    HGB 14.3 03/04/2020     Lab Results   Component Value Date  "   HCT 42.6 03/04/2020     Lab Results   Component Value Date    MCV 91 03/04/2020     Lab Results   Component Value Date     03/04/2020       Lab Results   Component Value Date    AST 27 02/17/2020     Lab Results   Component Value Date    ALT 25 02/17/2020     Lab Results   Component Value Date    BILICONJ 0.0 03/14/2012      Lab Results   Component Value Date    BILITOTAL 0.4 02/17/2020       Lab Results   Component Value Date    ALBUMIN 3.9 02/17/2020     Lab Results   Component Value Date    PROTTOTAL 7.0 02/17/2020      Lab Results   Component Value Date    ALKPHOS 82 02/17/2020       Lab Results   Component Value Date    INR 1.02 08/19/2013         Imaging:    No recent abdominal imaging    Niesha Adhikari is a 69 year old female who is being evaluated via a billable video visit.      The patient has been notified of following:     \"This video visit will be conducted via a call between you and your physician/provider. We have found that certain health care needs can be provided without the need for an in-person physical exam.  This service lets us provide the care you need with a video conversation.  If a prescription is necessary we can send it directly to your pharmacy.  If lab work is needed we can place an order for that and you can then stop by our lab to have the test done at a later time.    Video visits are billed at different rates depending on your insurance coverage.  Please reach out to your insurance provider with any questions.    If during the course of the call the physician/provider feels a video visit is not appropriate, you will not be charged for this service.\"    Patient has given verbal consent for Video visit? Yes    How would you like to obtain your AVS? Alice    Patient would like the video invitation sent by: Send to e-mail at: [email or web address]    Will anyone else be joining your video visit? No        Video-Visit Details    Type of service:  Video Visit    Video Start " Time: 9:00 am  Video End Time: 9:29 am    Originating Location (pt. Location): Home  Distant Location (provider location):  [facility] HEPATOLOGY     Platform used for Video Visit: Becca Chua PA-C        Again, thank you for allowing me to participate in the care of your patient.      Sincerely,    Tristan Chua PA-C

## 2020-05-04 NOTE — PATIENT INSTRUCTIONS
Vaibhav Scherer,     You were exposed to Hepatitis B and your body cleared it spontaneously. Since you had the Hepatitis B core antibody, you are at risk for Hepatitis B reactivation when starting a medication like Humira.     I will recommend that you start Tenofovir DF (Viread)  300 mg daily when start Humira to prevent Hep B reactivation.     As mentioned earlier, Hep B medication is very well tolerated. l

## 2020-05-04 NOTE — PROGRESS NOTES
Hepatology Clinic note  Niesha Adhikari   Date of Birth 1950  Date of Service 5/3/2020    REASON FOR CONSULTATION: Positive Hep B core antibody   REFERRING PROVIDER: Apolinar Cho         Assessment/plan:   Niesha Adhikari is a 69 year old female with history of chronic hepatitis C, who achieved SVR in 2017 with Harvoni and also history of positive Hepatitis B core antibody and low Hep B surface antibody in the setting of potential biology therapy for rheumatoid arthritis. We discussed the moderate potential for Hepatitis B reactivation with Humira and recommendation to start a daily Hep B antiviral to prevent reactivation. No risk of Hepatitis C reactivation. Liver biopsy in 2012 did not show any significant fibrosis. She has low normal platelets, otherwise normal liver function.     # Start Hepatitis B reactivation prophylaxis: tenofovir  mg daily when starting Humira   # Fibrosis scan in the future   # History of AIN III:   - recommend routine follow up with color-rectal specialist for routine surveillance   # Follow-up in clinic in six months or sooner as needed    Tristan Chua PA-C   [facility]     -----------------------------------------------------       HPI:   Niesha Adhikari is a 69 year old female  presenting for the evaluation of positive Hep B core antibody.     Hep B core antibody, surface antibody   - Risk factors for reactivation: possible Humira start    History of Hep C, achieved SVR in 2017  Genotype 1  Liver biopsy: 2012, Stage 1   - Telapravir/PEG interferon + Ribavirin stopped due to skin reaction  - Tx: Harvoni x 12 weeks, achieved SVR    Patient was diagnosed with Hepatitis B in the 1980's. She was also diagnosed with Hepatitis C. She had Telapravir in 2013 and developed a severe skin reaction and it was discontinued. She then had Hep C treatment with Harvoni x 3 months and achieved SVR om in 2017. She has history of positive Hepatitis B core  antibody and low Hepatitis B surface antibody of 0.65 in 2017. Outside records show liver biopsy showing Stage 1 fibrosis in 2012.  She may been starting Humira for rheumatoid arthritis.      Her appetite is currently okay as she has been experiencing some nausea since starting Doxycycline for deer tick bite. Her weight is up about 4 lbs since she states she has been less active over the past month. She otherwise states that she exercises every day. She has struggled with constipation and Miralax ansd psyllium fiber to help have regular bowel movements.     Patient denies jaundice, lower extremity edema, abdominal distension or confusion.  Patient also denies melena, hematochezia or hematemesis. Patient denies weight loss, fevers, sweats or chills. No coughing    PMH: rheumatoid arthritis, osteoporosis, HTN, anxiety, depression, history of Hepatitis C (achieved SVR). AIN III, GERD     SMH: s/p Hemorrhoidectomy in 2012     Medications:   See below     She does not smoke cigarettes. History of alcohol abuse, sober since 1987. History of IV/IN in the 1960's. History of blood transfusion in the 1960's. She is retired from working for [employer]. She currently lives at home with her son. No known family history of liver disease or liver cancer.     Previous work-up:  HCV antibody positive   HCV RNA - non detectable - 2017 & 2020  HAV Ab IgG  HAV Ab IgM   HBV SAb 0.65   HBV SAg nonreactive  HBV CAb positive      Medical hx Surgical hx   Past Medical History:   Diagnosis Date     ABUSE BY SPOUSE/PARTNER 7/27/2005     Degeneration of lumbar or lumbosacral intervertebral disc      HELICOBACTER PYLORI INFECTION 1/28/2005     Hepatitis C      Hypertension      Malignant neoplasm (H)      Osteoporosis      Other and unspecified alcohol dependence, unspecified drinking behavior      Other malaise and fatigue     Past Surgical History:   Procedure Laterality Date     BIOPSY ANAL CANAL  1/21/13    [facility]      BREAST  BIOPSY, RT/LT Left 1975    Breat Biopsy RT/LT     C NONSPECIFIC PROCEDURE  1965    Removed bone left index finger knuckle, casts broken bones     COLONOSCOPY  8/25/2009     COLONOSCOPY  2/14/2011    COLONOSCOPY performed by CRISTIN LAGUNAS at  GI     COLONOSCOPY N/A 1/8/2019    Procedure: Colonscopy, Biopsies by Biopsy;  Surgeon: Omega Talavera MD;  Location:  GI     CYSTOSCOPY  2/28/2011    CYSTOSCOPY performed by CAYLA FLOR at  OR     ENDOSCOPY  05/21/12    Upper GI - [facility]     HC COLONOSCOPY W/WO BRUSH/WASH  08/22/05     HC UGI ENDOSCOPY DIAG W BIOPSY  10/01/09     HC UGI ENDOSCOPY, SIMPLE EXAM  08/08/07     HEMORRHOIDECTOMY  06/25/12    [facility]     LAPAROSCOPIC SALPINGO-OOPHORECTOMY  2/28/2011    LAPAROSCOPIC SALPINGO-OOPHORECTOMY performed by CAYLA FLOR at  OR     TONSILLECTOMY & ADENOIDECTOMY  1965                 Medications:     Current Outpatient Medications   Medication     clotrimazole (CLOTRIMAZOLE ANTI-FUNGAL) 1 % external cream     cycloSPORINE (RESTASIS) 0.05 % ophthalmic emulsion     doxycycline monohydrate (ADOXA) 100 MG tablet     hydroxychloroquine (PLAQUENIL) 200 MG tablet     hydrOXYzine (ATARAX) 10 MG tablet     IBANdronate (BONIVA) 150 MG tablet     lisdexamfetamine (VYVANSE) 20 MG capsule     lisinopril (PRINIVIL/ZESTRIL) 10 MG tablet     Modafinil (PROVIGIL PO)     montelukast (SINGULAIR) 10 MG tablet     nystatin (MYCOSTATIN) 783064 UNIT/GM external powder     polyethylene glycol (MIRALAX) powder     Psyllium (FIBER) 0.52 G CAPS     vitamin D2 (ERGOCALCIFEROL) 88385 units (1250 mcg) capsule     No current facility-administered medications for this visit.             Allergies:     Allergies   Allergen Reactions     Telaprevir Other (See Comments) and Rash     Rectal bleeding, anemia     Abilify Discmelt Other (See Comments)     Disoriented     Antivert [Meclizine Hcl]      Chamomile      Compazine      Cymbalta Other (See Comments)     " Disoriented, trouble sleeping     Diphenhydramine Nausea     And abdominal pain     Effexor [Venlafaxine] Other (See Comments)     Disoriented, trouble sleeping     Elavil [Amitriptyline Hcl] Other (See Comments)     \"didn't feel right on it-med was stopped right away\"     Ferrous Sulfate Nausea and Vomiting     Food Difficulty breathing     cilantro     Indomethacin      indocin sensativity \"Severe h.a\"     Seasonal Allergies Other (See Comments) and Difficulty breathing     Philip Gold Aug-Sept, rag weed, sneezing     Sulfa Drugs      Thiopental Sodium      PENTOTHAL/rigidity and fight response     Animal Dander Difficulty breathing and Rash     sneezing,resp. distress     Bupropion Anxiety     Tylenol [Acetaminophen] Rash            Social History:     Social History     Socioeconomic History     Marital status:      Spouse name: Not on file     Number of children: Not on file     Years of education: Not on file     Highest education level: Not on file   Occupational History     Not on file   Social Needs     Financial resource strain: Not on file     Food insecurity     Worry: Not on file     Inability: Not on file     Transportation needs     Medical: Not on file     Non-medical: Not on file   Tobacco Use     Smoking status: Former Smoker     Packs/day: 0.75     Years: 10.00     Pack years: 7.50     Types: Cigarettes     Start date: 1968     Last attempt to quit: 1978     Years since quittin.5     Smokeless tobacco: Never Used     Tobacco comment: No exposure at home   Substance and Sexual Activity     Alcohol use: No     Alcohol/week: 0.0 standard drinks     Drug use: No     Sexual activity: Never     Partners: Male     Birth control/protection: Surgical   Lifestyle     Physical activity     Days per week: Not on file     Minutes per session: Not on file     Stress: Not on file   Relationships     Social connections     Talks on phone: Not on file     Gets together: Not on file     " Attends Zoroastrian service: Not on file     Active member of club or organization: Not on file     Attends meetings of clubs or organizations: Not on file     Relationship status: Not on file     Intimate partner violence     Fear of current or ex partner: Not on file     Emotionally abused: Not on file     Physically abused: Not on file     Forced sexual activity: Not on file   Other Topics Concern     Parent/sibling w/ CABG, MI or angioplasty before 65F 55M? Yes     Comment: Brother- 44, Sister-60      Service No     Blood Transfusions No     Caffeine Concern No     Occupational Exposure No     Hobby Hazards No     Sleep Concern No     Stress Concern Yes     Weight Concern Yes     Special Diet No     Back Care No     Exercise No     Bike Helmet Yes     Seat Belt Yes     Self-Exams Yes   Social History Narrative     Not on file            Family History:     Family History   Problem Relation Age of Onset     Hypertension Mother      Breast Cancer Mother      Coronary Artery Disease Mother      Cerebrovascular Disease Mother      Kidney Disease Mother      Hypertension Brother      Respiratory Brother         emphysema     Lipids Brother      Heart Disease Brother         stents, 12/2011; has had about 6 MIs, last one 1/2014     C.A.D. Sister         MI at age 63     Hypertension Sister      Gastrointestinal Disease Sister         gallbladder     Circulatory Sister         brain aneurysm at 63     Genitourinary Problems Sister         1 kidney/bladder     Hypertension Sister      Obesity Sister      Coronary Artery Disease Sister         had valve surgery, MI, CHF     Unknown/Adopted Paternal Uncle      Blood Disease Son         Lymes/7/11     Hypertension Son      Hypertension Father      Lymphoma Father      Glaucoma Father      Coronary Artery Disease Other 49        niece     Diabetes Other         cousin     Cerebrovascular Disease Maternal Grandmother      Cerebrovascular Disease Paternal Grandmother       Liver Cancer Cousin      Glaucoma Paternal Grandfather               Review of Systems:   Gen: See HPI     HEENT: No change in vision or hearing, mouth sores, dysphagia, lymph nodes  Resp: No shortness of breath, coughing, hx of asthma  CV: No chest pain, palpitations, syncope   GI: See HPI  : No dysuria, history of stones, urine color    Skin: No rash; no pruritus or psoriasis  MS: No arthralgias, myalgias, joint swelling  Neuro: No memory changes, confusion, numbness    Heme: No difficulty clotting, bruising, bleeding  Psych:  No anxiety, depression, agitation          Physical Exam:     GENERAL: healthy, alert and no distress  EYES: Eyes grossly normal to inspection, conjunctivae and sclerae normal  RESP: no audible wheeze, cough, or visible cyanosis.  No visible retractions or increased work of breathing.  Able to speak fully in complete sentences  NEURO: Cranial nerves grossly intact, mentation intact and speech normal  PSYCH: mentation appears normal, affect normal/bright, judgement and insight intact, normal speech and appearance well-groomed         Data:   Reviewed in person and significant for:    Lab Results   Component Value Date     01/20/2020      Lab Results   Component Value Date    POTASSIUM 4.2 01/20/2020     Lab Results   Component Value Date    CHLORIDE 104 01/20/2020     Lab Results   Component Value Date    CO2 30 01/20/2020     Lab Results   Component Value Date    BUN 8 01/20/2020     Lab Results   Component Value Date    CR 0.66 03/04/2020       Lab Results   Component Value Date    WBC 3.2 03/04/2020     Lab Results   Component Value Date    HGB 14.3 03/04/2020     Lab Results   Component Value Date    HCT 42.6 03/04/2020     Lab Results   Component Value Date    MCV 91 03/04/2020     Lab Results   Component Value Date     03/04/2020       Lab Results   Component Value Date    AST 27 02/17/2020     Lab Results   Component Value Date    ALT 25 02/17/2020     Lab Results    Component Value Date    BILICONJ 0.0 03/14/2012      Lab Results   Component Value Date    BILITOTAL 0.4 02/17/2020       Lab Results   Component Value Date    ALBUMIN 3.9 02/17/2020     Lab Results   Component Value Date    PROTTOTAL 7.0 02/17/2020      Lab Results   Component Value Date    ALKPHOS 82 02/17/2020       Lab Results   Component Value Date    INR 1.02 08/19/2013         Imaging:    No recent abdominal imaging

## 2020-05-04 NOTE — PROGRESS NOTES
"Niesha Adhikari is a 69 year old female who is being evaluated via a billable video visit.      The patient has been notified of following:     \"This video visit will be conducted via a call between you and your physician/provider. We have found that certain health care needs can be provided without the need for an in-person physical exam.  This service lets us provide the care you need with a video conversation.  If a prescription is necessary we can send it directly to your pharmacy.  If lab work is needed we can place an order for that and you can then stop by our lab to have the test done at a later time.    Video visits are billed at different rates depending on your insurance coverage.  Please reach out to your insurance provider with any questions.    If during the course of the call the physician/provider feels a video visit is not appropriate, you will not be charged for this service.\"    Patient has given verbal consent for Video visit? Yes    How would you like to obtain your AVS? NandiniWindom    Patient would like the video invitation sent by: Send to e-mail at: [email or web address]    Will anyone else be joining your video visit? No        Video-Visit Details    Type of service:  Video Visit    Video Start Time: 9:00 am  Video End Time: 9:29 am    Originating Location (pt. Location): Home  Distant Location (provider location):  [facility]     Platform used for Video Visit: Becca Chua PA-C      "

## 2020-05-05 ENCOUNTER — MYC MEDICAL ADVICE (OUTPATIENT)
Dept: RHEUMATOLOGY | Facility: CLINIC | Age: 70
End: 2020-05-05

## 2020-05-05 ENCOUNTER — ANCILLARY PROCEDURE (OUTPATIENT)
Dept: GENERAL RADIOLOGY | Facility: OTHER | Age: 70
End: 2020-05-05
Attending: INTERNAL MEDICINE
Payer: MEDICARE

## 2020-05-05 DIAGNOSIS — M06.09 RHEUMATOID ARTHRITIS OF MULTIPLE SITES WITH NEGATIVE RHEUMATOID FACTOR (H): ICD-10-CM

## 2020-05-05 DIAGNOSIS — Z79.899 HIGH RISK MEDICATION USE: ICD-10-CM

## 2020-05-05 PROCEDURE — 86481 TB AG RESPONSE T-CELL SUSP: CPT | Performed by: INTERNAL MEDICINE

## 2020-05-05 PROCEDURE — 36415 COLL VENOUS BLD VENIPUNCTURE: CPT | Performed by: INTERNAL MEDICINE

## 2020-05-05 PROCEDURE — 71046 X-RAY EXAM CHEST 2 VIEWS: CPT

## 2020-05-08 ENCOUNTER — TELEPHONE (OUTPATIENT)
Dept: RHEUMATOLOGY | Facility: CLINIC | Age: 70
End: 2020-05-08

## 2020-05-08 DIAGNOSIS — M06.09 RHEUMATOID ARTHRITIS OF MULTIPLE SITES WITH NEGATIVE RHEUMATOID FACTOR (H): Primary | ICD-10-CM

## 2020-05-08 LAB
GAMMA INTERFERON BACKGROUND BLD IA-ACNC: 0.02 IU/ML
M TB IFN-G BLD-IMP: NEGATIVE
M TB IFN-G CD4+ BCKGRND COR BLD-ACNC: 8.71 IU/ML
MITOGEN IGNF BCKGRD COR BLD-ACNC: 0 IU/ML
MITOGEN IGNF BCKGRD COR BLD-ACNC: 0.01 IU/ML

## 2020-05-08 NOTE — TELEPHONE ENCOUNTER
PA Initiation    Medication: Humira-PENDING  Insurance Company: [employer] Part D - Phone [phone] Fax [phone]  Pharmacy Filling the Rx: [facility] - [place], [place] - [facility] [address]  Filling Pharmacy Phone:    Filling Pharmacy Fax:    Start Date: 5/8/2020

## 2020-05-08 NOTE — RESULT ENCOUNTER NOTE
"RN: Please call to notify Ms. Adhikari that Humira has been prescribed to the specialty pharmacy and she will get a call from our pharmacy liaison when insurance has approved it.  Please remind her that she will need to be on tenofovir for hepatitis B reactivation prophylaxis while on Humira.     WigWag message sent:  \"Ms. Adhikari,    The tuberculosis screening was negative so Humira has been prescribed.  Remember that you will need to also take tenofovir (VIREAD) for hepatitis B reactivation prophylaxis.     Sincerely,  Apolinar Cho MD  5/8/2020 12:43 PM\""

## 2020-05-13 ENCOUNTER — TELEPHONE (OUTPATIENT)
Dept: GASTROENTEROLOGY | Facility: CLINIC | Age: 70
End: 2020-05-13

## 2020-05-28 NOTE — PROGRESS NOTES
"Niesha Adhikari is a 69 year old female who is being evaluated via a billable video visit.      The patient has been notified of following:     \"This video visit will be conducted via a call between you and your physician/provider. We have found that certain health care needs can be provided without the need for an in-person physical exam.  This service lets us provide the care you need with a video conversation.  If a prescription is necessary we can send it directly to your pharmacy.  If lab work is needed we can place an order for that and you can then stop by our lab to have the test done at a later time.    Video visits are billed at different rates depending on your insurance coverage.  Please reach out to your insurance provider with any questions.    If during the course of the call the physician/provider feels a video visit is not appropriate, you will not be charged for this service.\"    Patient has given verbal consent for Video visit? Yes    How would you like to obtain your AVS? Nandiniharmonica    Patient would like the video invitation sent by: Send to e-mail at: [email or web address]    If the video visit does not work please call patient at [phone]    Will anyone else be joining your video visit? No        Rheumatology Video Visit      Niesha Adhikari MRN# [id number]   YOB: 1950 Age: 69 year old      Date of visit: 5/29/20   PCP: Dr. Tanika Clark  Hepatologist: Dr. Peres at [facility] in [place]  Ophthalmology: Dr. Higgins, [facility]    Chief Complaint   Patient presents with:  Arthritis: doing ok, feeling nauseous, headache and really warm from the new medication    Assessment and Plan     1. Rheumatoid arthritis: Hepatitis C related arthralgias versus rheumatoid arthritis (RF and CCP negative); hepatitis C has since been cleared. Initially with symmetric synovitis on exam and morning stiffness for more than one hour. NSAIDs and Tylenol associated with rash.  She " "did well with hydroxychloroquine 400 mg daily for a while but more arthritis symptoms so SSZ was added but associated with cytopenia and GI upset so that was stopped.  Avoiding MTX and leflunomide because of hepatitis C hx and +HBV core.  She has now established with gastroenterology and is on tenofovir for hepatitis B reactivation prophylaxis; Humira was started 5/8/2020.  - Continue hydroxychloroquine 300 mg daily (Toxicity monitoring up to date; last done on 11/12/18 at the [facility]; reminded her to get yearly eye exams)   - Continue Humira 40mg SQ every 14 days  - Labs in 3 months: CBC, Creatinine, Hepatic Panel, ESR, CRP    2. Osteoporosis: Previously treated with Fosamax (GERD), PO Boniva (reportedly ineffective), and IV Boniva (reportedly effective). Prolia was being considered by a previous rheumatologist but not used because she wanted \"clearance\" from the patient's gastroenterologist because she has hepatitis C; I do not see a contraindication for Prolia in the setting of hepatitis C. The patient reports that her gastroenterologist reported no contraindication for Prolia either.  She had not been on osteoporosis treatment for at least 2 years before restarting Boniva.  Boniva was restarted with the first dose given on 12/5/2017.  Plan to recheck DEXA in 12/2019.  - Continue ergocalciferol 50,000 units twice weekly  - Calcium 1200 mg daily  - Continue Boniva 3mg IV every 3 months (she receives at the [facility])     3. Lower back pain and left hip pain: Ms. Adhikari had a CT pelvis on 11/16/2018 that showed severe degenerative changes of the L-spine based on my review.  Left hip does not appear narrowed.  Improves with PT exercises, but often doesn't do them.     4. 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  She had injections by orthopedic surgery in the past with some " improvement.  Repeat injections can also be considered in the future if needed. Not an issue today.     5. HBV core ab positive: On hepatitis B prophylaxis from gastroenterology.    # Relevant labs and imaging were reviewed with the patient    # High risk medication toxicity monitoring: discussion and labs reviewed; appropriate labs ordered. See above.  Instructed that if confirmed to have COVID-19 or exposure to someone with confirmed COVID-19 to call this clinic for directions on DMARD management.    # Note that this is a virtual visit to reduce the risk of COVID-19 exposure during this current pandemic.      # Considered to be at high risk of complications from the COVID-19 virus.  It is recommended to limit contact with other people and if possible to work remotely or provide a leave of absence to reduce the risk for COVID-19.      Ms. Adhikari verbalized agreement with and understanding of the rational for the diagnosis and treatment plan.  All questions were answered to best of my ability and the patient's satisfaction. Ms. Adhikari was advised to contact the clinic with any questions that may arise after the clinic visit.      Thank you for involving me in the care of the patient    Return to clinic: 3-4 months      HPI   Niesha Adhikari is a 69 year old female with a medical history significant for hepatitis C, hemorrhoids, narcolepsy, fibromyalgia, migraines, anxiety, pernicious anemia, GERD, allergic rhinitis, and osteoporosis who presents for follow-up of inflammatory arthritis.    Ms. Adhikari was previously followed in the rheumatology clinic by Dr. Luciano and Dr. Marc.  A 10/29/2015 clinic note documents osteoporosis that was first diagnosed in 2001. Initially she was on Fosamax but was limited because of GERD. Reportedly treatment failure to oral and IV Boniva. Prolia was being considered but Dr. Luciano documents that she wanted clearance from gastroenterology because of her high hepatitis C viral  load.    Regarding hepatitis C, she is followed at the Hepatology Department at [facility] in [place] by Shannon Sher and Dr. Peres.  A letter dated 10/29/2016 from Shannon Sher states that Ms. Adhikari has completed a 12 week course of hepatitis C therapy with Harvoni and she is responding to treatment, based on an undetectable hepatitis C viral load at the end of therapy.    Previously, she reported that she was doing well with hydroxychloroquine and today she said she was still tolerating it well with good control of her arthritis.    12/19/2019: Ms. Adhikari came in sooner than previously scheduled because of pain in the right hand PIPs that is worse in the AM and improves with time and activity.  Recalls right 1st CMC joint injection many years ago that helped.  Pain in the right hand is worse in the AM and improves with time and activity.  Morning stiffness for about 2 hours.     4/24/2020: she says that her entire hand is achy. Trouble gripping things in the AM.  Hand symptoms are worse in the AM.  Stiffness all day; worse in the AM. Numbness and tingling in all fingers, comes and goes; not associated with neck position; can move her fingers in and out and this will iprove.  Note that SSZ was stopped because of cytopenias.     Today, 5/29/2020: Tolerating Humira that was started in May 2020 at the same time she started tenofovir for hepatitis B reactivation prophylaxis.  Following with gastroenterology for the tenofovir.  Tolerating Humira injection so far.  Still with trouble gripping things in the morning and hand symptoms that are worse in the morning; stiffness all day that is worse in the morning.  Mild GI upset with tenofovir but this is improving she says.    Denies fevers, chills, nausea, vomiting, constipation, diarrhea. No abdominal pain. No chest pain/pressure, palpitations, or shortness of breath. No LE swelling. No neck pain. No oral or nasal sores.  No rash.    Tobacco: quit in  1978  EtOH: none  Drugs: none  Occupation: retired    ROS   GEN: No fevers, chills, night sweats, or weight change  SKIN: No itching, rashes, sores  HEENT: No epistaxis. No oral or nasal ulcers.  CV: No chest pain, pressure, palpitations, or dyspnea on exertion.  PULM: See HPI  GI: No nausea, vomiting, constipation, diarrhea. No blood in stool. No abdominal pain.  : No blood in urine.  MSK: See HPI.  NEURO: See HPI  EXT: No LE swelling  PSYCH: See history of present illness    Active Problem List     Patient Active Problem List   Diagnosis     Allergic rhinitis     Esophageal reflux     Pernicious anemia     Anxiety state     Migraine     Essential and other specified forms of tremor     Fibromyalgia     Moderate recurrent major depression (H)     Advanced directives, counseling/discussion     Narcolepsy     Internal hemorrhoids with other complication     AIN (anal intraepithelial neoplasia) anal canal     Sciatica     Osteoporosis     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)     Benign essential hypertension     Alcohol dependence in remission (H)     Personal history of other medical treatment     Nausea     Left hip pain     Constipation     DDD (degenerative disc disease), cervical     Past Medical History     Past Medical History:   Diagnosis Date     ABUSE BY SPOUSE/PARTNER 7/27/2005     Degeneration of lumbar or lumbosacral intervertebral disc     DDD L5/S1     HELICOBACTER PYLORI INFECTION 1/28/2005     Hepatitis C      Hypertension      Malignant neoplasm (H)     ACIN     Osteoporosis      Other and unspecified alcohol dependence, unspecified drinking behavior     Sober as 1/21/1987     Other malaise and fatigue      Past Surgical History     Past Surgical History:   Procedure Laterality Date     BIOPSY ANAL CANAL  1/21/13    [facility]      BREAST BIOPSY, RT/LT Left 1975    Breat Biopsy RT/LT     C NONSPECIFIC PROCEDURE  1965    Removed bone left index finger  "wei, casts broken bones     COLONOSCOPY  8/25/2009     COLONOSCOPY  2/14/2011    COLONOSCOPY performed by CRISTIN LAGUNAS at  GI     COLONOSCOPY N/A 1/8/2019    Procedure: Colonscopy, Biopsies by Biopsy;  Surgeon: Omega Talavera MD;  Location:  GI     CYSTOSCOPY  2/28/2011    CYSTOSCOPY performed by CAYLA FLOR at  OR     ENDOSCOPY  05/21/12    Upper GI - [facility]     HC COLONOSCOPY W/WO BRUSH/WASH  08/22/05     HC UGI ENDOSCOPY DIAG W BIOPSY  10/01/09      UGI ENDOSCOPY, SIMPLE EXAM  08/08/07     HEMORRHOIDECTOMY  06/25/12    [facility]     LAPAROSCOPIC SALPINGO-OOPHORECTOMY  2/28/2011    LAPAROSCOPIC SALPINGO-OOPHORECTOMY performed by CAYLA FLOR at  OR     TONSILLECTOMY & ADENOIDECTOMY  1965     Allergy     Allergies   Allergen Reactions     Telaprevir Other (See Comments) and Rash     Rectal bleeding, anemia     Abilify Discmelt Other (See Comments)     Disoriented     Antivert [Meclizine Hcl]      Chamomile      Compazine      Cymbalta Other (See Comments)     Disoriented, trouble sleeping     Diphenhydramine Nausea     And abdominal pain     Effexor [Venlafaxine] Other (See Comments)     Disoriented, trouble sleeping     Elavil [Amitriptyline Hcl] Other (See Comments)     \"didn't feel right on it-med was stopped right away\"     Ferrous Sulfate Nausea and Vomiting     Food Difficulty breathing     cilantro     Indomethacin      indocin sensativity \"Severe h.a\"     Seasonal Allergies Other (See Comments) and Difficulty breathing     Philip Gold Aug-Sept, rag weed, sneezing     Sulfa Drugs      Thiopental Sodium      PENTOTHAL/rigidity and fight response     Animal Dander Difficulty breathing and Rash     sneezing,resp. distress     Bupropion Anxiety     Tylenol [Acetaminophen] Rash     Current Medication List     Current Outpatient Medications   Medication Sig     adalimumab (HUMIRA *CF*) 40 MG/0.4ML pen kit Inject 0.4 mLs (40 mg) Subcutaneous every 14 days . " Hold for infection. Must also take tenofovir for hepatitis B reactivation prophylaxis.     cycloSPORINE (RESTASIS) 0.05 % ophthalmic emulsion Place 1 drop into both eyes 2 times daily      hydroxychloroquine (PLAQUENIL) 200 MG tablet Hydroxychloroquine 200mg daily; and an additional 200mg every other day. (Patient taking differently: Hydroxychloroquine 200mg in a.m.; and 100mg in p.m.)     hydrOXYzine (ATARAX) 10 MG tablet Take 50 mg by mouth At Bedtime      IBANdronate (BONIVA) 150 MG tablet Inject 3 mg into the vein every 3 months      lisdexamfetamine (VYVANSE) 20 MG capsule Take 30 mg by mouth every other day      lisinopril (PRINIVIL/ZESTRIL) 10 MG tablet Take 1 tablet (10 mg) by mouth daily     Modafinil (PROVIGIL PO) Take 100 mg by mouth Takes 1 1/2 tabs bid (150 mg bid) morning and noon     naproxen sodium (ANAPROX) 220 MG tablet Take 220-440 mg by mouth daily as needed for moderate pain     nystatin (MYCOSTATIN) 886715 UNIT/GM external powder Apply topically 3 times daily as needed     polyethylene glycol (MIRALAX) powder Take 1 capful by mouth daily as needed      Psyllium (FIBER) 0.52 G CAPS 2 tabs in am and pm     tenofovir (VIREAD) 300 MG tablet Take 1 tablet (300 mg) by mouth daily for Hep B reactivation prophylaxis     vitamin D2 (ERGOCALCIFEROL) 33652 units (1250 mcg) capsule Take 1 capsule (50,000 Units) by mouth every Monday and Friday.     clotrimazole (CLOTRIMAZOLE ANTI-FUNGAL) 1 % external cream APPLY TOPICALLY TWO TIMES A DAY     montelukast (SINGULAIR) 10 MG tablet TAKE ONE TABLET BY MOUTH EVERY DAY AS NEEDED SEASONALLY (AUGUST AND SEPTEMBER) (Patient not taking: Reported on 5/4/2020)     No current facility-administered medications for this visit.          Social History   See HPI    Family History     Family History   Problem Relation Age of Onset     Hypertension Mother      Breast Cancer Mother      Coronary Artery Disease Mother      Cerebrovascular Disease Mother      Kidney Disease  "Mother      Hypertension Brother      Respiratory Brother         emphysema     Lipids Brother      Heart Disease Brother         stents, 12/2011; has had about 6 MIs, last one 1/2014     C.A.D. Sister         MI at age 63     Hypertension Sister      Gastrointestinal Disease Sister         gallbladder     Circulatory Sister         brain aneurysm at 63     Genitourinary Problems Sister         1 kidney/bladder     Hypertension Sister      Obesity Sister      Coronary Artery Disease Sister         had valve surgery, MI, CHF     Unknown/Adopted Paternal Uncle      Blood Disease Son         Lymes/7/11     Hypertension Son      Hypertension Father      Lymphoma Father      Glaucoma Father      Coronary Artery Disease Other 49        niece     Diabetes Other         cousin     Cerebrovascular Disease Maternal Grandmother      Cerebrovascular Disease Paternal Grandmother      Liver Cancer Cousin      Glaucoma Paternal Grandfather      Physical Exam     Temp Readings from Last 3 Encounters:   03/04/20 98.8  F (37.1  C) (Temporal)   12/19/19 97.7  F (36.5  C) (Oral)   12/04/19 98.6  F (37  C) (Temporal)     BP Readings from Last 5 Encounters:   03/04/20 125/73   12/19/19 136/78   12/04/19 133/76   11/07/19 (!) 149/82   10/11/19 130/72     Pulse Readings from Last 1 Encounters:   03/04/20 64     Resp Readings from Last 1 Encounters:   03/04/20 16     Estimated body mass index is 24.59 kg/m  as calculated from the following:    Height as of 12/19/19: 1.613 m (5' 3.5\").    Weight as of 3/4/20: 64 kg (141 lb).      GEN: NAD  HEENT: MMM.  Anicteric, noninjected sclera  PULM: No increased work of breathing  MSK:  Hands and wrists without swelling.   PSYCH: Alert. Appropriate.        Labs / Imaging (select studies)   RF/CCP  Recent Labs   Lab Test 06/07/17  0955   CCPIGG 1   RHF <20     CBC  Recent Labs   Lab Test 03/04/20  0752 02/17/20  0810 01/20/20  0741  09/26/18  0836   WBC 3.2* 3.8* 2.9*   < > 4.0   RBC 4.69 4.70 4.41   < " > 4.52   HGB 14.3 14.3 13.2   < > 13.8   HCT 42.6 42.8 40.5   < > 42.3   MCV 91 91 92   < > 94   RDW 12.7 13.4 13.2   < > 12.9    140* 146*   < > 149*   MCH 30.5 30.4 29.9   < > 30.5   MCHC 33.6 33.4 32.6   < > 32.6   NEUTROPHIL 42.9 44.5 41.8   < > 52.8   LYMPH 38.5 35.7 37.2   < > 29.5   MONOCYTE 13.6 14.6 13.4   < > 9.6   EOSINOPHIL 3.8 4.7 7.6   < > 7.6   BASOPHIL 0.9 0.5 0.0   < > 0.5   ANEU 1.4* 1.7 1.2*   < > 2.1   ALYM 1.2 1.4 1.1   < > 1.2   FREDY 0.4 0.6 0.4   < > 0.4   AEOS  --  0.2 0.2  --  0.3   ABAS 0.0 0.0 0.0   < > 0.0    < > = values in this interval not displayed.     CMP  Recent Labs   Lab Test 03/04/20  0752 02/17/20  0810 01/20/20  0741 12/04/19  0823  08/09/19  1228  12/14/18  0753   NA  --   --  138  --   --  142  --  140   POTASSIUM  --   --  4.2  --   --  4.4  --  4.7   CHLORIDE  --   --  104  --   --  102  --  105   CO2  --   --  30  --   --  30  --  30   ANIONGAP  --   --  4  --   --  10  --  5   GLC  --   --  75  --   --  86  --  84   BUN  --   --  8  --   --  11  --  11   CR 0.66 0.63 0.62 0.63   < > 0.58   < > 0.66   GFRESTIMATED >90 >90 >90 >90   < > >90   < > >90   GFRESTBLACK >90 >90 >90 >90   < > >90   < > >90   ELIZABETH 9.5  --  8.8 9.1   < > 9.5   < > 9.2   BILITOTAL  --  0.4 0.5 0.4  --   --    < >  --    ALBUMIN  --  3.9 3.7 3.8   < >  --    < >  --    PROTTOTAL  --  7.0 6.7* 7.1  --   --    < >  --    ALKPHOS  --  82 75 84  --   --    < >  --    AST  --  27 25 19  --   --    < >  --    ALT  --  25 23 22  --   --    < >  --     < > = values in this interval not displayed.     Calcium/VitaminD  Recent Labs   Lab Test 03/04/20  0752 01/20/20  0741 12/04/19  0823  04/12/19  1109  09/26/18  0836   ELIZABETH 9.5 8.8 9.1   < >  --    < > 8.8   VITDT  --   --  53  --  55  --  46    < > = values in this interval not displayed.     ESR/CRP  Recent Labs   Lab Test 01/20/20  0741 06/30/17  0925   SED 6 6   CRP <2.9 <2.9     Lipid Panel  Recent Labs   Lab Test 08/09/19  1228 11/25/16  1217    CHOL 209* 276*   TRIG 74 88   HDL 82 65   * 193*   NHDL 127 211*     Hepatitis B  Recent Labs   Lab Test 06/30/17  0925 06/07/17  0955 09/30/15  0951   AUSAB  --  0.65  --    HBCAB  --  Reactive   A reactive result indicates acute, chronic or past/resolved hepatitis B   infection.  *  --    HBCM  --  Nonreactive   A nonreactive result suggests lack of recent exposure to the virus in the   preceding 6 months.    --    HEPBANG  --  Nonreactive Nonreactive   HBQLOG Not Calculated  --   --      Hepatitis C  Recent Labs   Lab Test 01/20/20  0741 06/07/17  0955 09/30/15  0951   HCVAB  --  Reactive   A reactive result indicates one of the following 1) current HCV infection 2)   past HCV infection that has resolved or 3) false positivity. The CDC recommends   that a reactive result should be followed by Nucleic acid testing for HCV RNA.  If HCV RNA is detected, that indicates current HCV infection. If HCV RNA is not   detected, that indicates either past, resolved HCV infection, or false HCV   antibody positivity.   Assay performance characteristics have not been established for newborns,   infants, and children  * Reactive   A reactive result indicates one of the following 1) current HCV infection 2)   past HCV infection that has resolved or 3) false positivity. The CDC recommends   that a reactive result should be followed by Nucleic acid testing for HCV RNA.  If HCV RNA is detected, that indicates current HCV infection. If HCV RNA is not   detected, that indicates either past, resolved HCV infection, or false HCV   antibody positivity.   Assay performance characteristics have not been established for newborns,   infants, and children  *   HCVRNA HCV RNA Not Detected HCV RNA Not Detected   The LUIS ARMANDO AmpliPrep/LUIS ARMANDO TaqMan HCV Test is an FDA-approved in vitro nucleic   acid amplification test for the quantitation of HCV RNA in human plasma (ETDA   plasma) or serum using the LUIS ARMANDO AmpliPrep Instrument for automated  viral   nucleic acid extraction and the LUIS ARMANDO TaqMan Analyzer or LUIS ARMANDO TaqMan for   automated Real Time PCR amplification and detection of the viral nucleic acid   target.   Titer results are reported in International Units/mL (IU/mL) using the 1st WHO   International standard for HCV for Nucleic Acid Amplification based assays.   [phone]*     Lyme ab screening  Recent Labs   Lab Test 08/09/19  1228 05/11/17  0830 04/12/17  1211   LYMEGM 0.05 <0.01  Negative, Absence of detectable Borrelia burdorferi antibodies. A negative   result does not exclude the possibility of Borrelia burgdorferi infection. If   early Lyme disease is suspected, a second sample should be collected and tested   2 to 4 weeks later.   <0.01  Negative, Absence of detectable Borrelia burdorferi antibodies. A negative   result does not exclude the possibility of Borrelia burgdorferi infection. If   early Lyme disease is suspected, a second sample should be collected and tested   2 to 4 weeks later.         Tuberculosis Screening  Recent Labs   Lab Test 05/05/20  1322 09/30/15  0952   TBRES Negative  --    TBRSLT  --  Negative   TBAGN  --  0.05     Immunization History     Immunization History   Administered Date(s) Administered     HEPA 04/18/2000, 09/26/2000     HPV 08/13/2012, 09/25/2012, 01/24/2013     HepB 11/15/2011, 12/19/2011     Influenza (H1N1) 01/07/2010     Influenza (High Dose) 3 valent vaccine 08/30/2018, 09/26/2019     Influenza (IIV3) PF 01/03/2005, 10/16/2006, 11/14/2007, 10/28/2008, 09/29/2009, 09/27/2010, 10/04/2011, 09/25/2012, 09/21/2015     Influenza Vaccine IM > 6 months Valent IIV4 09/26/2013, 10/06/2014, 10/06/2016, 09/08/2017     Pneumo Conj 13-V (2010&after) 10/06/2016     Pneumococcal 23 valent 11/15/2011, 10/17/2017     TD (ADULT, 7+) 04/18/2000, 07/07/2004     TDAP Vaccine (Boostrix) 09/21/2015     Tdap (Adacel,Boostrix) 05/23/2006     Zoster vaccine recombinant adjuvanted (SHINGRIX) 06/14/2018, 08/24/2018     Zoster  vaccine, live 09/21/2015          Chart documentation done in part with Dragon Voice recognition Software. Although reviewed after completion, some word and grammatical error may remain.    Video-Visit Details    Type of service:  Video Visit    Video Start Time: 7:22AM  Video End Time: 7:40 AM    Originating Location (pt. Location): Home    Distant Location (provider location):  Home     Platform used for Video Visit: Becca Cho MD

## 2020-05-29 ENCOUNTER — VIRTUAL VISIT (OUTPATIENT)
Dept: RHEUMATOLOGY | Facility: CLINIC | Age: 70
End: 2020-05-29
Payer: MEDICARE

## 2020-05-29 DIAGNOSIS — Z79.899 HIGH RISK MEDICATION USE: ICD-10-CM

## 2020-05-29 DIAGNOSIS — R76.8 HEPATITIS B CORE ANTIBODY POSITIVE: ICD-10-CM

## 2020-05-29 DIAGNOSIS — M81.8 OTHER OSTEOPOROSIS, UNSPECIFIED PATHOLOGICAL FRACTURE PRESENCE: ICD-10-CM

## 2020-05-29 DIAGNOSIS — M06.09 RHEUMATOID ARTHRITIS OF MULTIPLE SITES WITH NEGATIVE RHEUMATOID FACTOR (H): Primary | ICD-10-CM

## 2020-05-29 PROCEDURE — 99213 OFFICE O/P EST LOW 20 MIN: CPT | Mod: 95 | Performed by: INTERNAL MEDICINE

## 2020-06-03 DIAGNOSIS — E55.9 VITAMIN D DEFICIENCY: ICD-10-CM

## 2020-06-03 DIAGNOSIS — M81.8 OTHER OSTEOPOROSIS, UNSPECIFIED PATHOLOGICAL FRACTURE PRESENCE: ICD-10-CM

## 2020-06-03 NOTE — TELEPHONE ENCOUNTER
Medication:   Vitamin D  Last written on:   12/17/2019  Quantity:   24    Refills:   1    Last office visit:   5/29/2020  Next office visit:   9/11/2020  Last labs:   3/4/2020    Anabella Garcia CMA Rheumatology  6/3/2020 9:35 AM

## 2020-06-04 RX ORDER — ERGOCALCIFEROL 1.25 MG/1
CAPSULE, LIQUID FILLED ORAL
Qty: 24 CAPSULE | Refills: 1 | Status: SHIPPED | OUTPATIENT
Start: 2020-06-04 | End: 2020-12-01

## 2020-06-04 NOTE — TELEPHONE ENCOUNTER
Rheumatology team: Please call to notify MsJenn Zeynep that vitamin D has been refilled.  Apolinar Cho MD  6/4/2020 4:05 PM

## 2020-06-05 ENCOUNTER — INFUSION THERAPY VISIT (OUTPATIENT)
Dept: INFUSION THERAPY | Facility: CLINIC | Age: 70
End: 2020-06-05
Attending: INTERNAL MEDICINE
Payer: MEDICARE

## 2020-06-05 VITALS
DIASTOLIC BLOOD PRESSURE: 87 MMHG | RESPIRATION RATE: 18 BRPM | SYSTOLIC BLOOD PRESSURE: 134 MMHG | TEMPERATURE: 98.5 F | HEART RATE: 60 BPM | BODY MASS INDEX: 24.9 KG/M2 | WEIGHT: 142.8 LBS | OXYGEN SATURATION: 99 %

## 2020-06-05 DIAGNOSIS — Z92.89 PERSONAL HISTORY OF OTHER MEDICAL TREATMENT: Primary | ICD-10-CM

## 2020-06-05 DIAGNOSIS — M81.0 OSTEOPOROSIS, UNSPECIFIED OSTEOPOROSIS TYPE, UNSPECIFIED PATHOLOGICAL FRACTURE PRESENCE: Primary | ICD-10-CM

## 2020-06-05 DIAGNOSIS — Z92.89 PERSONAL HISTORY OF OTHER MEDICAL TREATMENT: ICD-10-CM

## 2020-06-05 DIAGNOSIS — M81.0 OSTEOPOROSIS, UNSPECIFIED OSTEOPOROSIS TYPE, UNSPECIFIED PATHOLOGICAL FRACTURE PRESENCE: ICD-10-CM

## 2020-06-05 LAB
CALCIUM SERPL-MCNC: 9.6 MG/DL (ref 8.5–10.1)
CREAT SERPL-MCNC: 0.72 MG/DL (ref 0.52–1.04)
GFR SERPL CREATININE-BSD FRML MDRD: 86 ML/MIN/{1.73_M2}

## 2020-06-05 PROCEDURE — 96374 THER/PROPH/DIAG INJ IV PUSH: CPT | Performed by: NURSE PRACTITIONER

## 2020-06-05 PROCEDURE — 82310 ASSAY OF CALCIUM: CPT | Performed by: INTERNAL MEDICINE

## 2020-06-05 PROCEDURE — 99207 ZZC NO CHARGE NURSE ONLY: CPT

## 2020-06-05 PROCEDURE — 36415 COLL VENOUS BLD VENIPUNCTURE: CPT | Performed by: INTERNAL MEDICINE

## 2020-06-05 PROCEDURE — 82565 ASSAY OF CREATININE: CPT | Performed by: INTERNAL MEDICINE

## 2020-06-05 RX ORDER — IBANDRONATE SODIUM 3 MG/3 ML
3 SYRINGE (ML) INTRAVENOUS ONCE
Status: CANCELLED | OUTPATIENT
Start: 2020-08-31

## 2020-06-05 RX ORDER — IBANDRONATE SODIUM 3 MG/3 ML
3 SYRINGE (ML) INTRAVENOUS ONCE
Status: COMPLETED | OUTPATIENT
Start: 2020-06-05 | End: 2020-06-05

## 2020-06-05 RX ADMIN — Medication 3 MG: at 10:28

## 2020-06-05 ASSESSMENT — PAIN SCALES - GENERAL: PAINLEVEL: NO PAIN (0)

## 2020-06-05 NOTE — PROGRESS NOTES
Infusion Nursing Note:  Niesha Adhikari presents today for Boniva every 90 days..    Patient seen by provider today: No   present during visit today: Not Applicable.    Note: Pt states she is doing well, denies any problems with her last dose of Boniva. Will treat as ordered.    Intravenous Access:  Peripheral IV placed.    Treatment Conditions:  Lab Results   Component Value Date     01/20/2020                   Lab Results   Component Value Date    POTASSIUM 4.2 01/20/2020           Lab Results   Component Value Date    MAG 1.7 12/05/2006            Lab Results   Component Value Date    CR 0.72 06/05/2020                   Lab Results   Component Value Date    ELIZABETH 9.6 06/05/2020                Lab Results   Component Value Date    BILITOTAL 0.4 02/17/2020           Lab Results   Component Value Date    ALBUMIN 3.9 02/17/2020                    Lab Results   Component Value Date    ALT 25 02/17/2020           Lab Results   Component Value Date    AST 27 02/17/2020       Results reviewed, labs MET treatment parameters, ok to proceed with treatment.      Post Infusion Assessment:  Patient tolerated infusion without incident.  Blood return noted pre and post infusion.  Site patent and intact, free from redness, edema or discomfort.  No evidence of extravasations.  Access discontinued per protocol.       Discharge Plan:   Return on 09/04/2020 for Boniva.  Discharge instructions reviewed with: Patient.  Patient and/or family verbalized understanding of discharge instructions and all questions answered.  Patient discharged in stable condition accompanied by: self.  Departure Mode: Ambulatory.    Consuelo Nix RN

## 2020-07-08 DIAGNOSIS — I10 ESSENTIAL HYPERTENSION: ICD-10-CM

## 2020-07-08 RX ORDER — LISINOPRIL 10 MG/1
10 TABLET ORAL DAILY
Qty: 90 TABLET | Refills: 2 | Status: SHIPPED | OUTPATIENT
Start: 2020-07-08 | End: 2021-02-16

## 2020-07-08 NOTE — TELEPHONE ENCOUNTER
Pending Prescriptions:                       Disp   Refills    lisinopril (ZESTRIL) 10 MG tablet [Pharma*90 tab*2            Sig: TAKE 1 TABLET (10 MG) BY MOUTH DAILY    Prescription approved per [facility] [place] [facility] Refill Protocol.    Alesia Abbasi, MSN, RN

## 2020-08-12 DIAGNOSIS — J30.2 OTHER SEASONAL ALLERGIC RHINITIS: ICD-10-CM

## 2020-08-13 DIAGNOSIS — M06.09 RHEUMATOID ARTHRITIS OF MULTIPLE SITES WITH NEGATIVE RHEUMATOID FACTOR (H): ICD-10-CM

## 2020-08-13 RX ORDER — MONTELUKAST SODIUM 10 MG/1
TABLET ORAL
Qty: 90 TABLET | Refills: 0 | Status: SHIPPED | OUTPATIENT
Start: 2020-08-13 | End: 2021-08-04

## 2020-08-13 NOTE — TELEPHONE ENCOUNTER
Prescription approved per [facility] [place] Refill Protocol.  Alley Salcedo, BSN, RN, PHN

## 2020-08-13 NOTE — TELEPHONE ENCOUNTER
Medication:   Hydroxychloroquine  Last written on:   12/19/2019  Quantity:   135    Refills:   1    Last office visit:   5/29/2020  Next office visit:   9/11/2020  Last labs:   6/5/2020    Anabella Garcia CMA Rheumatology  8/13/2020 12:56 PM

## 2020-08-14 RX ORDER — HYDROXYCHLOROQUINE SULFATE 200 MG/1
TABLET, FILM COATED ORAL
Qty: 135 TABLET | Refills: 1 | Status: SHIPPED | OUTPATIENT
Start: 2020-08-14 | End: 2021-01-08

## 2020-08-14 NOTE — TELEPHONE ENCOUNTER
Message left for patient that a prescription has been refilled and sent to pharmacy, any questions please call us at [phone].   Anabella Garcia CMA Rheumatology  8/14/2020 9:55 AM

## 2020-08-14 NOTE — TELEPHONE ENCOUNTER
Rheumatology team: Please call to notify Ms. Adhikari that hydroxychloroquine has been refilled.  Apolinar Cho MD  8/14/2020 5:02 AM

## 2020-08-21 ENCOUNTER — MYC MEDICAL ADVICE (OUTPATIENT)
Dept: RHEUMATOLOGY | Facility: CLINIC | Age: 70
End: 2020-08-21

## 2020-09-04 ENCOUNTER — INFUSION THERAPY VISIT (OUTPATIENT)
Dept: INFUSION THERAPY | Facility: CLINIC | Age: 70
End: 2020-09-04
Attending: INTERNAL MEDICINE
Payer: MEDICARE

## 2020-09-04 VITALS
BODY MASS INDEX: 24.02 KG/M2 | TEMPERATURE: 99.1 F | SYSTOLIC BLOOD PRESSURE: 117 MMHG | WEIGHT: 140.7 LBS | RESPIRATION RATE: 16 BRPM | HEIGHT: 64 IN | OXYGEN SATURATION: 99 % | HEART RATE: 63 BPM | DIASTOLIC BLOOD PRESSURE: 66 MMHG

## 2020-09-04 DIAGNOSIS — Z92.89 PERSONAL HISTORY OF OTHER MEDICAL TREATMENT: Primary | ICD-10-CM

## 2020-09-04 DIAGNOSIS — Z79.899 HIGH RISK MEDICATION USE: ICD-10-CM

## 2020-09-04 DIAGNOSIS — M06.09 RHEUMATOID ARTHRITIS OF MULTIPLE SITES WITH NEGATIVE RHEUMATOID FACTOR (H): ICD-10-CM

## 2020-09-04 DIAGNOSIS — M81.0 OSTEOPOROSIS, UNSPECIFIED OSTEOPOROSIS TYPE, UNSPECIFIED PATHOLOGICAL FRACTURE PRESENCE: ICD-10-CM

## 2020-09-04 LAB
ALBUMIN SERPL-MCNC: 3.7 G/DL (ref 3.4–5)
ALP SERPL-CCNC: 78 U/L (ref 40–150)
ALT SERPL W P-5'-P-CCNC: 26 U/L (ref 0–50)
AST SERPL W P-5'-P-CCNC: 25 U/L (ref 0–45)
BASOPHILS # BLD AUTO: 0 10E9/L (ref 0–0.2)
BASOPHILS NFR BLD AUTO: 0.7 %
BILIRUB DIRECT SERPL-MCNC: 0.2 MG/DL (ref 0–0.2)
BILIRUB SERPL-MCNC: 0.5 MG/DL (ref 0.2–1.3)
CALCIUM SERPL-MCNC: 9.6 MG/DL (ref 8.5–10.1)
CREAT SERPL-MCNC: 0.69 MG/DL (ref 0.52–1.04)
CRP SERPL-MCNC: <2.9 MG/L (ref 0–8)
DIFFERENTIAL METHOD BLD: ABNORMAL
EOSINOPHIL NFR BLD AUTO: 17.8 %
ERYTHROCYTE [DISTWIDTH] IN BLOOD BY AUTOMATED COUNT: 12.5 % (ref 10–15)
ERYTHROCYTE [SEDIMENTATION RATE] IN BLOOD BY WESTERGREN METHOD: 5 MM/H (ref 0–30)
GFR SERPL CREATININE-BSD FRML MDRD: 89 ML/MIN/{1.73_M2}
HCT VFR BLD AUTO: 43.9 % (ref 35–47)
HGB BLD-MCNC: 14.4 G/DL (ref 11.7–15.7)
IMM GRANULOCYTES # BLD: 0 10E9/L (ref 0–0.4)
IMM GRANULOCYTES NFR BLD: 0.2 %
LYMPHOCYTES # BLD AUTO: 1.3 10E9/L (ref 0.8–5.3)
LYMPHOCYTES NFR BLD AUTO: 31.9 %
MCH RBC QN AUTO: 29.5 PG (ref 26.5–33)
MCHC RBC AUTO-ENTMCNC: 32.8 G/DL (ref 31.5–36.5)
MCV RBC AUTO: 90 FL (ref 78–100)
MONOCYTES # BLD AUTO: 0.4 10E9/L (ref 0–1.3)
MONOCYTES NFR BLD AUTO: 10.5 %
NEUTROPHILS # BLD AUTO: 1.6 10E9/L (ref 1.6–8.3)
NEUTROPHILS NFR BLD AUTO: 38.9 %
NRBC # BLD AUTO: 0 10*3/UL
NRBC BLD AUTO-RTO: 0 /100
PLATELET # BLD AUTO: 140 10E9/L (ref 150–450)
PROT SERPL-MCNC: 7 G/DL (ref 6.8–8.8)
RBC # BLD AUTO: 4.88 10E12/L (ref 3.8–5.2)
WBC # BLD AUTO: 4.1 10E9/L (ref 4–11)

## 2020-09-04 PROCEDURE — 80076 HEPATIC FUNCTION PANEL: CPT | Performed by: INTERNAL MEDICINE

## 2020-09-04 PROCEDURE — 82565 ASSAY OF CREATININE: CPT | Performed by: INTERNAL MEDICINE

## 2020-09-04 PROCEDURE — 25000128 H RX IP 250 OP 636: Performed by: INTERNAL MEDICINE

## 2020-09-04 PROCEDURE — 96374 THER/PROPH/DIAG INJ IV PUSH: CPT

## 2020-09-04 PROCEDURE — 36415 COLL VENOUS BLD VENIPUNCTURE: CPT

## 2020-09-04 PROCEDURE — 36415 COLL VENOUS BLD VENIPUNCTURE: CPT | Performed by: INTERNAL MEDICINE

## 2020-09-04 PROCEDURE — 85652 RBC SED RATE AUTOMATED: CPT | Performed by: INTERNAL MEDICINE

## 2020-09-04 PROCEDURE — 82310 ASSAY OF CALCIUM: CPT | Performed by: INTERNAL MEDICINE

## 2020-09-04 PROCEDURE — 85025 COMPLETE CBC W/AUTO DIFF WBC: CPT | Performed by: INTERNAL MEDICINE

## 2020-09-04 PROCEDURE — 86140 C-REACTIVE PROTEIN: CPT | Performed by: INTERNAL MEDICINE

## 2020-09-04 RX ORDER — IBANDRONATE SODIUM 3 MG/3 ML
3 SYRINGE (ML) INTRAVENOUS ONCE
Status: COMPLETED | OUTPATIENT
Start: 2020-09-04 | End: 2020-09-04

## 2020-09-04 RX ORDER — IBANDRONATE SODIUM 3 MG/3 ML
3 SYRINGE (ML) INTRAVENOUS ONCE
Status: CANCELLED | OUTPATIENT
Start: 2020-12-02

## 2020-09-04 RX ADMIN — IBANDRONATE SODIUM 3 MG: 3 INJECTION, SOLUTION INTRAVENOUS at 08:37

## 2020-09-04 ASSESSMENT — MIFFLIN-ST. JEOR: SCORE: 1140.27

## 2020-09-04 ASSESSMENT — PAIN SCALES - GENERAL: PAINLEVEL: MODERATE PAIN (5)

## 2020-09-04 NOTE — PATIENT INSTRUCTIONS
Pt to return on 12/04/20 for Boniva. Copies of medication list and upcoming appointments given prior to discharge.

## 2020-09-04 NOTE — PROGRESS NOTES
Infusion Nursing Note:  Niesha Adhikari presents today for Boniva.    Patient seen by provider today: No   present during visit today: Not Applicable.    Note: Patient has no complaints today and did well with last Boniva infusion.    Intravenous Access:  Peripheral IV placed.    Treatment Conditions:  Results reviewed, labs MET treatment parameters, ok to proceed with treatment.  Creat-0.69  Ca-9.6.      Post Infusion Assessment:  Patient tolerated infusion without incident.  Patient observed for 15 minutes post infusion per protocol.  Blood return noted pre and post infusion.  Site patent and intact, free from redness, edema or discomfort.  No evidence of extravasations.  Access discontinued per protocol.       Discharge Plan:   Discharge instructions reviewed with: Patient.  Patient and/or family verbalized understanding of discharge instructions and all questions answered.  Patient discharged in stable condition accompanied by: self.  Departure Mode: Ambulatory.    Daiana Burton RN

## 2020-09-10 DIAGNOSIS — B37.2 CUTANEOUS CANDIDIASIS: ICD-10-CM

## 2020-09-11 ENCOUNTER — VIRTUAL VISIT (OUTPATIENT)
Dept: RHEUMATOLOGY | Facility: CLINIC | Age: 70
End: 2020-09-11
Payer: MEDICARE

## 2020-09-11 DIAGNOSIS — M81.0 AGE-RELATED OSTEOPOROSIS WITHOUT CURRENT PATHOLOGICAL FRACTURE: ICD-10-CM

## 2020-09-11 DIAGNOSIS — E55.9 VITAMIN D DEFICIENCY: ICD-10-CM

## 2020-09-11 DIAGNOSIS — M06.09 RHEUMATOID ARTHRITIS OF MULTIPLE SITES WITH NEGATIVE RHEUMATOID FACTOR (H): Primary | ICD-10-CM

## 2020-09-11 DIAGNOSIS — Z79.899 HIGH RISK MEDICATION USE: ICD-10-CM

## 2020-09-11 DIAGNOSIS — R76.8 HEPATITIS B CORE ANTIBODY POSITIVE: ICD-10-CM

## 2020-09-11 PROCEDURE — 99213 OFFICE O/P EST LOW 20 MIN: CPT | Mod: 95 | Performed by: INTERNAL MEDICINE

## 2020-09-11 RX ORDER — IBANDRONATE SODIUM 3 MG/3 ML
3 SYRINGE (ML) INTRAVENOUS ONCE
Status: CANCELLED | OUTPATIENT
Start: 2020-12-04

## 2020-09-11 NOTE — PATIENT INSTRUCTIONS
Continue hydroxychloroquine   Continue Humira  Continue ergocalciferol (vitamin D)  Continue calcium  Continue boniva infusions    Call to schedule a DEXA (bone density scan)

## 2020-09-11 NOTE — TELEPHONE ENCOUNTER
Pending Prescriptions:                       Disp   Refills    clotrimazole (LOTRIMIN) 1 % external cream*28.35 g3        Sig: APPLY TOPICALLY TWO TIMES A DAY    Routing refill request to provider for review/approval because:  Drug not on the [facility] refill protocol

## 2020-09-11 NOTE — PROGRESS NOTES
"Niesha Adhikari is a 70 year old female who is being evaluated via a billable video visit.      The patient has been notified of following:     \"This video visit will be conducted via a call between you and your physician/provider. We have found that certain health care needs can be provided without the need for an in-person physical exam.  This service lets us provide the care you need with a video conversation.  If a prescription is necessary we can send it directly to your pharmacy.  If lab work is needed we can place an order for that and you can then stop by our lab to have the test done at a later time.    Video visits are billed at different rates depending on your insurance coverage.  Please reach out to your insurance provider with any questions.    If during the course of the call the physician/provider feels a video visit is not appropriate, you will not be charged for this service.\"    Patient has given verbal consent for Video visit? Yes  How would you like to obtain your AVS? MyChart  If you are dropped from the video visit, the video invite should be resent to: mychart  Will anyone else be joining your video visit? No      Rheumatology Video Visit      Niesha Adhikari MRN# [id number]   YOB: 1950 Age: 70 year old      Date of visit: 9/11/20   PCP: Dr. Tanika Clark  Hepatologist: Dr. Peres at [facility] in [place]  Ophthalmology: Dr. Higgins, [facility]    Chief Complaint   Patient presents with:  Arthritis: RA.    Assessment and Plan     1. Rheumatoid arthritis: Hepatitis C related arthralgias versus rheumatoid arthritis (RF and CCP negative); hepatitis C has since been cleared. Initially with symmetric synovitis on exam and morning stiffness for more than one hour. NSAIDs and Tylenol associated with rash.  She did well with hydroxychloroquine 400 mg daily for a while but more arthritis symptoms so SSZ was added but associated with cytopenia and GI upset so that " "was stopped.  Avoiding MTX and leflunomide because of hepatitis C hx and +HBV core.  She has now established with gastroenterology and is on tenofovir for hepatitis B reactivation prophylaxis; Humira was started 5/8/2020.  - Continue hydroxychloroquine 300 mg daily (Toxicity monitoring up to date; last done on 11/12/18 at the [facility]; reminded her to get yearly eye exams)   - Continue Humira 40mg SQ every 14 days  - Labs in 3-4 months: CBC, Creatinine, Hepatic Panel, ESR, CRP    2. Osteoporosis: Previously treated with Fosamax (GERD), PO Boniva (reportedly ineffective), and IV Boniva (reportedly effective). Prolia was being considered by a previous rheumatologist but not used because she wanted \"clearance\" from the patient's gastroenterologist because she has hepatitis C; I do not see a contraindication for Prolia in the setting of hepatitis C. The patient reports that her gastroenterologist reported no contraindication for Prolia either.  She had not been on osteoporosis treatment for at least 2 years before restarting Boniva.  Boniva was restarted with the first dose given on 12/5/2017.  Recheck DEXA  - Continue ergocalciferol 50,000 units twice weekly  - Calcium 1200 mg daily  - Continue Boniva 3mg IV every 3 months (she receives at the [facility])  - DEXA; advised to call to schedule  - Labs in 3-4 months: vitamin D, Ca    3. Lower back pain and left hip pain: Ms. Adhikari had a CT pelvis on 11/16/2018 that showed degenerative changes of the L-spine.  Left hip does not appear narrowed.  Improves with PT exercises, but often doesn't do them.     4. 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  She had injections by orthopedic surgery in the past with some improvement.  Repeat injections can also be considered in the future if needed. Not an issue today.     5. HBV core ab positive: On " hepatitis B prophylaxis from gastroenterology.    # Relevant labs and imaging were reviewed with the patient    # High risk medication toxicity monitoring: discussion and labs reviewed; appropriate labs ordered. See above.  Instructed that if confirmed to have COVID-19 or exposure to someone with confirmed COVID-19 to call this clinic for directions on DMARD management.    # Note that this is a virtual visit to reduce the risk of COVID-19 exposure during this current pandemic.      # Considered to be at high risk of complications from the COVID-19 virus.  It is recommended to limit contact with other people and if possible to work remotely or provide a leave of absence to reduce the risk for COVID-19.      Ms. Adhikari verbalized agreement with and understanding of the rational for the diagnosis and treatment plan.  All questions were answered to best of my ability and the patient's satisfaction. Ms. Adhikari was advised to contact the clinic with any questions that may arise after the clinic visit.      Thank you for involving me in the care of the patient    Return to clinic: 3-4 months      HPI   Niesha Adhikari is a 70 year old female with a medical history significant for hepatitis C, hemorrhoids, narcolepsy, fibromyalgia, migraines, anxiety, pernicious anemia, GERD, allergic rhinitis, and osteoporosis who presents for follow-up of inflammatory arthritis.    Ms. Adhikari was previously followed in the rheumatology clinic by Dr. Luciano and Dr. Marc.  A 10/29/2015 clinic note documents osteoporosis that was first diagnosed in 2001. Initially she was on Fosamax but was limited because of GERD. Reportedly treatment failure to oral and IV Boniva. Prolia was being considered but Dr. Luciano documents that she wanted clearance from gastroenterology because of her high hepatitis C viral load.    Regarding hepatitis C, she is followed at the Hepatology Department at [facility] in [place] by Shannon Sher and  Dr. Peres.  A letter dated 10/29/2016 from Shannon Sher states that Ms. Adhikari has completed a 12 week course of hepatitis C therapy with [facility] and she is responding to treatment, based on an undetectable hepatitis C viral load at the end of therapy.    Previously, she reported that she was doing well with hydroxychloroquine and today she said she was still tolerating it well with good control of her arthritis.    12/19/2019: Ms. Adhikari came in sooner than previously scheduled because of pain in the right hand PIPs that is worse in the AM and improves with time and activity.  Recalls right 1st CMC joint injection many years ago that helped.  Pain in the right hand is worse in the AM and improves with time and activity.  Morning stiffness for about 2 hours.     4/24/2020: she says that her entire hand is achy. Trouble gripping things in the AM.  Hand symptoms are worse in the AM.  Stiffness all day; worse in the AM. Numbness and tingling in all fingers, comes and goes; not associated with neck position; can move her fingers in and out and this will iprove.  Note that SSZ was stopped because of cytopenias.     5/29/2020: Tolerating Humira that was started in May 2020 at the same time she started tenofovir for hepatitis B reactivation prophylaxis.  Following with gastroenterology for the tenofovir.  Tolerating Humira injection so far.  Still with trouble gripping things in the morning and hand symptoms that are worse in the morning; stiffness all day that is worse in the morning.  Mild GI upset with tenofovir but this is improving she says.    Today, 9/11/2020: right 2nd PIP and DIP ache, and she says that this is where she has severely broken her finger in the distant past. Sometimes with stiffness in the right 2nd MCP.  Low back pain worse with activity; improved with rest. Constipation that requires miralax; she says that typically when this occurs she needs a colonoscopy so she is going to speak with her PCP  about this issue.  She feels like her rheumatoid redness is otherwise doing well.  She noted that she did have a fall recently where she had a bruise and some achiness but this has been improving    Denies fevers, chills, nausea, vomiting, diarrhea. No abdominal pain. No chest pain/pressure, palpitations, or shortness of breath. No LE swelling. No neck pain. No oral or nasal sores.  No rash.    Tobacco: quit in 1978  EtOH: none  Drugs: none  Occupation: retired    ROS   GEN: No fevers, chills, night sweats, or weight change  SKIN: No rash  HEENT:  No oral or nasal ulcers.  CV: No chest pain, pressure, palpitations, or dyspnea on exertion.  PULM: See HPI  GI: No nausea, vomiting, diarrhea. No blood in stool. No abdominal pain.  : No blood in urine.  MSK: See HPI.  NEURO: See HPI  EXT: No LE swelling  PSYCH: See history of present illness    Active Problem List     Patient Active Problem List   Diagnosis     Allergic rhinitis     Esophageal reflux     Pernicious anemia     Anxiety state     Migraine     Essential and other specified forms of tremor     Fibromyalgia     Moderate recurrent major depression (H)     Advanced directives, counseling/discussion     Narcolepsy     Internal hemorrhoids with other complication     AIN (anal intraepithelial neoplasia) anal canal     Sciatica     Osteoporosis     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)     Benign essential hypertension     Alcohol dependence in remission (H)     Personal history of other medical treatment     Nausea     Left hip pain     Constipation     DDD (degenerative disc disease), cervical     Past Medical History     Past Medical History:   Diagnosis Date     ABUSE BY SPOUSE/PARTNER 7/27/2005     Degeneration of lumbar or lumbosacral intervertebral disc     DDD L5/S1     HELICOBACTER PYLORI INFECTION 1/28/2005     Hepatitis C      Hypertension      Malignant neoplasm (H)     ACIN     Osteoporosis      Other and  "unspecified alcohol dependence, unspecified drinking behavior     Sober as 1/21/1987     Other malaise and fatigue      Past Surgical History     Past Surgical History:   Procedure Laterality Date     BIOPSY ANAL CANAL  1/21/13    [facility]      BREAST BIOPSY, RT/LT Left 1975    Breat Biopsy RT/LT     C NONSPECIFIC PROCEDURE  1965    Removed bone left index finger knuckle, casts broken bones     COLONOSCOPY  8/25/2009     COLONOSCOPY  2/14/2011    COLONOSCOPY performed by CRISTIN LAGUNAS at  GI     COLONOSCOPY N/A 1/8/2019    Procedure: Colonscopy, Biopsies by Biopsy;  Surgeon: Omega Talavera MD;  Location:  GI     CYSTOSCOPY  2/28/2011    CYSTOSCOPY performed by CAYLA FLOR at  OR     ENDOSCOPY  05/21/12    [facility] GI - [facility]     HC COLONOSCOPY W/WO BRUSH/WASH  08/22/05     HC UGI ENDOSCOPY DIAG W BIOPSY  10/01/09      UGI ENDOSCOPY, SIMPLE EXAM  08/08/07     HEMORRHOIDECTOMY  06/25/12    [facility]     LAPAROSCOPIC SALPINGO-OOPHORECTOMY  2/28/2011    LAPAROSCOPIC SALPINGO-OOPHORECTOMY performed by CAYLA FLOR at  OR     TONSILLECTOMY & ADENOIDECTOMY  1965     Allergy     Allergies   Allergen Reactions     Telaprevir Other (See Comments) and Rash     Rectal bleeding, anemia     Abilify Discmelt Other (See Comments)     Disoriented     Antivert [Meclizine Hcl]      Chamomile      Compazine      Cymbalta Other (See Comments)     Disoriented, trouble sleeping     Diphenhydramine Nausea     And abdominal pain     Effexor [Venlafaxine] Other (See Comments)     Disoriented, trouble sleeping     Elavil [Amitriptyline Hcl] Other (See Comments)     \"didn't feel right on it-med was stopped right away\"     Ferrous Sulfate Nausea and Vomiting     Food Difficulty breathing     cilantro     Indomethacin      indocin sensativity \"Severe h.a\"     Seasonal Allergies Other (See Comments) and Difficulty breathing     Philip Gold Aug-Sept, rag weed, sneezing     Sulfa Drugs  "     Thiopental Sodium      PENTOTHAL/rigidity and fight response     Animal Dander Difficulty breathing and Rash     sneezing,resp. distress     Bupropion Anxiety     Tylenol [Acetaminophen] Rash     Current Medication List     Current Outpatient Medications   Medication Sig     adalimumab (HUMIRA *CF*) 40 MG/0.4ML pen kit Inject 0.4 mLs (40 mg) Subcutaneous every 14 days . Hold for infection. Must also take tenofovir for hepatitis B reactivation prophylaxis.     clotrimazole (CLOTRIMAZOLE ANTI-FUNGAL) 1 % external cream APPLY TOPICALLY TWO TIMES A DAY     cycloSPORINE (RESTASIS) 0.05 % ophthalmic emulsion Place 1 drop into both eyes 2 times daily      hydroxychloroquine (PLAQUENIL) 200 MG tablet Hydroxychloroquine 200mg daily; and an additional 200mg every other day.     hydrOXYzine (ATARAX) 10 MG tablet Take 50 mg by mouth At Bedtime      IBANdronate (BONIVA) 150 MG tablet Inject 3 mg into the vein every 3 months      lisdexamfetamine (VYVANSE) 20 MG capsule Take 30 mg by mouth every morning      lisinopril (ZESTRIL) 10 MG tablet TAKE 1 TABLET (10 MG) BY MOUTH DAILY     Modafinil (PROVIGIL PO) Take 100 mg by mouth Takes 1 1/2 tabs bid (150 mg bid) morning and noon     montelukast (SINGULAIR) 10 MG tablet TAKE 1 TABLET BY MOUTH ONCE DAILY AS NEEDED SEASONALLY (AUGUST AND SEPTEMBER)     naproxen sodium (ANAPROX) 220 MG tablet Take 220-440 mg by mouth daily as needed for moderate pain     nystatin (MYCOSTATIN) 780904 UNIT/GM external powder Apply topically 3 times daily as needed     polyethylene glycol (MIRALAX) powder Take 1 capful by mouth daily as needed      Psyllium (FIBER) 0.52 G CAPS 2 tabs in am and pm     tenofovir (VIREAD) 300 MG tablet Take 1 tablet (300 mg) by mouth daily for Hep B reactivation prophylaxis     vitamin D2 (ERGOCALCIFEROL) 43970 units (1250 mcg) capsule Take 1 capsule (50,000 Units) by mouth every Monday and Friday.     No current facility-administered medications for this visit.   "        Social History   See HPI    Family History     Family History   Problem Relation Age of Onset     Hypertension Mother      Breast Cancer Mother      Coronary Artery Disease Mother      Cerebrovascular Disease Mother      Kidney Disease Mother      Hypertension Brother      Respiratory Brother         emphysema     Lipids Brother      Heart Disease Brother         stents, 12/2011; has had about 6 MIs, last one 1/2014     C.A.D. Sister         MI at age 63     Hypertension Sister      Gastrointestinal Disease Sister         gallbladder     Circulatory Sister         brain aneurysm at 63     Genitourinary Problems Sister         1 kidney/bladder     Hypertension Sister      Obesity Sister      Coronary Artery Disease Sister         had valve surgery, MI, CHF     Unknown/Adopted Paternal Uncle      Blood Disease Son         Lymes/7/11     Hypertension Son      Hypertension Father      Lymphoma Father      Glaucoma Father      Coronary Artery Disease Other 49        niece     Diabetes Other         cousin     Cerebrovascular Disease Maternal Grandmother      Cerebrovascular Disease Paternal Grandmother      Liver Cancer Cousin      Glaucoma Paternal Grandfather      Physical Exam     Temp Readings from Last 3 Encounters:   09/04/20 99.1  F (37.3  C) (Temporal)   06/05/20 98.5  F (36.9  C) (Oral)   03/04/20 98.8  F (37.1  C) (Temporal)     BP Readings from Last 5 Encounters:   09/04/20 117/66   06/05/20 134/87   03/04/20 125/73   12/19/19 136/78   12/04/19 133/76     Pulse Readings from Last 1 Encounters:   09/04/20 63     Resp Readings from Last 1 Encounters:   09/04/20 16     Estimated body mass index is 24.53 kg/m  as calculated from the following:    Height as of 9/4/20: 1.613 m (5' 3.5\").    Weight as of 9/4/20: 63.8 kg (140 lb 11.2 oz).        GEN: NAD. Healthy appearing adult.   HEENT: MMM.  Anicteric, noninjected sclera. No obvious external lesions of the ear and nose. Hearing intact.  PULM: No increased " work of breathing  MSK:  Hands and wrists without swelling.  Heberden's and Philippe's nodes present   SKIN: No rash or jaundice seen.  No bruise seen except for where she says that she had her blood drawn recently  PSYCH: Alert. Appropriate. Oriented to person, place, and time.          Labs / Imaging (select studies)     RF/CCP  Recent Labs   Lab Test 06/07/17  0955   CCPIGG 1   RHF <20     CBC  Recent Labs   Lab Test 09/04/20  0756 03/04/20  0752 02/17/20  0810 01/20/20  0741  09/26/18  0836   WBC 4.1 3.2* 3.8* 2.9*   < > 4.0   RBC 4.88 4.69 4.70 4.41   < > 4.52   HGB 14.4 14.3 14.3 13.2   < > 13.8   HCT 43.9 42.6 42.8 40.5   < > 42.3   MCV 90 91 91 92   < > 94   RDW 12.5 12.7 13.4 13.2   < > 12.9   * 160 140* 146*   < > 149*   MCH 29.5 30.5 30.4 29.9   < > 30.5   MCHC 32.8 33.6 33.4 32.6   < > 32.6   NEUTROPHIL 38.9 42.9 44.5 41.8   < > 52.8   LYMPH 31.9 38.5 35.7 37.2   < > 29.5   MONOCYTE 10.5 13.6 14.6 13.4   < > 9.6   EOSINOPHIL 17.8 3.8 4.7 7.6   < > 7.6   BASOPHIL 0.7 0.9 0.5 0.0   < > 0.5   ANEU 1.6 1.4* 1.7 1.2*   < > 2.1   ALYM 1.3 1.2 1.4 1.1   < > 1.2   FREDY 0.4 0.4 0.6 0.4   < > 0.4   AEOS  --   --  0.2 0.2  --  0.3   ABAS 0.0 0.0 0.0 0.0   < > 0.0    < > = values in this interval not displayed.     CMP  Recent Labs   Lab Test 09/04/20  0756 06/05/20  0947 03/04/20  0752 02/17/20  0810 01/20/20  0741  08/09/19  1228  12/14/18  0753   NA  --   --   --   --  138  --  142  --  140   POTASSIUM  --   --   --   --  4.2  --  4.4  --  4.7   CHLORIDE  --   --   --   --  104  --  102  --  105   CO2  --   --   --   --  30  --  30  --  30   ANIONGAP  --   --   --   --  4  --  10  --  5   GLC  --   --   --   --  75  --  86  --  84   BUN  --   --   --   --  8  --  11  --  11   CR 0.69 0.72 0.66 0.63 0.62   < > 0.58   < > 0.66   GFRESTIMATED 89 86 >90 >90 >90   < > >90   < > >90   GFRESTBLACK >90 >90 >90 >90 >90   < > >90   < > >90   ELIZABETH 9.6 9.6 9.5  --  8.8   < > 9.5   < > 9.2   BILITOTAL 0.5  --   --  0.4  0.5   < >  --    < >  --    ALBUMIN 3.7  --   --  3.9 3.7   < >  --    < >  --    PROTTOTAL 7.0  --   --  7.0 6.7*   < >  --    < >  --    ALKPHOS 78  --   --  82 75   < >  --    < >  --    AST 25  --   --  27 25   < >  --    < >  --    ALT 26  --   --  25 23   < >  --    < >  --     < > = values in this interval not displayed.     Calcium/VitaminD  Recent Labs   Lab Test 09/04/20  0756 06/05/20  0947 03/04/20  0752  12/04/19  0823  04/12/19  1109  09/26/18  0836   ELIZABETH 9.6 9.6 9.5   < > 9.1   < >  --    < > 8.8   VITDT  --   --   --   --  53  --  55  --  46    < > = values in this interval not displayed.     ESR/CRP  Recent Labs   Lab Test 09/04/20  0756 01/20/20  0741 06/30/17  0925   SED 5 6 6   CRP <2.9 <2.9 <2.9     Lipid Panel  Recent Labs   Lab Test 08/09/19  1228 11/25/16  1217   CHOL 209* 276*   TRIG 74 88   HDL 82 65   * 193*   NHDL 127 211*     Hepatitis B  Recent Labs   Lab Test 06/30/17  0925 06/07/17  0955 09/30/15  0951   AUSAB  --  0.65  --    HBCAB  --  Reactive   A reactive result indicates acute, chronic or past/resolved hepatitis B   infection.  *  --    HBCM  --  Nonreactive   A nonreactive result suggests lack of recent exposure to the virus in the   preceding 6 months.    --    HEPBANG  --  Nonreactive Nonreactive   HBQLOG Not Calculated  --   --      Hepatitis C  Recent Labs   Lab Test 01/20/20  0741 06/07/17  0955 09/30/15  0951   HCVAB  --  Reactive   A reactive result indicates one of the following 1) current HCV infection 2)   past HCV infection that has resolved or 3) false positivity. The CDC recommends   that a reactive result should be followed by Nucleic acid testing for HCV RNA.  If HCV RNA is detected, that indicates current HCV infection. If HCV RNA is not   detected, that indicates either past, resolved HCV infection, or false HCV   antibody positivity.   Assay performance characteristics have not been established for newborns,   infants, and children  * Reactive   A  reactive result indicates one of the following 1) current HCV infection 2)   past HCV infection that has resolved or 3) false positivity. The CDC recommends   that a reactive result should be followed by Nucleic acid testing for HCV RNA.  If HCV RNA is detected, that indicates current HCV infection. If HCV RNA is not   detected, that indicates either past, resolved HCV infection, or false HCV   antibody positivity.   Assay performance characteristics have not been established for newborns,   infants, and children  *   HCVRNA HCV RNA Not Detected HCV RNA Not Detected   The [employer] AmpliPrep/[employer] TaqMan HCV Test is an FDA-approved in vitro nucleic   acid amplification test for the quantitation of HCV RNA in human plasma (ETDA   plasma) or serum using the [employer] AmpliPrep Instrument for automated viral   nucleic acid extraction and the [employer] TaqMan Analyzer or [employer] TaqMan for   automated Real Time PCR amplification and detection of the viral nucleic acid   target.   Titer results are reported in International Units/mL (IU/mL) using the 1st WHO   International standard for HCV for Nucleic Acid Amplification based assays.   [phone]*     Lyme ab screening  Recent Labs   Lab Test 08/09/19  1228 05/11/17  0830 04/12/17  1211   LYMEGM 0.05 <0.01  Negative, Absence of detectable Borrelia burdorferi antibodies. A negative   result does not exclude the possibility of Borrelia burgdorferi infection. If   early Lyme disease is suspected, a second sample should be collected and tested   2 to 4 weeks later.   <0.01  Negative, Absence of detectable Borrelia burdorferi antibodies. A negative   result does not exclude the possibility of Borrelia burgdorferi infection. If   early Lyme disease is suspected, a second sample should be collected and tested   2 to 4 weeks later.       Tuberculosis Screening  Recent Labs   Lab Test 05/05/20  1322 09/30/15  0952   TBRES Negative  --    TBRSLT  --  Negative   TBAGN  --  0.05       Immunization  History     Immunization History   Administered Date(s) Administered     HEPA 04/18/2000, 09/26/2000     HPV 08/13/2012, 09/25/2012, 01/24/2013     HepB 11/15/2011, 12/19/2011     Influenza (H1N1) 01/07/2010     Influenza (High Dose) 3 valent vaccine 08/30/2018, 09/26/2019     Influenza (IIV3) PF 01/03/2005, 10/16/2006, 11/14/2007, 10/28/2008, 09/29/2009, 09/27/2010, 10/04/2011, 09/25/2012, 09/21/2015     Influenza Vaccine IM > 6 months Valent IIV4 09/26/2013, 10/06/2014, 10/06/2016, 09/08/2017     Pneumo Conj 13-V (2010&after) 10/06/2016     Pneumococcal 23 valent 11/15/2011, 10/17/2017     TD (ADULT, 7+) 04/18/2000, 07/07/2004     TDAP Vaccine (Boostrix) 09/21/2015     Tdap (Adacel,Boostrix) 05/23/2006     Zoster vaccine recombinant adjuvanted (SHINGRIX) 06/14/2018, 08/24/2018     Zoster vaccine, live 09/21/2015          Chart documentation done in part with Dragon Voice recognition Software. Although reviewed after completion, some word and grammatical error may remain.    Video-Visit Details    Type of service:  Video Visit    Video Start Time: 7:25 AM  Video End Time: 7:40 AM    Originating Location (pt. Location): Home in [place]    Distant Location (provider location):  Home    Platform used for Video Visit: Becca Cho MD

## 2020-09-14 RX ORDER — CLOTRIMAZOLE 1 %
CREAM (GRAM) TOPICAL
Qty: 28.35 G | Refills: 3 | Status: SHIPPED | OUTPATIENT
Start: 2020-09-14 | End: 2022-04-26

## 2020-11-05 ENCOUNTER — TRANSFERRED RECORDS (OUTPATIENT)
Dept: HEALTH INFORMATION MANAGEMENT | Facility: CLINIC | Age: 70
End: 2020-11-05

## 2020-11-13 ENCOUNTER — VIRTUAL VISIT (OUTPATIENT)
Dept: GASTROENTEROLOGY | Facility: CLINIC | Age: 70
End: 2020-11-13
Attending: PHYSICIAN ASSISTANT
Payer: MEDICARE

## 2020-11-13 DIAGNOSIS — R76.8 HEPATITIS B CORE ANTIBODY POSITIVE: ICD-10-CM

## 2020-11-13 DIAGNOSIS — F10.21 ALCOHOL DEPENDENCE IN REMISSION (H): Primary | ICD-10-CM

## 2020-11-13 DIAGNOSIS — M06.09 RHEUMATOID ARTHRITIS OF MULTIPLE SITES WITH NEGATIVE RHEUMATOID FACTOR (H): ICD-10-CM

## 2020-11-13 PROCEDURE — 99214 OFFICE O/P EST MOD 30 MIN: CPT | Mod: 95 | Performed by: PHYSICIAN ASSISTANT

## 2020-11-13 RX ORDER — CLONIDINE HYDROCHLORIDE 0.2 MG/1
0.2 TABLET ORAL AT BEDTIME
COMMUNITY
Start: 2020-11-04

## 2020-11-13 ASSESSMENT — PAIN SCALES - GENERAL: PAINLEVEL: SEVERE PAIN (7)
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
## 2020-11-30 DIAGNOSIS — M81.8 OTHER OSTEOPOROSIS, UNSPECIFIED PATHOLOGICAL FRACTURE PRESENCE: ICD-10-CM

## 2020-11-30 DIAGNOSIS — E55.9 VITAMIN D DEFICIENCY: ICD-10-CM

## 2020-12-01 RX ORDER — ERGOCALCIFEROL 1.25 MG/1
CAPSULE, LIQUID FILLED ORAL
Qty: 24 CAPSULE | Refills: 2 | Status: SHIPPED | OUTPATIENT
Start: 2020-12-01 | End: 2021-07-30

## 2020-12-01 NOTE — TELEPHONE ENCOUNTER
Rheumatology team: Please call to notify MsJenn Zeynep that vitamin D has been refilled.  Apolinar Cho MD  12/1/2020 6:34 AM

## 2020-12-04 ENCOUNTER — APPOINTMENT (OUTPATIENT)
Dept: LAB | Facility: CLINIC | Age: 70
End: 2020-12-04
Payer: MEDICARE

## 2020-12-04 ENCOUNTER — INFUSION THERAPY VISIT (OUTPATIENT)
Dept: INFUSION THERAPY | Facility: CLINIC | Age: 70
End: 2020-12-04
Attending: INTERNAL MEDICINE
Payer: MEDICARE

## 2020-12-04 VITALS
HEART RATE: 70 BPM | TEMPERATURE: 98.5 F | SYSTOLIC BLOOD PRESSURE: 130 MMHG | OXYGEN SATURATION: 100 % | DIASTOLIC BLOOD PRESSURE: 63 MMHG | WEIGHT: 142.6 LBS | BODY MASS INDEX: 24.86 KG/M2 | RESPIRATION RATE: 18 BRPM

## 2020-12-04 DIAGNOSIS — M81.0 AGE-RELATED OSTEOPOROSIS WITHOUT CURRENT PATHOLOGICAL FRACTURE: ICD-10-CM

## 2020-12-04 DIAGNOSIS — Z92.89 PERSONAL HISTORY OF OTHER MEDICAL TREATMENT: Primary | ICD-10-CM

## 2020-12-04 LAB
CALCIUM SERPL-MCNC: 9.7 MG/DL (ref 8.5–10.1)
CREAT SERPL-MCNC: 0.77 MG/DL (ref 0.52–1.04)
GFR SERPL CREATININE-BSD FRML MDRD: 78 ML/MIN/{1.73_M2}

## 2020-12-04 PROCEDURE — 250N000011 HC RX IP 250 OP 636: Performed by: INTERNAL MEDICINE

## 2020-12-04 PROCEDURE — 96374 THER/PROPH/DIAG INJ IV PUSH: CPT

## 2020-12-04 PROCEDURE — 82310 ASSAY OF CALCIUM: CPT | Performed by: INTERNAL MEDICINE

## 2020-12-04 PROCEDURE — 82565 ASSAY OF CREATININE: CPT | Performed by: INTERNAL MEDICINE

## 2020-12-04 PROCEDURE — 36415 COLL VENOUS BLD VENIPUNCTURE: CPT | Performed by: INTERNAL MEDICINE

## 2020-12-04 RX ORDER — IBANDRONATE SODIUM 3 MG/3 ML
3 SYRINGE (ML) INTRAVENOUS ONCE
Status: CANCELLED | OUTPATIENT
Start: 2021-03-04 | End: 2021-03-04

## 2020-12-04 RX ORDER — IBANDRONATE SODIUM 3 MG/3 ML
3 SYRINGE (ML) INTRAVENOUS ONCE
Status: COMPLETED | OUTPATIENT
Start: 2020-12-04 | End: 2020-12-04

## 2020-12-04 RX ADMIN — IBANDRONATE SODIUM 3 MG: 3 INJECTION, SOLUTION INTRAVENOUS at 09:44

## 2020-12-04 ASSESSMENT — PAIN SCALES - GENERAL: PAINLEVEL: SEVERE PAIN (6)
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
## 2020-12-17 ENCOUNTER — TRANSFERRED RECORDS (OUTPATIENT)
Dept: HEALTH INFORMATION MANAGEMENT | Facility: CLINIC | Age: 70
End: 2020-12-17

## 2020-12-18 ENCOUNTER — TRANSFERRED RECORDS (OUTPATIENT)
Dept: HEALTH INFORMATION MANAGEMENT | Facility: CLINIC | Age: 70
End: 2020-12-18

## 2021-01-06 DIAGNOSIS — M06.09 RHEUMATOID ARTHRITIS OF MULTIPLE SITES WITH NEGATIVE RHEUMATOID FACTOR (H): ICD-10-CM

## 2021-01-06 DIAGNOSIS — M81.0 AGE-RELATED OSTEOPOROSIS WITHOUT CURRENT PATHOLOGICAL FRACTURE: ICD-10-CM

## 2021-01-06 DIAGNOSIS — Z79.899 HIGH RISK MEDICATION USE: ICD-10-CM

## 2021-01-06 LAB
ALBUMIN SERPL-MCNC: 4.1 G/DL (ref 3.4–5)
ALP SERPL-CCNC: 82 U/L (ref 40–150)
ALT SERPL W P-5'-P-CCNC: 22 U/L (ref 0–50)
AST SERPL W P-5'-P-CCNC: 22 U/L (ref 0–45)
BASOPHILS # BLD AUTO: 0 10E9/L (ref 0–0.2)
BASOPHILS NFR BLD AUTO: 0.2 %
BILIRUB DIRECT SERPL-MCNC: 0.1 MG/DL (ref 0–0.2)
BILIRUB SERPL-MCNC: 0.5 MG/DL (ref 0.2–1.3)
CALCIUM SERPL-MCNC: 9.7 MG/DL (ref 8.5–10.1)
CREAT SERPL-MCNC: 0.65 MG/DL (ref 0.52–1.04)
CRP SERPL-MCNC: <2.9 MG/L (ref 0–8)
DIFFERENTIAL METHOD BLD: NORMAL
EOSINOPHIL # BLD AUTO: 0.2 10E9/L (ref 0–0.7)
EOSINOPHIL NFR BLD AUTO: 5.5 %
ERYTHROCYTE [DISTWIDTH] IN BLOOD BY AUTOMATED COUNT: 13.3 % (ref 10–15)
ERYTHROCYTE [SEDIMENTATION RATE] IN BLOOD BY WESTERGREN METHOD: 4 MM/H (ref 0–30)
GFR SERPL CREATININE-BSD FRML MDRD: >90 ML/MIN/{1.73_M2}
HCT VFR BLD AUTO: 44.9 % (ref 35–47)
HGB BLD-MCNC: 14.8 G/DL (ref 11.7–15.7)
LYMPHOCYTES # BLD AUTO: 1.6 10E9/L (ref 0.8–5.3)
LYMPHOCYTES NFR BLD AUTO: 37 %
MCH RBC QN AUTO: 29.5 PG (ref 26.5–33)
MCHC RBC AUTO-ENTMCNC: 33 G/DL (ref 31.5–36.5)
MCV RBC AUTO: 90 FL (ref 78–100)
MONOCYTES # BLD AUTO: 0.5 10E9/L (ref 0–1.3)
MONOCYTES NFR BLD AUTO: 11.2 %
NEUTROPHILS # BLD AUTO: 2 10E9/L (ref 1.6–8.3)
NEUTROPHILS NFR BLD AUTO: 46.1 %
PLATELET # BLD AUTO: 157 10E9/L (ref 150–450)
PROT SERPL-MCNC: 7.6 G/DL (ref 6.8–8.8)
RBC # BLD AUTO: 5.01 10E12/L (ref 3.8–5.2)
WBC # BLD AUTO: 4.4 10E9/L (ref 4–11)

## 2021-01-06 PROCEDURE — 82306 VITAMIN D 25 HYDROXY: CPT | Performed by: INTERNAL MEDICINE

## 2021-01-06 PROCEDURE — 85025 COMPLETE CBC W/AUTO DIFF WBC: CPT | Performed by: INTERNAL MEDICINE

## 2021-01-06 PROCEDURE — 82310 ASSAY OF CALCIUM: CPT | Performed by: INTERNAL MEDICINE

## 2021-01-06 PROCEDURE — 36415 COLL VENOUS BLD VENIPUNCTURE: CPT | Performed by: INTERNAL MEDICINE

## 2021-01-06 PROCEDURE — 80076 HEPATIC FUNCTION PANEL: CPT | Performed by: INTERNAL MEDICINE

## 2021-01-06 PROCEDURE — 86140 C-REACTIVE PROTEIN: CPT | Performed by: INTERNAL MEDICINE

## 2021-01-06 PROCEDURE — 85652 RBC SED RATE AUTOMATED: CPT | Performed by: INTERNAL MEDICINE

## 2021-01-06 PROCEDURE — 82565 ASSAY OF CREATININE: CPT | Performed by: INTERNAL MEDICINE

## 2021-01-07 LAB — DEPRECATED CALCIDIOL+CALCIFEROL SERPL-MC: 74 UG/L (ref 20–75)

## 2021-01-08 ENCOUNTER — VIRTUAL VISIT (OUTPATIENT)
Dept: RHEUMATOLOGY | Facility: CLINIC | Age: 71
End: 2021-01-08
Payer: MEDICARE

## 2021-01-08 DIAGNOSIS — M19.041 PRIMARY OSTEOARTHRITIS OF BOTH HANDS: ICD-10-CM

## 2021-01-08 DIAGNOSIS — R20.0 NUMBNESS AND TINGLING IN BOTH HANDS: ICD-10-CM

## 2021-01-08 DIAGNOSIS — R76.8 HEPATITIS B CORE ANTIBODY POSITIVE: ICD-10-CM

## 2021-01-08 DIAGNOSIS — Z79.899 HIGH RISK MEDICATION USE: ICD-10-CM

## 2021-01-08 DIAGNOSIS — R20.2 NUMBNESS AND TINGLING IN BOTH HANDS: ICD-10-CM

## 2021-01-08 DIAGNOSIS — M19.042 PRIMARY OSTEOARTHRITIS OF BOTH HANDS: ICD-10-CM

## 2021-01-08 DIAGNOSIS — M18.0 PRIMARY OSTEOARTHRITIS OF BOTH FIRST CARPOMETACARPAL JOINTS: ICD-10-CM

## 2021-01-08 DIAGNOSIS — M81.0 AGE RELATED OSTEOPOROSIS, UNSPECIFIED PATHOLOGICAL FRACTURE PRESENCE: ICD-10-CM

## 2021-01-08 DIAGNOSIS — M06.09 RHEUMATOID ARTHRITIS OF MULTIPLE SITES WITH NEGATIVE RHEUMATOID FACTOR (H): Primary | ICD-10-CM

## 2021-01-08 PROCEDURE — 99214 OFFICE O/P EST MOD 30 MIN: CPT | Mod: 95 | Performed by: INTERNAL MEDICINE

## 2021-01-08 RX ORDER — HYDROXYCHLOROQUINE SULFATE 200 MG/1
TABLET, FILM COATED ORAL
Qty: 135 TABLET | Refills: 1 | Status: SHIPPED | OUTPATIENT
Start: 2021-01-08 | End: 2021-05-06

## 2021-01-08 NOTE — PROGRESS NOTES
Niesha Adhikari  is a 70 year old year old female who is being evaluated via a billable video visit.      How would you like to obtain your AVS? [employer]  If the video visit is dropped, the invitation should be resent by: Text to cell phone: [phone]  Will anyone else be joining your video visit? No    Rheumatology Video Visit      Niesha Adhikari MRN# [id number]   YOB: 1950 Age: 70 year old      Date of visit: 1/08/21   PCP: Dr. Tanika Clark  Hepatologist: Dr. Peres at [facility] in [place]  Ophthalmology: Dr. Higgins, [facility]    Chief Complaint   Patient presents with:  Arthritis: RA    Assessment and Plan     1. Rheumatoid arthritis: Hepatitis C related arthralgias versus rheumatoid arthritis (RF and CCP negative); hepatitis C has since been cleared. Initially with symmetric synovitis on exam and morning stiffness for more than one hour. NSAIDs and Tylenol associated with rash.  She did well with hydroxychloroquine 400 mg daily for a while but more arthritis symptoms so SSZ was added but associated with cytopenia and GI upset so that was stopped.  Avoiding MTX and leflunomide because of hepatitis C hx and +HBV core.  She has now established with gastroenterology and is on tenofovir for hepatitis B reactivation prophylaxis; Humira was started 5/8/2020.  - Continue hydroxychloroquine 300 mg daily (Toxicity monitoring up to date; last done on 11/12/18 at the [facility]; reportedly she had done recently and will upload a copy to [employer] of the visit note)  - Continue Humira 40mg SQ every 14 days  - Labs in 3-4 months: CBC, Creatinine, Hepatic Panel, ESR, CRP    Recent labs reviewed and independent interpretation of these tests reveal no evidence of medication toxicity, and disease appears well controlled.  Additional labs were ordered for HIGH RISK MEDICATION TOXICITY MONITORING and disease activity evaluation.  Management options discussed and implemented after  "shared medical decision making with the patient.      2. Osteoporosis: Previously treated with Fosamax (GERD), PO Boniva (reportedly ineffective), and IV Boniva (reportedly effective). Prolia was being considered by a previous rheumatologist but not used because she wanted \"clearance\" from the patient's gastroenterologist because she has hepatitis C; I do not see a contraindication for Prolia in the setting of hepatitis C. The patient reports that her gastroenterologist reported no contraindication for Prolia either.  She had not been on osteoporosis treatment for at least 2 years before restarting Boniva.  Boniva was restarted with the first dose given on 12/5/2017.  Recheck DEXA  - Continue ergocalciferol 50,000 units twice weekly  - Calcium 1200 mg daily  - Continue Boniva 3mg IV every 3 months (she receives at the [facility])  - DEXA; advised to call to schedule and phone number provided so that she can schedule    3. Lower back pain and left hip pain: Ms. Adhikari had a CT pelvis on 11/16/2018 that showed degenerative changes of the L-spine.  Left hip does not appear narrowed.  Improves with PT exercises, but often doesn't do them.     4. 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  Discussed hand therapy and she would like a referral for this.  We also discussed the option of repeat steroid injections that were effective in the past but she says that she would like to try physical therapy first.   -Hand therapy referral    5. HBV core ab positive: On hepatitis B prophylaxis from gastroenterology.  Continue to follow with gastroenterology.    6. Carpal tunnel syndrome: Check nerve conduction study  - EMG ordered; advised that she call to schedule    # Relevant labs and imaging were reviewed with the patient    # High risk medication toxicity monitoring: discussion and labs reviewed; appropriate labs ordered. See above.  Instructed that if confirmed to have COVID-19 or " exposure to someone with confirmed COVID-19 to call this clinic for directions on DMARD management.    # Considered to be at high risk of complications from the COVID-19 virus.  It is recommended to limit contact with other people and if possible to work remotely or provide a leave of absence to reduce the risk for COVID-19.      Total minutes spent in evaluation with patient, review of pertinent lab tests and chart notes, and documentation: 25      Ms. Adhikari verbalized agreement with and understanding of the rational for the diagnosis and treatment plan.  All questions were answered to best of my ability and the patient's satisfaction. Ms. Adhikari was advised to contact the clinic with any questions that may arise after the clinic visit.      Thank you for involving me in the care of the patient    Return to clinic: 3-4 months      HPI   Niesha Adhikari is a 70 year old female with a medical history significant for hepatitis C, hemorrhoids, narcolepsy, fibromyalgia, migraines, anxiety, pernicious anemia, GERD, allergic rhinitis, and osteoporosis who presents for follow-up of inflammatory arthritis.    Ms. Adhikari was previously followed in the rheumatology clinic by Dr. Luciano and Dr. Marc.  A 10/29/2015 clinic note documents osteoporosis that was first diagnosed in 2001. Initially she was on Fosamax but was limited because of GERD. Reportedly treatment failure to oral and IV Boniva. Prolia was being considered but Dr. Luciano documents that she wanted clearance from gastroenterology because of her high hepatitis C viral load.    Regarding hepatitis C, she is followed at the Hepatology Department at [facility] in [place] by Shannon Sher and Dr. Peres.  A letter dated 10/29/2016 from Shannon Sher states that Ms. Adhikari has completed a 12 week course of hepatitis C therapy with [facility] and she is responding to treatment, based on an undetectable hepatitis C viral load at the end of  therapy.    Previously, she reported that she was doing well with hydroxychloroquine and today she said she was still tolerating it well with good control of her arthritis.    12/19/2019: Ms. Adhikari came in sooner than previously scheduled because of pain in the right hand PIPs that is worse in the AM and improves with time and activity.  Recalls right 1st CMC joint injection many years ago that helped.  Pain in the right hand is worse in the AM and improves with time and activity.  Morning stiffness for about 2 hours.     4/24/2020: she says that her entire hand is achy. Trouble gripping things in the AM.  Hand symptoms are worse in the AM.  Stiffness all day; worse in the AM. Numbness and tingling in all fingers, comes and goes; not associated with neck position; can move her fingers in and out and this will iprove.  Note that SSZ was stopped because of cytopenias.     5/29/2020: Tolerating Humira that was started in May 2020 at the same time she started tenofovir for hepatitis B reactivation prophylaxis.  Following with gastroenterology for the tenofovir.  Tolerating Humira injection so far.  Still with trouble gripping things in the morning and hand symptoms that are worse in the morning; stiffness all day that is worse in the morning.  Mild GI upset with tenofovir but this is improving she says.    9/11/2020: right 2nd PIP and DIP ache, and she says that this is where she has severely broken her finger in the distant past. Sometimes with stiffness in the right 2nd MCP.  Low back pain worse with activity; improved with rest. Constipation that requires miralax; she says that typically when this occurs she needs a colonoscopy so she is going to speak with her PCP about this issue.  She feels like her rheumatoid redness is otherwise doing well.  She noted that she did have a fall recently where she had a bruise and some achiness but this has been improving    Today, she reports that she has pain in her bilateral  first CMC joints that is worse with activity and improves with rest.  She recalls that the steroid injections of these joints the past but she does not want to repeat the steroid injections at she is interested in going to hand therapy.  Topical Voltaren gel was not effective for her hand she reports numbness and tingling in her hands, affecting all fingers of both hands and improves when she shakes out her hands.  Over time the numbness and tingling has been worsening.  Tolerating hydroxychloroquine and Humira.  No issues with Boniva.    Denies fevers, chills, nausea, vomiting, diarrhea. No abdominal pain. No chest pain/pressure, palpitations, or shortness of breath. No LE swelling. No neck pain. No oral or nasal sores.  No rash.     Tobacco: quit in 1978  EtOH: none  Drugs: none  Occupation: retired    ROS   GEN: No fevers, chills, night sweats, or weight change  SKIN: No rash  HEENT:  No oral or nasal ulcers.  CV: No chest pain, pressure, palpitations, or dyspnea on exertion.  PULM: See HPI  GI: No nausea, vomiting, diarrhea. No blood in stool. No abdominal pain.  : No blood in urine.  MSK: See HPI.  NEURO: See HPI  EXT: No LE swelling  PSYCH: See history of present illness    Active Problem List     Patient Active Problem List   Diagnosis     Allergic rhinitis     Esophageal reflux     Pernicious anemia     Anxiety state     Migraine     Essential and other specified forms of tremor     Fibromyalgia     Moderate recurrent major depression (H)     Advanced directives, counseling/discussion     Narcolepsy     Internal hemorrhoids with other complication     AIN (anal intraepithelial neoplasia) anal canal     Sciatica     Osteoporosis     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)     Benign essential hypertension     Alcohol dependence in remission (H)     Personal history of other medical treatment     Nausea     Left hip pain     Constipation     DDD (degenerative disc disease),  cervical     Past Medical History     Past Medical History:   Diagnosis Date     ABUSE BY SPOUSE/PARTNER 7/27/2005     Degeneration of lumbar or lumbosacral intervertebral disc     DDD L5/S1     HELICOBACTER PYLORI INFECTION 1/28/2005     Hepatitis C      Hypertension      Malignant neoplasm (H)     ACIN     Osteoporosis      Other and unspecified alcohol dependence, unspecified drinking behavior     Sober as 1/21/1987     Other malaise and fatigue      Past Surgical History     Past Surgical History:   Procedure Laterality Date     BIOPSY ANAL CANAL  1/21/13    [facility]      BREAST BIOPSY, RT/LT Left 1975    Breat Biopsy RT/LT     COLONOSCOPY  8/25/2009     COLONOSCOPY  2/14/2011    COLONOSCOPY performed by CRISTIN LAGUNAS at  GI     COLONOSCOPY N/A 1/8/2019    Procedure: Colonscopy, Biopsies by Biopsy;  Surgeon: Omega Talavera MD;  Location:  GI     CYSTOSCOPY  2/28/2011    CYSTOSCOPY performed by CAYLA FLOR at  OR     ENDOSCOPY  05/21/12    [facility] - [facility]     HC COLONOSCOPY W/WO BRUSH/WASH  08/22/05     HC UGI ENDOSCOPY DIAG W BIOPSY  10/01/09     HC UGI ENDOSCOPY, SIMPLE EXAM  08/08/07     HEMORRHOIDECTOMY  06/25/12    [facility]     LAPAROSCOPIC SALPINGO-OOPHORECTOMY  2/28/2011    LAPAROSCOPIC SALPINGO-OOPHORECTOMY performed by CAYLA FLOR at  OR     TONSILLECTOMY & ADENOIDECTOMY  1965     [facility] NONSPECIFIC PROCEDURE  1965    Removed bone left index finger knuckle, casts broken bones     Allergy     Allergies   Allergen Reactions     Telaprevir Other (See Comments) and Rash     Rectal bleeding, anemia     Abilify Discmelt Other (See Comments)     Disoriented     Antivert [Meclizine Hcl]      Chamomile      Compazine      Cymbalta Other (See Comments)     Disoriented, trouble sleeping     Diphenhydramine Nausea     And abdominal pain     Effexor [Venlafaxine] Other (See Comments)     Disoriented, trouble sleeping     Elavil [Amitriptyline Hcl] Other  "(See Comments)     \"didn't feel right on it-med was stopped right away\"     Ferrous Sulfate Nausea and Vomiting     Food Difficulty breathing     cilantro     Indomethacin      indocin sensativity \"Severe h.a\"     Seasonal Allergies Other (See Comments) and Difficulty breathing     Philip Gold Aug-Sept, rag weed, sneezing     Sulfa Drugs      Thiopental Sodium      PENTOTHAL/rigidity and fight response     Animal Dander Difficulty breathing and Rash     sneezing,resp. distress     Bupropion Anxiety     Tylenol [Acetaminophen] Rash     Current Medication List     Current Outpatient Medications   Medication Sig     adalimumab (HUMIRA *CF*) 40 MG/0.4ML pen kit Inject 0.4 mLs (40 mg) Subcutaneous every 14 days . Hold for infection. Must also take tenofovir for hepatitis B reactivation prophylaxis.     cloNIDine (CATAPRES) 0.2 MG tablet Take 0.1 mg by mouth daily At night     clotrimazole (LOTRIMIN) 1 % external cream APPLY TOPICALLY TWO TIMES A DAY     cycloSPORINE (RESTASIS) 0.05 % ophthalmic emulsion Place 1 drop into both eyes 2 times daily      diclofenac (VOLTAREN) 1 % topical gel 4 times daily as needed      hydroxychloroquine (PLAQUENIL) 200 MG tablet Hydroxychloroquine 200mg daily; and an additional 200mg every other day.     hydrOXYzine (ATARAX) 10 MG tablet Take 50 mg by mouth At Bedtime      IBANdronate (BONIVA) 150 MG tablet Inject 3 mg into the vein every 3 months      lisdexamfetamine (VYVANSE) 20 MG capsule Take 30 mg by mouth every morning      lisinopril (ZESTRIL) 10 MG tablet TAKE 1 TABLET (10 MG) BY MOUTH DAILY     Modafinil (PROVIGIL PO) Take 100 mg by mouth Takes 1 1/2 tabs bid (150 mg bid) morning and noon     montelukast (SINGULAIR) 10 MG tablet TAKE 1 TABLET BY MOUTH ONCE DAILY AS NEEDED SEASONALLY (AUGUST AND SEPTEMBER)     naproxen sodium (ANAPROX) 220 MG tablet Take 220-440 mg by mouth daily as needed for moderate pain     nystatin (MYCOSTATIN) 439576 UNIT/GM external powder Apply topically 3 " times daily as needed     polyethylene glycol (MIRALAX) powder Take 1 capful by mouth daily as needed      Psyllium (FIBER) 0.52 G CAPS 2 tabs in am and pm     tenofovir (VIREAD) 300 MG tablet Take 1 tablet (300 mg) by mouth daily for Hep B reactivation prophylaxis     vitamin D2 (ERGOCALCIFEROL) 78558 units (1250 mcg) capsule Take 1 capsule (50,000 Units) by mouth every Monday and Friday.     No current facility-administered medications for this visit.          Social History   See HPI    Family History     Family History   Problem Relation Age of Onset     Hypertension Mother      Breast Cancer Mother      Coronary Artery Disease Mother      Cerebrovascular Disease Mother      Kidney Disease Mother      Hypertension Brother      Respiratory Brother         emphysema     Lipids Brother      Heart Disease Brother         stents, 12/2011; has had about 6 MIs, last one 1/2014     C.A.D. Sister         MI at age 63     Hypertension Sister      Gastrointestinal Disease Sister         gallbladder     Circulatory Sister         brain aneurysm at 63     Genitourinary Problems Sister         1 kidney/bladder     Hypertension Sister      Obesity Sister      Coronary Artery Disease Sister         had valve surgery, MI, CHF     Unknown/Adopted Paternal Uncle      Blood Disease Son         Lymes/7/11     Hypertension Son      Hypertension Father      Lymphoma Father      Glaucoma Father      Coronary Artery Disease Other 49        niece     Diabetes Other         cousin     Cerebrovascular Disease Maternal Grandmother      Cerebrovascular Disease Paternal Grandmother      Liver Cancer Cousin      Glaucoma Paternal Grandfather      Physical Exam     Temp Readings from Last 3 Encounters:   12/04/20 98.5  F (36.9  C) (Oral)   09/04/20 99.1  F (37.3  C) (Temporal)   06/05/20 98.5  F (36.9  C) (Oral)     BP Readings from Last 5 Encounters:   12/04/20 130/63   09/04/20 117/66   06/05/20 134/87   03/04/20 125/73   12/19/19 136/78  "    Pulse Readings from Last 1 Encounters:   12/04/20 70     Resp Readings from Last 1 Encounters:   12/04/20 18     Estimated body mass index is 24.86 kg/m  as calculated from the following:    Height as of 9/4/20: 1.613 m (5' 3.5\").    Weight as of 12/4/20: 64.7 kg (142 lb 9.6 oz).        GEN: NAD. Healthy appearing adult.   HEENT: MMM.  Anicteric, noninjected sclera. No obvious external lesions of the ear and nose. Hearing intact.  PULM: No increased work of breathing  MSK:  Hands and wrists without swelling.  Heberden's and Philippe's nodes present   SKIN: No rash or jaundice seen.  No bruise seen except for where she says that she had her blood drawn recently  PSYCH: Alert. Appropriate. Oriented to person, place, and time.          Labs / Imaging (select studies)     RF/CCP  Recent Labs   Lab Test 06/07/17  0955   CCPIGG 1   RHF <20     CBC  Recent Labs   Lab Test 01/06/21  1303 09/04/20  0756 03/04/20  0752 02/17/20  0810 01/20/20  0741   WBC 4.4 4.1 3.2* 3.8* 2.9*   RBC 5.01 4.88 4.69 4.70 4.41   HGB 14.8 14.4 14.3 14.3 13.2   HCT 44.9 43.9 42.6 42.8 40.5   MCV 90 90 91 91 92   RDW 13.3 12.5 12.7 13.4 13.2    140* 160 140* 146*   MCH 29.5 29.5 30.5 30.4 29.9   MCHC 33.0 32.8 33.6 33.4 32.6   NEUTROPHIL 46.1 38.9 42.9 44.5 41.8   LYMPH 37.0 31.9 38.5 35.7 37.2   MONOCYTE 11.2 10.5 13.6 14.6 13.4   EOSINOPHIL 5.5 17.8 3.8 4.7 7.6   BASOPHIL 0.2 0.7 0.9 0.5 0.0   ANEU 2.0 1.6 1.4* 1.7 1.2*   ALYM 1.6 1.3 1.2 1.4 1.1   FREDY 0.5 0.4 0.4 0.6 0.4   AEOS 0.2  --   --  0.2 0.2   ABAS 0.0 0.0 0.0 0.0 0.0     CMP  Recent Labs   Lab Test 01/06/21  1303 12/04/20  0850 09/04/20  0756 02/17/20  0810 02/17/20  0810 01/20/20  0741 08/09/19  1228 08/09/19  1228 12/14/18  0753 12/14/18  0753   NA  --   --   --   --   --  138  --  142  --  140   POTASSIUM  --   --   --   --   --  4.2  --  4.4  --  4.7   CHLORIDE  --   --   --   --   --  104  --  102  --  105   CO2  --   --   --   --   --  30  --  30  --  30   ANIONGAP  -- "   --   --   --   --  4  --  10  --  5   GLC  --   --   --   --   --  75  --  86  --  84   BUN  --   --   --   --   --  8  --  11  --  11   CR 0.65 0.77 0.69   < > 0.63 0.62   < > 0.58   < > 0.66   GFRESTIMATED >90 78 89   < > >90 >90   < > >90   < > >90   GFRESTBLACK >90 >90 >90   < > >90 >90   < > >90   < > >90   ELIZABETH 9.7 9.7 9.6   < >  --  8.8   < > 9.5   < > 9.2   BILITOTAL 0.5  --  0.5  --  0.4 0.5   < >  --    < >  --    ALBUMIN 4.1  --  3.7  --  3.9 3.7   < >  --    < >  --    PROTTOTAL 7.6  --  7.0  --  7.0 6.7*   < >  --    < >  --    ALKPHOS 82  --  78  --  82 75   < >  --    < >  --    AST 22  --  25  --  27 25   < >  --    < >  --    ALT 22  --  26  --  25 23   < >  --    < >  --     < > = values in this interval not displayed.     Calcium/VitaminD  Recent Labs   Lab Test 01/06/21  1303 12/04/20  0850 09/04/20  0756 12/04/19  0823 12/04/19  0823 04/12/19  1109 04/12/19  1109   ELIZABETH 9.7 9.7 9.6   < > 9.1   < >  --    VITDT 74  --   --   --  53  --  55    < > = values in this interval not displayed.      ESR/CRP  Recent Labs   Lab Test 01/06/21  1303 09/04/20  0756 01/20/20  0741   SED 4 5 6   CRP <2.9 <2.9 <2.9     Lipid Panel  Recent Labs   Lab Test 08/09/19  1228 11/25/16  1217   CHOL 209* 276*   TRIG 74 88   HDL 82 65   * 193*   NHDL 127 211*     Hepatitis B  Recent Labs   Lab Test 06/30/17  0925 06/07/17  0955 09/30/15  0951   AUSAB  --  0.65  --    HBCAB  --  Reactive   A reactive result indicates acute, chronic or past/resolved hepatitis B   infection.  *  --    HBCM  --  Nonreactive   A nonreactive result suggests lack of recent exposure to the virus in the   preceding 6 months.    --    HEPBANG  --  Nonreactive Nonreactive   HBQLOG Not Calculated  --   --      Hepatitis C  Recent Labs   Lab Test 01/20/20  0741 06/07/17  0955 09/30/15  0951   HCVAB  --  Reactive   A reactive result indicates one of the following 1) current HCV infection 2)   past HCV infection that has resolved or 3) false  positivity. The CDC recommends   that a reactive result should be followed by Nucleic acid testing for HCV RNA.  If HCV RNA is detected, that indicates current HCV infection. If HCV RNA is not   detected, that indicates either past, resolved HCV infection, or false HCV   antibody positivity.   Assay performance characteristics have not been established for newborns,   infants, and children  * Reactive   A reactive result indicates one of the following 1) current HCV infection 2)   past HCV infection that has resolved or 3) false positivity. The CDC recommends   that a reactive result should be followed by Nucleic acid testing for HCV RNA.  If HCV RNA is detected, that indicates current HCV infection. If HCV RNA is not   detected, that indicates either past, resolved HCV infection, or false HCV   antibody positivity.   Assay performance characteristics have not been established for newborns,   infants, and children  *   HCVRNA HCV RNA Not Detected HCV RNA Not Detected   The [employer] AmpliPrep/LUIS ARMANDO TaqMan HCV Test is an FDA-approved in vitro nucleic   acid amplification test for the quantitation of HCV RNA in human plasma (ETDA   plasma) or serum using the [employer] AmpliPrep Instrument for automated viral   nucleic acid extraction and the [employer] TaqMan Analyzer or [employer] TaqMan for   automated Real Time PCR amplification and detection of the viral nucleic acid   target.   Titer results are reported in International Units/mL (IU/mL) using the 1st WHO   International standard for HCV for Nucleic Acid Amplification based assays.   [phone]*     Lyme ab screening  Recent Labs   Lab Test 08/09/19  1228 05/11/17  0830 04/12/17  1211   LYMEGM 0.05 <0.01  Negative, Absence of detectable Borrelia burdorferi antibodies. A negative   result does not exclude the possibility of Borrelia burgdorferi infection. If   early Lyme disease is suspected, a second sample should be collected and tested   2 to 4 weeks later.   <0.01  Negative,  Absence of detectable Borrelia burdorferi antibodies. A negative   result does not exclude the possibility of Borrelia burgdorferi infection. If   early Lyme disease is suspected, a second sample should be collected and tested   2 to 4 weeks later.       Tuberculosis Screening  Recent Labs   Lab Test 05/05/20  1322 09/30/15  0952   TBRES Negative  --    TBRSLT  --  Negative   TBAGN  --  0.05     Immunization History     Immunization History   Administered Date(s) Administered     HEPA 04/18/2000, 09/26/2000     HPV 08/13/2012, 09/25/2012, 01/24/2013     HepB 11/15/2011, 12/19/2011     Influenza (H1N1) 01/07/2010     Influenza (High Dose) 3 valent vaccine 08/30/2018, 09/26/2019     Influenza (IIV3) PF 01/03/2005, 10/16/2006, 11/14/2007, 10/28/2008, 09/29/2009, 09/27/2010, 10/04/2011, 09/25/2012, 09/21/2015     Influenza Vaccine IM > 6 months Valent IIV4 09/26/2013, 10/06/2014, 10/06/2016, 09/08/2017     Influenza, Quad, High Dose, Pf, 65yr + 09/14/2020     Pneumo Conj 13-V (2010&after) 10/06/2016     Pneumococcal 23 valent 11/15/2011, 10/17/2017     TD (ADULT, 7+) 04/18/2000, 07/07/2004     TDAP Vaccine (Boostrix) 09/21/2015     Tdap (Adacel,Boostrix) 05/23/2006     Zoster vaccine recombinant adjuvanted (SHINGRIX) 06/14/2018, 08/24/2018     Zoster vaccine, live 09/21/2015          Chart documentation done in part with Dragon Voice recognition Software. Although reviewed after completion, some word and grammatical error may remain.      Video-Visit Details    Type of service:  Video Visit    Video Start Time: 11:07 AM    Video End Time:11:20 AM    Originating Location (pt. Location): Home, [place]    Distant Location (provider location):  Home    Platform used for Video Visit: [employer]

## 2021-01-10 ENCOUNTER — HEALTH MAINTENANCE LETTER (OUTPATIENT)
Age: 71
End: 2021-01-10

## 2021-01-13 ENCOUNTER — HOSPITAL ENCOUNTER (OUTPATIENT)
Dept: BONE DENSITY | Facility: CLINIC | Age: 71
Discharge: HOME | End: 2021-01-13
Attending: INTERNAL MEDICINE | Admitting: INTERNAL MEDICINE
Payer: MEDICARE

## 2021-01-13 DIAGNOSIS — M81.0 AGE-RELATED OSTEOPOROSIS WITHOUT CURRENT PATHOLOGICAL FRACTURE: ICD-10-CM

## 2021-01-13 PROCEDURE — 77080 DXA BONE DENSITY AXIAL: CPT

## 2021-01-20 ENCOUNTER — MYC MEDICAL ADVICE (OUTPATIENT)
Dept: RHEUMATOLOGY | Facility: CLINIC | Age: 71
End: 2021-01-20

## 2021-01-22 ENCOUNTER — TELEPHONE (OUTPATIENT)
Dept: RHEUMATOLOGY | Facility: CLINIC | Age: 71
End: 2021-01-22

## 2021-01-22 NOTE — TELEPHONE ENCOUNTER
Dr. Cho received patient's eye exam and would like patient to continue her yearly eye exams. Called patient's mobile however there was no answer and the voicemail box was full. Called her home phone and there was no answer. Left a detailed message letting patient know we received her eye exam results and she should continue to get yearly exam.    (consent to leave message is in demographics)    Landen Richardson RN....1/22/2021 8:52 AM

## 2021-02-11 ENCOUNTER — MYC MEDICAL ADVICE (OUTPATIENT)
Dept: FAMILY MEDICINE | Facility: OTHER | Age: 71
End: 2021-02-11

## 2021-02-15 NOTE — PROGRESS NOTES
Assessment & Plan     Rib pain on right side  Discussed that whether there is a fracture or not it will take time to heal.  Expect that she should improve over the next 6 to 8 weeks.  Continue to make sure she does not eat breathing.  She is using Aleve at night for pain control.  Recommend that she take this twice a day.  She denies need for stronger pain medications.    - XR Ribs & Chest Right G/E 3 Views; Future    Benign essential hypertension  Well-controlled.  Labs drawn.  Continue lisinopril 10 mg daily.  Refills given.    Moderate recurrent major depression (H)  Follows with psychiatry and psychology.  She brought in her treatment plan and will scanned this into her chart.    Alcohol dependence in remission (H)  Remote history.    Visit for screening mammogram    - MA SCREENING DIGITAL BILAT - Future  (s+30); Future    Tanika Clark MD  [facility]     Niesha Adhikari is a 70 year old female who presents to clinic today for the following health issues:    History of Present Illness       She eats 4 or more servings of fruits and vegetables daily.She consumes 0 sweetened beverage(s) daily.She exercises with enough effort to increase her heart rate 60 or more minutes per day.  She exercises with enough effort to increase her heart rate 5 days per week. She is missing 1 dose(s) of medications per week.  She is not taking prescribed medications regularly due to other.       Concern - fall  Onset: about a week ago, did not trip, no nausea, didn't feel faint, doesn't remember falling, but remembers hitting the floor.  Got up herself and went home.  Description: fell about a week ago on her right side. Having significant pain through her right ribs and under the right breast, was hard to take off top.  Intensity: moderate, severe  Progression of Symptoms:  waxing and waning  Accompanying Signs & Symptoms: difficulty taking deep breaths without pain - has been  "breathing shallowly, elbow/hand/neck/knee pain.  Hurts to wear bra.  Hurts to bend forward.  Hard to lift aything with right arm that is more than 10#.  Previous history of similar problem: yes  Precipitating factors:        Worsened by: being active (thought she was doing better, than did more around the house and felt worse the next day)  Alleviating factors:        Improved by: Aleve  Therapies tried and outcome: Aleve.  Been trying to take deep breaths.      States she also fell 2 weeks ago, stepped down on porch step and slipped on ice and hit the middle of her back on the last step.  Had headache and sneezing afterwards.    Review of Systems   CONSTITUTIONAL: NEGATIVE for fever, chills, change in weight  RESP: NEGATIVE for significant cough or SOB  CV: NEGATIVE for chest pain, palpitations or peripheral edema      Objective    /82   Pulse 63   Temp 97.1  F (36.2  C) (Temporal)   Ht 1.613 m (5' 3.5\")   Wt 65.3 kg (144 lb)   LMP 11/27/2003   SpO2 98%   BMI 25.11 kg/m    Body mass index is 25.11 kg/m .  Physical Exam   Gen: no apparent distress  NECK: no adenopathy, no asymmetry, no masses  Chest: clear to auscultation without wheeze, rale or rhonchi.  Ecchymoses on the right lateral rib cage about the size of a thumbtack.  It is tender to palpation here as well as costal cartilage.  Cor: regular rate and rhythm without murmur  Ext: warm and dry without edema  Psych: Alert and oriented times 3; coherent speech, normal   rate and volume, able to articulate logical thoughts, able   to abstract reason, no tangential thoughts, no hallucinations   or delusions  Her affect is neutral    CXR/rib x-ray: Reviewed by myself, possible old right 10th rib fracture, radiology review pending        "

## 2021-02-16 ENCOUNTER — ANCILLARY PROCEDURE (OUTPATIENT)
Dept: GENERAL RADIOLOGY | Facility: OTHER | Age: 71
End: 2021-02-16
Attending: FAMILY MEDICINE
Payer: MEDICARE

## 2021-02-16 ENCOUNTER — OFFICE VISIT (OUTPATIENT)
Dept: FAMILY MEDICINE | Facility: OTHER | Age: 71
End: 2021-02-16
Payer: MEDICARE

## 2021-02-16 VITALS
OXYGEN SATURATION: 98 % | WEIGHT: 144 LBS | SYSTOLIC BLOOD PRESSURE: 138 MMHG | TEMPERATURE: 97.1 F | HEIGHT: 64 IN | DIASTOLIC BLOOD PRESSURE: 82 MMHG | HEART RATE: 63 BPM | BODY MASS INDEX: 24.59 KG/M2

## 2021-02-16 DIAGNOSIS — F10.21 ALCOHOL DEPENDENCE IN REMISSION (H): ICD-10-CM

## 2021-02-16 DIAGNOSIS — R07.81 RIB PAIN ON RIGHT SIDE: ICD-10-CM

## 2021-02-16 DIAGNOSIS — F33.1 MODERATE RECURRENT MAJOR DEPRESSION (H): ICD-10-CM

## 2021-02-16 DIAGNOSIS — I10 BENIGN ESSENTIAL HYPERTENSION: ICD-10-CM

## 2021-02-16 DIAGNOSIS — R07.81 RIB PAIN ON RIGHT SIDE: Primary | ICD-10-CM

## 2021-02-16 DIAGNOSIS — Z12.31 VISIT FOR SCREENING MAMMOGRAM: ICD-10-CM

## 2021-02-16 LAB
ANION GAP SERPL CALCULATED.3IONS-SCNC: 3 MMOL/L (ref 3–14)
BUN SERPL-MCNC: 10 MG/DL (ref 7–30)
CALCIUM SERPL-MCNC: 10 MG/DL (ref 8.5–10.1)
CHLORIDE SERPL-SCNC: 100 MMOL/L (ref 94–109)
CO2 SERPL-SCNC: 31 MMOL/L (ref 20–32)
CREAT SERPL-MCNC: 0.7 MG/DL (ref 0.52–1.04)
GFR SERPL CREATININE-BSD FRML MDRD: 88 ML/MIN/{1.73_M2}
GLUCOSE SERPL-MCNC: 94 MG/DL (ref 70–99)
POTASSIUM SERPL-SCNC: 4.7 MMOL/L (ref 3.4–5.3)
SODIUM SERPL-SCNC: 134 MMOL/L (ref 133–144)

## 2021-02-16 PROCEDURE — 80048 BASIC METABOLIC PNL TOTAL CA: CPT | Performed by: FAMILY MEDICINE

## 2021-02-16 PROCEDURE — 36415 COLL VENOUS BLD VENIPUNCTURE: CPT | Performed by: FAMILY MEDICINE

## 2021-02-16 PROCEDURE — 71101 X-RAY EXAM UNILAT RIBS/CHEST: CPT | Mod: RT | Performed by: RADIOLOGY

## 2021-02-16 PROCEDURE — 99214 OFFICE O/P EST MOD 30 MIN: CPT | Performed by: FAMILY MEDICINE

## 2021-02-16 RX ORDER — LISINOPRIL 10 MG/1
10 TABLET ORAL DAILY
Qty: 90 TABLET | Refills: 3 | Status: SHIPPED | OUTPATIENT
Start: 2021-02-16 | End: 2022-03-25

## 2021-02-16 ASSESSMENT — MIFFLIN-ST. JEOR: SCORE: 1155.24

## 2021-02-16 ASSESSMENT — ANXIETY QUESTIONNAIRES
1. FEELING NERVOUS, ANXIOUS, OR ON EDGE: NEARLY EVERY DAY
3. WORRYING TOO MUCH ABOUT DIFFERENT THINGS: NEARLY EVERY DAY
7. FEELING AFRAID AS IF SOMETHING AWFUL MIGHT HAPPEN: SEVERAL DAYS
GAD7 TOTAL SCORE: 12
4. TROUBLE RELAXING: SEVERAL DAYS
GAD7 TOTAL SCORE: 12
2. NOT BEING ABLE TO STOP OR CONTROL WORRYING: NEARLY EVERY DAY
6. BECOMING EASILY ANNOYED OR IRRITABLE: SEVERAL DAYS
7. FEELING AFRAID AS IF SOMETHING AWFUL MIGHT HAPPEN: SEVERAL DAYS
GAD7 TOTAL SCORE: 12
5. BEING SO RESTLESS THAT IT IS HARD TO SIT STILL: NOT AT ALL

## 2021-02-16 ASSESSMENT — PATIENT HEALTH QUESTIONNAIRE - PHQ9
SUM OF ALL RESPONSES TO PHQ QUESTIONS 1-9: 6
SUM OF ALL RESPONSES TO PHQ QUESTIONS 1-9: 6
10. IF YOU CHECKED OFF ANY PROBLEMS, HOW DIFFICULT HAVE THESE PROBLEMS MADE IT FOR YOU TO DO YOUR WORK, TAKE CARE OF THINGS AT HOME, OR GET ALONG WITH OTHER PEOPLE: SOMEWHAT DIFFICULT

## 2021-02-17 ASSESSMENT — ANXIETY QUESTIONNAIRES: GAD7 TOTAL SCORE: 12

## 2021-02-19 ENCOUNTER — MYC MEDICAL ADVICE (OUTPATIENT)
Dept: FAMILY MEDICINE | Facility: OTHER | Age: 71
End: 2021-02-19

## 2021-03-01 ENCOUNTER — MYC MEDICAL ADVICE (OUTPATIENT)
Dept: FAMILY MEDICINE | Facility: OTHER | Age: 71
End: 2021-03-01

## 2021-03-05 ENCOUNTER — INFUSION THERAPY VISIT (OUTPATIENT)
Dept: INFUSION THERAPY | Facility: CLINIC | Age: 71
End: 2021-03-05
Attending: INTERNAL MEDICINE
Payer: MEDICARE

## 2021-03-05 VITALS
OXYGEN SATURATION: 98 % | BODY MASS INDEX: 24.45 KG/M2 | RESPIRATION RATE: 16 BRPM | HEART RATE: 67 BPM | WEIGHT: 143.2 LBS | SYSTOLIC BLOOD PRESSURE: 131 MMHG | TEMPERATURE: 99 F | DIASTOLIC BLOOD PRESSURE: 73 MMHG | HEIGHT: 64 IN

## 2021-03-05 DIAGNOSIS — M81.0 AGE-RELATED OSTEOPOROSIS WITHOUT CURRENT PATHOLOGICAL FRACTURE: ICD-10-CM

## 2021-03-05 DIAGNOSIS — Z92.89 PERSONAL HISTORY OF OTHER MEDICAL TREATMENT: Primary | ICD-10-CM

## 2021-03-05 PROCEDURE — 96374 THER/PROPH/DIAG INJ IV PUSH: CPT

## 2021-03-05 PROCEDURE — 250N000011 HC RX IP 250 OP 636: Performed by: INTERNAL MEDICINE

## 2021-03-05 RX ORDER — IBANDRONATE SODIUM 3 MG/3 ML
3 SYRINGE (ML) INTRAVENOUS ONCE
Status: COMPLETED | OUTPATIENT
Start: 2021-03-05 | End: 2021-03-05

## 2021-03-05 RX ORDER — IBANDRONATE SODIUM 3 MG/3 ML
3 SYRINGE (ML) INTRAVENOUS ONCE
Status: CANCELLED | OUTPATIENT
Start: 2021-06-02 | End: 2021-06-02

## 2021-03-05 RX ADMIN — IBANDRONATE SODIUM 3 MG: 3 INJECTION, SOLUTION INTRAVENOUS at 10:27

## 2021-03-05 ASSESSMENT — PAIN SCALES - GENERAL: PAINLEVEL: MODERATE PAIN (5)

## 2021-03-05 ASSESSMENT — MIFFLIN-ST. JEOR: SCORE: 1151.61

## 2021-03-05 NOTE — PATIENT INSTRUCTIONS
Pt to return on 06/07/21 for [facility]. Copies of medication list and upcoming appointments given prior to discharge.

## 2021-03-10 ENCOUNTER — IMMUNIZATION (OUTPATIENT)
Dept: FAMILY MEDICINE | Facility: CLINIC | Age: 71
End: 2021-03-10
Payer: MEDICARE

## 2021-03-10 PROCEDURE — 0011A PR COVID VAC MODERNA 100 MCG/0.5 ML IM: CPT

## 2021-03-10 PROCEDURE — 91301 PR COVID VAC MODERNA 100 MCG/0.5 ML IM: CPT

## 2021-03-17 ENCOUNTER — MYC MEDICAL ADVICE (OUTPATIENT)
Dept: RHEUMATOLOGY | Facility: CLINIC | Age: 71
End: 2021-03-17

## 2021-03-29 ENCOUNTER — HOSPITAL ENCOUNTER (OUTPATIENT)
Dept: MAMMOGRAPHY | Facility: CLINIC | Age: 71
Discharge: HOME | End: 2021-03-29
Attending: FAMILY MEDICINE | Admitting: FAMILY MEDICINE
Payer: MEDICARE

## 2021-03-29 DIAGNOSIS — Z12.31 VISIT FOR SCREENING MAMMOGRAM: ICD-10-CM

## 2021-03-29 PROCEDURE — 77063 BREAST TOMOSYNTHESIS BI: CPT

## 2021-04-07 ENCOUNTER — IMMUNIZATION (OUTPATIENT)
Dept: FAMILY MEDICINE | Facility: CLINIC | Age: 71
End: 2021-04-07
Attending: FAMILY MEDICINE
Payer: MEDICARE

## 2021-04-07 PROCEDURE — 0012A PR COVID VAC MODERNA 100 MCG/0.5 ML IM: CPT

## 2021-04-07 PROCEDURE — 91301 PR COVID VAC MODERNA 100 MCG/0.5 ML IM: CPT
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
## 2021-04-30 ENCOUNTER — OFFICE VISIT (OUTPATIENT)
Dept: FAMILY MEDICINE | Facility: OTHER | Age: 71
End: 2021-04-30
Payer: MEDICARE

## 2021-04-30 VITALS
RESPIRATION RATE: 16 BRPM | TEMPERATURE: 98.5 F | BODY MASS INDEX: 24.92 KG/M2 | HEIGHT: 64 IN | HEART RATE: 51 BPM | DIASTOLIC BLOOD PRESSURE: 84 MMHG | WEIGHT: 146 LBS | SYSTOLIC BLOOD PRESSURE: 132 MMHG | OXYGEN SATURATION: 97 %

## 2021-04-30 DIAGNOSIS — Z00.00 ENCOUNTER FOR MEDICARE ANNUAL WELLNESS EXAM: Primary | ICD-10-CM

## 2021-04-30 DIAGNOSIS — M53.3 COCCYDYNIA: ICD-10-CM

## 2021-04-30 DIAGNOSIS — M79.671 PAIN OF RIGHT HEEL: ICD-10-CM

## 2021-04-30 DIAGNOSIS — K59.09 CHRONIC CONSTIPATION: ICD-10-CM

## 2021-04-30 DIAGNOSIS — M72.2 PLANTAR FASCIAL FIBROMATOSIS OF RIGHT FOOT: ICD-10-CM
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
## 2021-04-30 PROCEDURE — 99397 PER PM REEVAL EST PAT 65+ YR: CPT | Performed by: FAMILY MEDICINE

## 2021-04-30 PROCEDURE — 99214 OFFICE O/P EST MOD 30 MIN: CPT | Mod: 25 | Performed by: FAMILY MEDICINE

## 2021-04-30 RX ORDER — HYDROXYZINE HYDROCHLORIDE 50 MG/1
50 TABLET, FILM COATED ORAL
COMMUNITY
End: 2024-06-13

## 2021-04-30 RX ORDER — LISDEXAMFETAMINE DIMESYLATE 30 MG/1
30 CAPSULE ORAL EVERY MORNING
COMMUNITY

## 2021-04-30 ASSESSMENT — ENCOUNTER SYMPTOMS
PARESTHESIAS: 1
COUGH: 0
SHORTNESS OF BREATH: 0
HEADACHES: 0
NAUSEA: 1
HEARTBURN: 1
CONSTIPATION: 1
PALPITATIONS: 0
NERVOUS/ANXIOUS: 1
DYSURIA: 0
MYALGIAS: 1
DIARRHEA: 0
BREAST MASS: 0
HEMATURIA: 0
CHILLS: 0
HEMATOCHEZIA: 0
ARTHRALGIAS: 1
ABDOMINAL PAIN: 0
FREQUENCY: 0
JOINT SWELLING: 1
FEVER: 0
DIZZINESS: 0
EYE PAIN: 0
WEAKNESS: 1
SORE THROAT: 0

## 2021-04-30 ASSESSMENT — ANXIETY QUESTIONNAIRES
4. TROUBLE RELAXING: SEVERAL DAYS
7. FEELING AFRAID AS IF SOMETHING AWFUL MIGHT HAPPEN: SEVERAL DAYS
GAD7 TOTAL SCORE: 13
1. FEELING NERVOUS, ANXIOUS, OR ON EDGE: NEARLY EVERY DAY
2. NOT BEING ABLE TO STOP OR CONTROL WORRYING: NEARLY EVERY DAY
GAD7 TOTAL SCORE: 13
7. FEELING AFRAID AS IF SOMETHING AWFUL MIGHT HAPPEN: SEVERAL DAYS
3. WORRYING TOO MUCH ABOUT DIFFERENT THINGS: NEARLY EVERY DAY
5. BEING SO RESTLESS THAT IT IS HARD TO SIT STILL: SEVERAL DAYS
GAD7 TOTAL SCORE: 13
6. BECOMING EASILY ANNOYED OR IRRITABLE: SEVERAL DAYS

## 2021-04-30 ASSESSMENT — MIFFLIN-ST. JEOR: SCORE: 1164.31

## 2021-04-30 ASSESSMENT — PATIENT HEALTH QUESTIONNAIRE - PHQ9
SUM OF ALL RESPONSES TO PHQ QUESTIONS 1-9: 7
SUM OF ALL RESPONSES TO PHQ QUESTIONS 1-9: 7
10. IF YOU CHECKED OFF ANY PROBLEMS, HOW DIFFICULT HAVE THESE PROBLEMS MADE IT FOR YOU TO DO YOUR WORK, TAKE CARE OF THINGS AT HOME, OR GET ALONG WITH OTHER PEOPLE: VERY DIFFICULT

## 2021-04-30 ASSESSMENT — ACTIVITIES OF DAILY LIVING (ADL): CURRENT_FUNCTION: HOUSEWORK REQUIRES ASSISTANCE

## 2021-04-30 ASSESSMENT — PAIN SCALES - GENERAL: PAINLEVEL: MODERATE PAIN (4)

## 2021-05-01 ASSESSMENT — ANXIETY QUESTIONNAIRES: GAD7 TOTAL SCORE: 13

## 2021-05-01 ASSESSMENT — PATIENT HEALTH QUESTIONNAIRE - PHQ9: SUM OF ALL RESPONSES TO PHQ QUESTIONS 1-9: 7

## 2021-05-06 ENCOUNTER — OFFICE VISIT (OUTPATIENT)
Dept: RHEUMATOLOGY | Facility: CLINIC | Age: 71
End: 2021-05-06
Payer: MEDICARE

## 2021-05-06 VITALS
OXYGEN SATURATION: 100 % | DIASTOLIC BLOOD PRESSURE: 91 MMHG | SYSTOLIC BLOOD PRESSURE: 147 MMHG | HEIGHT: 64 IN | BODY MASS INDEX: 24.41 KG/M2 | HEART RATE: 71 BPM | WEIGHT: 143 LBS

## 2021-05-06 DIAGNOSIS — M81.0 AGE RELATED OSTEOPOROSIS, UNSPECIFIED PATHOLOGICAL FRACTURE PRESENCE: ICD-10-CM

## 2021-05-06 DIAGNOSIS — M06.09 RHEUMATOID ARTHRITIS OF MULTIPLE SITES WITH NEGATIVE RHEUMATOID FACTOR (H): Primary | ICD-10-CM

## 2021-05-06 PROCEDURE — 99214 OFFICE O/P EST MOD 30 MIN: CPT | Performed by: INTERNAL MEDICINE

## 2021-05-06 RX ORDER — HYDROXYCHLOROQUINE SULFATE 200 MG/1
TABLET, FILM COATED ORAL
Qty: 135 TABLET | Refills: 2 | Status: SHIPPED | OUTPATIENT
Start: 2021-05-06 | End: 2022-02-14

## 2021-05-06 RX ORDER — IBANDRONATE SODIUM 3 MG/3 ML
3 SYRINGE (ML) INTRAVENOUS ONCE
Status: CANCELLED | OUTPATIENT
Start: 2021-06-02 | End: 2021-06-02

## 2021-05-06 ASSESSMENT — MIFFLIN-ST. JEOR: SCORE: 1150.7

## 2021-05-06 NOTE — NURSING NOTE
Blood pressure could not be checked as patient left before I got to the room.  Anabella Garcia [facility]  5/6/2021 10:31 AM               RAPID3 (0-30) Cumulative Score  18.2          RAPID3 Weighted Score (divide #4 by 3 and that is the weighted score)  6.0
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
## 2021-05-06 NOTE — PATIENT INSTRUCTIONS
RHEUMATOLOGY    Dr. Apolinar Cho    [facility]  [address]  [place] [address]    Our new phone number for Rheumatology is [phone], this number will be able to help you schedule appointments for Dr. hCo or if you have any message you would like sent to us.    Thank you for choosing [facility]!    Anabella Garcia CMA Rheumatology    For your EMG please call and make and appointment:    [facility]   [address] [place] [address]  Phone number:   [phone]

## 2021-05-07 DIAGNOSIS — R76.8 HEPATITIS B CORE ANTIBODY POSITIVE: ICD-10-CM

## 2021-05-07 RX ORDER — TENOFOVIR DISOPROXIL FUMARATE 300 MG/1
300 TABLET, FILM COATED ORAL DAILY
Qty: 30 TABLET | Refills: 11 | Status: SHIPPED | OUTPATIENT
Start: 2021-05-07 | End: 2022-04-18

## 2021-05-17 ENCOUNTER — MYC MEDICAL ADVICE (OUTPATIENT)
Dept: FAMILY MEDICINE | Facility: OTHER | Age: 71
End: 2021-05-17

## 2021-05-17 NOTE — TELEPHONE ENCOUNTER
RN Triage    Patient Contact    Attempt # 1    Was call answered?  No.  Unable to leave message. Mail box is full.    [employer] message sent.    Lluvia Galicia RN on 5/17/2021 at 3:18 PM

## 2021-05-17 NOTE — TELEPHONE ENCOUNTER
A visit is advised.  Patient is having trouble breathing- she thinks this is due to her fall.    She states her reflux is worse.  No chest pain.    She has a mild headache.    An appointment was made.    Lluvia Galicia RN on 5/17/2021 at 3:58 PM

## 2021-05-19 NOTE — PROGRESS NOTES
Assessment & Plan     Gastroesophageal reflux disease without esophagitis  This seems to be brought on after having several vomiting episodes.  Her vomiting has resolved but now she is having more reflux.  She has done well with omeprazole in the past.  Omeprazole was ordered.  Discussed using Tums or Rolaids as needed.  If this does not improve her symptoms would then consider endoscopy.    - omeprazole (PRILOSEC) 20 MG DR capsule; Take 1 capsule (20 mg) by mouth daily    Constipation, unspecified constipation type  Patient can go a week in between bowel movements.  Then when she does have a bowel movement that the urge is so great that sometimes she does not make it to the bathroom.  She currently started Senokot 2 tablets in the evening I have now asked her to take 2 tablets twice a day.  If she begins having frequent loose stools she can always back off on the senna.    Bruising  Unclear etiology of the bruising of her upper abdomen.  This seemed to occur about 10 days after a fall.  Seems to be healing.  No other unusual bruising.  Will check CBC.    - CBC with platelets    Tanika Clark MD  [facility] [place]    Oksana Scherer is a 70 year old who presents for the following health issues     History of Present Illness       She eats 2-3 servings of fruits and vegetables daily.She consumes 0 sweetened beverage(s) daily.She exercises with enough effort to increase her heart rate 30 to 60 minutes per day.  She exercises with enough effort to increase her heart rate 6 days per week. She is missing 1 dose(s) of medications per week.  She is not taking prescribed medications regularly due to remembering to take.   Answers for HPI/ROS submitted by the patient on 5/21/2021   Chronic problems general questions HPI Form  If you checked off any problems, how difficult have these problems made it for you to do your work, take care of things at home, or get along with other people?:  Somewhat difficult  PHQ9 TOTAL SCORE: 4  ALFREDO 7 TOTAL SCORE: 14      GERD/Heartburn  Onset/Duration: 2 weeks  Description: burning in throat, everything tastes sour  Intensity: moderate, severe  Progression of Symptoms: worsening  Accompanying Signs & Symptoms:  Does it feel like food gets stuck or trouble swallowing: YES--gets a tickle in her throat and coughs, no trouble with food getting stuck  Nausea: YES  Vomiting (bloody?): YES- not bloody  Abdominal Pain: no  Black-Tarry stools: no  Bloody stools: no  Has chronic constipation but did have one episode recently where she lost all control of her bowels   History:  Previous similar episodes: YES - has seen Dr. Zimmer in the past but it has been many years   Previous ulcers: unsure   Precipitating factors:   Caffeine use: YES- one cup per day - has recently cut this out of diet  Alcohol use: no  NSAID/Aspirin use: Takes Aleve if needed   Tobacco use: no  Worse with spicy foods and caffeinated drinks. - trying to eat a bland diet  Alleviating factors: rest  Therapies tried and outcome:             Lifestyle changes: Trying to cut out caffeine             Medications: Cough drops    Black and blue marks around navel - did fall a couple of weeks ago (5/3) but does not recall hitting her stomach at all. Was walking dogs and they were pulling her and she lost balance and fell.    States on 5/8 ate buttered vegetables and a navel orange and felt sick and vomitted all night, then vomiting resolved but still felt ill for 3 days.  Continues to have increased acid reflux.  Sometimes acid will wake her up at night.    Still constipated, started Senna 2 tabs at night. States hadn't had bowel movement for a week and then 2 days ago felt the need to defecate, but wasn't able to make it to the restroom in time.  Now hasn't had bowel movement since then.    Found bruising around her navel, no injury to that area, so not sure where they came from.          Objective    BP  "138/74   Pulse 64   Temp 98.8  F (37.1  C) (Temporal)   Resp 16   Ht 1.613 m (5' 3.5\")   Wt 64.4 kg (142 lb)   LMP 11/27/2003   SpO2 100%   BMI 24.76 kg/m    Body mass index is 24.76 kg/m .  Physical Exam   Gen: no apparent distress  Abd:  mild tenderness in the epigastric area, without rebound, guarding, mass or organomegaly. Abdomen is soft and bowel sounds are normal.  Healing ecchymoses of her upper abdomen        "

## 2021-05-21 ENCOUNTER — OFFICE VISIT (OUTPATIENT)
Dept: FAMILY MEDICINE | Facility: OTHER | Age: 71
End: 2021-05-21
Payer: MEDICARE

## 2021-05-21 VITALS
OXYGEN SATURATION: 100 % | HEART RATE: 64 BPM | HEIGHT: 64 IN | DIASTOLIC BLOOD PRESSURE: 74 MMHG | WEIGHT: 142 LBS | BODY MASS INDEX: 24.24 KG/M2 | TEMPERATURE: 98.8 F | SYSTOLIC BLOOD PRESSURE: 138 MMHG | RESPIRATION RATE: 16 BRPM

## 2021-05-21 DIAGNOSIS — D69.6 THROMBOCYTOPENIA (H): ICD-10-CM

## 2021-05-21 DIAGNOSIS — T14.8XXA BRUISING: ICD-10-CM

## 2021-05-21 DIAGNOSIS — K21.9 GASTROESOPHAGEAL REFLUX DISEASE WITHOUT ESOPHAGITIS: Primary | ICD-10-CM

## 2021-05-21 DIAGNOSIS — K59.00 CONSTIPATION, UNSPECIFIED CONSTIPATION TYPE: ICD-10-CM

## 2021-05-21 LAB
ERYTHROCYTE [DISTWIDTH] IN BLOOD BY AUTOMATED COUNT: 13.2 % (ref 10–15)
HCT VFR BLD AUTO: 40.4 % (ref 35–47)
HGB BLD-MCNC: 13.2 G/DL (ref 11.7–15.7)
MCH RBC QN AUTO: 29.4 PG (ref 26.5–33)
MCHC RBC AUTO-ENTMCNC: 32.7 G/DL (ref 31.5–36.5)
MCV RBC AUTO: 90 FL (ref 78–100)
PLATELET # BLD AUTO: 130 10E9/L (ref 150–450)
RBC # BLD AUTO: 4.49 10E12/L (ref 3.8–5.2)
WBC # BLD AUTO: 3.6 10E9/L (ref 4–11)

## 2021-05-21 PROCEDURE — 36415 COLL VENOUS BLD VENIPUNCTURE: CPT | Performed by: FAMILY MEDICINE

## 2021-05-21 PROCEDURE — 85027 COMPLETE CBC AUTOMATED: CPT | Performed by: FAMILY MEDICINE

## 2021-05-21 PROCEDURE — 99214 OFFICE O/P EST MOD 30 MIN: CPT | Performed by: FAMILY MEDICINE

## 2021-05-21 ASSESSMENT — ANXIETY QUESTIONNAIRES
5. BEING SO RESTLESS THAT IT IS HARD TO SIT STILL: SEVERAL DAYS
7. FEELING AFRAID AS IF SOMETHING AWFUL MIGHT HAPPEN: SEVERAL DAYS
GAD7 TOTAL SCORE: 14
2. NOT BEING ABLE TO STOP OR CONTROL WORRYING: NEARLY EVERY DAY
6. BECOMING EASILY ANNOYED OR IRRITABLE: SEVERAL DAYS
3. WORRYING TOO MUCH ABOUT DIFFERENT THINGS: NEARLY EVERY DAY
7. FEELING AFRAID AS IF SOMETHING AWFUL MIGHT HAPPEN: SEVERAL DAYS
GAD7 TOTAL SCORE: 14
GAD7 TOTAL SCORE: 14
4. TROUBLE RELAXING: MORE THAN HALF THE DAYS
1. FEELING NERVOUS, ANXIOUS, OR ON EDGE: NEARLY EVERY DAY

## 2021-05-21 ASSESSMENT — MIFFLIN-ST. JEOR: SCORE: 1146.17

## 2021-05-21 ASSESSMENT — PATIENT HEALTH QUESTIONNAIRE - PHQ9
SUM OF ALL RESPONSES TO PHQ QUESTIONS 1-9: 4
SUM OF ALL RESPONSES TO PHQ QUESTIONS 1-9: 4
10. IF YOU CHECKED OFF ANY PROBLEMS, HOW DIFFICULT HAVE THESE PROBLEMS MADE IT FOR YOU TO DO YOUR WORK, TAKE CARE OF THINGS AT HOME, OR GET ALONG WITH OTHER PEOPLE: SOMEWHAT DIFFICULT

## 2021-05-22 ASSESSMENT — ANXIETY QUESTIONNAIRES: GAD7 TOTAL SCORE: 14

## 2021-05-22 ASSESSMENT — PATIENT HEALTH QUESTIONNAIRE - PHQ9: SUM OF ALL RESPONSES TO PHQ QUESTIONS 1-9: 4

## 2021-06-07 ENCOUNTER — INFUSION THERAPY VISIT (OUTPATIENT)
Dept: INFUSION THERAPY | Facility: CLINIC | Age: 71
End: 2021-06-07
Attending: INTERNAL MEDICINE
Payer: MEDICARE

## 2021-06-07 VITALS
RESPIRATION RATE: 18 BRPM | TEMPERATURE: 98.2 F | BODY MASS INDEX: 24.62 KG/M2 | SYSTOLIC BLOOD PRESSURE: 122 MMHG | WEIGHT: 141.2 LBS | DIASTOLIC BLOOD PRESSURE: 65 MMHG | OXYGEN SATURATION: 100 % | HEART RATE: 61 BPM

## 2021-06-07 DIAGNOSIS — M06.09 RHEUMATOID ARTHRITIS OF MULTIPLE SITES WITH NEGATIVE RHEUMATOID FACTOR (H): ICD-10-CM

## 2021-06-07 DIAGNOSIS — Z79.899 HIGH RISK MEDICATION USE: ICD-10-CM

## 2021-06-07 DIAGNOSIS — Z92.89 PERSONAL HISTORY OF OTHER MEDICAL TREATMENT: Primary | ICD-10-CM

## 2021-06-07 DIAGNOSIS — D69.6 THROMBOCYTOPENIA (H): ICD-10-CM

## 2021-06-07 DIAGNOSIS — M81.0 AGE-RELATED OSTEOPOROSIS WITHOUT CURRENT PATHOLOGICAL FRACTURE: ICD-10-CM

## 2021-06-07 LAB
ALBUMIN SERPL-MCNC: 3.8 G/DL (ref 3.4–5)
ALP SERPL-CCNC: 79 U/L (ref 40–150)
ALT SERPL W P-5'-P-CCNC: 20 U/L (ref 0–50)
AST SERPL W P-5'-P-CCNC: 22 U/L (ref 0–45)
BASOPHILS # BLD AUTO: 0 10E9/L (ref 0–0.2)
BASOPHILS NFR BLD AUTO: 0.7 %
BILIRUB DIRECT SERPL-MCNC: 0.2 MG/DL (ref 0–0.2)
BILIRUB SERPL-MCNC: 0.5 MG/DL (ref 0.2–1.3)
CALCIUM SERPL-MCNC: 8.7 MG/DL (ref 8.5–10.1)
CREAT SERPL-MCNC: 0.76 MG/DL (ref 0.52–1.04)
CRP SERPL-MCNC: <2.9 MG/L (ref 0–8)
DIFFERENTIAL METHOD BLD: ABNORMAL
EOSINOPHIL # BLD AUTO: 0.3 10E9/L (ref 0–0.7)
EOSINOPHIL NFR BLD AUTO: 9.5 %
ERYTHROCYTE [DISTWIDTH] IN BLOOD BY AUTOMATED COUNT: 12.7 % (ref 10–15)
ERYTHROCYTE [SEDIMENTATION RATE] IN BLOOD BY WESTERGREN METHOD: 5 MM/H (ref 0–30)
GFR SERPL CREATININE-BSD FRML MDRD: 80 ML/MIN/{1.73_M2}
HCT VFR BLD AUTO: 40 % (ref 35–47)
HGB BLD-MCNC: 13.1 G/DL (ref 11.7–15.7)
IMM GRANULOCYTES # BLD: 0 10E9/L (ref 0–0.4)
IMM GRANULOCYTES NFR BLD: 0 %
LYMPHOCYTES # BLD AUTO: 1.3 10E9/L (ref 0.8–5.3)
LYMPHOCYTES NFR BLD AUTO: 43.4 %
MCH RBC QN AUTO: 29.1 PG (ref 26.5–33)
MCHC RBC AUTO-ENTMCNC: 32.8 G/DL (ref 31.5–36.5)
MCV RBC AUTO: 89 FL (ref 78–100)
MONOCYTES # BLD AUTO: 0.4 10E9/L (ref 0–1.3)
MONOCYTES NFR BLD AUTO: 13.5 %
NEUTROPHILS # BLD AUTO: 1 10E9/L (ref 1.6–8.3)
NEUTROPHILS NFR BLD AUTO: 32.9 %
NRBC # BLD AUTO: 0 10*3/UL
NRBC BLD AUTO-RTO: 0 /100
PLATELET # BLD AUTO: 132 10E9/L (ref 150–450)
PROT SERPL-MCNC: 6.7 G/DL (ref 6.8–8.8)
RBC # BLD AUTO: 4.5 10E12/L (ref 3.8–5.2)
WBC # BLD AUTO: 3 10E9/L (ref 4–11)

## 2021-06-07 PROCEDURE — 82310 ASSAY OF CALCIUM: CPT | Performed by: INTERNAL MEDICINE

## 2021-06-07 PROCEDURE — 80076 HEPATIC FUNCTION PANEL: CPT | Performed by: INTERNAL MEDICINE

## 2021-06-07 PROCEDURE — 85025 COMPLETE CBC W/AUTO DIFF WBC: CPT | Performed by: INTERNAL MEDICINE

## 2021-06-07 PROCEDURE — 250N000011 HC RX IP 250 OP 636: Performed by: INTERNAL MEDICINE

## 2021-06-07 PROCEDURE — 85652 RBC SED RATE AUTOMATED: CPT | Performed by: INTERNAL MEDICINE

## 2021-06-07 PROCEDURE — 36415 COLL VENOUS BLD VENIPUNCTURE: CPT | Performed by: INTERNAL MEDICINE

## 2021-06-07 PROCEDURE — 82565 ASSAY OF CREATININE: CPT | Performed by: INTERNAL MEDICINE

## 2021-06-07 PROCEDURE — 86140 C-REACTIVE PROTEIN: CPT | Performed by: INTERNAL MEDICINE

## 2021-06-07 PROCEDURE — 96374 THER/PROPH/DIAG INJ IV PUSH: CPT

## 2021-06-07 RX ORDER — IBANDRONATE SODIUM 3 MG/3 ML
3 SYRINGE (ML) INTRAVENOUS ONCE
Status: COMPLETED | OUTPATIENT
Start: 2021-06-07 | End: 2021-06-07

## 2021-06-07 RX ORDER — SENNOSIDES 8.6 MG
2 TABLET ORAL 2 TIMES DAILY
COMMUNITY

## 2021-06-07 RX ORDER — IBANDRONATE SODIUM 3 MG/3 ML
3 SYRINGE (ML) INTRAVENOUS ONCE
Status: CANCELLED | OUTPATIENT
Start: 2021-08-31 | End: 2021-08-31

## 2021-06-07 RX ADMIN — IBANDRONATE SODIUM 3 MG: 3 INJECTION INTRAVENOUS at 09:11

## 2021-06-07 ASSESSMENT — PAIN SCALES - GENERAL: PAINLEVEL: MODERATE PAIN (5)

## 2021-06-07 NOTE — PROGRESS NOTES
Infusion Nursing Note:  Niesha Adhikari presents today for Zizerones.    Patient seen by provider today: No   present during visit today: Not Applicable.    Note: N/A.  Patient  did meet criteria for an asymptomatic covid-19 PCR test in infusion today. Patient  declined the covid-19 test.    Intravenous Access:  Peripheral IV placed.    Treatment Conditions:  Lab Results   Component Value Date     02/16/2021                   Lab Results   Component Value Date    POTASSIUM 4.7 02/16/2021           Lab Results   Component Value Date    MAG 1.7 12/05/2006            Lab Results   Component Value Date    CR 0.76 06/07/2021                   Lab Results   Component Value Date    ELIZABETH 8.7 06/07/2021                Lab Results   Component Value Date    BILITOTAL 0.5 06/07/2021           Lab Results   Component Value Date    ALBUMIN 3.8 06/07/2021                    Lab Results   Component Value Date    ALT 20 06/07/2021           Lab Results   Component Value Date    AST 22 06/07/2021       Corrected calcium is 8.86      Post Infusion Assessment:  Patient tolerated infusion without incident.  Site patent and intact, free from redness, edema or discomfort.  Access discontinued per protocol.       Discharge Plan:   Discharge instructions reviewed with: Patient.  Patient and/or family verbalized understanding of discharge instructions and all questions answered.  Patient discharged in stable condition accompanied by: self.  Departure Mode: Ambulatory.      Monika Garcia RN

## 2021-08-03 DIAGNOSIS — J30.2 OTHER SEASONAL ALLERGIC RHINITIS: ICD-10-CM

## 2021-08-03 NOTE — TELEPHONE ENCOUNTER
Pending Prescriptions:                       Disp   Refills    montelukast (SINGULAIR) 10 MG tablet [Phar*90 tab*0        Sig: TAKE 1 TABLET BY MOUTH ONCE DAILY AS NEEDED           SEASONALLY (AUGUST AND SEPTEMBER)    Routing refill request to provider for review/approval because:  A break in medication

## 2021-08-04 RX ORDER — MONTELUKAST SODIUM 10 MG/1
TABLET ORAL
Qty: 90 TABLET | Refills: 2 | Status: SHIPPED | OUTPATIENT
Start: 2021-08-04 | End: 2022-07-05

## 2021-08-28 ENCOUNTER — MYC MEDICAL ADVICE (OUTPATIENT)
Dept: RHEUMATOLOGY | Facility: CLINIC | Age: 71
End: 2021-08-28

## 2021-08-30 NOTE — TELEPHONE ENCOUNTER
Vaibhav Scherer,    I have scheduled you for a follow up appointment with Dr. Cho on 10/14/2, [facility] at 3:00pm    Have a good day and see you soon    Denise Garcia MA

## 2021-09-03 ENCOUNTER — IMMUNIZATION (OUTPATIENT)
Dept: FAMILY MEDICINE | Facility: CLINIC | Age: 71
End: 2021-09-03
Payer: MEDICARE

## 2021-09-03 PROCEDURE — 91301 PR COVID VAC MODERNA 100 MCG/0.5 ML IM: CPT

## 2021-09-03 PROCEDURE — 0013A PR COVID VAC MODERNA 100 MCG/0.5 ML IM: CPT

## 2021-09-08 ENCOUNTER — APPOINTMENT (OUTPATIENT)
Dept: LAB | Facility: CLINIC | Age: 71
End: 2021-09-08
Payer: MEDICARE

## 2021-09-08 ENCOUNTER — INFUSION THERAPY VISIT (OUTPATIENT)
Dept: INFUSION THERAPY | Facility: CLINIC | Age: 71
End: 2021-09-08
Attending: INTERNAL MEDICINE
Payer: MEDICARE

## 2021-09-08 VITALS
WEIGHT: 144.7 LBS | HEART RATE: 65 BPM | TEMPERATURE: 98.6 F | RESPIRATION RATE: 16 BRPM | DIASTOLIC BLOOD PRESSURE: 78 MMHG | BODY MASS INDEX: 24.7 KG/M2 | OXYGEN SATURATION: 100 % | HEIGHT: 64 IN | SYSTOLIC BLOOD PRESSURE: 141 MMHG

## 2021-09-08 DIAGNOSIS — Z92.89 PERSONAL HISTORY OF OTHER MEDICAL TREATMENT: Primary | ICD-10-CM

## 2021-09-08 DIAGNOSIS — M81.0 AGE-RELATED OSTEOPOROSIS WITHOUT CURRENT PATHOLOGICAL FRACTURE: ICD-10-CM

## 2021-09-08 LAB
CALCIUM SERPL-MCNC: 9.2 MG/DL (ref 8.5–10.1)
CREAT SERPL-MCNC: 0.78 MG/DL (ref 0.52–1.04)
GFR SERPL CREATININE-BSD FRML MDRD: 77 ML/MIN/1.73M2

## 2021-09-08 PROCEDURE — 250N000011 HC RX IP 250 OP 636: Performed by: INTERNAL MEDICINE

## 2021-09-08 PROCEDURE — 36415 COLL VENOUS BLD VENIPUNCTURE: CPT | Performed by: INTERNAL MEDICINE

## 2021-09-08 PROCEDURE — 82310 ASSAY OF CALCIUM: CPT | Performed by: INTERNAL MEDICINE

## 2021-09-08 PROCEDURE — 82565 ASSAY OF CREATININE: CPT | Performed by: INTERNAL MEDICINE

## 2021-09-08 PROCEDURE — 96374 THER/PROPH/DIAG INJ IV PUSH: CPT

## 2021-09-08 RX ORDER — IBANDRONATE SODIUM 3 MG/3 ML
3 SYRINGE (ML) INTRAVENOUS ONCE
Status: CANCELLED | OUTPATIENT
Start: 2021-11-29 | End: 2021-11-29

## 2021-09-08 RX ORDER — IBANDRONATE SODIUM 3 MG/3 ML
3 SYRINGE (ML) INTRAVENOUS ONCE
Status: COMPLETED | OUTPATIENT
Start: 2021-09-08 | End: 2021-09-08

## 2021-09-08 RX ADMIN — IBANDRONATE SODIUM 3 MG: 3 INJECTION, SOLUTION INTRAVENOUS at 09:02

## 2021-09-08 ASSESSMENT — MIFFLIN-ST. JEOR: SCORE: 1153.41

## 2021-09-08 ASSESSMENT — PAIN SCALES - GENERAL: PAINLEVEL: MILD PAIN (3)

## 2021-09-08 NOTE — PROGRESS NOTES
Infusion Nursing Note:  Niesha Adhikari presents today for Boniva.    Patient seen by provider today: No   present during visit today: Not Applicable.    Note: N/A.      Intravenous Access:  Peripheral IV placed.    Treatment Conditions:  Lab Results   Component Value Date     02/16/2021    POTASSIUM 4.7 02/16/2021    MAG 1.7 12/05/2006    CR 0.78 09/08/2021    ELIZABETH 9.2 09/08/2021    BILITOTAL 0.5 06/07/2021    ALBUMIN 3.8 06/07/2021    ALT 20 06/07/2021    AST 22 06/07/2021     Results reviewed, labs MET treatment parameters, ok to proceed with treatment.      Post Infusion Assessment:  Patient tolerated infusion without incident.  Patient observed for 15 minutes post boniva per protocol.  Site patent and intact, free from redness, edema or discomfort.  No evidence of extravasations.  Access discontinued per protocol.       Discharge Plan:   Discharge instructions reviewed with: Patient.  Patient and/or family verbalized understanding of discharge instructions and all questions answered.  Patient discharged in stable condition accompanied by: self.  Departure Mode: Ambulatory.      Monika Garcia RN

## 2021-09-10 ENCOUNTER — OFFICE VISIT (OUTPATIENT)
Dept: FAMILY MEDICINE | Facility: OTHER | Age: 71
End: 2021-09-10
Payer: MEDICARE

## 2021-09-10 VITALS
SYSTOLIC BLOOD PRESSURE: 136 MMHG | HEIGHT: 64 IN | WEIGHT: 141 LBS | TEMPERATURE: 98.3 F | RESPIRATION RATE: 20 BRPM | BODY MASS INDEX: 24.07 KG/M2 | OXYGEN SATURATION: 99 % | HEART RATE: 62 BPM | DIASTOLIC BLOOD PRESSURE: 80 MMHG

## 2021-09-10 DIAGNOSIS — R53.82 CHRONIC FATIGUE: Primary | ICD-10-CM

## 2021-09-10 LAB
ALBUMIN SERPL-MCNC: 4 G/DL (ref 3.4–5)
ALP SERPL-CCNC: 86 U/L (ref 40–150)
ALT SERPL W P-5'-P-CCNC: 25 U/L (ref 0–50)
ANION GAP SERPL CALCULATED.3IONS-SCNC: 3 MMOL/L (ref 3–14)
AST SERPL W P-5'-P-CCNC: 25 U/L (ref 0–45)
BILIRUB SERPL-MCNC: 0.6 MG/DL (ref 0.2–1.3)
BUN SERPL-MCNC: 9 MG/DL (ref 7–30)
CALCIUM SERPL-MCNC: 8.9 MG/DL (ref 8.5–10.1)
CHLORIDE BLD-SCNC: 100 MMOL/L (ref 94–109)
CO2 SERPL-SCNC: 31 MMOL/L (ref 20–32)
CREAT SERPL-MCNC: 0.74 MG/DL (ref 0.52–1.04)
ERYTHROCYTE [DISTWIDTH] IN BLOOD BY AUTOMATED COUNT: 13.6 % (ref 10–15)
GFR SERPL CREATININE-BSD FRML MDRD: 82 ML/MIN/1.73M2
GLUCOSE BLD-MCNC: 86 MG/DL (ref 70–99)
HCT VFR BLD AUTO: 40.6 % (ref 35–47)
HGB BLD-MCNC: 13.2 G/DL (ref 11.7–15.7)
MCH RBC QN AUTO: 28.4 PG (ref 26.5–33)
MCHC RBC AUTO-ENTMCNC: 32.5 G/DL (ref 31.5–36.5)
MCV RBC AUTO: 88 FL (ref 78–100)
PLATELET # BLD AUTO: 152 10E3/UL (ref 150–450)
POTASSIUM BLD-SCNC: 4.6 MMOL/L (ref 3.4–5.3)
PROT SERPL-MCNC: 7.3 G/DL (ref 6.8–8.8)
RBC # BLD AUTO: 4.64 10E6/UL (ref 3.8–5.2)
SODIUM SERPL-SCNC: 134 MMOL/L (ref 133–144)
TSH SERPL DL<=0.005 MIU/L-ACNC: 1.35 MU/L (ref 0.4–4)
VIT B12 SERPL-MCNC: 149 PG/ML (ref 193–986)
WBC # BLD AUTO: 3.8 10E3/UL (ref 4–11)

## 2021-09-10 PROCEDURE — 84443 ASSAY THYROID STIM HORMONE: CPT | Performed by: FAMILY MEDICINE

## 2021-09-10 PROCEDURE — 36415 COLL VENOUS BLD VENIPUNCTURE: CPT | Performed by: FAMILY MEDICINE

## 2021-09-10 PROCEDURE — 80053 COMPREHEN METABOLIC PANEL: CPT | Performed by: FAMILY MEDICINE

## 2021-09-10 PROCEDURE — 82607 VITAMIN B-12: CPT | Performed by: FAMILY MEDICINE

## 2021-09-10 PROCEDURE — 99213 OFFICE O/P EST LOW 20 MIN: CPT | Performed by: FAMILY MEDICINE

## 2021-09-10 PROCEDURE — 85027 COMPLETE CBC AUTOMATED: CPT | Performed by: FAMILY MEDICINE

## 2021-09-10 RX ORDER — TEMAZEPAM 7.5 MG/1
CAPSULE ORAL
COMMUNITY
Start: 2021-07-19 | End: 2021-10-14

## 2021-09-10 ASSESSMENT — MIFFLIN-ST. JEOR: SCORE: 1144.19

## 2021-09-10 ASSESSMENT — PAIN SCALES - GENERAL: PAINLEVEL: NO PAIN (0)

## 2021-09-17 ENCOUNTER — HOSPITAL ENCOUNTER (OUTPATIENT)
Dept: CARDIOLOGY | Facility: CLINIC | Age: 71
Discharge: HOME | End: 2021-09-17
Attending: FAMILY MEDICINE | Admitting: FAMILY MEDICINE
Payer: MEDICARE

## 2021-09-17 DIAGNOSIS — R53.82 CHRONIC FATIGUE: ICD-10-CM

## 2021-09-17 PROCEDURE — 93242 EXT ECG>48HR<7D RECORDING: CPT

## 2021-10-14 ENCOUNTER — OFFICE VISIT (OUTPATIENT)
Dept: RHEUMATOLOGY | Facility: CLINIC | Age: 71
End: 2021-10-14
Payer: MEDICARE

## 2021-10-14 ENCOUNTER — LAB (OUTPATIENT)
Dept: LAB | Facility: CLINIC | Age: 71
End: 2021-10-14

## 2021-10-14 VITALS
OXYGEN SATURATION: 98 % | BODY MASS INDEX: 24.21 KG/M2 | WEIGHT: 141.8 LBS | HEIGHT: 64 IN | HEART RATE: 76 BPM | SYSTOLIC BLOOD PRESSURE: 163 MMHG | DIASTOLIC BLOOD PRESSURE: 88 MMHG

## 2021-10-14 DIAGNOSIS — M81.8 OTHER OSTEOPOROSIS, UNSPECIFIED PATHOLOGICAL FRACTURE PRESENCE: ICD-10-CM

## 2021-10-14 DIAGNOSIS — E55.9 VITAMIN D DEFICIENCY: ICD-10-CM

## 2021-10-14 DIAGNOSIS — Z79.899 HIGH RISK MEDICATION USE: ICD-10-CM

## 2021-10-14 DIAGNOSIS — M06.09 RHEUMATOID ARTHRITIS OF MULTIPLE SITES WITH NEGATIVE RHEUMATOID FACTOR (H): ICD-10-CM

## 2021-10-14 DIAGNOSIS — E55.9 VITAMIN D DEFICIENCY: Primary | ICD-10-CM

## 2021-10-14 PROCEDURE — 36415 COLL VENOUS BLD VENIPUNCTURE: CPT

## 2021-10-14 PROCEDURE — 82306 VITAMIN D 25 HYDROXY: CPT

## 2021-10-14 PROCEDURE — 86481 TB AG RESPONSE T-CELL SUSP: CPT

## 2021-10-14 PROCEDURE — 99214 OFFICE O/P EST MOD 30 MIN: CPT | Performed by: INTERNAL MEDICINE

## 2021-10-14 ASSESSMENT — MIFFLIN-ST. JEOR: SCORE: 1147.88

## 2021-10-14 NOTE — PATIENT INSTRUCTIONS
RHEUMATOLOGY    Dr. Apolinar Cho    [address] [place] [address]  Phone number: [phone]  Fax number: [phone]    Thank you for choosing [facility]!    Anabella Garcia CMA Rheumatology

## 2021-10-14 NOTE — NURSING NOTE
Blood pressure rechecked after visit    163/88  Monika Powell MA                              RAPID3 (0-30) Cumulative Score  9.3          RAPID3 Weighted Score (divide #4 by 3 and that is the weighted score)  3.1
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
## 2021-10-15 LAB — DEPRECATED CALCIDIOL+CALCIFEROL SERPL-MC: 70 UG/L (ref 20–75)

## 2021-10-16 LAB
GAMMA INTERFERON BACKGROUND BLD IA-ACNC: 0.05 IU/ML
M TB IFN-G BLD-IMP: NEGATIVE
M TB IFN-G CD4+ BCKGRND COR BLD-ACNC: 9.95 IU/ML
MITOGEN IGNF BCKGRD COR BLD-ACNC: -0.01 IU/ML
MITOGEN IGNF BCKGRD COR BLD-ACNC: 0 IU/ML
QUANTIFERON MITOGEN: 10 IU/ML
QUANTIFERON NIL TUBE: 0.05 IU/ML
QUANTIFERON TB1 TUBE: 0.04 IU/ML
QUANTIFERON TB2 TUBE: 0.05

## 2021-11-16 DIAGNOSIS — K21.9 GASTROESOPHAGEAL REFLUX DISEASE WITHOUT ESOPHAGITIS: ICD-10-CM

## 2021-11-16 NOTE — TELEPHONE ENCOUNTER
"Pending Prescriptions:                       Disp   Refills    omeprazole (PRILOSEC) 20 MG DR capsule [Ph*90 cap*1        Sig: Take 1 capsule (20 mg) by mouth daily    Routing refill request to provider for review/approval because:  Requested Prescriptions   Pending Prescriptions Disp Refills    omeprazole (PRILOSEC) 20 MG DR capsule [Pharmacy Med Name: OMEPRAZOLE 20MG CPDR] 90 capsule 1     Sig: Take 1 capsule (20 mg) by mouth daily        PPI Protocol Failed - 11/16/2021  7:33 AM        Failed - No diagnosis of osteoporosis on record        Passed - Not on Clopidogrel (unless Pantoprazole ordered)        Passed - Recent (12 mo) or future (30 days) visit within the authorizing provider's specialty     Patient has had an office visit with the authorizing provider or a provider within the authorizing providers department within the previous 12 mos or has a future within next 30 days. See \"Patient Info\" tab in inbasket, or \"Choose Columns\" in Meds & Orders section of the refill encounter.              Passed - Medication is active on med list        Passed - Patient is age 18 or older        Passed - No active pregnacy on record        Passed - No positive pregnancy test in past 12 months                  "

## 2021-12-08 ENCOUNTER — INFUSION THERAPY VISIT (OUTPATIENT)
Dept: INFUSION THERAPY | Facility: CLINIC | Age: 71
End: 2021-12-08
Attending: INTERNAL MEDICINE
Payer: MEDICARE

## 2021-12-08 ENCOUNTER — LAB (OUTPATIENT)
Dept: LAB | Facility: CLINIC | Age: 71
End: 2021-12-08
Payer: MEDICARE

## 2021-12-08 VITALS
HEIGHT: 64 IN | TEMPERATURE: 98.2 F | RESPIRATION RATE: 65 BRPM | WEIGHT: 142.4 LBS | OXYGEN SATURATION: 99 % | DIASTOLIC BLOOD PRESSURE: 83 MMHG | BODY MASS INDEX: 24.31 KG/M2 | HEART RATE: 65 BPM | SYSTOLIC BLOOD PRESSURE: 136 MMHG

## 2021-12-08 DIAGNOSIS — M81.0 AGE-RELATED OSTEOPOROSIS WITHOUT CURRENT PATHOLOGICAL FRACTURE: ICD-10-CM

## 2021-12-08 DIAGNOSIS — R53.83 FATIGUE, UNSPECIFIED TYPE: ICD-10-CM

## 2021-12-08 DIAGNOSIS — Z92.89 PERSONAL HISTORY OF OTHER MEDICAL TREATMENT: Primary | ICD-10-CM

## 2021-12-08 LAB
CALCIUM SERPL-MCNC: 9 MG/DL (ref 8.5–10.1)
CREAT SERPL-MCNC: 0.76 MG/DL (ref 0.52–1.04)
ERYTHROCYTE [DISTWIDTH] IN BLOOD BY AUTOMATED COUNT: 13 % (ref 10–15)
GFR SERPL CREATININE-BSD FRML MDRD: 80 ML/MIN/1.73M2
HCT VFR BLD AUTO: 41.4 % (ref 35–47)
HGB BLD-MCNC: 13.2 G/DL (ref 11.7–15.7)
MCH RBC QN AUTO: 27.7 PG (ref 26.5–33)
MCHC RBC AUTO-ENTMCNC: 31.9 G/DL (ref 31.5–36.5)
MCV RBC AUTO: 87 FL (ref 78–100)
PLATELET # BLD AUTO: 145 10E3/UL (ref 150–450)
RBC # BLD AUTO: 4.77 10E6/UL (ref 3.8–5.2)
VIT B12 SERPL-MCNC: 677 PG/ML (ref 193–986)
WBC # BLD AUTO: 3 10E3/UL (ref 4–11)

## 2021-12-08 PROCEDURE — 82607 VITAMIN B-12: CPT

## 2021-12-08 PROCEDURE — 82310 ASSAY OF CALCIUM: CPT | Performed by: INTERNAL MEDICINE

## 2021-12-08 PROCEDURE — 82565 ASSAY OF CREATININE: CPT | Performed by: INTERNAL MEDICINE

## 2021-12-08 PROCEDURE — 96374 THER/PROPH/DIAG INJ IV PUSH: CPT

## 2021-12-08 PROCEDURE — 250N000011 HC RX IP 250 OP 636: Performed by: INTERNAL MEDICINE

## 2021-12-08 PROCEDURE — 85027 COMPLETE CBC AUTOMATED: CPT

## 2021-12-08 PROCEDURE — 36415 COLL VENOUS BLD VENIPUNCTURE: CPT | Performed by: INTERNAL MEDICINE

## 2021-12-08 RX ORDER — IBANDRONATE SODIUM 3 MG/3 ML
3 SYRINGE (ML) INTRAVENOUS ONCE
Status: COMPLETED | OUTPATIENT
Start: 2021-12-08 | End: 2021-12-08

## 2021-12-08 RX ORDER — IBANDRONATE SODIUM 3 MG/3 ML
3 SYRINGE (ML) INTRAVENOUS ONCE
Status: CANCELLED | OUTPATIENT
Start: 2022-03-07 | End: 2022-03-07

## 2021-12-08 RX ADMIN — IBANDRONATE SODIUM 3 MG: 3 INJECTION, SOLUTION INTRAVENOUS at 09:07

## 2021-12-08 ASSESSMENT — PAIN SCALES - GENERAL: PAINLEVEL: SEVERE PAIN (6)

## 2021-12-08 ASSESSMENT — MIFFLIN-ST. JEOR: SCORE: 1142.98

## 2022-01-04 ENCOUNTER — E-VISIT (OUTPATIENT)
Dept: FAMILY MEDICINE | Facility: OTHER | Age: 72
End: 2022-01-04
Payer: COMMERCIAL

## 2022-01-04 DIAGNOSIS — Z20.822 SUSPECTED COVID-19 VIRUS INFECTION: Primary | ICD-10-CM

## 2022-01-04 PROCEDURE — 99421 OL DIG E/M SVC 5-10 MIN: CPT | Performed by: FAMILY MEDICINE

## 2022-01-05 NOTE — PATIENT INSTRUCTIONS
Niesha,    I agree that you should be tested for Covid.  You should be able to schedule testing through [facility].    Will I be tested for COVID-19?  We would like to test you for COVID-19 virus. I have placed orders for this test.     To schedule: go to your [facility] home page and scroll down to the section that says  You have an appointment that needs to be scheduled  and click the large green button that says  Schedule Now  and follow the steps to find the next available openings.    If you are unable to complete these [facility] scheduling steps, please call [phone] to schedule your testing.     Return to work/school/ guidance:  Please let your workplace manager and staffing office know when your isolation ends.       If you receive a positive COVID-19 test result, follow the guidance of the those who are giving you the results. Usually the return to work is 10 days from symptom onset or positive test date, (or in some cases 20 days if you are immunocompromised). If your symptoms started after your positive test, the 10 days should start when your symptoms started.   o If you work at [employer], you must also be cleared by Employee Occupational Health and Safety to return to work.      If you receive a negative COVID-19 test result and did not have a high risk exposure to someone with a known positive COVID-19 test, you can return to work once you're free of fever for 24 hours without fever-reducing medication and your symptoms are improving or resolved.    If you receive a negative COVID-19 test and had a high-risk exposure to someone who has tested positive for COVID-19 then you can return to work 14 days after your last contact with the positive individual. Follow quarantine guidance given by your doctor or public health officials.     Sign up for Get[employer] Loop:  We know it's scary to hear that you might have COVID-19. We want to track your symptoms to make sure you're okay over the next 2 weeks.  Please look for an email from [employer]--this is a free, online program that we'll use to keep in touch. To sign up, follow the link in the email you will receive. Learn more at [email or web address]    How can I take care of myself?  Over the counter medications may help with your symptoms like congestion, cough, chills, or fever.    There are not many effective prescription treatments for early COVID-19. Hydroxychloroquine, ivermectin, and azithromycin are not effective or recommended for COVID-19.    If your symptoms started in the last 10 days, you may be able to receive a treatment with monoclonal antibodies. This treatment can lower your risk of severe illness and going to the hospital. It is given through an IV or under your skin (subcutaneous) and must be given at an infusion center. You must be 12 or older, weight at least 88 pounds, and have a positive COVID-19 test.     If you would like to sign up to be considered to receive the monoclonal antibody medicine, please complete a participation form through the [facility] here: MNRAP ([email or web address]/[email or web address]). You may also call the [facility] COVID-19 Public Hotline at 1-867.957.5666 (open Mon-Fri: 9am-7pm and Sat: 10am-6pm).     Not all people who are eligible will receive the medicine, since supply is limited. You will be contacted in the next 1 to 2 business days only if you are selected. If you do not receive a call, you have not been selected to receive the medicine. If you have any questions about this medication, please contact your primary care provider. For more information, see [email or web address]/diseases/[email or web address]/meds.[email or web address]      Get lots of rest. Drink extra fluids (unless a doctor has told you not to)    Take Tylenol (acetaminophen) or ibuprofen for fever or pain. If you have liver or kidney problems, ask your family doctor if it's okay to take Tylenol o  ibuprofen    Take over the counter medications for your symptoms, as directed by your doctor. You may also talk to your pharmacist.      If you have other health problems (like cancer, heart failure, an organ transplant or severe kidney disease): Call your specialty clinic if you don't feel better in the next 2 days.    Know when to call 911. Emergency warning signs include:  o Trouble breathing or shortness of breath  o Pain or pressure in the chest that doesn't go away  o Feeling confused like you haven't felt before, or not being able to wake up  o Bluish-colored lips or face    Where can I get more information?    [facility] - About COVID-19: [email or web address]     CDC - What to Do If You're Sick:     www.cdc.gov/coronavirus/2019-ncov/about/steps-when-sick.html    CDC - Ending Home Isolation:  https://www.cdc.gov/coronavirus/2019-ncov/your-health/quarantine-isolation.html    CDC - Caring for Someone:  www.cdc.gov/coronavirus/2019-ncov/if-you-are-sick/care-for-someone.html    [facility] clinical trials (COVID-19 research studies): clinicalaffairs.[facility]-clinical-trials    Below are the COVID-19 hotlines at the [facility] ([facility]). Interpreters are available.  o For health questions: Call 895-624-9116 or 1-712.996.7093 (7 a.m. to 7 p.m.)  o For questions about schools and childcare: Call 322-812-7342 or 1-266.336.8711 (7 a.m. to 7 p.m.)

## 2022-01-11 ENCOUNTER — LAB (OUTPATIENT)
Dept: URGENT CARE | Facility: URGENT CARE | Age: 72
End: 2022-01-11
Attending: FAMILY MEDICINE
Payer: COMMERCIAL

## 2022-01-11 DIAGNOSIS — Z20.822 SUSPECTED COVID-19 VIRUS INFECTION: ICD-10-CM

## 2022-01-11 PROCEDURE — U0005 INFEC AGEN DETEC AMPLI PROBE: HCPCS

## 2022-01-11 PROCEDURE — U0003 INFECTIOUS AGENT DETECTION BY NUCLEIC ACID (DNA OR RNA); SEVERE ACUTE RESPIRATORY SYNDROME CORONAVIRUS 2 (SARS-COV-2) (CORONAVIRUS DISEASE [COVID-19]), AMPLIFIED PROBE TECHNIQUE, MAKING USE OF HIGH THROUGHPUT TECHNOLOGIES AS DESCRIBED BY CMS-2020-01-R: HCPCS

## 2022-01-12 LAB — SARS-COV-2 RNA RESP QL NAA+PROBE: NEGATIVE

## 2022-01-26 ENCOUNTER — TELEPHONE (OUTPATIENT)
Dept: RHEUMATOLOGY | Facility: CLINIC | Age: 72
End: 2022-01-26
Payer: COMMERCIAL

## 2022-01-26 DIAGNOSIS — E55.9 VITAMIN D DEFICIENCY: ICD-10-CM

## 2022-01-26 DIAGNOSIS — M81.8 OTHER OSTEOPOROSIS, UNSPECIFIED PATHOLOGICAL FRACTURE PRESENCE: ICD-10-CM

## 2022-01-26 NOTE — TELEPHONE ENCOUNTER
Reason for Call:  Medication or medication refill:    Do you use a [facility]?  Name of the pharmacy and phone number for the current request:   [facility] - [phone]    Name of the medication requested: Vitamin D2    Other request: n/a    Can we leave a detailed message on this number? YES    Phone number patient can be reached at: Home number on file [phone] (home)    Best Time: Any time    Call taken on 1/26/2022 at 4:47 PM by Jose Chavez

## 2022-01-27 RX ORDER — ERGOCALCIFEROL 1.25 MG/1
CAPSULE, LIQUID FILLED ORAL
Qty: 24 CAPSULE | Refills: 1 | Status: SHIPPED | OUTPATIENT
Start: 2022-01-27 | End: 2022-06-15

## 2022-01-27 NOTE — TELEPHONE ENCOUNTER
Per Dr. Cho's visit note from 10/14/21, patient was to continue ergocalciferol 50,000 units twice weekly. RN refilled prescription per [facility] protocol.    Landen GONZALES RN....1/27/2022 8:25 AM

## 2022-02-01 ENCOUNTER — TRANSFERRED RECORDS (OUTPATIENT)
Dept: HEALTH INFORMATION MANAGEMENT | Facility: CLINIC | Age: 72
End: 2022-02-01
Payer: COMMERCIAL

## 2022-02-14 ENCOUNTER — OFFICE VISIT (OUTPATIENT)
Dept: RHEUMATOLOGY | Facility: CLINIC | Age: 72
End: 2022-02-14
Payer: COMMERCIAL

## 2022-02-14 VITALS
OXYGEN SATURATION: 99 % | DIASTOLIC BLOOD PRESSURE: 80 MMHG | HEART RATE: 80 BPM | WEIGHT: 142.8 LBS | HEIGHT: 64 IN | RESPIRATION RATE: 16 BRPM | BODY MASS INDEX: 24.38 KG/M2 | SYSTOLIC BLOOD PRESSURE: 148 MMHG

## 2022-02-14 DIAGNOSIS — M06.09 RHEUMATOID ARTHRITIS OF MULTIPLE SITES WITH NEGATIVE RHEUMATOID FACTOR (H): Primary | ICD-10-CM

## 2022-02-14 DIAGNOSIS — M81.8 OTHER OSTEOPOROSIS, UNSPECIFIED PATHOLOGICAL FRACTURE PRESENCE: ICD-10-CM

## 2022-02-14 DIAGNOSIS — Z79.899 HIGH RISK MEDICATION USE: ICD-10-CM

## 2022-02-14 LAB
ALBUMIN SERPL-MCNC: 3.9 G/DL (ref 3.4–5)
ALP SERPL-CCNC: 107 U/L (ref 40–150)
ALT SERPL W P-5'-P-CCNC: 23 U/L (ref 0–50)
AST SERPL W P-5'-P-CCNC: 27 U/L (ref 0–45)
BASOPHILS # BLD AUTO: 0 10E3/UL (ref 0–0.2)
BASOPHILS NFR BLD AUTO: 0 %
BILIRUB DIRECT SERPL-MCNC: 0.2 MG/DL (ref 0–0.2)
BILIRUB SERPL-MCNC: 0.5 MG/DL (ref 0.2–1.3)
CALCIUM SERPL-MCNC: 9.5 MG/DL (ref 8.5–10.1)
CREAT SERPL-MCNC: 0.79 MG/DL (ref 0.52–1.04)
CRP SERPL-MCNC: <2.9 MG/L (ref 0–8)
DEPRECATED CALCIDIOL+CALCIFEROL SERPL-MC: 65 UG/L (ref 20–75)
EOSINOPHIL # BLD AUTO: 0.3 10E3/UL (ref 0–0.7)
EOSINOPHIL NFR BLD AUTO: 8 %
ERYTHROCYTE [DISTWIDTH] IN BLOOD BY AUTOMATED COUNT: 14.4 % (ref 10–15)
ERYTHROCYTE [SEDIMENTATION RATE] IN BLOOD BY WESTERGREN METHOD: 6 MM/HR (ref 0–30)
GFR SERPL CREATININE-BSD FRML MDRD: 80 ML/MIN/1.73M2
HCT VFR BLD AUTO: 39.8 % (ref 35–47)
HGB BLD-MCNC: 12.3 G/DL (ref 11.7–15.7)
LYMPHOCYTES # BLD AUTO: 1.1 10E3/UL (ref 0.8–5.3)
LYMPHOCYTES NFR BLD AUTO: 27 %
MCH RBC QN AUTO: 27.2 PG (ref 26.5–33)
MCHC RBC AUTO-ENTMCNC: 30.9 G/DL (ref 31.5–36.5)
MCV RBC AUTO: 88 FL (ref 78–100)
MONOCYTES # BLD AUTO: 0.4 10E3/UL (ref 0–1.3)
MONOCYTES NFR BLD AUTO: 9 %
NEUTROPHILS # BLD AUTO: 2.2 10E3/UL (ref 1.6–8.3)
NEUTROPHILS NFR BLD AUTO: 56 %
PLATELET # BLD AUTO: 139 10E3/UL (ref 150–450)
PROT SERPL-MCNC: 6.9 G/DL (ref 6.8–8.8)
RBC # BLD AUTO: 4.53 10E6/UL (ref 3.8–5.2)
WBC # BLD AUTO: 3.9 10E3/UL (ref 4–11)

## 2022-02-14 PROCEDURE — 85025 COMPLETE CBC W/AUTO DIFF WBC: CPT | Performed by: INTERNAL MEDICINE

## 2022-02-14 PROCEDURE — 85652 RBC SED RATE AUTOMATED: CPT | Performed by: INTERNAL MEDICINE

## 2022-02-14 PROCEDURE — 99214 OFFICE O/P EST MOD 30 MIN: CPT | Performed by: INTERNAL MEDICINE

## 2022-02-14 PROCEDURE — 82306 VITAMIN D 25 HYDROXY: CPT | Performed by: INTERNAL MEDICINE

## 2022-02-14 PROCEDURE — 82310 ASSAY OF CALCIUM: CPT | Performed by: INTERNAL MEDICINE

## 2022-02-14 PROCEDURE — 36415 COLL VENOUS BLD VENIPUNCTURE: CPT | Performed by: INTERNAL MEDICINE

## 2022-02-14 PROCEDURE — 80076 HEPATIC FUNCTION PANEL: CPT | Performed by: INTERNAL MEDICINE

## 2022-02-14 PROCEDURE — 86140 C-REACTIVE PROTEIN: CPT | Performed by: INTERNAL MEDICINE

## 2022-02-14 PROCEDURE — 82565 ASSAY OF CREATININE: CPT | Performed by: INTERNAL MEDICINE

## 2022-02-14 RX ORDER — HYDROXYCHLOROQUINE SULFATE 200 MG/1
TABLET, FILM COATED ORAL
Qty: 135 TABLET | Refills: 2 | Status: SHIPPED | OUTPATIENT
Start: 2022-02-14 | End: 2022-06-15

## 2022-02-14 RX ORDER — IBANDRONATE SODIUM 3 MG/3 ML
3 SYRINGE (ML) INTRAVENOUS ONCE
Status: CANCELLED | OUTPATIENT
Start: 2022-03-08 | End: 2022-03-08

## 2022-02-14 ASSESSMENT — MIFFLIN-ST. JEOR: SCORE: 1144.8

## 2022-02-14 NOTE — NURSING NOTE
Blood pressure rechecked after visit    148/80  Anabella Garcia CMA Rheumatology  2/14/2022 11:07 AM                              RAPID3 (0-30) Cumulative Score  13.3          RAPID3 Weighted Score (divide #4 by 3 and that is the weighted score)  4.4

## 2022-02-14 NOTE — PATIENT INSTRUCTIONS
RHEUMATOLOGY    Dr. Apolinar Cho    [address] [place] [address]  Phone number: [phone]  Fax number: [phone]      Thank you for choosing [facility]!    Anabella Garcia CMA Rheumatology    -----------------    A 4th dose of the mRNA COVID-19 vaccination is recommended to be received 5 months after the third mRNA COVID-19 vaccination was received.     [place]:  [phone]
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
## 2022-03-02 ENCOUNTER — IMMUNIZATION (OUTPATIENT)
Dept: FAMILY MEDICINE | Facility: CLINIC | Age: 72
End: 2022-03-02
Payer: COMMERCIAL

## 2022-03-02 PROCEDURE — 0064A COVID-19,PF,MODERNA (18+ YRS BOOSTER .25ML): CPT

## 2022-03-02 PROCEDURE — 91306 COVID-19,PF,MODERNA (18+ YRS BOOSTER .25ML): CPT

## 2022-03-09 ENCOUNTER — APPOINTMENT (OUTPATIENT)
Dept: LAB | Facility: CLINIC | Age: 72
End: 2022-03-09
Payer: COMMERCIAL

## 2022-03-09 ENCOUNTER — INFUSION THERAPY VISIT (OUTPATIENT)
Dept: INFUSION THERAPY | Facility: CLINIC | Age: 72
End: 2022-03-09
Attending: INTERNAL MEDICINE
Payer: COMMERCIAL

## 2022-03-09 VITALS
RESPIRATION RATE: 18 BRPM | WEIGHT: 141 LBS | TEMPERATURE: 98.3 F | BODY MASS INDEX: 24.59 KG/M2 | SYSTOLIC BLOOD PRESSURE: 141 MMHG | HEART RATE: 64 BPM | DIASTOLIC BLOOD PRESSURE: 83 MMHG | OXYGEN SATURATION: 99 %

## 2022-03-09 DIAGNOSIS — M81.0 AGE-RELATED OSTEOPOROSIS WITHOUT CURRENT PATHOLOGICAL FRACTURE: ICD-10-CM

## 2022-03-09 DIAGNOSIS — Z92.89 PERSONAL HISTORY OF OTHER MEDICAL TREATMENT: Primary | ICD-10-CM

## 2022-03-09 LAB
CALCIUM SERPL-MCNC: 9.3 MG/DL (ref 8.5–10.1)
CREAT SERPL-MCNC: 0.69 MG/DL (ref 0.52–1.04)
GFR SERPL CREATININE-BSD FRML MDRD: >90 ML/MIN/1.73M2

## 2022-03-09 PROCEDURE — 82310 ASSAY OF CALCIUM: CPT | Performed by: INTERNAL MEDICINE

## 2022-03-09 PROCEDURE — 250N000011 HC RX IP 250 OP 636: Performed by: INTERNAL MEDICINE

## 2022-03-09 PROCEDURE — 96374 THER/PROPH/DIAG INJ IV PUSH: CPT

## 2022-03-09 PROCEDURE — 82565 ASSAY OF CREATININE: CPT | Performed by: INTERNAL MEDICINE

## 2022-03-09 PROCEDURE — 36415 COLL VENOUS BLD VENIPUNCTURE: CPT | Performed by: INTERNAL MEDICINE

## 2022-03-09 RX ORDER — IBANDRONATE SODIUM 3 MG/3 ML
3 SYRINGE (ML) INTRAVENOUS ONCE
Status: COMPLETED | OUTPATIENT
Start: 2022-03-09 | End: 2022-03-09

## 2022-03-09 RX ORDER — IBANDRONATE SODIUM 3 MG/3 ML
3 SYRINGE (ML) INTRAVENOUS ONCE
Status: CANCELLED | OUTPATIENT
Start: 2022-06-06 | End: 2022-06-06

## 2022-03-09 RX ADMIN — IBANDRONATE SODIUM 3 MG: 3 INJECTION, SOLUTION INTRAVENOUS at 09:21

## 2022-03-09 ASSESSMENT — PAIN SCALES - GENERAL: PAINLEVEL: SEVERE PAIN (6)

## 2022-03-09 NOTE — PROGRESS NOTES
"Infusion Nursing Note:  Niesha Adhikari presents today for Boniva.    Patient seen by provider today: No   present during visit today: Not Applicable.    Note: Arrives alert and ambulatory for appt. Patient reports experiencing extreme fatigue lately. Hand  impaired, Moderate pain currently \"5\"/10 in her back, tailbone and hips. Very dry, cracked skin. Noticing increased B/P at times. Takes Lisinopril but is planning to have a checkup with her primary provider soon. B/P 141/83 today, Pulse 64. Afebrile. Also busy having her house remodeled and is busy at home.       Intravenous Access:  Peripheral IV placed.    Treatment Conditions:  Lab Results   Component Value Date     09/10/2021    POTASSIUM 4.6 09/10/2021    MAG 1.7 12/05/2006    CR 0.69 03/09/2022    ELIZABETH 9.3 03/09/2022    BILITOTAL 0.5 02/14/2022    ALBUMIN 3.9 02/14/2022    ALT 23 02/14/2022    AST 27 02/14/2022     Results reviewed, labs MET treatment parameters, ok to proceed with treatment.      Post Infusion Assessment:  Patient tolerated IVP injection without incident.  Blood return noted pre and post infusion.  Site patent and intact, free from redness, edema or discomfort.  No evidence of extravasations.  PIV access discontinued per protocol.       Discharge Plan:   Copy of AVS reviewed with patient. Patient will return 6/10 for next appointment.  Patient discharged in stable condition accompanied by: self.  Departure Mode: Ambulatory.      Veronica Christensen RN                    "

## 2022-03-24 DIAGNOSIS — I10 BENIGN ESSENTIAL HYPERTENSION: Primary | ICD-10-CM

## 2022-03-25 RX ORDER — LISINOPRIL 10 MG/1
10 TABLET ORAL DAILY
Qty: 90 TABLET | Refills: 0 | Status: SHIPPED | OUTPATIENT
Start: 2022-03-25 | End: 2022-07-05

## 2022-03-25 NOTE — TELEPHONE ENCOUNTER
Pending Prescriptions:                       Disp   Refills    lisinopril (ZESTRIL) 10 MG tablet [Pharmac*90 tab*3        Sig: Take 1 tablet (10 mg) by mouth daily    Routing refill request to provider for review/approval because:  Labs out of range:    BP Readings from Last 3 Encounters:   03/09/22 (!) 141/83   02/14/22 (!) 148/80   12/08/21 136/83

## 2022-04-18 DIAGNOSIS — R76.8 HEPATITIS B CORE ANTIBODY POSITIVE: ICD-10-CM

## 2022-04-18 DIAGNOSIS — R76.8 HEPATITIS B CORE ANTIBODY POSITIVE: Primary | ICD-10-CM

## 2022-04-18 RX ORDER — TENOFOVIR DISOPROXIL FUMARATE 300 MG/1
300 TABLET, FILM COATED ORAL DAILY
Qty: 90 TABLET | Refills: 0 | Status: SHIPPED | OUTPATIENT
Start: 2022-04-18 | End: 2022-05-16

## 2022-04-22 DIAGNOSIS — L30.4 INTERTRIGO: ICD-10-CM

## 2022-04-22 DIAGNOSIS — B37.2 CUTANEOUS CANDIDIASIS: ICD-10-CM

## 2022-04-26 RX ORDER — CLOTRIMAZOLE 1 %
CREAM (GRAM) TOPICAL
Qty: 30 G | Refills: 3 | Status: SHIPPED | OUTPATIENT
Start: 2022-04-26 | End: 2024-09-04

## 2022-04-26 RX ORDER — NYSTATIN 100000 [USP'U]/G
POWDER TOPICAL 3 TIMES DAILY PRN
Qty: 60 G | Refills: 1 | Status: SHIPPED | OUTPATIENT
Start: 2022-04-26 | End: 2024-09-04

## 2022-04-26 NOTE — TELEPHONE ENCOUNTER
I have not seen patient for 12 years, I will not approve.  Looks like TC has seen her most recently and is listed as PCP  Rosa Cuellar MD

## 2022-04-26 NOTE — TELEPHONE ENCOUNTER
Pending Prescriptions:                       Disp   Refills    clotrimazole (LOTRIMIN) 1 % external cream*30 g   3        Sig: APPLY TOPICALLY TWO TIMES A DAY    Routing refill request to provider for review/approval because:  Drug not on the [facility] refill protocol     Requested Prescriptions   Pending Prescriptions Disp Refills    clotrimazole (LOTRIMIN) 1 % external cream [Pharmacy Med Name: CLOTRIMAZOLE 1% CREA] 30 g 3     Sig: APPLY TOPICALLY TWO TIMES A DAY        There is no refill protocol information for this order

## 2022-04-27 ENCOUNTER — VIRTUAL VISIT (OUTPATIENT)
Dept: GASTROENTEROLOGY | Facility: CLINIC | Age: 72
End: 2022-04-27
Attending: PHYSICIAN ASSISTANT
Payer: COMMERCIAL

## 2022-04-27 DIAGNOSIS — M06.09 RHEUMATOID ARTHRITIS OF MULTIPLE SITES WITH NEGATIVE RHEUMATOID FACTOR (H): ICD-10-CM

## 2022-04-27 DIAGNOSIS — R79.9 ABNORMAL FINDING OF BLOOD CHEMISTRY, UNSPECIFIED: ICD-10-CM

## 2022-04-27 DIAGNOSIS — Z51.81 ENCOUNTER FOR THERAPEUTIC DRUG MONITORING: ICD-10-CM

## 2022-04-27 DIAGNOSIS — R76.8 HEPATITIS B CORE ANTIBODY POSITIVE: Primary | ICD-10-CM

## 2022-04-27 PROCEDURE — 99214 OFFICE O/P EST MOD 30 MIN: CPT | Mod: 95 | Performed by: PHYSICIAN ASSISTANT

## 2022-04-27 ASSESSMENT — PAIN SCALES - GENERAL: PAINLEVEL: MODERATE PAIN (4)

## 2022-04-27 NOTE — Clinical Note
Please help schedule FibroScan at her next convenience. Follow up visit in one year.   Thanks, Tristan
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
## 2022-05-16 ENCOUNTER — LAB (OUTPATIENT)
Dept: LAB | Facility: OTHER | Age: 72
End: 2022-05-16
Payer: COMMERCIAL

## 2022-05-16 DIAGNOSIS — M06.09 RHEUMATOID ARTHRITIS OF MULTIPLE SITES WITH NEGATIVE RHEUMATOID FACTOR (H): ICD-10-CM

## 2022-05-16 DIAGNOSIS — R79.9 ABNORMAL FINDING OF BLOOD CHEMISTRY, UNSPECIFIED: ICD-10-CM

## 2022-05-16 DIAGNOSIS — R76.8 HEPATITIS B CORE ANTIBODY POSITIVE: ICD-10-CM

## 2022-05-16 DIAGNOSIS — Z51.81 ENCOUNTER FOR THERAPEUTIC DRUG MONITORING: ICD-10-CM

## 2022-05-16 LAB
ALBUMIN SERPL-MCNC: 3.8 G/DL (ref 3.4–5)
ALP SERPL-CCNC: 110 U/L (ref 40–150)
ALT SERPL W P-5'-P-CCNC: 22 U/L (ref 0–50)
ANION GAP SERPL CALCULATED.3IONS-SCNC: 4 MMOL/L (ref 3–14)
AST SERPL W P-5'-P-CCNC: 22 U/L (ref 0–45)
BILIRUB DIRECT SERPL-MCNC: <0.1 MG/DL (ref 0–0.2)
BILIRUB SERPL-MCNC: 0.4 MG/DL (ref 0.2–1.3)
BUN SERPL-MCNC: 13 MG/DL (ref 7–30)
CALCIUM SERPL-MCNC: 8.9 MG/DL (ref 8.5–10.1)
CHLORIDE BLD-SCNC: 109 MMOL/L (ref 94–109)
CO2 SERPL-SCNC: 29 MMOL/L (ref 20–32)
CREAT SERPL-MCNC: 0.64 MG/DL (ref 0.52–1.04)
ERYTHROCYTE [DISTWIDTH] IN BLOOD BY AUTOMATED COUNT: 14.6 % (ref 10–15)
FERRITIN SERPL-MCNC: 8 NG/ML (ref 8–252)
GFR SERPL CREATININE-BSD FRML MDRD: >90 ML/MIN/1.73M2
GLUCOSE BLD-MCNC: 89 MG/DL (ref 70–99)
HCT VFR BLD AUTO: 38.1 % (ref 35–47)
HGB BLD-MCNC: 12 G/DL (ref 11.7–15.7)
IRON SATN MFR SERPL: 12 % (ref 15–46)
IRON SERPL-MCNC: 48 UG/DL (ref 35–180)
MAGNESIUM SERPL-MCNC: 2.1 MG/DL (ref 1.6–2.3)
MCH RBC QN AUTO: 27.8 PG (ref 26.5–33)
MCHC RBC AUTO-ENTMCNC: 31.5 G/DL (ref 31.5–36.5)
MCV RBC AUTO: 88 FL (ref 78–100)
PLATELET # BLD AUTO: 121 10E3/UL (ref 150–450)
POTASSIUM BLD-SCNC: 4.3 MMOL/L (ref 3.4–5.3)
PROT SERPL-MCNC: 6.6 G/DL (ref 6.8–8.8)
RBC # BLD AUTO: 4.31 10E6/UL (ref 3.8–5.2)
SODIUM SERPL-SCNC: 142 MMOL/L (ref 133–144)
TIBC SERPL-MCNC: 393 UG/DL (ref 240–430)
WBC # BLD AUTO: 3.1 10E3/UL (ref 4–11)

## 2022-05-16 PROCEDURE — 85027 COMPLETE CBC AUTOMATED: CPT

## 2022-05-16 PROCEDURE — 83550 IRON BINDING TEST: CPT

## 2022-05-16 PROCEDURE — 82248 BILIRUBIN DIRECT: CPT

## 2022-05-16 PROCEDURE — 87517 HEPATITIS B DNA QUANT: CPT

## 2022-05-16 PROCEDURE — 83735 ASSAY OF MAGNESIUM: CPT

## 2022-05-16 PROCEDURE — 82728 ASSAY OF FERRITIN: CPT

## 2022-05-16 PROCEDURE — 80053 COMPREHEN METABOLIC PANEL: CPT

## 2022-05-16 PROCEDURE — 36415 COLL VENOUS BLD VENIPUNCTURE: CPT

## 2022-05-16 RX ORDER — TENOFOVIR DISOPROXIL FUMARATE 300 MG/1
300 TABLET, FILM COATED ORAL DAILY
Qty: 90 TABLET | Refills: 3 | Status: SHIPPED | OUTPATIENT
Start: 2022-05-16 | End: 2023-06-12

## 2022-05-17 LAB — HBV DNA SERPL NAA+PROBE-ACNC: NOT DETECTED IU/ML

## 2022-05-23 ENCOUNTER — TELEPHONE (OUTPATIENT)
Dept: GASTROENTEROLOGY | Facility: CLINIC | Age: 72
End: 2022-05-23
Payer: COMMERCIAL

## 2022-05-23 DIAGNOSIS — K62.82 AIN (ANAL INTRAEPITHELIAL NEOPLASIA) ANAL CANAL: Primary | ICD-10-CM

## 2022-05-23 NOTE — TELEPHONE ENCOUNTER
Called patient to discuss iron panel revealing drop in ferritin again.     Let her know Gremalachi recommends either an oral supplement or IV iron. Patient states prefers IV iron due to chronic constipation.  Let her know she should discuss with her PCP IV iron.    Let her know Tristan also recommends she be evaluated by colorectal surgery as she has not been evaluated since 2013  Patient agrees.  Referral placed.     Skylar MARMOLEJO LPN  Hepatology Clinic             
Unknown if ever smoked

## 2022-05-31 ENCOUNTER — TELEPHONE (OUTPATIENT)
Dept: SURGERY | Facility: CLINIC | Age: 72
End: 2022-05-31
Payer: COMMERCIAL

## 2022-05-31 NOTE — TELEPHONE ENCOUNTER
Main number wouldn't go through. Called mobile, voice mailbox full. Will send mychart for scheduling:  With: Charlotte Wells NP   Referring Provider: Tristan Chua PA-C   For / Appt Notes: micro- hx of dysplasia in 2014   Appt Type: [employer]   Appt Date/Time: next available

## 2022-06-04 ENCOUNTER — HEALTH MAINTENANCE LETTER (OUTPATIENT)
Age: 72
End: 2022-06-04

## 2022-06-10 ENCOUNTER — INFUSION THERAPY VISIT (OUTPATIENT)
Dept: INFUSION THERAPY | Facility: CLINIC | Age: 72
End: 2022-06-10
Attending: INTERNAL MEDICINE
Payer: COMMERCIAL

## 2022-06-10 ENCOUNTER — APPOINTMENT (OUTPATIENT)
Dept: LAB | Facility: CLINIC | Age: 72
End: 2022-06-10
Payer: COMMERCIAL

## 2022-06-10 VITALS
TEMPERATURE: 98.9 F | DIASTOLIC BLOOD PRESSURE: 68 MMHG | BODY MASS INDEX: 23.92 KG/M2 | RESPIRATION RATE: 16 BRPM | HEART RATE: 57 BPM | OXYGEN SATURATION: 96 % | WEIGHT: 137.2 LBS | SYSTOLIC BLOOD PRESSURE: 121 MMHG

## 2022-06-10 DIAGNOSIS — Z92.89 PERSONAL HISTORY OF OTHER MEDICAL TREATMENT: ICD-10-CM

## 2022-06-10 DIAGNOSIS — M06.09 RHEUMATOID ARTHRITIS OF MULTIPLE SITES WITH NEGATIVE RHEUMATOID FACTOR (H): Primary | ICD-10-CM

## 2022-06-10 DIAGNOSIS — M81.0 AGE-RELATED OSTEOPOROSIS WITHOUT CURRENT PATHOLOGICAL FRACTURE: ICD-10-CM

## 2022-06-10 LAB
CALCIUM SERPL-MCNC: 9.4 MG/DL (ref 8.5–10.1)
CREAT SERPL-MCNC: 0.72 MG/DL (ref 0.52–1.04)
GFR SERPL CREATININE-BSD FRML MDRD: 89 ML/MIN/1.73M2

## 2022-06-10 PROCEDURE — 96374 THER/PROPH/DIAG INJ IV PUSH: CPT

## 2022-06-10 PROCEDURE — 82565 ASSAY OF CREATININE: CPT | Performed by: INTERNAL MEDICINE

## 2022-06-10 PROCEDURE — 258N000003 HC RX IP 258 OP 636: Performed by: INTERNAL MEDICINE

## 2022-06-10 PROCEDURE — 82310 ASSAY OF CALCIUM: CPT | Performed by: INTERNAL MEDICINE

## 2022-06-10 PROCEDURE — 36415 COLL VENOUS BLD VENIPUNCTURE: CPT | Performed by: INTERNAL MEDICINE

## 2022-06-10 PROCEDURE — 250N000011 HC RX IP 250 OP 636: Performed by: INTERNAL MEDICINE

## 2022-06-10 RX ORDER — IBANDRONATE SODIUM 3 MG/3 ML
3 SYRINGE (ML) INTRAVENOUS ONCE
Status: CANCELLED | OUTPATIENT
Start: 2022-09-05 | End: 2022-09-05

## 2022-06-10 RX ORDER — IBANDRONATE SODIUM 3 MG/3 ML
3 SYRINGE (ML) INTRAVENOUS ONCE
Status: COMPLETED | OUTPATIENT
Start: 2022-06-10 | End: 2022-06-10

## 2022-06-10 RX ADMIN — IBANDRONATE SODIUM 3 MG: 3 INJECTION, SOLUTION INTRAVENOUS at 08:58

## 2022-06-10 RX ADMIN — SODIUM CHLORIDE 250 ML: 9 INJECTION, SOLUTION INTRAVENOUS at 08:49

## 2022-06-10 ASSESSMENT — PAIN SCALES - GENERAL: PAINLEVEL: MODERATE PAIN (4)

## 2022-06-10 NOTE — PROGRESS NOTES
Infusion Nursing Note:  Niesha Adhikari presents today for Boniva.    Patient seen by provider today: No   present during visit today: Not Applicable.    Note: Pt here today for Boniva infusion.  Labs drawn prior to arrival on unit. Pt's main complaint today is extreme fatigue.  States she feels tired throughout the day, even in the morning after she has just slept. Tries not to take naps because then she doesn't sleep well at night.  States she exercises daily and has been able to get out in her yard and do some yard work.     Intravenous Access:  Peripheral IV placed.    Treatment Conditions:  Lab Results   Component Value Date     05/16/2022    POTASSIUM 4.3 05/16/2022    MAG 2.1 05/16/2022    CR 0.72 06/10/2022    ELIZABETH 9.4 06/10/2022    BILITOTAL 0.4 05/16/2022    ALBUMIN 3.8 05/16/2022    ALT 22 05/16/2022    AST 22 05/16/2022     Results reviewed, labs MET treatment parameters, ok to proceed with treatment.    Post Infusion Assessment:  Patient tolerated infusion without incident.  Patient observed for 30 minutes post Boniva per protocol.  Site patent and intact, free from redness, edema or discomfort.  No evidence of extravasations.  Access discontinued per protocol.     Discharge Plan:   Patient discharged in stable condition accompanied by: self.  Departure Mode: Ambulatory.      Debbie Beltran RN

## 2022-06-13 ENCOUNTER — TELEPHONE (OUTPATIENT)
Dept: RHEUMATOLOGY | Facility: CLINIC | Age: 72
End: 2022-06-13
Payer: COMMERCIAL

## 2022-06-13 NOTE — TELEPHONE ENCOUNTER
Patient is wondering if she is able to change her appointment on 6/15 to a video visit. She is having car trouble and unable to make an in person visit.

## 2022-06-15 ENCOUNTER — VIRTUAL VISIT (OUTPATIENT)
Dept: RHEUMATOLOGY | Facility: CLINIC | Age: 72
End: 2022-06-15
Payer: COMMERCIAL

## 2022-06-15 DIAGNOSIS — R20.2 NUMBNESS AND TINGLING IN BOTH HANDS: ICD-10-CM

## 2022-06-15 DIAGNOSIS — M06.09 RHEUMATOID ARTHRITIS OF MULTIPLE SITES WITH NEGATIVE RHEUMATOID FACTOR (H): Primary | ICD-10-CM

## 2022-06-15 DIAGNOSIS — M81.8 OTHER OSTEOPOROSIS, UNSPECIFIED PATHOLOGICAL FRACTURE PRESENCE: ICD-10-CM

## 2022-06-15 DIAGNOSIS — R20.0 NUMBNESS AND TINGLING IN BOTH HANDS: ICD-10-CM

## 2022-06-15 DIAGNOSIS — E55.9 VITAMIN D DEFICIENCY: ICD-10-CM

## 2022-06-15 PROCEDURE — 99214 OFFICE O/P EST MOD 30 MIN: CPT | Mod: 95 | Performed by: INTERNAL MEDICINE

## 2022-06-15 RX ORDER — ERGOCALCIFEROL 1.25 MG/1
CAPSULE, LIQUID FILLED ORAL
Qty: 24 CAPSULE | Refills: 1 | Status: SHIPPED | OUTPATIENT
Start: 2022-06-15 | End: 2022-12-16

## 2022-06-15 RX ORDER — HYDROXYCHLOROQUINE SULFATE 200 MG/1
TABLET, FILM COATED ORAL
Qty: 135 TABLET | Refills: 2 | Status: SHIPPED | OUTPATIENT
Start: 2022-06-15 | End: 2023-01-31

## 2022-06-15 NOTE — PATIENT INSTRUCTIONS
RHEUMATOLOGY    Dr. Apolinar Cho    [address] [place] [address]  Phone number: [phone]  Fax number: [phone]      Thank you for choosing [facility]!    Anabella Garcia CMA Rheumatology    ---------------------------------------    COVID-19 vaccination:  A 5th mRNA vaccine (2nd booster) may be received at least 4 months after the 4th dose.

## 2022-06-15 NOTE — PROGRESS NOTES
"Niesha Adhikari  is a 71 year old year old who is being evaluated via a billable video visit.      How would you like to obtain your AVS? MyChart  If the video visit is dropped, the invitation should be resent by: Text to cell phone: [phone]   Will anyone else be joining your video visit? No     Rheumatology Video Visit      Niesha Adhikari MRN# [id number]   YOB: 1950 Age: 71 year old      Date of visit: 6/15/22   PCP: Dr. Tanika Clark  Hepatologist: Dr. Peres at [facility] in [place]  Ophthalmology: Dr. Higgins, [facility]    Chief Complaint   Patient presents with:  Rheumatoid Arthritis    Assessment and Plan     1. Rheumatoid arthritis: Hepatitis C related arthralgias versus rheumatoid arthritis (RF and CCP negative); hepatitis C has since been cleared. Initially with symmetric synovitis on exam and morning stiffness for more than one hour. NSAIDs and Tylenol associated with rash.  She did well with hydroxychloroquine 400 mg daily for a while but more arthritis symptoms so SSZ was added but associated with cytopenia and GI upset so that was stopped.  Avoiding MTX and leflunomide because of hepatitis C hx and +HBV core.  She has established with gastroenterology and is on tenofovir for hepatitis B reactivation prophylaxis; Humira was started 5/8/2020.  Doing well with regard to RA.  Chronic illness, stable.    - Continue hydroxychloroquine 300 mg daily (last eye exam was performed 2/1/2022 at [facility])  - Continue Humira 40mg SQ every 14 days  - Labs in January 2023: CBC, creatinine, hepatic panel, ESR, CRP, QuantiFERON-TB gold plus    2. Osteoporosis: Previously treated with Fosamax (GERD), PO Boniva (reportedly ineffective), and IV Boniva (reportedly effective). Prolia was being considered by a previous rheumatologist but not used because she wanted \"clearance\" from the patient's gastroenterologist because she has hepatitis C; I do not see a " contraindication for Prolia in the setting of hepatitis C. The patient reports that her gastroenterologist reported no contraindication for Prolia either.  She had not been on osteoporosis treatment for at least 2 years before restarting Boniva.  Boniva was restarted with the first dose given on 12/5/2017.  2021 DEXA showed improvement. Continue boniva until 12/2022.  Recheck DEXA in January 2023, no sooner than 1/14/2023.  Chronic illness, stable.    - DEXA previously ordered; advised that she call to schedule, with it to be no sooner than 1/14/2023  - Continue ergocalciferol 50,000 units twice weekly for now  - Calcium 1200 mg daily  - Continue Boniva 3mg IV every 3 months (she receives at the [facility])  - Lab in January 2023: Calcium, Vitamin D    3.  History of lower back pain and left hip pain: Ms. Adhikari had a CT pelvis on 11/16/2018 that showed degenerative changes of the L-spine.  Left hip does not appear narrowed.  Improves with PT exercises, but often doesn't do them.     4. HBV core ab positive: On hepatitis B prophylaxis from gastroenterology.  Continue to follow with gastroenterology.    5. Bilateral carpal tunnel syndrome: EMG/NCS previously ordered but symptoms improved but have now worsened.  Advised that she have the EMG completed.  Phone number provided so that she may call to schedule.  Note that the carpal tunnel wrist splint is not helping at this point.  Chronic illness, progressive    6.  Vaccinations: Vaccinations reviewed with Ms. Adhikari.  Risks and benefits of vaccinations were discussed.    - Influenza: up to date  - [id number]: up to date  - [id number]: up to date  - Shingrix: Up to date  - COVID19: s/p 4 doses of the Moderna COVID19 vaccine, last received on 3/2/2022.  A 5th mRNA vaccine (2nd booster) may be received at least 4 months after the 4th dose.      Total minutes spent in evaluation with patient, review of pertinent lab tests and chart notes, and  documentation: 22    Ms. Adhikari verbalized agreement with and understanding of the rational for the diagnosis and treatment plan.  All questions were answered to best of my ability and the patient's satisfaction. Ms. Adhikari was advised to contact the clinic with any questions that may arise after the clinic visit.      Thank you for involving me in the care of the patient    Return to clinic: Late January 2023      [facility]   Niesha Adhikari is a 71 year old female with a medical history significant for hepatitis C, hemorrhoids, narcolepsy, fibromyalgia, migraines, anxiety, pernicious anemia, GERD, allergic rhinitis, and osteoporosis who presents for follow-up of inflammatory arthritis.    Today, 6/15/2022: Numbness and tingling in both hands, primarily affecting the first-fourth fingers that is worse with activity and improves with immobilization of the hand but never completely goes away.  Uses a carpal tunnel splint without much benefit at this point.  Recalls having steroid injection for carpal tunnel syndrome in the past that was helpful, but this was many years ago.  Tolerating Humira well.  No joint swelling.  Ergocalciferol taken twice weekly.    Denies fevers, chills, nausea, vomiting, diarrhea. No abdominal pain. No chest pain/pressure, palpitations, or shortness of breath. No LE swelling. No neck pain. No oral or nasal sores.  No rash.     Tobacco: quit in 1978  EtOH: none  Drugs: none  Occupation: retired    ROS   12 point review of system was completed and negative except as noted in the HPI     Active Problem List     Patient Active Problem List   Diagnosis     Allergic rhinitis     Esophageal reflux     Pernicious anemia     Anxiety state     Migraine     Essential and other specified forms of tremor     Fibromyalgia     Moderate recurrent major depression (H)     Advanced directives, counseling/discussion     Narcolepsy     Internal hemorrhoids with other complication     AIN (anal intraepithelial  neoplasia) anal canal     Sciatica     Osteoporosis     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)     Benign essential hypertension     Alcohol dependence in remission (H)     Personal history of other medical treatment     Nausea     Left hip pain     Constipation     DDD (degenerative disc disease), cervical     Fall     Foot pain, right     Past Medical History     Past Medical History:   Diagnosis Date     ABUSE BY SPOUSE/PARTNER 07/27/2005     Degeneration of lumbar or lumbosacral intervertebral disc     DDD L5/S1     Depressive disorder      HELICOBACTER PYLORI INFECTION 01/28/2005     Hepatitis C - Cured. Achieved SVR in 2017      History of blood transfusion      Hypertension      Malignant neoplasm (H)     ACIN     Osteoporosis      Other and unspecified alcohol dependence, unspecified drinking behavior     Sober as 1/21/1987     Other malaise and fatigue      Past Surgical History     Past Surgical History:   Procedure Laterality Date     BIOPSY ANAL CANAL  1/21/13    [facility]      BREAST BIOPSY, RT/LT Left 1975    Breat Biopsy RT/LT     COLONOSCOPY  8/25/2009     COLONOSCOPY  2/14/2011    COLONOSCOPY performed by CRISTIN LAGUNAS at  GI     COLONOSCOPY N/A 1/8/2019    Procedure: Colonscopy, Biopsies by Biopsy;  Surgeon: Omega Talavera MD;  Location:  GI     CYSTOSCOPY  2/28/2011    CYSTOSCOPY performed by CAYLA FLOR at  OR     ENDOSCOPY  05/21/12    [facility]     ENT SURGERY  1965     GI SURGERY  06/25/12      UGI ENDOSCOPY DIAG W BIOPSY  10/01/09     HEMORRHOIDECTOMY  06/25/12    [facility]     LAPAROSCOPIC SALPINGO-OOPHORECTOMY  2/28/2011    LAPAROSCOPIC SALPINGO-OOPHORECTOMY performed by CAYLA FLOR at  OR     TONSILLECTOMY & ADENOIDECTOMY  1965     [facility] NONSPECIFIC PROCEDURE  1965    Removed bone left index finger knuckle, casts broken bones     [email or web address] COLONOSCOPY W/WO BRUSH/WASH  08/22/05     [email or web address] UGI  "ENDOSCOPY, SIMPLE EXAM  08/08/07     Allergy     Allergies   Allergen Reactions     Telaprevir Other (See Comments) and Rash     Rectal bleeding, anemia     Abilify Discmelt Other (See Comments)     Disoriented     Antivert [Meclizine Hcl]      Chamomile      Compazine      Cymbalta Other (See Comments)     Disoriented, trouble sleeping     Diphenhydramine Nausea     And abdominal pain     Effexor [Venlafaxine] Other (See Comments)     Disoriented, trouble sleeping     Elavil [Amitriptyline Hcl] Other (See Comments)     \"didn't feel right on it-med was stopped right away\"     Ferrous Sulfate Nausea and Vomiting     Food Difficulty breathing     cilantro     Indomethacin      indocin sensativity \"Severe h.a\"     Seasonal Allergies Other (See Comments) and Difficulty breathing     Philip Gold Aug-Sept, rag weed, sneezing     Sulfa Drugs      Thiopental Sodium      PENTOTHAL/rigidity and fight response     Animal Dander Difficulty breathing and Rash     sneezing,resp. distress     Bupropion Anxiety     Tylenol [Acetaminophen] Rash     Current Medication List     Current Outpatient Medications   Medication Sig     adalimumab (HUMIRA *CF*) 40 MG/0.4ML pen kit Inject 0.4 mLs (40 mg) Subcutaneous every 14 days . Hold for infection. Must also take tenofovir for hepatitis B reactivation prophylaxis.     calcium carb-cholecalciferol 600-500 MG-UNIT CAPS Take 1,200 mg by mouth daily     cloNIDine (CATAPRES) 0.2 MG tablet Take 0.2 mg by mouth At Bedtime     clotrimazole (LOTRIMIN) 1 % external cream APPLY TOPICALLY TWO TIMES A DAY     cyanocobalamin (VITAMIN B-12) 500 MCG SUBL sublingual tablet Place 500 mcg under the tongue daily     cycloSPORINE (RESTASIS) 0.05 % ophthalmic emulsion Place 1 drop into both eyes 2 times daily      hydroxychloroquine (PLAQUENIL) 200 MG tablet Hydroxychloroquine 200mg daily; and an additional 200mg every other day.  Yearly eye exam, including 10-2 VF and SD-OCT, required.     hydrOXYzine (ATARAX) " 50 MG tablet Take 50 mg by mouth 1 1/2-2 tabs at bedtime     IBANdronate (BONIVA) 150 MG tablet Inject 3 mg into the vein every 3 months      lisdexamfetamine (VYVANSE) 30 MG capsule Take 30 mg by mouth every morning     lisinopril (ZESTRIL) 10 MG tablet TAKE 1 TABLET (10 MG) BY MOUTH DAILY     Modafinil (PROVIGIL PO) Take 200 mg by mouth Takes 1 1/2 tabs twice daily-- morning and noon     montelukast (SINGULAIR) 10 MG tablet TAKE 1 TABLET BY MOUTH ONCE DAILY AS NEEDED SEASONALLY (AUGUST AND SEPTEMBER)     naproxen sodium (ANAPROX) 220 MG tablet Take 220-440 mg by mouth daily as needed for moderate pain     nystatin (MYCOSTATIN) 212993 UNIT/GM external powder Apply topically 3 times daily as needed     polyethylene glycol (MIRALAX) 17 GM/Dose powder Take 1 capful by mouth daily as needed      Psyllium (FIBER) 0.52 G CAPS 2 tabs in am and pm     sennosides (SENOKOT) 8.6 MG tablet Take 2 tablets by mouth 2 times daily     tenofovir (VIREAD) 300 MG tablet Take 1 tablet (300 mg) by mouth daily for Hep B reactivation prophylaxis     vitamin D2 (ERGOCALCIFEROL) 24859 units (1250 mcg) capsule Take 1 capsule (50,000 Units) by mouth every Monday and Friday.     omeprazole (PRILOSEC) 20 MG DR capsule TAKE 1 CAPSULE (20 MG) BY MOUTH DAILY     No current facility-administered medications for this visit.         Social History   See HPI    Family History     Family History   Problem Relation Age of Onset     Hypertension Mother      Breast Cancer Mother      Coronary Artery Disease Mother      Cerebrovascular Disease Mother      Kidney Disease Mother      Obesity Mother      Hypertension Brother      Respiratory Brother         emphysema     Lipids Brother      Heart Disease Brother         stents, 12/2011; has had about 6 MIs, last one 1/2014     C.A.D. Sister         MI at age 63     Hypertension Sister      Gastrointestinal Disease Sister         gallbladder     Circulatory Sister         brain aneurysm at 63      "Genitourinary Problems Sister         1 kidney/bladder     Hypertension Sister      Obesity Sister      Coronary Artery Disease Sister         had valve surgery, MI, CHF     Unknown/Adopted Paternal Uncle      Blood Disease Son         Lymes/7/11     Hypertension Son      Hypertension Father      Lymphoma Father      Glaucoma Father      Other Cancer Father         Lymphoma     Genetic Disorder Father         Glaucoma     Obesity Father      Breast Cancer Maternal Aunt      Ovarian Cancer Maternal Aunt      Coronary Artery Disease Other 49        niece     Diabetes Other         cousin     Cerebrovascular Disease Maternal Grandmother      Hypertension Maternal Grandmother      Cerebrovascular Disease Paternal Grandmother      Hypertension Paternal Grandmother      Liver Cancer Cousin      Glaucoma Paternal Grandfather      Genetic Disorder Paternal Grandfather         Glaucoma     Coronary Artery Disease Brother      Hypertension Brother      Hyperlipidemia Brother      Hypertension Sister      Obesity Sister      Breast Cancer Other      Obesity Son      Obesity Other      Obesity Other      Obesity Other      Obesity Niece      Obesity Niece      Physical Exam     Temp Readings from Last 3 Encounters:   06/10/22 98.9  F (37.2  C) (Temporal)   03/09/22 98.3  F (36.8  C) (Oral)   12/08/21 98.2  F (36.8  C) (Oral)     BP Readings from Last 5 Encounters:   06/10/22 121/68   03/09/22 (!) 141/83   02/14/22 (!) 148/80   12/08/21 136/83   10/14/21 (!) 163/88     Pulse Readings from Last 1 Encounters:   06/10/22 57     Resp Readings from Last 1 Encounters:   06/10/22 16     Estimated body mass index is 23.92 kg/m  as calculated from the following:    Height as of 2/14/22: 1.613 m (5' 3.5\").    Weight as of 6/10/22: 62.2 kg (137 lb 3.2 oz).    GEN: NAD.   HEENT: MMM.  Anicteric, noninjected sclera. No obvious external lesions of the ear and nose. Hearing intact.  PULM: No increased work of breathing  MSK:  Hands and wrists " without swelling.  SKIN: No rash or jaundice seen  PSYCH: Alert. Appropriate.      Labs / Imaging (select studies)     RF/CCP  Recent Labs   Lab Test 06/07/17  0955   CCPIGG 1   RHF <20     CBC  Recent Labs   Lab Test 05/16/22  1044 02/14/22  1121 12/08/21  0816 09/10/21  0933 06/07/21  0811 05/21/21  1145 01/06/21  1303 09/04/20  0756 03/04/20  0752 02/17/20  0810   WBC 3.1* 3.9* 3.0*   < > 3.0*   < > 4.4 4.1   < > 3.8*   RBC 4.31 4.53 4.77   < > 4.50   < > 5.01 4.88   < > 4.70   HGB 12.0 12.3 13.2   < > 13.1   < > 14.8 14.4   < > 14.3   HCT 38.1 39.8 41.4   < > 40.0   < > 44.9 43.9   < > 42.8   MCV 88 88 87   < > 89   < > 90 90   < > 91   RDW 14.6 14.4 13.0   < > 12.7   < > 13.3 12.5   < > 13.4   * 139* 145*   < > 132*   < > 157 140*   < > 140*   MCH 27.8 27.2 27.7   < > 29.1   < > 29.5 29.5   < > 30.4   MCHC 31.5 30.9* 31.9   < > 32.8   < > 33.0 32.8   < > 33.4   NEUTROPHIL  --  56  --   --  32.9  --  46.1 38.9   < > 44.5   LYMPH  --  27  --   --  43.4  --  37.0 31.9   < > 35.7   MONOCYTE  --  9  --   --  13.5  --  11.2 10.5   < > 14.6   EOSINOPHIL  --  8  --   --  9.5  --  5.5 17.8   < > 4.7   BASOPHIL  --  0  --   --  0.7  --  0.2 0.7   < > 0.5   ANEU  --   --   --   --  1.0*  --  2.0 1.6   < > 1.7   ALYM  --   --   --   --  1.3  --  1.6 1.3   < > 1.4   FREDY  --   --   --   --  0.4  --  0.5 0.4   < > 0.6   AEOS  --   --   --   --  0.3  --  0.2  --   --  0.2   ABAS  --   --   --   --  0.0  --  0.0 0.0   < > 0.0   ANEUTAUTO  --  2.2  --   --   --   --   --   --   --   --    ALYMPAUTO  --  1.1  --   --   --   --   --   --   --   --    AMONOAUTO  --  0.4  --   --   --   --   --   --   --   --    AEOSAUTO  --  0.3  --   --   --   --   --   --   --   --    ABSBASO  --  0.0  --   --   --   --   --   --   --   --     < > = values in this interval not displayed.     [facility]  Recent Labs   Lab Test 06/10/22  0816 05/16/22  1044 03/09/22  0821 02/14/22  1121 12/08/21  0816 09/10/21  0933 09/08/21  0806 06/07/21  0811  02/16/21  0842 01/06/21  1303   NA  --  142  --   --   --  134  --   --  134  --    POTASSIUM  --  4.3  --   --   --  4.6  --   --  4.7  --    CHLORIDE  --  109  --   --   --  100  --   --  100  --    CO2  --  29  --   --   --  31  --   --  31  --    ANIONGAP  --  4  --   --   --  3  --   --  3  --    GLC  --  89  --   --   --  86  --   --  94  --    BUN  --  13  --   --   --  9  --   --  10  --    CR 0.72 0.64 0.69 0.79   < > 0.74   < > 0.76 0.70 0.65   GFRESTIMATED 89 >90 >90 80   < > 82   < > 80 88 >90   GFRESTBLACK  --   --   --   --   --   --   --  >90 >90 >90   ELIZABETH 9.4 8.9 9.3 9.5   < > 8.9   < > 8.7 10.0 9.7   BILITOTAL  --  0.4  --  0.5  --  0.6  --  0.5  --  0.5   ALBUMIN  --  3.8  --  3.9  --  4.0  --  3.8  --  4.1   PROTTOTAL  --  6.6*  --  6.9  --  7.3  --  6.7*  --  7.6   ALKPHOS  --  110  --  107  --  86  --  79  --  82   AST  --  22  --  27  --  25  --  22  --  22   ALT  --  22  --  23  --  25  --  20  --  22    < > = values in this interval not displayed.     Calcium/VitaminD  Recent Labs   Lab Test 06/10/22  0816 05/16/22  1044 03/09/22  0821 02/14/22  1121 12/08/21  0816 10/14/21  1546 02/16/21  0842 01/06/21  1303   ELIZABETH 9.4 8.9 9.3 9.5   < >  --    < > 9.7   VITDT  --   --   --  65  --  70  --  74    < > = values in this interval not displayed.     ESR/CRP  Recent Labs   Lab Test 02/14/22  1121 06/07/21  0811 01/06/21  1303   SED 6 5 4   CRP <2.9 <2.9 <2.9     Lipid Panel  Recent Labs   Lab Test 08/09/19  1228 11/25/16  1217   CHOL 209* 276*   TRIG 74 88   HDL 82 65   * 193*   NHDL 127 211*     Hepatitis B  Recent Labs   Lab Test 06/30/17  0925 06/07/17  0955 09/30/15  0951   AUSAB  --  0.65  --    HBCAB  --  Reactive   A reactive result indicates acute, chronic or past/resolved hepatitis B   infection.  *  --    HBCM  --  Nonreactive   A nonreactive result suggests lack of recent exposure to the virus in the   preceding 6 months.    --    HEPBANG  --  Nonreactive Nonreactive   HBQLOG Not  Calculated  --   --      Hepatitis C  Recent Labs   Lab Test 01/20/20  0741 06/07/17  0955 09/30/15  0951   HCVAB  --  Reactive   A reactive result indicates one of the following 1) current HCV infection 2)   past HCV infection that has resolved or 3) false positivity. The CDC recommends   that a reactive result should be followed by Nucleic acid testing for HCV RNA.  If HCV RNA is detected, that indicates current HCV infection. If HCV RNA is not   detected, that indicates either past, resolved HCV infection, or false HCV   antibody positivity.   Assay performance characteristics have not been established for newborns,   infants, and children  * Reactive   A reactive result indicates one of the following 1) current HCV infection 2)   past HCV infection that has resolved or 3) false positivity. The CDC recommends   that a reactive result should be followed by Nucleic acid testing for HCV RNA.  If HCV RNA is detected, that indicates current HCV infection. If HCV RNA is not   detected, that indicates either past, resolved HCV infection, or false HCV   antibody positivity.   Assay performance characteristics have not been established for newborns,   infants, and children  *   HCVRNA HCV RNA Not Detected HCV RNA Not Detected   The LUIS ARMANDO AmpliPrep/LUIS ARMANDO TaqMan HCV Test is an FDA-approved in vitro nucleic   acid amplification test for the quantitation of HCV RNA in human plasma (ETDA   plasma) or serum using the LUIS ARMANDO AmpliPrep Instrument for automated viral   nucleic acid extraction and the LUIS ARMANDO TaqMan Analyzer or LUIS ARMANDO TaqMan for   automated Real Time PCR amplification and detection of the viral nucleic acid   target.   Titer results are reported in International Units/mL (IU/mL) using the 1st WHO   International standard for HCV for Nucleic Acid Amplification based assays.   [phone]*     Lyme ab screening  Recent Labs   Lab Test 08/09/19  1228 05/11/17  0830 04/12/17  1211   LYMEGM 0.05 <0.01  Negative, Absence of  detectable Borrelia burdorferi antibodies. A negative   result does not exclude the possibility of Borrelia burgdorferi infection. If   early Lyme disease is suspected, a second sample should be collected and tested   2 to 4 weeks later.   <0.01  Negative, Absence of detectable Borrelia burdorferi antibodies. A negative   result does not exclude the possibility of Borrelia burgdorferi infection. If   early Lyme disease is suspected, a second sample should be collected and tested   2 to 4 weeks later.       Tuberculosis Screening  Recent Labs   Lab Test 10/14/21  1546 05/05/20  1322 09/30/15  0952   TBRES Negative Negative  --    TBRSLT  --   --  Negative   TBAGN  --   --  0.05       Immunization History     Immunization History   Administered Date(s) Administered     COVID-19,PF,Moderna 03/10/2021, 04/07/2021, 09/03/2021     COVID-19,PF,Moderna Booster 03/02/2022     HEPA 04/18/2000, 09/26/2000     HPV 08/13/2012, 09/25/2012, 01/24/2013     HepB 11/15/2011, 12/19/2011     Influenza (H1N1) 01/07/2010     Influenza (High Dose) 3 valent vaccine 08/30/2018, 09/26/2019     Influenza (IIV3) PF 01/03/2005, 10/16/2006, 11/14/2007, 10/28/2008, 09/29/2009, 09/27/2010, 10/04/2011, 09/25/2012, 09/21/2015     Influenza Vaccine IM > 6 months Valent IIV4 (Alfuria,Fluzone) 09/26/2013, 10/06/2014, 10/06/2016, 09/08/2017     Influenza, Quad, High Dose, Pf, 65yr+ (Fluzone HD) 09/14/2020, 09/24/2021     Pneumo Conj 13-V (2010&after) 10/06/2016     Pneumococcal 23 valent 11/15/2011, 10/17/2017     TD (ADULT, 7+) 04/18/2000, 07/07/2004     TDAP Vaccine (Boostrix) 09/21/2015     Tdap (Adacel,Boostrix) 05/23/2006     Zoster vaccine recombinant adjuvanted (SHINGRIX) 06/14/2018, 08/24/2018     Zoster vaccine, live 09/21/2015          Chart documentation done in part with Dragon Voice recognition Software. Although reviewed after completion, some word and grammatical error may remain.      Video-Visit Details    Type of service:  Video  Visit    Video Start Time: 10:53 AM    Video End Time:11:07 AM    Originating Location (pt. Location): [place], [place]    Distant Location (provider location):  [place]    Platform used for Video Visit: Becca Cho MD

## 2022-06-30 ASSESSMENT — ENCOUNTER SYMPTOMS
CONSTIPATION: 1
CHILLS: 0
COUGH: 0
BREAST MASS: 0
ABDOMINAL PAIN: 0
DIZZINESS: 1
PARESTHESIAS: 0
FEVER: 0
HEMATURIA: 0
FREQUENCY: 0
MYALGIAS: 0
DYSURIA: 0
NERVOUS/ANXIOUS: 1
JOINT SWELLING: 0
HEARTBURN: 0
HEMATOCHEZIA: 0
DIARRHEA: 0
WEAKNESS: 1
SORE THROAT: 0
NAUSEA: 0
EYE PAIN: 0
HEADACHES: 0
PALPITATIONS: 0
ARTHRALGIAS: 1
SHORTNESS OF BREATH: 0

## 2022-06-30 ASSESSMENT — PATIENT HEALTH QUESTIONNAIRE - PHQ9
SUM OF ALL RESPONSES TO PHQ QUESTIONS 1-9: 6
SUM OF ALL RESPONSES TO PHQ QUESTIONS 1-9: 6
10. IF YOU CHECKED OFF ANY PROBLEMS, HOW DIFFICULT HAVE THESE PROBLEMS MADE IT FOR YOU TO DO YOUR WORK, TAKE CARE OF THINGS AT HOME, OR GET ALONG WITH OTHER PEOPLE: VERY DIFFICULT
SUM OF ALL RESPONSES TO PHQ QUESTIONS 1-9: 6
SUM OF ALL RESPONSES TO PHQ QUESTIONS 1-9: 6
10. IF YOU CHECKED OFF ANY PROBLEMS, HOW DIFFICULT HAVE THESE PROBLEMS MADE IT FOR YOU TO DO YOUR WORK, TAKE CARE OF THINGS AT HOME, OR GET ALONG WITH OTHER PEOPLE: VERY DIFFICULT

## 2022-06-30 ASSESSMENT — ACTIVITIES OF DAILY LIVING (ADL): CURRENT_FUNCTION: HOUSEWORK REQUIRES ASSISTANCE

## 2022-07-01 DIAGNOSIS — R76.8 HEPATITIS B CORE ANTIBODY POSITIVE: ICD-10-CM

## 2022-07-01 PROCEDURE — 91200 LIVER ELASTOGRAPHY: CPT | Mod: 26 | Performed by: PHYSICIAN ASSISTANT

## 2022-07-05 ENCOUNTER — OFFICE VISIT (OUTPATIENT)
Dept: FAMILY MEDICINE | Facility: OTHER | Age: 72
End: 2022-07-05
Payer: COMMERCIAL

## 2022-07-05 ENCOUNTER — MYC MEDICAL ADVICE (OUTPATIENT)
Dept: FAMILY MEDICINE | Facility: OTHER | Age: 72
End: 2022-07-05

## 2022-07-05 VITALS
WEIGHT: 135 LBS | DIASTOLIC BLOOD PRESSURE: 54 MMHG | HEIGHT: 63 IN | HEART RATE: 70 BPM | OXYGEN SATURATION: 99 % | RESPIRATION RATE: 18 BRPM | SYSTOLIC BLOOD PRESSURE: 94 MMHG | BODY MASS INDEX: 23.92 KG/M2 | TEMPERATURE: 97 F

## 2022-07-05 DIAGNOSIS — Z12.31 VISIT FOR SCREENING MAMMOGRAM: ICD-10-CM

## 2022-07-05 DIAGNOSIS — M79.672 LEFT FOOT PAIN: ICD-10-CM

## 2022-07-05 DIAGNOSIS — F33.42 MAJOR DEPRESSIVE DISORDER, RECURRENT EPISODE, IN FULL REMISSION (H): ICD-10-CM

## 2022-07-05 DIAGNOSIS — I10 BENIGN ESSENTIAL HYPERTENSION: ICD-10-CM

## 2022-07-05 DIAGNOSIS — E61.1 IRON DEFICIENCY: ICD-10-CM

## 2022-07-05 DIAGNOSIS — F10.21 ALCOHOL DEPENDENCE IN REMISSION (H): ICD-10-CM

## 2022-07-05 DIAGNOSIS — D69.6 THROMBOCYTOPENIA (H): ICD-10-CM

## 2022-07-05 DIAGNOSIS — Z00.00 ENCOUNTER FOR MEDICARE ANNUAL WELLNESS EXAM: Primary | ICD-10-CM

## 2022-07-05 DIAGNOSIS — J30.2 OTHER SEASONAL ALLERGIC RHINITIS: ICD-10-CM

## 2022-07-05 PROBLEM — M79.671 FOOT PAIN, RIGHT: Status: RESOLVED | Noted: 2021-03-08 | Resolved: 2022-07-05
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
## 2022-07-05 PROBLEM — M25.552 LEFT HIP PAIN: Status: RESOLVED | Noted: 2019-08-09 | Resolved: 2022-07-05

## 2022-07-05 PROBLEM — R11.0 NAUSEA: Status: RESOLVED | Noted: 2019-08-09 | Resolved: 2022-07-05

## 2022-07-05 PROCEDURE — 99397 PER PM REEVAL EST PAT 65+ YR: CPT | Performed by: FAMILY MEDICINE

## 2022-07-05 PROCEDURE — 99214 OFFICE O/P EST MOD 30 MIN: CPT | Mod: 25 | Performed by: FAMILY MEDICINE

## 2022-07-05 RX ORDER — MONTELUKAST SODIUM 10 MG/1
TABLET ORAL
Qty: 90 TABLET | Refills: 2 | Status: SHIPPED | OUTPATIENT
Start: 2022-07-05 | End: 2023-08-18

## 2022-07-05 RX ORDER — NALOXONE HYDROCHLORIDE 0.4 MG/ML
0.2 INJECTION, SOLUTION INTRAMUSCULAR; INTRAVENOUS; SUBCUTANEOUS
Status: CANCELLED | OUTPATIENT
Start: 2022-07-05

## 2022-07-05 RX ORDER — EPINEPHRINE 1 MG/ML
0.3 INJECTION, SOLUTION, CONCENTRATE INTRAVENOUS EVERY 5 MIN PRN
Status: CANCELLED | OUTPATIENT
Start: 2022-07-05

## 2022-07-05 RX ORDER — LISINOPRIL 5 MG/1
5 TABLET ORAL DAILY
Qty: 90 TABLET | Refills: 3 | Status: SHIPPED | OUTPATIENT
Start: 2022-07-05 | End: 2022-10-28

## 2022-07-05 RX ORDER — LISINOPRIL 10 MG/1
10 TABLET ORAL DAILY
Qty: 90 TABLET | Refills: 3 | Status: SHIPPED | OUTPATIENT
Start: 2022-07-05 | End: 2022-07-05

## 2022-07-05 RX ORDER — HEPARIN SODIUM,PORCINE 10 UNIT/ML
5 VIAL (ML) INTRAVENOUS
Status: CANCELLED | OUTPATIENT
Start: 2022-07-05

## 2022-07-05 RX ORDER — ALBUTEROL SULFATE 90 UG/1
1-2 AEROSOL, METERED RESPIRATORY (INHALATION)
Status: CANCELLED
Start: 2022-07-05

## 2022-07-05 RX ORDER — ALBUTEROL SULFATE 0.83 MG/ML
2.5 SOLUTION RESPIRATORY (INHALATION)
Status: CANCELLED | OUTPATIENT
Start: 2022-07-05

## 2022-07-05 RX ORDER — METHYLPREDNISOLONE SODIUM SUCCINATE 125 MG/2ML
125 INJECTION, POWDER, LYOPHILIZED, FOR SOLUTION INTRAMUSCULAR; INTRAVENOUS
Status: CANCELLED
Start: 2022-07-05

## 2022-07-05 RX ORDER — DIPHENHYDRAMINE HYDROCHLORIDE 50 MG/ML
50 INJECTION INTRAMUSCULAR; INTRAVENOUS
Status: CANCELLED
Start: 2022-07-05

## 2022-07-05 RX ORDER — HEPARIN SODIUM (PORCINE) LOCK FLUSH IV SOLN 100 UNIT/ML 100 UNIT/ML
5 SOLUTION INTRAVENOUS
Status: CANCELLED | OUTPATIENT
Start: 2022-07-05

## 2022-07-05 RX ORDER — MEPERIDINE HYDROCHLORIDE 25 MG/ML
25 INJECTION INTRAMUSCULAR; INTRAVENOUS; SUBCUTANEOUS EVERY 30 MIN PRN
Status: CANCELLED | OUTPATIENT
Start: 2022-07-05

## 2022-07-05 ASSESSMENT — ENCOUNTER SYMPTOMS
ABDOMINAL PAIN: 0
FREQUENCY: 0
EYE PAIN: 0
BREAST MASS: 0
CHILLS: 0
DYSURIA: 0
COUGH: 0
HEMATURIA: 0
DIARRHEA: 0
DIZZINESS: 1
PALPITATIONS: 0
NAUSEA: 0
HEADACHES: 0
HEMATOCHEZIA: 0
CONSTIPATION: 1
NERVOUS/ANXIOUS: 1
ARTHRALGIAS: 1
MYALGIAS: 0
WEAKNESS: 1
SORE THROAT: 0
JOINT SWELLING: 0
FEVER: 0
HEARTBURN: 0
SHORTNESS OF BREATH: 0
PARESTHESIAS: 0

## 2022-07-05 ASSESSMENT — ACTIVITIES OF DAILY LIVING (ADL): CURRENT_FUNCTION: HOUSEWORK REQUIRES ASSISTANCE

## 2022-07-05 ASSESSMENT — PAIN SCALES - GENERAL: PAINLEVEL: NO PAIN (0)

## 2022-07-05 NOTE — PATIENT INSTRUCTIONS
Patient Education   Personalized Prevention Plan  You are due for the preventive services outlined below.  Your care team is available to assist you in scheduling these services.  If you have already completed any of these items, please share that information with your care team to update in your medical record.  Health Maintenance Due   Topic Date Due     COVID-19 Vaccine (5 - Booster for Moderna series) 07/02/2022       Signs of Hearing Loss      Hearing much better with one ear can be a sign of hearing loss.   Hearing loss is a problem shared by many people. In fact, it is one of the most common health problems, particularly as people age. Most people age 65 and older have some hearing loss. By age 80, almost everyone does. Hearing loss often occurs slowly over the years. So you may not realize your hearing has gotten worse.  Have your hearing checked  Call your healthcare provider if you:    Have to strain to hear normal conversation    Have to watch other people s faces very carefully to follow what they re saying    Need to ask people to repeat what they ve said    Often misunderstand what people are saying    Turn the volume of the television or radio up so high that others complain    Feel that people are mumbling when they re talking to you    Find that the effort to hear leaves you feeling tired and irritated    Notice, when using the phone, that you hear better with one ear than the other  [employer] last reviewed this educational content on 1/1/2020 2000-2021 [employer]. All rights reserved. This information is not intended as a substitute for professional medical care. Always follow your healthcare professional's instructions.          Depression and Suicide in Older Adults    Nearly 2 million older Americans have some type of depression. Some of them even take their own lives. Yet depression among older adults is often ignored. Learn the warning signs. You may help spare a loved one  "needless pain. You may also save a life.   What is depression?  Depression is a common and serious illness that affects the way you think and feel. It is not a normal part of aging, nor is it a sign of weakness, a character flaw, or something you can snap out of. Most people with depression need treatment to get better. The most common symptom is a feeling of deep sadness. People who are depressed also may seem tired and listless. And nothing seems to give them pleasure. It s normal to grieve or be sad sometimes. But sadness lessens or passes with time. Depression rarely goes away or improves on its own. A person with clinical depression can't \"snap out of it.\" Other symptoms of depression are:     Sleeping more or less than normal    Eating more or less than normal    Having headaches, stomachaches, or other pains that don t go away    Feeling nervous,  empty,  or worthless    Crying a great deal    Thinking or talking about suicide or death    Loss of interest in activities previously enjoyed    Social isolation    Feeling confused or forgetful  What causes it?  The causes of depression aren t fully known. But it is thought to result from a complex blend of these factors:     Biochemistry. Certain chemicals in the brain play a role.    Genes. Depression does run in families.    Life stress. Life stresses can also trigger depression in some people. Older adults often face many stressors, such as death of friends or a spouse, health problems, and financial concerns.    Chronic conditions. This includes conditions such as diabetes, heart disease, or cancer. These can cause symptoms of depression. Medicine side effects can cause changes in thoughts and behaviors.  How you can help  Often, depressed people may not want to ask for help. When they do, they may be ignored. Or, they may receive the wrong treatment. You can help by showing parents and older friends love and support. If they seem depressed, don t lecture the " person, ignore the symptoms, or discount the symptoms as a  normal  part of aging -which they are not. Get involved, listen, and show interest and support.   Help them understand that depression is a treatable illness. Tell them you can help them find the right treatment. Offer to go to their healthcare provider's appointment with them for support when the symptoms are discussed. With their approval, contact a local mental health center, social service agency, or hospital about services.   You can be an advocate for him or her at healthcare appointments. Many older adults have chronic illnesses that can cause symptoms of depression. Medicine side effects can change thoughts and behaviors. You can help make sure that the healthcare provider looks at all of these factors. He or she should refer your family member or friend to a mental healthcare provider when needed. in some cases, untreated depression can lead to a misdiagnosis. A person may be diagnosed with a brain disorder such as dementia. If the healthcare provider does not take the issue of depression seriously, help your family member or friend to find another provider.   Don't be afraid to ask  If you think an older person you care about could be suicidal, ask,  Have you thought about suicide?  Most people will tell you the truth. If they say  yes,  they may already have a plan for how and when they will attempt it. Find out as much as you can. The more detailed the plan, and the easier it is to carry out, the more danger the person is in right now. Tell the person you are there for them and do not want them to harm him or herself. Don't wait to get help for the person. Call the person's healthcare provider, local hospital, or emergency services.   To learn more    National Suicide Prevention Lifeline (crisis hotline) 857-754-QUKU (681-614-5784)    National [place] of Mental Ozwoyy908-877-6831vqk.[facility].nih.gov    National [place] on Mental  Wyhondo156-244-5433vys.[facility].org    Mental Health Ojvakeq646-955-2954lhk.[facility].org    National Suicide Gkapaei568-ITMFPUO (259-288-0015)    Call 911  Never leave the person alone. A person who is actively suicidal needs psychiatric care right away. They will need constant supervision. Never leave the person out of sight. Call 911 or the [facility] 24-hour suicide crisis hotline at 599-710-ZEQT (264-538-7947). You can also take the person to the closest emergency room.   Charlee last reviewed this educational content on 5/1/2020 2000-2021 [employer]. All rights reserved. This information is not intended as a substitute for professional medical care. Always follow your healthcare professional's instructions.

## 2022-07-05 NOTE — PROGRESS NOTES
"SUBJECTIVE:   Niesha Adhikari is a 71 year old female who presents for Preventive Visit.    Patient has been advised of split billing requirements and indicates understanding: Yes     Are you in the first 12 months of your Medicare coverage?  No    Healthy Habits:     In general, how would you rate your overall health?  Good    Frequency of exercise:  6-7 days/week    Duration of exercise:  Greater than 60 minutes    Do you usually eat at least 4 servings of fruit and vegetables a day, include whole grains    & fiber and avoid regularly eating high fat or \"junk\" foods?  Yes    Taking medications regularly:  No    Barriers to taking medications:  Other    Medication side effects:  Other    Ability to successfully perform activities of daily living:  Housework requires assistance    Home Safety:  No safety concerns identified    Hearing Impairment:  Difficulty following a conversation in a noisy restaurant or crowded room, need to ask people to speak up or repeat themselves and difficulty understanding soft or whispered speech    In the past 6 months, have you been bothered by leaking of urine?  No    In general, how would you rate your overall mental or emotional health?  Good      PHQ-2 Total Score: 0    Additional concerns today:  Yes    Do you feel safe in your environment? Yes    Have you ever done Advance Care Planning? (For example, a Health Directive, POLST, or a discussion with a medical provider or your loved ones about your wishes): Yes, advance care planning is on file.       Fall risk  Fallen 2 or more times in the past year?: Yes  Any fall with injury in the past year?: Yes  Timed Up and Go Test (>13.5 is fall risk; contact physician) : 5    Cognitive Screening   1) Repeat 3 items (Leader, Season, Table)    2) Clock draw: \ NORMAL  3) 3 item recall: Recalls 3 objects  Results: 3 items recalled: COGNITIVE IMPAIRMENT LESS LIKELY    Mini-CogTM Copyright S Brenda. Licensed by the author for use in " [facility]; reprinted with permission (timoteoflor@[facility]). All rights reserved.        Reviewed and updated as needed this visit by clinical staff   Tobacco  Allergies  Meds  Problems  Med Hx  Surg Hx  Fam Hx  Soc   Hx          Reviewed and updated as needed this visit by Provider   Tobacco  Allergies  Meds  Problems  Med Hx  Surg Hx  Fam Hx           Social History     Tobacco Use     Smoking status: Former Smoker     Packs/day: 0.75     Years: 10.00     Pack years: 7.50     Types: Cigarettes     Start date: 1968     Quit date: 1978     Years since quittin.7     Smokeless tobacco: Never Used     Tobacco comment: No exposure at home   Substance Use Topics     Alcohol use: No         Alcohol Use 2022   Prescreen: >3 drinks/day or >7 drinks/week? Not Applicable   Prescreen: >3 drinks/day or >7 drinks/week? -       Current providers sharing in care for this patient include:   Patient Care Team:  Tanika Clark MD as PCP - Dolores Brantley APRN CNP (Nurse Practitioner)  Tanika Clark MD as Assigned PCP  Apolinar Cho MD as Assigned Rheumatology Provider    The following health maintenance items are reviewed in Epic and correct as of today:  Health Maintenance Due   Topic Date Due     COVID-19 Vaccine (5 - Booster for Moderna series) 2022         Review of Systems   Constitutional: Negative for chills and fever.   HENT: Positive for ear pain. Negative for congestion, hearing loss and sore throat.    Eyes: Negative for pain and visual disturbance.   Respiratory: Negative for cough and shortness of breath.    Cardiovascular: Negative for chest pain, palpitations and peripheral edema.   Gastrointestinal: Positive for constipation. Negative for abdominal pain, diarrhea, heartburn, hematochezia and nausea.   Breasts:  Negative for tenderness, breast mass and discharge.   Genitourinary: Negative for dysuria, frequency, genital sores, hematuria, pelvic  "pain, urgency, vaginal bleeding and vaginal discharge.   Musculoskeletal: Positive for arthralgias. Negative for joint swelling and myalgias.   Skin: Negative for rash.   Neurological: Positive for dizziness and weakness. Negative for headaches and paresthesias.   Psychiatric/Behavioral: Negative for mood changes. The patient is nervous/anxious.      Intermittent left ear pain, hurts to wear glasses, goes away on its own.    Has intermittent left middle of the foot pain, goes away on its own, also having difficulty clipping her nails.    Stopped omeprazole after reading side effects.  Finds she can control her heartburn with watching her diet.    Saw Hepatology, they told her that she needed IV iron, but that they couldn't order it.    Has had insect bites, she is worried that they were tick bites, but didn't see the tick.    OBJECTIVE:   BP 94/54   Pulse 70   Temp 97  F (36.1  C) (Temporal)   Resp 18   Ht 1.61 m (5' 3.39\")   Wt 61.2 kg (135 lb)   LMP 11/27/2003   SpO2 99%   BMI 23.62 kg/m   Estimated body mass index is 23.62 kg/m  as calculated from the following:    Height as of this encounter: 1.61 m (5' 3.39\").    Weight as of this encounter: 61.2 kg (135 lb).  Physical Exam  Constitutional:       General: She is not in acute distress.     Appearance: She is well-developed.   HENT:      Right Ear: Tympanic membrane and external ear normal.      Left Ear: Tympanic membrane and external ear normal.      Nose: Nose normal.   Eyes:      General:         Right eye: No discharge.         Left eye: No discharge.      Conjunctiva/sclera: Conjunctivae normal.      Pupils: Pupils are equal, round, and reactive to light.   Neck:      Thyroid: No thyromegaly.      Trachea: No tracheal deviation.   Cardiovascular:      Rate and Rhythm: Normal rate and regular rhythm.      Heart sounds: Normal heart sounds, S1 normal and S2 normal. No murmur heard.  Pulmonary:      Effort: Pulmonary effort is normal. No respiratory " distress.      Breath sounds: Normal breath sounds. No wheezing or rales.   Abdominal:      General: Bowel sounds are normal.      Palpations: Abdomen is soft. There is no mass.      Tenderness: There is no abdominal tenderness.   Musculoskeletal:         General: Normal range of motion.      Cervical back: Neck supple.   Lymphadenopathy:      Cervical: No cervical adenopathy.   Skin:     General: Skin is warm and dry.      Findings: No rash.   Neurological:      Mental Status: She is alert and oriented to person, place, and time.   Psychiatric:         Thought Content: Thought content normal.         Judgment: Judgment normal.           ASSESSMENT / PLAN:   (Z00.00) Encounter for Medicare annual wellness exam  (primary encounter diagnosis)      (I10) Benign essential hypertension  Comment: Blood pressure is on the low side, she has recent lost weight.  She also complains of increased fatigue.  We will decrease her to 5 mg daily.  Plan: lisinopril (ZESTRIL) 5 MG tablet, TSH with         free T4 reflex            (M79.672) Left foot pain  Comment: She states she needs running shoes.  We will also refer to podiatry as she may need orthotics.  Plan: Orthopedic  Referral          (J30.2) Other seasonal allergic rhinitis  Comment: Finds that Singulair works well in the summer/fall.  Plan: montelukast (SINGULAIR) 10 MG tablet            (E61.1) Iron deficiency  Comment: Hepatology recommends IV iron, but states that they couldn't order it.  Therapy plan placed.    (D69.6) Thrombocytopenia (H)  Comment: Most likely secondary to liver disease, follows closely with Hepatology.    (F10.21) Alcohol dependence in remission (H)  Comment:  Long term sobriety.      (F33.42) Major depressive disorder, recurrent episode, in full remission (H)  Comment: Follows closely with Psychiatry     (Z12.31) Visit for screening mammogram  Plan: *MA Screening Digital Bilateral          COUNSELING:  Reviewed preventive health counseling,  "as reflected in patient instructions    Estimated body mass index is 23.62 kg/m  as calculated from the following:    Height as of this encounter: 1.61 m (5' 3.39\").    Weight as of this encounter: 61.2 kg (135 lb).      She reports that she quit smoking about 43 years ago. Her smoking use included cigarettes. She started smoking about 53 years ago. She has a 7.50 pack-year smoking history. She has never used smokeless tobacco.      Appropriate preventive services were discussed with this patient, including applicable screening as appropriate for cardiovascular disease, diabetes, osteopenia/osteoporosis, and glaucoma.  As appropriate for age/gender, discussed screening for colorectal cancer, prostate cancer, breast cancer, and cervical cancer. Checklist reviewing preventive services available has been given to the patient.    Reviewed patients plan of care and provided an AVS. The Basic Care Plan (routine screening as documented in Health Maintenance) for Niesha meets the Care Plan requirement. This Care Plan has been established and reviewed with the Patient.    Counseling Resources:  ATP IV Guidelines  Pooled Cohorts Equation Calculator  Breast Cancer Risk Calculator  Breast Cancer: Medication to Reduce Risk  FRAX Risk Assessment  ICSI Preventive Guidelines  Dietary Guidelines for Americans, 2010  [employer]'s MyPlate  ASA Prophylaxis  Lung CA Screening    Tanika Clark MD  [facility]    Identified Health Risks:    The patient was provided with written information regarding signs of hearing loss.  The patient's PHQ-9 score is consistent with mild depression. She was provided with information regarding depression and was advised to continue following with Psychology and Psychiatry   "

## 2022-07-07 ENCOUNTER — IMMUNIZATION (OUTPATIENT)
Dept: FAMILY MEDICINE | Facility: CLINIC | Age: 72
End: 2022-07-07
Payer: COMMERCIAL

## 2022-07-07 DIAGNOSIS — Z23 ENCOUNTER FOR IMMUNIZATION: Primary | ICD-10-CM

## 2022-07-07 PROCEDURE — 91306 COVID-19,PF,MODERNA (18+ YRS BOOSTER .25ML): CPT

## 2022-07-07 PROCEDURE — 0064A COVID-19,PF,MODERNA (18+ YRS BOOSTER .25ML): CPT

## 2022-07-07 NOTE — NURSING NOTE
Per Dr. Apolinar Cho patient is due for a 5th Moderna vaccine:  See notes from patients visit with Dr. Cho on 06/15/2022  6.  Vaccinations: Vaccinations reviewed with Ms. Juaresestefani.  Risks and benefits of vaccinations were discussed.    - Influenza: up to date  - Iigbeby59: up to date  - Ghypicdeg25: up to date  - Shingrix: Up to date  - COVID19: s/p 4 doses of the Moderna COVID19 vaccine, last received on 3/2/2022.  A 5th mRNA vaccine (2nd booster) may be received at least 4 months after the 4th dose.     I spoke with Anoop and per CDC guidelines an immune compromised patient can have a 5th Moderna injection 4 months after the 4th dose.  Patient's last dose of Moderna was on  03/02/22 so she is ok'd to have the 5th booster today.  Emily Woods CMA

## 2022-07-08 NOTE — TELEPHONE ENCOUNTER
Patient didn't view message below in [email or web address], please notify her--    From: Tanika Clark MD   Sent: 7/5/2022   To: Niesha Adhikari   Subject: Unread Message Notification                       I was finishing your note and realized that your blood pressure was on the lower side, probably because of your weight loss.  Let's decrease your lisinopril to 5 mg daily and see if this helps your fatigue.  I did notify the pharmacy of the dose change.     Electronically signed by Tanika Clark MD on 7/5/2022

## 2022-07-14 ENCOUNTER — MYC MEDICAL ADVICE (OUTPATIENT)
Dept: FAMILY MEDICINE | Facility: OTHER | Age: 72
End: 2022-07-14

## 2022-07-15 NOTE — TELEPHONE ENCOUNTER
I placed the therapy plan for iron on 7/5/22, please contact infusion therapy, not sure why no one called to schedule, I see that the appointment request order was done.  Patient needs to be scheduled with them.

## 2022-07-18 ENCOUNTER — HOSPITAL ENCOUNTER (OUTPATIENT)
Dept: MAMMOGRAPHY | Facility: CLINIC | Age: 72
Discharge: HOME | End: 2022-07-18
Attending: FAMILY MEDICINE | Admitting: FAMILY MEDICINE
Payer: COMMERCIAL

## 2022-07-18 DIAGNOSIS — Z12.31 VISIT FOR SCREENING MAMMOGRAM: ICD-10-CM

## 2022-07-18 PROCEDURE — 77067 SCR MAMMO BI INCL CAD: CPT

## 2022-07-21 ENCOUNTER — OFFICE VISIT (OUTPATIENT)
Dept: PODIATRY | Facility: CLINIC | Age: 72
End: 2022-07-21

## 2022-07-21 ENCOUNTER — ANCILLARY PROCEDURE (OUTPATIENT)
Dept: GENERAL RADIOLOGY | Facility: CLINIC | Age: 72
End: 2022-07-21
Attending: PODIATRIST
Payer: COMMERCIAL

## 2022-07-21 VITALS
HEIGHT: 63 IN | DIASTOLIC BLOOD PRESSURE: 68 MMHG | BODY MASS INDEX: 24.63 KG/M2 | WEIGHT: 139 LBS | SYSTOLIC BLOOD PRESSURE: 108 MMHG

## 2022-07-21 DIAGNOSIS — L85.9 HYPERKERATOSIS: ICD-10-CM

## 2022-07-21 DIAGNOSIS — M79.672 LEFT FOOT PAIN: ICD-10-CM

## 2022-07-21 DIAGNOSIS — Q66.6 PES VALGUS OF LEFT FOOT: ICD-10-CM

## 2022-07-21 DIAGNOSIS — M72.2 PLANTAR FASCIITIS OF LEFT FOOT: Primary | ICD-10-CM

## 2022-07-21 DIAGNOSIS — L60.3 ONYCHODYSTROPHY: ICD-10-CM

## 2022-07-21 PROCEDURE — 73630 X-RAY EXAM OF FOOT: CPT | Mod: TC | Performed by: RADIOLOGY

## 2022-07-21 PROCEDURE — 99203 OFFICE O/P NEW LOW 30 MIN: CPT | Performed by: PODIATRIST

## 2022-07-21 ASSESSMENT — PAIN SCALES - GENERAL: PAINLEVEL: NO PAIN (0)
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
## 2022-07-21 NOTE — PATIENT INSTRUCTIONS
Reliable shoe stores: To maximize your experience and provide the best possible fit.  Be sure to show them your foot concerns and tell them Dr. Chaparro sent you.      Stores listed in bold have only athletic shoes, and stores that are not bold are mostly casual or variety of shoes    [employer]  [address] [place] [address]  [phone]    [employer] [facility] [place]  [address] [place] [address]  [phone]     [facility] [address]ah  [address] [place] [address]  [phone]    [employer]  [address] [facility]  [facility] [place] [address]  [phone]    [employer]  [address] [place], [place] [address] [phone]    [employer]  [address] [place] [address]  [phone]                         [employer] Locations:     [address] [place] [address]   [phone]     [address] [place] [address] [address] [place] [address]   [phone]     [address] [place] [address]   [phone]     [address] [facility] [place] [address]   [phone]     [address] [facility] [place] [address]   [phone]     [address] [place] [address]   [phone]     [address] [facility] [address]   [place] [address]   [phone]     [address]   [place] [address]   [phone]             How to find reasonable shoes          The correct width    Correct Fitting    Correct Length      Foot Distortion    Posture Distortion                          Torsional Rigidity      Grasp behind the heel and underneath the foot and twist      Bad    Excessive torsion/twist in midfoot     Less torsion/twist in midfoot is better                   Heel Counter Rigidity      Grasp just above   midsole and squeeze      Bad    Soft heel counter      Good    Rigid Heel Counter      Flexion Rigidity      Grasp shoe and bend from forefoot to rearfoot              Nail Debridement    A high quality instrument  "makes trimming toenails MUCH easier.  Search [employer] for any 5\" nail nipper manufactured by reliable brands such as Miltex, Integra or Jarit as these quality instruments will help manage difficult nails more effectively and comfortably. We use Miltex -SS.  A physician is not necessary to trim nails even if you are taking blood thinners or are diabetic.  Your family or care givers may help manage your toenails.      Trim or sand the nails once weekly.  Do not wait until they are long and painful or trimming will become too difficult and painful and will increase your risk of complications or infection.  A course file or 120 grit sandpaper on a sanding block can be helpful.  For very thick nails many people prefer battery operated echols such as an Amope', Personal Pedi and Emjoi for regular use or heavy painful callouses or thick toenails.    Trim or skive any portion of nail that is thick, loose, crumbling, or not well attached. Do not tear the nail away, but rather cut them with a nail nipperor sand or sand them down.  You may follow up with your Podiatric Physician if you have pain, bleeding, infection, questions or other concerns.      You may also contact the following Registered Nurses for further help with nail debridement and minor hygiene concnerns.  They may come to your home or meet them at their clinic to trim your toenails and soak your feet, as well as monitor for any complications that would require evaluation by a Physician.      [facility]  Corinne Yost RN  Phone & text [phone]    [facility]  Lisa Claros RN  Office [phone]    [facility]  [phone]  [email or web address]    For up to date list and to find foot care nurses in other communities visit [employer] Association website:  afcna.org.     Calluses, Corns, IPKs, Porokeratosis    When there is excessive friction or pressure on the skin, the " body responds by making the skin thicker.  While this may protect the deeper structures, the thickened skin can take up more space and thus increase pressure over a bony prominence or become an open sore or skin ulcer as this skin becomes less flexible.    Flat, diffuse thickening are simple calluses and they are usually caused by friction.  Often these are the result of rubbing on a shoe or going barefoot.    Calluses with a central core between the toes are called corns.  These often result from prominent joints on adjacent toes rubbing together.  Theses are often a symptom of bone malalignment and will usually recur unless the underlying bones are addressed.    Many of these lesions can be kept comfortable with routine maintenance. This consists of filing them with a Ped Egg, callus file, or 120 grit sandpaper on a block, every day during your bath or shower.  Most people prefer battery operated echols such as an Amope', Personal Pedi and Emjoi for regular use or heavy painful callouses.  Heavy creams or ointments can be applied 1-2 times every day to keep them soft. Toe spacers can be used for corns, gel pads can be used for other lesions on the bottom of the foot. If there is a deformity noted, such as a prominent bone, often this can be addressed to minimize recurrence. However, sometimes the pressure and lesion simply migrates to another spot after surgery, so it is not a guaranteed cure.     If you have severe callouses and cracking, you may apply heavy ointments that you scoop up such as Cetaphil cream, Eucerin, Aquaphor or Vaseline.  Be sure to obtain cream or ointment in these brands and not lotion (lotion is water based and not durable enough for feet). For more aggressive help apply heavy creams or ointment under occlusive dressings such as Saran Wrap or Jelly Feet while sleeping.   Jelly Feet can be obtained at www.jellyfeet.com.     To be successful with managing hyperkeratotic skin, you must manage  hygiene daily.  Apply the cream once or twice EVERY day.  At your bath or shower time is the easiest time to work on this when skin is most soft.  There is no medical or surgical treatment that will absolutely eliminate many of these symptoms.      [employer] is a reliable source for all sorts of foot pads, cushions, or interdigital spacers and foot appliances. Go to www.[employer]er or request a catalog at 2-697-Kindermint.        Please call with any additional questions.
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
## 2022-07-27 DIAGNOSIS — Z92.89 PERSONAL HISTORY OF OTHER MEDICAL TREATMENT: Primary | ICD-10-CM

## 2022-07-27 DIAGNOSIS — M81.0 AGE-RELATED OSTEOPOROSIS WITHOUT CURRENT PATHOLOGICAL FRACTURE: ICD-10-CM

## 2022-07-27 RX ORDER — ALBUTEROL SULFATE 90 UG/1
1-2 AEROSOL, METERED RESPIRATORY (INHALATION)
Status: CANCELLED
Start: 2022-07-27

## 2022-07-27 RX ORDER — METHYLPREDNISOLONE SODIUM SUCCINATE 125 MG/2ML
125 INJECTION, POWDER, LYOPHILIZED, FOR SOLUTION INTRAMUSCULAR; INTRAVENOUS
Status: CANCELLED
Start: 2022-07-27

## 2022-07-27 RX ORDER — MEPERIDINE HYDROCHLORIDE 25 MG/ML
25 INJECTION INTRAMUSCULAR; INTRAVENOUS; SUBCUTANEOUS EVERY 30 MIN PRN
Status: CANCELLED | OUTPATIENT
Start: 2022-07-27

## 2022-07-27 RX ORDER — ALBUTEROL SULFATE 0.83 MG/ML
2.5 SOLUTION RESPIRATORY (INHALATION)
Status: CANCELLED | OUTPATIENT
Start: 2022-07-27

## 2022-07-27 RX ORDER — DIPHENHYDRAMINE HYDROCHLORIDE 50 MG/ML
50 INJECTION INTRAMUSCULAR; INTRAVENOUS
Status: CANCELLED
Start: 2022-07-27

## 2022-07-27 RX ORDER — EPINEPHRINE 1 MG/ML
0.3 INJECTION, SOLUTION, CONCENTRATE INTRAVENOUS EVERY 5 MIN PRN
Status: CANCELLED | OUTPATIENT
Start: 2022-07-27

## 2022-07-27 RX ORDER — IBANDRONATE SODIUM 3 MG/3 ML
3 SYRINGE (ML) INTRAVENOUS ONCE
Status: CANCELLED
Start: 2022-07-27 | End: 2022-07-27

## 2022-07-29 ENCOUNTER — TELEPHONE (OUTPATIENT)
Dept: FAMILY MEDICINE | Facility: OTHER | Age: 72
End: 2022-07-29

## 2022-07-29 DIAGNOSIS — M81.0 OSTEOPOROSIS, UNSPECIFIED OSTEOPOROSIS TYPE, UNSPECIFIED PATHOLOGICAL FRACTURE PRESENCE: ICD-10-CM

## 2022-07-29 DIAGNOSIS — E61.1 IRON DEFICIENCY: Primary | ICD-10-CM

## 2022-07-29 DIAGNOSIS — Z92.89 PERSONAL HISTORY OF OTHER MEDICAL TREATMENT: ICD-10-CM

## 2022-07-29 RX ORDER — DIPHENHYDRAMINE HYDROCHLORIDE 50 MG/ML
50 INJECTION INTRAMUSCULAR; INTRAVENOUS
Status: CANCELLED
Start: 2022-07-29

## 2022-07-29 RX ORDER — ALBUTEROL SULFATE 0.83 MG/ML
2.5 SOLUTION RESPIRATORY (INHALATION)
Status: CANCELLED | OUTPATIENT
Start: 2022-07-29

## 2022-07-29 RX ORDER — HEPARIN SODIUM,PORCINE 10 UNIT/ML
5 VIAL (ML) INTRAVENOUS
Status: CANCELLED | OUTPATIENT
Start: 2022-07-29

## 2022-07-29 RX ORDER — EPINEPHRINE 1 MG/ML
0.3 INJECTION, SOLUTION, CONCENTRATE INTRAVENOUS EVERY 5 MIN PRN
Status: CANCELLED | OUTPATIENT
Start: 2022-07-29

## 2022-07-29 RX ORDER — MEPERIDINE HYDROCHLORIDE 25 MG/ML
25 INJECTION INTRAMUSCULAR; INTRAVENOUS; SUBCUTANEOUS EVERY 30 MIN PRN
Status: CANCELLED | OUTPATIENT
Start: 2022-07-29

## 2022-07-29 RX ORDER — METHYLPREDNISOLONE SODIUM SUCCINATE 125 MG/2ML
125 INJECTION, POWDER, LYOPHILIZED, FOR SOLUTION INTRAMUSCULAR; INTRAVENOUS
Status: CANCELLED
Start: 2022-07-29

## 2022-07-29 RX ORDER — ALBUTEROL SULFATE 90 UG/1
1-2 AEROSOL, METERED RESPIRATORY (INHALATION)
Status: CANCELLED
Start: 2022-07-29

## 2022-07-29 RX ORDER — HEPARIN SODIUM (PORCINE) LOCK FLUSH IV SOLN 100 UNIT/ML 100 UNIT/ML
5 SOLUTION INTRAVENOUS
Status: CANCELLED | OUTPATIENT
Start: 2022-07-29

## 2022-07-29 NOTE — TELEPHONE ENCOUNTER
Notified patient of message below, she is wondering in between times what should she be doing with her increasing fatigue?

## 2022-07-29 NOTE — TELEPHONE ENCOUNTER
They denied the infusion because she is not anemic--her hemoglobin is normal and has been normal for years.  Also, her ferritin, iron and iron binding capacity are still normal, they are just on the low side of normal--which is also why insurance won't cover it.    Pernicious anemia is due to low Vitamin B12 which she has under control with her B12 supplements.  Pernicious anemia is not treated with iron.    Iron supplement may give a little boost to energy, but would not expect it to be a dramatic change.    She can try iron rich foods--liver, meat, iron fortified cereals, nuts, sunflower seeds, beans, raisins, prunes, spinach

## 2022-07-29 NOTE — TELEPHONE ENCOUNTER
Please let patient know that insurance won't cover her iron infusion since she is not anemic.    I did reach out to her liver provider (Tristan Chua PA-C) who states to cancel the infusion and she'll recheck labs in September.    Please let patient know the above and cancel her infusion at [facility] on 8/5/22

## 2022-07-29 NOTE — TELEPHONE ENCOUNTER
Relayed info to pt. She said she already exercises 1 hr a day. She has talked to her psychiatrist about this and they think everything is good medication wise.     She said under her problem list on mychart she does have a diagnosis of pernicious anemia. She wants to know if this was relayed to insurance and if it wasn't can it be. She said her psychiatrist is very concerned that they denied the iron infusions.

## 2022-07-29 NOTE — TELEPHONE ENCOUNTER
Increase exercise if she can.    Talk with Psychiatry and make sure depression is adequately controlled.    Does she want to visit with sleep specialist to see if there is anything else that can be done to optimize her sleep?

## 2022-07-29 NOTE — TELEPHONE ENCOUNTER
----- Message from Moy Mehta sent at 7/29/2022 11:06 AM CDT -----  Regarding: RE: INFED INFUSION  That sounds like a good idea since without an anemia DX and documentation the infusion would not be covered. Will one of you or someone on you care team be reaching out to the patient to let her know she should cancel her appt on 08/05? Please advise    Thank you,  Moy Mehta  Infusion  ([facility])  Ph: [phone]  ----- Message -----  From: Tristan Chua PA-C  Sent: 7/29/2022  10:43 AM CDT  To: Tanika Clark MD, Moy Mehta, #  Subject: RE: INFED INFUSION                               Okay, well any infusion may have to be delayed.     She has repeat labs due in September, maybe there will be a change at that point.     Regards,     Tristan Chua PA-C   ----- Message -----  From: Moy Mehta  Sent: 7/28/2022   3:58 PM CDT  To: Tanika Clark MD, #  Subject: RE: INFED INFUSION                               Hello -     Unfortunately, no. FDA requires an anemia diagnosis and also documentation of that anemia. All the labs I have reviewed do not show proof of anemia either.    Thank you,  Moy Mehta  Infusion  ([facility])  Ph: [phone]  ----- Message -----  From: Tristan Chua PA-C  Sent: 7/28/2022   3:55 PM CDT  To: Tanika Clark MD, Moy Mehta, #  Subject: RE: INFED INFUSION                               She has iron deficiency, will that code work?     Thanks, CAT      ----- Message -----  From: Tanika Clark MD  Sent: 7/28/2022   3:10 PM CDT  To: Tristan Chua PA-C, Moy Mehta, #  Subject: RE: INFED INFUSION                               She has a low ferritin, she is not anemic.  I added Tristan Chua PA-C from GI who was the one who recommended the patient to have IV iron, but wanted me to place the order.      Electronically signed by Tanika Clark MD on 7/28/2022    ----- Message  -----  From: Moy Mehta  Sent: 7/28/2022   1:34 PM CDT  To: Tanika Clark MD, [facility]  Subject: INFED INFUSION                                   Dr Jenn Clark,     I was working on securing insurance coverage for this patients Infed infusion scheduled for 08/05/22. There are 2 things that are needed:  1. FDA approval requires an anemia related Dx included on the therapy plan        and   2. Labs supporting the anemia     If we have documentation and it's appropriate are you able to add an anemia dx? If we do not have supporting documentation I would need you to provide a letter of medical necessity to submit with a off label authorization request. Please advise.    Thank you,  Moy Mehta  Infusion  ([facility])  Ph: [phone]

## 2022-08-05 NOTE — TELEPHONE ENCOUNTER
Pt called back, relayed info to patient. She understands however she does not understand what is wrong with her and why is as fatigued as she is always. She said she eats good, exercises, does everything shes told and it almost seems to be getting worse. She is very frustrated. Asked to send pcp a message and see what to do next.

## 2022-08-08 NOTE — TELEPHONE ENCOUNTER
RN's can we please call patient and relay the information from Tanika below about the fatigue and relationship the role it plays and recommendations for foods that are specific. She may be eating well and exercising but it is important to also get the right type of foods to help. We will leave this message for PCP as well and I recommend that she also schedule a visit with her provider as well.       YOSEPH Baker CNP  Questions or concerns please feel free to send me a [employer] message or call me  Phone : [phone]

## 2022-08-09 NOTE — TELEPHONE ENCOUNTER
Left message on answering machine for patient to call back and ask to speak with any RN at [phone], her primary care clinic.  Alicia KViviana RN

## 2022-08-10 NOTE — TELEPHONE ENCOUNTER
Left message on answering machine for patient to call back and ask to speak with any RN at [phone], her primary care clinic.  Alicia KViviana RN

## 2022-08-11 NOTE — TELEPHONE ENCOUNTER
Third attempt to reach patient.   If the patient calls back, please inform her of the message below.   RN sent [employer] message.     Note from PCP:   They denied the infusion because she is not anemic--her hemoglobin is normal and has been normal for years.  Also, her ferritin, iron and iron binding capacity are still normal, they are just on the low side of normal--which is also why insurance won't cover it.    Pernicious anemia is due to low Vitamin B12 which she has under control with her B12 supplements.  Pernicious anemia is not treated with iron.    Iron supplement may give a little boost to energy, but would not expect it to be a dramatic change.    She can try iron rich foods--liver, meat, iron fortified cereals, nuts, sunflower seeds, beans, raisins, prunes, spinach

## 2022-08-12 NOTE — TELEPHONE ENCOUNTER
Duplicate message.    Landen Richardson RN....1/20/2021 4:38 PM      Aklief Pregnancy And Lactation Text: It is unknown if this medication is safe to use during pregnancy.  It is unknown if this medication is excreted in breast milk.  Breastfeeding women should use the topical cream on the smallest area of the skin for the shortest time needed while breastfeeding.  Do not apply to nipple and areola.

## 2022-08-24 ENCOUNTER — MYC MEDICAL ADVICE (OUTPATIENT)
Dept: FAMILY MEDICINE | Facility: OTHER | Age: 72
End: 2022-08-24

## 2022-08-24 DIAGNOSIS — G47.19 EXCESSIVE DAYTIME SLEEPINESS: Primary | ICD-10-CM

## 2022-08-25 NOTE — TELEPHONE ENCOUNTER
Last OV 7/5/22 patient is having increased fatigue and would like a B12 lab order if possible.  Last B12 lab done was 12/21.    Kiley Erazo RN  [facility] ~ Registered Nurse  Clinic Triage ~ [place] [facility] [place]  August 25, 2022

## 2022-08-31 ENCOUNTER — LAB (OUTPATIENT)
Dept: LAB | Facility: OTHER | Age: 72
End: 2022-08-31
Payer: COMMERCIAL

## 2022-08-31 DIAGNOSIS — G47.19 EXCESSIVE DAYTIME SLEEPINESS: ICD-10-CM

## 2022-08-31 DIAGNOSIS — I10 BENIGN ESSENTIAL HYPERTENSION: ICD-10-CM

## 2022-08-31 LAB — TSH SERPL DL<=0.005 MIU/L-ACNC: 1.63 MU/L (ref 0.4–4)

## 2022-08-31 PROCEDURE — 82607 VITAMIN B-12: CPT

## 2022-08-31 PROCEDURE — 84443 ASSAY THYROID STIM HORMONE: CPT

## 2022-08-31 PROCEDURE — 36415 COLL VENOUS BLD VENIPUNCTURE: CPT

## 2022-09-01 LAB — VIT B12 SERPL-MCNC: 2328 PG/ML (ref 232–1245)

## 2022-09-09 ENCOUNTER — OFFICE VISIT (OUTPATIENT)
Dept: SURGERY | Facility: CLINIC | Age: 72
End: 2022-09-09
Payer: COMMERCIAL

## 2022-09-09 VITALS
DIASTOLIC BLOOD PRESSURE: 81 MMHG | HEART RATE: 66 BPM | WEIGHT: 140.9 LBS | OXYGEN SATURATION: 100 % | HEIGHT: 63 IN | SYSTOLIC BLOOD PRESSURE: 166 MMHG | BODY MASS INDEX: 24.96 KG/M2

## 2022-09-09 DIAGNOSIS — K62.82 AIN (ANAL INTRAEPITHELIAL NEOPLASIA) ANAL CANAL: ICD-10-CM

## 2022-09-09 LAB
LAB DIRECTOR COMMENTS: ABNORMAL
LAB DIRECTOR DISCLAIMER: ABNORMAL
LAB DIRECTOR INTERPRETATION: ABNORMAL
LAB DIRECTOR METHODOLOGY: ABNORMAL
LAB DIRECTOR RESULTS: ABNORMAL
SPECIMEN DESCRIPTION: ABNORMAL

## 2022-09-09 PROCEDURE — 99203 OFFICE O/P NEW LOW 30 MIN: CPT | Mod: 25 | Performed by: NURSE PRACTITIONER

## 2022-09-09 PROCEDURE — 46607 DIAGNOSTIC ANOSCOPY & BIOPSY: CPT | Performed by: NURSE PRACTITIONER

## 2022-09-09 PROCEDURE — 88305 TISSUE EXAM BY PATHOLOGIST: CPT | Mod: TC | Performed by: NURSE PRACTITIONER

## 2022-09-09 PROCEDURE — 87624 HPV HI-RISK TYP POOLED RSLT: CPT | Performed by: NURSE PRACTITIONER

## 2022-09-09 PROCEDURE — 88305 TISSUE EXAM BY PATHOLOGIST: CPT | Mod: 26 | Performed by: PATHOLOGY

## 2022-09-09 PROCEDURE — 88112 CYTOPATH CELL ENHANCE TECH: CPT | Mod: 26 | Performed by: PATHOLOGY

## 2022-09-09 PROCEDURE — 88112 CYTOPATH CELL ENHANCE TECH: CPT | Mod: TC | Performed by: NURSE PRACTITIONER

## 2022-09-09 RX ORDER — LIDOCAINE HYDROCHLORIDE AND EPINEPHRINE 10; 10 MG/ML; UG/ML
7 INJECTION, SOLUTION INFILTRATION; PERINEURAL ONCE
Status: COMPLETED | OUTPATIENT
Start: 2022-09-09 | End: 2022-09-09

## 2022-09-09 RX ADMIN — LIDOCAINE HYDROCHLORIDE AND EPINEPHRINE 7 ML: 10; 10 INJECTION, SOLUTION INFILTRATION; PERINEURAL at 09:21

## 2022-09-09 ASSESSMENT — PAIN SCALES - GENERAL: PAINLEVEL: NO PAIN (0)
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
## 2022-09-09 NOTE — NURSING NOTE
"Chief Complaint   Patient presents with     Follow Up     HRA       Vitals:    09/09/22 0845   BP: (!) 166/81   BP Location: Left arm   Patient Position: Sitting   Cuff Size: Adult Regular   Pulse: 66   SpO2: 100%   Weight: 140 lb 14.4 oz   Height: 5' 3.39\"       Body mass index is 24.65 kg/m .    Smiley Garcia CMA    "

## 2022-09-12 ENCOUNTER — E-VISIT (OUTPATIENT)
Dept: FAMILY MEDICINE | Facility: OTHER | Age: 72
End: 2022-09-12
Payer: COMMERCIAL

## 2022-09-12 ENCOUNTER — LAB (OUTPATIENT)
Dept: URGENT CARE | Facility: URGENT CARE | Age: 72
End: 2022-09-12
Attending: FAMILY MEDICINE

## 2022-09-12 DIAGNOSIS — J02.9 SORE THROAT: ICD-10-CM

## 2022-09-12 DIAGNOSIS — Z20.822 SUSPECTED COVID-19 VIRUS INFECTION: ICD-10-CM

## 2022-09-12 LAB
DEPRECATED S PYO AG THROAT QL EIA: NEGATIVE
GROUP A STREP BY PCR: NOT DETECTED
PATH REPORT.COMMENTS IMP SPEC: NORMAL
PATH REPORT.FINAL DX SPEC: NORMAL
PATH REPORT.FINAL DX SPEC: NORMAL
PATH REPORT.GROSS SPEC: NORMAL
PATH REPORT.GROSS SPEC: NORMAL
PATH REPORT.MICROSCOPIC SPEC OTHER STN: NORMAL
PATH REPORT.MICROSCOPIC SPEC OTHER STN: NORMAL
PATH REPORT.RELEVANT HX SPEC: NORMAL
PATH REPORT.RELEVANT HX SPEC: NORMAL
PHOTO IMAGE: NORMAL

## 2022-09-12 PROCEDURE — U0003 INFECTIOUS AGENT DETECTION BY NUCLEIC ACID (DNA OR RNA); SEVERE ACUTE RESPIRATORY SYNDROME CORONAVIRUS 2 (SARS-COV-2) (CORONAVIRUS DISEASE [COVID-19]), AMPLIFIED PROBE TECHNIQUE, MAKING USE OF HIGH THROUGHPUT TECHNOLOGIES AS DESCRIBED BY CMS-2020-01-R: HCPCS

## 2022-09-12 PROCEDURE — 87651 STREP A DNA AMP PROBE: CPT

## 2022-09-12 PROCEDURE — 99421 OL DIG E/M SVC 5-10 MIN: CPT | Mod: CS | Performed by: FAMILY MEDICINE

## 2022-09-12 PROCEDURE — U0005 INFEC AGEN DETEC AMPLI PROBE: HCPCS

## 2022-09-12 NOTE — PATIENT INSTRUCTIONS
Dear Niesha,    Your symptoms show that you may have coronavirus (COVID-19).     Because you also reported sore throat, I would like to also test you for Strep Throat to determine if we need to treat you for that as well.    What should I do?  We would like to test you for COVID-19 virus and Strep Throat. I have placed orders for these tests.   To schedule: go to your [employer] home page and scroll down to the section that says  You have an appointment that needs to be scheduled  and click the large green button that says  Schedule Now  and follow the steps to find the next available openings. It is important that when you are asked what the reason for your appointment is that you mention you need BOTH COVID and Strep tests.    If you are unable to complete these [employer] scheduling steps, please call [phone] to schedule your testing.     These guidelines are for isolating before returning to work, school or .     For employers, schools and day cares: This is an official notice for this person s medical guidelines for returning in-person.     For health care sites: The CDC gives different isolation and quarantine guidelines for healthcare sites, please check with these sites before arriving.     How do I self-isolate?  You isolate when you have symptoms of COVID or a test shows you have COVID, even if you don t have symptoms.     If you DO have symptoms:  o Stay home and away from others  - For at least 5 days after your symptoms started, AND   - You are fever free for 24 hours (with no medicine that reduces fever), AND  - Your other symptoms are better.  o Wear a mask for 10 full days any time you are around others.    If you DON T have symptoms:  o Stay at home and away from others for at least 5 days after your positive test.  o Wear a mask for 10 full days any time you are around others.    How can I take care of myself?  Over the counter medications may help with your symptoms such as runny or stuffy  nose, cough, chills, or fever. Talk to your care team about your options.   Some people are at high risk of severe illness (for example, you have a weak immune system, you re 65 years or older, or you have certain medical problems). If your risk is high and your symptoms started in the last 5 to 7 days, we strongly recommend for you to get COVID treatment as soon as possible. Paxlovid, Molnupiravir and the monoclonal antibody treatments are proven safe and effective, make you feel better faster, and prevent hospitalization and death.       To schedule an appointment to discuss COVID treatment, request an appointment on [employer] (select  COVID-19 Treatment ) or call [employer] ([phone]), press 7.    Get lots of rest. Drink extra fluids (unless a doctor has told you not to)    Take Tylenol (acetaminophen) or ibuprofen for fever or pain. If you have liver or kidney problems, ask your family doctor if it's okay to take Tylenol or ibuprofen    Take over the counter medications for your symptoms, as directed by your doctor. You may also talk to your pharmacist.      If you have other health problems (like cancer, heart failure, an organ transplant or severe kidney disease): Call your specialty clinic if you don't feel better in the next 2 days.    Know when to call 911. Emergency warning signs include:  o Trouble breathing or shortness of breath  o Pain or pressure in the chest that doesn't go away  o Feeling confused like you haven't felt before, or not being able to wake up  o Bluish-colored lips or face    Where can I get more information?    [facility] - About COVID-19: [email or web address]     CDC - What to Do If You're Sick: https://www.cdc.gov/coronavirus/2019-ncov/if-you-are-sick/index.html     CDC - Quarantine & Isolation: https://www.cdc.gov/coronavirus/2019-ncov/your-health/quarantine-isolation.html     [facility] clinical trials (COVID-19 research studies):  clinicalaffairs.[email or web address]-clinical-trials    Below are the COVID-19 hotlines at the [place] Department of Health ([facility]). Interpreters are available.  o For health questions: Call 186-034-3312 or 1-312.972.1633 (7 a.m. to 7 p.m.)  o For questions about schools and childcare: Call 484-697-9087 or 1-905.689.1414 (7 a.m. to 7 p.m.)

## 2022-09-13 ENCOUNTER — MYC MEDICAL ADVICE (OUTPATIENT)
Dept: FAMILY MEDICINE | Facility: OTHER | Age: 72
End: 2022-09-13

## 2022-09-13 ENCOUNTER — VIRTUAL VISIT (OUTPATIENT)
Dept: FAMILY MEDICINE | Facility: CLINIC | Age: 72
End: 2022-09-13
Payer: COMMERCIAL

## 2022-09-13 DIAGNOSIS — F33.1 MODERATE RECURRENT MAJOR DEPRESSION (H): ICD-10-CM

## 2022-09-13 DIAGNOSIS — U07.1 INFECTION DUE TO 2019 NOVEL CORONAVIRUS: Primary | ICD-10-CM

## 2022-09-13 DIAGNOSIS — M06.09 RHEUMATOID ARTHRITIS OF MULTIPLE SITES WITH NEGATIVE RHEUMATOID FACTOR (H): ICD-10-CM

## 2022-09-13 LAB — SARS-COV-2 RNA RESP QL NAA+PROBE: POSITIVE

## 2022-09-13 PROCEDURE — 99213 OFFICE O/P EST LOW 20 MIN: CPT | Mod: CS | Performed by: FAMILY MEDICINE

## 2022-09-13 NOTE — PROGRESS NOTES
Niesha is a 72 year old who is being evaluated via a billable telephone visit.      What phone number would you like to be contacted at? [phone]  How would you like to obtain your AVS? MyChart    Assessment & Plan     Infection due to 2019 novel coronavirus  Started patient on antiviral for COVID infection.  Her symptoms started about 4 days ago.  She was notified about the positive COVID test results today.  No drug interactions noted.    Instructed to seek medical attention if any worsening of symptoms noted.  Patient agrees to the plan  - nirmatrelvir and ritonavir (PAXLOVID) therapy pack; Take 3 tablets by mouth 2 times daily for 5 days (Take 2 Nirmatrelvir tablets and 1 Ritonavir tablet twice daily for 5 days)    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)      Moderate recurrent major depression (H)            Dona Rodgers MD  [employer]   Niesha is a 72 year old, presenting for the following health issues:  Covid Concern      HPI       COVID-19 Symptom Review  How many days ago did these symptoms start? 9/9/2022    Are any of the following symptoms significant for you?    New or worsening difficulty breathing? No    Worsening cough? Yes, I am coughing up mucus.    Fever or chills? No    Headache: YES    Sore throat: YES    Chest pain: No    Diarrhea: No    Body aches? YES    What treatments has patient tried? Aleve for headache   Does patient live in a nursing home, group home, or shelter? No  Does patient have a way to get food/medications during quarantined? No - [place] and does not have family that could help. Will need to go to the pharmacy her self            Review of Systems     RESP: NEGATIVE for significant cough or SOB  CV: NEGATIVE for chest pain, palpitations or peripheral edema      Objective           Vitals:  No vitals were obtained today due to virtual visit.    Physical Exam    healthy, alert and no distress  PSYCH: Alert and oriented times 3; coherent speech, normal   rate and volume, able to articulate logical thoughts, able   to abstract reason, no tangential thoughts, no hallucinations   or delusions  Her affect is normal  RESP: No cough, no audible wheezing, able to talk in full sentences  Remainder of exam unable to be completed due to telephone visits          Phone call duration: 11 minutes

## 2022-10-10 ENCOUNTER — INFUSION THERAPY VISIT (OUTPATIENT)
Dept: INFUSION THERAPY | Facility: CLINIC | Age: 72
End: 2022-10-10
Attending: FAMILY MEDICINE
Payer: COMMERCIAL

## 2022-10-10 ENCOUNTER — APPOINTMENT (OUTPATIENT)
Dept: LAB | Facility: CLINIC | Age: 72
End: 2022-10-10
Payer: COMMERCIAL

## 2022-10-10 VITALS
DIASTOLIC BLOOD PRESSURE: 77 MMHG | WEIGHT: 137.1 LBS | BODY MASS INDEX: 24.29 KG/M2 | SYSTOLIC BLOOD PRESSURE: 135 MMHG | RESPIRATION RATE: 16 BRPM | HEIGHT: 63 IN | OXYGEN SATURATION: 100 % | TEMPERATURE: 99.5 F | HEART RATE: 58 BPM

## 2022-10-10 DIAGNOSIS — M81.0 AGE-RELATED OSTEOPOROSIS WITHOUT CURRENT PATHOLOGICAL FRACTURE: Primary | ICD-10-CM

## 2022-10-10 DIAGNOSIS — Z92.89 PERSONAL HISTORY OF OTHER MEDICAL TREATMENT: ICD-10-CM

## 2022-10-10 LAB
CALCIUM SERPL-MCNC: 9.7 MG/DL (ref 8.5–10.1)
CREAT SERPL-MCNC: 0.8 MG/DL (ref 0.52–1.04)
GFR SERPL CREATININE-BSD FRML MDRD: 78 ML/MIN/1.73M2

## 2022-10-10 PROCEDURE — 82565 ASSAY OF CREATININE: CPT | Performed by: FAMILY MEDICINE

## 2022-10-10 PROCEDURE — 250N000011 HC RX IP 250 OP 636: Performed by: FAMILY MEDICINE

## 2022-10-10 PROCEDURE — 96374 THER/PROPH/DIAG INJ IV PUSH: CPT

## 2022-10-10 PROCEDURE — 36415 COLL VENOUS BLD VENIPUNCTURE: CPT | Performed by: FAMILY MEDICINE

## 2022-10-10 PROCEDURE — 82310 ASSAY OF CALCIUM: CPT | Performed by: FAMILY MEDICINE

## 2022-10-10 RX ORDER — EPINEPHRINE 1 MG/ML
0.3 INJECTION, SOLUTION INTRAMUSCULAR; SUBCUTANEOUS EVERY 5 MIN PRN
Status: CANCELLED | OUTPATIENT
Start: 2023-01-08

## 2022-10-10 RX ORDER — IBANDRONATE SODIUM 3 MG/3 ML
3 SYRINGE (ML) INTRAVENOUS ONCE
Status: CANCELLED
Start: 2023-01-08 | End: 2023-01-08

## 2022-10-10 RX ORDER — MEPERIDINE HYDROCHLORIDE 25 MG/ML
25 INJECTION INTRAMUSCULAR; INTRAVENOUS; SUBCUTANEOUS EVERY 30 MIN PRN
Status: CANCELLED | OUTPATIENT
Start: 2023-01-08

## 2022-10-10 RX ORDER — METHYLPREDNISOLONE SODIUM SUCCINATE 125 MG/2ML
125 INJECTION, POWDER, LYOPHILIZED, FOR SOLUTION INTRAMUSCULAR; INTRAVENOUS
Status: CANCELLED
Start: 2023-01-08

## 2022-10-10 RX ORDER — ALBUTEROL SULFATE 0.83 MG/ML
2.5 SOLUTION RESPIRATORY (INHALATION)
Status: CANCELLED | OUTPATIENT
Start: 2023-01-08

## 2022-10-10 RX ORDER — IBANDRONATE SODIUM 3 MG/3 ML
3 SYRINGE (ML) INTRAVENOUS ONCE
Status: COMPLETED | OUTPATIENT
Start: 2022-10-10 | End: 2022-10-10

## 2022-10-10 RX ORDER — DIPHENHYDRAMINE HYDROCHLORIDE 50 MG/ML
50 INJECTION INTRAMUSCULAR; INTRAVENOUS
Status: CANCELLED
Start: 2023-01-08

## 2022-10-10 RX ORDER — ALBUTEROL SULFATE 90 UG/1
1-2 AEROSOL, METERED RESPIRATORY (INHALATION)
Status: CANCELLED
Start: 2023-01-08

## 2022-10-10 RX ADMIN — IBANDRONATE SODIUM 3 MG: 3 INJECTION, SOLUTION INTRAVENOUS at 15:10

## 2022-10-10 ASSESSMENT — PAIN SCALES - GENERAL: PAINLEVEL: SEVERE PAIN (6)

## 2022-10-10 NOTE — PROGRESS NOTES
Infusion Nursing Note:  Niesha Adhikari presents today for Boniva IVP  Patient seen by provider today: No   present during visit today: Not Applicable.    Note: Pt here today for Boniva IVP.  Pt is feeling okay today. Having some hip pain/low back pain.     Intravenous Access:  Peripheral IV placed.    Treatment Conditions:  Lab parameters met today for patient to get treatment.     Post Infusion Assessment:  Patient tolerated infusion without incident.  Site patent and intact, free from redness, edema or discomfort.  Access discontinued per protocol.     Discharge Plan:   Patient discharged in stable condition accompanied by: self.  Departure Mode: Ambulatory.  Appointment made for patient to return on 01/12/2022      Debbie Beltran RN

## 2022-10-15 ENCOUNTER — MYC MEDICAL ADVICE (OUTPATIENT)
Dept: FAMILY MEDICINE | Facility: OTHER | Age: 72
End: 2022-10-15

## 2022-10-17 NOTE — TELEPHONE ENCOUNTER
"SITUATION:   RN called to discuss further.     Patient is due for her 3rd hepatitis B injection and Bivalent. Patient will wait 90     Patient describes an elevation in BP for the last 3 weeks. She does not check her BP daily. Her readings are as followed:   10/15/22 *Patient had a severe headache.  10/15/22 175/109 HR-60  10/15/22  158/103 HR- 79   (20m apart)  10/15/22 148/86  HR- 61   (20m apart)  10/17/22 *Patient has a headache.   10/17/22 146/93 HR 61    Patient describes having a frontal/Parietal lobe headache. The pain is rated at a4-5/10, constant, and achy. Patient declines difficult breathing, chest pain, or other symptoms.   Two days ago the patient had a severe headache that affected her mobility, eye pain, and sensitivity to sound. She felt nauseated, but did not vomit. On occasion the patient will feel unsteady on her feet accompanied with ringing in her ears.     HOME TREATMENTS:  Rest     NURSE RECOMMENDATION:   Increase water intake.   Rest  Elevate Feet    Self-Care for Headaches  Most headaches aren't serious and can be relieved with self-care. But some headaches may be a sign of another health problem like eye trouble or high blood pressure. To find the best treatment, learn what kind of headaches you get. For tension headaches, self-care will usually help. To treat migraines, ask your healthcare provider for advice. It's also possible to get both tension and migraine headaches. Self-care involves relieving the pain and avoiding headache \"triggers\" if you can.     Ways to reduce pain and tension  Try these steps:    Apply a cold compress or ice pack to the pain site.    Drink fluids. If nausea makes it hard to drink, try sucking on ice.    Rest. Protect yourself from bright light and loud noises.    Calm your emotions by imagining a peaceful scene.    Massage tight neck, shoulder, and head muscles.    To relax muscles, soak in a hot bath or use a hot shower.  Use medicines  Aspirin or other " "over-the-counter (OTC) pain medicines such as ibuprofen and acetaminophen can relieve headache. Remember: Never give aspirin to anyone 18 years old or younger because of the risk of getting Reye syndrome. Use pain medicines only when needed. Certain prescription and OTC medicines can lead to rebound headaches if they are taken too often. Check with your healthcare provider or pharmacist about your medicines.   Track your headaches  Keeping a headache diary can help you and your healthcare provider identify what's causing your headaches:     Note when each headache happens.    Identify your activities and the foods you've eaten 6 to 8 hours before the headache began.    Look for any trends or \"triggers.\"    Bring the information to your next medical appointment.  Signs of tension headache  Any of the following can be signs:     Dull pain or feeling of pressure in a tight band around your head    Pain in your neck or shoulders    Headache without a definite beginning or end    Headache after an activity such as driving or working on a computer  Signs of migraine  Any of the following can be signs:     Throbbing pain on one or both sides of your head    Nausea or vomiting    Extreme sensitivity to light, sound, and smells    Bright spots, flashes, or other visual changes    Pain or nausea so severe that you can't continue your daily activities  When to call your healthcare provider   Call your healthcare provider if any of these occur:     A headache that lingers after a recent injury or bump to the head    A headache that lasts for more than 3 days    Frequent headaches, especially in the morning  Get medical care right away if you have:    A headache along with slurred speech, changes in your vision, or numbness or weakness in your arms or legs    Headaches with seizures    A fever with a stiff neck or pain when you bend your head toward your chest    A headache that you would call \"the worst headache you have ever " "had\" or a severe, persistent headache that gets worse or doesn't get better with treatment        PLAN:   Patient will go to the ED if symptoms worsen. Patient scheduled visit with .     Next 5 appointments (look out 90 days)    Oct 28, 2022 10:30 AM  (Arrive by 10:10 AM)  Provider Visit with Tanika Clark MD  [facility] ([facility] ) [address] [place] [address] [address]  [facility] [id number] [phone]        Are you able to work in sooner?      RECOMMENDED DISPOSITION:  Schedule visit.   Will comply with recommendation: yes   If further questions/concerns or if Sx do not improve, worsen or new Sx develop, call your PCP or [facility] as soon as possible.    NOTES:  Disposition was determined by the first positive assessment question, therefore all previous assessment questions were negative.     Guideline used:[employer]igrane/BP  Telephone Triage Protocols for Nurses, Fifth Edition, CARMENZA RileyN, RN, PHN  [place]  October 17, 2022    "

## 2022-10-28 ENCOUNTER — OFFICE VISIT (OUTPATIENT)
Dept: FAMILY MEDICINE | Facility: OTHER | Age: 72
End: 2022-10-28
Payer: COMMERCIAL

## 2022-10-28 VITALS
HEART RATE: 74 BPM | DIASTOLIC BLOOD PRESSURE: 80 MMHG | BODY MASS INDEX: 24.45 KG/M2 | OXYGEN SATURATION: 100 % | HEIGHT: 63 IN | TEMPERATURE: 98.3 F | SYSTOLIC BLOOD PRESSURE: 157 MMHG | WEIGHT: 138 LBS

## 2022-10-28 DIAGNOSIS — I10 BENIGN ESSENTIAL HYPERTENSION: Primary | ICD-10-CM

## 2022-10-28 DIAGNOSIS — G47.419 PRIMARY NARCOLEPSY WITHOUT CATAPLEXY: ICD-10-CM

## 2022-10-28 PROBLEM — R53.83 FATIGUE: Status: ACTIVE | Noted: 2019-01-02

## 2022-10-28 PROCEDURE — 99214 OFFICE O/P EST MOD 30 MIN: CPT | Mod: 25 | Performed by: FAMILY MEDICINE

## 2022-10-28 PROCEDURE — G0010 ADMIN HEPATITIS B VACCINE: HCPCS | Performed by: FAMILY MEDICINE

## 2022-10-28 PROCEDURE — 90746 HEPB VACCINE 3 DOSE ADULT IM: CPT | Performed by: FAMILY MEDICINE

## 2022-10-28 RX ORDER — LISINOPRIL 10 MG/1
10 TABLET ORAL DAILY
Qty: 90 TABLET | Refills: 3 | Status: SHIPPED | OUTPATIENT
Start: 2022-10-28 | End: 2023-11-16

## 2022-10-28 ASSESSMENT — PATIENT HEALTH QUESTIONNAIRE - PHQ9
10. IF YOU CHECKED OFF ANY PROBLEMS, HOW DIFFICULT HAVE THESE PROBLEMS MADE IT FOR YOU TO DO YOUR WORK, TAKE CARE OF THINGS AT HOME, OR GET ALONG WITH OTHER PEOPLE: SOMEWHAT DIFFICULT
SUM OF ALL RESPONSES TO PHQ QUESTIONS 1-9: 6
SUM OF ALL RESPONSES TO PHQ QUESTIONS 1-9: 6

## 2022-10-28 ASSESSMENT — ENCOUNTER SYMPTOMS: HEADACHES: 1
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
## 2022-12-01 ENCOUNTER — OFFICE VISIT (OUTPATIENT)
Dept: NEUROLOGY | Facility: CLINIC | Age: 72
End: 2022-12-01
Payer: COMMERCIAL

## 2022-12-01 DIAGNOSIS — R20.0 NUMBNESS AND TINGLING IN BOTH HANDS: ICD-10-CM

## 2022-12-01 DIAGNOSIS — R20.2 NUMBNESS AND TINGLING IN BOTH HANDS: ICD-10-CM

## 2022-12-01 PROCEDURE — 95886 MUSC TEST DONE W/N TEST COMP: CPT | Performed by: INTERNAL MEDICINE

## 2022-12-01 PROCEDURE — 95885 MUSC TST DONE W/NERV TST LIM: CPT | Mod: 59 | Performed by: INTERNAL MEDICINE

## 2022-12-01 PROCEDURE — 95910 NRV CNDJ TEST 7-8 STUDIES: CPT | Performed by: INTERNAL MEDICINE

## 2022-12-01 NOTE — LETTER
2022         RE: Niesha Adhikari  [address] [facility] [place] [address]        Dear Colleague,    Thank you for referring your patient, Niesha Adhikari, to the [facility] [place]. Please see a copy of my visit note below.                        [facility]  Electrodiagnostic Laboratory                 Department of Neurology                                                                                                         Test Date:  2022    Patient: Niesha Adhikari : 1950 Physician: Saravanan Ricardo MD   Sex: Female AGE: 72 year Ref Phys:    ID#: [id number]   Technician: Wilmer Bean     Clinical Information:  Patient is a 73 y/o female who presents for evaluation of bilateral hand numbness/tingling/pain. EMG ordered to evaluate for carpal tunnel syndrome.     Techniques:  Motor and sensory conduction studies were done with surface recording electrodes. EMG was done with a concentric needle electrode.     Results:  Evaluation of the left median sensory nerve showed decreased conduction velocity (45 m/s).  The right median sensory nerve showed reduced amplitude (10  V) and decreased conduction velocity (41 m/s).  The left median-ulnar palmar sensory nerve showed abnormal peak latency difference ((median palm)-(ulnar palm), 0.50 ms).  The right median-ulnar palmar sensory nerve showed prolonged distal peak latency (ulnar palm, 3.7 ms) and abnormal peak latency difference ((median palm)-(ulnar palm), 1.0 ms).  The left ulnar sensory nerve showed reduced amplitude (14  V).  All remaining nerves (as indicated in the following tables) were within normal limits.      All examined muscles (as indicated in the following table) showed no evidence of electrical instability.        Interpretation:  This is an abnormal examination. There is electrophysiologic evidence of mild bilateral median mononeuropathies at the wrists (carpal tunnel syndrome).    ___________________________  Saravanan Ricardo MD        Nerve Conduction Studies  Motor Sites      Latency Amplitude Neg. Amp Diff Segment Distance Velocity Neg. Dur Neg Area Diff Temperature Comment   Site (ms) Norm (mV) Norm %  cm m/s Norm ms %  C    Left Median (APB) Motor   Wrist 4.4  < 4.4 7.9  > 5.0  Wrist-APB 8   4.6  32.1    Elbow 8.5 - 6.7  < 54.0 -15.2 Elbow-Wrist 20 49  > 48 4.5 -23.6 32.1    Right Median (APB) Motor   Wrist 4.4  < 4.4 6.8  > 5.0  Wrist-APB 8   5.0  32.2    Elbow 8.5 - 7.0  < 54.0 2.9 Elbow-Wrist 20 49  > 48 5.1 1.66 32.3    Left Median/Ulnar (Lumb-Dors Int II) Motor        Median (Lumb I)   Wrist 3.9 - 0.64 -      6.2  32.1         Ulnar (Dorsal Int (manus))   Wrist 3.0 - 1.69 -      5.1  32.2    Right Median/Ulnar (Lumb-Dors Int II) Motor        Median (Lumb I)   Wrist 3.8 - 0.79 -      6.6  32.4         Ulnar (Dorsal Int (manus))   Wrist 2.9 - 2.4 -      5.9  32.5    Left Ulnar (ADM) Motor   Wrist 3.0  < 3.5 10.3  > 5.0  Wrist-ADM 8   4.6  32.8    Bel Elbow 5.6 - 9.3 - -9.7 Bel Elbow-Wrist 16.4 63  > 48 5.0 -2.1 32.8    Abv Elbow 7.6 - 8.7 - -6.5 Abv Elbow-Bel Elbow 12.2 61  > 48 5.1 -4.3 32.7    Right Ulnar (ADM) Motor   Wrist 2.9  < 3.5 10.5  > 5.0  Wrist-ADM 8   4.6  33.3    Bel Elbow 6.1 - 10.3 - -1.90 Bel Elbow-Wrist 19 59  > 48 4.7 -1.08 33.2    Abv Elbow 7.9 - 10.0 - -2.9 Abv Elbow-Bel Elbow 10.5 58  > 48 4.6 -1.45 33.1      Sensory Sites      Onset Lat Peak Lat Amp (O-P) Amp (P-P) Segment Distance Velocity Temperature Comment   Site ms ms  V Norm  V  cm m/s Norm  C    Left Median Sensory   Wrist-Dig II 3.1 3.8 13  > 10 19 Wrist-Dig II 14 45  > 48 32.5    Right Median Sensory   Wrist-Dig II 3.4 4.2 10  > 10 15 Wrist-Dig II 14 41  > 48 32.9    Left Median-Ulnar Palmar Sensory        Median   Palm-Wrist 1.93 2.5 20 - 32 Palm-Wrist - - - 32.2         Ulnar   Palm-Wrist 1.53 2.0 7 - 16 Palm-Wrist - - - 32.2    Right Median-Ulnar Palmar Sensory        Median   Palm-Wrist 2.0 2.7 19 -  18 Palm-Wrist - - - 32.3         Ulnar   Palm-Wrist 1.78 3.7 3 - 4 Palm-Wrist - - - 32.3    Left Ulnar Sensory   Wrist-Dig V 2.6 3.4 15  > 8 14 Wrist-Dig V 12.5 48  > 48 32.2    Right Ulnar Sensory   Wrist-Dig V 2.5 3.1 21  > 8 24 Wrist-Dig V 12.5 50  > 48 33      Inter-Nerve Comparisons     Nerve 1 Value 1 Nerve 2 Value 2 Parameter Result Normal   Sensory Sites   R Median Palm-Wrist 2.7 ms R Ulnar Palm-Wrist 3.7 ms Peak Lat Diff 1.0 ms <0.40   L Median Palm-Wrist 2.5 ms L Ulnar Palm-Wrist 2.0 ms Peak Lat Diff 0.50 ms <0.40       Electromyography     Side Muscle Ins Act Fibs/PSW Fasc HF Amp Dur Poly Recrt Int Pat   Left Deltoid Nml None Nml 0 Nml Nml 0 Nml Nml   Left Biceps Nml None Nml 0 Nml Nml 0 Nml Nml   Left Triceps Nml None Nml 0 Nml Nml 0 Nml Nml   Left EDC Nml None Nml 0 Nml Nml 0 Nml Nml   Left FDI Nml None Nml 0 Nml Nml 0 Nml Nml   Right Deltoid Nml None Nml 0 Nml Nml 0 Nml Nml   Right EDC Nml None Nml 0 Nml Nml 0 Nml Nml   Right FDI Nml None Nml 0 Nml Nml 0 Nml Nml         NCS Waveforms:    Motor                    Sensory                              Again, thank you for allowing me to participate in the care of your patient.        Sincerely,        Saravanan Ricardo MD

## 2022-12-01 NOTE — PROGRESS NOTES
[facility]  Electrodiagnostic Laboratory                 Department of Neurology                                                                                                         Test Date:  2022    Patient: Niesha Adhikari : 1950 Physician: Saravanan Ricardo MD   Sex: Female AGE: 72 year Ref Phys:    ID#: [id number]   Technician: Wilmer Bean     Clinical Information:  Patient is a 71 y/o female who presents for evaluation of bilateral hand numbness/tingling/pain. EMG ordered to evaluate for carpal tunnel syndrome.     Techniques:  Motor and sensory conduction studies were done with surface recording electrodes. EMG was done with a concentric needle electrode.     Results:  Evaluation of the left median sensory nerve showed decreased conduction velocity (45 m/s).  The right median sensory nerve showed reduced amplitude (10  V) and decreased conduction velocity (41 m/s).  The left median-ulnar palmar sensory nerve showed abnormal peak latency difference ((median palm)-(ulnar palm), 0.50 ms).  The right median-ulnar palmar sensory nerve showed prolonged distal peak latency (ulnar palm, 3.7 ms) and abnormal peak latency difference ((median palm)-(ulnar palm), 1.0 ms).  The left ulnar sensory nerve showed reduced amplitude (14  V).  All remaining nerves (as indicated in the following tables) were within normal limits.      All examined muscles (as indicated in the following table) showed no evidence of electrical instability.        Interpretation:  This is an abnormal examination. There is electrophysiologic evidence of mild bilateral median mononeuropathies at the wrists (carpal tunnel syndrome).   ___________________________  Saravanan Ricardo MD        Nerve Conduction Studies  Motor Sites      Latency Amplitude Neg. Amp Diff Segment Distance Velocity Neg. Dur Neg Area Diff Temperature Comment   Site (ms) Norm (mV) Norm %  cm m/s Norm ms %  C    Left Median (APB) Motor    Wrist 4.4  < 4.4 7.9  > 5.0  Wrist-APB 8   4.6  32.1    Elbow 8.5 - 6.7  < 54.0 -15.2 Elbow-Wrist 20 49  > 48 4.5 -23.6 32.1    Right Median (APB) Motor   Wrist 4.4  < 4.4 6.8  > 5.0  Wrist-APB 8   5.0  32.2    Elbow 8.5 - 7.0  < 54.0 2.9 Elbow-Wrist 20 49  > 48 5.1 1.66 32.3    Left Median/Ulnar (Lumb-Dors Int II) Motor        Median (Lumb I)   Wrist 3.9 - 0.64 -      6.2  32.1         Ulnar (Dorsal Int (manus))   Wrist 3.0 - 1.69 -      5.1  32.2    Right Median/Ulnar (Lumb-Dors Int II) Motor        Median (Lumb I)   Wrist 3.8 - 0.79 -      6.6  32.4         Ulnar (Dorsal Int (manus))   Wrist 2.9 - 2.4 -      5.9  32.5    Left Ulnar (ADM) Motor   Wrist 3.0  < 3.5 10.3  > 5.0  Wrist-ADM 8   4.6  32.8    Bel Elbow 5.6 - 9.3 - -9.7 Bel Elbow-Wrist 16.4 63  > 48 5.0 -2.1 32.8    Abv Elbow 7.6 - 8.7 - -6.5 Abv Elbow-Bel Elbow 12.2 61  > 48 5.1 -4.3 32.7    Right Ulnar (ADM) Motor   Wrist 2.9  < 3.5 10.5  > 5.0  Wrist-ADM 8   4.6  33.3    Bel Elbow 6.1 - 10.3 - -1.90 Bel Elbow-Wrist 19 59  > 48 4.7 -1.08 33.2    Abv Elbow 7.9 - 10.0 - -2.9 Abv Elbow-Bel Elbow 10.5 58  > 48 4.6 -1.45 33.1      Sensory Sites      Onset Lat Peak Lat Amp (O-P) Amp (P-P) Segment Distance Velocity Temperature Comment   Site ms ms  V Norm  V  cm m/s Norm  C    Left Median Sensory   Wrist-Dig II 3.1 3.8 13  > 10 19 Wrist-Dig II 14 45  > 48 32.5    Right Median Sensory   Wrist-Dig II 3.4 4.2 10  > 10 15 Wrist-Dig II 14 41  > 48 32.9    Left Median-Ulnar Palmar Sensory        Median   Palm-Wrist 1.93 2.5 20 - 32 Palm-Wrist - - - 32.2         Ulnar   Palm-Wrist 1.53 2.0 7 - 16 Palm-Wrist - - - 32.2    Right Median-Ulnar Palmar Sensory        Median   Palm-Wrist 2.0 2.7 19 - 18 Palm-Wrist - - - 32.3         Ulnar   Palm-Wrist 1.78 3.7 3 - 4 Palm-Wrist - - - 32.3    Left Ulnar Sensory   Wrist-Dig V 2.6 3.4 15  > 8 14 Wrist-Dig V 12.5 48  > 48 32.2    Right Ulnar Sensory   Wrist-Dig V 2.5 3.1 21  > 8 24 Wrist-Dig V 12.5 50  > 48 33      Inter-Nerve  Comparisons     Nerve 1 Value 1 Nerve 2 Value 2 Parameter Result Normal   Sensory Sites   R Median Palm-Wrist 2.7 ms R Ulnar Palm-Wrist 3.7 ms Peak Lat Diff 1.0 ms <0.40   L Median Palm-Wrist 2.5 ms L Ulnar Palm-Wrist 2.0 ms Peak Lat Diff 0.50 ms <0.40       Electromyography     Side Muscle Ins Act Fibs/PSW Fasc HF Amp Dur Poly Recrt Int Pat   Left Deltoid Nml None Nml 0 Nml Nml 0 Nml Nml   Left Biceps Nml None Nml 0 Nml Nml 0 Nml Nml   Left Triceps Nml None Nml 0 Nml Nml 0 Nml Nml   Left EDC Nml None Nml 0 Nml Nml 0 Nml Nml   Left FDI Nml None Nml 0 Nml Nml 0 Nml Nml   Right Deltoid Nml None Nml 0 Nml Nml 0 Nml Nml   Right EDC Nml None Nml 0 Nml Nml 0 Nml Nml   Right FDI Nml None Nml 0 Nml Nml 0 Nml Nml         NCS Waveforms:    Motor                    Sensory

## 2022-12-02 ENCOUNTER — TELEPHONE (OUTPATIENT)
Dept: RHEUMATOLOGY | Facility: CLINIC | Age: 72
End: 2022-12-02

## 2022-12-02 ENCOUNTER — TELEPHONE (OUTPATIENT)
Dept: FAMILY MEDICINE | Facility: OTHER | Age: 72
End: 2022-12-02

## 2022-12-02 DIAGNOSIS — G56.03 BILATERAL CARPAL TUNNEL SYNDROME: Primary | ICD-10-CM

## 2022-12-02 NOTE — TELEPHONE ENCOUNTER
RN: Please call to notify Niesha Adhikari that the nerve conduction study / EMG showed mild bilateral carpal tunnel syndrome.  I have placed an order for orthopedic surgery evaluation.  Please advise Niesha Adhikari to call [phone] to schedule an appointment with orthopedics.    Apolinar Cho MD  12/2/2022

## 2022-12-02 NOTE — TELEPHONE ENCOUNTER
Called patient and read back message from provider below. Advised patient to call back with any questions or if she does not hear from Ortho scheduling.

## 2022-12-02 NOTE — TELEPHONE ENCOUNTER
Called pt and let her geo HARKINS message below. Number given    Teresa HENDERSON RN Specialty Triage 12/2/2022 2:05 PM

## 2022-12-15 DIAGNOSIS — M81.8 OTHER OSTEOPOROSIS, UNSPECIFIED PATHOLOGICAL FRACTURE PRESENCE: ICD-10-CM

## 2022-12-15 DIAGNOSIS — E55.9 VITAMIN D DEFICIENCY: ICD-10-CM

## 2022-12-15 NOTE — TELEPHONE ENCOUNTER
Pending Prescriptions:                       Disp   Refills    vitamin D2 (ERGOCALCIFEROL) 73715 units (*24 cap*1            Sig: Take 1 capsule (50,000 Units) by mouth every           Monday and Friday.    No protocol. Routing to provider    Wilbert Scanlon RN 12/15/2022 12:03 PM

## 2022-12-16 RX ORDER — ERGOCALCIFEROL 1.25 MG/1
CAPSULE, LIQUID FILLED ORAL
Qty: 24 CAPSULE | Refills: 0 | Status: SHIPPED | OUTPATIENT
Start: 2022-12-16 | End: 2023-01-31

## 2022-12-21 ENCOUNTER — IMMUNIZATION (OUTPATIENT)
Dept: FAMILY MEDICINE | Facility: CLINIC | Age: 72
End: 2022-12-21
Payer: COMMERCIAL

## 2022-12-21 PROCEDURE — 91312 COVID-19 VACCINE BIVALENT BOOSTER 12+ (PFIZER): CPT

## 2022-12-21 PROCEDURE — 0124A COVID-19 VACCINE BIVALENT BOOSTER 12+ (PFIZER): CPT

## 2022-12-27 ENCOUNTER — OFFICE VISIT (OUTPATIENT)
Dept: ORTHOPEDICS | Facility: CLINIC | Age: 72
End: 2022-12-27
Attending: INTERNAL MEDICINE
Payer: COMMERCIAL

## 2022-12-27 VITALS — WEIGHT: 135 LBS | BODY MASS INDEX: 23.92 KG/M2 | RESPIRATION RATE: 18 BRPM | HEIGHT: 63 IN

## 2022-12-27 DIAGNOSIS — G56.03 BILATERAL CARPAL TUNNEL SYNDROME: ICD-10-CM

## 2022-12-27 DIAGNOSIS — M65.332 TRIGGER MIDDLE FINGER OF LEFT HAND: Primary | ICD-10-CM

## 2022-12-27 PROCEDURE — 99203 OFFICE O/P NEW LOW 30 MIN: CPT | Performed by: ORTHOPAEDIC SURGERY

## 2022-12-27 NOTE — LETTER
12/27/2022         RE: Niesha Adhikari  [address] [facility] [id number]        Dear Colleague,    Thank you for referring your patient, Niesha Adhikari, to the [facility]. Please see a copy of my visit note below.    Niesha Adhikari is a 72 year old female who is seen in consultation at the request of Dr Apolinar Cho  for complaint of numbness of bilateral hands, especially with use of the hand and at night.  All fingers are involved.  She has diagnosis of rheumatoid arthritis and has multiple joint pains.  Hand Pain is dull, achy, with occasional sharp pain .    She has had symptoms for 4 years.    Small finger is not involved.  Prior treatment used: Wrist splint - yes-night rigid splints and flexible daytime splints.  NSAID: - no, using rheumatoid arthritis regimen.    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      PAST MEDICAL HISTORY:  Diabetes mellitus negative, hypothyroid negative, positive rheumatoid arthritis..    EMG has been performed.  In 2018 and again in 2022.  Shows mild bilateral carpal tunnel syndrome.    Past Medical History:   Diagnosis Date     ABUSE BY SPOUSE/PARTNER 07/27/2005     Degeneration of lumbar or lumbosacral intervertebral disc     DDD L5/S1     Depressive disorder      Esophageal reflux 1/28/2005     HELICOBACTER PYLORI INFECTION 01/28/2005     Hepatitis C - Cured. Achieved SVR in 2017      History of blood transfusion      Hypertension      Malignant neoplasm (H)     ACIN     Osteoporosis      Other and unspecified alcohol dependence, unspecified drinking behavior     Sober as 1/21/1987     Other malaise and fatigue        Past Surgical History:   Procedure Laterality Date     BIOPSY ANAL CANAL  1/21/13    [facility]      BREAST BIOPSY, RT/LT Left 1975    Breat Biopsy RT/LT     COLONOSCOPY  8/25/2009     COLONOSCOPY  2/14/2011    COLONOSCOPY performed by CRISTIN LAGUNAS at  GI     COLONOSCOPY N/A 1/8/2019     Procedure: Colonscopy, Biopsies by Biopsy;  Surgeon: Omega Talavera MD;  Location:  GI     CYSTOSCOPY  2/28/2011    CYSTOSCOPY performed by CAYLA FLOR at  OR     ENDOSCOPY  05/21/12    Upper GI - [facility] [place]     ENT SURGERY  1965     GI SURGERY  06/25/12      UGI ENDOSCOPY DIAG W BIOPSY  10/01/09     HEMORRHOIDECTOMY  06/25/12    [facility]     LAPAROSCOPIC SALPINGO-OOPHORECTOMY  2/28/2011    LAPAROSCOPIC SALPINGO-OOPHORECTOMY performed by CAYLA FLOR at  OR     TONSILLECTOMY & ADENOIDECTOMY  1965     [facility] NONSPECIFIC PROCEDURE  1965    Removed bone left index finger knuckle, casts broken bones     [facility] COLONOSCOPY W/WO BRUSH/WASH  08/22/05     [facility] UGI ENDOSCOPY, SIMPLE EXAM  08/08/07       Family History   Problem Relation Age of Onset     Hypertension Mother      Breast Cancer Mother      Coronary Artery Disease Mother      Cerebrovascular Disease Mother      Kidney Disease Mother      Obesity Mother      Hypertension Brother      Respiratory Brother         emphysema     Lipids Brother      Heart Disease Brother         stents, 12/2011; has had about 6 MIs, last one 1/2014     C.A.D. Sister         MI at age 63     Hypertension Sister      Gastrointestinal Disease Sister         gallbladder     Circulatory Sister         brain aneurysm at 63     Genitourinary Problems Sister         1 kidney/bladder     Hypertension Sister      Obesity Sister      Coronary Artery Disease Sister         had valve surgery, MI, CHF     Unknown/Adopted Paternal Uncle      Blood Disease Son         Lymes/7/11     Hypertension Son      Hypertension Father      Lymphoma Father      Glaucoma Father      Other Cancer Father         Lymphoma     Genetic Disorder Father         Glaucoma     Obesity Father      Breast Cancer Maternal Aunt      Ovarian Cancer Maternal Aunt      Coronary Artery Disease Other 49        niece     Diabetes Other         cousin     Cerebrovascular Disease Maternal  Grandmother      Hypertension Maternal Grandmother      Cerebrovascular Disease Paternal Grandmother      Hypertension Paternal Grandmother      Liver Cancer Cousin      Glaucoma Paternal Grandfather      Genetic Disorder Paternal Grandfather         Glaucoma     Coronary Artery Disease Brother      Hypertension Brother      Hyperlipidemia Brother      Hypertension Sister      Obesity Sister      Breast Cancer Other      Obesity Son      Obesity Other      Obesity Other      Obesity Other      Obesity Niece      Obesity Niece        Social History     Socioeconomic History     Marital status:      Spouse name: Not on file     Number of children: Not on file     Years of education: Not on file     Highest education level: Not on file   Occupational History     Not on file   Tobacco Use     Smoking status: Former     Packs/day: 0.75     Years: 10.00     Pack years: 7.50     Types: Cigarettes     Start date: 1968     Quit date: 1978     Years since quittin.1     Smokeless tobacco: Never     Tobacco comments:     No exposure at home   Vaping Use     Vaping Use: Never used   Substance and Sexual Activity     Alcohol use: No     Drug use: No     Sexual activity: Not Currently     Partners: Male     Birth control/protection: Surgical   Other Topics Concern     Parent/sibling w/ CABG, MI or angioplasty before 65F 55M? Yes     Comment: Mother, brother and sister      Service No     Blood Transfusions No     Caffeine Concern No     Occupational Exposure No     Hobby Hazards No     Sleep Concern No     Stress Concern Yes     Weight Concern Yes     Special Diet No     Back Care No     Exercise No     Bike Helmet Yes     Seat Belt Yes     Self-Exams Yes   Social History Narrative     Not on file     Social Determinants of Health     Financial Resource Strain: Not on file   Food Insecurity: Not on file   Transportation Needs: Not on file   Physical Activity: Not on file   Stress: Not on file    Social Connections: Not on file   Intimate Partner Violence: Not on file   Housing Stability: Not on file       Current Outpatient Medications   Medication Sig Dispense Refill     adalimumab (HUMIRA *CF*) 40 MG/0.4ML pen kit Inject 0.4 mLs (40 mg) Subcutaneous every 14 days . Hold for infection. Must also take tenofovir for hepatitis B reactivation prophylaxis. 0.8 mL 8     calcium carb-cholecalciferol 600-500 MG-UNIT CAPS Take 1,200 mg by mouth daily       cloNIDine (CATAPRES) 0.2 MG tablet Take 0.2 mg by mouth At Bedtime       clotrimazole (LOTRIMIN) 1 % external cream APPLY TOPICALLY TWO TIMES A DAY 30 g 3     cyanocobalamin (VITAMIN B-12) 500 MCG SUBL sublingual tablet Place 500 mcg under the tongue daily       cycloSPORINE (RESTASIS) 0.05 % ophthalmic emulsion Place 1 drop into both eyes 2 times daily        hydroxychloroquine (PLAQUENIL) 200 MG tablet Hydroxychloroquine 200mg daily; and an additional 200mg every other day.  Yearly eye exam, including 10-2 VF and SD-OCT, required. 135 tablet 2     hydrOXYzine (ATARAX) 50 MG tablet Take 50 mg by mouth 1 1/2-2 tabs at bedtime       IBANdronate (BONIVA) 150 MG tablet Inject 3 mg into the vein every 3 months        lisdexamfetamine (VYVANSE) 30 MG capsule Take 30 mg by mouth every morning       lisinopril (ZESTRIL) 10 MG tablet Take 1 tablet (10 mg) by mouth daily 90 tablet 3     Modafinil (PROVIGIL PO) Take 200 mg by mouth Takes 1 1/2 tabs twice daily-- morning and noon       montelukast (SINGULAIR) 10 MG tablet TAKE 1 TABLET BY MOUTH ONCE DAILY AS NEEDED SEASONALLY (AUGUST AND SEPTEMBER) 90 tablet 2     naproxen sodium (ANAPROX) 220 MG tablet Take 220-440 mg by mouth daily as needed for moderate pain       nystatin (MYCOSTATIN) 876566 UNIT/GM external powder Apply topically 3 times daily as needed 60 g 1     polyethylene glycol (MIRALAX) 17 GM/Dose powder Take 1 capful by mouth daily as needed        Psyllium (FIBER) 0.52 G CAPS 2 tabs in am and pm 540 capsule   "    sennosides (SENOKOT) 8.6 MG tablet Take 2 tablets by mouth 2 times daily       tenofovir (VIREAD) 300 MG tablet Take 1 tablet (300 mg) by mouth daily for Hep B reactivation prophylaxis 90 tablet 3     vitamin D2 (ERGOCALCIFEROL) 63825 units (1250 mcg) capsule Take 1 capsule (50,000 Units) by mouth every Monday and Friday. 24 capsule 0       Allergies   Allergen Reactions     Telaprevir Other (See Comments) and Rash     Rectal bleeding, anemia     Abilify Discmelt Other (See Comments)     Disoriented     Antivert [Meclizine Hcl]      Chamomile      Compazine      Cymbalta Other (See Comments)     Disoriented, trouble sleeping     Diphenhydramine Nausea     And abdominal pain     Effexor [Venlafaxine] Other (See Comments)     Disoriented, trouble sleeping     Elavil [Amitriptyline Hcl] Other (See Comments)     \"didn't feel right on it-med was stopped right away\"     Ferrous Sulfate Nausea and Vomiting     Food Difficulty breathing     cilantro     Indomethacin      indocin sensativity \"Severe h.a\"     Seasonal Allergies Other (See Comments) and Difficulty breathing     Philip Gold Aug-Sept, rag weed, sneezing     Sulfa Drugs      Thiopental Sodium      PENTOTHAL/rigidity and fight response     Animal Dander Difficulty breathing and Rash     sneezing,resp. distress     Bupropion Anxiety     Tylenol [Acetaminophen] Rash       REVIEW OF SYSTEMS:  CONSTITUTIONAL:  NEGATIVE for fever, chills, change in weight, not feeling tired  SKIN:  NEGATIVE for worrisome rashes, no skin lumps, no skin ulcers and no non-healing wounds  EYES:  NEGATIVE for vision changes or irritation.  ENT/MOUTH:  NEGATIVE.  No hearing loss, no hoarseness, no difficulty swallowing.  RESP:  NEGATIVE. No cough or shortness of breath.  BREAST:  NEGATIVE for masses, tenderness or discharge  CV:  NEGATIVE for chest pain, palpitations or peripheral edema  GI:  NEGATIVE for nausea, abdominal pain, heartburn, or change in bowel habits  :  Negative. No " "dysuria, no hematuria  MUSCULOSKELETAL:  See HPI above  NEURO:  NEGATIVE . No headaches, no dizziness,  no numbness  ENDOCRINE:  NEGATIVE for temperature intolerance, skin/hair changes  HEME/ALLERGY/IMMUNE:  NEGATIVE for bleeding problems  PSYCHIATRIC:  NEGATIVE. no anxiety, no depression.          O: She appears well,   Vitals: Resp 18   Ht 1.607 m (5' 3.25\")   Wt 61.2 kg (135 lb)   LMP 11/27/2003   BMI 23.73 kg/m    BMI= Body mass index is 23.73 kg/m .   Cervical spine has full range of motion without pain.  Full range of motion of shoulder, elbows, wrists and fingers.  All joints have some pain.  Left long finger has mild trigger finger.  Hand exam revealed     Right: reduced sensation along entire hand, + Tinel's sign, no thenar atrophy, no weakness of thumb/pinky pincer grasp.  Left: reduced sensation along entire hand, + Tinel's sign, no thenar atrophy, no weakness of thumb/pinky pincer grasp   strength: Right normal, Left normal.  Positive trigger finger of left long finger - mild.    A: Bilateral carpal tunnel syndrome.    2. Left long trigger finger.    P: Explanation of median nerve entrapment is given.  The treatment spectrum is discussed, including conservative treatment with night splint, NSAID, activity modification, and possible surgical release if the symptoms are persistent and severe.   She has used splints and anti-inflammatories.  We could consider steroid injection, but she has had significant conservative treatment alrerady.  We should consider carpal tunnel release with left long trigger finger release.  She wants to discuss with Dr Apolinar Cho first.        Again, thank you for allowing me to participate in the care of your patient.        Sincerely,        David John MD    "

## 2022-12-28 NOTE — PROGRESS NOTES
Niesha Adhikari is a 72 year old female who is seen in consultation at the request of Dr Apolinar Cho  for complaint of numbness of bilateral hands, especially with use of the hand and at night.  All fingers are involved.  She has diagnosis of rheumatoid arthritis and has multiple joint pains.  Hand Pain is dull, achy, with occasional sharp pain .    She has had symptoms for 4 years.    Small finger is not involved.  Prior treatment used: Wrist splint - yes-night rigid splints and flexible daytime splints.  NSAID: - no, using rheumatoid arthritis regimen.    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      PAST MEDICAL HISTORY:  Diabetes mellitus negative, hypothyroid negative, positive rheumatoid arthritis..    EMG has been performed.  In 2018 and again in 2022.  Shows mild bilateral carpal tunnel syndrome.    Past Medical History:   Diagnosis Date     ABUSE BY SPOUSE/PARTNER 07/27/2005     Degeneration of lumbar or lumbosacral intervertebral disc     DDD L5/S1     Depressive disorder      Esophageal reflux 1/28/2005     HELICOBACTER PYLORI INFECTION 01/28/2005     Hepatitis C - Cured. Achieved SVR in 2017      History of blood transfusion      Hypertension      Malignant neoplasm (H)     ACIN     Osteoporosis      Other and unspecified alcohol dependence, unspecified drinking behavior     Sober as 1/21/1987     Other malaise and fatigue        Past Surgical History:   Procedure Laterality Date     BIOPSY ANAL CANAL  1/21/13    [facility]      BREAST BIOPSY, RT/LT Left 1975    Breat Biopsy RT/LT     COLONOSCOPY  8/25/2009     COLONOSCOPY  2/14/2011    COLONOSCOPY performed by CRISTIN LAGUNAS at  GI     COLONOSCOPY N/A 1/8/2019    Procedure: Colonscopy, Biopsies by Biopsy;  Surgeon: Omega Talavera MD;  Location:  GI     CYSTOSCOPY  2/28/2011    CYSTOSCOPY performed by CAYLA FLOR at  OR     ENDOSCOPY  05/21/12    Upper GI - [facility]     ENT SURGERY  1965      GI SURGERY  06/25/12      UGI ENDOSCOPY DIAG W BIOPSY  10/01/09     HEMORRHOIDECTOMY  06/25/12    [facility]     LAPAROSCOPIC SALPINGO-OOPHORECTOMY  2/28/2011    LAPAROSCOPIC SALPINGO-OOPHORECTOMY performed by CAYLA FLOR at  OR     TONSILLECTOMY & ADENOIDECTOMY  1965     [facility] NONSPECIFIC PROCEDURE  1965    Removed bone left index finger knuckle, casts broken bones     [facility] COLONOSCOPY W/WO BRUSH/WASH  08/22/05     [facility]GI ENDOSCOPY, SIMPLE EXAM  08/08/07       Family History   Problem Relation Age of Onset     Hypertension Mother      Breast Cancer Mother      Coronary Artery Disease Mother      Cerebrovascular Disease Mother      Kidney Disease Mother      Obesity Mother      Hypertension Brother      Respiratory Brother         emphysema     Lipids Brother      Heart Disease Brother         stents, 12/2011; has had about 6 MIs, last one 1/2014     C.A.D. Sister         MI at age 63     Hypertension Sister      Gastrointestinal Disease Sister         gallbladder     Circulatory Sister         brain aneurysm at 63     Genitourinary Problems Sister         1 kidney/bladder     Hypertension Sister      Obesity Sister      Coronary Artery Disease Sister         had valve surgery, MI, CHF     Unknown/Adopted Paternal Uncle      Blood Disease Son         Lymes/7/11     Hypertension Son      Hypertension Father      Lymphoma Father      Glaucoma Father      Other Cancer Father         Lymphoma     Genetic Disorder Father         Glaucoma     Obesity Father      Breast Cancer Maternal Aunt      Ovarian Cancer Maternal Aunt      Coronary Artery Disease Other 49        niece     Diabetes Other         cousin     Cerebrovascular Disease Maternal Grandmother      Hypertension Maternal Grandmother      Cerebrovascular Disease Paternal Grandmother      Hypertension Paternal Grandmother      Liver Cancer Cousin      Glaucoma Paternal Grandfather      Genetic Disorder Paternal Grandfather         Glaucoma      Coronary Artery Disease Brother      Hypertension Brother      Hyperlipidemia Brother      Hypertension Sister      Obesity Sister      Breast Cancer Other      Obesity Son      Obesity Other      Obesity Other      Obesity Other      Obesity Niece      Obesity Niece        Social History     Socioeconomic History     Marital status:      Spouse name: Not on file     Number of children: Not on file     Years of education: Not on file     Highest education level: Not on file   Occupational History     Not on file   Tobacco Use     Smoking status: Former     Packs/day: 0.75     Years: 10.00     Pack years: 7.50     Types: Cigarettes     Start date: 1968     Quit date: 1978     Years since quittin.1     Smokeless tobacco: Never     Tobacco comments:     No exposure at home   Vaping Use     Vaping Use: Never used   Substance and Sexual Activity     Alcohol use: No     Drug use: No     Sexual activity: Not Currently     Partners: Male     Birth control/protection: Surgical   Other Topics Concern     Parent/sibling w/ CABG, MI or angioplasty before 65F 55M? Yes     Comment: Mother, brother and sister      Service No     Blood Transfusions No     Caffeine Concern No     Occupational Exposure No     Hobby Hazards No     Sleep Concern No     Stress Concern Yes     Weight Concern Yes     Special Diet No     Back Care No     Exercise No     Bike Helmet Yes     Seat Belt Yes     Self-Exams Yes   Social History Narrative     Not on file     Social Determinants of Health     Financial Resource Strain: Not on file   Food Insecurity: Not on file   Transportation Needs: Not on file   Physical Activity: Not on file   Stress: Not on file   Social Connections: Not on file   Intimate Partner Violence: Not on file   Housing Stability: Not on file       Current Outpatient Medications   Medication Sig Dispense Refill     adalimumab (HUMIRA *CF*) 40 MG/0.4ML pen kit Inject 0.4 mLs (40 mg) Subcutaneous every 14  days . Hold for infection. Must also take tenofovir for hepatitis B reactivation prophylaxis. 0.8 mL 8     calcium carb-cholecalciferol 600-500 MG-UNIT CAPS Take 1,200 mg by mouth daily       cloNIDine (CATAPRES) 0.2 MG tablet Take 0.2 mg by mouth At Bedtime       clotrimazole (LOTRIMIN) 1 % external cream APPLY TOPICALLY TWO TIMES A DAY 30 g 3     cyanocobalamin (VITAMIN B-12) 500 MCG SUBL sublingual tablet Place 500 mcg under the tongue daily       cycloSPORINE (RESTASIS) 0.05 % ophthalmic emulsion Place 1 drop into both eyes 2 times daily        hydroxychloroquine (PLAQUENIL) 200 MG tablet Hydroxychloroquine 200mg daily; and an additional 200mg every other day.  Yearly eye exam, including 10-2 VF and SD-OCT, required. 135 tablet 2     hydrOXYzine (ATARAX) 50 MG tablet Take 50 mg by mouth 1 1/2-2 tabs at bedtime       IBANdronate (BONIVA) 150 MG tablet Inject 3 mg into the vein every 3 months        lisdexamfetamine (VYVANSE) 30 MG capsule Take 30 mg by mouth every morning       lisinopril (ZESTRIL) 10 MG tablet Take 1 tablet (10 mg) by mouth daily 90 tablet 3     Modafinil (PROVIGIL PO) Take 200 mg by mouth Takes 1 1/2 tabs twice daily-- morning and noon       montelukast (SINGULAIR) 10 MG tablet TAKE 1 TABLET BY MOUTH ONCE DAILY AS NEEDED SEASONALLY (AUGUST AND SEPTEMBER) 90 tablet 2     naproxen sodium (ANAPROX) 220 MG tablet Take 220-440 mg by mouth daily as needed for moderate pain       nystatin (MYCOSTATIN) 979716 UNIT/GM external powder Apply topically 3 times daily as needed 60 g 1     polyethylene glycol (MIRALAX) 17 GM/Dose powder Take 1 capful by mouth daily as needed        Psyllium (FIBER) 0.52 G CAPS 2 tabs in am and pm 540 capsule      sennosides (SENOKOT) 8.6 MG tablet Take 2 tablets by mouth 2 times daily       tenofovir (VIREAD) 300 MG tablet Take 1 tablet (300 mg) by mouth daily for Hep B reactivation prophylaxis 90 tablet 3     vitamin D2 (ERGOCALCIFEROL) 60956 units (1250 mcg) capsule Take 1  "capsule (50,000 Units) by mouth every Monday and Friday. 24 capsule 0       Allergies   Allergen Reactions     Telaprevir Other (See Comments) and Rash     Rectal bleeding, anemia     Abilify Discmelt Other (See Comments)     Disoriented     Antivert [Meclizine Hcl]      Chamomile      Compazine      Cymbalta Other (See Comments)     Disoriented, trouble sleeping     Diphenhydramine Nausea     And abdominal pain     Effexor [Venlafaxine] Other (See Comments)     Disoriented, trouble sleeping     Elavil [Amitriptyline Hcl] Other (See Comments)     \"didn't feel right on it-med was stopped right away\"     Ferrous Sulfate Nausea and Vomiting     Food Difficulty breathing     cilantro     Indomethacin      indocin sensativity \"Severe h.a\"     Seasonal Allergies Other (See Comments) and Difficulty breathing     Philip Gold Aug-Sept, rag weed, sneezing     Sulfa Drugs      Thiopental Sodium      PENTOTHAL/rigidity and fight response     Animal Dander Difficulty breathing and Rash     sneezing,resp. distress     Bupropion Anxiety     Tylenol [Acetaminophen] Rash       REVIEW OF SYSTEMS:  CONSTITUTIONAL:  NEGATIVE for fever, chills, change in weight, not feeling tired  SKIN:  NEGATIVE for worrisome rashes, no skin lumps, no skin ulcers and no non-healing wounds  EYES:  NEGATIVE for vision changes or irritation.  ENT/MOUTH:  NEGATIVE.  No hearing loss, no hoarseness, no difficulty swallowing.  RESP:  NEGATIVE. No cough or shortness of breath.  BREAST:  NEGATIVE for masses, tenderness or discharge  CV:  NEGATIVE for chest pain, palpitations or peripheral edema  GI:  NEGATIVE for nausea, abdominal pain, heartburn, or change in bowel habits  :  Negative. No dysuria, no hematuria  MUSCULOSKELETAL:  See HPI above  NEURO:  NEGATIVE . No headaches, no dizziness,  no numbness  ENDOCRINE:  NEGATIVE for temperature intolerance, skin/hair changes  HEME/ALLERGY/IMMUNE:  NEGATIVE for bleeding problems  PSYCHIATRIC:  NEGATIVE. no anxiety, " "no depression.          O: She appears well,   Vitals: Resp 18   Ht 1.607 m (5' 3.25\")   Wt 61.2 kg (135 lb)   LMP 11/27/2003   BMI 23.73 kg/m    BMI= Body mass index is 23.73 kg/m .   Cervical spine has full range of motion without pain.  Full range of motion of shoulder, elbows, wrists and fingers.  All joints have some pain.  Left long finger has mild trigger finger.  Hand exam revealed     Right: reduced sensation along entire hand, + Tinel's sign, no thenar atrophy, no weakness of thumb/pinky pincer grasp.  Left: reduced sensation along entire hand, + Tinel's sign, no thenar atrophy, no weakness of thumb/pinky pincer grasp   strength: Right normal, Left normal.  Positive trigger finger of left long finger - mild.    A: Bilateral carpal tunnel syndrome.    2. Left long trigger finger.    P: Explanation of median nerve entrapment is given.  The treatment spectrum is discussed, including conservative treatment with night splint, NSAID, activity modification, and possible surgical release if the symptoms are persistent and severe.   She has used splints and anti-inflammatories.  We could consider steroid injection, but she has had significant conservative treatment alrerady.  We should consider carpal tunnel release with left long trigger finger release.  She wants to discuss with Dr Apolinar miguel.    "

## 2023-01-12 ENCOUNTER — LAB (OUTPATIENT)
Dept: LAB | Facility: CLINIC | Age: 73
End: 2023-01-12
Payer: COMMERCIAL

## 2023-01-12 ENCOUNTER — INFUSION THERAPY VISIT (OUTPATIENT)
Dept: INFUSION THERAPY | Facility: CLINIC | Age: 73
End: 2023-01-12
Payer: COMMERCIAL

## 2023-01-12 VITALS
SYSTOLIC BLOOD PRESSURE: 109 MMHG | TEMPERATURE: 97.7 F | DIASTOLIC BLOOD PRESSURE: 61 MMHG | WEIGHT: 138.2 LBS | RESPIRATION RATE: 16 BRPM | OXYGEN SATURATION: 100 % | BODY MASS INDEX: 24.49 KG/M2 | HEIGHT: 63 IN | HEART RATE: 61 BPM

## 2023-01-12 DIAGNOSIS — M81.8 OTHER OSTEOPOROSIS, UNSPECIFIED PATHOLOGICAL FRACTURE PRESENCE: ICD-10-CM

## 2023-01-12 DIAGNOSIS — Z92.89 PERSONAL HISTORY OF OTHER MEDICAL TREATMENT: ICD-10-CM

## 2023-01-12 DIAGNOSIS — M06.09 RHEUMATOID ARTHRITIS OF MULTIPLE SITES WITH NEGATIVE RHEUMATOID FACTOR (H): ICD-10-CM

## 2023-01-12 DIAGNOSIS — M81.0 AGE-RELATED OSTEOPOROSIS WITHOUT CURRENT PATHOLOGICAL FRACTURE: Primary | ICD-10-CM

## 2023-01-12 LAB
ALBUMIN SERPL-MCNC: 3.6 G/DL (ref 3.4–5)
ALP SERPL-CCNC: 80 U/L (ref 40–150)
ALT SERPL W P-5'-P-CCNC: 21 U/L (ref 0–50)
AST SERPL W P-5'-P-CCNC: 21 U/L (ref 0–45)
BASOPHILS # BLD AUTO: 0 10E3/UL (ref 0–0.2)
BASOPHILS NFR BLD AUTO: 1 %
BILIRUB DIRECT SERPL-MCNC: 0.1 MG/DL (ref 0–0.2)
BILIRUB SERPL-MCNC: 0.4 MG/DL (ref 0.2–1.3)
CALCIUM SERPL-MCNC: 9.2 MG/DL (ref 8.5–10.1)
CREAT SERPL-MCNC: 0.79 MG/DL (ref 0.52–1.04)
CRP SERPL-MCNC: <2.9 MG/L (ref 0–8)
DEPRECATED CALCIDIOL+CALCIFEROL SERPL-MC: 59 UG/L (ref 20–75)
EOSINOPHIL # BLD AUTO: 0.4 10E3/UL (ref 0–0.7)
EOSINOPHIL NFR BLD AUTO: 12 %
ERYTHROCYTE [DISTWIDTH] IN BLOOD BY AUTOMATED COUNT: 13.6 % (ref 10–15)
ERYTHROCYTE [SEDIMENTATION RATE] IN BLOOD BY WESTERGREN METHOD: 6 MM/HR (ref 0–30)
GFR SERPL CREATININE-BSD FRML MDRD: 80 ML/MIN/1.73M2
HCT VFR BLD AUTO: 39 % (ref 35–47)
HGB BLD-MCNC: 12 G/DL (ref 11.7–15.7)
IMM GRANULOCYTES # BLD: 0 10E3/UL
IMM GRANULOCYTES NFR BLD: 0 %
LYMPHOCYTES # BLD AUTO: 1.2 10E3/UL (ref 0.8–5.3)
LYMPHOCYTES NFR BLD AUTO: 34 %
MCH RBC QN AUTO: 27.3 PG (ref 26.5–33)
MCHC RBC AUTO-ENTMCNC: 30.8 G/DL (ref 31.5–36.5)
MCV RBC AUTO: 89 FL (ref 78–100)
MONOCYTES # BLD AUTO: 0.4 10E3/UL (ref 0–1.3)
MONOCYTES NFR BLD AUTO: 10 %
NEUTROPHILS # BLD AUTO: 1.6 10E3/UL (ref 1.6–8.3)
NEUTROPHILS NFR BLD AUTO: 43 %
NRBC # BLD AUTO: 0 10E3/UL
NRBC BLD AUTO-RTO: 0 /100
PLATELET # BLD AUTO: 139 10E3/UL (ref 150–450)
PROT SERPL-MCNC: 6.5 G/DL (ref 6.8–8.8)
RBC # BLD AUTO: 4.39 10E6/UL (ref 3.8–5.2)
WBC # BLD AUTO: 3.7 10E3/UL (ref 4–11)

## 2023-01-12 PROCEDURE — 85025 COMPLETE CBC W/AUTO DIFF WBC: CPT

## 2023-01-12 PROCEDURE — 36415 COLL VENOUS BLD VENIPUNCTURE: CPT

## 2023-01-12 PROCEDURE — 96374 THER/PROPH/DIAG INJ IV PUSH: CPT

## 2023-01-12 PROCEDURE — 86481 TB AG RESPONSE T-CELL SUSP: CPT

## 2023-01-12 PROCEDURE — 86140 C-REACTIVE PROTEIN: CPT

## 2023-01-12 PROCEDURE — 82306 VITAMIN D 25 HYDROXY: CPT | Mod: 59

## 2023-01-12 PROCEDURE — 85652 RBC SED RATE AUTOMATED: CPT

## 2023-01-12 PROCEDURE — 80076 HEPATIC FUNCTION PANEL: CPT

## 2023-01-12 PROCEDURE — 82310 ASSAY OF CALCIUM: CPT

## 2023-01-12 PROCEDURE — 82565 ASSAY OF CREATININE: CPT

## 2023-01-12 PROCEDURE — 250N000011 HC RX IP 250 OP 636: Performed by: FAMILY MEDICINE

## 2023-01-12 RX ORDER — MEPERIDINE HYDROCHLORIDE 25 MG/ML
25 INJECTION INTRAMUSCULAR; INTRAVENOUS; SUBCUTANEOUS EVERY 30 MIN PRN
Status: CANCELLED | OUTPATIENT
Start: 2023-04-08

## 2023-01-12 RX ORDER — DIPHENHYDRAMINE HYDROCHLORIDE 50 MG/ML
50 INJECTION INTRAMUSCULAR; INTRAVENOUS
Status: CANCELLED
Start: 2023-04-08

## 2023-01-12 RX ORDER — ALBUTEROL SULFATE 0.83 MG/ML
2.5 SOLUTION RESPIRATORY (INHALATION)
Status: CANCELLED | OUTPATIENT
Start: 2023-04-08

## 2023-01-12 RX ORDER — IBANDRONATE SODIUM 3 MG/3 ML
3 SYRINGE (ML) INTRAVENOUS ONCE
Status: COMPLETED | OUTPATIENT
Start: 2023-01-12 | End: 2023-01-12

## 2023-01-12 RX ORDER — METHYLPREDNISOLONE SODIUM SUCCINATE 125 MG/2ML
125 INJECTION, POWDER, LYOPHILIZED, FOR SOLUTION INTRAMUSCULAR; INTRAVENOUS
Status: CANCELLED
Start: 2023-04-08

## 2023-01-12 RX ORDER — ALBUTEROL SULFATE 90 UG/1
1-2 AEROSOL, METERED RESPIRATORY (INHALATION)
Status: CANCELLED
Start: 2023-04-08

## 2023-01-12 RX ORDER — EPINEPHRINE 1 MG/ML
0.3 INJECTION, SOLUTION INTRAMUSCULAR; SUBCUTANEOUS EVERY 5 MIN PRN
Status: CANCELLED | OUTPATIENT
Start: 2023-04-08

## 2023-01-12 RX ORDER — IBANDRONATE SODIUM 3 MG/3 ML
3 SYRINGE (ML) INTRAVENOUS ONCE
Status: CANCELLED
Start: 2023-04-08 | End: 2023-04-08

## 2023-01-12 RX ADMIN — IBANDRONATE SODIUM 3 MG: 3 INJECTION, SOLUTION INTRAVENOUS at 09:14

## 2023-01-12 NOTE — PROGRESS NOTES
Infusion Nursing Note:  Niesha Adhikari presents today for Boniva.    Patient seen by provider today: No   present during visit today: Not Applicable.    Note: N/A.    Intravenous Access:  Peripheral IV placed.    Treatment Conditions:  Lab Results   Component Value Date     05/16/2022    POTASSIUM 4.3 05/16/2022    MAG 2.1 05/16/2022    CR 0.79 01/12/2023    ELIZABETH 9.2 01/12/2023    BILITOTAL 0.4 01/12/2023    ALBUMIN 3.6 01/12/2023    ALT 21 01/12/2023    AST 21 01/12/2023     Results reviewed, labs MET treatment parameters, ok to proceed with treatment.    Post Infusion Assessment:  Patient tolerated infusion without incident.  Patient observed for 15 minutes post Boniva.  Site patent and intact, free from redness, edema or discomfort.  No evidence of extravasations.  Access discontinued per protocol.     Discharge Plan:   Discharge instructions reviewed with: Patient.  Patient discharged in stable condition accompanied by: self.  Departure Mode: Ambulatory.      Amisha Cobb RN

## 2023-01-14 LAB
GAMMA INTERFERON BACKGROUND BLD IA-ACNC: 0.06 IU/ML
M TB IFN-G BLD-IMP: NEGATIVE
M TB IFN-G CD4+ BCKGRND COR BLD-ACNC: 9.94 IU/ML
MITOGEN IGNF BCKGRD COR BLD-ACNC: 0 IU/ML
MITOGEN IGNF BCKGRD COR BLD-ACNC: 0 IU/ML
QUANTIFERON MITOGEN: 10 IU/ML
QUANTIFERON NIL TUBE: 0.06 IU/ML
QUANTIFERON TB1 TUBE: 0.06 IU/ML
QUANTIFERON TB2 TUBE: 0.06

## 2023-01-16 ENCOUNTER — HOSPITAL ENCOUNTER (OUTPATIENT)
Dept: BONE DENSITY | Facility: CLINIC | Age: 73
Discharge: HOME | End: 2023-01-16
Attending: INTERNAL MEDICINE | Admitting: INTERNAL MEDICINE
Payer: COMMERCIAL

## 2023-01-16 DIAGNOSIS — M81.8 OTHER OSTEOPOROSIS, UNSPECIFIED PATHOLOGICAL FRACTURE PRESENCE: ICD-10-CM

## 2023-01-16 PROCEDURE — 77080 DXA BONE DENSITY AXIAL: CPT

## 2023-01-31 ENCOUNTER — ANCILLARY PROCEDURE (OUTPATIENT)
Dept: GENERAL RADIOLOGY | Facility: CLINIC | Age: 73
End: 2023-01-31
Attending: INTERNAL MEDICINE
Payer: COMMERCIAL

## 2023-01-31 ENCOUNTER — OFFICE VISIT (OUTPATIENT)
Dept: RHEUMATOLOGY | Facility: CLINIC | Age: 73
End: 2023-01-31
Payer: COMMERCIAL

## 2023-01-31 VITALS
BODY MASS INDEX: 24.55 KG/M2 | OXYGEN SATURATION: 98 % | WEIGHT: 139.4 LBS | DIASTOLIC BLOOD PRESSURE: 84 MMHG | SYSTOLIC BLOOD PRESSURE: 126 MMHG | HEART RATE: 70 BPM

## 2023-01-31 DIAGNOSIS — Z92.89 PERSONAL HISTORY OF OTHER MEDICAL TREATMENT: ICD-10-CM

## 2023-01-31 DIAGNOSIS — M06.09 RHEUMATOID ARTHRITIS OF MULTIPLE SITES WITH NEGATIVE RHEUMATOID FACTOR (H): Primary | ICD-10-CM

## 2023-01-31 DIAGNOSIS — M79.641 RIGHT HAND PAIN: ICD-10-CM

## 2023-01-31 DIAGNOSIS — M81.0 AGE-RELATED OSTEOPOROSIS WITHOUT CURRENT PATHOLOGICAL FRACTURE: ICD-10-CM

## 2023-01-31 DIAGNOSIS — E55.9 VITAMIN D DEFICIENCY: ICD-10-CM

## 2023-01-31 DIAGNOSIS — M06.09 RHEUMATOID ARTHRITIS OF MULTIPLE SITES WITH NEGATIVE RHEUMATOID FACTOR (H): ICD-10-CM

## 2023-01-31 DIAGNOSIS — M81.8 OTHER OSTEOPOROSIS, UNSPECIFIED PATHOLOGICAL FRACTURE PRESENCE: ICD-10-CM

## 2023-01-31 PROCEDURE — 99214 OFFICE O/P EST MOD 30 MIN: CPT | Performed by: INTERNAL MEDICINE

## 2023-01-31 PROCEDURE — 73130 X-RAY EXAM OF HAND: CPT | Mod: TC | Performed by: RADIOLOGY

## 2023-01-31 RX ORDER — DIPHENHYDRAMINE HYDROCHLORIDE 50 MG/ML
50 INJECTION INTRAMUSCULAR; INTRAVENOUS
Status: CANCELLED
Start: 2023-04-01

## 2023-01-31 RX ORDER — ERGOCALCIFEROL 1.25 MG/1
CAPSULE, LIQUID FILLED ORAL
Qty: 24 CAPSULE | Refills: 2 | Status: SHIPPED | OUTPATIENT
Start: 2023-01-31 | End: 2023-09-18

## 2023-01-31 RX ORDER — HYDROXYCHLOROQUINE SULFATE 200 MG/1
TABLET, FILM COATED ORAL
Qty: 135 TABLET | Refills: 2 | Status: SHIPPED | OUTPATIENT
Start: 2023-01-31 | End: 2023-09-18

## 2023-01-31 RX ORDER — ALBUTEROL SULFATE 90 UG/1
1-2 AEROSOL, METERED RESPIRATORY (INHALATION)
Status: CANCELLED
Start: 2023-04-01

## 2023-01-31 RX ORDER — ALBUTEROL SULFATE 0.83 MG/ML
2.5 SOLUTION RESPIRATORY (INHALATION)
Status: CANCELLED | OUTPATIENT
Start: 2023-04-01

## 2023-01-31 RX ORDER — METHYLPREDNISOLONE SODIUM SUCCINATE 125 MG/2ML
125 INJECTION, POWDER, LYOPHILIZED, FOR SOLUTION INTRAMUSCULAR; INTRAVENOUS
Status: CANCELLED
Start: 2023-04-01

## 2023-01-31 RX ORDER — EPINEPHRINE 1 MG/ML
0.3 INJECTION, SOLUTION, CONCENTRATE INTRAVENOUS EVERY 5 MIN PRN
Status: CANCELLED | OUTPATIENT
Start: 2023-04-01

## 2023-01-31 RX ORDER — MEPERIDINE HYDROCHLORIDE 25 MG/ML
25 INJECTION INTRAMUSCULAR; INTRAVENOUS; SUBCUTANEOUS EVERY 30 MIN PRN
Status: CANCELLED | OUTPATIENT
Start: 2023-04-01

## 2023-01-31 NOTE — NURSING NOTE
Blood pressure rechecked after visit    126/84  Anabella Garcia CMA Rheumatology  1/31/2023                                 RAPID3 (0-30) Cumulative Score  18.0          RAPID3 Weighted Score (divide #4 by 3 and that is the weighted score)  6.0

## 2023-01-31 NOTE — PROGRESS NOTES
"  Rheumatology Clinic Visit      Niesha Adhikari MRN# [id number]   YOB: 1950 Age: 72 year old      Date of visit: 1/31/23   PCP: Dr. Tanika Clakr  Hepatologist: Dr. Peres at [facility] in [place]  Ophthalmology: Dr. Higgins, [facility]    Chief Complaint   Patient presents with:  Rheumatoid Arthritis: Wakes up from pain in hands 3-4 times in night. Which is effecting her quality of life. Can not squeeze thing, she has eye drops and can not do them. Hard time opening jars.    Assessment and Plan     1. Rheumatoid arthritis: Hepatitis C related arthralgias versus rheumatoid arthritis (RF and CCP negative); hepatitis C has since been cleared. Initially with symmetric synovitis on exam and morning stiffness for more than one hour. NSAIDs and Tylenol associated with rash.  She did well with hydroxychloroquine 400 mg daily for a while but more arthritis symptoms so SSZ was added but associated with cytopenia and GI upset so that was stopped.  Avoiding MTX and leflunomide because of hepatitis C hx and +HBV core.  She has established with gastroenterology and is on tenofovir for hepatitis B reactivation prophylaxis; Humira was started 5/8/2020.  Doing well with regard to RA.  Chronic illness, stable.    - Continue hydroxychloroquine 300 mg daily (last eye exam was performed 2/1/2022 at [facility])  - Continue Humira 40mg SQ every 14 days  - X-rays today: Bilateral hands    2. Osteoporosis: Previously treated with Fosamax (GERD), PO Boniva (reportedly ineffective), and IV Boniva (reportedly effective). Prolia was being considered by a previous rheumatologist but not used because she wanted \"clearance\" from the patient's gastroenterologist because she has hepatitis C; I do not see a contraindication for Prolia in the setting of hepatitis C. The patient reports that her gastroenterologist reported no contraindication for Prolia either.  She had not been on osteoporosis " treatment for at least 2 years before restarting Boniva.  Boniva was restarted with the first dose given on 12/5/2017.  2021 DEXA showed improvement and Boniva was continued until the last dose that was on 1/12/2023.  1/16/2023 DEXA shows osteoporosis, with reduced bone mineral density compared to the previous, about the hips and lumbar spine.  Therefore, discussed other treatment options and will change to Prolia.  Risks and side effects of Prolia were reviewed in detail today that include, but are not limited to, osteonecrosis, injection reaction, anaphylaxis, and death.  She would like to proceed with Prolia.  Chronic illness, stable.    - Continue ergocalciferol 50,000 units twice weekly   - Calcium 1200 mg daily  - Discontinue Boniva 3mg IV every 3 months (she receives at the [facility])  - Start Prolia 60mg SQ every 6 months, first dose in April 2023; patient to schedule at the infusion center    3.  History of lower back pain and left hip pain: Ms. Adhikari had a CT pelvis on 11/16/2018 that showed degenerative changes of the L-spine.  Left hip does not appear narrowed.  Improves with PT exercises, but often doesn't do them.     4. HBV core ab positive: On hepatitis B prophylaxis from gastroenterology.  Continue to follow with gastroenterology.    5. Bilateral carpal tunnel syndrome: EMG/NCS showed mild carpal tunnel syndrome.  Also having intermittent left 3rd digit trigger finger; has seen Dr. John where surgery was discussed but she is not certain that she wants to proceed with surgery because she is not certain that carpal tunnel syndrome is the problem.  See #6.  Chronic illness, progressive.       6. Right hand pain: Right hand symptoms are worse than left.  Mild synovial hypertrophy of the right second and third MCPs but no active synovitis.  Symptoms are still most consistent with carpal tunnel syndrome.  She describes hand pain that is also consistent with osteoarthritis.   She is hesitant to move forward with surgery and we discussed additional work-up that could include x-ray and MRI and she would like to proceed with this.  She verbalized understanding that these imaging tests may not alter the treatment plan or identify the etiology of her symptoms.  She denies claustrophobia.  No metal implants.  No cochlear implant.  No pacemaker.  Has tolerated IV contrast in the past without issue.  - MRI with and without contrast of the right hand    7.  Vaccinations: Vaccinations reviewed with Ms. Adhikari.  Risks and benefits of vaccinations were discussed.    - Influenza: encouraged yearly vaccination  - [id number]: up to date  - [id number]: up to date  - Shingrix: Up to date  - COVID19: up to date     Total minutes spent in evaluation with patient, review of pertinent lab tests and chart notes, and documentation: 22    Ms. Adhikari verbalized agreement with and understanding of the rational for the diagnosis and treatment plan.  All questions were answered to best of my ability and the patient's satisfaction. Ms. Adhikari was advised to contact the clinic with any questions that may arise after the clinic visit.      Thank you for involving me in the care of the patient    Return to clinic: mid-to-late February      HPI   Niesha Adhikari is a 72 year old female with a medical history significant for hepatitis C, hemorrhoids, narcolepsy, fibromyalgia, migraines, anxiety, pernicious anemia, GERD, allergic rhinitis, and osteoporosis who presents for follow-up of inflammatory arthritis.    Today, 1/31/2023: Was seen by orthopedic surgery where she was told that she may benefit from carpal tunnel surgery bilaterally, and left third digit trigger finger surgery.  She has hesitant to go through with this because she questions if her symptoms are more arthritis related.  Larger nodules over the right second and third MCPs on the left.  Bony hypertrophy at the DIPs.  Occasional pain at the bilateral  first CMC joints, right first MCP, and wrist, but pain is worse with activity and improves with rest, not associated with swelling or increased warmth, and only last for a few seconds at a time.  Still with numbness and tingling in both hands that she says affects all of her fingers, is worse at night, and sometimes wakes her up at night.    Denies fevers, chills, nausea, vomiting, diarrhea. No abdominal pain. No chest pain/pressure, palpitations, or shortness of breath. No LE swelling. No neck pain. No oral or nasal sores.  No rash.     Tobacco: quit in 1978  EtOH: none  Drugs: none  Occupation: retired    ROS   12 point review of system was completed and negative except as noted in the HPI     Active Problem List     Patient Active Problem List   Diagnosis     Allergic rhinitis     Pernicious anemia     Anxiety state     Migraine     Essential and other specified forms of tremor     Fibromyalgia     Moderate recurrent major depression (H)     Narcolepsy     Internal hemorrhoids with other complication     AIN (anal intraepithelial neoplasia) anal canal     Osteoporosis     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)     Benign essential hypertension     Alcohol dependence in remission (H)     Personal history of other medical treatment     Constipation     DDD (degenerative disc disease), cervical     Iron deficiency     Thrombocytopenia (H)     Fatigue     Trigger middle finger of left hand     Bilateral carpal tunnel syndrome     Past Medical History     Past Medical History:   Diagnosis Date     ABUSE BY SPOUSE/PARTNER 07/27/2005     Degeneration of lumbar or lumbosacral intervertebral disc     DDD L5/S1     Depressive disorder      Esophageal reflux 1/28/2005     HELICOBACTER PYLORI INFECTION 01/28/2005     Hepatitis C - Cured. Achieved SVR in 2017      History of blood transfusion      Hypertension      Malignant neoplasm (H)     ACIN     Osteoporosis      Other and unspecified  "alcohol dependence, unspecified drinking behavior     Sober as 1/21/1987     Other malaise and fatigue      Past Surgical History     Past Surgical History:   Procedure Laterality Date     BIOPSY ANAL CANAL  1/21/13    [facility]      BREAST BIOPSY, RT/LT Left 1975    Breat Biopsy RT/LT     COLONOSCOPY  8/25/2009     COLONOSCOPY  2/14/2011    COLONOSCOPY performed by CRISTIN LAGUNAS at  GI     COLONOSCOPY N/A 1/8/2019    Procedure: Colonscopy, Biopsies by Biopsy;  Surgeon: Omega Talavera MD;  Location:  GI     CYSTOSCOPY  2/28/2011    CYSTOSCOPY performed by CAYLA FLOR at  OR     ENDOSCOPY  05/21/12    Upper GI - [facility] [place]     ENT SURGERY  1965     GI SURGERY  06/25/12      UGI ENDOSCOPY DIAG W BIOPSY  10/01/09     HEMORRHOIDECTOMY  06/25/12    [facility]     LAPAROSCOPIC SALPINGO-OOPHORECTOMY  2/28/2011    LAPAROSCOPIC SALPINGO-OOPHORECTOMY performed by CAYLA FLOR at  OR     TONSILLECTOMY & ADENOIDECTOMY  1965     [facility] NONSPECIFIC PROCEDURE  1965    Removed bone left index finger knuckle, casts broken bones     [facility] COLONOSCOPY W/WO BRUSH/WASH  08/22/05     [facility] UGI ENDOSCOPY, SIMPLE EXAM  08/08/07     Allergy     Allergies   Allergen Reactions     Telaprevir Other (See Comments) and Rash     Rectal bleeding, anemia     Abilify Discmelt Other (See Comments)     Disoriented     Antivert [Meclizine Hcl]      Chamomile      Compazine      Cymbalta Other (See Comments)     Disoriented, trouble sleeping     Diphenhydramine Nausea     And abdominal pain     Effexor [Venlafaxine] Other (See Comments)     Disoriented, trouble sleeping     Elavil [Amitriptyline Hcl] Other (See Comments)     \"didn't feel right on it-med was stopped right away\"     Ferrous Sulfate Nausea and Vomiting     Food Difficulty breathing     cilantro     Indomethacin      indocin sensativity \"Severe h.a\"     Seasonal Allergies Other (See Comments) and Difficulty breathing     Philip Gold " Aug-Sept, rag weed, sneezing     Sulfa Drugs      Thiopental Sodium      PENTOTHAL/rigidity and fight response     Animal Dander Difficulty breathing and Rash     sneezing,resp. distress     Bupropion Anxiety     Tylenol [Acetaminophen] Rash     Current Medication List     Current Outpatient Medications   Medication Sig     adalimumab (HUMIRA *CF*) 40 MG/0.4ML pen kit Inject 0.4 mLs (40 mg) Subcutaneous every 14 days . Hold for infection. Must also take tenofovir for hepatitis B reactivation prophylaxis.     calcium carb-cholecalciferol 600-500 MG-UNIT CAPS Take 1,200 mg by mouth daily     cloNIDine (CATAPRES) 0.2 MG tablet Take 0.2 mg by mouth At Bedtime     clotrimazole (LOTRIMIN) 1 % external cream APPLY TOPICALLY TWO TIMES A DAY     cyanocobalamin (VITAMIN B-12) 500 MCG SUBL sublingual tablet Place 500 mcg under the tongue every other day     cycloSPORINE (RESTASIS) 0.05 % ophthalmic emulsion Place 1 drop into both eyes 2 times daily      hydroxychloroquine (PLAQUENIL) 200 MG tablet Hydroxychloroquine 200mg daily; and an additional 200mg every other day.  Yearly eye exam, including 10-2 VF and SD-OCT, required.     hydrOXYzine (ATARAX) 50 MG tablet Take 50 mg by mouth 1 1/2-2 tabs at bedtime     lisdexamfetamine (VYVANSE) 30 MG capsule Take 30 mg by mouth every morning     lisinopril (ZESTRIL) 10 MG tablet Take 1 tablet (10 mg) by mouth daily     Modafinil (PROVIGIL PO) Take 200 mg by mouth Takes 1 1/2 tabs twice daily-- morning and noon     montelukast (SINGULAIR) 10 MG tablet TAKE 1 TABLET BY MOUTH ONCE DAILY AS NEEDED SEASONALLY (AUGUST AND SEPTEMBER)     naproxen sodium (ANAPROX) 220 MG tablet Take 220-440 mg by mouth daily as needed for moderate pain     nystatin (MYCOSTATIN) 437909 UNIT/GM external powder Apply topically 3 times daily as needed     polyethylene glycol (MIRALAX) 17 GM/Dose powder Take 1 capful by mouth daily as needed      Psyllium (FIBER) 0.52 G CAPS 2 tabs in am and pm     sennosides  (SENOKOT) 8.6 MG tablet Take 2 tablets by mouth 2 times daily     tenofovir (VIREAD) 300 MG tablet Take 1 tablet (300 mg) by mouth daily for Hep B reactivation prophylaxis     vitamin D2 (ERGOCALCIFEROL) 67697 units (1250 mcg) capsule Take 1 capsule (50,000 Units) by mouth every Monday and Friday.     No current facility-administered medications for this visit.         Social History   See HPI    Family History     Family History   Problem Relation Age of Onset     Hypertension Mother      Breast Cancer Mother      Coronary Artery Disease Mother      Cerebrovascular Disease Mother      Kidney Disease Mother      Obesity Mother      Hypertension Brother      Respiratory Brother         emphysema     Lipids Brother      Heart Disease Brother         stents, 12/2011; has had about 6 MIs, last one 1/2014     C.A.D. Sister         MI at age 63     Hypertension Sister      Gastrointestinal Disease Sister         gallbladder     Circulatory Sister         brain aneurysm at 63     Genitourinary Problems Sister         1 kidney/bladder     Hypertension Sister      Obesity Sister      Coronary Artery Disease Sister         had valve surgery, MI, CHF     Unknown/Adopted Paternal Uncle      Blood Disease Son         Lymes/7/11     Hypertension Son      Hypertension Father      Lymphoma Father      Glaucoma Father      Other Cancer Father         Lymphoma     Genetic Disorder Father         Glaucoma     Obesity Father      Breast Cancer Maternal Aunt      Ovarian Cancer Maternal Aunt      Coronary Artery Disease Other 49        niece     Diabetes Other         cousin     Cerebrovascular Disease Maternal Grandmother      Hypertension Maternal Grandmother      Cerebrovascular Disease Paternal Grandmother      Hypertension Paternal Grandmother      Liver Cancer Cousin      Glaucoma Paternal Grandfather      Genetic Disorder Paternal Grandfather         Glaucoma     Coronary Artery Disease Brother      Hypertension Brother       "Hyperlipidemia Brother      Hypertension Sister      Obesity Sister      Breast Cancer Other      Obesity Son      Obesity Other      Obesity Other      Obesity Other      Obesity Niece      Obesity Niece      Physical Exam     Temp Readings from Last 3 Encounters:   01/12/23 97.7  F (36.5  C) (Oral)   10/28/22 98.3  F (36.8  C) (Oral)   10/10/22 99.5  F (37.5  C) (Temporal)     BP Readings from Last 5 Encounters:   01/31/23 (!) 178/79   01/12/23 109/61   10/28/22 (!) 157/80   10/10/22 135/77   09/09/22 (!) 166/81     Pulse Readings from Last 1 Encounters:   01/31/23 70     Resp Readings from Last 1 Encounters:   01/12/23 16     Estimated body mass index is 24.55 kg/m  as calculated from the following:    Height as of 1/12/23: 1.605 m (5' 3.19\").    Weight as of this encounter: 63.2 kg (139 lb 6.4 oz).      GEN: NAD. Healthy appearing adult.   HEENT:  Anicteric, noninjected sclera. No obvious external lesions of the ear and nose. Hearing intact.  CV: S1, S2. RRR. No m/r/g  PULM: No increased work of breathing. CTA bilaterally   MSK: Synovial hypertrophy without tenderness to palpation, increased warmth, or overlying erythema at the right second and third MCPs.  Other MCPs without swelling or tenderness to palpation.  PIPs without synovial swelling or tenderness to palpation.  Heberden's and Philippe's nodes present.  DIPs nontender to palpation.  Bilateral first CMC joints tender to palpation but no effusion, worse on the right.  Wrists without swelling or tenderness to palpation.  Elbows and shoulders without swelling or tenderness to palpation.  Shoulders with normal range of motion.  Knees, ankles, and MTPs without swelling or tenderness to palpation.    SKIN: No rash or jaundice seen  PSYCH: Alert. Appropriate.      Labs / Imaging (select studies)     RF/CCP  Recent Labs   Lab Test 06/07/17  0955   CCPIGG 1   RHF <20     CBC  Recent Labs   Lab Test 01/12/23  0818 05/16/22  1044 02/14/22  1121 09/10/21  0933 " 06/07/21  0811 05/21/21  1145 01/06/21  1303 09/04/20  0756 03/04/20  0752 02/17/20  0810   WBC 3.7* 3.1* 3.9*   < > 3.0*   < > 4.4 4.1   < > 3.8*   RBC 4.39 4.31 4.53   < > 4.50   < > 5.01 4.88   < > 4.70   HGB 12.0 12.0 12.3   < > 13.1   < > 14.8 14.4   < > 14.3   HCT 39.0 38.1 39.8   < > 40.0   < > 44.9 43.9   < > 42.8   MCV 89 88 88   < > 89   < > 90 90   < > 91   RDW 13.6 14.6 14.4   < > 12.7   < > 13.3 12.5   < > 13.4   * 121* 139*   < > 132*   < > 157 140*   < > 140*   MCH 27.3 27.8 27.2   < > 29.1   < > 29.5 29.5   < > 30.4   MCHC 30.8* 31.5 30.9*   < > 32.8   < > 33.0 32.8   < > 33.4   NEUTROPHIL 43  --  56  --  32.9  --  46.1 38.9   < > 44.5   LYMPH 34  --  27  --  43.4  --  37.0 31.9   < > 35.7   MONOCYTE 10  --  9  --  13.5  --  11.2 10.5   < > 14.6   EOSINOPHIL 12  --  8  --  9.5  --  5.5 17.8   < > 4.7   BASOPHIL 1  --  0  --  0.7  --  0.2 0.7   < > 0.5   ANEU  --   --   --   --  1.0*  --  2.0 1.6   < > 1.7   ALYM  --   --   --   --  1.3  --  1.6 1.3   < > 1.4   FREDY  --   --   --   --  0.4  --  0.5 0.4   < > 0.6   AEOS  --   --   --   --  0.3  --  0.2  --   --  0.2   ABAS  --   --   --   --  0.0  --  0.0 0.0   < > 0.0   ANEUTAUTO 1.6  --  2.2  --   --   --   --   --   --   --    ALYMPAUTO 1.2  --  1.1  --   --   --   --   --   --   --    AMONOAUTO 0.4  --  0.4  --   --   --   --   --   --   --    AEOSAUTO 0.4  --  0.3  --   --   --   --   --   --   --    ABSBASO 0.0  --  0.0  --   --   --   --   --   --   --     < > = values in this interval not displayed.     CMP  Recent Labs   Lab Test 01/12/23  0818 10/10/22  1428 06/10/22  0816 05/16/22  1044 03/09/22  0821 02/14/22  1121 12/08/21  0816 09/10/21  0933 09/08/21  0806 06/07/21  0811 02/16/21  0842 01/06/21  1303   NA  --   --   --  142  --   --   --  134  --   --  134  --    POTASSIUM  --   --   --  4.3  --   --   --  4.6  --   --  4.7  --    CHLORIDE  --   --   --  109  --   --   --  100  --   --  100  --    CO2  --   --   --  29  --   --    --  31  --   --  31  --    ANIONGAP  --   --   --  4  --   --   --  3  --   --  3  --    GLC  --   --   --  89  --   --   --  86  --   --  94  --    BUN  --   --   --  13  --   --   --  9  --   --  10  --    CR 0.79 0.80 0.72 0.64   < > 0.79   < > 0.74   < > 0.76 0.70 0.65   GFRESTIMATED 80 78 89 >90   < > 80   < > 82   < > 80 88 >90   GFRESTBLACK  --   --   --   --   --   --   --   --   --  >90 >90 >90   ELIZABETH 9.2 9.7 9.4 8.9   < > 9.5   < > 8.9   < > 8.7 10.0 9.7   BILITOTAL 0.4  --   --  0.4  --  0.5  --  0.6   < > 0.5  --  0.5   ALBUMIN 3.6  --   --  3.8  --  3.9  --  4.0   < > 3.8  --  4.1   PROTTOTAL 6.5*  --   --  6.6*  --  6.9  --  7.3   < > 6.7*  --  7.6   ALKPHOS 80  --   --  110  --  107  --  86   < > 79  --  82   AST 21  --   --  22  --  27  --  25   < > 22  --  22   ALT 21  --   --  22  --  23  --  25   < > 20  --  22    < > = values in this interval not displayed.     Calcium/VitaminD  Recent Labs   Lab Test 01/12/23  0818 10/10/22  1428 06/10/22  0816 03/09/22  0821 02/14/22  1121 12/08/21  0816 10/14/21  1546   ELIZABETH 9.2 9.7 9.4   < > 9.5   < >  --    VITDT 59  --   --   --  65  --  70    < > = values in this interval not displayed.     ESR/CRP  Recent Labs   Lab Test 01/12/23 0818 02/14/22  1121 06/07/21  0811   SED 6 6 5   CRP <2.9 <2.9 <2.9     Hepatitis B  Recent Labs   Lab Test 05/16/22  1044 06/30/17  0925 06/07/17  0955 09/30/15  0951   AUSAB  --   --  0.65  --    HBCAB  --   --  Reactive   A reactive result indicates acute, chronic or past/resolved hepatitis B   infection.  *  --    HBCM  --   --  Nonreactive   A nonreactive result suggests lack of recent exposure to the virus in the   preceding 6 months.    --    HEPBANG  --   --  Nonreactive Nonreactive   HBQLOG  --  Not Calculated  --   --    HBQRES Not Detected HBV DNA Not Detected   The LUIS ARMANDO AmpliPrep/LUIS ARMANDO TaqMan HBV Test is an FDA-approved in vitro nucleic   acid amplification test for the quantitation of HBV DNA in human plasma (EDTA    plasma) or serum using the LUIS ARMANDO AmpliPrep Instrument for automated viral   nucleic acid extraction and the Gamador TaqMan Analyzer or Quovo for the   automated Real Time PCR amplification and detection of viral nucleic acid   target.   Titer results are reported in International Units/mL (IU/mL) using the 1st WHO   International standard for HBV for Nucleic Acid Amplification based assays.    --   --      Hepatitis C  Recent Labs   Lab Test 01/20/20  0741 06/07/17  0955 09/30/15  0951   HCVAB  --  Reactive   A reactive result indicates one of the following 1) current HCV infection 2)   past HCV infection that has resolved or 3) false positivity. The CDC recommends   that a reactive result should be followed by Nucleic acid testing for HCV RNA.  If HCV RNA is detected, that indicates current HCV infection. If HCV RNA is not   detected, that indicates either past, resolved HCV infection, or false HCV   antibody positivity.   Assay performance characteristics have not been established for newborns,   infants, and children  * Reactive   A reactive result indicates one of the following 1) current HCV infection 2)   past HCV infection that has resolved or 3) false positivity. The CDC recommends   that a reactive result should be followed by Nucleic acid testing for HCV RNA.  If HCV RNA is detected, that indicates current HCV infection. If HCV RNA is not   detected, that indicates either past, resolved HCV infection, or false HCV   antibody positivity.   Assay performance characteristics have not been established for newborns,   infants, and children  *   HCVRNA HCV RNA Not Detected HCV RNA Not Detected   The LUIS ARMANDO AmpliPrep/LUIS ARMANDO TaqMan HCV Test is an FDA-approved in vitro nucleic   acid amplification test for the quantitation of HCV RNA in human plasma (ETDA   plasma) or serum using the LUIS ARMANDO AmpliPrep Instrument for automated viral   nucleic acid extraction and the Gamador TaqMan Analyzer or Quovo for    automated Real Time PCR amplification and detection of the viral nucleic acid   target.   Titer results are reported in International Units/mL (IU/mL) using the 1st WHO   International standard for HCV for Nucleic Acid Amplification based assays.   [phone]*     Lyme ab screening  Recent Labs   Lab Test 08/09/19  1228 05/11/17  0830 04/12/17  1211   LYMEGM 0.05 <0.01  Negative, Absence of detectable Borrelia burdorferi antibodies. A negative   result does not exclude the possibility of Borrelia burgdorferi infection. If   early Lyme disease is suspected, a second sample should be collected and tested   2 to 4 weeks later.   <0.01  Negative, Absence of detectable Borrelia burdorferi antibodies. A negative   result does not exclude the possibility of Borrelia burgdorferi infection. If   early Lyme disease is suspected, a second sample should be collected and tested   2 to 4 weeks later.       Tuberculosis Screening  Recent Labs   Lab Test 01/12/23  0818 10/14/21  1546 05/05/20  1322 09/30/15  0952   TBRES Negative Negative Negative  --    TBRSLT  --   --   --  Negative   TBAGN  --   --   --  0.05     Immunization History     Immunization History   Administered Date(s) Administered     COVID-19 Vaccine 18+ (Moderna) 03/10/2021, 04/07/2021, 09/03/2021     COVID-19 Vaccine Bivalent Booster 12+ (Pfizer) 12/21/2022     COVID-19,PF,Moderna Booster 03/02/2022, 07/07/2022     HEPA 04/18/2000, 09/26/2000     HPV 08/13/2012, 09/25/2012, 01/24/2013     HepB 11/15/2011, 12/19/2011     HepB-Adult 10/28/2022     Influenza (H1N1) 01/07/2010     Influenza (High Dose) 3 valent vaccine 08/30/2018, 09/26/2019     Influenza (IIV3) PF 01/03/2005, 10/16/2006, 11/14/2007, 10/28/2008, 09/29/2009, 09/27/2010, 10/04/2011, 09/25/2012, 09/21/2015     Influenza Vaccine 65+ (Fluzone HD) 09/14/2020, 09/24/2021, 09/29/2022     Influenza Vaccine >6 months (Alfuria,Fluzone) 09/26/2013, 10/06/2014, 10/06/2016, 09/08/2017     Pneumo Conj 13-V  (2010&after) 10/06/2016     Pneumococcal 23 valent 11/15/2011, 10/17/2017     TD (ADULT, 7+) 04/18/2000, 07/07/2004     TDAP Vaccine (Boostrix) 09/21/2015     Tdap (Adacel,Boostrix) 05/23/2006     Zoster vaccine recombinant adjuvanted (SHINGRIX) 06/14/2018, 08/24/2018     Zoster vaccine, live 09/21/2015          Chart documentation done in part with Dragon Voice recognition Software. Although reviewed after completion, some word and grammatical error may remain.    Apolinar Cho MD

## 2023-01-31 NOTE — PATIENT INSTRUCTIONS
Please call to schedule the right hand MRI.    [facility]  [address] [place] [address]  Phone: [phone]    Please call to schedule Prolia injection.     [facility]  [address] , [place] [address]  Phone: [phone]

## 2023-02-08 ENCOUNTER — TRANSFERRED RECORDS (OUTPATIENT)
Dept: HEALTH INFORMATION MANAGEMENT | Facility: CLINIC | Age: 73
End: 2023-02-08

## 2023-02-09 ENCOUNTER — HOSPITAL ENCOUNTER (OUTPATIENT)
Dept: MRI IMAGING | Facility: CLINIC | Age: 73
Discharge: HOME | End: 2023-02-09
Attending: INTERNAL MEDICINE | Admitting: INTERNAL MEDICINE
Payer: COMMERCIAL

## 2023-02-09 DIAGNOSIS — M79.641 RIGHT HAND PAIN: ICD-10-CM

## 2023-02-09 PROCEDURE — 73220 MRI UPPR EXTREMITY W/O&W/DYE: CPT | Mod: 26 | Performed by: RADIOLOGY

## 2023-02-09 PROCEDURE — 255N000002 HC RX 255 OP 636: Performed by: INTERNAL MEDICINE

## 2023-02-09 PROCEDURE — A9585 GADOBUTROL INJECTION: HCPCS | Performed by: INTERNAL MEDICINE

## 2023-02-09 PROCEDURE — 73220 MRI UPPR EXTREMITY W/O&W/DYE: CPT | Mod: RT

## 2023-02-09 RX ORDER — GADOBUTROL 604.72 MG/ML
7.5 INJECTION INTRAVENOUS ONCE
Status: COMPLETED | OUTPATIENT
Start: 2023-02-09 | End: 2023-02-09

## 2023-02-09 RX ADMIN — GADOBUTROL 7.5 ML: 604.72 INJECTION INTRAVENOUS at 14:19

## 2023-02-20 ENCOUNTER — VIRTUAL VISIT (OUTPATIENT)
Dept: RHEUMATOLOGY | Facility: CLINIC | Age: 73
End: 2023-02-20
Payer: COMMERCIAL

## 2023-02-20 DIAGNOSIS — M06.09 RHEUMATOID ARTHRITIS OF MULTIPLE SITES WITH NEGATIVE RHEUMATOID FACTOR (H): Primary | ICD-10-CM

## 2023-02-20 PROCEDURE — 99214 OFFICE O/P EST MOD 30 MIN: CPT | Mod: VID | Performed by: INTERNAL MEDICINE

## 2023-02-20 RX ORDER — PREDNISONE 5 MG/1
TABLET ORAL
Qty: 91 TABLET | Refills: 0 | Status: SHIPPED | OUTPATIENT
Start: 2023-02-20 | End: 2023-04-12

## 2023-02-20 RX ORDER — MEDROXYPROGESTERONE ACETATE 150 MG/ML
50 INJECTION, SUSPENSION INTRAMUSCULAR WEEKLY
Qty: 4 ML | Refills: 4 | Status: SHIPPED | OUTPATIENT
Start: 2023-02-20 | End: 2023-06-05

## 2023-02-20 NOTE — PATIENT INSTRUCTIONS
RHEUMATOLOGY    Dr. Apolinar Cho    [facility]  [address] [place] [address]  [place] [address]  Phone number: [phone]  Fax number: [phone]    You may schedule your FLU shot by calling [phone] or if you would like to get your shot at a [facility] pharmacy you may schedule online at [email or web address].    Thank you for choosing [facility]!    Anabella Garcia CMA Rheumatology

## 2023-02-20 NOTE — PROGRESS NOTES
Niesha Adhikari  is a 72 year old year old who is being evaluated via a billable video visit.      How would you like to obtain your AVS? MyChart  If the video visit is dropped, the invitation should be resent by: Text to cell phone: [phone]   Will anyone else be joining your video visit? No     Rheumatology Video Visit      Niesha Adhikari MRN# [id number]   YOB: 1950 Age: 72 year old      Date of visit: 2/20/23   PCP: Dr. Tanika Clark  Hepatologist: Dr. Peres at [facility]  Ophthalmology: Dr. Higgins, [facility]    Chief Complaint   Patient presents with:  Rheumatoid Arthritis    Assessment and Plan     1. Rheumatoid arthritis: Hepatitis C related arthralgias versus rheumatoid arthritis (RF and CCP negative); hepatitis C has since been cleared. Initially with symmetric synovitis on exam and morning stiffness for more than one hour. NSAIDs and Tylenol associated with rash.  She did well with hydroxychloroquine 400 mg daily for a while but more arthritis symptoms so SSZ was added but associated with cytopenia and GI upset so that was stopped.  Avoiding MTX and leflunomide because of hepatitis C hx and +HBV core.  She has established with gastroenterology and is on tenofovir for hepatitis B reactivation prophylaxis; Humira was started 5/8/2020.  Then in January 2023 was having more pain at the right hand with mild synovial hypertrophy but no clearly active synovitis.  She had findings that were more consistent with carpal tunnel syndrome.  An MRI of the right hand showed synovitis at the right second and third MCP and tenosynovitis of the extensor tendons of the right third finger.  Therefore, the right hand pain is most consistent with active rheumatoid arthritis.  Discussed changing Humira to Enbrel and she is in agreement.  If insurance requires, okay to use Simponi instead.  We discussed how to change between biologic DMARD.  Prednisone to address symptoms now.   Chronic illness, progressive.    - Continue hydroxychloroquine 300 mg daily (last eye exam was performed 2/1/2022 at [facility])  - Discontinue Humira 40mg SQ every 14 days  - Start Enbrel 50 mg SQ every 7 days  - Start predniSONE (DELTASONE) 5 MG tablet; Take 4 tablets (20 mg) by mouth daily for 5 days, THEN 3 tablets (15 mg) daily for 5 days, THEN 2 tablets (10 mg) daily for 14 days, THEN 1 tablet (5 mg) daily for 28 days.  Dispense: 91 tablet; Refill: 0  - Labs in 3 months: CBC, Creatinine, Hepatic Panel, ESR, CRP    # Prednisone Risks and Benefits: The risks and benefits of prednisone were discussed in detail and the patient verbalized understanding.  The risks discussed include, but are not limited to, weight gain, fluid retention, impaired wound healing, hyperglycemia, adrenal suppression, GI upset, peptic ulcer, hepatotoxicity, aseptic necrosis of the femoral and humeral heads, osteoporosis, myopathy, tendon rupture (particularly Achilles tendon), ocular changes including an increased intraocular pressure.  I encouraged reviewing the package insert and asking any questions about the medication.      # Etanercept (Enbrel) Risks and Benefits: The risks and benefits of etanercept were discussed in detail and the patient verbalized understanding.  The risks discussed include, but are not limited to, the risk for hypersensitivity, anaphylaxis, anaphylactoid reactions, an increased risk for serious infections leading to hospitalization or death, a possible increased risk for lymphoma and other malignancies, a possible worsening of demyelinating diseases, a possible worsening of heart failure, possible reactivation of hepatitis B, and possible reactivation of latent tuberculosis.  Subcutaneous injections may result in injection site reactions and/or pain at the site of injection.  It was discussed that the medication would need to be discontinued if a serious infection develops.  It was discussed  "that live vaccinations should not be received while using etanercept or within 30 days prior to starting etanercept.  I encouraged reviewing the package insert and asking any questions about the medication.      # Golimumab (Simponi) Risks and Benefits: The risks and benefits of golimumab were discussed in detail and the patient verbalized understanding.  The risks discussed include, but are not limited to, the risk for hypersensitivity, anaphylaxis, anaphylactoid reactions, an increased risk for serious infections leading to hospitalization or death, a possible increased risk for lymphoma and other malignancies, a possible worsening of demyelinating diseases, a possible worsening of heart failure, risk for cytopenias, risk for drug induced lupus, possible reactivation of hepatitis B, and possible reactivation of latent tuberculosis.  Subcutaneous injections may result in injection site reactions and/or pain at the site of injection.  The most common adverse reactions are infections and injection site reactions.  It was discussed that the medication would need to be discontinued if a serious infection develops.  It was discussed that live vaccinations should not be received while using golimumab or within 30 days prior to starting golimumab.  I encouraged reviewing the package insert and asking any questions about the medication.      2. Osteoporosis: Previously treated with Fosamax (GERD), PO Boniva (reportedly ineffective), and IV Boniva (reportedly effective). Prolia was being considered by a previous rheumatologist but not used because she wanted \"clearance\" from the patient's gastroenterologist because she has hepatitis C; I do not see a contraindication for Prolia in the setting of hepatitis C. The patient reports that her gastroenterologist reported no contraindication for Prolia either.  She had not been on osteoporosis treatment for at least 2 years before restarting Boniva.  Boniva was restarted with the " first dose given on 12/5/2017.  2021 DEXA showed improvement and Boniva was continued until the last dose that was on 1/12/2023.  1/16/2023 DEXA shows osteoporosis, with reduced bone mineral density compared to the previous, about the hips and lumbar spine.  Therefore, previously discussed other treatment options and changed to Prolia.  Risks and side effects of Prolia were reviewed in detail previously but not discussed again today.   Chronic illness, stable.    - Continue ergocalciferol 50,000 units twice weekly   - Calcium 1200 mg daily  - Start Prolia 60mg SQ every 6 months, first dose in April 2023; patient to schedule at the infusion center    3.  History of lower back pain and left hip pain: Ms. Adhikari had a CT pelvis on 11/16/2018 that showed degenerative changes of the L-spine.  Left hip does not appear narrowed.  Improves with PT exercises, but often doesn't do them.     4. HBV core ab positive: On hepatitis B prophylaxis from gastroenterology.  Continue to follow with gastroenterology.    5. Bilateral carpal tunnel syndrome: EMG/NCS showed mild carpal tunnel syndrome.  Also having intermittent left 3rd digit trigger finger; has seen Dr. John where surgery was discussed but she is not certain that she wants to proceed with surgery because she is not certain that carpal tunnel syndrome is the problem.  See #1.  Adjusting RA treatment and will monitor for response.     6.  Vaccinations: Vaccinations reviewed with Ms. Adhikari.    - Influenza: encouraged yearly vaccination  - [id number]: up to date  - [id number]: up to date  - Shingrix: Up to date  - COVID19: up to date     Total minutes spent in evaluation with patient, review of pertinent lab tests and chart notes, and documentation: 18    Ms. Adhikari verbalized agreement with and understanding of the rational for the diagnosis and treatment plan.  All questions were answered to best of my ability and the patient's satisfaction. Ms. Adhikari was advised to  contact the clinic with any questions that may arise after the clinic visit.      Thank you for involving me in the care of the patient    Return to clinic: 3.5 months      HPI   Niesha Adhikari is a 72 year old female with a medical history significant for hepatitis C, hemorrhoids, narcolepsy, fibromyalgia, migraines, anxiety, pernicious anemia, GERD, allergic rhinitis, and osteoporosis who presents for follow-up of inflammatory arthritis.    1/31/2023: Was seen by orthopedic surgery where she was told that she may benefit from carpal tunnel surgery bilaterally, and left third digit trigger finger surgery.  She has hesitant to go through with this because she questions if her symptoms are more arthritis related.  Larger nodules over the right second and third MCPs on the left.  Bony hypertrophy at the DIPs.  Occasional pain at the bilateral first CMC joints, right first MCP, and wrist, but pain is worse with activity and improves with rest, not associated with swelling or increased warmth, and only last for a few seconds at a time.  Still with numbness and tingling in both hands that she says affects all of her fingers, is worse at night, and sometimes wakes her up at night.    Today, 2/20/2023: Having more pain and stiffness in the bilateral second and third MCPs and PIPs that is worse in the morning and improves with time and activity.  Occasionally has a zapping sensation in her fingers.  The zapping sensation typically resolves after a few seconds and does not occur for more than a few times each day, if at all.  Having more difficulty grasping items due to pain and stiffness at the MCPs and would like to use prednisone.  States that she has anxiety but prednisone does not worsen her anxiety.    Denies fevers, chills, nausea, vomiting, diarrhea. No abdominal pain. No chest pain/pressure, palpitations, or shortness of breath. No LE swelling. No neck pain. No oral or nasal sores.  No rash.     Tobacco: quit in  1978  EtOH: none  Drugs: none  Occupation: retired    ROS   12 point review of system was completed and negative except as noted in the HPI     Active Problem List     Patient Active Problem List   Diagnosis     Allergic rhinitis     Pernicious anemia     Anxiety state     Migraine     Essential and other specified forms of tremor     Fibromyalgia     Moderate recurrent major depression (H)     Narcolepsy     Internal hemorrhoids with other complication     AIN (anal intraepithelial neoplasia) anal canal     Osteoporosis     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)     Benign essential hypertension     Alcohol dependence in remission (H)     Personal history of other medical treatment     Constipation     DDD (degenerative disc disease), cervical     Iron deficiency     Thrombocytopenia (H)     Fatigue     Trigger middle finger of left hand     Bilateral carpal tunnel syndrome     Past Medical History     Past Medical History:   Diagnosis Date     ABUSE BY SPOUSE/PARTNER 07/27/2005     Degeneration of lumbar or lumbosacral intervertebral disc     DDD L5/S1     Depressive disorder      Esophageal reflux 1/28/2005     HELICOBACTER PYLORI INFECTION 01/28/2005     Hepatitis C - Cured. Achieved SVR in 2017      History of blood transfusion      Hypertension      Malignant neoplasm (H)     ACIN     Osteoporosis      Other and unspecified alcohol dependence, unspecified drinking behavior     Sober as 1/21/1987     Other malaise and fatigue      Past Surgical History     Past Surgical History:   Procedure Laterality Date     BIOPSY ANAL CANAL  1/21/13    [facility]      BREAST BIOPSY, RT/LT Left 1975    Breat Biopsy RT/LT     COLONOSCOPY  8/25/2009     COLONOSCOPY  2/14/2011    COLONOSCOPY performed by CRISTIN LAGUNAS at  GI     COLONOSCOPY N/A 1/8/2019    Procedure: Colonscopy, Biopsies by Biopsy;  Surgeon: Omega Talavera MD;  Location:  GI     CYSTOSCOPY  2/28/2011     "CYSTOSCOPY performed by CAYLA FLOR at  OR     ENDOSCOPY  05/21/12    Upper GI - [facility]     ENT SURGERY  1965     GI SURGERY  06/25/12      UGI ENDOSCOPY DIAG W BIOPSY  10/01/09     HEMORRHOIDECTOMY  06/25/12    [facility]     LAPAROSCOPIC SALPINGO-OOPHORECTOMY  2/28/2011    LAPAROSCOPIC SALPINGO-OOPHORECTOMY performed by CAYLA FLOR at  OR     TONSILLECTOMY & ADENOIDECTOMY  1965     [facility] NONSPECIFIC PROCEDURE  1965    Removed bone left index finger knuckle, casts broken bones     [facility] COLONOSCOPY W/WO BRUSH/WASH  08/22/05     [facility] UGI ENDOSCOPY, SIMPLE EXAM  08/08/07     Allergy     Allergies   Allergen Reactions     Telaprevir Other (See Comments) and Rash     Rectal bleeding, anemia     Abilify Discmelt Other (See Comments)     Disoriented     Antivert [Meclizine Hcl]      Chamomile      Compazine      Cymbalta Other (See Comments)     Disoriented, trouble sleeping     Diphenhydramine Nausea     And abdominal pain     Effexor [Venlafaxine] Other (See Comments)     Disoriented, trouble sleeping     Elavil [Amitriptyline Hcl] Other (See Comments)     \"didn't feel right on it-med was stopped right away\"     Ferrous Sulfate Nausea and Vomiting     Food Difficulty breathing     cilantro     Indomethacin      indocin sensativity \"Severe h.a\"     Seasonal Allergies Other (See Comments) and Difficulty breathing     Philip Gold Aug-Sept, rag weed, sneezing     Sulfa Drugs      Thiopental Sodium      PENTOTHAL/rigidity and fight response     Animal Dander Difficulty breathing and Rash     sneezing,resp. distress     Bupropion Anxiety     Tylenol [Acetaminophen] Rash     Current Medication List     Current Outpatient Medications   Medication Sig     adalimumab (HUMIRA *CF*) 40 MG/0.4ML pen kit Inject 0.4 mLs (40 mg) Subcutaneous every 14 days . Hold for infection. Must also take tenofovir for hepatitis B reactivation prophylaxis.     calcium carb-cholecalciferol 600-500 MG-UNIT CAPS " Take 1,200 mg by mouth daily     cloNIDine (CATAPRES) 0.2 MG tablet Take 0.2 mg by mouth At Bedtime     clotrimazole (LOTRIMIN) 1 % external cream APPLY TOPICALLY TWO TIMES A DAY     cyanocobalamin (VITAMIN B-12) 500 MCG SUBL sublingual tablet Place 500 mcg under the tongue every other day     cycloSPORINE (RESTASIS) 0.05 % ophthalmic emulsion Place 1 drop into both eyes 2 times daily      hydroxychloroquine (PLAQUENIL) 200 MG tablet Hydroxychloroquine 200mg daily; and an additional 200mg every other day.  Yearly eye exam, including 10-2 VF and SD-OCT, required.     hydrOXYzine (ATARAX) 50 MG tablet Take 50 mg by mouth 1 1/2-2 tabs at bedtime     lisdexamfetamine (VYVANSE) 30 MG capsule Take 30 mg by mouth every morning     lisinopril (ZESTRIL) 10 MG tablet Take 1 tablet (10 mg) by mouth daily     Modafinil (PROVIGIL PO) Take 200 mg by mouth Takes 1 1/2 tabs twice daily-- morning and noon     montelukast (SINGULAIR) 10 MG tablet TAKE 1 TABLET BY MOUTH ONCE DAILY AS NEEDED SEASONALLY (AUGUST AND SEPTEMBER)     naproxen sodium (ANAPROX) 220 MG tablet Take 220-440 mg by mouth daily as needed for moderate pain     nystatin (MYCOSTATIN) 824689 UNIT/GM external powder Apply topically 3 times daily as needed     polyethylene glycol (MIRALAX) 17 GM/Dose powder Take 1 capful by mouth daily as needed      Psyllium (FIBER) 0.52 G CAPS 2 tabs in am and pm     sennosides (SENOKOT) 8.6 MG tablet Take 2 tablets by mouth 2 times daily     tenofovir (VIREAD) 300 MG tablet Take 1 tablet (300 mg) by mouth daily for Hep B reactivation prophylaxis     vitamin D2 (ERGOCALCIFEROL) 53419 units (1250 mcg) capsule Take 1 capsule (50,000 Units) by mouth every Monday and Friday.     No current facility-administered medications for this visit.         Social History   See HPI    Family History     Family History   Problem Relation Age of Onset     Hypertension Mother      Breast Cancer Mother      Coronary Artery Disease Mother       Cerebrovascular Disease Mother      Kidney Disease Mother      Obesity Mother      Hypertension Brother      Respiratory Brother         emphysema     Lipids Brother      Heart Disease Brother         stents, 12/2011; has had about 6 MIs, last one 1/2014     C.A.D. Sister         MI at age 63     Hypertension Sister      Gastrointestinal Disease Sister         gallbladder     Circulatory Sister         brain aneurysm at 63     Genitourinary Problems Sister         1 kidney/bladder     Hypertension Sister      Obesity Sister      Coronary Artery Disease Sister         had valve surgery, MI, CHF     Unknown/Adopted Paternal Uncle      Blood Disease Son         Lymes/7/11     Hypertension Son      Hypertension Father      Lymphoma Father      Glaucoma Father      Other Cancer Father         Lymphoma     Genetic Disorder Father         Glaucoma     Obesity Father      Breast Cancer Maternal Aunt      Ovarian Cancer Maternal Aunt      Coronary Artery Disease Other 49        niece     Diabetes Other         cousin     Cerebrovascular Disease Maternal Grandmother      Hypertension Maternal Grandmother      Cerebrovascular Disease Paternal Grandmother      Hypertension Paternal Grandmother      Liver Cancer Cousin      Glaucoma Paternal Grandfather      Genetic Disorder Paternal Grandfather         Glaucoma     Coronary Artery Disease Brother      Hypertension Brother      Hyperlipidemia Brother      Hypertension Sister      Obesity Sister      Breast Cancer Other      Obesity Son      Obesity Other      Obesity Other      Obesity Other      Obesity Niece      Obesity Niece      Physical Exam     Temp Readings from Last 3 Encounters:   01/12/23 97.7  F (36.5  C) (Oral)   10/28/22 98.3  F (36.8  C) (Oral)   10/10/22 99.5  F (37.5  C) (Temporal)     BP Readings from Last 5 Encounters:   01/31/23 126/84   01/12/23 109/61   10/28/22 (!) 157/80   10/10/22 135/77   09/09/22 (!) 166/81     Pulse Readings from Last 1 Encounters:  "  01/31/23 70     Resp Readings from Last 1 Encounters:   01/12/23 16     Estimated body mass index is 24.55 kg/m  as calculated from the following:    Height as of 1/12/23: 1.605 m (5' 3.19\").    Weight as of 1/31/23: 63.2 kg (139 lb 6.4 oz).      GEN: NAD. Healthy appearing adult.   HEENT:Anicteric, noninjected sclera. No obvious external lesions of the ear and nose. Hearing intact.  PULM: No increased work of breathing  MSK: Possible mild swelling of the bilateral second-third MCPs.  PIPs and DIPs without swelling.  Wrists without swelling.    SKIN: No rash or jaundice seen  PSYCH: Alert. Appropriate.      Labs / Imaging (select studies)     RF/CCP  Recent Labs   Lab Test 06/07/17  0955   CCPIGG 1   RHF <20     CBC  Recent Labs   Lab Test 01/12/23  0818 05/16/22  1044 02/14/22  1121 09/10/21  0933 06/07/21  0811 05/21/21  1145 01/06/21  1303 09/04/20  0756 03/04/20  0752 02/17/20  0810   WBC 3.7* 3.1* 3.9*   < > 3.0*   < > 4.4 4.1   < > 3.8*   RBC 4.39 4.31 4.53   < > 4.50   < > 5.01 4.88   < > 4.70   HGB 12.0 12.0 12.3   < > 13.1   < > 14.8 14.4   < > 14.3   HCT 39.0 38.1 39.8   < > 40.0   < > 44.9 43.9   < > 42.8   MCV 89 88 88   < > 89   < > 90 90   < > 91   RDW 13.6 14.6 14.4   < > 12.7   < > 13.3 12.5   < > 13.4   * 121* 139*   < > 132*   < > 157 140*   < > 140*   MCH 27.3 27.8 27.2   < > 29.1   < > 29.5 29.5   < > 30.4   MCHC 30.8* 31.5 30.9*   < > 32.8   < > 33.0 32.8   < > 33.4   NEUTROPHIL 43  --  56  --  32.9  --  46.1 38.9   < > 44.5   LYMPH 34  --  27  --  43.4  --  37.0 31.9   < > 35.7   MONOCYTE 10  --  9  --  13.5  --  11.2 10.5   < > 14.6   EOSINOPHIL 12  --  8  --  9.5  --  5.5 17.8   < > 4.7   BASOPHIL 1  --  0  --  0.7  --  0.2 0.7   < > 0.5   ANEU  --   --   --   --  1.0*  --  2.0 1.6   < > 1.7   ALYM  --   --   --   --  1.3  --  1.6 1.3   < > 1.4   FREDY  --   --   --   --  0.4  --  0.5 0.4   < > 0.6   AEOS  --   --   --   --  0.3  --  0.2  --   --  0.2   ABAS  --   --   --   --  0.0  " --  0.0 0.0   < > 0.0   ANEUTAUTO 1.6  --  2.2  --   --   --   --   --   --   --    ALYMPAUTO 1.2  --  1.1  --   --   --   --   --   --   --    AMONOAUTO 0.4  --  0.4  --   --   --   --   --   --   --    AEOSAUTO 0.4  --  0.3  --   --   --   --   --   --   --    ABSBASO 0.0  --  0.0  --   --   --   --   --   --   --     < > = values in this interval not displayed.     CMP  Recent Labs   Lab Test 01/12/23  0818 10/10/22  1428 06/10/22  0816 05/16/22  1044 03/09/22  0821 02/14/22  1121 12/08/21  0816 09/10/21  0933 09/08/21  0806 06/07/21  0811 02/16/21  0842 01/06/21  1303   NA  --   --   --  142  --   --   --  134  --   --  134  --    POTASSIUM  --   --   --  4.3  --   --   --  4.6  --   --  4.7  --    CHLORIDE  --   --   --  109  --   --   --  100  --   --  100  --    CO2  --   --   --  29  --   --   --  31  --   --  31  --    ANIONGAP  --   --   --  4  --   --   --  3  --   --  3  --    GLC  --   --   --  89  --   --   --  86  --   --  94  --    BUN  --   --   --  13  --   --   --  9  --   --  10  --    CR 0.79 0.80 0.72 0.64   < > 0.79   < > 0.74   < > 0.76 0.70 0.65   GFRESTIMATED 80 78 89 >90   < > 80   < > 82   < > 80 88 >90   GFRESTBLACK  --   --   --   --   --   --   --   --   --  >90 >90 >90   ELIZABETH 9.2 9.7 9.4 8.9   < > 9.5   < > 8.9   < > 8.7 10.0 9.7   BILITOTAL 0.4  --   --  0.4  --  0.5  --  0.6   < > 0.5  --  0.5   ALBUMIN 3.6  --   --  3.8  --  3.9  --  4.0   < > 3.8  --  4.1   PROTTOTAL 6.5*  --   --  6.6*  --  6.9  --  7.3   < > 6.7*  --  7.6   ALKPHOS 80  --   --  110  --  107  --  86   < > 79  --  82   AST 21  --   --  22  --  27  --  25   < > 22  --  22   ALT 21  --   --  22  --  23  --  25   < > 20  --  22    < > = values in this interval not displayed.     Calcium/VitaminD  Recent Labs   Lab Test 01/12/23  0818 10/10/22  1428 06/10/22  0816 03/09/22  0821 02/14/22  1121 12/08/21  0816 10/14/21  1546   ELIZABETH 9.2 9.7 9.4   < > 9.5   < >  --    VITDT 59  --   --   --  65  --  70    < > = values in  this interval not displayed.     ESR/CRP  Recent Labs   Lab Test 01/12/23  0818 02/14/22  1121 06/07/21  0811   SED 6 6 5   CRP <2.9 <2.9 <2.9     Hepatitis B  Recent Labs   Lab Test 05/16/22  1044 06/30/17  0925 06/07/17  0955 09/30/15  0951   AUSAB  --   --  0.65  --    HBCAB  --   --  Reactive   A reactive result indicates acute, chronic or past/resolved hepatitis B   infection.  *  --    HBCM  --   --  Nonreactive   A nonreactive result suggests lack of recent exposure to the virus in the   preceding 6 months.    --    HEPBANG  --   --  Nonreactive Nonreactive   HBQLOG  --  Not Calculated  --   --    HBQRES Not Detected HBV DNA Not Detected   The [employer] AmpliPrep/[employer] TaqMan HBV Test is an FDA-approved in vitro nucleic   acid amplification test for the quantitation of HBV DNA in human plasma (EDTA   plasma) or serum using the [employer] AmpliPrep Instrument for automated viral   nucleic acid extraction and the [employer] TaqMan Analyzer or [employer] TaqMan for the   automated Real Time PCR amplification and detection of viral nucleic acid   target.   Titer results are reported in International Units/mL (IU/mL) using the 1st WHO   International standard for HBV for Nucleic Acid Amplification based assays.    --   --      Hepatitis C  Recent Labs   Lab Test 01/20/20  0741 06/07/17  0955 09/30/15  0951   HCVAB  --  Reactive   A reactive result indicates one of the following 1) current HCV infection 2)   past HCV infection that has resolved or 3) false positivity. The CDC recommends   that a reactive result should be followed by Nucleic acid testing for HCV RNA.  If HCV RNA is detected, that indicates current HCV infection. If HCV RNA is not   detected, that indicates either past, resolved HCV infection, or false HCV   antibody positivity.   Assay performance characteristics have not been established for newborns,   infants, and children  * Reactive   A reactive result indicates one of the following 1) current HCV infection 2)    past HCV infection that has resolved or 3) false positivity. The CDC recommends   that a reactive result should be followed by Nucleic acid testing for HCV RNA.  If HCV RNA is detected, that indicates current HCV infection. If HCV RNA is not   detected, that indicates either past, resolved HCV infection, or false HCV   antibody positivity.   Assay performance characteristics have not been established for newborns,   infants, and children  *   HCVRNA HCV RNA Not Detected HCV RNA Not Detected   The LUIS ARMANDO AmpliPrep/LUIS ARMANDO TaqMan HCV Test is an FDA-approved in vitro nucleic   acid amplification test for the quantitation of HCV RNA in human plasma (ETDA   plasma) or serum using the LUIS ARMANDO AmpliPrep Instrument for automated viral   nucleic acid extraction and the LUIS ARMANDO TaqMan Analyzer or [employer]ou[employer] TaqMan for   automated Real Time PCR amplification and detection of the viral nucleic acid   target.   Titer results are reported in International Units/mL (IU/mL) using the 1st WHO   International standard for HCV for Nucleic Acid Amplification based assays.   [phone]     Lyme ab screening  Recent Labs   Lab Test 08/09/19  1228 05/11/17  0830 04/12/17  1211   LYMEGM 0.05 <0.01  Negative, Absence of detectable Borrelia burdorferi antibodies. A negative   result does not exclude the possibility of Borrelia burgdorferi infection. If   early Lyme disease is suspected, a second sample should be collected and tested   2 to 4 weeks later.   <0.01  Negative, Absence of detectable Borrelia burdorferi antibodies. A negative   result does not exclude the possibility of Borrelia burgdorferi infection. If   early Lyme disease is suspected, a second sample should be collected and tested   2 to 4 weeks later.       Tuberculosis Screening  Recent Labs   Lab Test 01/12/23  0818 10/14/21  1546 05/05/20  1322 09/30/15  0952   TBRES Negative Negative Negative  --    TBRSLT  --   --   --  Negative   TBAGN  --   --   --  0.05     Immunization History      Immunization History   Administered Date(s) Administered     COVID-19 Vaccine 18+ (Moderna) 03/10/2021, 04/07/2021, 09/03/2021     COVID-19 Vaccine Bivalent Booster 12+ (Pfizer) 12/21/2022     COVID-19,PF,Moderna Booster 03/02/2022, 07/07/2022     HEPA 04/18/2000, 09/26/2000     HPV 08/13/2012, 09/25/2012, 01/24/2013     HepB 11/15/2011, 12/19/2011     HepB-Adult 10/28/2022     Influenza (H1N1) 01/07/2010     Influenza (High Dose) 3 valent vaccine 08/30/2018, 09/26/2019     Influenza (IIV3) PF 01/03/2005, 10/16/2006, 11/14/2007, 10/28/2008, 09/29/2009, 09/27/2010, 10/04/2011, 09/25/2012, 09/21/2015     Influenza Vaccine 65+ (Fluzone HD) 09/14/2020, 09/24/2021, 09/29/2022     Influenza Vaccine >6 months (Alfuria,Fluzone) 09/26/2013, 10/06/2014, 10/06/2016, 09/08/2017     Pneumo Conj 13-V (2010&after) 10/06/2016     Pneumococcal 23 valent 11/15/2011, 10/17/2017     TD (ADULT, 7+) 04/18/2000, 07/07/2004     TDAP Vaccine (Boostrix) 09/21/2015     Tdap (Adacel,Boostrix) 05/23/2006     Zoster vaccine recombinant adjuvanted (SHINGRIX) 06/14/2018, 08/24/2018     Zoster vaccine, live 09/21/2015          Chart documentation done in part with Dragon Voice recognition Software. Although reviewed after completion, some word and grammatical error may remain.      Video-Visit Details    Type of service:  Video Visit    Video Start Time: 10:25 AM    Video End Time:10:35 AM    Originating Location (pt. Location): Home, [place]    Distant Location (provider location):  Off site, [place]    Platform used for Video Visit: Becca Cho MD

## 2023-02-21 ENCOUNTER — TELEPHONE (OUTPATIENT)
Dept: RHEUMATOLOGY | Facility: CLINIC | Age: 73
End: 2023-02-21
Payer: COMMERCIAL

## 2023-02-21 NOTE — TELEPHONE ENCOUNTER
PA Initiation    Medication: Enbrel Sureclick 50mg/mL - PA Pending  Insurance Company: [employer] - Phone [phone] Fax [phone]  Pharmacy Filling the Rx: [facility] - [place], [place] - [facility] [address]  Filling Pharmacy Phone:    Filling Pharmacy Fax:    Start Date: 2/21/2023        JIMMY RAMESH Buchanan: [email or web address]

## 2023-02-22 NOTE — TELEPHONE ENCOUNTER
Prior Authorization Approval    Authorization Effective Date: 1/1/2023  Authorization Expiration Date: 2/21/2024  Medication: Enbrel Sureclick 50mg/mL - PA Approved  Approved Dose/Quantity: 4ml per 28 days  Reference #: [id number]   Insurance Company: [employer] - Phone [phone] Fax [phone]  Expected CoPay:     $0  CoPay Card Available:      Foundation Assistance Needed:    Which Pharmacy is filling the prescription (Not needed for infusion/clinic administered): [facility] - [place] [address]   Pharmacy Notified:    Patient Notified:    Yes        Lashae Temple CphT  [facility] Liaison  [email or web address]  Phone: [phone]  Fax: [phone]

## 2023-04-12 ENCOUNTER — INFUSION THERAPY VISIT (OUTPATIENT)
Dept: INFUSION THERAPY | Facility: CLINIC | Age: 73
End: 2023-04-12
Attending: INTERNAL MEDICINE
Payer: COMMERCIAL

## 2023-04-12 ENCOUNTER — APPOINTMENT (OUTPATIENT)
Dept: LAB | Facility: CLINIC | Age: 73
End: 2023-04-12
Payer: COMMERCIAL

## 2023-04-12 VITALS
RESPIRATION RATE: 18 BRPM | BODY MASS INDEX: 24.73 KG/M2 | SYSTOLIC BLOOD PRESSURE: 131 MMHG | HEART RATE: 60 BPM | TEMPERATURE: 97.8 F | DIASTOLIC BLOOD PRESSURE: 77 MMHG | OXYGEN SATURATION: 100 % | HEIGHT: 63 IN | WEIGHT: 139.6 LBS

## 2023-04-12 DIAGNOSIS — Z92.89 PERSONAL HISTORY OF OTHER MEDICAL TREATMENT: Primary | ICD-10-CM

## 2023-04-12 DIAGNOSIS — M81.0 AGE-RELATED OSTEOPOROSIS WITHOUT CURRENT PATHOLOGICAL FRACTURE: ICD-10-CM

## 2023-04-12 LAB
ALBUMIN SERPL BCG-MCNC: 3.9 G/DL (ref 3.5–5.2)
CALCIUM SERPL-MCNC: 9.8 MG/DL (ref 8.8–10.2)
CREAT SERPL-MCNC: 0.81 MG/DL (ref 0.51–0.95)
GFR SERPL CREATININE-BSD FRML MDRD: 77 ML/MIN/1.73M2

## 2023-04-12 PROCEDURE — 82565 ASSAY OF CREATININE: CPT | Performed by: INTERNAL MEDICINE

## 2023-04-12 PROCEDURE — 96372 THER/PROPH/DIAG INJ SC/IM: CPT | Performed by: INTERNAL MEDICINE

## 2023-04-12 PROCEDURE — 82310 ASSAY OF CALCIUM: CPT | Performed by: INTERNAL MEDICINE

## 2023-04-12 PROCEDURE — 82040 ASSAY OF SERUM ALBUMIN: CPT | Performed by: INTERNAL MEDICINE

## 2023-04-12 PROCEDURE — 36415 COLL VENOUS BLD VENIPUNCTURE: CPT | Performed by: INTERNAL MEDICINE

## 2023-04-12 PROCEDURE — 250N000011 HC RX IP 250 OP 636: Performed by: INTERNAL MEDICINE

## 2023-04-12 RX ORDER — EPINEPHRINE 1 MG/ML
0.3 INJECTION, SOLUTION INTRAMUSCULAR; SUBCUTANEOUS EVERY 5 MIN PRN
Status: CANCELLED | OUTPATIENT
Start: 2023-10-09

## 2023-04-12 RX ORDER — DIPHENHYDRAMINE HYDROCHLORIDE 50 MG/ML
50 INJECTION INTRAMUSCULAR; INTRAVENOUS
Status: CANCELLED
Start: 2023-10-09

## 2023-04-12 RX ORDER — ALBUTEROL SULFATE 90 UG/1
1-2 AEROSOL, METERED RESPIRATORY (INHALATION)
Status: CANCELLED
Start: 2023-10-09

## 2023-04-12 RX ORDER — MEPERIDINE HYDROCHLORIDE 25 MG/ML
25 INJECTION INTRAMUSCULAR; INTRAVENOUS; SUBCUTANEOUS EVERY 30 MIN PRN
Status: CANCELLED | OUTPATIENT
Start: 2023-10-09

## 2023-04-12 RX ORDER — METHYLPREDNISOLONE SODIUM SUCCINATE 125 MG/2ML
125 INJECTION, POWDER, LYOPHILIZED, FOR SOLUTION INTRAMUSCULAR; INTRAVENOUS
Status: CANCELLED
Start: 2023-10-09

## 2023-04-12 RX ORDER — ALBUTEROL SULFATE 0.83 MG/ML
2.5 SOLUTION RESPIRATORY (INHALATION)
Status: CANCELLED | OUTPATIENT
Start: 2023-10-09

## 2023-04-12 RX ADMIN — DENOSUMAB 60 MG: 60 INJECTION SUBCUTANEOUS at 10:27

## 2023-04-12 ASSESSMENT — PAIN SCALES - GENERAL: PAINLEVEL: MODERATE PAIN (5)

## 2023-04-12 NOTE — PROGRESS NOTES
Infusion Nursing Note:  Niesha Adhikari presents today for Prolia.    Patient seen by provider today: No   present during visit today: Not Applicable.    Note: Patient doing well. Staying active. Just completed tapering dose of prednisone for RA. VSS. .    Intravenous Access:  No Intravenous access  at this visit.    Treatment Conditions:  Lab Results   Component Value Date     05/16/2022    POTASSIUM 4.3 05/16/2022    MAG 2.1 05/16/2022    CR 0.81 04/12/2023    ELIZABETH 9.8 04/12/2023    BILITOTAL 0.4 01/12/2023    ALBUMIN 3.9 04/12/2023    ALT 21 01/12/2023    AST 21 01/12/2023     Results reviewed, labs MET treatment parameters, ok to proceed with treatment.    Post Infusion Assessment:  Patient tolerated injection without incident.  Patient observed for 15 minutes post PROLIA per protocol.     Discharge Plan:   Discharge instructions reviewed with: Patient.  Patient discharged in stable condition accompanied by: self.  Departure Mode: Ambulatory.      Monika Garcia RN

## 2023-05-10 ENCOUNTER — NURSE TRIAGE (OUTPATIENT)
Dept: NURSING | Facility: CLINIC | Age: 73
End: 2023-05-10
Payer: COMMERCIAL

## 2023-05-11 NOTE — TELEPHONE ENCOUNTER
Triage Call:    Patient calling to report fall with injuries on 5/4/2023.  She reports that she fell onto her right back and head.  She reports that since injury she has had bloating, tenderness, and severe spasms on lower right abdomen.  She reports that she had rectal bleeding turning the toilet water red from 5/6/2023 to 5/9/2023.  She reports weakness and fatigue needing to use cane to ambulate.     According to the protocol, patient should go to ED now.  Care advice given. Patient verbalizes understanding and patient declines disposition as she reports that she would rather see her PCP.    Routing note to PCP per patient request.    Belia Snyder RN  05/10/23 8:28 PM  [facility] Nurse Advisor    Reason for Disposition    Injury (or injuries) that need emergency care    Additional Information    Negative: Dangerous mechanism of injury (e.g., MVA, contact sports, trampoline, diving, fall > 10 feet or 3 meters)  (Exception: back pain began > 1 hour after injury)    Negative: [1] Weakness (i.e., paralysis, loss of muscle strength) of the leg(s) or foot AND [2] sudden onset after back injury    Negative: [1] Numbness (i.e., loss of sensation) of the leg(s) or foot AND [2] sudden onset after back injury    Negative: [1] Major bleeding (e.g., actively dripping or spurting) AND [2] can't be stopped    Negative: Bullet wound, knife wound, or other penetrating object    Negative: Shock suspected (e.g., cold/pale/clammy skin, too weak to stand, low BP, rapid pulse)    Negative: Sounds like a life-threatening emergency to the triager    Negative: [1] Injuries at more than 1 site AND [2] unsure which guideline to use    Negative: Injury to the neck    Negative: Injury to the tailbone    Negative: Back pain not from an injury    Negative: Back pain from overuse (work, exercise, gardening) OR from twisting, lifting, or bending injury    Negative: Major injury from dangerous force (e.g., fall > 10 feet or 3  meters)    Negative: [1] Major bleeding (e.g., actively dripping or spurting) AND [2] can't be stopped    Negative: Shock suspected (e.g., cold/pale/clammy skin, too weak to stand)    Negative: Difficult to awaken or acting confused (e.g., disoriented, slurred speech)    Negative: [1] SEVERE weakness (i.e., unable to walk or barely able to walk, requires support) AND [2] new-onset or worsening    Negative: [1] Can't stand (bear weight) or walk AND [2] new-onset after fall    Negative: Sounds like a life-threatening emergency to the triager    Negative: Fainted (passed out)    Negative: New-onset or worsening weakness of the face, arm or leg on one side of the body    Negative: [1] New-onset or worsening dizziness AND [2] described as spinning or off balance (i.e., vertigo)    Negative: [1] New-onset or worsening dizziness AND [2] NO spinning sensation or trouble with balance    Negative: New-onset or worsening pale skin (pallor)    Negative: Pregnant and fall    Negative: Patient has a concerning injury to a specific part of the body (e.g., chest, leg, head)    Negative: Patient has a wound (abrasion, cut, puncture, other skin injury or tear)    Protocols used: FALLS AND [id number], BACK INJURY-A-AH

## 2023-05-11 NOTE — TELEPHONE ENCOUNTER
"Contacted patient. Advised her of provider's message. She states that she will \"go tomorrow\". Informed her that it is her choice, however, it would be recommended that she go right away. She is aware of the closest ED-[facility] and continues to state that she will go tomorrow or later today, if able. Reiterated that she should go right away.     Julia Nevarez, CARMENZAN, RN, PHN  Registered Nurse-Clinic Triage  [facility]  5/11/2023 at 8:08 AM    "

## 2023-05-11 NOTE — TELEPHONE ENCOUNTER
The internal bleedign suggest that she should be seen in ED to consider CT scan. Not clear on note if there was a known source for the bleeding, like hemorrhoids which would make it easier to see here.  Likely best to direct to ED.  Rosa Cuellar MD

## 2023-05-11 NOTE — TELEPHONE ENCOUNTER
Messaged covering providers asking that they review.     Julia Nevarez, BSN, RN, PHN  Registered Nurse-Clinic Triage  [facility]  5/11/2023 at 7:15 AM

## 2023-05-12 ENCOUNTER — APPOINTMENT (OUTPATIENT)
Dept: CT IMAGING | Facility: CLINIC | Age: 73
End: 2023-05-12
Attending: EMERGENCY MEDICINE
Payer: COMMERCIAL

## 2023-05-12 ENCOUNTER — HOSPITAL ENCOUNTER (EMERGENCY)
Facility: CLINIC | Age: 73
Discharge: HOME | End: 2023-05-12
Attending: EMERGENCY MEDICINE | Admitting: EMERGENCY MEDICINE
Payer: COMMERCIAL

## 2023-05-12 VITALS
OXYGEN SATURATION: 99 % | HEART RATE: 70 BPM | HEIGHT: 63 IN | TEMPERATURE: 98 F | WEIGHT: 138 LBS | BODY MASS INDEX: 24.45 KG/M2 | DIASTOLIC BLOOD PRESSURE: 85 MMHG | RESPIRATION RATE: 18 BRPM | SYSTOLIC BLOOD PRESSURE: 166 MMHG

## 2023-05-12 DIAGNOSIS — S22.41XA CLOSED FRACTURE OF MULTIPLE RIBS OF RIGHT SIDE, INITIAL ENCOUNTER: ICD-10-CM

## 2023-05-12 LAB
BASOPHILS # BLD AUTO: 0 10E3/UL (ref 0–0.2)
BASOPHILS NFR BLD AUTO: 1 %
EOSINOPHIL # BLD AUTO: 0.3 10E3/UL (ref 0–0.7)
EOSINOPHIL NFR BLD AUTO: 9 %
ERYTHROCYTE [DISTWIDTH] IN BLOOD BY AUTOMATED COUNT: 14.3 % (ref 10–15)
HCT VFR BLD AUTO: 35.5 % (ref 35–47)
HEMOCCULT STL QL: NEGATIVE
HGB BLD-MCNC: 11 G/DL (ref 11.7–15.7)
IMM GRANULOCYTES # BLD: 0 10E3/UL
IMM GRANULOCYTES NFR BLD: 0 %
LYMPHOCYTES # BLD AUTO: 1.2 10E3/UL (ref 0.8–5.3)
LYMPHOCYTES NFR BLD AUTO: 40 %
MCH RBC QN AUTO: 26 PG (ref 26.5–33)
MCHC RBC AUTO-ENTMCNC: 31 G/DL (ref 31.5–36.5)
MCV RBC AUTO: 84 FL (ref 78–100)
MONOCYTES # BLD AUTO: 0.4 10E3/UL (ref 0–1.3)
MONOCYTES NFR BLD AUTO: 12 %
NEUTROPHILS # BLD AUTO: 1.2 10E3/UL (ref 1.6–8.3)
NEUTROPHILS NFR BLD AUTO: 38 %
NRBC # BLD AUTO: 0 10E3/UL
NRBC BLD AUTO-RTO: 0 /100
PLATELET # BLD AUTO: 149 10E3/UL (ref 150–450)
RBC # BLD AUTO: 4.23 10E6/UL (ref 3.8–5.2)
WBC # BLD AUTO: 3.1 10E3/UL (ref 4–11)

## 2023-05-12 PROCEDURE — 99284 EMERGENCY DEPT VISIT MOD MDM: CPT | Mod: 25 | Performed by: EMERGENCY MEDICINE

## 2023-05-12 PROCEDURE — 36415 COLL VENOUS BLD VENIPUNCTURE: CPT | Performed by: EMERGENCY MEDICINE

## 2023-05-12 PROCEDURE — 85025 COMPLETE CBC W/AUTO DIFF WBC: CPT | Performed by: EMERGENCY MEDICINE

## 2023-05-12 PROCEDURE — 82272 OCCULT BLD FECES 1-3 TESTS: CPT | Performed by: EMERGENCY MEDICINE

## 2023-05-12 PROCEDURE — 71250 CT THORAX DX C-: CPT

## 2023-05-12 PROCEDURE — 99284 EMERGENCY DEPT VISIT MOD MDM: CPT | Performed by: EMERGENCY MEDICINE

## 2023-05-12 RX ORDER — OXYCODONE HYDROCHLORIDE 5 MG/1
5 TABLET ORAL EVERY 6 HOURS PRN
Qty: 15 TABLET | Refills: 0 | Status: SHIPPED | OUTPATIENT
Start: 2023-05-12 | End: 2023-05-15

## 2023-05-12 ASSESSMENT — ACTIVITIES OF DAILY LIVING (ADL): ADLS_ACUITY_SCORE: 35
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
## 2023-05-12 NOTE — ED TRIAGE NOTES
"Pt presents with concerns of after a fall.  Pt fell last Thursday ( a week ago).  Pt states that she fell backwards hitting her right side of the back.  Pt states that she had messaged her provider.  Pt states that she has pain and swelling located below the right posterior ribs.  Pt states that she has an area of softness near her groin. Pt states that in the past week she had \"hemorraging anally\", denies any at this time.  Pt has a history of hemorrhoids. Nothing for pain today.      Triage Assessment     Row Name 05/12/23 1111       Triage Assessment (Adult)    Airway WDL WDL       Respiratory WDL    Respiratory WDL WDL       Skin Circulation/Temperature WDL    Skin Circulation/Temperature WDL WDL       Cardiac WDL    Cardiac WDL WDL       Peripheral/Neurovascular WDL    Peripheral Neurovascular WDL WDL       Cognitive/Neuro/Behavioral WDL    Cognitive/Neuro/Behavioral WDL WDL              "

## 2023-05-30 ENCOUNTER — OFFICE VISIT (OUTPATIENT)
Dept: FAMILY MEDICINE | Facility: OTHER | Age: 73
End: 2023-05-30
Payer: COMMERCIAL

## 2023-05-30 VITALS
WEIGHT: 139 LBS | DIASTOLIC BLOOD PRESSURE: 70 MMHG | BODY MASS INDEX: 24.63 KG/M2 | SYSTOLIC BLOOD PRESSURE: 120 MMHG | HEIGHT: 63 IN | RESPIRATION RATE: 20 BRPM | TEMPERATURE: 97.4 F | OXYGEN SATURATION: 99 % | HEART RATE: 63 BPM

## 2023-05-30 DIAGNOSIS — E61.1 IRON DEFICIENCY: ICD-10-CM

## 2023-05-30 DIAGNOSIS — F33.1 MODERATE RECURRENT MAJOR DEPRESSION (H): ICD-10-CM

## 2023-05-30 DIAGNOSIS — S22.41XD CLOSED FRACTURE OF MULTIPLE RIBS OF RIGHT SIDE WITH ROUTINE HEALING, SUBSEQUENT ENCOUNTER: Primary | ICD-10-CM

## 2023-05-30 DIAGNOSIS — D69.6 THROMBOCYTOPENIA (H): ICD-10-CM

## 2023-05-30 DIAGNOSIS — R93.89 ABNORMAL CHEST CT: ICD-10-CM

## 2023-05-30 DIAGNOSIS — I10 BENIGN ESSENTIAL HYPERTENSION: ICD-10-CM

## 2023-05-30 DIAGNOSIS — D51.0 PERNICIOUS ANEMIA: ICD-10-CM

## 2023-05-30 DIAGNOSIS — F10.21 ALCOHOL DEPENDENCE IN REMISSION (H): ICD-10-CM

## 2023-05-30 LAB
ANION GAP SERPL CALCULATED.3IONS-SCNC: 10 MMOL/L (ref 7–15)
BUN SERPL-MCNC: 11.7 MG/DL (ref 8–23)
CALCIUM SERPL-MCNC: 9.7 MG/DL (ref 8.8–10.2)
CHLORIDE SERPL-SCNC: 102 MMOL/L (ref 98–107)
CREAT SERPL-MCNC: 0.79 MG/DL (ref 0.51–0.95)
DEPRECATED HCO3 PLAS-SCNC: 26 MMOL/L (ref 22–29)
ERYTHROCYTE [DISTWIDTH] IN BLOOD BY AUTOMATED COUNT: 14.5 % (ref 10–15)
FERRITIN SERPL-MCNC: 14 NG/ML (ref 11–328)
GFR SERPL CREATININE-BSD FRML MDRD: 80 ML/MIN/1.73M2
GLUCOSE SERPL-MCNC: 91 MG/DL (ref 70–99)
HCT VFR BLD AUTO: 36.2 % (ref 35–47)
HGB BLD-MCNC: 11.3 G/DL (ref 11.7–15.7)
IRON BINDING CAPACITY (ROCHE): 423 UG/DL (ref 240–430)
IRON SATN MFR SERPL: 8 % (ref 15–46)
IRON SERPL-MCNC: 35 UG/DL (ref 37–145)
MCH RBC QN AUTO: 26.3 PG (ref 26.5–33)
MCHC RBC AUTO-ENTMCNC: 31.2 G/DL (ref 31.5–36.5)
MCV RBC AUTO: 84 FL (ref 78–100)
PLATELET # BLD AUTO: 160 10E3/UL (ref 150–450)
POTASSIUM SERPL-SCNC: 4.4 MMOL/L (ref 3.4–5.3)
RBC # BLD AUTO: 4.3 10E6/UL (ref 3.8–5.2)
SODIUM SERPL-SCNC: 138 MMOL/L (ref 136–145)
TSH SERPL DL<=0.005 MIU/L-ACNC: 2.06 UIU/ML (ref 0.3–4.2)
WBC # BLD AUTO: 4.9 10E3/UL (ref 4–11)

## 2023-05-30 PROCEDURE — 84443 ASSAY THYROID STIM HORMONE: CPT | Performed by: FAMILY MEDICINE

## 2023-05-30 PROCEDURE — 96127 BRIEF EMOTIONAL/BEHAV ASSMT: CPT | Performed by: FAMILY MEDICINE

## 2023-05-30 PROCEDURE — 85027 COMPLETE CBC AUTOMATED: CPT | Performed by: FAMILY MEDICINE

## 2023-05-30 PROCEDURE — 99214 OFFICE O/P EST MOD 30 MIN: CPT | Performed by: FAMILY MEDICINE

## 2023-05-30 PROCEDURE — 83550 IRON BINDING TEST: CPT | Performed by: FAMILY MEDICINE

## 2023-05-30 PROCEDURE — 82728 ASSAY OF FERRITIN: CPT | Performed by: FAMILY MEDICINE

## 2023-05-30 PROCEDURE — 83540 ASSAY OF IRON: CPT | Performed by: FAMILY MEDICINE

## 2023-05-30 PROCEDURE — 36415 COLL VENOUS BLD VENIPUNCTURE: CPT | Performed by: FAMILY MEDICINE

## 2023-05-30 PROCEDURE — 82607 VITAMIN B-12: CPT | Performed by: FAMILY MEDICINE

## 2023-05-30 PROCEDURE — 80048 BASIC METABOLIC PNL TOTAL CA: CPT | Performed by: FAMILY MEDICINE

## 2023-05-30 ASSESSMENT — PATIENT HEALTH QUESTIONNAIRE - PHQ9
SUM OF ALL RESPONSES TO PHQ QUESTIONS 1-9: 4
10. IF YOU CHECKED OFF ANY PROBLEMS, HOW DIFFICULT HAVE THESE PROBLEMS MADE IT FOR YOU TO DO YOUR WORK, TAKE CARE OF THINGS AT HOME, OR GET ALONG WITH OTHER PEOPLE: NOT DIFFICULT AT ALL
SUM OF ALL RESPONSES TO PHQ QUESTIONS 1-9: 4

## 2023-05-30 ASSESSMENT — PAIN SCALES - GENERAL: PAINLEVEL: MODERATE PAIN (5)

## 2023-05-30 NOTE — PROGRESS NOTES
Assessment & Plan     Closed fracture of multiple ribs of right side with routine healing, subsequent encounter  Appears to be healing.  Using naproxen only for pain.    Abnormal chest CT  Has an indeterminate nodule in the anterior mediastinum.  Will obtain MRI of the chest.    - MR Chest w Contrast; Future    Thrombocytopenia (H)/Pernicious anemia/Iron deficiency  Labs drawn.    - CBC with platelets  - Iron and iron binding capacity  - Ferritin  -Vit B12    Alcohol dependence in remission (H)  Sober for many years    Moderate recurrent major depression (H)  Stable.  Follow with psychiatry.    Benign essential hypertension  Blood pressure controlled.  Labs drawn.    - Basic metabolic panel  (Ca, Cl, CO2, Creat, Gluc, K, Na, BUN)  - TSH with free T4 reflex     MED REC REQUIRED  Post Medication Reconciliation Status:  Discharge medications reconciled, continue medications without change        Tanika Clark MD  [facility] JEREMÍAS Scherer is a 72 year old, presenting for the following health issues:  ER F/U         View : No data to display.              HPI     ED/UC Followup:    Facility:  [facility]  Date of visit: 5/12/23  Reason for visit: back pain- fell on 5/3/23  Current Status: still having pain.   Was told she needs an MRI to check on her nodule they found    She was working in her yard pulling a branch and fell.  She was having persistent right chest pain and went into the ER about a week later.  She was found to have several right-sided rib fractures.  She states she took the oxycodone for her pain and she is out of that now.  She is now taking 2 Aleve at night for her pain.  She feels her arthritis pain at this time is worse than her fracture pain.  She feels the fracture pain has slowly been improving.  She denies issues with shortness of breath, cough or difficulty breathing.  When she had the chest CT which found the rib fractures there was also an  "indeterminate nodule in her anterior mediastinum that they recommended having further imaging evaluation.  She also has had issues with mild thrombocytopenia and mild leukopenia.  During the ER she was also found to have anemia.  She has known pernicious anemia for which she takes vitamin B12 as well as iron deficiency.  Will repeat labs and do further iron and B12 studies.      Objective    /70 (BP Location: Right arm, Patient Position: Sitting, Cuff Size: Adult Regular)   Pulse 63   Temp 97.4  F (36.3  C) (Temporal)   Resp 20   Ht 1.6 m (5' 3\")   Wt 63 kg (139 lb)   LMP 11/27/2003   SpO2 99%   BMI 24.62 kg/m    Body mass index is 24.62 kg/m .  Physical Exam   Gen: no apparent distress  Chest: clear to auscultation without wheeze, rale or rhonchi  Cor: regular rate and rhythm without murmur  Chest wall: She has a spot in her lateral to anterior right mid chest that is tender to palpation as well as a spot in her right lower back which is tender to palpation.  Both of these are over ribs.  Psych: Alert and oriented times 3; coherent speech, normal   rate and volume, able to articulate logical thoughts, able   to abstract reason, no tangential thoughts, no hallucinations   or delusions  Her affect is neutral              Answers for HPI/ROS submitted by the patient on 5/30/2023  If you checked off any problems, how difficult have these problems made it for you to do your work, take care of things at home, or get along with other people?: Not difficult at all  PHQ9 TOTAL SCORE: 4      "

## 2023-05-31 LAB — VIT B12 SERPL-MCNC: 964 PG/ML (ref 232–1245)

## 2023-06-05 ENCOUNTER — PATIENT OUTREACH (OUTPATIENT)
Dept: CARE COORDINATION | Facility: CLINIC | Age: 73
End: 2023-06-05

## 2023-06-05 ENCOUNTER — OFFICE VISIT (OUTPATIENT)
Dept: RHEUMATOLOGY | Facility: CLINIC | Age: 73
End: 2023-06-05
Payer: COMMERCIAL

## 2023-06-05 VITALS
SYSTOLIC BLOOD PRESSURE: 144 MMHG | BODY MASS INDEX: 24.76 KG/M2 | HEART RATE: 73 BPM | DIASTOLIC BLOOD PRESSURE: 84 MMHG | OXYGEN SATURATION: 100 % | WEIGHT: 139.8 LBS

## 2023-06-05 DIAGNOSIS — M19.042 PRIMARY OSTEOARTHRITIS OF BOTH HANDS: ICD-10-CM

## 2023-06-05 DIAGNOSIS — Z92.89 PERSONAL HISTORY OF OTHER MEDICAL TREATMENT: ICD-10-CM

## 2023-06-05 DIAGNOSIS — M81.0 AGE-RELATED OSTEOPOROSIS WITHOUT CURRENT PATHOLOGICAL FRACTURE: ICD-10-CM

## 2023-06-05 DIAGNOSIS — M06.09 RHEUMATOID ARTHRITIS OF MULTIPLE SITES WITH NEGATIVE RHEUMATOID FACTOR (H): Primary | ICD-10-CM

## 2023-06-05 DIAGNOSIS — M19.041 PRIMARY OSTEOARTHRITIS OF BOTH HANDS: ICD-10-CM

## 2023-06-05 PROCEDURE — 99214 OFFICE O/P EST MOD 30 MIN: CPT | Performed by: INTERNAL MEDICINE

## 2023-06-05 RX ORDER — ABATACEPT 125 MG/ML
125 INJECTION, SOLUTION SUBCUTANEOUS
Qty: 4 ML | Refills: 3 | Status: SHIPPED | OUTPATIENT
Start: 2023-06-05 | End: 2023-09-18

## 2023-06-05 NOTE — PROGRESS NOTES
Rheumatology Clinic Visit      Niesha Adhikari MRN# [id number]   YOB: 1950 Age: 72 year old      Date of visit: 6/05/23   PCP: Dr. Tanika Clark  Hepatologist: Dr. Peres at [facility] in [place]  Ophthalmology: Dr. Higgins, [facility]    Chief Complaint   Patient presents with:  Arthritis    Assessment and Plan     1. Rheumatoid arthritis: Hepatitis C related arthralgias versus rheumatoid arthritis (RF and CCP negative); hepatitis C has since been cleared. Initially with symmetric synovitis on exam and morning stiffness for more than one hour. NSAIDs and Tylenol associated with rash.  She did well with hydroxychloroquine 400 mg daily for a while but more arthritis symptoms so SSZ was added but associated with cytopenia and GI upset so that was stopped.  Avoiding MTX, leflunomide because of hepatitis C hx and +HBV core.  Preferentially avoid Jakinibs because of hepatitis C history and +HBV core. She has established with gastroenterology and is on tenofovir for hepatitis B reactivation prophylaxis.  Previously on Humira (5/8/2020-2/2023).  Currently on Enbrel (started 2/2023-6/5/2023, injection site reactions, no improvement since changing from Humira to Enbrel).  Note that she had been doing well but then in January 2023 was having more pain in the right hand with mild synovial hypertrophy but no clearly active synovitis.  She had findings that were more consistent with carpal tunnel syndrome.  An MRI of the right hand showed synovitis at the right second and third MCP and tenosynovitis of the extensor tendons of the right third finger.  Therefore, changed from Humira to Enbrel with no improvement and exam has remained unchanged.  Also having injection site reactions with Enbrel.  Therefore, change mechanism of action: Change Enbrel to Orencia.  Discussed how to change between biologic DMARD.  She felt somewhat better when on prednisone but does not wish to restart prednisone at  "this time.   Chronic illness, progressive.    - Continue hydroxychloroquine 300 mg daily (last eye exam was performed 2/1/2022 at [facility])  - Discontinue Enbrel 50 mg SQ every 7 days  - Start Orencia 125mg SQ once every 7 days  - Labs in 3 months: CBC, Creatinine, Hepatic Panel, ESR, CRP    # Abatacept (Orencia) Risks and Benefits: The risks and benefits of abatacept were discussed in detail and the patient verbalized understanding.  The risks discussed include, but are not limited to, the risk for hypersensitivity, anaphylaxis, anaphylactoid reactions, an increased risk for serious infections leading to hospitalization or death, and an increased risk for more frequent respiratory adverse events in patients with COPD.  If subcutaneous injections are used, then injection site reactions and/or pain may occur at the site of injection.  The most common side effects were discussed that include headache, upper respiratory tract infections, nasopharyngitis, and nausea.  It was discussed that the medication would need to be discontinued if a serious infection develops.  It was discussed that live vaccinations should not be received while using abatacept or within 30 days prior to starting abatacept.  I encouraged reviewing the package insert and asking any questions about the medication.      2. Osteoporosis: Previously treated with Fosamax (GERD), PO Boniva (reportedly ineffective), and IV Boniva (reportedly effective). Prolia was being considered by a previous rheumatologist but not used because she wanted \"clearance\" from the patient's gastroenterologist because she has hepatitis C; I do not see a contraindication for Prolia in the setting of hepatitis C. The patient reports that her gastroenterologist reported no contraindication for Prolia either.  She had not been on osteoporosis treatment for at least 2 years before restarting Boniva.  Boniva was restarted with the first dose given on 12/5/2017.  " 2021 DEXA showed improvement and Boniva was continued until the last dose that was on 1/12/2023.  1/16/2023 DEXA shows osteoporosis, with reduced bone mineral density compared to the previous, about the hips and lumbar spine.  Changed to Prolia with the first dose received on 4/12/2023.   Chronic illness, stable.    - Continue ergocalciferol 50,000 units twice weekly   - Calcium 1200 mg daily  - Continue Prolia 60mg SQ every 6 months (received at the infusion center)    3.  History of lower back pain and left hip pain: Ms. Adhikari had a CT pelvis on 11/16/2018 that showed degenerative changes of the L-spine.  Left hip does not appear narrowed.  Improves with PT exercises, but often doesn't do them.     4. HBV core ab positive: On hepatitis B prophylaxis from gastroenterology.  Continue to follow with gastroenterology.    5. Bilateral carpal tunnel syndrome: EMG/NCS showed mild carpal tunnel syndrome.  Also having intermittent left 3rd digit trigger finger; has seen Dr. John where surgery was discussed but she is not certain that she wants to proceed with surgery because she is not certain that carpal tunnel syndrome is the problem.  Still symptomatic and she is going to consider following up with surgery.  Addressing RA as noted in #1.    6.  Bilateral hand osteoarthritis: Reviewed the diagnosis treatment options.  Start Voltaren gel as needed.  Risks and side effects of Voltaren gel were reviewed in detail today.  - diclofenac (VOLTAREN) 1 % topical gel; Apply up to 2 grams of 1% gel to hands up to 4 times daily as needed for joint pain (maximum: 8 g per upper extremity per day)  Dispense: 200 g; Refill: 2    7.  Vaccinations: Vaccinations reviewed with Ms. Adhikari.    - Influenza: encouraged yearly vaccination  - [id number]: up to date  - [id number]: up to date  - Shingrix: Up to date  - COVID19: Advised updating     8. Elevated blood pressure:  Niesha to follow up with Primary Care provider regarding elevated  blood pressure.     Total minutes spent in evaluation with patient, review of pertinent lab tests and chart notes, and documentation: 20    Ms. Adhikari verbalized agreement with and understanding of the rational for the diagnosis and treatment plan.  All questions were answered to best of my ability and the patient's satisfaction. Ms. Adhikari was advised to contact the clinic with any questions that may arise after the clinic visit.      Thank you for involving me in the care of the patient    Return to clinic: 3.5 months      HPI   Niesha Adhikari is a 72 year old female with a medical history significant for hepatitis C, hemorrhoids, narcolepsy, fibromyalgia, migraines, anxiety, pernicious anemia, GERD, allergic rhinitis, and osteoporosis who presents for follow-up of inflammatory arthritis.    1/31/2023: Was seen by orthopedic surgery where she was told that she may benefit from carpal tunnel surgery bilaterally, and left third digit trigger finger surgery.  She has hesitant to go through with this because she questions if her symptoms are more arthritis related.  Larger nodules over the right second and third MCPs on the left.  Bony hypertrophy at the DIPs.  Occasional pain at the bilateral first CMC joints, right first MCP, and wrist, but pain is worse with activity and improves with rest, not associated with swelling or increased warmth, and only last for a few seconds at a time.  Still with numbness and tingling in both hands that she says affects all of her fingers, is worse at night, and sometimes wakes her up at night.    2/20/2023: Having more pain and stiffness in the bilateral second and third MCPs and PIPs that is worse in the morning and improves with time and activity.  Occasionally has a zapping sensation in her fingers.  The zapping sensation typically resolves after a few seconds and does not occur for more than a few times each day, if at all.  Having more difficulty grasping items due to pain  and stiffness at the MCPs and would like to use prednisone.  States that she has anxiety but prednisone does not worsen her anxiety.    Today, 6/5/2023: Swelling at the MCPs, worse on the right hand.  Still with numbness and tingling in the fingers that is intermittent.  States the pain at the MCPs is worse at night and wakes her up from sleep about 2-3 times per night.  Felt better when she was on prednisone was able to sleep through the night while on prednisone but does not wish to restart prednisone because she does not like the potential side effects.  Fell doing yard work and broke several ribs.  Found to have a nodule behind the sternum and is going to have additional work-up for this with imaging at the direction of another clinic; the nodule was found incidentally when imaging was done to find the rib fractures.    Denies fevers, chills, nausea, vomiting, diarrhea. No abdominal pain. No chest pain/pressure, palpitations, or shortness of breath. No LE swelling. No neck pain. No oral or nasal sores.  No rash.     Tobacco: quit in 1978  EtOH: none  Drugs: none  Occupation: retired    ROS   12 point review of system was completed and negative except as noted in the HPI     Active Problem List     Patient Active Problem List   Diagnosis     Allergic rhinitis     Pernicious anemia     Anxiety state     Migraine     Essential and other specified forms of tremor     Fibromyalgia     Moderate recurrent major depression (H)     Narcolepsy     Internal hemorrhoids with other complication     AIN (anal intraepithelial neoplasia) anal canal     Osteoporosis     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)     Benign essential hypertension     Alcohol dependence in remission (H)     Personal history of other medical treatment     Constipation     DDD (degenerative disc disease), cervical     Iron deficiency     Thrombocytopenia (H)     Fatigue     Trigger middle finger of left hand      Bilateral carpal tunnel syndrome     Past Medical History     Past Medical History:   Diagnosis Date     ABUSE BY SPOUSE/PARTNER 07/27/2005     Degeneration of lumbar or lumbosacral intervertebral disc     DDD L5/S1     Depressive disorder      Esophageal reflux 1/28/2005     HELICOBACTER PYLORI INFECTION 01/28/2005     Hepatitis C - Cured. Achieved SVR in 2017      History of blood transfusion      Hypertension      Malignant neoplasm (H)     ACIN     Osteoporosis      Other and unspecified alcohol dependence, unspecified drinking behavior     Sober as 1/21/1987     Other malaise and fatigue      Past Surgical History     Past Surgical History:   Procedure Laterality Date     BIOPSY ANAL CANAL  1/21/13    [facility]      BREAST BIOPSY, RT/LT Left 1975    Breat Biopsy RT/LT     COLONOSCOPY  8/25/2009     COLONOSCOPY  2/14/2011    COLONOSCOPY performed by CRISTIN LAGUNAS at  GI     COLONOSCOPY N/A 1/8/2019    Procedure: Colonscopy, Biopsies by Biopsy;  Surgeon: Omega Talavera MD;  Location:  GI     CYSTOSCOPY  2/28/2011    CYSTOSCOPY performed by CAYLA FLOR at  OR     ENDOSCOPY  05/21/12    [facility]     ENT SURGERY  1965     GI SURGERY  06/25/12      UGI ENDOSCOPY DIAG W BIOPSY  10/01/09     HEMORRHOIDECTOMY  06/25/12    [facility]     LAPAROSCOPIC SALPINGO-OOPHORECTOMY  2/28/2011    LAPAROSCOPIC SALPINGO-OOPHORECTOMY performed by CAYLA FLOR at  OR     TONSILLECTOMY & ADENOIDECTOMY  1965     [facility] NONSPECIFIC PROCEDURE  1965    Removed bone left index finger knuckle, casts broken bones     [facility] COLONOSCOPY W/WO BRUSH/WASH  08/22/05     [facility] UGI ENDOSCOPY, SIMPLE EXAM  08/08/07     Allergy     Allergies   Allergen Reactions     Telaprevir Other (See Comments) and Rash     Rectal bleeding, anemia     Abilify Discmelt Other (See Comments)     Disoriented     Antivert [Meclizine Hcl]      Chamomile      Compazine      Diphenhydramine Nausea     And  "abdominal pain     Duloxetine Hcl Other (See Comments)     Disoriented, trouble sleeping     Effexor [Venlafaxine] Other (See Comments)     Disoriented, trouble sleeping     Elavil [Amitriptyline Hcl] Other (See Comments)     \"didn't feel right on it-med was stopped right away\"     Ferrous Sulfate Nausea and Vomiting     Food Difficulty breathing     cilantro     Indomethacin      indocin sensativity \"Severe h.a\"     Seasonal Allergies Other (See Comments) and Difficulty breathing     Philip Gold Aug-Sept, rag weed, sneezing     Sulfa Antibiotics      Thiopental Sodium      PENTOTHAL/rigidity and fight response     Animal Dander Difficulty breathing and Rash     sneezing,resp. distress     Bupropion Anxiety     Tylenol [Acetaminophen] Rash     Current Medication List     Current Outpatient Medications   Medication Sig     calcium carb-cholecalciferol 600-500 MG-UNIT CAPS Take 1,200 mg by mouth daily     cloNIDine (CATAPRES) 0.2 MG tablet Take 0.2 mg by mouth At Bedtime     clotrimazole (LOTRIMIN) 1 % external cream APPLY TOPICALLY TWO TIMES A DAY     cyanocobalamin (VITAMIN B-12) 500 MCG SUBL sublingual tablet Place 500 mcg under the tongue every other day     cycloSPORINE (RESTASIS) 0.05 % ophthalmic emulsion Place 1 drop into both eyes 2 times daily      etanercept (ENBREL SURECLICK) 50 MG/ML autoinjector Inject 50 mg Subcutaneous once a week . Hold for signs of infection, and seek medical attention.     hydroxychloroquine (PLAQUENIL) 200 MG tablet Hydroxychloroquine 200mg daily; and an additional 200mg every other day.  Yearly eye exam, including 10-2 VF and SD-OCT, required.     hydrOXYzine (ATARAX) 50 MG tablet Take 50 mg by mouth 1 1/2-2 tabs at bedtime     lisdexamfetamine (VYVANSE) 30 MG capsule Take 30 mg by mouth every morning     lisinopril (ZESTRIL) 10 MG tablet Take 1 tablet (10 mg) by mouth daily     Modafinil (PROVIGIL PO) Take 200 mg by mouth Takes 1 1/2 tabs twice daily-- morning and noon     " montelukast (SINGULAIR) 10 MG tablet TAKE 1 TABLET BY MOUTH ONCE DAILY AS NEEDED SEASONALLY (AUGUST AND SEPTEMBER)     naproxen sodium (ANAPROX) 220 MG tablet Take 220-440 mg by mouth daily as needed for moderate pain     nystatin (MYCOSTATIN) 151317 UNIT/GM external powder Apply topically 3 times daily as needed     polyethylene glycol (MIRALAX) 17 GM/Dose powder Take 1 capful by mouth daily as needed      Psyllium (FIBER) 0.52 G CAPS 1 tabs in am and pm     sennosides (SENOKOT) 8.6 MG tablet Take 2 tablets by mouth 2 times daily     tenofovir (VIREAD) 300 MG tablet Take 1 tablet (300 mg) by mouth daily for Hep B reactivation prophylaxis     vitamin D2 (ERGOCALCIFEROL) 30571 units (1250 mcg) capsule Take 1 capsule (50,000 Units) by mouth every Monday and Friday.     No current facility-administered medications for this visit.         Social History   See HPI    Family History     Family History   Problem Relation Age of Onset     Hypertension Mother      Breast Cancer Mother      Coronary Artery Disease Mother      Cerebrovascular Disease Mother      Kidney Disease Mother      Obesity Mother      Hypertension Brother      Respiratory Brother         emphysema     Lipids Brother      Heart Disease Brother         stents, 12/2011; has had about 6 MIs, last one 1/2014     C.A.D. Sister         MI at age 63     Hypertension Sister      Gastrointestinal Disease Sister         gallbladder     Circulatory Sister         brain aneurysm at 63     Genitourinary Problems Sister         1 kidney/bladder     Hypertension Sister      Obesity Sister      Coronary Artery Disease Sister         had valve surgery, MI, CHF     Unknown/Adopted Paternal Uncle      Blood Disease Son         Lymes/7/11     Hypertension Son      Hypertension Father      Lymphoma Father      Glaucoma Father      Other Cancer Father         Lymphoma     Genetic Disorder Father         Glaucoma     Obesity Father      Breast Cancer Maternal Aunt      Ovarian  "Cancer Maternal Aunt      Coronary Artery Disease Other 49        niece     Diabetes Other         cousin     Cerebrovascular Disease Maternal Grandmother      Hypertension Maternal Grandmother      Cerebrovascular Disease Paternal Grandmother      Hypertension Paternal Grandmother      Liver Cancer Cousin      Glaucoma Paternal Grandfather      Genetic Disorder Paternal Grandfather         Glaucoma     Coronary Artery Disease Brother      Hypertension Brother      Hyperlipidemia Brother      Hypertension Sister      Obesity Sister      Breast Cancer Other      Obesity Son      Obesity Other      Obesity Other      Obesity Other      Obesity Niece      Obesity Niece      Physical Exam     Temp Readings from Last 3 Encounters:   05/30/23 97.4  F (36.3  C) (Temporal)   05/12/23 98  F (36.7  C)   04/12/23 97.8  F (36.6  C) (Temporal)     BP Readings from Last 5 Encounters:   06/05/23 (!) 153/84   05/30/23 120/70   05/12/23 (!) 166/85   04/12/23 131/77   01/31/23 126/84     Pulse Readings from Last 1 Encounters:   06/05/23 73     Resp Readings from Last 1 Encounters:   05/30/23 20     Estimated body mass index is 24.76 kg/m  as calculated from the following:    Height as of 5/30/23: 1.6 m (5' 3\").    Weight as of this encounter: 63.4 kg (139 lb 12.8 oz).      GEN: NAD. Healthy appearing adult.   HEENT:  Anicteric, noninjected sclera. No obvious external lesions of the ear and nose. Hearing intact.  CV: S1, S2. RRR. No m/r/g  PULM: No increased work of breathing. CTA bilaterally   MSK: Synovial hypertrophy without tenderness to palpation, increased warmth, or overlying erythema of the bilateral second-third MCPs, no worse on the right.  PIPs, DIPs without swelling or tenderness to palpation.  Wrists without swelling or tenderness to palpation.  Elbows and shoulders without swelling or tenderness to palpation.  Knees, ankles, and MTPs without swelling or tenderness to palpation.    SKIN: No rash or jaundice seen  PSYCH: " Alert. Appropriate.         Labs / Imaging (select studies)     RF/CCP  Recent Labs   Lab Test 06/07/17  0955   CCPIGG 1   RHF <20     CBC  Recent Labs   Lab Test 05/30/23  1139 05/12/23  1228 01/12/23  0818 05/16/22  1044 02/14/22  1121 09/10/21  0933 06/07/21  0811 05/21/21  1145 01/06/21  1303 09/04/20  0756 03/04/20  0752 02/17/20  0810   WBC 4.9 3.1* 3.7*   < > 3.9*   < > 3.0*   < > 4.4 4.1   < > 3.8*   RBC 4.30 4.23 4.39   < > 4.53   < > 4.50   < > 5.01 4.88   < > 4.70   HGB 11.3* 11.0* 12.0   < > 12.3   < > 13.1   < > 14.8 14.4   < > 14.3   HCT 36.2 35.5 39.0   < > 39.8   < > 40.0   < > 44.9 43.9   < > 42.8   MCV 84 84 89   < > 88   < > 89   < > 90 90   < > 91   RDW 14.5 14.3 13.6   < > 14.4   < > 12.7   < > 13.3 12.5   < > 13.4    149* 139*   < > 139*   < > 132*   < > 157 140*   < > 140*   MCH 26.3* 26.0* 27.3   < > 27.2   < > 29.1   < > 29.5 29.5   < > 30.4   MCHC 31.2* 31.0* 30.8*   < > 30.9*   < > 32.8   < > 33.0 32.8   < > 33.4   NEUTROPHIL  --  38 43  --  56  --  32.9  --  46.1 38.9   < > 44.5   LYMPH  --  40 34  --  27  --  43.4  --  37.0 31.9   < > 35.7   MONOCYTE  --  12 10  --  9  --  13.5  --  11.2 10.5   < > 14.6   EOSINOPHIL  --  9 12  --  8  --  9.5  --  5.5 17.8   < > 4.7   BASOPHIL  --  1 1  --  0  --  0.7  --  0.2 0.7   < > 0.5   ANEU  --   --   --   --   --   --  1.0*  --  2.0 1.6   < > 1.7   ALYM  --   --   --   --   --   --  1.3  --  1.6 1.3   < > 1.4   FREDY  --   --   --   --   --   --  0.4  --  0.5 0.4   < > 0.6   AEOS  --   --   --   --   --   --  0.3  --  0.2  --   --  0.2   ABAS  --   --   --   --   --   --  0.0  --  0.0 0.0   < > 0.0   ANEUTAUTO  --  1.2* 1.6  --  2.2  --   --   --   --   --   --   --    ALYMPAUTO  --  1.2 1.2  --  1.1  --   --   --   --   --   --   --    AMONOAUTO  --  0.4 0.4  --  0.4  --   --   --   --   --   --   --    AEOSAUTO  --  0.3 0.4  --  0.3  --   --   --   --   --   --   --    ABSBASO  --  0.0 0.0  --  0.0  --   --   --   --   --   --   --     < >  = values in this interval not displayed.     CMP  Recent Labs   Lab Test 05/30/23  1139 04/12/23  0817 01/12/23  0818 06/10/22  0816 05/16/22  1044 03/09/22  0821 02/14/22  1121 12/08/21  0816 09/10/21  0933 09/08/21  0806 06/07/21  0811 02/16/21  0842 01/06/21  1303     --   --   --  142  --   --   --  134  --   --  134  --    POTASSIUM 4.4  --   --   --  4.3  --   --   --  4.6  --   --  4.7  --    CHLORIDE 102  --   --   --  109  --   --   --  100  --   --  100  --    CO2 26  --   --   --  29  --   --   --  31  --   --  31  --    ANIONGAP 10  --   --   --  4  --   --   --  3  --   --  3  --    GLC 91  --   --   --  89  --   --   --  86  --   --  94  --    BUN 11.7  --   --   --  13  --   --   --  9  --   --  10  --    CR 0.79 0.81 0.79   < > 0.64   < > 0.79   < > 0.74   < > 0.76 0.70 0.65   GFRESTIMATED 80 77 80   < > >90   < > 80   < > 82   < > 80 88 >90   GFRESTBLACK  --   --   --   --   --   --   --   --   --   --  >90 >90 >90   ELIZABETH 9.7 9.8 9.2   < > 8.9   < > 9.5   < > 8.9   < > 8.7 10.0 9.7   BILITOTAL  --   --  0.4  --  0.4  --  0.5  --  0.6   < > 0.5  --  0.5   ALBUMIN  --  3.9 3.6  --  3.8  --  3.9  --  4.0   < > 3.8  --  4.1   PROTTOTAL  --   --  6.5*  --  6.6*  --  6.9  --  7.3   < > 6.7*  --  7.6   ALKPHOS  --   --  80  --  110  --  107  --  86   < > 79  --  82   AST  --   --  21  --  22  --  27  --  25   < > 22  --  22   ALT  --   --  21  --  22  --  23  --  25   < > 20  --  22    < > = values in this interval not displayed.     Calcium/VitaminD  Recent Labs   Lab Test 05/30/23  1139 04/12/23  0817 01/12/23  0818 03/09/22  0821 02/14/22  1121 12/08/21  0816 10/14/21  1546   ELIZABETH 9.7 9.8 9.2   < > 9.5   < >  --    VITDT  --   --  59  --  65  --  70    < > = values in this interval not displayed.     ESR/CRP  Recent Labs   Lab Test 01/12/23  0818 02/14/22  1121 06/07/21  0811   SED 6 6 5   CRP <2.9 <2.9 <2.9     Hepatitis B  Recent Labs   Lab Test 05/16/22  1044 06/30/17  0925 06/07/17  0955  09/30/15  0951   AUSAB  --   --  0.65  --    HBCAB  --   --  Reactive   A reactive result indicates acute, chronic or past/resolved hepatitis B   infection.  *  --    HBCM  --   --  Nonreactive   A nonreactive result suggests lack of recent exposure to the virus in the   preceding 6 months.    --    HEPBANG  --   --  Nonreactive Nonreactive   HBQLOG  --  Not Calculated  --   --    HBQRES Not Detected HBV DNA Not Detected   The LUIS ARMANDO AmpliPrep/LUIS ARMANDO TaqMan HBV Test is an FDA-approved in vitro nucleic   acid amplification test for the quantitation of HBV DNA in human plasma (EDTA   plasma) or serum using the LUIS ARMANDO AmpliPrep Instrument for automated viral   nucleic acid extraction and the XDC TaqMan Analyzer or XDC TaqMan for the   automated Real Time PCR amplification and detection of viral nucleic acid   target.   Titer results are reported in International Units/mL (IU/mL) using the 1st WHO   International standard for HBV for Nucleic Acid Amplification based assays.    --   --      Hepatitis C  Recent Labs   Lab Test 01/20/20  0741 06/07/17  0955 09/30/15  0951   HCVAB  --  Reactive   A reactive result indicates one of the following 1) current HCV infection 2)   past HCV infection that has resolved or 3) false positivity. The CDC recommends   that a reactive result should be followed by Nucleic acid testing for HCV RNA.  If HCV RNA is detected, that indicates current HCV infection. If HCV RNA is not   detected, that indicates either past, resolved HCV infection, or false HCV   antibody positivity.   Assay performance characteristics have not been established for newborns,   infants, and children  * Reactive   A reactive result indicates one of the following 1) current HCV infection 2)   past HCV infection that has resolved or 3) false positivity. The CDC recommends   that a reactive result should be followed by Nucleic acid testing for HCV RNA.  If HCV RNA is detected, that indicates current HCV infection. If  HCV RNA is not   detected, that indicates either past, resolved HCV infection, or false HCV   antibody positivity.   Assay performance characteristics have not been established for newborns,   infants, and children  *   HCVRNA HCV RNA Not Detected HCV RNA Not Detected   The LUIS ARMANDO AmpliPrep/LUIS ARMANDO TaqMan HCV Test is an FDA-approved in vitro nucleic   acid amplification test for the quantitation of HCV RNA in human plasma (ETDA   plasma) or serum using the LUIS ARMANDO AmpliPrep Instrument for automated viral   nucleic acid extraction and the LUIS ARMANDO TaqMan Analyzer or Cloudkick TaqMan for   automated Real Time PCR amplification and detection of the viral nucleic acid   target.   Titer results are reported in International Units/mL (IU/mL) using the 1st WHO   International standard for HCV for Nucleic Acid Amplification based assays.   [phone]     Lyme ab screening  Recent Labs   Lab Test 08/09/19  1228 05/11/17  0830 04/12/17  1211   LYMEGM 0.05 <0.01  Negative, Absence of detectable Borrelia burdorferi antibodies. A negative   result does not exclude the possibility of Borrelia burgdorferi infection. If   early Lyme disease is suspected, a second sample should be collected and tested   2 to 4 weeks later.   <0.01  Negative, Absence of detectable Borrelia burdorferi antibodies. A negative   result does not exclude the possibility of Borrelia burgdorferi infection. If   early Lyme disease is suspected, a second sample should be collected and tested   2 to 4 weeks later.         Tuberculosis Screening  Recent Labs   Lab Test 01/12/23  0818 10/14/21  1546 05/05/20  1322 09/30/15  0952   TBRES Negative Negative Negative  --    TBRSLT  --   --   --  Negative   TBAGN  --   --   --  0.05     Immunization History     Immunization History   Administered Date(s) Administered     COVID-19 Bivalent 12+ (Pfizer) 12/21/2022     COVID-19 Monovalent 18+ (Moderna) 03/10/2021, 04/07/2021, 09/03/2021     COVID-19 Monovalent Booster 18+ (Moderna)  03/02/2022, 07/07/2022     HEPA 04/18/2000, 09/26/2000     HPV 08/13/2012, 09/25/2012, 01/24/2013     HepB 11/15/2011, 12/19/2011     Hepatitis B, Adult 10/28/2022     Influenza (H1N1) 01/07/2010     Influenza (High Dose) 3 valent vaccine 08/30/2018, 09/26/2019     Influenza (IIV3) PF 01/03/2005, 10/16/2006, 11/14/2007, 10/28/2008, 09/29/2009, 09/27/2010, 10/04/2011, 09/25/2012, 09/21/2015     Influenza Vaccine 65+ (Fluzone HD) 09/14/2020, 09/24/2021, 09/29/2022     Influenza Vaccine >6 months (Alfuria,Fluzone) 09/26/2013, 10/06/2014, 10/06/2016, 09/08/2017     Pneumo Conj 13-V (2010&after) 10/06/2016     Pneumococcal 23 valent 11/15/2011, 10/17/2017     TD,PF 7+ (Tenivac) 04/18/2000, 07/07/2004     TDAP (Adacel,Boostrix) 05/23/2006     TDAP Vaccine (Boostrix) 09/21/2015     Zoster recombinant adjuvanted (SHINGRIX) 06/14/2018, 08/24/2018     Zoster vaccine, live 09/21/2015          Chart documentation done in part with Dragon Voice recognition Software. Although reviewed after completion, some word and grammatical error may remain.    Apolinar Cho MD

## 2023-06-05 NOTE — NURSING NOTE
Blood pressure rechecked after visit    144/84  Anabella Garcia CMA Rheumatology  6/5/2023

## 2023-06-08 ENCOUNTER — HOSPITAL ENCOUNTER (OUTPATIENT)
Dept: MRI IMAGING | Facility: CLINIC | Age: 73
Discharge: HOME | End: 2023-06-08
Attending: FAMILY MEDICINE | Admitting: FAMILY MEDICINE
Payer: COMMERCIAL

## 2023-06-08 ENCOUNTER — TELEPHONE (OUTPATIENT)
Dept: RHEUMATOLOGY | Facility: CLINIC | Age: 73
End: 2023-06-08
Payer: COMMERCIAL

## 2023-06-08 DIAGNOSIS — R93.89 ABNORMAL CHEST CT: ICD-10-CM

## 2023-06-08 PROCEDURE — 255N000002 HC RX 255 OP 636: Performed by: FAMILY MEDICINE

## 2023-06-08 PROCEDURE — 71552 MRI CHEST W/O & W/DYE: CPT

## 2023-06-08 PROCEDURE — A9585 GADOBUTROL INJECTION: HCPCS | Performed by: FAMILY MEDICINE

## 2023-06-08 RX ORDER — GADOBUTROL 604.72 MG/ML
7.5 INJECTION INTRAVENOUS ONCE
Status: COMPLETED | OUTPATIENT
Start: 2023-06-08 | End: 2023-06-08

## 2023-06-08 RX ADMIN — GADOBUTROL 6.5 ML: 604.72 INJECTION INTRAVENOUS at 09:38

## 2023-06-08 NOTE — TELEPHONE ENCOUNTER
PA Initiation    Medication: ORENCIA CLICKJECT 125 MG/ML SC SOAJ  Insurance Company: Medicare Blue RX - Phone [phone] Fax [phone]  Pharmacy Filling the Rx: [facility] - [place], [place] - [facility] [address]  Filling Pharmacy Phone: [phone]  Filling Pharmacy Fax: [phone]  Start Date: 6/8/2023    JIMMY RAMESH ([place]uchanan: [id number])

## 2023-06-08 NOTE — TELEPHONE ENCOUNTER
PA Needed    Medication: ORENCIA CLICKLET 125MG/ML  QTY/DS: 4 FOR 28 DAYS  NEW INS:   Insurance Company: Medicare Blue RX - Phone [phone] Fax [phone]  Pharmacy Filling the Rx: [facility] - [place], [place] - [address]  PA : AN/A  Date of last fill: N/A

## 2023-06-09 NOTE — TELEPHONE ENCOUNTER
Prior Authorization Approval    Medication: ORENCIA CLICKJECT 125 MG/ML SC SOAJ  Authorization Effective Date: 3/11/2023  Authorization Expiration Date: 6/9/2024  Approved Dose/Quantity: 4 / 28  Reference #: JIMMY RAMESH ([place]anan: [id number])   Insurance Company: Medicare Blue RX - Phone [phone] Fax [phone]  Expected CoPay: $60     CoPay Card Available: No    Financial Assistance Needed:   Which Pharmacy is filling the prescription: [facility] [address] [facility]  Pharmacy Notified: Yes  Patient Notified: Yes        Thank You,     Rosa Arguelles [facility]  Specialty Pharmacy Clinic [facility] Special[facility]  [email or web address]  Phone: [phone]  Fax: [phone]

## 2023-06-12 DIAGNOSIS — R76.8 HEPATITIS B CORE ANTIBODY POSITIVE: ICD-10-CM

## 2023-06-12 DIAGNOSIS — R93.89 ABNORMAL CHEST CT: Primary | ICD-10-CM

## 2023-06-12 RX ORDER — TENOFOVIR DISOPROXIL FUMARATE 300 MG/1
300 TABLET, FILM COATED ORAL DAILY
Qty: 90 TABLET | Refills: 0 | Status: SHIPPED | OUTPATIENT
Start: 2023-06-12 | End: 2023-09-05

## 2023-06-21 ENCOUNTER — IMMUNIZATION (OUTPATIENT)
Dept: FAMILY MEDICINE | Facility: CLINIC | Age: 73
End: 2023-06-21
Payer: COMMERCIAL

## 2023-06-21 PROCEDURE — 0134A COVID-19 BIVALENT 18+ (MODERNA): CPT

## 2023-06-21 PROCEDURE — 91313 COVID-19 BIVALENT 18+ (MODERNA): CPT

## 2023-06-23 ENCOUNTER — OFFICE VISIT (OUTPATIENT)
Dept: FAMILY MEDICINE | Facility: OTHER | Age: 73
End: 2023-06-23
Payer: COMMERCIAL

## 2023-06-23 ENCOUNTER — PATIENT OUTREACH (OUTPATIENT)
Dept: ONCOLOGY | Facility: CLINIC | Age: 73
End: 2023-06-23

## 2023-06-23 VITALS
HEART RATE: 62 BPM | DIASTOLIC BLOOD PRESSURE: 74 MMHG | OXYGEN SATURATION: 99 % | WEIGHT: 142 LBS | BODY MASS INDEX: 25.16 KG/M2 | TEMPERATURE: 98 F | SYSTOLIC BLOOD PRESSURE: 106 MMHG | HEIGHT: 63 IN | RESPIRATION RATE: 17 BRPM

## 2023-06-23 DIAGNOSIS — J98.59 MEDIASTINAL MASS: Primary | ICD-10-CM

## 2023-06-23 PROCEDURE — 99213 OFFICE O/P EST LOW 20 MIN: CPT | Performed by: FAMILY MEDICINE

## 2023-06-23 ASSESSMENT — ENCOUNTER SYMPTOMS
PALPITATIONS: 0
ARTHRALGIAS: 1
FREQUENCY: 0
HEARTBURN: 1
COUGH: 0
MYALGIAS: 0
HEMATURIA: 0
DIZZINESS: 0
NERVOUS/ANXIOUS: 0
FEVER: 0
DIARRHEA: 0
JOINT SWELLING: 1
WEAKNESS: 1
BREAST MASS: 0
DYSURIA: 0
NAUSEA: 0
CHILLS: 0
SORE THROAT: 0
CONSTIPATION: 1
ABDOMINAL PAIN: 0
HEMATOCHEZIA: 1
HEADACHES: 0
SHORTNESS OF BREATH: 0
PARESTHESIAS: 1
EYE PAIN: 0

## 2023-06-23 ASSESSMENT — PATIENT HEALTH QUESTIONNAIRE - PHQ9: SUM OF ALL RESPONSES TO PHQ QUESTIONS 1-9: 9

## 2023-06-23 ASSESSMENT — ANXIETY QUESTIONNAIRES
3. WORRYING TOO MUCH ABOUT DIFFERENT THINGS: SEVERAL DAYS
IF YOU CHECKED OFF ANY PROBLEMS ON THIS QUESTIONNAIRE, HOW DIFFICULT HAVE THESE PROBLEMS MADE IT FOR YOU TO DO YOUR WORK, TAKE CARE OF THINGS AT HOME, OR GET ALONG WITH OTHER PEOPLE: SOMEWHAT DIFFICULT
6. BECOMING EASILY ANNOYED OR IRRITABLE: SEVERAL DAYS
4. TROUBLE RELAXING: NOT AT ALL
7. FEELING AFRAID AS IF SOMETHING AWFUL MIGHT HAPPEN: NOT AT ALL
GAD7 TOTAL SCORE: 2
GAD7 TOTAL SCORE: 2
5. BEING SO RESTLESS THAT IT IS HARD TO SIT STILL: NOT AT ALL
1. FEELING NERVOUS, ANXIOUS, OR ON EDGE: NOT AT ALL
2. NOT BEING ABLE TO STOP OR CONTROL WORRYING: NOT AT ALL

## 2023-06-23 ASSESSMENT — ACTIVITIES OF DAILY LIVING (ADL): CURRENT_FUNCTION: HOUSEWORK REQUIRES ASSISTANCE

## 2023-06-23 ASSESSMENT — PAIN SCALES - GENERAL: PAINLEVEL: SEVERE PAIN (6)

## 2023-06-23 NOTE — PROGRESS NOTES
"  Assessment & Plan     Mediastinal mass  Patient has anterior mediastinal mass which was found incidentally.  There is a family history of lymphoma.  Overall imaging does not show a certain diagnosis.  We discussed referral to specialist to discuss whether serial imaging or other evaluation is necessary.    - Thoracic Surgery  Referral; Future    MD JACINTA Ramirez [facility] JEREMÍAS Scherer is a 72 year old, presenting for the following health issues:  Rib Pain and Rheumatoid Arthritis        6/23/2023     8:24 AM   Additional Questions   Roomed by Kristina WORKMAN     Healthy Habits:     In general, how would you rate your overall health?  Good    Frequency of exercise:  6-7 days/week    Duration of exercise:  Greater than 60 minutes    Do you usually eat at least 4 servings of fruit and vegetables a day, include whole grains    & fiber and avoid regularly eating high fat or \"junk\" foods?  Yes    Taking medications regularly:  Yes    Medication side effects:  Other    Ability to successfully perform activities of daily living:  Housework requires assistance    Home Safety:  Lack of grab bars in the bathroom    Hearing Impairment:  Difficulty following a conversation in a noisy restaurant or crowded room and difficulty understanding soft or whispered speech    In the past 6 months, have you been bothered by leaking of urine?  No    In general, how would you rate your overall mental or emotional health?  Good      PHQ-2 Total Score: 1    Additional concerns today:  Yes       Pain History:  When did you first notice your pain? Est 2017  Have you seen this provider for your pain in the past?   Yes   Where in your body do you have pain? hands  Are you seeing anyone else for your pain? Yes - .  Medication changed to Orencia, has only had it once so far.  Pain is mainly in the hands and wrists, can migrate up her arms, affecting her sleep.  Sleeps for a couple hours at a " "time.  This is making her fatigue worse and makes it hard to do activities during the day.  Melatonin helps her fall asleep but doesn't sustain it.  Takes Aleve for the pain.  Rheumatology gave her diclofenac gel, but hasn't tried it.    Also has constipation, then after several days will have bowel movement with bright red blood.  Uses Miralax, Senna and fiber as well as lots of water.    Having lower back pain which is new, not as bad as her hands.      During her evaluation for her recent rib fracture she was found to have an anterior mediastinal mass on CT.  It was indeterminate 1.9 x 1.1 cm.  An MRI of the chest was then done and appeared to be somewhat elongated and lying anterior to the ascending thoracic aorta and deep to the sternum.  Was felt to be stable in size without convincing enhancement.  This thought it could be complex cystic such as a thymic cyst and radiologist recommended surveillance CT in 6 months.  Patient states that her father had lymphoma and she is concerned about possible malignant properties of this mass.        6/30/2022     9:57 AM 10/28/2022    10:04 AM 5/30/2023    11:10 AM   PHQ-9 SCORE   PHQ-9 Total Score MyChart 6 (Mild depression) 6 (Mild depression) 4 (Minimal depression)   PHQ-9 Total Score 6    6 6 4                 10/28/2022    10:04 AM 5/30/2023    11:10 AM 6/23/2023     8:36 AM   PHQ-9 SCORE   PHQ-9 Total Score MyChart 6 (Mild depression) 4 (Minimal depression)    PHQ-9 Total Score 6 4 9         4/30/2021     8:04 AM 5/21/2021    11:04 AM 6/23/2023     8:36 AM   ALFREDO-7 SCORE   Total Score 13 (moderate anxiety) 14 (moderate anxiety)    Total Score 13 14 2         6/23/2023     8:35 AM   PEG Score   PEG Total Score 8             Objective    /74 (Cuff Size: Adult Regular)   Pulse 62   Temp 98  F (36.7  C) (Oral)   Resp 17   Ht 1.605 m (5' 3.19\")   Wt 64.4 kg (142 lb)   LMP 11/27/2003   SpO2 99%   BMI 25.00 kg/m    Body mass index is 25 kg/m .  Physical Exam "   Gen: no apparent distress  Psych: Alert and oriented times 3; coherent speech, normal   rate and volume, able to articulate logical thoughts, able   to abstract reason, no tangential thoughts, no hallucinations   or delusions  Her affect is neutral

## 2023-06-23 NOTE — PROGRESS NOTES
New Patient Oncology Nurse Navigator Note     Referring provider: Dr. Tanika Clark    Referring Clinic/Organization: [facility]  Referred to: Thoracic Surgery  Requested provider (if applicable): First available - did not specify   Referral Received: 06/23/23       Evaluation for :   Diagnosis   J98.59 (ICD-10-CM) - Mediastinal mass     Clinical History (per Nurse review of records provided):      05/12/2023 CT Chest Abdomen Pelvis w/o contrast (bookmarked) showed:   FINDINGS:   LUNGS AND PLEURA: No infiltrate, pleural effusion, pulmonary contusion  or pneumothorax. A few small opacified granuloma. No suspicious  nodules or masses.     MEDIASTINUM/AXILLAE: Small nodule in the anterior mediastinum  measuring 1.9 x 1.1 cm, indeterminate. This could represent a nodule  in the thymus or enlarged lymph node. Other otherwise, no  lymphadenopathy. No thoracic aortic aneurysm. Evaluation of thoracic  aorta lumen is not possible in the absence of intravenous contrast.  Severe coronary artery calcifications. No pericardial effusion. Small  hiatal hernia.     HEPATOBILIARY: Few hepatic cysts. No definite evidence for traumatic  injury in the liver on noncontrast CT. No calcified gallstones or  biliary ductal dilatation.     PANCREAS: Normal.     SPLEEN: Normal.     ADRENAL GLANDS: Normal.     KIDNEYS/BLADDER: Normal.     BOWEL: Diverticulosis in the colon. No acute inflammatory change. No  obstruction. Normal appendix.     LYMPH NODES: No lymphadenopathy.     VASCULATURE: No abdominal aortic aneurysm. Evaluation for aortic  injury is limited on noncontrast CT.     PELVIC ORGANS: No pelvic masses.     OTHER: No ascites or abscess. No extraluminal air.     MUSCULOSKELETAL: Mild linear sclerosis in the anterolateral right  third and fourth ribs could represent subacute nondisplaced fractures  (series 3, image 56 and 71). Age-indeterminate but likely acute or  subacute nondisplaced fracture of the lateral right fifth,  sixth,  seventh, eighth, ninth and 10th ribs. Old healed fracture deformity of  the left anterior third rib with callus formation. No suspicious  lesions in the bones.                                                                      IMPRESSION:  1.  Age indeterminate, possibly acute/subacute nondisplaced fractures  of the right 5th to 10th ribs and possibly also of the right third and  fourth ribs.  2.  Otherwise, no acute or traumatic findings in the chest, abdomen  and pelvis on noncontrast CT.  3.  Indeterminate 1.9 x 1.1 cm nodule in the anterior mediastinum,  which could either represent a thymic mass or mediastinal lymph node.  Nonemergent further assessment with chest MRI or follow-up chest CT in  about 6-8 weeks can be considered.     QUEENIE QUINN MD       06/08/2023 MR CHest w/o & w/ contrast (bookmarked) showed:   IMPRESSION:   1.  Anterior mediastinal nodular lesion appears somewhat elongated and  lies anterior to the ascending thoracic aorta and deep to the sternum.  It is stable in size, and does not show convincing enhancement. It has  mildly increased T1 signal. It could be complex cystic, perhaps a  thymic cyst. It is technically indeterminant. As such, recommend  surveillance CT in six months to ensure stability.      Clinical Assessment / Barriers to Care (Per Nurse):   Tobacco History    Smoking Status   Former Smoking Start Date   11/1/1968 Quit Date   11/1/1978 Smoking Frequency   0.75 packs/day for 10.00 years (7.50 pk-yrs)     Records Location: [facility]   Records Needed: None  Additional testing needed prior to consult: PFTs    CARMENZA BonillaN, RN, OCN  [facility] Oncology Nurse Navigator  [phone] / [phone]

## 2023-07-03 ENCOUNTER — PHARMACY VISIT (OUTPATIENT)
Dept: PHARMACY | Facility: CLINIC | Age: 73
End: 2023-07-03
Payer: COMMERCIAL

## 2023-07-03 NOTE — PROGRESS NOTES
"Rheumatoid Arthritis Clinical Follow Up Assessment     Activity Medications    ORENCIA     Care Details    What are the patient's goals for this therapy?   ? Reduce pain      Is patient meeting treatment goals?   ? Not appropriate to assess at this time      Based on the patient's report or refill record over the last 6-12 months, does the patient appear to be taking less than 80% of their medication? [QA Metric]   ? No      Summary Notes  Spoke with Niesha for assessment. Current Regimen: Orencia 125mg every week, hydroxychloroquine daily     Med/Allergy Changes: None     Adherence: No concerns. Has had 3 doses so far. Knows how to refill.     Tolerability: Denies SE/ISR. Was having ISR with Enbrel so is relieved to not have issues so far with Orencia. Feels like she has been more fatigued lately, feeling like her muscles dont have as much energy lately. She thinks it is likely the weather since \"I have never dealt well with heat.\" Will continue to monitor. Also have some soreness in her lower back. Does not sound like a SE from Orencia but will continue to check in with her.     RA: Spent most of the time discussing possible SE as noted above. Baseline RAPID-3=12.832.     Follow up: 1 month    Jocelyn Jimenez PharmD  Therapy Management Pharmacist  [facility]            "

## 2023-07-07 NOTE — TELEPHONE ENCOUNTER
RECORDS STATUS - ALL OTHER DIAGNOSIS      RECORDS RECEIVED FROM: Epic   DATE RECEIVED:    NOTES STATUS DETAILS   OFFICE NOTE from referring provider Epic 06/23/23: Dr. Tanika Clark   DISCHARGE SUMMARY from hospital [facility] 05/12/23: [facility] ED   MEDICATION LIST [facility]    LABS     PATHOLOGY REPORTS     ANYTHING RELATED TO DIAGNOSIS Epic Most recent 05/30/23   IMAGING (NEED IMAGES & REPORT)     CT SCANS PACS 05/12/23: CT CAP   MRI PACS 06/08/23: MR Chest   XRAYS PACS 05/05/20: XR Chest

## 2023-07-19 ENCOUNTER — ONCOLOGY VISIT (OUTPATIENT)
Dept: SURGERY | Facility: CLINIC | Age: 73
End: 2023-07-19
Attending: FAMILY MEDICINE
Payer: COMMERCIAL

## 2023-07-19 ENCOUNTER — PRE VISIT (OUTPATIENT)
Dept: SURGERY | Facility: CLINIC | Age: 73
End: 2023-07-19

## 2023-07-19 VITALS
HEART RATE: 62 BPM | RESPIRATION RATE: 16 BRPM | SYSTOLIC BLOOD PRESSURE: 146 MMHG | TEMPERATURE: 98.3 F | DIASTOLIC BLOOD PRESSURE: 78 MMHG | HEIGHT: 63 IN | OXYGEN SATURATION: 100 % | BODY MASS INDEX: 24.79 KG/M2 | WEIGHT: 139.9 LBS

## 2023-07-19 DIAGNOSIS — J98.59 MEDIASTINAL MASS: ICD-10-CM

## 2023-07-19 PROCEDURE — G0463 HOSPITAL OUTPT CLINIC VISIT: HCPCS | Performed by: THORACIC SURGERY (CARDIOTHORACIC VASCULAR SURGERY)

## 2023-07-19 PROCEDURE — 94375 RESPIRATORY FLOW VOLUME LOOP: CPT | Performed by: INTERNAL MEDICINE

## 2023-07-19 PROCEDURE — 94726 PLETHYSMOGRAPHY LUNG VOLUMES: CPT | Performed by: INTERNAL MEDICINE

## 2023-07-19 PROCEDURE — 99204 OFFICE O/P NEW MOD 45 MIN: CPT | Performed by: THORACIC SURGERY (CARDIOTHORACIC VASCULAR SURGERY)

## 2023-07-19 PROCEDURE — 94729 DIFFUSING CAPACITY: CPT | Performed by: INTERNAL MEDICINE

## 2023-07-19 ASSESSMENT — PAIN SCALES - GENERAL: PAINLEVEL: MILD PAIN (3)
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
## 2023-07-19 NOTE — NURSING NOTE
"Oncology Rooming Note    July 19, 2023 8:49 AM   Niesha Adhikari is a 72 year old female who presents for:    Chief Complaint   Patient presents with     Oncology Clinic Visit     [facility]     Initial Vitals: Blood Pressure (Abnormal) 146/78   Pulse 62   Temperature 98.3  F (36.8  C) (Oral)   Respiration 16   Height 1.6 m (5' 3\")   Weight 63.5 kg (139 lb 14.4 oz)   Last Menstrual Period 11/27/2003   Oxygen Saturation 100%   Body Mass Index 24.78 kg/m   Estimated body mass index is 24.78 kg/m  as calculated from the following:    Height as of this encounter: 1.6 m (5' 3\").    Weight as of this encounter: 63.5 kg (139 lb 14.4 oz). Body surface area is 1.68 meters squared.  Mild Pain (3) Comment: Data Unavailable   Patient's last menstrual period was 11/27/2003.  Allergies reviewed: Yes  Medications reviewed: Yes    Medications: Medication refills not needed today.  Pharmacy name entered into [facility]:    [facility] [place] [address] [facility] - [place], [place] - [address]    Clinical concerns: none       Dianna Hancock MA            "

## 2023-07-20 LAB
DLCOUNC-%PRED-PRE: 111 %
DLCOUNC-PRE: 20.78 ML/MIN/MMHG
DLCOUNC-PRED: 18.67 ML/MIN/MMHG
ERV-%PRED-PRE: 162 %
ERV-PRE: 1.13 L
ERV-PRED: 0.7 L
EXPTIME-PRE: 8.79 SEC
FEF2575-%PRED-PRE: 88 %
FEF2575-PRE: 1.53 L/SEC
FEF2575-PRED: 1.72 L/SEC
FEFMAX-%PRED-PRE: 116 %
FEFMAX-PRE: 6.39 L/SEC
FEFMAX-PRED: 5.49 L/SEC
FEV1-%PRED-PRE: 120 %
FEV1-PRE: 2.4 L
FEV1FEV6-PRE: 71 %
FEV1FEV6-PRED: 79 %
FEV1FVC-PRE: 70 %
FEV1FVC-PRED: 79 %
FEV1SVC-PRE: 72 %
FEV1SVC-PRED: 69 %
FIFMAX-PRE: 4.63 L/SEC
FRCPLETH-%PRED-PRE: 110 %
FRCPLETH-PRE: 3.01 L
FRCPLETH-PRED: 2.73 L
FVC-%PRED-PRE: 134 %
FVC-PRE: 3.43 L
FVC-PRED: 2.55 L
IC-%PRED-PRE: 103 %
IC-PRE: 2.22 L
IC-PRED: 2.16 L
RVPLETH-%PRED-PRE: 97 %
RVPLETH-PRE: 1.88 L
RVPLETH-PRED: 1.93 L
TLCPLETH-%PRED-PRE: 107 %
TLCPLETH-PRE: 5.23 L
TLCPLETH-PRED: 4.89 L
VA-%PRED-PRE: 114 %
VA-PRE: 5.11 L
VC-%PRED-PRE: 115 %
VC-PRE: 3.35 L
VC-PRED: 2.91 L

## 2023-07-25 DIAGNOSIS — J98.59 MEDIASTINAL MASS: Primary | ICD-10-CM

## 2023-08-08 ENCOUNTER — PHARMACY VISIT (OUTPATIENT)
Dept: PHARMACY | Facility: CLINIC | Age: 73
End: 2023-08-08
Payer: COMMERCIAL

## 2023-08-08 NOTE — PROGRESS NOTES
"Rheumatoid Arthritis Clinical Follow Up Assessment      Activity Medications    ORENCIA    Summary Notes  Spoke with Niesha for assessment. Current Regimen: Orencia 125mg every week, hydroxychloroquine daily     Med/Allergy Changes: None     Adherence: No concerns. Refilled on time.     Tolerability: \"Not having any skin reaction (ISR) at all.\" No SE from Orencia.     RA: \"Not too bad.\" Maybe starting to see some changes. Slightly less pain. Used to be \"impossible pain overnight in hands, fingers, wrists.\" Now she is still having pain but it is not as extreme.     Follow up: Quarterly     Jocelyn Jimenez, RebeccaD  Therapy Management Pharmacist  [facility]         "

## 2023-08-17 DIAGNOSIS — J30.2 OTHER SEASONAL ALLERGIC RHINITIS: ICD-10-CM

## 2023-08-18 RX ORDER — MONTELUKAST SODIUM 10 MG/1
TABLET ORAL
Qty: 90 TABLET | Refills: 3 | Status: SHIPPED | OUTPATIENT
Start: 2023-08-18 | End: 2024-07-26

## 2023-08-19 ENCOUNTER — HEALTH MAINTENANCE LETTER (OUTPATIENT)
Age: 73
End: 2023-08-19

## 2023-09-05 DIAGNOSIS — R76.8 HEPATITIS B CORE ANTIBODY POSITIVE: Primary | ICD-10-CM

## 2023-09-05 RX ORDER — TENOFOVIR DISOPROXIL FUMARATE 300 MG/1
300 TABLET, FILM COATED ORAL DAILY
Qty: 30 TABLET | Refills: 0 | Status: SHIPPED | OUTPATIENT
Start: 2023-09-05 | End: 2023-09-11

## 2023-09-06 ENCOUNTER — LAB (OUTPATIENT)
Dept: LAB | Facility: OTHER | Age: 73
End: 2023-09-06
Payer: COMMERCIAL

## 2023-09-06 DIAGNOSIS — M06.09 RHEUMATOID ARTHRITIS OF MULTIPLE SITES WITH NEGATIVE RHEUMATOID FACTOR (H): ICD-10-CM

## 2023-09-06 DIAGNOSIS — R76.8 HEPATITIS B CORE ANTIBODY POSITIVE: ICD-10-CM

## 2023-09-06 LAB
ALBUMIN SERPL BCG-MCNC: 4.2 G/DL (ref 3.5–5.2)
ALP SERPL-CCNC: 78 U/L (ref 35–104)
ALT SERPL W P-5'-P-CCNC: 14 U/L (ref 0–50)
ANION GAP SERPL CALCULATED.3IONS-SCNC: 11 MMOL/L (ref 7–15)
AST SERPL W P-5'-P-CCNC: 27 U/L (ref 0–45)
BASOPHILS # BLD AUTO: 0 10E3/UL (ref 0–0.2)
BASOPHILS NFR BLD AUTO: 1 %
BILIRUB DIRECT SERPL-MCNC: <0.2 MG/DL (ref 0–0.3)
BILIRUB SERPL-MCNC: 0.3 MG/DL
BUN SERPL-MCNC: 10.4 MG/DL (ref 8–23)
CALCIUM SERPL-MCNC: 9.7 MG/DL (ref 8.8–10.2)
CHLORIDE SERPL-SCNC: 100 MMOL/L (ref 98–107)
CREAT SERPL-MCNC: 0.8 MG/DL (ref 0.51–0.95)
CRP SERPL-MCNC: <3 MG/L
DEPRECATED HCO3 PLAS-SCNC: 28 MMOL/L (ref 22–29)
EGFRCR SERPLBLD CKD-EPI 2021: 78 ML/MIN/1.73M2
EOSINOPHIL # BLD AUTO: 0.2 10E3/UL (ref 0–0.7)
EOSINOPHIL NFR BLD AUTO: 8 %
ERYTHROCYTE [DISTWIDTH] IN BLOOD BY AUTOMATED COUNT: 14.4 % (ref 10–15)
ERYTHROCYTE [SEDIMENTATION RATE] IN BLOOD BY WESTERGREN METHOD: 7 MM/HR (ref 0–30)
GLUCOSE SERPL-MCNC: 83 MG/DL (ref 70–99)
HCT VFR BLD AUTO: 35.5 % (ref 35–47)
HGB BLD-MCNC: 10.9 G/DL (ref 11.7–15.7)
IMM GRANULOCYTES # BLD: 0 10E3/UL
IMM GRANULOCYTES NFR BLD: 0 %
INR PPP: 1.2 (ref 0.85–1.15)
LYMPHOCYTES # BLD AUTO: 1.1 10E3/UL (ref 0.8–5.3)
LYMPHOCYTES NFR BLD AUTO: 35 %
MCH RBC QN AUTO: 25.1 PG (ref 26.5–33)
MCHC RBC AUTO-ENTMCNC: 30.7 G/DL (ref 31.5–36.5)
MCV RBC AUTO: 82 FL (ref 78–100)
MONOCYTES # BLD AUTO: 0.4 10E3/UL (ref 0–1.3)
MONOCYTES NFR BLD AUTO: 11 %
NEUTROPHILS # BLD AUTO: 1.4 10E3/UL (ref 1.6–8.3)
NEUTROPHILS NFR BLD AUTO: 46 %
PLATELET # BLD AUTO: 155 10E3/UL (ref 150–450)
POTASSIUM SERPL-SCNC: 4.7 MMOL/L (ref 3.4–5.3)
PROT SERPL-MCNC: 6.5 G/DL (ref 6.4–8.3)
RBC # BLD AUTO: 4.35 10E6/UL (ref 3.8–5.2)
SODIUM SERPL-SCNC: 139 MMOL/L (ref 136–145)
WBC # BLD AUTO: 3.1 10E3/UL (ref 4–11)

## 2023-09-06 PROCEDURE — 85652 RBC SED RATE AUTOMATED: CPT

## 2023-09-06 PROCEDURE — 80053 COMPREHEN METABOLIC PANEL: CPT

## 2023-09-06 PROCEDURE — 85610 PROTHROMBIN TIME: CPT

## 2023-09-06 PROCEDURE — 87517 HEPATITIS B DNA QUANT: CPT

## 2023-09-06 PROCEDURE — 36415 COLL VENOUS BLD VENIPUNCTURE: CPT

## 2023-09-06 PROCEDURE — 82248 BILIRUBIN DIRECT: CPT

## 2023-09-06 PROCEDURE — 85025 COMPLETE CBC W/AUTO DIFF WBC: CPT

## 2023-09-06 PROCEDURE — 86140 C-REACTIVE PROTEIN: CPT

## 2023-09-07 LAB — HBV DNA SERPL NAA+PROBE-ACNC: NOT DETECTED IU/ML

## 2023-09-11 ENCOUNTER — OFFICE VISIT (OUTPATIENT)
Dept: GASTROENTEROLOGY | Facility: CLINIC | Age: 73
End: 2023-09-11
Attending: PHYSICIAN ASSISTANT
Payer: COMMERCIAL

## 2023-09-11 VITALS
BODY MASS INDEX: 25.03 KG/M2 | WEIGHT: 141.3 LBS | TEMPERATURE: 98.3 F | OXYGEN SATURATION: 100 % | HEART RATE: 65 BPM | SYSTOLIC BLOOD PRESSURE: 144 MMHG | DIASTOLIC BLOOD PRESSURE: 81 MMHG

## 2023-09-11 DIAGNOSIS — Z79.899 HIGH RISK MEDICATION USE: ICD-10-CM

## 2023-09-11 DIAGNOSIS — R76.8 HEPATITIS B CORE ANTIBODY POSITIVE: ICD-10-CM

## 2023-09-11 DIAGNOSIS — M80.00XD OSTEOPOROSIS WITH CURRENT PATHOLOGICAL FRACTURE WITH ROUTINE HEALING, UNSPECIFIED OSTEOPOROSIS TYPE, SUBSEQUENT ENCOUNTER: Primary | ICD-10-CM

## 2023-09-11 PROCEDURE — G0463 HOSPITAL OUTPT CLINIC VISIT: HCPCS | Performed by: PHYSICIAN ASSISTANT

## 2023-09-11 PROCEDURE — 99214 OFFICE O/P EST MOD 30 MIN: CPT | Performed by: PHYSICIAN ASSISTANT

## 2023-09-11 ASSESSMENT — PAIN SCALES - GENERAL: PAINLEVEL: NO PAIN (0)
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
## 2023-09-11 NOTE — NURSING NOTE
Chief Complaint   Patient presents with    RECHECK       BP (!) 144/81   Pulse 65   Temp 98.3  F (36.8  C) (Oral)   Wt 64.1 kg (141 lb 4.8 oz)   LMP 11/27/2003   SpO2 100%   BMI 25.03 kg/m      Myke Garcia on 9/11/2023 at 11:21 AM

## 2023-09-12 ENCOUNTER — TELEPHONE (OUTPATIENT)
Dept: GASTROENTEROLOGY | Facility: CLINIC | Age: 73
End: 2023-09-12
Payer: COMMERCIAL

## 2023-09-12 NOTE — TELEPHONE ENCOUNTER
Returned call to Maribeth at [facility] and confirmed diagnosis for the Vemlidy medication.    Skylar MARMOLEJO LPN  Hepatology Clinic     [facility]    Phone Message    May a detailed message be left on voicemail: yes     Reason for Call: Other: Maribeth at [facility] called in regard to prior auth for RX: tenofovir alafenamide fumarate (VEMLIDY) 25 MG tablet.  She needs to know the DX for patient.  Please call [phone], option 3, and use reference #[id number].     Action Taken: Message routed to:  [facility]    Travel Screening: Not Applicable

## 2023-09-18 ENCOUNTER — OFFICE VISIT (OUTPATIENT)
Dept: RHEUMATOLOGY | Facility: CLINIC | Age: 73
End: 2023-09-18
Payer: COMMERCIAL

## 2023-09-18 VITALS
RESPIRATION RATE: 16 BRPM | DIASTOLIC BLOOD PRESSURE: 75 MMHG | WEIGHT: 142 LBS | HEIGHT: 63 IN | BODY MASS INDEX: 25.16 KG/M2 | HEART RATE: 68 BPM | OXYGEN SATURATION: 99 % | SYSTOLIC BLOOD PRESSURE: 134 MMHG

## 2023-09-18 DIAGNOSIS — M81.0 AGE-RELATED OSTEOPOROSIS WITHOUT CURRENT PATHOLOGICAL FRACTURE: ICD-10-CM

## 2023-09-18 DIAGNOSIS — M54.50 CHRONIC BILATERAL LOW BACK PAIN WITHOUT SCIATICA: ICD-10-CM

## 2023-09-18 DIAGNOSIS — G89.29 CHRONIC BILATERAL LOW BACK PAIN WITHOUT SCIATICA: ICD-10-CM

## 2023-09-18 DIAGNOSIS — E55.9 VITAMIN D DEFICIENCY: ICD-10-CM

## 2023-09-18 DIAGNOSIS — M81.8 OTHER OSTEOPOROSIS, UNSPECIFIED PATHOLOGICAL FRACTURE PRESENCE: ICD-10-CM

## 2023-09-18 DIAGNOSIS — M06.09 RHEUMATOID ARTHRITIS OF MULTIPLE SITES WITH NEGATIVE RHEUMATOID FACTOR (H): Primary | ICD-10-CM

## 2023-09-18 DIAGNOSIS — Z92.89 PERSONAL HISTORY OF OTHER MEDICAL TREATMENT: ICD-10-CM

## 2023-09-18 PROCEDURE — 99214 OFFICE O/P EST MOD 30 MIN: CPT | Performed by: INTERNAL MEDICINE

## 2023-09-18 RX ORDER — ERGOCALCIFEROL 1.25 MG/1
CAPSULE, LIQUID FILLED ORAL
Qty: 24 CAPSULE | Refills: 2 | Status: SHIPPED | OUTPATIENT
Start: 2023-09-18 | End: 2024-01-22

## 2023-09-18 RX ORDER — HYDROXYCHLOROQUINE SULFATE 200 MG/1
TABLET, FILM COATED ORAL
Qty: 135 TABLET | Refills: 2 | Status: SHIPPED | OUTPATIENT
Start: 2023-09-18 | End: 2024-04-08

## 2023-09-18 RX ORDER — ABATACEPT 125 MG/ML
125 INJECTION, SOLUTION SUBCUTANEOUS
Qty: 4 ML | Refills: 3 | Status: SHIPPED | OUTPATIENT
Start: 2023-09-18 | End: 2024-01-22

## 2023-09-18 ASSESSMENT — PAIN SCALES - GENERAL: PAINLEVEL: SEVERE PAIN (7)

## 2023-09-18 NOTE — PATIENT INSTRUCTIONS
Wear the carpal tunnel splints at night and if still having the diffuse hand pain then please consider seeing orthopedic surgery again to consider surgery for carpal tunnel syndrome

## 2023-09-18 NOTE — NURSING NOTE
RAPID3 (0-30) Cumulative Score  15.2          RAPID3 Weighted Score (divide #4 by 3 and that is the weighted score)  5.0

## 2023-09-18 NOTE — PROGRESS NOTES
Rheumatology Clinic Visit      Niesha Adhikari MRN# [id number]   YOB: 1950 Age: 73 year old      Date of visit: 9/18/23   PCP: Dr. Tanika Clark  Hepatologist: Dr. Peres at [facility] in [place]  Ophthalmology: Dr. Higgins, [facility]    Chief Complaint   Patient presents with:  RECHECK: RA is getting worse. Left elbow and lower back very painful    Assessment and Plan     1. Rheumatoid arthritis: Hepatitis C related arthralgias versus rheumatoid arthritis (RF and CCP negative); hepatitis C has since been cleared. Initially with symmetric synovitis on exam and morning stiffness for more than one hour. NSAIDs and Tylenol associated with rash.  She did well with hydroxychloroquine 400 mg daily for a while but more arthritis symptoms so SSZ was added but associated with cytopenia and GI upset so that was stopped.  Avoiding MTX, leflunomide because of hepatitis C hx and +HBV core.  Preferentially avoid Jakinibs because of hepatitis C history and +HBV core. She has established with gastroenterology and is on tenofovir for hepatitis B reactivation prophylaxis.  Previously on Humira (5/8/2020-2/2023).  Currently on Enbrel (started 2/2023-6/5/2023, injection site reactions, no improvement since changing from Humira to Enbrel).  Note that she had been doing well but then in January 2023 was having more pain in the right hand with mild synovial hypertrophy but no clearly active synovitis.  She had findings that were more consistent with carpal tunnel syndrome.  An MRI of the right hand showed synovitis at the right second and third MCP and tenosynovitis of the extensor tendons of the right third finger.  Therefore, changed from Humira to Enbrel with no improvement and exam remained unchanged; was also having injection site reactions with Enbrel.  Therefore, changed to Orencia on 6/5/2023.  Doing well at this time; no synovitis on exam and no injection site issues.  Chronic illness,  "progressive.    - Continue hydroxychloroquine 300 mg daily (last eye exam was performed 2/1/2022 at [facility])  - Continue Orencia 125mg SQ once every 7 days  - Labs in 3-4 months: CBC, Creatinine, Hepatic Panel, ESR, CRP    2. Osteoporosis: Previously treated with Fosamax (GERD), PO Boniva (reportedly ineffective), and IV Boniva (reportedly effective). Prolia was being considered by a previous rheumatologist but not used because she wanted \"clearance\" from the patient's gastroenterologist because she has hepatitis C; I do not see a contraindication for Prolia in the setting of hepatitis C. The patient reports that her gastroenterologist reported no contraindication for Prolia either.  She had not been on osteoporosis treatment for at least 2 years before restarting Boniva.  Boniva was restarted with the first dose given on 12/5/2017.  2021 DEXA showed improvement and Boniva was continued until the last dose that was on 1/12/2023.  1/16/2023 DEXA shows osteoporosis, with reduced bone mineral density compared to the previous, about the hips and lumbar spine.  Changed to Prolia with the first dose received on 4/12/2023.   Chronic illness, stable.    - Continue ergocalciferol 50,000 units twice weekly   - Calcium 1200 mg daily  - Continue Prolia 60mg SQ every 6 months (received at the infusion center)  - Lab in 3-4 months: Vitamin D    3.  Lower back pain and history left hip pain: Ms. Adhikari had a CT pelvis on 11/16/2018 that showed degenerative changes of the L-spine.   Improves with PT exercises, but often doesn't do them.  She would like a refresher with physical therapy for the low back pain  - Physical therapy referral    4. HBV core ab positive: On hepatitis B prophylaxis from gastroenterology.  Continue to follow with gastroenterology.    5. Bilateral carpal tunnel syndrome: EMG/NCS showed mild carpal tunnel syndrome.  Also having intermittent left 3rd digit trigger finger; has seen Dr." Dora where surgery was discussed but she is not certain that she wants to proceed with surgery because she is not certain that carpal tunnel syndrome is the problem.  Still symptomatic and she is going to consider following up with surgery.      6.  Bilateral hand osteoarthritis: Reviewed the diagnosis treatment options.  Start Voltaren gel as needed.  Risks and side effects of Voltaren gel were reviewed in detail today.  - diclofenac (VOLTAREN) 1 % topical gel; Apply up to 2 grams of 1% gel to hands up to 4 times daily as needed for joint pain (maximum: 8 g per upper extremity per day)      7.  Vaccinations: Vaccinations reviewed with Ms. Adhikari.    - Influenza: encouraged yearly vaccination  - [id number]: up to date  - [id number]: up to date  - Shingrix: Up to date  - COVID19: Advised keeping up-to-date, and to hold Orencia for 1-2 weeks afterward       Total minutes spent in evaluation with patient, review of pertinent lab tests and chart notes, and documentation: 22    Ms. Adhikari verbalized agreement with and understanding of the rational for the diagnosis and treatment plan.  All questions were answered to best of my ability and the patient's satisfaction. Ms. Adhikari was advised to contact the clinic with any questions that may arise after the clinic visit.      Thank you for involving me in the care of the patient    Return to clinic: 3-4 months    HPI   Niesha Adhikari is a 73 year old female with a medical history significant for hepatitis C, hemorrhoids, narcolepsy, fibromyalgia, migraines, anxiety, pernicious anemia, GERD, allergic rhinitis, and osteoporosis who presents for follow-up of inflammatory arthritis.    1/31/2023: Was seen by orthopedic surgery where she was told that she may benefit from carpal tunnel surgery bilaterally, and left third digit trigger finger surgery.  She has hesitant to go through with this because she questions if her symptoms are more arthritis related.  Larger nodules  over the right second and third MCPs on the left.  Bony hypertrophy at the DIPs.  Occasional pain at the bilateral first CMC joints, right first MCP, and wrist, but pain is worse with activity and improves with rest, not associated with swelling or increased warmth, and only last for a few seconds at a time.  Still with numbness and tingling in both hands that she says affects all of her fingers, is worse at night, and sometimes wakes her up at night.    2/20/2023: Having more pain and stiffness in the bilateral second and third MCPs and PIPs that is worse in the morning and improves with time and activity.  Occasionally has a zapping sensation in her fingers.  The zapping sensation typically resolves after a few seconds and does not occur for more than a few times each day, if at all.  Having more difficulty grasping items due to pain and stiffness at the MCPs and would like to use prednisone.  States that she has anxiety but prednisone does not worsen her anxiety.    6/5/2023: Swelling at the MCPs, worse on the right hand.  Still with numbness and tingling in the fingers that is intermittent.  States the pain at the MCPs is worse at night and wakes her up from sleep about 2-3 times per night.  Felt better when she was on prednisone was able to sleep through the night while on prednisone but does not wish to restart prednisone because she does not like the potential side effects.  Fell doing yard work and broke several ribs.  Found to have a nodule behind the sternum and is going to have additional work-up for this with imaging at the direction of another clinic; the nodule was found incidentally when imaging was done to find the rib fractures.    Today, 9/18/2023: Still with numbness and tingling in her hands that occurs intermittently, sometimes also radiating proximally from the wrist.  Joints without swelling, increased warmth, or overlying erythema.  Pain at the fingers diffusely when she has the numbness and  tingling.  No injection site issues with Orencia.  Occasional left elbow pain over the olecranon process but no swelling, increased warmth, or overlying erythema and not an issue at this time.  Chronic low back pain and would like to go back to physical therapy for a refresher.    Denies fevers, chills, nausea, vomiting, diarrhea. No abdominal pain. No chest pain/pressure, palpitations, or shortness of breath. No LE swelling. No neck pain. No oral or nasal sores.  No rash.     Tobacco: quit in 1978  EtOH: none  Drugs: none  Occupation: retired    ROS   12 point review of system was completed and negative except as noted in the HPI     Active Problem List     Patient Active Problem List   Diagnosis    Allergic rhinitis    Pernicious anemia    Anxiety state    Migraine    Essential and other specified forms of tremor    Fibromyalgia    Moderate recurrent major depression (H)    Narcolepsy    Internal hemorrhoids with other complication    AIN (anal intraepithelial neoplasia) anal canal    Osteoporosis    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)    Benign essential hypertension    Alcohol dependence in remission (H)    Personal history of other medical treatment    Constipation    DDD (degenerative disc disease), cervical    Iron deficiency    Thrombocytopenia (H)    Fatigue    Trigger middle finger of left hand    Bilateral carpal tunnel syndrome     Past Medical History     Past Medical History:   Diagnosis Date    ABUSE BY SPOUSE/PARTNER 07/27/2005    Degeneration of lumbar or lumbosacral intervertebral disc     DDD L5/S1    Depressive disorder     Esophageal reflux 1/28/2005    HELICOBACTER PYLORI INFECTION 01/28/2005    Hepatitis C - Cured. Achieved SVR in 2017     History of blood transfusion     Hypertension     Malignant neoplasm (H)     ACIN    Osteoporosis     Other and unspecified alcohol dependence, unspecified drinking behavior     Sober as 1/21/1987    Other malaise and  "fatigue      Past Surgical History     Past Surgical History:   Procedure Laterality Date    BIOPSY ANAL CANAL  1/21/13    [facility]     BREAST BIOPSY, RT/LT Left 1975    Breat Biopsy RT/LT    COLONOSCOPY  8/25/2009    COLONOSCOPY  2/14/2011    COLONOSCOPY performed by CRISTIN LAGUNAS at  GI    COLONOSCOPY N/A 1/8/2019    Procedure: Colonscopy, Biopsies by Biopsy;  Surgeon: Omega Talavera MD;  Location:  GI    CYSTOSCOPY  2/28/2011    CYSTOSCOPY performed by CAYLA FLOR at  OR    ENDOSCOPY  05/21/12    Upper GI - [facility]    ENT SURGERY  1965    GI SURGERY  06/25/12     UGI ENDOSCOPY DIAG W BIOPSY  10/01/09    HEMORRHOIDECTOMY  06/25/12    [facility]    LAPAROSCOPIC SALPINGO-OOPHORECTOMY  2/28/2011    LAPAROSCOPIC SALPINGO-OOPHORECTOMY performed by CAYLA FLOR at  OR    TONSILLECTOMY & ADENOIDECTOMY  1965    [facility] NONSPECIFIC PROCEDURE  1965    Removed bone left index finger knuckle, casts broken bones    [facility] COLONOSCOPY W/WO BRUSH/WASH  08/22/05    [facility] UGI ENDOSCOPY, SIMPLE EXAM  08/08/07     Allergy     Allergies   Allergen Reactions    Telaprevir Other (See Comments) and Rash     Rectal bleeding, anemia    Abilify Discmelt Other (See Comments)     Disoriented    Antivert [Meclizine Hcl]     Chamomile     Compazine     Diphenhydramine Nausea     And abdominal pain    Duloxetine Hcl Other (See Comments)     Disoriented, trouble sleeping    Effexor [Venlafaxine] Other (See Comments)     Disoriented, trouble sleeping    Elavil [Amitriptyline Hcl] Other (See Comments)     \"didn't feel right on it-med was stopped right away\"    Ferrous Sulfate Nausea and Vomiting    Food Difficulty breathing     cilantro    Indomethacin      indocin sensativity \"Severe h.a\"    Seasonal Allergies Other (See Comments) and Difficulty breathing     Philip Gold Aug-Sept, rag weed, sneezing    Sulfa Antibiotics     Thiopental Sodium      PENTOTHAL/rigidity and fight response    Animal " Dander Difficulty breathing and Rash     sneezing,resp. distress    Bupropion Anxiety    Tylenol [Acetaminophen] Rash     Current Medication List     Current Outpatient Medications   Medication Sig    Abatacept (ORENCIA CLICKJECT) 125 MG/ML SOAJ auto-injector Inject 1 mL (125 mg) Subcutaneous every 7 days    calcium carb-cholecalciferol 600-500 MG-UNIT CAPS Take 1,200 mg by mouth daily    cloNIDine (CATAPRES) 0.2 MG tablet Take 0.2 mg by mouth At Bedtime    clotrimazole (LOTRIMIN) 1 % external cream APPLY TOPICALLY TWO TIMES A DAY    cyanocobalamin (VITAMIN B-12) 500 MCG SUBL sublingual tablet Place 500 mcg under the tongue every other day    cycloSPORINE (RESTASIS) 0.05 % ophthalmic emulsion Place 1 drop into both eyes 2 times daily     denosumab (PROLIA) 60 MG/ML SOSY injection     diclofenac (VOLTAREN) 1 % topical gel Apply up to 2 grams of 1% gel to hands up to 4 times daily as needed for joint pain (maximum: 8 g per upper extremity per day)    hydroxychloroquine (PLAQUENIL) 200 MG tablet Hydroxychloroquine 200mg daily; and an additional 200mg every other day.  Yearly eye exam, including 10-2 VF and SD-OCT, required.    hydrOXYzine (ATARAX) 50 MG tablet Take 50 mg by mouth 1 1/2-2 tabs at bedtime    lisdexamfetamine (VYVANSE) 30 MG capsule Take 30 mg by mouth every morning    lisinopril (ZESTRIL) 10 MG tablet Take 1 tablet (10 mg) by mouth daily    Modafinil (PROVIGIL PO) Take 200 mg by mouth Takes 1 1/2 tabs twice daily-- morning and noon    montelukast (SINGULAIR) 10 MG tablet TAKE 1 TABLET BY MOUTH ONCE DAILY AS NEEDED SEASONALLY (AUGUST AND SEPTEMBER)    naproxen sodium (ANAPROX) 220 MG tablet Take 220-440 mg by mouth daily as needed for moderate pain    nystatin (MYCOSTATIN) 979896 UNIT/GM external powder Apply topically 3 times daily as needed    polyethylene glycol (MIRALAX) 17 GM/Dose powder Take 1 capful by mouth daily as needed     Psyllium (FIBER) 0.52 G CAPS 1 tabs in am and pm    sennosides  (SENOKOT) 8.6 MG tablet Take 2 tablets by mouth 2 times daily    tenofovir alafenamide fumarate (VEMLIDY) 25 MG tablet Take 1 tablet (25 mg) by mouth daily with food (dispense only in the original container).    vitamin D2 (ERGOCALCIFEROL) 67594 units (1250 mcg) capsule Take 1 capsule (50,000 Units) by mouth every Monday and Friday.     No current facility-administered medications for this visit.         Social History   See HPI    Family History     Family History   Problem Relation Age of Onset    Hypertension Mother     Breast Cancer Mother     Coronary Artery Disease Mother     Cerebrovascular Disease Mother     Kidney Disease Mother     Obesity Mother     Hypertension Father     Lymphoma Father     Glaucoma Father     Other Cancer Father         Lymphoma    Genetic Disorder Father         Glaucoma    Obesity Father     C.A.D. Sister         MI at age 63    Hypertension Sister     Gastrointestinal Disease Sister         gallbladder    Circulatory Sister         brain aneurysm at 63    Genitourinary Problems Sister         1 kidney/bladder    Hypertension Sister     Obesity Sister     Coronary Artery Disease Sister         had valve surgery, MI, CHF    Coronary Artery Disease Brother     Hypertension Brother     Hyperlipidemia Brother     Cerebrovascular Disease Maternal Grandmother     Hypertension Maternal Grandmother     Cerebrovascular Disease Paternal Grandmother     Hypertension Paternal Grandmother     Glaucoma Paternal Grandfather     Genetic Disorder Paternal Grandfather         Glaucoma    Blood Disease Son         Lymes/7/11    Hypertension Son     Obesity Son     Breast Cancer Maternal Aunt     Ovarian Cancer Maternal Aunt     Unknown/Adopted Paternal Uncle     Obesity Niece     Obesity Niece     Liver Cancer Cousin     Coronary Artery Disease Other 49        niece    Diabetes Other         cousin    Breast Cancer Other     Obesity Other     Obesity Other     Obesity Other      Physical Exam     Temp  "Readings from Last 3 Encounters:   09/11/23 98.3  F (36.8  C) (Oral)   07/19/23 98.3  F (36.8  C) (Oral)   06/23/23 98  F (36.7  C) (Oral)     BP Readings from Last 5 Encounters:   09/18/23 134/75   09/11/23 (!) 144/81   07/19/23 (!) 146/78   06/23/23 106/74   06/05/23 (!) 144/84     Pulse Readings from Last 1 Encounters:   09/18/23 68     Resp Readings from Last 1 Encounters:   09/18/23 16     Estimated body mass index is 25.15 kg/m  as calculated from the following:    Height as of this encounter: 1.6 m (5' 3\").    Weight as of this encounter: 64.4 kg (142 lb).      GEN: NAD. Healthy appearing adult.   HEENT:  Anicteric, noninjected sclera. No obvious external lesions of the ear and nose. Hearing intact.  CV: S1, S2. RRR. No m/r/g  PULM: No increased work of breathing. CTA bilaterally   MSK: MCPs and PIPs without synovial swelling or tenderness to palpation.  Heberden's nodes present.  Philippe's nodes present.  DIPs nontender to palpation.  Wrists without swelling or tenderness to palpation.  Elbows and shoulders without swelling or tenderness to palpation.  Knees, ankles, and MTPs without swelling or tenderness to palpation.    NEURO: + Tinel's over each wrist  SKIN: No rash or jaundice seen  PSYCH: Alert. Appropriate.         Labs / Imaging (select studies)     RF/CCP  Recent Labs   Lab Test 06/07/17  0955   CCPIGG 1   RHF <20     CBC  Recent Labs   Lab Test 09/06/23  0832 05/30/23  1139 05/12/23  1228 01/12/23  0818 09/10/21  0933 06/07/21  0811 05/21/21  1145 01/06/21  1303 09/04/20  0756 03/04/20  0752 02/17/20  0810   WBC 3.1* 4.9 3.1* 3.7*   < > 3.0*   < > 4.4 4.1   < > 3.8*   RBC 4.35 4.30 4.23 4.39   < > 4.50   < > 5.01 4.88   < > 4.70   HGB 10.9* 11.3* 11.0* 12.0   < > 13.1   < > 14.8 14.4   < > 14.3   HCT 35.5 36.2 35.5 39.0   < > 40.0   < > 44.9 43.9   < > 42.8   MCV 82 84 84 89   < > 89   < > 90 90   < > 91   RDW 14.4 14.5 14.3 13.6   < > 12.7   < > 13.3 12.5   < > 13.4    160 149* 139*   < > " 132*   < > 157 140*   < > 140*   MCH 25.1* 26.3* 26.0* 27.3   < > 29.1   < > 29.5 29.5   < > 30.4   MCHC 30.7* 31.2* 31.0* 30.8*   < > 32.8   < > 33.0 32.8   < > 33.4   NEUTROPHIL 46  --  38 43   < > 32.9  --  46.1 38.9   < > 44.5   LYMPH 35  --  40 34   < > 43.4  --  37.0 31.9   < > 35.7   MONOCYTE 11  --  12 10   < > 13.5  --  11.2 10.5   < > 14.6   EOSINOPHIL 8  --  9 12   < > 9.5  --  5.5 17.8   < > 4.7   BASOPHIL 1  --  1 1   < > 0.7  --  0.2 0.7   < > 0.5   ANEU  --   --   --   --   --  1.0*  --  2.0 1.6   < > 1.7   ALYM  --   --   --   --   --  1.3  --  1.6 1.3   < > 1.4   FREDY  --   --   --   --   --  0.4  --  0.5 0.4   < > 0.6   AEOS  --   --   --   --   --  0.3  --  0.2  --   --  0.2   ABAS  --   --   --   --   --  0.0  --  0.0 0.0   < > 0.0   ANEUTAUTO 1.4*  --  1.2* 1.6   < >  --   --   --   --   --   --    ALYMPAUTO 1.1  --  1.2 1.2   < >  --   --   --   --   --   --    AMONOAUTO 0.4  --  0.4 0.4   < >  --   --   --   --   --   --    AEOSAUTO 0.2  --  0.3 0.4   < >  --   --   --   --   --   --    ABSBASO 0.0  --  0.0 0.0   < >  --   --   --   --   --   --     < > = values in this interval not displayed.     [facility]  Recent Labs   Lab Test 09/06/23  0832 05/30/23  1139 04/12/23  0817 01/12/23  0818 06/10/22  0816 05/16/22  1044 09/08/21  0806 06/07/21  0811 02/16/21  0842 01/06/21  1303    138  --   --   --  142   < >  --  134  --    POTASSIUM 4.7 4.4  --   --   --  4.3   < >  --  4.7  --    CHLORIDE 100 102  --   --   --  109   < >  --  100  --    CO2 28 26  --   --   --  29   < >  --  31  --    ANIONGAP 11 10  --   --   --  4   < >  --  3  --    GLC 83 91  --   --   --  89   < >  --  94  --    BUN 10.4 11.7  --   --   --  13   < >  --  10  --    CR 0.80 0.79 0.81 0.79   < > 0.64   < > 0.76 0.70 0.65   GFRESTIMATED 78 80 77 80   < > >90   < > 80 88 >90   GFRESTBLACK  --   --   --   --   --   --   --  >90 >90 >90   ELIZABETH 9.7 9.7 9.8 9.2   < > 8.9   < > 8.7 10.0 9.7   BILITOTAL 0.3  --   --  0.4  --  0.4    < > 0.5  --  0.5   ALBUMIN 4.2  --  3.9 3.6  --  3.8   < > 3.8  --  4.1   PROTTOTAL 6.5  --   --  6.5*  --  6.6*   < > 6.7*  --  7.6   ALKPHOS 78  --   --  80  --  110   < > 79  --  82   AST 27  --   --  21  --  22   < > 22  --  22   ALT 14  --   --  21  --  22   < > 20  --  22    < > = values in this interval not displayed.     Calcium/VitaminD  Recent Labs   Lab Test 09/06/23  0832 05/30/23  1139 04/12/23  0817 01/12/23  0818 03/09/22  0821 02/14/22  1121 12/08/21  0816 10/14/21  1546   ELIZABETH 9.7 9.7 9.8 9.2   < > 9.5   < >  --    VITDT  --   --   --  59  --  65  --  70    < > = values in this interval not displayed.     ESR/CRP  Recent Labs   Lab Test 09/06/23  0832 01/12/23  0818 02/14/22  1121 06/07/21  0811   SED 7 6 6 5   CRP  --  <2.9 <2.9 <2.9   CRPI <3.00  --   --   --      Hepatitis B  Recent Labs   Lab Test 09/06/23  0832 05/16/22  1044 06/30/17  0925 06/07/17  0955 09/30/15  0951   AUSAB  --   --   --  0.65  --    HBCAB  --   --   --  Reactive   A reactive result indicates acute, chronic or past/resolved hepatitis B   infection.  *  --    HBCM  --   --   --  Nonreactive   A nonreactive result suggests lack of recent exposure to the virus in the   preceding 6 months.    --    HEPBANG  --   --   --  Nonreactive Nonreactive   HBQLOG  --   --  Not Calculated  --   --    HBQRES Not Detected Not Detected HBV DNA Not Detected   The LUIS ARMANDO AmpliPrep/LUIS ARMANDO TaqMan HBV Test is an FDA-approved in vitro nucleic   acid amplification test for the quantitation of HBV DNA in human plasma (EDTA   plasma) or serum using the LUIS ARMANDO AmpliPrep Instrument for automated viral   nucleic acid extraction and the LUIS ARMANDO TaqMan Analyzer or LUIS ARMANDO TaqMan for the   automated Real Time PCR amplification and detection of viral nucleic acid   target.   Titer results are reported in International Units/mL (IU/mL) using the 1st WHO   International standard for HBV for Nucleic Acid Amplification based assays.    --   --      Hepatitis C  Recent  Labs   Lab Test 01/20/20  0741 06/07/17  0955 09/30/15  0951   HCVAB  --  Reactive   A reactive result indicates one of the following 1) current HCV infection 2)   past HCV infection that has resolved or 3) false positivity. The CDC recommends   that a reactive result should be followed by Nucleic acid testing for HCV RNA.  If HCV RNA is detected, that indicates current HCV infection. If HCV RNA is not   detected, that indicates either past, resolved HCV infection, or false HCV   antibody positivity.   Assay performance characteristics have not been established for newborns,   infants, and children  * Reactive   A reactive result indicates one of the following 1) current HCV infection 2)   past HCV infection that has resolved or 3) false positivity. The CDC recommends   that a reactive result should be followed by Nucleic acid testing for HCV RNA.  If HCV RNA is detected, that indicates current HCV infection. If HCV RNA is not   detected, that indicates either past, resolved HCV infection, or false HCV   antibody positivity.   Assay performance characteristics have not been established for newborns,   infants, and children  *   HCVRNA HCV RNA Not Detected HCV RNA Not Detected   The LUIS ARMANDO AmpliPrep/LUIS ARMANDO TaqMan HCV Test is an FDA-approved in vitro nucleic   acid amplification test for the quantitation of HCV RNA in human plasma (ETDA   plasma) or serum using the LUIS ARMANDO AmpliPrep Instrument for automated viral   nucleic acid extraction and the LUIS ARMANDO TaqMan Analyzer or LUIS ARMANDO TaqMan for   automated Real Time PCR amplification and detection of the viral nucleic acid   target.   Titer results are reported in International Units/mL (IU/mL) using the 1st WHO   International standard for HCV for Nucleic Acid Amplification based assays.   [phone]*     Lyme ab screening  Recent Labs   Lab Test 08/09/19  1228 05/11/17  0830 04/12/17  1211   LYMEGM 0.05 <0.01  Negative, Absence of detectable Borrelia burdorferi antibodies. A  negative   result does not exclude the possibility of Borrelia burgdorferi infection. If   early Lyme disease is suspected, a second sample should be collected and tested   2 to 4 weeks later.   <0.01  Negative, Absence of detectable Borrelia burdorferi antibodies. A negative   result does not exclude the possibility of Borrelia burgdorferi infection. If   early Lyme disease is suspected, a second sample should be collected and tested   2 to 4 weeks later.       Tuberculosis Screening  Recent Labs   Lab Test 01/12/23  0818 10/14/21  1546 05/05/20  1322 09/30/15  0952   TBRES Negative Negative Negative  --    TBRSLT  --   --   --  Negative   TBAGN  --   --   --  0.05     Immunization History     Immunization History   Administered Date(s) Administered    COVID-19 Bivalent 12+ (Pfizer) 12/21/2022    COVID-19 Bivalent 18+ (Moderna) 06/21/2023    COVID-19 Monovalent 18+ (Moderna) 03/10/2021, 04/07/2021, 09/03/2021    COVID-19 Monovalent Booster 18+ (Moderna) 03/02/2022, 07/07/2022    HEPA 04/18/2000, 09/26/2000    HPV 08/13/2012, 09/25/2012, 01/24/2013    HepB 11/15/2011, 12/19/2011    Hepatitis B, Adult 10/28/2022    Influenza (H1N1) 01/07/2010    Influenza (High Dose) 3 valent vaccine 08/30/2018, 09/26/2019    Influenza (IIV3) PF 01/03/2005, 10/16/2006, 11/14/2007, 10/28/2008, 09/29/2009, 09/27/2010, 10/04/2011, 09/25/2012, 09/21/2015    Influenza Vaccine 65+ (Fluzone HD) 09/14/2020, 09/24/2021, 09/29/2022    Influenza Vaccine >6 months (Alfuria,Fluzone) 09/26/2013, 10/06/2014, 10/06/2016, 09/08/2017    Pneumo Conj 13-V (2010&after) 10/06/2016    Pneumococcal 23 valent 11/15/2011, 10/17/2017    TD,PF 7+ (Tenivac) 04/18/2000, 07/07/2004    TDAP (Adacel,Boostrix) 05/23/2006    TDAP Vaccine (Boostrix) 09/21/2015    Zoster recombinant adjuvanted (SHINGRIX) 06/14/2018, 08/24/2018    Zoster vaccine, live 09/21/2015          Chart documentation done in part with Dragon Voice recognition Software. Although reviewed after  completion, some word and grammatical error may remain.    Apolinar Cho MD

## 2023-10-02 ENCOUNTER — NURSE TRIAGE (OUTPATIENT)
Dept: FAMILY MEDICINE | Facility: OTHER | Age: 73
End: 2023-10-02
Payer: COMMERCIAL

## 2023-10-02 ENCOUNTER — MYC MEDICAL ADVICE (OUTPATIENT)
Dept: FAMILY MEDICINE | Facility: OTHER | Age: 73
End: 2023-10-02
Payer: COMMERCIAL

## 2023-10-02 NOTE — TELEPHONE ENCOUNTER
Nurse Triage SBAR    Is this a 2nd Level Triage? NO    Situation: Patent calling regarding a tick she removed on 9/30 from back of neck. Patient is familiar with ticks and knows it was a sabino tick. She thinks it was attached only about 24 hours. Patient was able to remove the tick and after inspection she thinks the tick was fully removed. Denies any fever or rash. Only symptom she still has is a small pea lump at site of bite.     Background:     Assessment: RN advised patient since tick wasn't attached >36 hours then the risk of Lymes is lower. RN did advise though I would get a message to PCP to see if she has any concern.     Protocol Recommended Disposition:   No disposition on file.    Recommendation: Does PCP have any concern for patient if patient should be seen or if PCP would like anything sent?    MARC Obrien, RN        Routed to provider    Does the patient meet one of the following criteria for ADS visit consideration? 16+ years old, with an [facility] PCP     TIP  Providers, please consider if this condition is appropriate for management at one of our Acute and Diagnostic Services sites.     If patient is a good candidate, please use dotphrase <dot>triageresponse and select Refer to ADS to document.    Additional Information   Negative: Not a tick bite   Negative: Patient sounds very sick or weak to the triager   Negative: Fever or severe headache occurs, 2 to 14 days following the bite   Negative: Widespread rash occurs, 2 to 14 days following the bite   Negative: Can't remove live tick (after using Care Advice)   Negative: Fever and spreading red area or streak   Negative: Fever and area is very tender to touch   Negative: Red streak or red line and length > 2 inches (5 cm)   Negative: Red or very tender (to touch) area and started over 24 hours after the bite   Negative: Red ring or bull's-eye rash occurs around a deer tick bite   Negative: Probable deer tick that was attached > 36 hours (or  tick appears swollen, not flat)   Negative: Patient wants to be seen    Protocols used: Tick Bite-A-OH

## 2023-10-03 NOTE — TELEPHONE ENCOUNTER
Patient is called and informed of this message.  Patient understands and agrees to this plan.  Closing this encounter.    CARMENZA ObrienN, RN

## 2023-10-10 ENCOUNTER — HOSPITAL ENCOUNTER (EMERGENCY)
Facility: CLINIC | Age: 73
Discharge: HOME | End: 2023-10-10
Attending: NURSE PRACTITIONER | Admitting: NURSE PRACTITIONER
Payer: COMMERCIAL

## 2023-10-10 ENCOUNTER — APPOINTMENT (OUTPATIENT)
Dept: ULTRASOUND IMAGING | Facility: CLINIC | Age: 73
End: 2023-10-10
Attending: NURSE PRACTITIONER
Payer: COMMERCIAL

## 2023-10-10 ENCOUNTER — APPOINTMENT (OUTPATIENT)
Dept: GENERAL RADIOLOGY | Facility: CLINIC | Age: 73
End: 2023-10-10
Attending: EMERGENCY MEDICINE
Payer: COMMERCIAL

## 2023-10-10 VITALS
SYSTOLIC BLOOD PRESSURE: 153 MMHG | HEART RATE: 75 BPM | DIASTOLIC BLOOD PRESSURE: 93 MMHG | RESPIRATION RATE: 20 BRPM | TEMPERATURE: 98 F | OXYGEN SATURATION: 100 %

## 2023-10-10 DIAGNOSIS — M79.662 PAIN OF LEFT LOWER LEG: ICD-10-CM

## 2023-10-10 LAB
ALBUMIN SERPL BCG-MCNC: 4.4 G/DL (ref 3.5–5.2)
ALP SERPL-CCNC: 69 U/L (ref 35–104)
ALT SERPL W P-5'-P-CCNC: 16 U/L (ref 0–50)
ANION GAP SERPL CALCULATED.3IONS-SCNC: 11 MMOL/L (ref 7–15)
AST SERPL W P-5'-P-CCNC: 27 U/L (ref 0–45)
BASO+EOS+MONOS # BLD AUTO: ABNORMAL 10*3/UL
BASO+EOS+MONOS NFR BLD AUTO: ABNORMAL %
BASOPHILS # BLD AUTO: 0 10E3/UL (ref 0–0.2)
BASOPHILS NFR BLD AUTO: 0 %
BILIRUB SERPL-MCNC: 0.4 MG/DL
BUN SERPL-MCNC: 10.1 MG/DL (ref 8–23)
CALCIUM SERPL-MCNC: 9.5 MG/DL (ref 8.8–10.2)
CHLORIDE SERPL-SCNC: 98 MMOL/L (ref 98–107)
CREAT SERPL-MCNC: 0.71 MG/DL (ref 0.51–0.95)
DEPRECATED HCO3 PLAS-SCNC: 24 MMOL/L (ref 22–29)
EGFRCR SERPLBLD CKD-EPI 2021: 90 ML/MIN/1.73M2
EOSINOPHIL # BLD AUTO: 0.1 10E3/UL (ref 0–0.7)
EOSINOPHIL NFR BLD AUTO: 3 %
ERYTHROCYTE [DISTWIDTH] IN BLOOD BY AUTOMATED COUNT: 15.7 % (ref 10–15)
FLUAV RNA SPEC QL NAA+PROBE: NEGATIVE
FLUBV RNA RESP QL NAA+PROBE: NEGATIVE
GLUCOSE SERPL-MCNC: 97 MG/DL (ref 70–99)
HCT VFR BLD AUTO: 36.2 % (ref 35–47)
HGB BLD-MCNC: 11.1 G/DL (ref 11.7–15.7)
IMM GRANULOCYTES # BLD: 0 10E3/UL
IMM GRANULOCYTES NFR BLD: 0 %
LYMPHOCYTES # BLD AUTO: 1.1 10E3/UL (ref 0.8–5.3)
LYMPHOCYTES NFR BLD AUTO: 20 %
MCH RBC QN AUTO: 24.3 PG (ref 26.5–33)
MCHC RBC AUTO-ENTMCNC: 30.7 G/DL (ref 31.5–36.5)
MCV RBC AUTO: 79 FL (ref 78–100)
MONOCYTES # BLD AUTO: 0.4 10E3/UL (ref 0–1.3)
MONOCYTES NFR BLD AUTO: 8 %
NEUTROPHILS # BLD AUTO: 3.6 10E3/UL (ref 1.6–8.3)
NEUTROPHILS NFR BLD AUTO: 69 %
NRBC # BLD AUTO: 0 10E3/UL
NRBC BLD AUTO-RTO: 0 /100
PLATELET # BLD AUTO: 158 10E3/UL (ref 150–450)
POTASSIUM SERPL-SCNC: 4.1 MMOL/L (ref 3.4–5.3)
PROT SERPL-MCNC: 6.8 G/DL (ref 6.4–8.3)
RBC # BLD AUTO: 4.57 10E6/UL (ref 3.8–5.2)
RSV RNA SPEC NAA+PROBE: NEGATIVE
SARS-COV-2 RNA RESP QL NAA+PROBE: NEGATIVE
SODIUM SERPL-SCNC: 133 MMOL/L (ref 135–145)
WBC # BLD AUTO: 5.3 10E3/UL (ref 4–11)

## 2023-10-10 PROCEDURE — 80053 COMPREHEN METABOLIC PANEL: CPT | Performed by: NURSE PRACTITIONER

## 2023-10-10 PROCEDURE — 93971 EXTREMITY STUDY: CPT | Mod: LT

## 2023-10-10 PROCEDURE — 73562 X-RAY EXAM OF KNEE 3: CPT | Mod: LT

## 2023-10-10 PROCEDURE — 250N000013 HC RX MED GY IP 250 OP 250 PS 637: Performed by: NURSE PRACTITIONER

## 2023-10-10 PROCEDURE — 36415 COLL VENOUS BLD VENIPUNCTURE: CPT | Performed by: NURSE PRACTITIONER

## 2023-10-10 PROCEDURE — 99285 EMERGENCY DEPT VISIT HI MDM: CPT | Mod: 25 | Performed by: NURSE PRACTITIONER

## 2023-10-10 PROCEDURE — 87637 SARSCOV2&INF A&B&RSV AMP PRB: CPT | Performed by: NURSE PRACTITIONER

## 2023-10-10 PROCEDURE — 86618 LYME DISEASE ANTIBODY: CPT | Performed by: NURSE PRACTITIONER

## 2023-10-10 PROCEDURE — 85025 COMPLETE CBC W/AUTO DIFF WBC: CPT | Performed by: NURSE PRACTITIONER

## 2023-10-10 PROCEDURE — 99284 EMERGENCY DEPT VISIT MOD MDM: CPT | Performed by: NURSE PRACTITIONER

## 2023-10-10 RX ORDER — OXYCODONE HYDROCHLORIDE 5 MG/1
5 TABLET ORAL EVERY 6 HOURS PRN
Qty: 6 TABLET | Refills: 0 | Status: SHIPPED | OUTPATIENT
Start: 2023-10-10 | End: 2023-11-29

## 2023-10-10 RX ORDER — OXYCODONE HYDROCHLORIDE 5 MG/1
5 TABLET ORAL ONCE
Status: COMPLETED | OUTPATIENT
Start: 2023-10-10 | End: 2023-10-10

## 2023-10-10 RX ADMIN — OXYCODONE HYDROCHLORIDE 5 MG: 5 TABLET ORAL at 21:00

## 2023-10-10 ASSESSMENT — ACTIVITIES OF DAILY LIVING (ADL): ADLS_ACUITY_SCORE: 35

## 2023-10-11 LAB — B BURGDOR IGG+IGM SER QL: 0.03

## 2023-10-11 NOTE — ED PROVIDER NOTES
"  History     Chief Complaint   Patient presents with    Knee Pain     HPI  Niesha Adhikari is a 73 year old female with history of rheumatoid arthritis, fibromyalgia, migraine, essential tremor, NETO, hypertension, and osteoporosis who presents for evaluation of left knee and lower leg pain.  Patient reports over the last couple weeks she has been having some low back pain.  Today she was doing some laundry and then went outside and picked up some sticks.  She started to feel some discomfort in her knee which has gradually worsened throughout the day.  Now she feels severe pain from her left knee down the front part of her shin.  She reports cramping in the lower leg.  She does not endorse any known injury or twisting of her leg. She is using a cane on arrival due to the pain in the left leg.   She had a tick bite a week ago.    Allergies:  Allergies   Allergen Reactions    Telaprevir Other (See Comments) and Rash     Rectal bleeding, anemia    Abilify Discmelt Other (See Comments)     Disoriented    Antivert [Meclizine Hcl]     Chamomile     Compazine     Diphenhydramine Nausea     And abdominal pain    Duloxetine Hcl Other (See Comments)     Disoriented, trouble sleeping    Effexor [Venlafaxine] Other (See Comments)     Disoriented, trouble sleeping    Elavil [Amitriptyline Hcl] Other (See Comments)     \"didn't feel right on it-med was stopped right away\"    Ferrous Sulfate Nausea and Vomiting    Food Difficulty breathing     cilantro    Indomethacin      indocin sensativity \"Severe h.a\"    Seasonal Allergies Other (See Comments) and Difficulty breathing     Philip Gold Aug-Sept, rag weed, sneezing    Sulfa Antibiotics     Thiopental Sodium      PENTOTHAL/rigidity and fight response    Animal Dander Difficulty breathing and Rash     sneezing,resp. distress    Bupropion Anxiety    Tylenol [Acetaminophen] Rash       Problem List:    Patient Active Problem List    Diagnosis Date Noted    Trigger middle finger of " left hand 12/27/2022     Priority: Medium    Bilateral carpal tunnel syndrome 12/27/2022     Priority: Medium    Iron deficiency 07/05/2022     Priority: Medium    Thrombocytopenia (H24) 07/05/2022     Priority: Medium    Constipation 08/09/2019     Priority: Medium    DDD (degenerative disc disease), cervical 08/09/2019     Priority: Medium    Fatigue 01/02/2019     Priority: Medium    Personal history of other medical treatment 12/05/2018     Priority: Medium     Alendronate (GERD per record review), Boniva PO (Ineffective per record review), Boniva IV (Effective per record review)      Alcohol dependence in remission (H) 11/16/2018     Priority: Medium    Benign essential hypertension 09/01/2017     Priority: Medium    Osteoporosis 06/07/2017     Priority: Medium    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) 06/07/2017     Priority: Medium    AIN (anal intraepithelial neoplasia) anal canal 09/25/2012     Priority: Medium    Internal hemorrhoids with other complication 10/13/2011     Priority: Medium    Narcolepsy 08/15/2011     Priority: Medium    Moderate recurrent major depression (H) 05/05/2010     Priority: Medium    Fibromyalgia 07/10/2009     Priority: Medium    Essential and other specified forms of tremor 06/30/2006     Priority: Medium    Migraine 06/05/2006     Priority: Medium    Pernicious anemia 07/27/2005     Priority: Medium    Anxiety state 07/27/2005     Priority: Medium    Allergic rhinitis 06/15/2002     Priority: Medium        Past Medical History:    Past Medical History:   Diagnosis Date    ABUSE BY SPOUSE/PARTNER 07/27/2005    Degeneration of lumbar or lumbosacral intervertebral disc     Depressive disorder     Esophageal reflux 1/28/2005    HELICOBACTER PYLORI INFECTION 01/28/2005    Hepatitis C - Cured. Achieved SVR in 2017     History of blood transfusion     Hypertension     Malignant neoplasm (H)     Osteoporosis     Other and unspecified alcohol  dependence, unspecified drinking behavior     Other malaise and fatigue        Past Surgical History:    Past Surgical History:   Procedure Laterality Date    BIOPSY ANAL CANAL  1/21/13    [facility]     BREAST BIOPSY, RT/LT Left 1975    Breat Biopsy RT/LT    COLONOSCOPY  8/25/2009    COLONOSCOPY  2/14/2011    COLONOSCOPY performed by CRISTIN LAGUNAS at  GI    COLONOSCOPY N/A 1/8/2019    Procedure: Colonscopy, Biopsies by Biopsy;  Surgeon: Omega Talavera MD;  Location:  GI    CYSTOSCOPY  2/28/2011    CYSTOSCOPY performed by CAYLA FLOR at  OR    ENDOSCOPY  05/21/12    [facility] [place]    ENT SURGERY  1965    GI SURGERY  06/25/12     UGI ENDOSCOPY DIAG W BIOPSY  10/01/09    HEMORRHOIDECTOMY  06/25/12    [facility]    LAPAROSCOPIC SALPINGO-OOPHORECTOMY  2/28/2011    LAPAROSCOPIC SALPINGO-OOPHORECTOMY performed by CAYLA FLOR at  OR    TONSILLECTOMY & ADENOIDECTOMY  1965    [facility] NONSPECIFIC PROCEDURE  1965    Removed bone left index finger knuckle, casts broken bones    ZZ COLONOSCOPY W/WO BRUSH/WASH  08/22/05    [facility]AM  08/08/07       Family History:    Family History   Problem Relation Age of Onset    Hypertension Mother     Breast Cancer Mother     Coronary Artery Disease Mother     Cerebrovascular Disease Mother     Kidney Disease Mother     Obesity Mother     Hypertension Father     Lymphoma Father     Glaucoma Father     Other Cancer Father         Lymphoma    Genetic Disorder Father         Glaucoma    Obesity Father     C.A.D. Sister         MI at age 63    Hypertension Sister     Gastrointestinal Disease Sister         gallbladder    Circulatory Sister         brain aneurysm at 63    Genitourinary Problems Sister         1 kidney/bladder    Hypertension Sister     Obesity Sister     Coronary Artery Disease Sister         had valve surgery, MI, CHF    Coronary Artery Disease Brother     Hypertension Brother     Hyperlipidemia  Brother     Cerebrovascular Disease Maternal Grandmother     Hypertension Maternal Grandmother     Cerebrovascular Disease Paternal Grandmother     Hypertension Paternal Grandmother     Glaucoma Paternal Grandfather     Genetic Disorder Paternal Grandfather         Glaucoma    Blood Disease Son         Lymes/    Hypertension Son     Obesity Son     Breast Cancer Maternal Aunt     Ovarian Cancer Maternal Aunt     Unknown/Adopted Paternal Uncle     Obesity Niece     Obesity Niece     Liver Cancer Cousin     Coronary Artery Disease Other 49        niece    Diabetes Other         cousin    Breast Cancer Other     Obesity Other     Obesity Other     Obesity Other        Social History:  Marital Status:   [4]  Social History     Tobacco Use    Smoking status: Former     Packs/day: 0.75     Years: 10.00     Pack years: 7.50     Types: Cigarettes     Start date: 1968     Quit date: 1978     Years since quittin.9     Passive exposure: Never    Smokeless tobacco: Never    Tobacco comments:     No exposure at home   Vaping Use    Vaping Use: Never used   Substance Use Topics    Alcohol use: No    Drug use: No        Medications:    oxyCODONE (ROXICODONE) 5 MG tablet  Abatacept (ORENCIA CLICKJECT) 125 MG/ML SOAJ auto-injector  calcium carb-cholecalciferol 600-500 MG-UNIT CAPS  cloNIDine (CATAPRES) 0.2 MG tablet  clotrimazole (LOTRIMIN) 1 % external cream  cyanocobalamin (VITAMIN B-12) 500 MCG SUBL sublingual tablet  cycloSPORINE (RESTASIS) 0.05 % ophthalmic emulsion  denosumab (PROLIA) 60 MG/ML SOSY injection  diclofenac (VOLTAREN) 1 % topical gel  hydroxychloroquine (PLAQUENIL) 200 MG tablet  hydrOXYzine (ATARAX) 50 MG tablet  lisdexamfetamine (VYVANSE) 30 MG capsule  lisinopril (ZESTRIL) 10 MG tablet  Modafinil (PROVIGIL PO)  montelukast (SINGULAIR) 10 MG tablet  naproxen sodium (ANAPROX) 220 MG tablet  nystatin (MYCOSTATIN) 080866 UNIT/GM external powder  polyethylene glycol (MIRALAX) 17 GM/Dose  powder  Psyllium (FIBER) 0.52 G CAPS  sennosides (SENOKOT) 8.6 MG tablet  tenofovir alafenamide fumarate (VEMLIDY) 25 MG tablet  vitamin D2 (ERGOCALCIFEROL) 51792 units (1250 mcg) capsule          Review of Systems  As mentioned above in the history present illness. All other systems were reviewed and are negative.    Physical Exam   BP: (!) 153/93  Pulse: 75  Temp: 98  F (36.7  C)  Resp: 18  SpO2: 100 %      Physical Exam  Constitutional:       General: She is not in acute distress.     Appearance: Normal appearance. She is well-developed. She is not ill-appearing.   HENT:      Head: Normocephalic and atraumatic.      Right Ear: External ear normal.      Left Ear: External ear normal.      Nose: Nose normal.      Mouth/Throat:      Mouth: Mucous membranes are moist.   Eyes:      Conjunctiva/sclera: Conjunctivae normal.   Cardiovascular:      Rate and Rhythm: Normal rate and regular rhythm.      Heart sounds: Normal heart sounds. No murmur heard.  Pulmonary:      Effort: Pulmonary effort is normal. No respiratory distress.      Breath sounds: Normal breath sounds.   Musculoskeletal:         General: Normal range of motion.      Comments: Left leg:  -- No swelling or discoloration.  -- No rash.  -- No significant tenderness.  -- Foot is warm and pink.     Skin:     General: Skin is warm and dry.      Findings: No rash.   Neurological:      General: No focal deficit present.      Mental Status: She is alert and oriented to person, place, and time.         ED Course                 Procedures         Results for orders placed or performed during the hospital encounter of 10/10/23 (from the past 24 hour(s))   XR Knee Left 3 Views    Narrative    EXAM: XR KNEE LEFT 3 VIEWS  LOCATION: [facility]  DATE: 10/10/2023    INDICATION: pain and swelling  COMPARISON: None.      Impression    IMPRESSION: Normal joint spaces and alignment. No fracture or joint effusion.   CBC with platelets  differential    Narrative    The following orders were created for panel order CBC with platelets differential.  Procedure                               Abnormality         Status                     ---------                               -----------         ------                     CBC with platelets and d...[[id number]]  Abnormal            Final result                 Please view results for these tests on the individual orders.   Comprehensive metabolic panel   Result Value Ref Range    Sodium 133 (L) 135 - 145 mmol/L    Potassium 4.1 3.4 - 5.3 mmol/L    Carbon Dioxide (CO2) 24 22 - 29 mmol/L    Anion Gap 11 7 - 15 mmol/L    Urea Nitrogen 10.1 8.0 - 23.0 mg/dL    Creatinine 0.71 0.51 - 0.95 mg/dL    GFR Estimate 90 >60 mL/min/1.73m2    Calcium 9.5 8.8 - 10.2 mg/dL    Chloride 98 98 - 107 mmol/L    Glucose 97 70 - 99 mg/dL    Alkaline Phosphatase 69 35 - 104 U/L    AST 27 0 - 45 U/L    ALT 16 0 - 50 U/L    Protein Total 6.8 6.4 - 8.3 g/dL    Albumin 4.4 3.5 - 5.2 g/dL    Bilirubin Total 0.4 <=1.2 mg/dL   CBC with platelets and differential   Result Value Ref Range    WBC Count 5.3 4.0 - 11.0 10e3/uL    RBC Count 4.57 3.80 - 5.20 10e6/uL    Hemoglobin 11.1 (L) 11.7 - 15.7 g/dL    Hematocrit 36.2 35.0 - 47.0 %    MCV 79 78 - 100 fL    MCH 24.3 (L) 26.5 - 33.0 pg    MCHC 30.7 (L) 31.5 - 36.5 g/dL    RDW 15.7 (H) 10.0 - 15.0 %    Platelet Count 158 150 - 450 10e3/uL    % Neutrophils 69 %    % Lymphocytes 20 %    % Monocytes 8 %    Mids % (Monos, Eos, Basos)      % Eosinophils 3 %    % Basophils 0 %    % Immature Granulocytes 0 %    NRBCs per 100 WBC 0 <1 /100    Absolute Neutrophils 3.6 1.6 - 8.3 10e3/uL    Absolute Lymphocytes 1.1 0.8 - 5.3 10e3/uL    Absolute Monocytes 0.4 0.0 - 1.3 10e3/uL    Mids Abs (Monos, Eos, Basos)      Absolute Eosinophils 0.1 0.0 - 0.7 10e3/uL    Absolute Basophils 0.0 0.0 - 0.2 10e3/uL    Absolute Immature Granulocytes 0.0 <=0.4 10e3/uL    Absolute NRBCs 0.0 10e3/uL   Symptomatic  Influenza A/B, RSV, & SARS-CoV2 PCR (COVID-19) Nose    Specimen: Nose; Swab   Result Value Ref Range    Influenza A PCR Negative Negative    Influenza B PCR Negative Negative    RSV PCR Negative Negative    SARS CoV2 PCR Negative Negative    Narrative    Testing was performed using the Xpert Xpress CoV2/Flu/RSV Assay on the [employer] GeneXpert Instrument. This test should be ordered for the detection of SARS-CoV-2, influenza, and RSV viruses in individuals who meet clinical and/or epidemiological criteria. Test performance is unknown in asymptomatic patients. This test is for in vitro diagnostic use under the FDA EUA for laboratories certified under CLIA to perform high or moderate complexity testing. This test has not been FDA cleared or approved. A negative result does not rule out the presence of PCR inhibitors in the specimen or target RNA in concentration below the limit of detection for the assay. If only one viral target is positive but coinfection with multiple targets is suspected, the sample should be re-tested with another FDA cleared, approved, or authorized test, if coinfection would change clinical management. This test was validated by the [facility]. These laboratories are certified under the Clinical Laboratory Improvement Amendments of 1988 (CLIA-88) as qualified to perform high complexity laboratory testing.   US Lower Extremity Venous Duplex Left    Narrative    EXAM: US LOWER EXTREMITY VENOUS DUPLEX LEFT  LOCATION: [facility]  DATE: 10/10/2023    INDICATION: lower leg pain knee to calf  COMPARISON: None.  TECHNIQUE: Venous Duplex ultrasound of the left lower extremity with and without compression, augmentation and duplex. Color flow and spectral Doppler with waveform analysis performed.    FINDINGS: Exam includes the common femoral, femoral, popliteal, and contralateral common femoral veins as well as segmentally visualized deep calf veins and  greater saphenous vein.     LEFT: No deep vein thrombosis. No superficial thrombophlebitis. No popliteal cyst.      Impression    IMPRESSION:  1.  No deep venous thrombosis in the left lower extremity.     *Note: Due to a large number of results and/or encounters for the requested time period, some results have not been displayed. A complete set of results can be found in Results Review.       Medications   oxyCODONE (ROXICODONE) tablet 5 mg (5 mg Oral $Given 10/10/23 2100)       Assessments & Plan (with Medical Decision Making)     No concerning lab findings.  Left lower leg ultrasound is negative for DVT.  No evidence of Baker's cyst.  Left knee x-ray is negative for acute osseous abnormality.  No joint effusion.  No concerning exam findings to explain her pain.  I did consider possibly this is lumbar radiculopathy and the pain is coming from her back.  I discussed this with her.  Patient was given a dose of oxycodone for her severe pain and this was helpful.  I discussed treating her with a muscle relaxer such as Flexeril, but is contraindicated with her other medications.  I offered her a prescription for a small number of oxycodone, not for long-term use.  Patient was agreeable to this.  She should have close follow-up with her clinic provider for recheck.      Discharge Medication List as of 10/10/2023 10:18 PM        START taking these medications    Details   oxyCODONE (ROXICODONE) 5 MG tablet Take 1 tablet (5 mg) by mouth every 6 hours as needed for severe pain, Disp-6 tablet, R-0, [facility]             Final diagnoses:   Pain of left lower leg - questionable lumbar radiculopathy (pain in the leg that is coming from the nerves in the low back)       10/10/2023   [facility] EMERGENCY DEPT       Sanjay, YOSEPH Tejada CNP  10/10/23 [id number]

## 2023-10-12 ENCOUNTER — INFUSION THERAPY VISIT (OUTPATIENT)
Dept: INFUSION THERAPY | Facility: CLINIC | Age: 73
End: 2023-10-12
Attending: INTERNAL MEDICINE
Payer: COMMERCIAL

## 2023-10-12 ENCOUNTER — APPOINTMENT (OUTPATIENT)
Dept: LAB | Facility: CLINIC | Age: 73
End: 2023-10-12
Payer: COMMERCIAL

## 2023-10-12 VITALS
SYSTOLIC BLOOD PRESSURE: 125 MMHG | DIASTOLIC BLOOD PRESSURE: 77 MMHG | OXYGEN SATURATION: 100 % | WEIGHT: 141.7 LBS | BODY MASS INDEX: 25.11 KG/M2 | TEMPERATURE: 97.9 F | HEART RATE: 54 BPM | HEIGHT: 63 IN

## 2023-10-12 DIAGNOSIS — M81.0 AGE-RELATED OSTEOPOROSIS WITHOUT CURRENT PATHOLOGICAL FRACTURE: Primary | ICD-10-CM

## 2023-10-12 DIAGNOSIS — Z92.89 PERSONAL HISTORY OF OTHER MEDICAL TREATMENT: ICD-10-CM

## 2023-10-12 PROCEDURE — 250N000011 HC RX IP 250 OP 636: Mod: JZ | Performed by: INTERNAL MEDICINE

## 2023-10-12 PROCEDURE — 96372 THER/PROPH/DIAG INJ SC/IM: CPT | Performed by: INTERNAL MEDICINE

## 2023-10-12 RX ORDER — MEPERIDINE HYDROCHLORIDE 25 MG/ML
25 INJECTION INTRAMUSCULAR; INTRAVENOUS; SUBCUTANEOUS EVERY 30 MIN PRN
Status: CANCELLED | OUTPATIENT
Start: 2024-04-06

## 2023-10-12 RX ORDER — DIPHENHYDRAMINE HYDROCHLORIDE 50 MG/ML
50 INJECTION INTRAMUSCULAR; INTRAVENOUS
Status: CANCELLED
Start: 2024-04-06

## 2023-10-12 RX ORDER — METHYLPREDNISOLONE SODIUM SUCCINATE 125 MG/2ML
125 INJECTION, POWDER, LYOPHILIZED, FOR SOLUTION INTRAMUSCULAR; INTRAVENOUS
Status: CANCELLED
Start: 2024-04-06

## 2023-10-12 RX ORDER — ALBUTEROL SULFATE 90 UG/1
1-2 AEROSOL, METERED RESPIRATORY (INHALATION)
Status: CANCELLED
Start: 2024-04-06

## 2023-10-12 RX ORDER — EPINEPHRINE 1 MG/ML
0.3 INJECTION, SOLUTION INTRAMUSCULAR; SUBCUTANEOUS EVERY 5 MIN PRN
Status: CANCELLED | OUTPATIENT
Start: 2024-04-06

## 2023-10-12 RX ORDER — ALBUTEROL SULFATE 0.83 MG/ML
2.5 SOLUTION RESPIRATORY (INHALATION)
Status: CANCELLED | OUTPATIENT
Start: 2024-04-06

## 2023-10-12 RX ADMIN — DENOSUMAB 60 MG: 60 INJECTION SUBCUTANEOUS at 08:48

## 2023-10-12 ASSESSMENT — PAIN SCALES - GENERAL: PAINLEVEL: MODERATE PAIN (4)

## 2023-10-12 NOTE — PROGRESS NOTES
Infusion Nursing Note:  Niesha Adhikari presents today for prolia injection.    Patient seen by provider today: No   present during visit today: Not Applicable.    Note: Pt here today on her own for her twice yearly Prolia injection. Given today in her right arm.  Pt had labs drawn in the last couple of days in the ED.  Was seen for knee and leg pain at that time.  States that the leg pain is better now but today complains of neck pain.  History of arthritis.     Intravenous Access:  No Intravenous access/labs at this visit.    Treatment Conditions:  Lab Results   Component Value Date     (L) 10/10/2023    POTASSIUM 4.1 10/10/2023    MAG 2.1 05/16/2022    CR 0.71 10/10/2023    ELIZABETH 9.5 10/10/2023    BILITOTAL 0.4 10/10/2023    ALBUMIN 4.4 10/10/2023    ALT 16 10/10/2023    AST 27 10/10/2023       Results reviewed, labs MET treatment parameters, ok to proceed with treatment.      Post Infusion Assessment:  Patient tolerated injection without incident.  Patient observed for 15 minutes      Discharge Plan:   Patient discharged in stable condition accompanied by: self.  Departure Mode: Ambulatory  Pt does not have next appt scheduled yet.   .      Debbie Beltran RN

## 2023-10-16 ENCOUNTER — MYC MEDICAL ADVICE (OUTPATIENT)
Dept: FAMILY MEDICINE | Facility: OTHER | Age: 73
End: 2023-10-16
Payer: COMMERCIAL

## 2023-10-19 ENCOUNTER — THERAPY VISIT (OUTPATIENT)
Dept: PHYSICAL THERAPY | Facility: CLINIC | Age: 73
End: 2023-10-19
Attending: INTERNAL MEDICINE
Payer: COMMERCIAL

## 2023-10-19 DIAGNOSIS — G89.29 CHRONIC BILATERAL LOW BACK PAIN WITHOUT SCIATICA: ICD-10-CM

## 2023-10-19 DIAGNOSIS — M54.50 CHRONIC BILATERAL LOW BACK PAIN WITHOUT SCIATICA: ICD-10-CM

## 2023-10-19 PROCEDURE — 97110 THERAPEUTIC EXERCISES: CPT | Mod: GP | Performed by: PHYSICAL THERAPIST

## 2023-10-19 PROCEDURE — 97161 PT EVAL LOW COMPLEX 20 MIN: CPT | Mod: GP | Performed by: PHYSICAL THERAPIST

## 2023-10-19 PROCEDURE — 97530 THERAPEUTIC ACTIVITIES: CPT | Mod: GP | Performed by: PHYSICAL THERAPIST

## 2023-10-19 NOTE — PROGRESS NOTES
PHYSICAL THERAPY EVALUATION  Type of Visit: Evaluation    See electronic medical record for Abuse and Falls Screening details.    Subjective       Presenting condition or subjective complaint: My rheumatoid arthritis, osteoporosis & degenerative disk disease are worsening.  Am an active person & self sufficient.  I fell in May 2023 & broke 8 ribs. Ribs have mostly healed, but back, neck, tailbone & leg issues are affecting day to day activity.  Date of onset: 23    Relevant medical history: Anemia; Arthritis; Bladder or bowel problems; Cancer; Concussions; Hepatitis; High blood pressure; History of fractures; Neck injury; Osteoporosis; Pain at night or rest; Rheumatoid arthritis; Significant weakness; Sleep disorder like apnea   Dates & types of surgery: Surgeries are listed [facility]    Prior diagnostic imaging/testing results: CT scan; X-ray; EMG; Bone scan     Prior therapy history for the same diagnosis, illness or injury: Yes I do not remember the dates because it was years ago.    Prior Level of Function  Transfers: Independent  Ambulation: Independent  ADL: Independent  IADL:     Living Environment  Social support: Alone   Type of home: House; Basement   Stairs to enter the home: Yes 2 Is there a railing: Yes   Ramp: No   Stairs inside the home: Yes 12 Is there a railing: Yes   Help at home: None  Equipment owned: Straight Cane     Employment: No    Hobbies/Interests: Maintain certified wildlife habitat, wildlife photography, writer, compiler of literary and photographic publications, runner, bicyclist, care of two dogs, , play piano, hiking, book group, , read daily.    Patient goals for therapy: Run 6 miles, lift 40 lbs., take care of dogs, outdoor landscape & house without pain, difficulty or restriction.     Occupation: retired  Sitting: 10% Standin%  Lifting: Yes    Pain Location: R>L LBP to upper and middle buttocks, L lateral hip, gets  "entire L>R LE isabela horses even into her feet, L knee and shin pain. Also has neck pain but no PT orders for that.     Visual Analog Scale:  Low back average pain intensity: 4/10  Low back worst pain intensity: 10/10    Lower extremity average pain intensity: 1/10 lately over last 1-2 weeks, was worse  Lower extremity worst pain intensity: 10/10    History:  Onset Date: 5/5/23  Onset Cause: fell while pulling up a tree landing on R side, to ED on 10/10/23 after doing laundry and picking up sticks, L knee pain  Onset Symptoms: back pain, rib fx  Symptom Change: improving  Constant Symptoms: LBP, L lateral pelvis, upper back and neck pain  Intermittent Symptoms: L groin other LE sx  Investigations/Imaging: L knee negative  Meds for LBP: none reported  Prior Treatment: standing stretches  Last time pt has been sx free for 30 straight days: 5/5/23  Accidents: reports 5-6 falls over the last year usually from being really active outside    Symptom Frequency:   Low Back: 100%  Lower Extremity: 100% L lateral hip, other LE sx intermittent    Functional Comparison:  Bending: worse  Rising: worse  Sitting: worse  Standing: worst  Walking: better  Lying: better, Worst position supine  Awaking: better  AM: worst  Midday: better  PM: worse    Overall Functional Level 55% of Normal since fall in May    Objective     Physical Exam:    Sitting Posture: kyphotic lumbar spine  Standing Posture:  fair  Standing Lordosis:  reduced  Standing Scoliosis: none     Range of Motion loss:   Flexion: 25% decrease  Extension: 25%  Deviation Noted: none    Special Tests:  Neurological: n/t  Sensation: n/t  MMT: n/t  HPI/SI: n/t  Other Info: slow transfers, pt with significant neck flexion in sitting and lying prefers neck flexed to almost 60 degrees.    Static/Dynamic Force Analysis/Directional Preference Exam:    Symptoms prior to testing: in sitting 1/10 LBP 12\" across low back, in standing 0.5/10    Standing:  Flexion (x1): increased LBP " to 1/10, worse.   Flexion (3x10): n/t  Extension (x1): no change  Extension (3x10): n/t    Hook Lying: abolished sx  Flexion (x1): n/t  Flexion (3x10): n/t    Prone: did not have pt lie prone due to neck condition  Prone on Elbows: n/t  Extension (x1): n/t  Extension (3x10): n/t    Optional: In L sidelying with hips/knees flexion to near 80 degrees 0.5/10 LBP. Pillow between knees and decreased flexion angle to 60 or less abolished sx.     Assessment & Plan   CLINICAL IMPRESSIONS  Medical Diagnosis: Chronic bilateral low back pain without sciatica    Treatment Diagnosis: mechanical low back pain with L>R LE pain/referral   Impression/Assessment: Patient is a 73 year old female with LBP and L>R LE pain, cramping and also neck/thoracic pain and decreased functional level complaints.  The following significant findings have been identified: Pain, Decreased ROM/flexibility, Decreased strength, Decreased activity tolerance, and Impaired posture. These impairments interfere with their ability to perform self care tasks, work tasks, recreational activities, household chores, driving , household mobility, and community mobility as compared to previous level of function.     Clinical Decision Making (Complexity):  Clinical Presentation: Stable/Uncomplicated  Clinical Presentation Rationale: based on medical and personal factors listed in PT evaluation  Clinical Decision Making (Complexity): Low complexity    PLAN OF CARE  Treatment Interventions:  Modalities: Ultrasound  Interventions: Manual Therapy, Neuromuscular Re-education, Therapeutic Activity, Therapeutic Exercise    Long Term Goals     PT Goal 1  Goal Identifier: Pain  Goal Description: Pt will note average LBP level decrease from a 4/10 to a 2/10 or less so she will no longer wake 2-3x per night due to pain but 50% less often or better  Target Date: 12/19/23  PT Goal 2  Goal Identifier: Function  Goal Description: Pt will note a decrease LBP and LE sx and carry over  to improved functional level as measured by KEVIN score decrease from 54% to 34% or less  Target Date: 12/19/23      Frequency of Treatment: 1x per week  Duration of Treatment: 90 days, decrease as able    Recommended Referrals to Other Professionals:   Education Assessment:   Learner/Method: Patient;Listening;Reading;Demonstration;Pictures/Video;No Barriers to Learning  Education Comments: home program    Risks and benefits of evaluation/treatment have been explained.   Patient/Family/caregiver agrees with Plan of Care.     Evaluation Time:     PT Eval, Low Complexity Minutes (69864): 35       Signing Clinician: Ronald Rodríguez PT      [facility]                                                                                   OUTPATIENT PHYSICAL THERAPY      PLAN OF TREATMENT FOR OUTPATIENT REHABILITATION   Patient's Last Name, First Name, Niesha Norwood YOB: 1950   Provider's Name   [facility]   Medical Record No.  [id number]     Onset Date: 05/05/23  Start of Care Date: 10/19/23     Medical Diagnosis:  Chronic bilateral low back pain without sciatica      PT Treatment Diagnosis:  mechanical low back pain with L>R LE pain/referral Plan of Treatment  Frequency/Duration: 1x per week/ 90 days, decrease as able    Certification date from 10/19/23 to 01/16/24         See note for plan of treatment details and functional goals     Ronald Rodríguez, PT                         I CERTIFY THE NEED FOR THESE SERVICES FURNISHED UNDER        THIS PLAN OF TREATMENT AND WHILE UNDER MY CARE     (Physician attestation of this document indicates review and certification of the therapy plan).                Referring Provider:  Apolinar Cho      Initial Assessment  See Epic Evaluation- Start of Care Date: 10/19/23

## 2023-10-28 ENCOUNTER — HEALTH MAINTENANCE LETTER (OUTPATIENT)
Age: 73
End: 2023-10-28

## 2023-10-30 ENCOUNTER — TELEPHONE (OUTPATIENT)
Dept: SURGERY | Facility: CLINIC | Age: 73
End: 2023-10-30
Payer: COMMERCIAL

## 2023-11-01 ENCOUNTER — TELEPHONE (OUTPATIENT)
Dept: SURGERY | Facility: CLINIC | Age: 73
End: 2023-11-01
Payer: COMMERCIAL

## 2023-11-02 ENCOUNTER — THERAPY VISIT (OUTPATIENT)
Dept: PHYSICAL THERAPY | Facility: CLINIC | Age: 73
End: 2023-11-02
Attending: INTERNAL MEDICINE
Payer: COMMERCIAL

## 2023-11-02 DIAGNOSIS — M54.50 CHRONIC BILATERAL LOW BACK PAIN WITHOUT SCIATICA: Primary | ICD-10-CM

## 2023-11-02 DIAGNOSIS — G89.29 CHRONIC BILATERAL LOW BACK PAIN WITHOUT SCIATICA: Primary | ICD-10-CM

## 2023-11-02 PROCEDURE — 97035 APP MDLTY 1+ULTRASOUND EA 15: CPT | Mod: GP | Performed by: PHYSICAL THERAPIST

## 2023-11-02 PROCEDURE — 97110 THERAPEUTIC EXERCISES: CPT | Mod: GP | Performed by: PHYSICAL THERAPIST

## 2023-11-02 PROCEDURE — 97530 THERAPEUTIC ACTIVITIES: CPT | Mod: GP | Performed by: PHYSICAL THERAPIST

## 2023-11-07 ENCOUNTER — PHARMACY VISIT (OUTPATIENT)
Dept: PHARMACY | Facility: CLINIC | Age: 73
End: 2023-11-07
Payer: COMMERCIAL

## 2023-11-07 NOTE — PROGRESS NOTES
"Rheumatoid Arthritis Clinical Follow Up Assessment     Activity Medications    ORENCIA     Summary Notes  Spoke with Niesha for assessment. Current Regimen: Orencia 125mg every week, hydroxychloroquine daily     Adherence: PDC = 0.96. Denies missed doses.     Tolerability: Denies SE/ISR today.     RA: RAPID3=11.6, Pain 4, Overall 3. Similar to baseline when starting Orencia. Hands and fingers are \"really bad\" and they still wake her up at night with awful pain. \"Can hardly function\" with her hands for 30-60 mins after waking up. Gets up and washes dishes with warm water to get her hands loosened up. Knuckles are swollen and fingers are getting more bent. Doesnt think she has seen positive changes at all from the Orencia. She has follow up on 1/4 to discuss the plan with rheumatologist. In the meantime, she is doing PT for her lower back and is happy to be working on that.     Follow up: After rheum appt      Care Details   ? RAPID-3    Dress yourself, including tying shoelaces and doing buttons?   ? With some difficulty         Get in and out of bed?   ? With some difficulty         Lift a full cup or glass to your mouth?   ? Without any difficulty         Walk outdoors on flat ground?   ? With much difficulty         Wash and dry your entire body?   ? With some difficulty         Bend down to  clothing from the floor?   ? With much difficulty         Turn regular faucets on and off?   ? Without any difficulty         Get in and out of a car, bus, train or airplane?   ? With some difficulty         Walk two miles or three kilometers, if you wish?   ? Unable to do    Participate in recreational activities and sports as you would like, if you wish?   ? Unable to do    How much pain have you had because of your condition over the past week? Please indicate how severe your pain has been, on the scale from 0-10 scale (with 0 being no pain and 10 being pain as bad as it could be)   ? 4.0          Considering all the " ways in which illness and health conditions may affect you at this time, please indicate how you are doing on the scale of 0-10 (with 0 being very well and 10 being very poorly)   ? 3.0          Please enter the RAPID-3 score. [QA Metric]   ? 11.6      Jocelyn Jimenez, PharmD  Therapy Management Pharmacist  [facility]

## 2023-11-09 ASSESSMENT — ENCOUNTER SYMPTOMS
JOINT SWELLING: 0
ARTHRALGIAS: 1
SORE THROAT: 0
FREQUENCY: 0
DIARRHEA: 0
HEARTBURN: 0
HEMATURIA: 0
ABDOMINAL PAIN: 0
EYE PAIN: 0
DYSURIA: 0
CONSTIPATION: 1
BREAST MASS: 0
MYALGIAS: 0
FEVER: 0
PARESTHESIAS: 0
SHORTNESS OF BREATH: 0
PALPITATIONS: 0
HEADACHES: 0
DIZZINESS: 0
COUGH: 0
WEAKNESS: 1
NAUSEA: 0
NERVOUS/ANXIOUS: 0
CHILLS: 0
HEMATOCHEZIA: 1

## 2023-11-09 ASSESSMENT — ACTIVITIES OF DAILY LIVING (ADL): CURRENT_FUNCTION: HOUSEWORK REQUIRES ASSISTANCE

## 2023-11-13 ENCOUNTER — THERAPY VISIT (OUTPATIENT)
Dept: PHYSICAL THERAPY | Facility: CLINIC | Age: 73
End: 2023-11-13
Attending: INTERNAL MEDICINE
Payer: COMMERCIAL

## 2023-11-13 DIAGNOSIS — M54.50 CHRONIC BILATERAL LOW BACK PAIN WITHOUT SCIATICA: Primary | ICD-10-CM

## 2023-11-13 DIAGNOSIS — G89.29 CHRONIC BILATERAL LOW BACK PAIN WITHOUT SCIATICA: Primary | ICD-10-CM

## 2023-11-13 PROCEDURE — 97110 THERAPEUTIC EXERCISES: CPT | Mod: GP | Performed by: PHYSICAL THERAPIST

## 2023-11-13 PROCEDURE — 97035 APP MDLTY 1+ULTRASOUND EA 15: CPT | Mod: GP | Performed by: PHYSICAL THERAPIST

## 2023-11-13 PROCEDURE — 97530 THERAPEUTIC ACTIVITIES: CPT | Mod: GP | Performed by: PHYSICAL THERAPIST

## 2023-11-16 ENCOUNTER — OFFICE VISIT (OUTPATIENT)
Dept: FAMILY MEDICINE | Facility: OTHER | Age: 73
End: 2023-11-16
Payer: COMMERCIAL

## 2023-11-16 VITALS
HEART RATE: 54 BPM | WEIGHT: 145.5 LBS | TEMPERATURE: 98 F | RESPIRATION RATE: 18 BRPM | HEIGHT: 63 IN | DIASTOLIC BLOOD PRESSURE: 80 MMHG | OXYGEN SATURATION: 99 % | BODY MASS INDEX: 25.78 KG/M2 | SYSTOLIC BLOOD PRESSURE: 122 MMHG

## 2023-11-16 DIAGNOSIS — Z00.00 ENCOUNTER FOR MEDICARE ANNUAL WELLNESS EXAM: Primary | ICD-10-CM

## 2023-11-16 DIAGNOSIS — R21 RASH: ICD-10-CM

## 2023-11-16 DIAGNOSIS — M79.7 FIBROMYALGIA: ICD-10-CM

## 2023-11-16 DIAGNOSIS — F41.1 ANXIETY STATE: ICD-10-CM

## 2023-11-16 DIAGNOSIS — I10 BENIGN ESSENTIAL HYPERTENSION: ICD-10-CM

## 2023-11-16 DIAGNOSIS — H91.93 DECREASED HEARING OF BOTH EARS: ICD-10-CM

## 2023-11-16 PROCEDURE — 99214 OFFICE O/P EST MOD 30 MIN: CPT | Mod: 25 | Performed by: PHYSICIAN ASSISTANT

## 2023-11-16 PROCEDURE — G0439 PPPS, SUBSEQ VISIT: HCPCS | Performed by: PHYSICIAN ASSISTANT

## 2023-11-16 RX ORDER — GABAPENTIN 100 MG/1
100-300 CAPSULE ORAL
Qty: 90 CAPSULE | Refills: 2 | Status: SHIPPED | OUTPATIENT
Start: 2023-11-16 | End: 2024-03-18

## 2023-11-16 RX ORDER — LISINOPRIL 10 MG/1
10 TABLET ORAL DAILY
Qty: 90 TABLET | Refills: 3 | Status: SHIPPED | OUTPATIENT
Start: 2023-11-16

## 2023-11-16 ASSESSMENT — ENCOUNTER SYMPTOMS
SORE THROAT: 0
FREQUENCY: 0
PARESTHESIAS: 0
DIZZINESS: 0
NERVOUS/ANXIOUS: 0
MYALGIAS: 0
HEADACHES: 0
NAUSEA: 0
HEMATOCHEZIA: 1
ARTHRALGIAS: 1
BREAST MASS: 0
ABDOMINAL PAIN: 0
WEAKNESS: 1
HEARTBURN: 0
EYE PAIN: 0
HEMATURIA: 0
FEVER: 0
COUGH: 0
DIARRHEA: 0
CHILLS: 0
SHORTNESS OF BREATH: 0
CONSTIPATION: 1
JOINT SWELLING: 0
PALPITATIONS: 0
DYSURIA: 0

## 2023-11-16 ASSESSMENT — ANXIETY QUESTIONNAIRES
GAD7 TOTAL SCORE: 5
6. BECOMING EASILY ANNOYED OR IRRITABLE: SEVERAL DAYS
IF YOU CHECKED OFF ANY PROBLEMS ON THIS QUESTIONNAIRE, HOW DIFFICULT HAVE THESE PROBLEMS MADE IT FOR YOU TO DO YOUR WORK, TAKE CARE OF THINGS AT HOME, OR GET ALONG WITH OTHER PEOPLE: SOMEWHAT DIFFICULT
7. FEELING AFRAID AS IF SOMETHING AWFUL MIGHT HAPPEN: NOT AT ALL
3. WORRYING TOO MUCH ABOUT DIFFERENT THINGS: SEVERAL DAYS
5. BEING SO RESTLESS THAT IT IS HARD TO SIT STILL: NOT AT ALL
2. NOT BEING ABLE TO STOP OR CONTROL WORRYING: SEVERAL DAYS
1. FEELING NERVOUS, ANXIOUS, OR ON EDGE: MORE THAN HALF THE DAYS
GAD7 TOTAL SCORE: 5
4. TROUBLE RELAXING: NOT AT ALL

## 2023-11-16 ASSESSMENT — ACTIVITIES OF DAILY LIVING (ADL): CURRENT_FUNCTION: HOUSEWORK REQUIRES ASSISTANCE

## 2023-11-16 ASSESSMENT — PATIENT HEALTH QUESTIONNAIRE - PHQ9
10. IF YOU CHECKED OFF ANY PROBLEMS, HOW DIFFICULT HAVE THESE PROBLEMS MADE IT FOR YOU TO DO YOUR WORK, TAKE CARE OF THINGS AT HOME, OR GET ALONG WITH OTHER PEOPLE: NOT DIFFICULT AT ALL
SUM OF ALL RESPONSES TO PHQ QUESTIONS 1-9: 6
SUM OF ALL RESPONSES TO PHQ QUESTIONS 1-9: 6

## 2023-11-16 ASSESSMENT — PAIN SCALES - GENERAL: PAINLEVEL: MILD PAIN (2)

## 2023-11-16 NOTE — PROGRESS NOTES
"SUBJECTIVE:   Niesha is a 73 year old, presenting for the following:  Physical        11/16/2023     8:13 AM   Additional Questions   Roomed by Nicki   Accompanied by Self         11/16/2023     8:13 AM   Patient Reported Additional Medications   Patient reports taking the following new medications NA       Are you in the first 12 months of your Medicare coverage?  No    Healthy Habits:     In general, how would you rate your overall health?  Fair    Frequency of exercise:  6-7 days/week    Duration of exercise:  Greater than 60 minutes    Do you usually eat at least 4 servings of fruit and vegetables a day, include whole grains    & fiber and avoid regularly eating high fat or \"junk\" foods?  Yes    Taking medications regularly:  Yes    Barriers to taking medications:  None    Medication side effects:  Other    Ability to successfully perform activities of daily living:  Housework requires assistance    Home Safety:  No safety concerns identified    Hearing Impairment:  Difficulty following a conversation in a noisy restaurant or crowded room and need to ask people to speak up or repeat themselves    In the past 6 months, have you been bothered by leaking of urine?  No    In general, how would you rate your overall mental or emotional health?  Good    Additional concerns today:  Yes    Niesha is a 73 year-old female who came in today for her annual wellness exam and concerns of a rash, trouble sleeping, and chronic back pain:     -She has a rash that began about 6 months ago. She describes it as small red patches that are flaky and have been spreading from her legs to her lower back. She denies burning sensation and states that it does not itch when it first appears but starts to get slightly itchy afterwards. She has not tried anything, such as emollients, for the rash. She is concerned that one of her new medication (Orencia or Vemlidy) might be causing it.    -She has also been having troubles sleeping, which has " been an ongoing concern. She is able to fall asleep, but has trouble maintaining it. She goes through cycles of 45 min asleep and then waking up. Due to this, she only got about 3 hours of sleep last night. She is starting to feel the affects during the day and when she is driving. Therefore, she is hoping to get something today to help her with maintaining sleep at night.     -She has chronic low back pain that she has been managing well with physical therapy and warm compresses.            5/21/2021    11:04 AM 6/23/2023     8:36 AM 11/16/2023     8:07 AM   ALFREDO-7 SCORE   Total Score 14 (moderate anxiety)  5 (mild anxiety)   Total Score 14 2 5           5/30/2023    11:10 AM 6/23/2023     8:36 AM 11/16/2023     8:06 AM   PHQ   PHQ-9 Total Score 4 9 6   Q9: Thoughts of better off dead/self-harm past 2 weeks Not at all Not at all Not at all         Today's PHQ-9 Score:       11/16/2023     8:06 AM   PHQ-9 SCORE   PHQ-9 Total Score MyChart 6 (Mild depression)   PHQ-9 Total Score 6       Have you ever done Advance Care Planning? (For example, a Health Directive, POLST, or a discussion with a medical provider or your loved ones about your wishes): Yes, advance care planning is on file.      Fall risk  Fallen 2 or more times in the past year?: Yes  Any fall with injury in the past year?: Yes    Timed test: : 8 seconds    Cognitive Screening   1) Repeat 3 items (Leader, Season, Table)    2) Clock draw: NORMAL  3) 3 item recall: Recalls 3 objects  Results: 3 items recalled: COGNITIVE IMPAIRMENT LESS LIKELY    Mini-CogTM Copyright S Brenda. Licensed by the author for use in [facility]; reprinted with permission ([email or web address]). All rights reserved.      Do you have sleep apnea, excessive snoring or daytime drowsiness? : no    Reviewed and updated as needed this visit by clinical staff   Tobacco  Allergies  Meds  Problems  Med Hx  Surg Hx  Fam Hx          Reviewed and updated as needed this visit by  Provider   Tobacco  Allergies  Meds  Problems  Med Hx  Surg Hx  Fam Hx           Social History     Tobacco Use    Smoking status: Former     Packs/day: 0.75     Years: 10.00     Additional pack years: 0.00     Total pack years: 7.50     Types: Cigarettes     Start date: 1968     Quit date: 1978     Years since quittin.0     Passive exposure: Never    Smokeless tobacco: Never    Tobacco comments:     Quit 45 years ago   Substance Use Topics    Alcohol use: No           2023     2:58 PM   Alcohol Use   Prescreen: >3 drinks/day or >7 drinks/week? Not Applicable     Do you have a current opioid prescription? No  Do you use any other controlled substances or medications that are not prescribed by a provider? None    Hypertension Follow-up    Do you check your blood pressure regularly outside of the clinic? No   Are you following a low salt diet? Yes  Are your blood pressures ever more than 140 on the top number (systolic) OR more   than 90 on the bottom number (diastolic), for example 140/90? Yes    Current providers sharing in care for this patient include:   Patient Care Team:  Tanika Clark MD as PCP - General  Dolores Velásquez APRN CNP (Nurse Practitioner)  Tanika Clark MD as Assigned PCP  Apolinar Cho MD as Assigned Rheumatology Provider  Saravanan Ricardo MD as Assigned Neuroscience Provider  David John MD as Assigned Musculoskeletal Provider  Tristan Chua PA-C as Physician Assistant (Gastroenterology)  Marlyn Augustine MD as MD (Cardiovascular & Thoracic Surgery)  Marlyn Augustine MD as Assigned Surgical Provider    The following health maintenance items are reviewed in Epic and correct as of today:  Health Maintenance   Topic Date Due    MEDICARE ANNUAL WELLNESS VISIT  2023    Anal Microscopy (high resolution anoscopy)  2023    COVID-19 Vaccine ( season) 2023    COLORECTAL CANCER SCREENING  2024     PHQ-9  05/16/2024    ANNUAL REVIEW OF HM ORDERS  06/23/2024    MAMMO SCREENING  07/18/2024    LIPID  08/09/2024    FALL RISK ASSESSMENT  11/16/2024    DTAP/TDAP/TD IMMUNIZATION (3 - Td or Tdap) 09/21/2025    ADVANCE CARE PLANNING  07/05/2027    DEXA  01/16/2038    HEPATITIS C SCREENING  Completed    DEPRESSION ACTION PLAN  Completed    INFLUENZA VACCINE  Completed    Pneumococcal Vaccine: 65+ Years  Completed    ZOSTER IMMUNIZATION  Completed    RSV VACCINE (Pregnancy & 60+)  Completed    IPV IMMUNIZATION  Aged Out    HPV IMMUNIZATION  Aged Out    MENINGITIS IMMUNIZATION  Aged Out    RSV MONOCLONAL ANTIBODY  Aged Out     Lab work is in process  Labs reviewed in EPIC  BP Readings from Last 3 Encounters:   11/16/23 122/80   10/12/23 125/77   10/10/23 (!) 153/93    Wt Readings from Last 3 Encounters:   11/16/23 66 kg (145 lb 8 oz)   10/12/23 64.3 kg (141 lb 11.2 oz)   09/18/23 64.4 kg (142 lb)           Review of Systems   Constitutional:  Negative for chills and fever.   HENT:  Negative for congestion, ear pain, hearing loss and sore throat.    Eyes:  Negative for pain and visual disturbance.   Respiratory:  Negative for cough and shortness of breath.    Cardiovascular:  Negative for chest pain, palpitations and peripheral edema.   Gastrointestinal:  Positive for constipation and hematochezia. Negative for abdominal pain, diarrhea, heartburn and nausea.   Breasts:  Negative for tenderness, breast mass and discharge.   Genitourinary:  Negative for dysuria, frequency, genital sores, hematuria, pelvic pain, urgency, vaginal bleeding and vaginal discharge.   Musculoskeletal:  Positive for arthralgias. Negative for joint swelling and myalgias.   Skin:  Positive for rash.   Neurological:  Positive for weakness. Negative for dizziness, headaches and paresthesias.   Psychiatric/Behavioral:  Negative for mood changes. The patient is not nervous/anxious.        OBJECTIVE:   /80   Pulse 54   Temp 98  F (36.7  C)  "(Temporal)   Resp 18   Ht 1.605 m (5' 3.19\")   Wt 66 kg (145 lb 8 oz)   LMP 11/27/2003   SpO2 99%   BMI 25.62 kg/m   Estimated body mass index is 25.62 kg/m  as calculated from the following:    Height as of this encounter: 1.605 m (5' 3.19\").    Weight as of this encounter: 66 kg (145 lb 8 oz).  Physical Exam  Constitutional:       General: She is not in acute distress.     Appearance: She is well-developed.   HENT:      Right Ear: External ear normal. No middle ear effusion. There is no impacted cerumen. Tympanic membrane is not injected, perforated, erythematous or bulging.      Left Ear: External ear normal.  No middle ear effusion. There is no impacted cerumen. Tympanic membrane is not injected, perforated, erythematous or bulging.      Nose: Nose normal. No mucosal edema or rhinorrhea.      Mouth/Throat:      Dentition: Normal dentition.      Tongue: No lesions. Tongue does not deviate from midline.      Palate: No lesions.      Pharynx: No pharyngeal swelling, oropharyngeal exudate or posterior oropharyngeal erythema.      Tonsils: No tonsillar exudate or tonsillar abscesses.   Eyes:      General: Lids are normal.         Right eye: No discharge.         Left eye: No discharge.      Conjunctiva/sclera: Conjunctivae normal.      Right eye: Right conjunctiva is not injected.      Left eye: Left conjunctiva is not injected.      Pupils: Pupils are equal, round, and reactive to light.   Neck:      Thyroid: No thyroid mass or thyromegaly.      Vascular: No JVD.      Trachea: Trachea normal. No tracheal deviation.   Cardiovascular:      Rate and Rhythm: Normal rate and regular rhythm.      Heart sounds: Normal heart sounds. No murmur heard.     No friction rub. No gallop.   Pulmonary:      Effort: Pulmonary effort is normal. No respiratory distress.      Breath sounds: Normal breath sounds. No stridor or decreased air movement. No wheezing, rhonchi or rales.   Abdominal:      General: Bowel sounds are normal. " There is no distension.      Palpations: Abdomen is soft. There is no mass.      Tenderness: There is no abdominal tenderness. There is no guarding or rebound.      Hernia: No hernia is present.   Musculoskeletal:         General: Normal range of motion.      Cervical back: Normal range of motion and neck supple.   Lymphadenopathy:      Cervical: No cervical adenopathy.   Skin:     General: Skin is warm and dry.      Comments: Occasional faint circular spots in clusters up legs, not erythematous, no scaling, borders are flat    Neurological:      Mental Status: She is alert and oriented to person, place, and time.   Psychiatric:         Behavior: Behavior normal.         Thought Content: Thought content normal.         Judgment: Judgment normal.         Diagnostic Test Results:  Labs reviewed in Epic  See    ASSESSMENT / PLAN:       ICD-10-CM    1. Encounter for Medicare annual wellness exam  Z00.00       2. Benign essential hypertension  I10 lisinopril (ZESTRIL) 10 MG tablet     OFFICE/OUTPT VISIT,EST,LEVL IV      3. Fibromyalgia  M79.7 gabapentin (NEURONTIN) 100 MG capsule     OFFICE/OUTPT VISIT,EST,LEVL IV      4. Decreased hearing of both ears  H91.93 Adult Audiology  Referral      5. Anxiety state  F41.1 OFFICE/OUTPT VISIT,EST,LEVL IV      6. Rash  R21 OFFICE/OUTPT VISIT,EST,LEVL IV          Patient has been advised of split billing requirements and indicates understanding: Yes    - Chronic problems      Follows closely with specialty teams     Psychiatry, Counseling, Colorectal, Rheumatology     - Only medication she gets from PCP is Lisinopril for her HTN      BP well controlled today     Reviewed medication use and side effects, refilled      CMP reviewed done last month    - Patient reports due to her mood and pain is not getting any sleep      Failed on Amitriptyline and Trazodone in the past      States she doesn't do well with anti-depressants      Recommend trial of Gabapentin - discussed use  "and side effects      Recheck 4-6 weeks      If fails on this, could try Seroquel     - Rash     Doesn't seem consistent with bacterial or fungal infection, not consistent with a dermatitis either      Doesn't itch or hurt patient      Very faint and hard to see on exam      Recommend discuss possible medication reaction with her RA team     - Referral placed for hearing evaluation       COUNSELING:  Reviewed preventive health counseling, as reflected in patient instructions  Special attention given to:       Regular exercise       Healthy diet/nutrition       Hearing screening    BMI:   Estimated body mass index is 25.62 kg/m  as calculated from the following:    Height as of this encounter: 1.605 m (5' 3.19\").    Weight as of this encounter: 66 kg (145 lb 8 oz).     She reports that she quit smoking about 45 years ago. Her smoking use included cigarettes. She started smoking about 55 years ago. She has a 7.50 pack-year smoking history. She has never been exposed to tobacco smoke. She has never used smokeless tobacco.    Appropriate preventive services were discussed with this patient, including applicable screening as appropriate for fall prevention, nutrition, physical activity, Tobacco-use cessation, weight loss and cognition.  Checklist reviewing preventive services available has been given to the patient.    Reviewed patients plan of care and provided an AVS. The Basic Care Plan (routine screening as documented in Health Maintenance) for Niesha meets the Care Plan requirement. This Care Plan has been established and reviewed with the Patient.    Review of the result(s) of each unique test - See list           PHQ9 & GAD7         10/10/23 - CMP   Diagnosis or treatment significantly limited by social determinants of health - None     30 minutes spent on the date of the encounter doing chart review, history and exam, documentation and further activities as noted above    The patient indicates understanding of " these issues and agrees with the plan.    I, William Craft PA-C,  was present with the PA student who participated in the service and in the documentation of the note.  I have verified the history and personally performed the physical exam and medical decision making.  I agree with the assessment and plan of care as documented in the note.     FELIPE De Santiago   [facility] LISETTE Craft PA-C  [facility]    Identified Health Risks:  I have reviewed Opioid Use Disorder and Substance Use Disorder risk factors and made any needed referrals.   Answers submitted by the patient for this visit:    The patient was provided with suggestions to help her develop a healthy physical lifestyle.  The patient reports that she has difficulty with activities of daily living. I have asked that the patient make a follow up appointment in 53 weeks where this issue will be further evaluated and addressed.  The patient was provided with written information regarding signs of hearing loss.  The patient's PHQ-9 score is consistent with mild depression. She was provided with information regarding depression and was advised to schedule a follow up appointment in 4-6 weeks to further address this issue.

## 2023-11-16 NOTE — PATIENT INSTRUCTIONS
Trial of Gabapentin for sleep      1-3 tablets at night   - Recheck 4-6 weeks           Patient Education   Personalized Prevention Plan  You are due for the preventive services outlined below.  Your care team is available to assist you in scheduling these services.  If you have already completed any of these items, please share that information with your care team to update in your medical record.  Health Maintenance Due   Topic Date Due    Annual Wellness Visit  07/05/2023    Anal Microscopy (high resolution anoscopy)  09/09/2023     Your Health Risk Assessment indicates you feel you are not in good health    A healthy lifestyle helps keep the body fit and the mind alert. It helps protect you from disease, helps you fight disease, and helps prevent chronic disease (disease that doesn't go away) from getting worse. This is important as you get older and begin to notice twinges in muscles and joints and a decline in the strength and stamina you once took for granted. A healthy lifestyle includes good healthcare, good nutrition, weight control, recreation, and regular exercise. Avoid harmful substances and do what you can to keep safe. Another part of a healthy lifestyle is stay mentally active and socially involved.    Good healthcare   Have a wellness visit every year.   If you have new symptoms, let us know right away. Don't wait until the next checkup.   Take medicines exactly as prescribed and keep your medicines in a safe place. Tell us if your medicine causes problems.   Healthy diet and weight control   Eat 3 or 4 small, nutritious, low-fat, high-fiber meals a day. Include a variety of fruits, vegetables, and whole-grain foods.   Make sure you get enough calcium in your diet. Calcium, vitamin D, and exercise help prevent osteoporosis (bone thinning).   If you live alone, try eating with others when you can. That way you get a good meal and have company while you eat it.   Try to keep a healthy weight. If  you eat more calories than your body uses for energy, it will be stored as fat and you will gain weight.     Recreation   Recreation is not limited to sports and team events. It includes any activity that provides relaxation, interest, enjoyment, and exercise. Recreation provides an outlet for physical, mental, and social energy. It can give a sense of worth and achievement. It can help you stay healthy.    Mental Exercise and Social Involvement  Mental and emotional health is as important as physical health. Keep in touch with friends and family. Stay as active as possible. Continue to learn and challenge yourself.   Things you can do to stay mentally active are:  Learn something new, like a foreign language or musical instrument.   Play SCRABBLE or do crossword puzzles. If you cannot find people to play these games with you at home, you can play them with others on your computer through the Internet.   Join a games club--anything from card games to chess or checkers or lawn bowling.   Start a new hobby.   Go back to school.   Volunteer.   Read.   Keep up with world events.  Activities of Daily Living    Your Health Risk Assessment indicates you have difficulties with activities of daily living such as housework, bathing, preparing meals, taking medication, etc. Please make a follow up appointment for us to address this issue in more detail.  Hearing Loss: Care Instructions  Overview     Hearing loss is a sudden or slow decrease in how well you hear. It can range from slight to profound. Permanent hearing loss can occur with aging. It also can happen when you are exposed long-term to loud noise. Examples include listening to loud music, riding motorcycles, or being around other loud machines.  Hearing loss can affect your work and home life. It can make you feel lonely or depressed. You may feel that you have lost your independence. But hearing aids and other devices can help you hear better and feel connected to  others.  Follow-up care is a key part of your treatment and safety. Be sure to make and go to all appointments, and call your doctor if you are having problems. It's also a good idea to know your test results and keep a list of the medicines you take.  How can you care for yourself at home?  Avoid loud noises whenever possible. This helps keep your hearing from getting worse.  Always wear hearing protection around loud noises.  Wear a hearing aid as directed.  A professional can help you pick a hearing aid that will work best for you.  You can also get hearing aids over the counter for mild to moderate hearing loss.  Have hearing tests as your doctor suggests. They can show whether your hearing has changed. Your hearing aid may need to be adjusted.  Use other devices as needed. These may include:  Telephone amplifiers and hearing aids that can connect to a television, stereo, radio, or microphone.  Devices that use lights or vibrations. These alert you to the doorbell, a ringing telephone, or a baby monitor.  Television closed-captioning. This shows the words at the bottom of the screen. Most new TVs can do this.  TTY (text telephone). This lets you type messages back and forth on the telephone instead of talking or listening. These devices are also called TDD. When messages are typed on the keyboard, they are sent over the phone line to a receiving TTY. The message is shown on a monitor.  Use text messaging, social media, and email if it is hard for you to communicate by telephone.  Try to learn a listening technique called speechreading. It is not lipreading. You pay attention to people's gestures, expressions, posture, and tone of voice. These clues can help you understand what a person is saying. Face the person you are talking to, and have them face you. Make sure the lighting is good. You need to see the other person's face clearly.  Think about counseling if you need help to adjust to your hearing loss.  When  "should you call for help?  Watch closely for changes in your health, and be sure to contact your doctor if:    You think your hearing is getting worse.     You have new symptoms, such as dizziness or nausea.   Where can you learn more?  Go to https://www.[employer].net/patiented  Enter R798 in the search box to learn more about \"Hearing Loss: Care Instructions.\"  Current as of: February 28, 2023               Content Version: 13.8    2029-6097 [employer].   Care instructions adapted under license by your healthcare professional. If you have questions about a medical condition or this instruction, always ask your healthcare professional. [employer] disclaims any warranty or liability for your use of this information.      Learning About Depression Screening  What is depression screening?  Depression screening is a way to see if you have depression symptoms. It may be done by a doctor or counselor. It's often part of a routine checkup. That's because your mental health is just as important as your physical health.  Depression is a mental health condition that affects how you feel, think, and act. You may:  Have less energy.  Lose interest in your daily activities.  Feel sad and grouchy for a long time.  Depression is very common. It affects people of all ages.  Many things can lead to depression. Some people become depressed after they have a stroke or find out they have a major illness like cancer or heart disease. The death of a loved one or a breakup may lead to depression. It can run in families. Most experts believe that a combination of inherited genes and stressful life events can cause it.  What happens during screening?  You may be asked to fill out a form about your depression symptoms. You and the doctor will discuss your answers. The doctor may ask you more questions to learn more about how you think, act, and feel.  What happens after screening?  If you have symptoms of " "depression, your doctor will talk to you about your options.  Doctors usually treat depression with medicines or counseling. Often, combining the two works best. Many people don't get help because they think that they'll get over the depression on their own. But people with depression may not get better unless they get treatment.  The cause of depression is not well understood. There may be many factors involved. But if you have depression, it's not your fault.  A serious symptom of depression is thinking about death or suicide. If you or someone you care about talks about this or about feeling hopeless, get help right away.  It's important to know that depression can be treated. Medicine, counseling, and self-care may help.  Where can you learn more?  Go to https://www.[employer].net/patiented  Enter T185 in the search box to learn more about \"Learning About Depression Screening.\"  Current as of: June 25, 2023               Content Version: 13.8    9420-4248 [employer].   Care instructions adapted under license by your healthcare professional. If you have questions about a medical condition or this instruction, always ask your healthcare professional. [employer] disclaims any warranty or liability for your use of this information.         "

## 2023-11-28 ENCOUNTER — MYC MEDICAL ADVICE (OUTPATIENT)
Dept: FAMILY MEDICINE | Facility: OTHER | Age: 73
End: 2023-11-28
Payer: COMMERCIAL

## 2023-11-29 ENCOUNTER — TELEPHONE (OUTPATIENT)
Dept: FAMILY MEDICINE | Facility: CLINIC | Age: 73
End: 2023-11-29

## 2023-11-29 ENCOUNTER — VIRTUAL VISIT (OUTPATIENT)
Dept: FAMILY MEDICINE | Facility: CLINIC | Age: 73
End: 2023-11-29
Payer: COMMERCIAL

## 2023-11-29 ENCOUNTER — NURSE TRIAGE (OUTPATIENT)
Dept: FAMILY MEDICINE | Facility: OTHER | Age: 73
End: 2023-11-29
Payer: COMMERCIAL

## 2023-11-29 DIAGNOSIS — U07.1 POSITIVE SELF-ADMINISTERED ANTIGEN TEST FOR COVID-19: Primary | ICD-10-CM

## 2023-11-29 DIAGNOSIS — R06.2 WHEEZE: ICD-10-CM

## 2023-11-29 PROCEDURE — 99213 OFFICE O/P EST LOW 20 MIN: CPT | Mod: VID | Performed by: NURSE PRACTITIONER

## 2023-11-29 RX ORDER — ALBUTEROL SULFATE 90 UG/1
2 AEROSOL, METERED RESPIRATORY (INHALATION) EVERY 4 HOURS PRN
Qty: 18 G | Refills: 0 | Status: SHIPPED | OUTPATIENT
Start: 2023-11-29 | End: 2024-01-22

## 2023-11-29 NOTE — COMMUNITY RESOURCES LIST (ENGLISH)
11/29/2023   [facility]  N/A  For questions about this resource list or additional care needs, please contact your primary care clinic or care manager.  Phone: [phone]   Email: N/A   Address: [address] [place] [id number]   Hours: N/A        Transportation       Free or low-cost transportation  1  [facility] [employer] Distance: 12.13 miles      In-Person   [address] [place] [address]  Language: English  Hours: Mon - Fri 6:00 AM - 6:00 PM  Fees: Insurance, Self Pay   Phone: (694) 535-7825 Email: info@[employer] Website: http://www.Clinicbook     Transportation to medical appointments  2  [facility] [employer] Distance: 12.13 miles      In-Person   [address] [place] [address]  Language: English  Hours: Mon - Fri 6:00 AM - 6:00 PM  Fees: Insurance, Self Pay   Phone: (225) 615-8640 Email: info@[employer] Website: http://www.Clinicbook     3  [facility] Distance: 18.52 miles      In-Person   [address] [place] [address]  Language: English  Hours: Mon - Thu 9:00 AM - 4:30 PM  Fees: Free   Phone: (942) 858-5900 Email: info@"[employer] Website: http://www."[employer]          Important Numbers & Websites       Emergency Services   911  [facility]   311  Poison Control   (435) 917-3246  Suicide Prevention Lifeline   (780) 424-1989 (TALK)  Child Abuse Hotline   (607) 194-6087 (4-A-Child)  Sexual Assault Hotline   (633) 639-4636 (HOPE)  National Runaway Safeline   (306) 946-1408 (RUNAWAY)  All-Options Talkline   (472) 370-1696  Substance Abuse Referral   (922) 974-2515 (HELP)

## 2023-11-29 NOTE — PROGRESS NOTES
Niesha is a 73 year old who is being evaluated via a billable video visit.      How would you like to obtain your AVS? MyChart  If the video visit is dropped, the invitation should be resent by: Send to e-mail at: [email or web address]  Will anyone else be joining your video visit? No          Assessment & Plan     Positive self-administered antigen test for COVID-19  Patient presents patient presenting on day 4 of symptoms for COVID.  She tested positive on day 2 and 3 of her current illness.  She is exhibiting multiple signs of COVID-19 most severely patient reports chest congestion and headache.  Discussed with patient over-the-counter analgesics for head pain.  Discussed increased hydration and rest.  Given her chest congestion and self-reported wheeze will give albuterol inhaler prescription today.  Discussed with patient how to apply this.  Patient should hold in front of the lips followed by breathing in all of her air out and then taking a deep breath in as she charges the albuterol.  She should do 2 puffs every 4-6 hours as needed for shortness of breath or wheezing.  Patient can take over-the-counter pseudoephedrine to reduce nasal congestion.    Discussed starting Paxlovid.  Reviewed chart and interactions with other medications as well as renal function.  Appropriate to take full dose of 3 capsules twice a day for 5 days.  Discussed the importance of starting this by tomorrow.  Patient does voice concerns around getting her medication as she does not currently drive.  She does feel she can get her son to  the medication for her and bring it for her by tomorrow.  Discussed side effects of Paxlovid with patient    Discussed signs and symptoms of respiratory distress and when to call an ambulance    Discussed with patient that she should call her primary care provider in the event that her symptoms do not improve in the next 5 days or if she later developed a fever after the fever has resolved without  medication.  This can be a sign of a secondary bacterial infection.  Patient verbalizes understanding and agrees with plan of care  - albuterol (PROAIR HFA/PROVENTIL HFA/VENTOLIN HFA) 108 (90 Base) MCG/ACT inhaler  Dispense: 18 g; Refill: 0  - nirmatrelvir and ritonavir (PAXLOVID) 300 mg/100 mg therapy pack  Dispense: 30 tablet; Refill: 0    Wheeze  See above                   YOSEPH Bassett CNP  [facility]    Oksana Scherer is a 73 year old, presenting for the following health issues:  Covid 19 Testing (Covid treatment )      11/29/2023    11:33 AM   Additional Questions   Roomed by lc-lpn       History of Present Illness       Reason for visit:  Positive for Covid  Symptom onset:  1-3 days ago  Symptoms include:  Sore throat, cough, headaches, aches, runny nose, fever  Symptom intensity:  Moderate  Symptom progression:  Staying the same  Had these symptoms before:  No  What makes it better:  Drinking water, rest    She eats 2-3 servings of fruits and vegetables daily.She consumes 0 sweetened beverage(s) daily.She exercises with enough effort to increase her heart rate 60 or more minutes per day.  She exercises with enough effort to increase her heart rate 7 days per week.   She is taking medications regularly.     I have reviewed the nursing triage note from earlier today    Positive home test yesterday and sxs started Sunday also tested on a   11/27/2023    Had COVID before and not as severe as this.     Walking around makes her fatigued, started having more sputum, headaches are significant - feels like she can hear herself wheezing.     -fever or chills  - cough - mild  - shortness of breath or difficulty breathing  - fatigue  - muscle or body aches  - headache  - new loss of taste or smell  - sore throat  - congestion or runny nose    Taken Aleve for the headache     Has never had Paxlovid before.  Has never had antibodies for COVID before            Review of Systems    Constitutional, HEENT, cardiovascular, pulmonary, gi and gu systems are negative, except as otherwise noted.      Objective           Vitals:  No vitals were obtained today due to virtual visit.    Physical Exam   GENERAL: Healthy, alert and no distress  RESP: No audible wheeze, cough.  No increased work of breathing.    PSYCH: Mentation appears normal, affect normal/bright, judgement and insight intact, normal speech                Video-Visit Details    Type of service:  Video Visit   Video Start Time:  12:58am  Video End Time:1:14 PM    Originating Location (pt. Location): Home  Patient was having difficulty with connecting to the camera.  The majority of today's visit was completed over the phone  Distant Location (provider location):  On-site  Platform used for Video Visit: [employer] (Telehealth)

## 2023-11-29 NOTE — TELEPHONE ENCOUNTER
[facility] pharmacy calling about drug interaction on the paxlovid and wanting a urgent call back in regards to this.  They messaged you as well.  Call [phone] Daiana the pharmacist

## 2023-11-29 NOTE — TELEPHONE ENCOUNTER
RN COVID TREATMENT VISIT  11/29/23      The patient has been triaged and does not require a higher level of care.    Niesha Adhikari  73 year old  Current weight? 142 lbs    Has the patient been seen by a primary care provider at an [facility] or [facility] within the past two years? Yes.   Have you been in close proximity to/do you have a known exposure to a person with a confirmed case of influenza? No.     General treatment eligibility:  Date of positive COVID test (PCR or at home)?  11/27/2023    Are you or have you been hospitalized for this COVID-19 infection? No.   Have you received monoclonal antibodies or antiviral treatment for COVID-19 since this positive test? No.   Do you have any of the following conditions that place you at risk of being very sick from COVID-19?   - Age 50 years or older  - Mental health disorders including mood disorders, depression, schizophrenia spectrum disorders   Yes, patient has at least one high risk condition as noted above.     Current COVID symptoms:   - fever or chills  - cough  - shortness of breath or difficulty breathing  - fatigue  - muscle or body aches  - headache  - new loss of taste or smell  - sore throat  - congestion or runny nose  Yes. Patient has at least one symptom as selected.     How many days since symptoms started? 5 days or less. Established patient, 12 years or older weighing at least 88.2 lbs, who has symptoms that started in the past 5 days, has not been hospitalized nor received treatment already, and is at risk for being very sick from COVID-19.     Treatment eligibility by RN:  Are you currently pregnant or nursing? No  Do you have a clinically significant hypersensitivity to nirmatrelvir or ritonavir, or toxic epidermal necrolysis (TEN) or Kellogg-Jose Syndrome? No  Do you have a history of hepatitis, any hepatic impairment on the Problem List (such as Child-Rodriguez Class C, cirrhosis, fatty liver disease,  alcoholic liver disease), or was the last liver lab (hepatic panel, ALT, AST, ALK Phos, bilirubin) elevated in the past 6 months? YES  History of Hep C  Do you have any history of severe renal impairment (eGFR < 30mL/min)? No    Is patient eligible to continue? No, patient does not meet all eligibility requirements for the RN COVID treatment (as denoted by yes response(s) above). Patient informed they will need a virtual provider visit to assess treatment options.  Patient will be transferred to a  at the end of this call.   Gabby Boyer RN        Additional Information   Negative: SEVERE difficulty breathing (e.g., struggling for each breath, speaks in single words)   Negative: Difficult to awaken or acting confused (e.g., disoriented, slurred speech)   Negative: Bluish (or gray) lips or face now   Negative: Shock suspected (e.g., cold/pale/clammy skin, too weak to stand, low BP, rapid pulse)   Negative: Sounds like a life-threatening emergency to the triager   Negative: SEVERE or constant chest pain or pressure  (Exception: Mild central chest pain, present only when coughing.)   Negative: MODERATE difficulty breathing (e.g., speaks in phrases, SOB even at rest, pulse 100-120)   Negative: Headache and stiff neck (can't touch chin to chest)   Negative: Oxygen level (e.g., pulse oximetry) 90% or lower   Negative: Chest pain or pressure  (Exception: MILD central chest pain, present only when coughing.)   Negative: Drinking very little and dehydration suspected (e.g., no urine > 12 hours, very dry mouth, very lightheaded)   Negative: Patient sounds very sick or weak to the triager    Protocols used: Coronavirus (COVID-19) Diagnosed or [id number]

## 2023-11-29 NOTE — TELEPHONE ENCOUNTER
Symptoms started 11/26    Positive date: 11/27     Symptoms sore throat, runny nose, headache, diarrhea, cough     Ana Posada RN

## 2023-11-30 NOTE — TELEPHONE ENCOUNTER
FYI PCP    Call placed and spoke with pharmacy   Regarding tenifir - discussed this with patient on visit - ok to continue both with monitoring - pharmacy to will also notify patient again to observe for signs of toxicity.     YOSEPH Bassett CNP on 11/29/2023 at 6:24 PM

## 2023-12-27 ENCOUNTER — LAB (OUTPATIENT)
Dept: LAB | Facility: OTHER | Age: 73
End: 2023-12-27
Payer: COMMERCIAL

## 2023-12-27 DIAGNOSIS — M81.8 OTHER OSTEOPOROSIS, UNSPECIFIED PATHOLOGICAL FRACTURE PRESENCE: ICD-10-CM

## 2023-12-27 DIAGNOSIS — E55.9 VITAMIN D DEFICIENCY: ICD-10-CM

## 2023-12-27 DIAGNOSIS — M06.09 RHEUMATOID ARTHRITIS OF MULTIPLE SITES WITH NEGATIVE RHEUMATOID FACTOR (H): ICD-10-CM

## 2023-12-27 LAB
ALBUMIN SERPL BCG-MCNC: 4.1 G/DL (ref 3.5–5.2)
ALP SERPL-CCNC: 75 U/L (ref 40–150)
ALT SERPL W P-5'-P-CCNC: 17 U/L (ref 0–50)
AST SERPL W P-5'-P-CCNC: 36 U/L (ref 0–45)
BASOPHILS # BLD AUTO: 0 10E3/UL (ref 0–0.2)
BASOPHILS NFR BLD AUTO: 0 %
BILIRUB DIRECT SERPL-MCNC: <0.2 MG/DL (ref 0–0.3)
BILIRUB SERPL-MCNC: 0.2 MG/DL
CREAT SERPL-MCNC: 0.89 MG/DL (ref 0.51–0.95)
CRP SERPL-MCNC: <3 MG/L
EGFRCR SERPLBLD CKD-EPI 2021: 69 ML/MIN/1.73M2
EOSINOPHIL # BLD AUTO: 0.2 10E3/UL (ref 0–0.7)
EOSINOPHIL NFR BLD AUTO: 7 %
ERYTHROCYTE [DISTWIDTH] IN BLOOD BY AUTOMATED COUNT: 15.5 % (ref 10–15)
ERYTHROCYTE [SEDIMENTATION RATE] IN BLOOD BY WESTERGREN METHOD: 8 MM/HR (ref 0–30)
HCT VFR BLD AUTO: 35.6 % (ref 35–47)
HGB BLD-MCNC: 10.8 G/DL (ref 11.7–15.7)
IMM GRANULOCYTES # BLD: 0 10E3/UL
IMM GRANULOCYTES NFR BLD: 0 %
LYMPHOCYTES # BLD AUTO: 1 10E3/UL (ref 0.8–5.3)
LYMPHOCYTES NFR BLD AUTO: 32 %
MCH RBC QN AUTO: 24.5 PG (ref 26.5–33)
MCHC RBC AUTO-ENTMCNC: 30.3 G/DL (ref 31.5–36.5)
MCV RBC AUTO: 81 FL (ref 78–100)
MONOCYTES # BLD AUTO: 0.4 10E3/UL (ref 0–1.3)
MONOCYTES NFR BLD AUTO: 12 %
NEUTROPHILS # BLD AUTO: 1.6 10E3/UL (ref 1.6–8.3)
NEUTROPHILS NFR BLD AUTO: 48 %
PLATELET # BLD AUTO: 139 10E3/UL (ref 150–450)
PROT SERPL-MCNC: 6.6 G/DL (ref 6.4–8.3)
RBC # BLD AUTO: 4.4 10E6/UL (ref 3.8–5.2)
VIT D+METAB SERPL-MCNC: 120 NG/ML (ref 20–50)
WBC # BLD AUTO: 3.2 10E3/UL (ref 4–11)

## 2023-12-27 PROCEDURE — 85652 RBC SED RATE AUTOMATED: CPT

## 2023-12-27 PROCEDURE — 82306 VITAMIN D 25 HYDROXY: CPT

## 2023-12-27 PROCEDURE — 85025 COMPLETE CBC W/AUTO DIFF WBC: CPT

## 2023-12-27 PROCEDURE — 80076 HEPATIC FUNCTION PANEL: CPT

## 2023-12-27 PROCEDURE — 86140 C-REACTIVE PROTEIN: CPT

## 2023-12-27 PROCEDURE — 36415 COLL VENOUS BLD VENIPUNCTURE: CPT

## 2023-12-27 PROCEDURE — 82565 ASSAY OF CREATININE: CPT

## 2023-12-28 ENCOUNTER — MYC MEDICAL ADVICE (OUTPATIENT)
Dept: SURGERY | Facility: CLINIC | Age: 73
End: 2023-12-28
Payer: COMMERCIAL

## 2024-01-19 ENCOUNTER — OFFICE VISIT (OUTPATIENT)
Dept: SURGERY | Facility: CLINIC | Age: 74
End: 2024-01-19
Payer: COMMERCIAL

## 2024-01-19 VITALS — OXYGEN SATURATION: 98 % | DIASTOLIC BLOOD PRESSURE: 62 MMHG | SYSTOLIC BLOOD PRESSURE: 131 MMHG | HEART RATE: 80 BPM

## 2024-01-19 DIAGNOSIS — K62.82 AIN (ANAL INTRAEPITHELIAL NEOPLASIA) ANAL CANAL: Primary | ICD-10-CM

## 2024-01-19 LAB
LAB DIRECTOR COMMENTS: NORMAL
LAB DIRECTOR DISCLAIMER: NORMAL
LAB DIRECTOR INTERPRETATION: NORMAL
LAB DIRECTOR METHODOLOGY: NORMAL
LAB DIRECTOR RESULTS: NORMAL
SPECIMEN DESCRIPTION: NORMAL

## 2024-01-19 PROCEDURE — 46607 DIAGNOSTIC ANOSCOPY & BIOPSY: CPT | Performed by: NURSE PRACTITIONER

## 2024-01-19 PROCEDURE — 88112 CYTOPATH CELL ENHANCE TECH: CPT | Mod: 26 | Performed by: PATHOLOGY

## 2024-01-19 PROCEDURE — 99213 OFFICE O/P EST LOW 20 MIN: CPT | Mod: 25 | Performed by: NURSE PRACTITIONER

## 2024-01-19 PROCEDURE — 88112 CYTOPATH CELL ENHANCE TECH: CPT | Mod: TC | Performed by: NURSE PRACTITIONER

## 2024-01-19 PROCEDURE — 88305 TISSUE EXAM BY PATHOLOGIST: CPT | Mod: TC | Performed by: NURSE PRACTITIONER

## 2024-01-19 PROCEDURE — G0452 MOLECULAR PATHOLOGY INTERPR: HCPCS | Mod: 26 | Performed by: PATHOLOGY

## 2024-01-19 PROCEDURE — 46910 DESTRUCTION ANAL LESION(S): CPT | Performed by: NURSE PRACTITIONER

## 2024-01-19 PROCEDURE — 88305 TISSUE EXAM BY PATHOLOGIST: CPT | Mod: 26 | Performed by: PATHOLOGY

## 2024-01-19 PROCEDURE — 87624 HPV HI-RISK TYP POOLED RSLT: CPT | Performed by: NURSE PRACTITIONER

## 2024-01-19 RX ORDER — IODINE AND POTASSIUM IODIDE 50; 100 MG/ML; MG/ML
LIQUID ORAL ONCE
Status: COMPLETED | OUTPATIENT
Start: 2024-01-19 | End: 2024-01-19

## 2024-01-19 RX ORDER — LIDOCAINE HYDROCHLORIDE 20 MG/ML
JELLY TOPICAL ONCE
Status: COMPLETED | OUTPATIENT
Start: 2024-01-19 | End: 2024-01-19

## 2024-01-19 RX ADMIN — LIDOCAINE HYDROCHLORIDE: 20 JELLY TOPICAL at 10:04

## 2024-01-19 RX ADMIN — IODINE AND POTASSIUM IODIDE 1 ML: 50; 100 LIQUID ORAL at 10:25

## 2024-01-19 ASSESSMENT — PAIN SCALES - GENERAL: PAINLEVEL: MILD PAIN (2)
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
## 2024-01-19 NOTE — PROGRESS NOTES
SurgCTO Informed Consent Process Documentation    Study Name: High Resolution Anoscopy (HRA) Registry    IRB#: [id number]    ICF Version Date:  12.13.2023    Date of Approach/Consent: 1/19/2024      The subject was screened and meets all of the inclusion criteria and none of the exclusion criteria is met.      The subject was told:  -that the study involves research   -the purpose of the research study  -the expected duration of the study and the approximate number of subject sought  -of procedures that are identified as experimental  -of reasonably foreseeable risks or discomforts to the subject  -of any benefits to the subject or others that may be expected from the research  -of alternative procedures and/or treatment  -how the confidentiality of records would be maintained  -whether or not compensation and medical treatments are available should injury occur as a result of the study  -who to contact if they have questions related to the research study or questions regarding research subjects' rights  -that participation is completely voluntary and that their decision to or not to participate will have no impact on their relationships with the N and the staff    No study procedures were completed prior to the consent being obtained.  The use of historical information (lab or assessments) used for the purpose of the study was approved by subject.  The subject was fully aware that we would be reviewing their medical record for the study.    The subject demonstrated an understanding of what the study involved.  Specifically, how this study differed from standard of care at our center and what was required of the subject as part of the study.    The subject reviewed the consent form and was given the opportunity to ask questions before signing.  Questions and concerns were answered by the study staff and/or study physician.      A copy of the signed informed consent document was provided to the subject.  [x] Yes  [] No    The subject was offered a copy of the signed informed consent but declined. [] Yes [x] No    The consent required the use of a :   [] Yes []  No [x] NA    The subject required a legally-authorized representative (LAR) to sign on their behalf:                                                    [] Yes  [] No        [x] NA      Questions to Evaluate Subject Comprehension of Study:    Question: Adequate Response? If No, explain what actions were taken   What is being studied? [] Yes  [x] No   If you participate, what will be different than if you decide not to participate?  [] Yes  [x] No   How long will the study last; will you be required to return for visits?  [] Yes  [x] No   What kinds of risks are there?  [] Yes  [x] No   Do you understand that you can withdraw consent at any time and for any reason while participating in the study?   [] Yes  [x] No       :       [email or web address] [email or web address]  [phone]                             :           [email or web address] [email or web address]  [phone]

## 2024-01-19 NOTE — NURSING NOTE
Chief Complaint   Patient presents with    high resolution anoscopy       Vitals:    01/19/24 0925   BP: 131/62   BP Location: Right arm   Patient Position: Sitting   Cuff Size: Adult Large   Pulse: 80   SpO2: 98%       There is no height or weight on file to calculate BMI.    Jorge Navarro EMT-P
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
## 2024-01-22 ENCOUNTER — ANCILLARY PROCEDURE (OUTPATIENT)
Dept: CT IMAGING | Facility: CLINIC | Age: 74
End: 2024-01-22
Attending: CLINICAL NURSE SPECIALIST
Payer: COMMERCIAL

## 2024-01-22 ENCOUNTER — MYC REFILL (OUTPATIENT)
Dept: RHEUMATOLOGY | Facility: CLINIC | Age: 74
End: 2024-01-22

## 2024-01-22 ENCOUNTER — ONCOLOGY VISIT (OUTPATIENT)
Dept: SURGERY | Facility: CLINIC | Age: 74
End: 2024-01-22
Attending: CLINICAL NURSE SPECIALIST
Payer: COMMERCIAL

## 2024-01-22 VITALS
SYSTOLIC BLOOD PRESSURE: 148 MMHG | DIASTOLIC BLOOD PRESSURE: 80 MMHG | BODY MASS INDEX: 25.78 KG/M2 | TEMPERATURE: 98.6 F | OXYGEN SATURATION: 100 % | HEART RATE: 68 BPM | WEIGHT: 146.4 LBS | RESPIRATION RATE: 16 BRPM

## 2024-01-22 DIAGNOSIS — J98.59 MEDIASTINAL MASS: ICD-10-CM

## 2024-01-22 DIAGNOSIS — M06.09 RHEUMATOID ARTHRITIS OF MULTIPLE SITES WITH NEGATIVE RHEUMATOID FACTOR (H): ICD-10-CM

## 2024-01-22 DIAGNOSIS — E55.9 VITAMIN D DEFICIENCY: Primary | ICD-10-CM

## 2024-01-22 DIAGNOSIS — J98.59 MEDIASTINAL MASS: Primary | ICD-10-CM

## 2024-01-22 LAB
PATH REPORT.COMMENTS IMP SPEC: NORMAL
PATH REPORT.FINAL DX SPEC: NORMAL
PATH REPORT.FINAL DX SPEC: NORMAL
PATH REPORT.GROSS SPEC: NORMAL
PATH REPORT.GROSS SPEC: NORMAL
PATH REPORT.MICROSCOPIC SPEC OTHER STN: NORMAL
PATH REPORT.RELEVANT HX SPEC: NORMAL
PATH REPORT.RELEVANT HX SPEC: NORMAL
PHOTO IMAGE: NORMAL

## 2024-01-22 PROCEDURE — 99213 OFFICE O/P EST LOW 20 MIN: CPT | Performed by: CLINICAL NURSE SPECIALIST

## 2024-01-22 PROCEDURE — G0463 HOSPITAL OUTPT CLINIC VISIT: HCPCS | Performed by: CLINICAL NURSE SPECIALIST

## 2024-01-22 PROCEDURE — 71250 CT THORAX DX C-: CPT | Mod: GC | Performed by: RADIOLOGY

## 2024-01-22 RX ORDER — ABATACEPT 125 MG/ML
125 INJECTION, SOLUTION SUBCUTANEOUS
Qty: 4 ML | Refills: 3 | Status: CANCELLED | OUTPATIENT
Start: 2024-01-22

## 2024-01-22 RX ORDER — CHOLECALCIFEROL (VITAMIN D3) 50 MCG
1 TABLET ORAL DAILY
Qty: 90 TABLET | Refills: 1 | Status: SHIPPED | OUTPATIENT
Start: 2024-01-22 | End: 2024-04-08

## 2024-01-22 ASSESSMENT — PAIN SCALES - GENERAL: PAINLEVEL: MODERATE PAIN (4)

## 2024-01-22 NOTE — TELEPHONE ENCOUNTER
Pt is stating she is returning a call she received about her refill request. Pt would like a call back at [phone].
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
## 2024-01-22 NOTE — TELEPHONE ENCOUNTER
Orencia clickject 125mg/ml      Last Written Prescription Date:  09-18-23  Last Fill Quantity: 4ml,   # refills: 3  Last Office Visit: 09-18-23  Future Office visit:    Next 5 appointments (look out 90 days)      Apr 08, 2024  8:20 AM  (Arrive by 8:05 AM)  Return Visit with Apolinar Cho MD  [facility] ([facility] ) [address] [place] [id number]  [phone]             Routing refill request to provider for review/approval because:  Medication is reported/historical  Pending Prescriptions:                       Disp   Refills    Abatacept (ORENCIA CLICKJECT) 125 MG/ML S*4 mL   3            Sig: Inject 1 mL (125 mg) Subcutaneous every 7 days .           Hold for any infection and seek medical           attention.   Aleyad Alba CMA 1/22/2024 9:49 AM

## 2024-01-22 NOTE — NURSING NOTE
"Oncology Rooming Note    January 22, 2024 9:43 AM   Niesah Adhikari is a 73 year old female who presents for:    Chief Complaint   Patient presents with    Oncology Clinic Visit     Mediastinal mass     Initial Vitals: BP (!) 148/80 (BP Location: Right arm, Patient Position: Sitting, Cuff Size: Adult Regular)   Pulse 68   Temp 98.6  F (37  C) (Oral)   Resp 16   Wt 66.4 kg (146 lb 6.4 oz)   LMP 11/27/2003   SpO2 100%   BMI 25.78 kg/m   Estimated body mass index is 25.78 kg/m  as calculated from the following:    Height as of 11/16/23: 1.605 m (5' 3.19\").    Weight as of this encounter: 66.4 kg (146 lb 6.4 oz). Body surface area is 1.72 meters squared.  Moderate Pain (4) Comment: Data Unavailable   Patient's last menstrual period was 11/27/2003.  Allergies reviewed: Yes  Medications reviewed: Yes    Medications: Medication refills not needed today.  Pharmacy name entered into [facility]:    [facility] [place] [address] [facility] - [place], [place] - [address]  [facility]    Frailty Screening:   Is the patient here for a new oncology consult visit in cancer care? 2. No      Clinical concerns: Rash at ankles is spreading. Has questions about CT with vs without contrast.      Lyndsay De La Fuente, EMT  1/22/2024            "
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
## 2024-01-23 ENCOUNTER — TELEPHONE (OUTPATIENT)
Dept: ONCOLOGY | Facility: CLINIC | Age: 74
End: 2024-01-23
Payer: COMMERCIAL

## 2024-01-23 NOTE — PROGRESS NOTES
CLINICAL NUTRITION SERVICES     Reason for Contact: Questions from Oncology Distress Screening  1. How concerned are you about your ability to eat? :  0  2. How concerned are you about unintended weight loss or your current weight? : 8    Action: Attempted to call patient x2, no answer and no voicemail available. Writer left [employer] message with call back number.      Follow up: Wait for a return phone call.    Shabnam Edwards RD, LD  [phone]

## 2024-01-28 RX ORDER — ABATACEPT 125 MG/ML
125 INJECTION, SOLUTION SUBCUTANEOUS
Qty: 4 ML | Refills: 2 | Status: SHIPPED | OUTPATIENT
Start: 2024-01-28 | End: 2024-04-03

## 2024-01-29 NOTE — TELEPHONE ENCOUNTER
Rheumatology team: Please call to notify MsJenn Zeynep that orencia has been refilled.  Apolinar Cho MD  1/28/2024 10:56 PM

## 2024-01-29 NOTE — TELEPHONE ENCOUNTER
I tried calling patient and the mailbox was full.     Karolina BANUELOS RN, Specialty Clinic 01/29/24 2:39 PM

## 2024-01-29 NOTE — TELEPHONE ENCOUNTER
Duplicate signed yesterday. Orencia removed from encounter      MARC Martínez RN 1/29/2024 4:35 PM
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
## 2024-03-18 DIAGNOSIS — M79.7 FIBROMYALGIA: ICD-10-CM

## 2024-03-18 RX ORDER — GABAPENTIN 100 MG/1
CAPSULE ORAL
Qty: 90 CAPSULE | Refills: 2 | Status: SHIPPED | OUTPATIENT
Start: 2024-03-18 | End: 2024-04-17

## 2024-03-18 NOTE — TELEPHONE ENCOUNTER
Has upcoming apt with PCP     Refills given    William Craft PA-C  [facility]

## 2024-03-25 ENCOUNTER — MYC MEDICAL ADVICE (OUTPATIENT)
Dept: FAMILY MEDICINE | Facility: OTHER | Age: 74
End: 2024-03-25
Payer: COMMERCIAL

## 2024-03-25 DIAGNOSIS — G47.19 EXCESSIVE DAYTIME SLEEPINESS: ICD-10-CM

## 2024-03-25 DIAGNOSIS — G47.419 PRIMARY NARCOLEPSY WITHOUT CATAPLEXY: Primary | ICD-10-CM

## 2024-03-25 NOTE — TELEPHONE ENCOUNTER
Please advise on [employer] message.  Will need new order placed for sleep study as prior order  as of .  Alicia JARRETT RN

## 2024-03-28 ENCOUNTER — OFFICE VISIT (OUTPATIENT)
Dept: AUDIOLOGY | Facility: CLINIC | Age: 74
End: 2024-03-28
Payer: COMMERCIAL

## 2024-03-28 DIAGNOSIS — H90.41 SENSORINEURAL HEARING LOSS (SNHL) OF RIGHT EAR WITH UNRESTRICTED HEARING OF LEFT EAR: Primary | ICD-10-CM

## 2024-03-28 PROCEDURE — 92557 COMPREHENSIVE HEARING TEST: CPT | Performed by: AUDIOLOGIST

## 2024-03-28 PROCEDURE — 92550 TYMPANOMETRY & REFLEX THRESH: CPT | Performed by: AUDIOLOGIST

## 2024-03-28 NOTE — PROGRESS NOTES
AUDIOLOGY REPORT    BACKGROUND:  Referred by ELSI Craft for concern regarding difficulty following conversation in a restaurant, with music and other background noise. She reported intermittent and random tinnitus (spikes with stress). She also reported brief and random episodes of vertigo/disequilibrium. She controls her blood pressure with medication. She reported  I fall a lot , but move too fast and try to do too much. She did have a fall with broken ribs 5/2023. Her care team is aware and she has seen a physical therapist. Patient noted limited noise exposure and denied ear surgeries. Previous evaluation 11/2011 showed normal hearing bilaterally, right worse than left 1k-8k (10-20 dB).    RESULTS:  Otoscopy showed visible eardrums and landmarks bilaterally. Tympanograms showed normal eardrum mobility and pressure right and left ears. Reflexes: ipsi and contra present bilaterally. Pure tones showed normal hearing 250-1.5k sloping to mild sloping to moderate sensorineural hearing loss right ear and normal hearing sensitivity 250-6k sloping to moderate hearing loss at 8k left ear. Right ear [id number], 15-20 dB decrease 2k-8k and left ear stable 250-4k, 45 dB decrease at 8k. Good SRT/PTA agreement left ear. Good word understanding in quiet, 90% right and 96% left, 50-word lists.    RECOMMENDATIONS:  Return as needed for audiology evaluation. Discussed stable high frequency asymmetry, follow up with primary for ENT referral if there are changes.    music and other background noise. She reported intermittent and random tinnitus (spikes with stress). She also reported brief and random episodes of vertigo/disequilibrium. She controls her blood pressure with medication. She reported  I fal a lot , but move to fast and try to do too much. She did have a fall with broken ribs 5/2023. Her care team is aware and she has seen a physical therapist. Patient noted limited noise exposure and denied ear surgeries.  Previous evaluation 11/2011 showed normal hearing bilaterally, right worse than left 1k-8k (10-20 dB).    Yulia Garcia.  [place] Licensed Audiologist #[id number]

## 2024-04-03 DIAGNOSIS — M06.09 RHEUMATOID ARTHRITIS OF MULTIPLE SITES WITH NEGATIVE RHEUMATOID FACTOR (H): ICD-10-CM

## 2024-04-03 RX ORDER — ABATACEPT 125 MG/ML
125 INJECTION, SOLUTION SUBCUTANEOUS
Qty: 4 ML | Refills: 5 | Status: SHIPPED | OUTPATIENT
Start: 2024-04-03 | End: 2024-08-12

## 2024-04-03 NOTE — TELEPHONE ENCOUNTER
Signed Prescriptions:                        Disp   Refills    Abatacept (ORENCIA CLICKJECT) 125 MG/ML SO*4 mL   5        Sig: Inject 1 mL (125 mg) Subcutaneous every 7 days . Hold           for any infection and seek medical attention.  Authorizing Provider: PREM AVENDAÑO  Ordering User: WANDA DELA CRUZ Dr. ok to refill.     Wanda BANUELOS RN, Specialty Clinic 04/03/24 4:05 PM

## 2024-04-08 ENCOUNTER — OFFICE VISIT (OUTPATIENT)
Dept: RHEUMATOLOGY | Facility: CLINIC | Age: 74
End: 2024-04-08
Payer: COMMERCIAL

## 2024-04-08 VITALS
DIASTOLIC BLOOD PRESSURE: 70 MMHG | SYSTOLIC BLOOD PRESSURE: 142 MMHG | HEART RATE: 64 BPM | OXYGEN SATURATION: 98 % | RESPIRATION RATE: 16 BRPM

## 2024-04-08 DIAGNOSIS — M06.09 RHEUMATOID ARTHRITIS OF MULTIPLE SITES WITH NEGATIVE RHEUMATOID FACTOR (H): Primary | ICD-10-CM

## 2024-04-08 DIAGNOSIS — M81.0 AGE-RELATED OSTEOPOROSIS WITHOUT CURRENT PATHOLOGICAL FRACTURE: Primary | ICD-10-CM

## 2024-04-08 DIAGNOSIS — Z59.82 TRANSPORTATION INSECURITY: ICD-10-CM

## 2024-04-08 DIAGNOSIS — Z59.41 FOOD INSECURITY: ICD-10-CM

## 2024-04-08 DIAGNOSIS — E55.9 VITAMIN D DEFICIENCY: ICD-10-CM

## 2024-04-08 DIAGNOSIS — M81.0 AGE-RELATED OSTEOPOROSIS WITHOUT CURRENT PATHOLOGICAL FRACTURE: ICD-10-CM

## 2024-04-08 DIAGNOSIS — G56.03 BILATERAL CARPAL TUNNEL SYNDROME: ICD-10-CM

## 2024-04-08 DIAGNOSIS — E61.1 IRON DEFICIENCY: ICD-10-CM

## 2024-04-08 LAB
ALBUMIN SERPL BCG-MCNC: 4.4 G/DL (ref 3.5–5.2)
BASOPHILS # BLD AUTO: 0 10E3/UL (ref 0–0.2)
BASOPHILS NFR BLD AUTO: 1 %
CALCIUM SERPL-MCNC: 10.3 MG/DL (ref 8.8–10.2)
CREAT SERPL-MCNC: 0.98 MG/DL (ref 0.51–0.95)
EGFRCR SERPLBLD CKD-EPI 2021: 61 ML/MIN/1.73M2
EOSINOPHIL # BLD AUTO: 0.3 10E3/UL (ref 0–0.7)
EOSINOPHIL NFR BLD AUTO: 7 %
ERYTHROCYTE [DISTWIDTH] IN BLOOD BY AUTOMATED COUNT: 15.7 % (ref 10–15)
FERRITIN SERPL-MCNC: 16 NG/ML (ref 11–328)
HCT VFR BLD AUTO: 37 % (ref 35–47)
HGB BLD-MCNC: 11.5 G/DL (ref 11.7–15.7)
IMM GRANULOCYTES # BLD: 0 10E3/UL
IMM GRANULOCYTES NFR BLD: 0 %
IRON BINDING CAPACITY (ROCHE): 451 UG/DL (ref 240–430)
IRON SATN MFR SERPL: 10 % (ref 15–46)
IRON SERPL-MCNC: 44 UG/DL (ref 37–145)
LYMPHOCYTES # BLD AUTO: 1.2 10E3/UL (ref 0.8–5.3)
LYMPHOCYTES NFR BLD AUTO: 34 %
MCH RBC QN AUTO: 23.9 PG (ref 26.5–33)
MCHC RBC AUTO-ENTMCNC: 31.1 G/DL (ref 31.5–36.5)
MCV RBC AUTO: 77 FL (ref 78–100)
MONOCYTES # BLD AUTO: 0.4 10E3/UL (ref 0–1.3)
MONOCYTES NFR BLD AUTO: 12 %
NEUTROPHILS # BLD AUTO: 1.6 10E3/UL (ref 1.6–8.3)
NEUTROPHILS NFR BLD AUTO: 45 %
PLATELET # BLD AUTO: 174 10E3/UL (ref 150–450)
RBC # BLD AUTO: 4.81 10E6/UL (ref 3.8–5.2)
VIT D+METAB SERPL-MCNC: 92 NG/ML (ref 20–50)
WBC # BLD AUTO: 3.5 10E3/UL (ref 4–11)

## 2024-04-08 PROCEDURE — 36415 COLL VENOUS BLD VENIPUNCTURE: CPT | Performed by: INTERNAL MEDICINE

## 2024-04-08 PROCEDURE — 82310 ASSAY OF CALCIUM: CPT | Performed by: INTERNAL MEDICINE

## 2024-04-08 PROCEDURE — G2211 COMPLEX E/M VISIT ADD ON: HCPCS | Performed by: INTERNAL MEDICINE

## 2024-04-08 PROCEDURE — 83550 IRON BINDING TEST: CPT | Performed by: INTERNAL MEDICINE

## 2024-04-08 PROCEDURE — 82040 ASSAY OF SERUM ALBUMIN: CPT | Performed by: INTERNAL MEDICINE

## 2024-04-08 PROCEDURE — 82306 VITAMIN D 25 HYDROXY: CPT | Performed by: INTERNAL MEDICINE

## 2024-04-08 PROCEDURE — 83540 ASSAY OF IRON: CPT | Performed by: INTERNAL MEDICINE

## 2024-04-08 PROCEDURE — 99214 OFFICE O/P EST MOD 30 MIN: CPT | Performed by: INTERNAL MEDICINE

## 2024-04-08 PROCEDURE — 85025 COMPLETE CBC W/AUTO DIFF WBC: CPT | Performed by: INTERNAL MEDICINE

## 2024-04-08 PROCEDURE — 82728 ASSAY OF FERRITIN: CPT | Performed by: INTERNAL MEDICINE

## 2024-04-08 PROCEDURE — 82565 ASSAY OF CREATININE: CPT | Performed by: INTERNAL MEDICINE

## 2024-04-08 RX ORDER — FERROUS SULFATE 325(65) MG
325 TABLET, DELAYED RELEASE (ENTERIC COATED) ORAL EVERY OTHER DAY
Qty: 45 TABLET | Refills: 0 | Status: SHIPPED | OUTPATIENT
Start: 2024-04-08 | End: 2024-06-24

## 2024-04-08 RX ORDER — HYDROXYCHLOROQUINE SULFATE 200 MG/1
TABLET, FILM COATED ORAL
Qty: 135 TABLET | Refills: 1 | Status: SHIPPED | OUTPATIENT
Start: 2024-04-08

## 2024-04-08 RX ORDER — VITAMIN B COMPLEX
25 TABLET ORAL DAILY
Qty: 90 TABLET | Refills: 1 | Status: SHIPPED | OUTPATIENT
Start: 2024-04-08

## 2024-04-08 SDOH — ECONOMIC STABILITY - FOOD INSECURITY: FOOD INSECURITY: Z59.41

## 2024-04-08 SDOH — ECONOMIC STABILITY - TRANSPORTATION SECURITY: TRANSPORTATION INSECURITY: Z59.82

## 2024-04-08 NOTE — NURSING NOTE
Blood pressure rechecked after visit    142/70  Anabella Garcia CMA Rheumatology  4/8/2024                                 RAPID3 (0-30) Cumulative Score  19.7          RAPID3 Weighted Score (divide #4 by 3 and that is the weighted score)  6.5

## 2024-04-08 NOTE — PATIENT INSTRUCTIONS
RHEUMATOLOGY    [facility]  [address] [place] [address]  [place] [address]    Phone number: [phone]  Fax number: [phone]    If you need a medication refill, please contact us as you may need lab work and/or a follow up visit prior to your refill.      Thank you for choosing [facility]!    Anabella Garcia CMA Rheumatology

## 2024-04-08 NOTE — PROGRESS NOTES
Rheumatology Clinic Visit      Niesha Adhikari MRN# [id number]   YOB: 1950 Age: 73 year old      Date of visit: 4/08/24   PCP: Dr. Tanika Clark  Hepatologist: Dr. Peres at [facility] in [place]  Ophthalmology: Dr. Higgins, [facility]    Chief Complaint   Patient presents with:  Rheumatoid Arthritis: Has been difficult. Pain in back is bad when standing for more than a few minutes. Tried many things and nothing helps. Fingers are worse. Skin is worse. Fingernails are deformed. Drops things all the time. Can't use oven anymore. Does not sleep anymore, sleeps for a hour then wakes up, goes on all night. Has open wounds on fingers. Feels like a different person due to medications changes. Thirsty all the time.    Assessment and Plan     1. Rheumatoid arthritis: Hepatitis C related arthralgias versus rheumatoid arthritis (RF and CCP negative); hepatitis C has since been cleared. Initially with symmetric synovitis on exam and morning stiffness for more than one hour. NSAIDs and Tylenol associated with rash.  She did well with hydroxychloroquine 400 mg daily for a while but more arthritis symptoms so SSZ was added but associated with cytopenia and GI upset so that was stopped.  Avoiding MTX, leflunomide because of hepatitis C hx and +HBV core.  Preferentially avoid Jakinibs because of hepatitis C history and +HBV core. She has established with gastroenterology and is on tenofovir for hepatitis B reactivation prophylaxis.  Previously on Humira (5/8/2020-2/2023).  Currently on Enbrel (started 2/2023-6/5/2023, injection site reactions, no improvement since changing from Humira to Enbrel).  Note that she had been doing well but then in January 2023 was having more pain in the right hand with mild synovial hypertrophy but no clearly active synovitis.  She had findings that were more consistent with carpal tunnel syndrome.  An MRI of the right hand showed synovitis at the right second and  "third MCP and tenosynovitis of the extensor tendons of the right third finger.  Therefore, changed from Humira to Enbrel with no improvement and exam remained unchanged; was also having injection site reactions with Enbrel.  Therefore, changed to Orencia on 6/5/2023.  Doing well at this time; no synovitis on exam and no injection site issues.  Chronic illness, progressive.    - Continue hydroxychloroquine 300 mg daily (last eye exam was performed 2/1/2022 at [facility]; yearly eye exam required and she says that it is scheduled)  - Continue Orencia 125mg SQ once every 7 days    2. Osteoporosis: Previously treated with Fosamax (GERD), PO Boniva (reportedly ineffective), and IV Boniva (reportedly effective). Prolia was being considered by a previous rheumatologist but not used because she wanted \"clearance\" from the patient's gastroenterologist because she has hepatitis C; I do not see a contraindication for Prolia in the setting of hepatitis C. The patient reports that her gastroenterologist reported no contraindication for Prolia either.  She had not been on osteoporosis treatment for at least 2 years before restarting Boniva.  Boniva was restarted with the first dose given on 12/5/2017.  2021 DEXA showed improvement and Boniva was continued until the last dose that was on 1/12/2023.  1/16/2023 DEXA shows osteoporosis, with reduced bone mineral density compared to the previous, about the hips and lumbar spine.  Changed to Prolia with the first dose received on 4/12/2023.  Vitamin D reduced previously because of elevated vitamin D..   Chronic illness, stable.    - Continue vitamin D 2000 IU daily  - Calcium 1200 mg daily  - Continue Prolia 60mg SQ every 6 months (received at the infusion center)  - Labs today: Creatinine, calcium, vitamin D, albumin, creatinine    3.  Lower back pain and history left hip pain: Ms. Adhikari had a CT pelvis on 11/16/2018 that showed degenerative changes of the L-spine.   " Improves with PT exercises, but often doesn't do them.  Has had difficulty getting back to physical therapy.  Doing exercises on her own at home that are helpful.    4. HBV core ab positive: On hepatitis B prophylaxis from gastroenterology.  Continue to follow with gastroenterology.    5. Bilateral carpal tunnel syndrome: EMG/NCS showed mild carpal tunnel syndrome.  Also having intermittent left 3rd digit trigger finger; has seen Dr. John where surgery was discussed but she is not certain that she wants to proceed with surgery because she is not certain that carpal tunnel syndrome is the problem.  Still symptomatic and she is going to consider following up with surgery.      6.  Bilateral hand osteoarthritis: Reviewed the diagnosis treatment options.  Start Voltaren gel as needed.  Risks and side effects of Voltaren gel were reviewed in detail today.  - diclofenac (VOLTAREN) 1 % topical gel; Apply up to 2 grams of 1% gel to hands up to 4 times daily as needed for joint pain (maximum: 8 g per upper extremity per day)      7.  Vaccinations: Vaccinations reviewed with Ms. Adhikari.    - Influenza: encouraged yearly vaccination  - [id number]: up to date  - [id number]: up to date  - Shingrix: Up to date  - COVID19: Advised keeping up-to-date, and to hold Orencia for 1-2 weeks afterward     8.  Iron deficiency anemia: Iron deficiency based on previous labs.  Advised that she start taking ferrous sulfate 325 mg every other day.  Noted nausea associated with ferrous sulfate in the past and she says that if she has nausea again then she will stop ferrous sulfate; reportedly mild issue in the past.  She will follow long-term with her primary care provider for iron deficiency anemia.  - Start ferrous sulfate 325 mg every other day  - Lab today: CBC, ferritin, iron    9.  Transportation and food insecurity: Missing medical appointments because of lack of transportation.  Difficulty getting through because of lack of  transportation.  Discussed seeing a /care coordinator and she is in agreement  - care coordinator referral    10. Elevated blood pressure:  Niesha to follow up with Primary Care provider regarding elevated blood pressure.     Total minutes spent in evaluation with patient, review of pertinent lab tests and chart notes, and documentation: 28  The longitudinal plan of care for the rheumatology problem(s) were addressed during this visit.  Due to added complexity of care, we will continue to support the patient and the subsequent management of this condition with ongoing continuity of care.       Ms. Adhikari verbalized agreement with and understanding of the rational for the diagnosis and treatment plan.  All questions were answered to best of my ability and the patient's satisfaction. Ms. Adhikari was advised to contact the clinic with any questions that may arise after the clinic visit.      Thank you for involving me in the care of the patient    Return to clinic: 4 months    HPI   Niesha Adhikari is a 73 year old female with a medical history significant for hepatitis C, hemorrhoids, narcolepsy, fibromyalgia, migraines, anxiety, pernicious anemia, GERD, allergic rhinitis, and osteoporosis who presents for follow-up of inflammatory arthritis.    1/31/2023: Was seen by orthopedic surgery where she was told that she may benefit from carpal tunnel surgery bilaterally, and left third digit trigger finger surgery.  She has hesitant to go through with this because she questions if her symptoms are more arthritis related.  Larger nodules over the right second and third MCPs on the left.  Bony hypertrophy at the DIPs.  Occasional pain at the bilateral first CMC joints, right first MCP, and wrist, but pain is worse with activity and improves with rest, not associated with swelling or increased warmth, and only last for a few seconds at a time.  Still with numbness and tingling in both hands that she says affects  all of her fingers, is worse at night, and sometimes wakes her up at night.    2/20/2023: Having more pain and stiffness in the bilateral second and third MCPs and PIPs that is worse in the morning and improves with time and activity.  Occasionally has a zapping sensation in her fingers.  The zapping sensation typically resolves after a few seconds and does not occur for more than a few times each day, if at all.  Having more difficulty grasping items due to pain and stiffness at the MCPs and would like to use prednisone.  States that she has anxiety but prednisone does not worsen her anxiety.    6/5/2023: Swelling at the MCPs, worse on the right hand.  Still with numbness and tingling in the fingers that is intermittent.  States the pain at the MCPs is worse at night and wakes her up from sleep about 2-3 times per night.  Felt better when she was on prednisone was able to sleep through the night while on prednisone but does not wish to restart prednisone because she does not like the potential side effects.  Fell doing yard work and broke several ribs.  Found to have a nodule behind the sternum and is going to have additional work-up for this with imaging at the direction of another clinic; the nodule was found incidentally when imaging was done to find the rib fractures.    9/18/2023: Still with numbness and tingling in her hands that occurs intermittently, sometimes also radiating proximally from the wrist.  Joints without swelling, increased warmth, or overlying erythema.  Pain at the fingers diffusely when she has the numbness and tingling.  No injection site issues with Orencia.  Occasional left elbow pain over the olecranon process but no swelling, increased warmth, or overlying erythema and not an issue at this time.  Chronic low back pain and would like to go back to physical therapy for a refresher.    Today, 4/8/2024: modafinil was stopped because it was thought to be keeping her up at night and this has  worsened her sleep, and hydroxyzine was replaced with gabapentin and she says that she thinks that gabapentin is a placebo (taking gabapentin 300 mg TID); she says that she plans to follow-up with the prescribing providers regarding these medications.  Planning to have a sleep study.  [place] and having issues with transportation reliability; she plans to discuss with her insurance.  Hasn't made it to her eye appointment or see the dentist or go to physical therapy.  Had to reschedule her eye exam for Plaquenil toxicity monitoring.  Reports that the combination of Plaquenil and Orencia is very effective.  Arthritis well-controlled.  No joint pain or swelling.  Morning stiffness for no more than 10 minutes.    Denies fevers, chills, nausea, vomiting, diarrhea. No abdominal pain. No chest pain/pressure, palpitations, or shortness of breath. No LE swelling. No neck pain. No oral or nasal sores.  No rash.     Tobacco: quit in 1978  EtOH: none  Drugs: none  Occupation: retired    ROS   12 point review of system was completed and negative except as noted in the HPI     Active Problem List     Patient Active Problem List   Diagnosis    Allergic rhinitis    Pernicious anemia    Anxiety state    Migraine    Essential and other specified forms of tremor    Fibromyalgia    Moderate recurrent major depression (H)    Narcolepsy    Internal hemorrhoids with other complication    AIN (anal intraepithelial neoplasia) anal canal    Osteoporosis    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)    Benign essential hypertension    Alcohol dependence in remission (H)    Personal history of other medical treatment    Constipation    DDD (degenerative disc disease), cervical    Iron deficiency    Thrombocytopenia (H24)    Fatigue    Trigger middle finger of left hand    Bilateral carpal tunnel syndrome    Chronic bilateral low back pain without sciatica     Past Medical History     Past Medical  History:   Diagnosis Date    ABUSE BY SPOUSE/PARTNER 07/27/2005    Degeneration of lumbar or lumbosacral intervertebral disc     DDD L5/S1    Depressive disorder     Esophageal reflux 1/28/2005    HELICOBACTER PYLORI INFECTION 01/28/2005    Hepatitis C - Cured. Achieved SVR in 2017     History of blood transfusion     Hypertension     Malignant neoplasm (H)     ACIN    Osteoporosis     Other and unspecified alcohol dependence, unspecified drinking behavior     Sober as 1/21/1987    Other malaise and fatigue      Past Surgical History     Past Surgical History:   Procedure Laterality Date    BIOPSY ANAL CANAL  1/21/13    [facility]     BREAST BIOPSY, RT/LT Left 1975    Breat Biopsy RT/LT    COLONOSCOPY  8/25/2009    COLONOSCOPY  2/14/2011    COLONOSCOPY performed by CRISTIN LAGUNAS at  GI    COLONOSCOPY N/A 1/8/2019    Procedure: Colonscopy, Biopsies by Biopsy;  Surgeon: Omega Talavera MD;  Location:  GI    CYSTOSCOPY  2/28/2011    CYSTOSCOPY performed by CAYLA FLOR at  OR    ENDOSCOPY  05/21/12    [facility]    ENT SURGERY  1965    GI SURGERY  06/25/12     [facility] ENDOSCOPY DIAG W BIOPSY  10/01/09    HEMORRHOIDECTOMY  06/25/12    [facility]    LAPAROSCOPIC SALPINGO-OOPHORECTOMY  2/28/2011    LAPAROSCOPIC SALPINGO-OOPHORECTOMY performed by CAYLA FLRO at  OR    TONSILLECTOMY & ADENOIDECTOMY  1965    [facility] NONSPECIFIC PROCEDURE  1965    Removed bone left index finger knuckle, casts broken bones    [facility] COLONOSCOPY W/WO BRUSH/WASH  08/22/05    [facility] UGI ENDOSCOPY, SIMPLE EXAM  08/08/07     Allergy     Allergies   Allergen Reactions    Telaprevir Other (See Comments) and Rash     Rectal bleeding, anemia    Abilify Discmelt Other (See Comments)     Disoriented    Antivert [Meclizine Hcl]     Chamomile     Compazine     Diphenhydramine Nausea     And abdominal pain    Duloxetine Hcl Other (See Comments)     Disoriented, trouble sleeping    Effexor [Venlafaxine]  "Other (See Comments)     Disoriented, trouble sleeping    Elavil [Amitriptyline Hcl] Other (See Comments)     \"didn't feel right on it-med was stopped right away\"    Ferrous Sulfate Nausea and Vomiting    Food Difficulty breathing     cilantro    Indomethacin      indocin sensativity \"Severe h.a\"    Seasonal Allergies Other (See Comments) and Difficulty breathing     Philip Gold Aug-Sept, rag weed, sneezing    Sulfa Antibiotics     Thiopental Sodium      PENTOTHAL/rigidity and fight response    Animal Dander Difficulty breathing and Rash     sneezing,resp. distress    Bupropion Anxiety    Tylenol [Acetaminophen] Rash     Current Medication List     Current Outpatient Medications   Medication Sig Dispense Refill    Abatacept (ORENCIA CLICKJECT) 125 MG/ML SOAJ auto-injector Inject 1 mL (125 mg) Subcutaneous every 7 days . Hold for any infection and seek medical attention. 4 mL 5    calcium carb-cholecalciferol 600-500 MG-UNIT CAPS Take 1,200 mg by mouth daily      cloNIDine (CATAPRES) 0.2 MG tablet Take 0.2 mg by mouth At Bedtime      clotrimazole (LOTRIMIN) 1 % external cream APPLY TOPICALLY TWO TIMES A DAY 30 g 3    cyanocobalamin (VITAMIN B-12) 500 MCG SUBL sublingual tablet Place 500 mcg under the tongue every other day      cycloSPORINE (RESTASIS) 0.05 % ophthalmic emulsion Place 1 drop into both eyes 2 times daily       denosumab (PROLIA) 60 MG/ML SOSY injection       diclofenac (VOLTAREN) 1 % topical gel Apply up to 2 grams of 1% gel to hands up to 4 times daily as needed for joint pain (maximum: 8 g per upper extremity per day) 200 g 2    gabapentin (NEURONTIN) 100 MG capsule TAKE 1-3 CAPSULES (100-300 MG) BY MOUTH NIGHTLY AS NEEDED FOR NEUROPATHIC PAIN OR SLEEP 90 capsule 2    hydroxychloroquine (PLAQUENIL) 200 MG tablet Hydroxychloroquine 200mg daily; and an additional 200mg every other day.  Yearly eye exam, including 10-2 VF and SD-OCT, required. 135 tablet 2    hydrOXYzine (ATARAX) 50 MG tablet Take 50 mg " by mouth 1 1/2-2 tabs at bedtime      lisdexamfetamine (VYVANSE) 30 MG capsule Take 30 mg by mouth every morning      lisinopril (ZESTRIL) 10 MG tablet Take 1 tablet (10 mg) by mouth daily 90 tablet 3    montelukast (SINGULAIR) 10 MG tablet TAKE 1 TABLET BY MOUTH ONCE DAILY AS NEEDED SEASONALLY (AUGUST AND SEPTEMBER) 90 tablet 3    naproxen sodium (ANAPROX) 220 MG tablet Take 220-440 mg by mouth daily as needed for moderate pain      nystatin (MYCOSTATIN) 743156 UNIT/GM external powder Apply topically 3 times daily as needed 60 g 1    polyethylene glycol (MIRALAX) 17 GM/Dose powder Take 1 capful by mouth daily as needed       Psyllium (FIBER) 0.52 G CAPS 1 tabs in am and pm 540 capsule     sennosides (SENOKOT) 8.6 MG tablet Take 2 tablets by mouth 2 times daily      tenofovir alafenamide fumarate (VEMLIDY) 25 MG tablet Take 1 tablet (25 mg) by mouth daily with food (dispense only in the original container). 90 tablet 3    vitamin D3 (CHOLECALCIFEROL) 50 mcg (2000 units) tablet Take 1 tablet (50 mcg) by mouth daily 90 tablet 1     No current facility-administered medications for this visit.         Social History   See HPI    Family History     Family History   Problem Relation Age of Onset    Hypertension Mother     Breast Cancer Mother     Coronary Artery Disease Mother     Cerebrovascular Disease Mother     Kidney Disease Mother     Obesity Mother     Hypertension Father         Father    Lymphoma Father     Glaucoma Father     Other Cancer Father         Lymphoma    Genetic Disorder Father         Glaucoma    Obesity Father     C.A.D. Sister         MI at age 63    Hypertension Sister     Gastrointestinal Disease Sister         gallbladder    Hyperlipidemia Sister     Cerebrovascular Disease Sister     Circulatory Sister         brain aneurysm at 63    Genitourinary Problems Sister         1 kidney/bladder    Hypertension Sister     Obesity Sister     Coronary Artery Disease Sister         had valve surgery, MI,  "CHF    Coronary Artery Disease Brother     Hypertension Brother     Hyperlipidemia Brother     Cerebrovascular Disease Maternal Grandmother     Hypertension Maternal Grandmother     Cerebrovascular Disease Paternal Grandmother     Hypertension Paternal Grandmother     Glaucoma Paternal Grandfather     Genetic Disorder Paternal Grandfather         Glaucoma    Blood Disease Son         Lymes/7/11    Hypertension Son     Obesity Son     Hypertension Son     Breast Cancer Maternal Aunt     Ovarian Cancer Maternal Aunt     Unknown/Adopted Paternal Uncle     Obesity Niece     Obesity Niece     Liver Cancer Cousin     Coronary Artery Disease Other 49        niece    Diabetes Other         1st cousin    Breast Cancer Other     Obesity Other     Obesity Other     Hypertension Other         Aunts    Obesity Other         Uncle    Hypertension Other         Uncle    Coronary Artery Disease Sister     Coronary Artery Disease Brother     Hypertension Brother     Hyperlipidemia Brother     Coronary Artery Disease Nephew         Nephew    Hypertension Niece         [place]    Hypertension Other         Nephew    Cerebrovascular Disease Other         Aunts    Obesity Other         Nephew     Physical Exam     Temp Readings from Last 3 Encounters:   01/22/24 98.6  F (37  C) (Oral)   11/16/23 98  F (36.7  C) (Temporal)   10/12/23 97.9  F (36.6  C) (Oral)     BP Readings from Last 5 Encounters:   04/08/24 (!) 148/71   01/22/24 (!) 148/80   01/19/24 131/62   11/16/23 122/80   10/12/23 125/77     Pulse Readings from Last 1 Encounters:   04/08/24 64     Resp Readings from Last 1 Encounters:   04/08/24 16     Estimated body mass index is 25.78 kg/m  as calculated from the following:    Height as of 11/16/23: 1.605 m (5' 3.19\").    Weight as of 1/22/24: 66.4 kg (146 lb 6.4 oz).      GEN: NAD. Healthy appearing adult.   HEENT:  Anicteric, noninjected sclera. No obvious external lesions of the ear and nose. Hearing intact.  CV: S1, S2. RRR. " No m/r/g  PULM: No increased work of breathing. CTA bilaterally   MSK: MCPs and PIPs without synovial swelling or tenderness to palpation.  Heberden's nodes present.  Philippe's nodes present.  DIPs nontender to palpation.  Wrists without swelling or tenderness to palpation.  Elbows and shoulders without swelling or tenderness to palpation.  Knees, ankles, and MTPs without swelling or tenderness to palpation.    NEURO: + Tinel's over each wrist  SKIN: No rash or jaundice seen  PSYCH: Alert. Appropriate.         Labs / Imaging (select studies)     RF/CCP  Recent Labs   Lab Test 06/07/17  0955   CCPIGG 1   RHF <20     CBC  Recent Labs   Lab Test 12/27/23  0710 10/10/23  2015 09/06/23  0832 09/10/21  0933 06/07/21  0811 05/21/21  1145 01/06/21  1303 09/04/20  0756 03/04/20  0752 02/17/20  0810   WBC 3.2* 5.3 3.1*   < > 3.0*   < > 4.4 4.1   < > 3.8*   RBC 4.40 4.57 4.35   < > 4.50   < > 5.01 4.88   < > 4.70   HGB 10.8* 11.1* 10.9*   < > 13.1   < > 14.8 14.4   < > 14.3   HCT 35.6 36.2 35.5   < > 40.0   < > 44.9 43.9   < > 42.8   MCV 81 79 82   < > 89   < > 90 90   < > 91   RDW 15.5* 15.7* 14.4   < > 12.7   < > 13.3 12.5   < > 13.4   * 158 155   < > 132*   < > 157 140*   < > 140*   MCH 24.5* 24.3* 25.1*   < > 29.1   < > 29.5 29.5   < > 30.4   MCHC 30.3* 30.7* 30.7*   < > 32.8   < > 33.0 32.8   < > 33.4   NEUTROPHIL 48 69 46   < > 32.9  --  46.1 38.9   < > 44.5   LYMPH 32 20 35   < > 43.4  --  37.0 31.9   < > 35.7   MONOCYTE 12 8 11   < > 13.5  --  11.2 10.5   < > 14.6   EOSINOPHIL 7 3 8   < > 9.5  --  5.5 17.8   < > 4.7   BASOPHIL 0 0 1   < > 0.7  --  0.2 0.7   < > 0.5   ANEU  --   --   --   --  1.0*  --  2.0 1.6   < > 1.7   ALYM  --   --   --   --  1.3  --  1.6 1.3   < > 1.4   FREDY  --   --   --   --  0.4  --  0.5 0.4   < > 0.6   AEOS  --   --   --   --  0.3  --  0.2  --   --  0.2   ABAS  --   --   --   --  0.0  --  0.0 0.0   < > 0.0   ANEUTAUTO 1.6 3.6 1.4*   < >  --   --   --   --   --   --    ALYMPAUTO 1.0 1.1  1.1   < >  --   --   --   --   --   --    AMONOAUTO 0.4 0.4 0.4   < >  --   --   --   --   --   --    AEOSAUTO 0.2 0.1 0.2   < >  --   --   --   --   --   --    ABSBASO 0.0 0.0 0.0   < >  --   --   --   --   --   --     < > = values in this interval not displayed.     CMP  Recent Labs   Lab Test 12/27/23  0702 10/10/23  2015 09/06/23  0832 05/30/23  1139 04/12/23  0817 01/12/23  0818 06/10/22  0816 05/16/22  1044 12/08/21  0816 09/10/21  0933 09/08/21  0806 06/07/21  0811 02/16/21  0842 01/06/21  1303 12/04/20  0850   NA  --  133* 139 138  --   --   --  142  --  134   < >  --  134  --   --    POTASSIUM  --  4.1 4.7 4.4  --   --   --  4.3  --  4.6   < >  --  4.7  --   --    CHLORIDE  --  98 100 102  --   --   --  109  --  100   < >  --  100  --   --    CO2  --  24 28 26  --   --   --  29  --  31   < >  --  31  --   --    ANIONGAP  --  11 11 10  --   --   --  4  --  3   < >  --  3  --   --    GLC  --  97 83 91  --   --   --  89  --  86  --   --  94  --   --    BUN  --  10.1 10.4 11.7  --   --   --  13  --  9   < >  --  10  --   --    CR 0.89 0.71 0.80 0.79 0.81 0.79   < > 0.64   < > 0.74   < > 0.76 0.70 0.65 0.77   GFRESTIMATED 69 90 78 80 77 80   < > >90   < > 82   < > 80 88 >90 78   GFRESTBLACK  --   --   --   --   --   --   --   --   --   --   --  >90 >90 >90 >90   ELIZABETH  --  9.5 9.7 9.7 9.8 9.2   < > 8.9   < > 8.9   < > 8.7 10.0 9.7 9.7   BILITOTAL 0.2 0.4 0.3  --   --  0.4  --  0.4   < > 0.6  --  0.5  --  0.5  --    ALBUMIN 4.1 4.4 4.2  --  3.9 3.6  --  3.8   < > 4.0  --  3.8  --  4.1  --    PROTTOTAL 6.6 6.8 6.5  --   --  6.5*  --  6.6*   < > 7.3  --  6.7*  --  7.6  --    ALKPHOS 75 69 78  --   --  80  --  110   < > 86  --  79  --  82  --    AST 36 27 27  --   --  21  --  22   < > 25  --  22  --  22  --    ALT 17 16 14  --   --  21  --  22   < > 25  --  20  --  22  --     < > = values in this interval not displayed.         Calcium/VitaminD  Recent Labs   Lab Test 12/27/23  0702 10/10/23  2015 09/06/23  0897  05/30/23  1139 04/12/23  0817 01/12/23  0818 03/09/22  0821 02/14/22  1121   ELIZABETH  --  9.5 9.7 9.7   < > 9.2   < > 9.5   VITDT 120*  --   --   --   --  59  --  65    < > = values in this interval not displayed.     ESR/CRP  Recent Labs   Lab Test 12/27/23  0703 12/27/23  0702 09/06/23  0832 01/12/23  0818 02/14/22  1121 02/14/22  1121 06/07/21  0811   SED 8  --  7 6   < > 6 5   CRP  --   --   --  <2.9  --  <2.9 <2.9   CRPI  --  <3.00 <3.00  --   --   --   --     < > = values in this interval not displayed.     TSH/T4  Recent Labs   Lab Test 05/30/23  1139 08/31/22  1253 09/10/21  0933   TSH 2.06 1.63 1.35     Lipid Panel  Recent Labs   Lab Test 08/09/19  1228 11/25/16  1217   CHOL 209* 276*   TRIG 74 88   HDL 82 65   * 193*   NHDL 127 211*     Hepatitis B  Recent Labs   Lab Test 06/30/17  0925 06/07/17  0955   AUSAB  --  0.65   HBCAB  --  Reactive   A reactive result indicates acute, chronic or past/resolved hepatitis B   infection.  *   HBCM  --  Nonreactive   A nonreactive result suggests lack of recent exposure to the virus in the   preceding 6 months.     HEPBANG  --  Nonreactive   HBQLOG Not Calculated  --      Hepatitis C  Recent Labs   Lab Test 01/20/20  0741 06/07/17  0955   HCVAB  --  Reactive   A reactive result indicates one of the following 1) current HCV infection 2)   past HCV infection that has resolved or 3) false positivity. The CDC recommends   that a reactive result should be followed by Nucleic acid testing for HCV RNA.  If HCV RNA is detected, that indicates current HCV infection. If HCV RNA is not   detected, that indicates either past, resolved HCV infection, or false HCV   antibody positivity.   Assay performance characteristics have not been established for newborns,   infants, and children  *   HCVRNA HCV RNA Not Detected HCV RNA Not Detected   The LUIS ARMANDO AmpliPrep/LUIS ARMANDO TaqMan HCV Test is an FDA-approved in vitro nucleic   acid amplification test for the quantitation of HCV RNA in  human plasma (ETDA   plasma) or serum using the LUIS ARMANDO AmpliPrep Instrument for automated viral   nucleic acid extraction and the LUIS ARMANDO TaqMan Analyzer or LUIS ARMANDO TaqMan for   automated Real Time PCR amplification and detection of the viral nucleic acid   target.   Titer results are reported in International Units/mL (IU/mL) using the 1st WHO   International standard for HCV for Nucleic Acid Amplification based assays.       Lyme ab screening  Recent Labs   Lab Test 10/10/23  2015 08/09/19  1228 05/11/17  0830   LYMEGM 0.03 0.05 <0.01  Negative, Absence of detectable Borrelia burdorferi antibodies. A negative   result does not exclude the possibility of Borrelia burgdorferi infection. If   early Lyme disease is suspected, a second sample should be collected and tested   2 to 4 weeks later.       Tuberculosis Screening  Recent Labs   Lab Test 01/12/23  0818 10/14/21  1546 05/05/20  1322   TBRES Negative Negative Negative     Immunization History     Immunization History   Administered Date(s) Administered    COVID-19 12+ (2023-24) (Pfizer) 10/17/2023    COVID-19 Bivalent 12+ (Pfizer) 12/21/2022    COVID-19 Bivalent 18+ (Moderna) 06/21/2023    COVID-19 Monovalent 18+ (Moderna) 03/10/2021, 04/07/2021, 09/03/2021    COVID-19 Monovalent Booster 18+ (Moderna) 03/02/2022, 07/07/2022    HEPA 04/18/2000, 09/26/2000    HPV 08/13/2012, 09/25/2012, 01/24/2013    HepB 11/15/2011, 12/19/2011    Hepatitis B, Adult 10/28/2022    Influenza (H1N1) 01/07/2010    Influenza (High Dose) 3 valent vaccine 08/30/2018, 09/26/2019    Influenza (IIV3) PF 01/03/2005, 10/16/2006, 11/14/2007, 10/28/2008, 09/29/2009, 09/27/2010, 10/04/2011, 09/25/2012, 09/21/2015    Influenza Vaccine 65+ (Fluzone HD) 09/14/2020, 09/24/2021, 09/29/2022, 10/17/2023    Influenza Vaccine >6 months,quad, PF 09/26/2013, 10/06/2014, 10/06/2016, 09/08/2017    Pneumo Conj 13-V (2010&after) 10/06/2016    Pneumococcal 23 valent 11/15/2011, 10/17/2017    RSV Vaccine (Abrysvo)  10/17/2023    TD,PF 7+ (Tenivac) 04/18/2000, 07/07/2004    TDAP (Adacel,Boostrix) 05/23/2006    TDAP Vaccine (Boostrix) 09/21/2015    Zoster recombinant adjuvanted (SHINGRIX) 06/14/2018, 08/24/2018    Zoster vaccine, live 09/21/2015          Chart documentation done in part with Dragon Voice recognition Software. Although reviewed after completion, some word and grammatical error may remain.    Apolinar Cho MD

## 2024-04-09 ENCOUNTER — PATIENT OUTREACH (OUTPATIENT)
Dept: CARE COORDINATION | Facility: CLINIC | Age: 74
End: 2024-04-09
Payer: COMMERCIAL

## 2024-04-10 NOTE — PROGRESS NOTES
Clinic Care Coordination Contact  Community Health Worker Initial Outreach    CHW Initial Information Gathering:  Referral Source: PCP  Preferred Hospital: [facility], [place]  [phone]  Preferred Urgent Care: [facility] - [place], [phone]  Current living arrangement:: I live alone  Type of residence:: Private home - stairs  Community Resources: Other (see comment) ([employer])  Supplies Currently Used at Home: None  Equipment Currently Used at Home: cane, straight  Informal Support system:: Family, Friends  No PCP office visit in Past Year: No  Transportation means:: Friend, Other ([employer])       Patient accepts CC: Yes. Patient scheduled for assessment with the SW on 4/17/24 at 12:00 pm. Patient noted desire to discuss transportation and possible food supports. [employer] is used, but issues with transportation. The patient is worried about if there is an emergency and how she would be able to get the care that she needs.     RENETTA BarrazaW  [phone]  [facility]  [facility]

## 2024-04-12 ENCOUNTER — INFUSION THERAPY VISIT (OUTPATIENT)
Dept: INFUSION THERAPY | Facility: CLINIC | Age: 74
End: 2024-04-12
Attending: FAMILY MEDICINE
Payer: COMMERCIAL

## 2024-04-12 VITALS
HEART RATE: 62 BPM | DIASTOLIC BLOOD PRESSURE: 75 MMHG | RESPIRATION RATE: 14 BRPM | SYSTOLIC BLOOD PRESSURE: 127 MMHG | OXYGEN SATURATION: 100 % | TEMPERATURE: 97.6 F

## 2024-04-12 DIAGNOSIS — M81.0 AGE-RELATED OSTEOPOROSIS WITHOUT CURRENT PATHOLOGICAL FRACTURE: Primary | ICD-10-CM

## 2024-04-12 DIAGNOSIS — Z92.89 PERSONAL HISTORY OF OTHER MEDICAL TREATMENT: ICD-10-CM

## 2024-04-12 PROCEDURE — 96372 THER/PROPH/DIAG INJ SC/IM: CPT | Performed by: INTERNAL MEDICINE

## 2024-04-12 PROCEDURE — 250N000011 HC RX IP 250 OP 636: Mod: JZ | Performed by: INTERNAL MEDICINE

## 2024-04-12 RX ORDER — MEPERIDINE HYDROCHLORIDE 25 MG/ML
25 INJECTION INTRAMUSCULAR; INTRAVENOUS; SUBCUTANEOUS EVERY 30 MIN PRN
OUTPATIENT
Start: 2024-10-06

## 2024-04-12 RX ORDER — ALBUTEROL SULFATE 90 UG/1
1-2 AEROSOL, METERED RESPIRATORY (INHALATION)
Start: 2024-10-06

## 2024-04-12 RX ORDER — METHYLPREDNISOLONE SODIUM SUCCINATE 125 MG/2ML
125 INJECTION, POWDER, LYOPHILIZED, FOR SOLUTION INTRAMUSCULAR; INTRAVENOUS
Start: 2024-10-06

## 2024-04-12 RX ORDER — DIPHENHYDRAMINE HYDROCHLORIDE 50 MG/ML
50 INJECTION INTRAMUSCULAR; INTRAVENOUS
Start: 2024-10-06

## 2024-04-12 RX ORDER — EPINEPHRINE 1 MG/ML
0.3 INJECTION, SOLUTION, CONCENTRATE INTRAVENOUS EVERY 5 MIN PRN
OUTPATIENT
Start: 2024-10-06

## 2024-04-12 RX ORDER — ALBUTEROL SULFATE 0.83 MG/ML
2.5 SOLUTION RESPIRATORY (INHALATION)
OUTPATIENT
Start: 2024-10-06

## 2024-04-12 RX ADMIN — DENOSUMAB 60 MG: 60 INJECTION SUBCUTANEOUS at 08:54

## 2024-04-12 NOTE — PROGRESS NOTES
"Infusion Nursing Note:  Niesha Adhikari presents today for Prolia injection.    Patient seen by provider today: No   present during visit today: Not Applicable.    Note: Pt  acknowledges that she is taking Calcium w/Vit D.   Pt states that last week she was reaching to put her coffee cup into the grocery cart and \"I heard something pop\".  Is c/o extreme pain in her low back and down lt buttock when standing, \"7-10\", when sitting a \"6\".  She has an appt with Dr Clark next Wed 4/17.  She also is having excessive thirst and urination.  She denies any hx diabetes. Last glucose performed 10/2023 was WNL.  Pt also c/o bright red bleeding from rectum with lg amount of clots \"it fills the bowl\".  She states that she has a hx of anal cancer and hemorrhoids and has had a scope in the recent past.  Pt will discuss the above symptoms on Wed at her appt with her PCP.       Intravenous Access:  No Intravenous access/labs at this visit.    Treatment Conditions:  Results reviewed, labs MET treatment parameters, ok to proceed with treatment.      Post Infusion Assessment:  Patient tolerated injection without incident.  Patient observed for 15 minutes post Prolia injection.       Discharge Plan:   Discharge instructions reviewed with: Patient.  Patient discharged in stable condition accompanied by: self.  Departure Mode: Ambulatory w/cane.      Amisha Cobb RN  "

## 2024-04-15 ENCOUNTER — IMMUNIZATION (OUTPATIENT)
Dept: FAMILY MEDICINE | Facility: CLINIC | Age: 74
End: 2024-04-15
Payer: COMMERCIAL

## 2024-04-15 PROCEDURE — 90480 ADMN SARSCOV2 VAC 1/ONLY CMP: CPT

## 2024-04-15 PROCEDURE — 91320 SARSCV2 VAC 30MCG TRS-SUC IM: CPT

## 2024-04-17 ENCOUNTER — OFFICE VISIT (OUTPATIENT)
Dept: FAMILY MEDICINE | Facility: OTHER | Age: 74
End: 2024-04-17
Payer: COMMERCIAL

## 2024-04-17 ENCOUNTER — TELEPHONE (OUTPATIENT)
Dept: GASTROENTEROLOGY | Facility: CLINIC | Age: 74
End: 2024-04-17

## 2024-04-17 ENCOUNTER — PATIENT OUTREACH (OUTPATIENT)
Dept: CARE COORDINATION | Facility: CLINIC | Age: 74
End: 2024-04-17

## 2024-04-17 VITALS
OXYGEN SATURATION: 99 % | BODY MASS INDEX: 24.65 KG/M2 | SYSTOLIC BLOOD PRESSURE: 108 MMHG | TEMPERATURE: 97.7 F | HEART RATE: 62 BPM | RESPIRATION RATE: 20 BRPM | WEIGHT: 140 LBS | DIASTOLIC BLOOD PRESSURE: 72 MMHG

## 2024-04-17 DIAGNOSIS — F10.21 ALCOHOL DEPENDENCE IN REMISSION (H): ICD-10-CM

## 2024-04-17 DIAGNOSIS — M79.7 FIBROMYALGIA: ICD-10-CM

## 2024-04-17 DIAGNOSIS — M06.09 RHEUMATOID ARTHRITIS OF MULTIPLE SITES WITH NEGATIVE RHEUMATOID FACTOR (H): ICD-10-CM

## 2024-04-17 DIAGNOSIS — M54.50 ACUTE LEFT-SIDED LOW BACK PAIN WITHOUT SCIATICA: ICD-10-CM

## 2024-04-17 DIAGNOSIS — L85.3 DRY SKIN: ICD-10-CM

## 2024-04-17 DIAGNOSIS — R63.1 POLYDIPSIA: ICD-10-CM

## 2024-04-17 DIAGNOSIS — Z23 NEED FOR PROPHYLACTIC VACCINATION AGAINST HEPATITIS A: ICD-10-CM

## 2024-04-17 DIAGNOSIS — K62.5 RECTAL BLEEDING: Primary | ICD-10-CM

## 2024-04-17 DIAGNOSIS — F33.1 MODERATE RECURRENT MAJOR DEPRESSION (H): ICD-10-CM

## 2024-04-17 PROBLEM — D69.6 THROMBOCYTOPENIA (H): Status: RESOLVED | Noted: 2022-07-05 | Resolved: 2024-04-17

## 2024-04-17 LAB
ERYTHROCYTE [DISTWIDTH] IN BLOOD BY AUTOMATED COUNT: 16.8 % (ref 10–15)
FASTING STATUS PATIENT QL REPORTED: YES
GLUCOSE SERPL-MCNC: 97 MG/DL (ref 70–99)
HCT VFR BLD AUTO: 38.8 % (ref 35–47)
HGB BLD-MCNC: 11.7 G/DL (ref 11.7–15.7)
MCH RBC QN AUTO: 24 PG (ref 26.5–33)
MCHC RBC AUTO-ENTMCNC: 30.2 G/DL (ref 31.5–36.5)
MCV RBC AUTO: 80 FL (ref 78–100)
PLATELET # BLD AUTO: 167 10E3/UL (ref 150–450)
RBC # BLD AUTO: 4.88 10E6/UL (ref 3.8–5.2)
TSH SERPL DL<=0.005 MIU/L-ACNC: 1.45 UIU/ML (ref 0.3–4.2)
WBC # BLD AUTO: 3.7 10E3/UL (ref 4–11)

## 2024-04-17 PROCEDURE — 99215 OFFICE O/P EST HI 40 MIN: CPT | Performed by: FAMILY MEDICINE

## 2024-04-17 PROCEDURE — 84443 ASSAY THYROID STIM HORMONE: CPT | Performed by: FAMILY MEDICINE

## 2024-04-17 PROCEDURE — 85027 COMPLETE CBC AUTOMATED: CPT | Performed by: FAMILY MEDICINE

## 2024-04-17 PROCEDURE — 82947 ASSAY GLUCOSE BLOOD QUANT: CPT | Performed by: FAMILY MEDICINE

## 2024-04-17 PROCEDURE — 36415 COLL VENOUS BLD VENIPUNCTURE: CPT | Performed by: FAMILY MEDICINE

## 2024-04-17 RX ORDER — OXYCODONE HYDROCHLORIDE 5 MG/1
5 TABLET ORAL EVERY 6 HOURS PRN
Qty: 6 TABLET | Refills: 0 | Status: SHIPPED | OUTPATIENT
Start: 2024-04-17 | End: 2024-04-20

## 2024-04-17 RX ORDER — GABAPENTIN 300 MG/1
300-600 CAPSULE ORAL
Qty: 180 CAPSULE | Refills: 1 | Status: SHIPPED | OUTPATIENT
Start: 2024-04-17 | End: 2024-04-26

## 2024-04-17 RX ORDER — METHYLPREDNISOLONE 4 MG
TABLET, DOSE PACK ORAL
Qty: 21 TABLET | Refills: 0 | Status: SHIPPED | OUTPATIENT
Start: 2024-04-17 | End: 2024-04-26

## 2024-04-17 ASSESSMENT — PAIN SCALES - GENERAL: PAINLEVEL: SEVERE PAIN (6)

## 2024-04-17 ASSESSMENT — PATIENT HEALTH QUESTIONNAIRE - PHQ9
SUM OF ALL RESPONSES TO PHQ QUESTIONS 1-9: 7
SUM OF ALL RESPONSES TO PHQ QUESTIONS 1-9: 7
10. IF YOU CHECKED OFF ANY PROBLEMS, HOW DIFFICULT HAVE THESE PROBLEMS MADE IT FOR YOU TO DO YOUR WORK, TAKE CARE OF THINGS AT HOME, OR GET ALONG WITH OTHER PEOPLE: EXTREMELY DIFFICULT

## 2024-04-17 ASSESSMENT — ENCOUNTER SYMPTOMS: HEMATOCHEZIA: 1

## 2024-04-17 NOTE — PROGRESS NOTES
Assessment & Plan     Rectal bleeding  Will refer for colonoscopy.  At this time her vitals are stable.  Hemoglobin is improved today compared to 9 days ago.  She no longer has bright red blood and has more coffee-ground blood.    - Adult GI  Referral - Procedure Only; Future  - CBC with platelets    Acute left-sided low back pain without sciatica  Patient has had pain for the last week and this was associated with movements.  No trauma.  Will treat with steroids.  Encouraged her to try physical therapy.  Right now she has a lack of transportation but she will see if she is able to arrange rides for therapy.  Will use oxycodone sparingly for pain.  Will use gabapentin at night to help out with pain and sleep and will increase the dose to 600 mg at bedtime as needed.  If she is not improving over the next month would then consider MRI.    - methylPREDNISolone (MEDROL DOSEPAK) 4 MG tablet therapy pack; Follow Package Directions  - Physical Therapy  Referral; Future  - oxyCODONE (ROXICODONE) 5 MG tablet; Take 1 tablet (5 mg) by mouth every 6 hours as needed for severe pain  - gabapentin (NEURONTIN) 300 MG capsule; Take 1-2 capsules (300-600 mg) by mouth nightly as needed for neuropathic pain    Fibromyalgia  Will increase her gabapentin from 300 mg to 600 mg at bedtime as needed.    - gabapentin (NEURONTIN) 300 MG capsule; Take 1-2 capsules (300-600 mg) by mouth nightly as needed for neuropathic pain    Dry skin  Patient's rash appears to be related to scratching from her dry skin.  Encouraged her to use lotion twice a day.  Will also check her thyroid.    - TSH with free T4 reflex    Polydipsia  She is feeling very thirsty.  Will check a random glucose.    - Glucose    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)  Follows with rheumatology.  Continues on Orencia.    Moderate recurrent major depression (H)  Follows with psychiatry.  Currently not on medication.  Will  check thyroid.    - TSH with free T4 reflex    Alcohol dependence in remission (H)  Has been sober for many years.    Need for prophylactic vaccination against hepatitis A  Has a history of hepatitis C.  She has had 2 vaccinations for hepatitis a but the second 1 was earlier than recommended.  Recommended she get her second hepatitis A.    - hepatitis A vaccine (VAQTA) 50 UNIT/ML injection; Inject 1 mL (50 Units) into the muscle once for 1 dose      40 minutes spent by me on the date of the encounter doing chart review, history and exam, documentation and further activities per the note    Subjective   Niesha is a 73 year old, presenting for the following health issues:  Back Problem, Rash, and Rectal Bleeding      4/17/2024     9:31 AM   Additional Questions   Roomed by Kristina WORKMAN     Rash  Associated symptoms include a rash.   Rectal Bleeding  Associated symptoms include a rash.   History of Present Illness       Reason for visit:  Several issues: Injury, problems since change in meds,  pain, insomnia, chronic fatigue, skin and nail problems...    She eats 4 or more servings of fruits and vegetables daily.She consumes 0 sweetened beverage(s) daily.She exercises with enough effort to increase her heart rate 30 to 60 minutes per day.  She exercises with enough effort to increase her heart rate 7 days per week. She is missing 1 dose(s) of medications per week.  She is not taking prescribed medications regularly due to remembering to take.         Rash  Onset/Duration: Beginning of 2024  Description  Location: Upper front legs, upper arms, upper back  Character: She has dry skin which she then scratches and then it itches and rashes  Itching: mild  Intensity:  moderate  Progression of Symptoms:  worsening  Accompanying signs and symptoms:   Fever: No  Body aches or joint pain: No  Sore throat symptoms: No  Recent cold symptoms: No  History:           Previous episodes of similar rash: None  New exposures:  none  Recent  travel: No  Exposure to similar rash: No  Precipitating or alleviating factors: none  Therapies tried and outcome: eucerin    Concern - Rectal Bleeding  Onset: Wednesday of last week (4/10)  Description: large blood clots and heavy bleeding in stool starting off and now is more like coffee grounds  Progression of Symptoms:  improving slightly  Previous history of similar problem: 2013 [facility], had anal microscopy in January  Therapies tried and outcome: None    Pain History:  When did you first notice your pain? Tuesday of last week (4/9)  Have you seen anyone else for your pain? No  How has your pain affected your ability to work? Not applicable  Where in your body do you have pain? Back Pain  Onset/Duration: 4/9  Description:   Location of pain: low back left  Character of pain: dull ache and waxing and waning  Pain radiation: radiates into the left buttocks  New numbness or weakness in legs, not attributed to pain: no   Tingling in lower left upper thigh  Intensity: Currently 6/10, At its worst 10/10  Progression of Symptoms: same and waxing and waning  History:   Specific cause: bending at waist and reaching  Pain interferes with job: N/A  History of back problems: previous degenerative joint disease of the cervical spine and previous degenerative joint disease of the lumbar spine  Any previous MRI or X-rays: Yes- at [facility].   Sees a specialist for back pain: yes for arthritis but not for this  Alleviating factors:   Improved by: heat and sitting    Precipitating factors:  Worsened by: Lifting, Bending, Standing, and Walking  Therapies tried and outcome: heat, rest, and sitting    Accompanying Signs & Symptoms:  Risk of Fracture: Osteoporosis and Age >64  Risk of Cauda Equina: None  Risk of Infection: None  Risk of Cancer: None  Risk of Ankylosing Spondylitis: Onset at age <35, male, AND morning back stiffness  no       Was bending over in car and felt a pop in her back and has had a lot pain.  Using Tylenol and  ibuprofen without much relief.    Feeling thirsty all the time.  Tongue gets dried out.  Urinates more often.    Feeling tired.  Will see sleep specialist in August.  Has been trying gabapentin to help with sleep at night but at 300 mg at bedtime has not noticed this to be helpful.          Objective    /72 (Cuff Size: Adult Regular)   Pulse 62   Temp 97.7  F (36.5  C) (Oral)   Resp 20   Wt 63.5 kg (140 lb)   LMP 11/27/2003   SpO2 99%   BMI 24.65 kg/m    Body mass index is 24.65 kg/m .  Physical Exam   Gen: no apparent distress  Chest: clear to auscultation without wheeze, rale or rhonchi  Cor: regular rate and rhythm without murmur  Ext: warm and dry without edema  Psych: Alert and oriented times 3; coherent speech, normal   rate and volume, able to articulate logical thoughts, able   to abstract reason, no tangential thoughts, no hallucinations   or delusions  Her affect is neutral    Comprehensive back pain exam:  Tenderness of left paralumbar musculature, Pain limits the following motions: Flexion and lateral bending, Lower extremity strength functional and equal on both sides, Lower extremity reflexes within normal limits bilaterally, Lower extremity sensation normal and equal on both sides, and Straight leg raise negative bilaterally          Signed Electronically by: Tanika Clark MD

## 2024-04-17 NOTE — TELEPHONE ENCOUNTER
"Endoscopy Scheduling Screen    Have you had a positive Covid test in the last 14 days?  No    What is your communication preference for Instructions and/or Bowel Prep?   MyChart    What insurance is in the chart?  Other:  [employer] MEDICARE    Ordering/Referring Provider: SANG ORDAZ   (If ordering provider performs procedure, schedule with ordering provider unless otherwise instructed. )    BMI: Estimated body mass index is 24.65 kg/m  as calculated from the following:    Height as of 11/16/23: 1.605 m (5' 3.19\").    Weight as of an earlier encounter on 4/17/24: 63.5 kg (140 lb).     Sedation Ordered  moderate sedation.   If patient BMI > 50 do not schedule in [facility].    If patient BMI > 45 do not schedule at [facility].    Are you taking methadone or Suboxone?  No    Have you had difficulties, pain, or discomfort during past endoscopy procedures?  No    Are you taking any prescription medications for pain 3 or more times per week?   YES, RN review needed to determine if MAC is required.  (RN Review required.)     Do you have a history of malignant hyperthermia?  No    (Females) Are you currently pregnant?   No     Have you been diagnosed or told you have pulmonary hypertension?   No    Do you have an LVAD?  No    Have you been told you have moderate to severe sleep apnea?  No    Have you been told you have COPD, asthma, or any other lung disease?  No    Do you have any heart conditions?  No     Have you ever had or are you waiting for an organ transplant?  No. Continue scheduling, no site restrictions.    Have you had a stroke or transient ischemic attack (TIA aka \"mini stroke\" in the last 6 months?   No    Have you been diagnosed with or been told you have cirrhosis of the liver?   No    Are you currently on dialysis?   No    Do you need assistance transferring?   No    BMI: Estimated body mass index is 24.65 kg/m  as calculated from the following:    Height as of 11/16/23: 1.605 m (5' 3.19\").    Weight as of an " earlier encounter on 4/17/24: 63.5 kg (140 lb).     Is patients BMI > 40 and scheduling location [facility]?  No    Do you take an injectable medication for weight loss or diabetes (excluding insulin)?  No    Do you take the medication Naltrexone?  No    Do you take blood thinners?  No       Prep   Are you currently on dialysis or do you have chronic kidney disease?  No    Do you have a diagnosis of diabetes?  No    Do you have a diagnosis of cystic fibrosis (CF)?  No    On a regular basis do you go 3 -5 days between bowel movements?  No    BMI > 40?  No    Preferred Pharmacy:    [place], [place] - [address]  [address] [address]  Phone: [phone] Fax: [phone]    Final Scheduling Details     Procedure scheduled  Colonoscopy    Surgeon:  NAREN     Date of procedure:  6/17/24     Pre-OP / PAC:   No - Not required for this site.    Location  [facility] - Per order.    Sedation   MAC/Deep Sedation - Per exclusion criteria.      Patient Reminders:   You will receive a call from a Nurse to review instructions and health history.  This assessment must be completed prior to your procedure.  Failure to complete the Nurse assessment may result in the procedure being cancelled.      On the day of your procedure, please designate an adult(s) who can drive you home stay with you for the next 24 hours. The medicines used in the exam will make you sleepy. You will not be able to drive.      You cannot take public transportation, ride share services, or non-medical taxi service without a responsible caregiver.  Medical transport services are allowed with the requirement that a responsible caregiver will receive you at your destination.  We require that drivers and caregivers are confirmed prior to your procedure.

## 2024-04-17 NOTE — PROGRESS NOTES
Clinic Care Coordination Contact    Call placed to pt and she asked that appt be rescheduled.  Rescheduled to Thursday April 25th at noon.     TATYANA Valdes  [facility] - Care Coordinator   4/17/2024   12:10 PM  [phone]

## 2024-04-25 ENCOUNTER — PATIENT OUTREACH (OUTPATIENT)
Dept: FAMILY MEDICINE | Facility: CLINIC | Age: 74
End: 2024-04-25
Payer: COMMERCIAL

## 2024-04-25 ASSESSMENT — ACTIVITIES OF DAILY LIVING (ADL)
DEPENDENT_IADLS:: CLEANING;TRANSPORTATION
DEPENDENT_IADLS:: CLEANING;TRANSPORTATION

## 2024-04-25 NOTE — LETTER
It was a pleasure to speak with you.  I would like to provide you with the enclosed information for your records.  As part of care coordination, we are developing care plans to assist in accomplishing your health care goals.  When we speak next, please feel free to let me know if you want to add or change any information on your care plans.    As always, feel free to contact me if you have any questions or concerns.  I look forward to working with you in the effort to achieve your health care and wellness goals .        Sincerely,      Monika Newell, [facility]  Clinic Care Coordination  [phone]    [facility]  Patient Centered Plan of Care  About Me:        Patient Name:  Niesha Adhikari    YOB: 1950  Age:         73 year old   [facility] MRN:    [id number] Telephone Information:  Home Phone [phone]   Mobile [phone]       Address:  [address] [address] [place] [address] Email address:  [email or web address]      Emergency Contact(s)    Name Relationship Lgl Grd Work Phone Home Phone Mobile Phone   1. DEVON PINTO Son No  [phone]           Primary language:  English     needed? No   [employer]:  [phone] op. 1  Other communication barriers:None    Preferred Method of Communication:  Mail  Current living arrangement: I live alone; I live in a private home    Mobility Status/ Medical Equipment: Independent w/Device        Health Maintenance  Health Maintenance Reviewed: Due/Overdue   Health Maintenance Due   Topic Date Due    COLORECTAL CANCER SCREENING  01/08/2024           My Access Plan  Medical Emergency 911   Primary Clinic Line [facility] - [phone]   24 Hour Appointment Line [phone] or  [phone] ([phone]) (toll-free)   24 Hour Nurse Line [phone] (toll-free)   Preferred Urgent Care [facility], [phone]     [facility]  [facility]  [phone]     [place] [facility] [place] [address] [facility]     Behavioral Health Crisis Line The National Suicide Prevention Lifeline at 1-537.886.3123 or Text/Call 988           My Care Team Members  Patient Care Team         Relationship Specialty Notifications Start End    Tanika Clark MD PCP - General   5/8/01     Phone: [phone] Fax: [phone]         [address] [facility] [address] [facility] [address]    Dolores Velásquez APRN CNP  Nurse Practitioner  6/8/15     Phone: [phone] Fax: [phone]         [facility] [address] [facility] [address] [facility] [address]    Tanika Clark MD Assigned PCP   12/3/17     Phone: [phone] Fax: [phone]         [address] [facility] [address] [facility] [address]    Apolinar Cho MD Assigned Rheumatology Provider   11/21/21     Phone: [phone] Fax: [phone]         [address] [facility] [address]    Saravanan Ricardo MD Assigned Neuroscience Provider   12/10/22     Phone: [phone] Fax: [phone]         [address] [facility] [address]    David John MD Assigned Musculoskeletal Provider   12/31/22     Phone: [phone] Fax: [phone]         [address] [facility] [address]    Tristan Chua PA-C Physician Assistant Gastroenterology  6/12/23     Phone: [phone] Fax: [phone]         [address] [place] [address]    Marlyn Augustine MD MD Cardiovascular & Thoracic Surgery  6/27/23     Phone: [phone] Fax: [phone]         [address] [place] [address]    Charlotte Wells APRN CNP Assigned Surgical Provider   1/25/24     Phone: [phone] Fax: [phone]         [address] [place] [address]    Monika Newell LSW Lead Care Coordinator Primary Care - [facility] Admissions 4/10/24     Phone: [phone] Fax: [phone]        Madi Pisano, W  [phone]   Community Health Worker Primary Care - [facility] Admissions 4/25/24                 My Care Plans  Self Management and Treatment Plan    Care Plan  Care Plan: Chronic Pain       Problem: Chronic Pain is Not Self-Managed       Goal: Demonstrate improved self-management of chronic pain       Start Date: 4/25/2024 Expected End Date: 4/25/2025    Note:     Barriers: pain  Strengths: working with providers and able to ask questions  Patient expressed understanding of goal: yes  Action steps to achieve this goal:  1. I will reach out to providers and attend appointments to get the best pain management options  2. I will take medications as prescribed  3. I will work on spacing out my over the counter medications vs taking once per day  4. I will continue to look into non medical approaches to manage my pain                              Care Plan: Transportation       Problem: Lack of transportation       Goal: Establish reliable transportation       Start Date: 4/25/2024 Expected End Date: 10/25/2024    Note:     Barriers: availability of the vehicle I want  Strengths: name on list for option I want  Patient expressed understanding of goal: yes  Action steps to achieve this goal:  1. I will continue to check in on the wait list and consider other options to add to the list  2. I will look into transportation options that are sent to me for help until I get a vehicle                                Care Plan: health care directive       Problem: HP GENERAL PROBLEM       Goal: I will update my health care directive       Start Date: 4/25/2024 Expected End Date: 10/25/2024    Note:     Barriers: none right now  Strengths: has one completed to update  Patient expressed understanding of goal: yes  Action steps to achieve this goal:  1. I will I will review my current health care directive and update  2. I will get it notarize  3. I will email it to [email or web address]                              Care Plan:  Medication mail order       Problem: Medication Adherence       Goal: I want to find a mail order or better option for getting my meds       Start Date: 4/25/2024 Expected End Date: 10/25/2024    Note:     Barriers: lack of transportation  Strengths: desire to find alternative ways  Patient expressed understanding of goal: yes  Action steps to achieve this goal:  1. I will reach out to my insurance company for mail order options  2. I will have my prescriptions sent to the mail order company                                  Action Plans on File:            Depression          Advance Care Plans/Directives:   Advanced Care Plan/Directives on file:   Yes    Status of Document(s):   On file but Validation not complete - referral to [facility]    Advanced Care Plan/Directives Type: No data recorded         My Medical and Care Information  Problem List   Patient Active Problem List   Diagnosis    Allergic rhinitis    Pernicious anemia    Anxiety state    Migraine    Essential and other specified forms of tremor    Fibromyalgia    Moderate recurrent major depression (H)    Narcolepsy    Internal hemorrhoids with other complication    AIN (anal intraepithelial neoplasia) anal canal    Osteoporosis    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)    Benign essential hypertension    Alcohol dependence in remission (H)    Personal history of other medical treatment    Constipation    DDD (degenerative disc disease), cervical    Iron deficiency    Fatigue    Trigger middle finger of left hand    Bilateral carpal tunnel syndrome    Chronic bilateral low back pain without sciatica      Current Medications and Allergies:     Allergies   Allergen Reactions    Telaprevir Other (See Comments) and Rash     Rectal bleeding, anemia    Abilify Discmelt Other (See Comments)     Disoriented    Antivert [Meclizine Hcl]     Chamomile     Compazine     Diphenhydramine Nausea     And abdominal pain     "Duloxetine Hcl Other (See Comments)     Disoriented, trouble sleeping    Effexor [Venlafaxine] Other (See Comments)     Disoriented, trouble sleeping    Elavil [Amitriptyline Hcl] Other (See Comments)     \"didn't feel right on it-med was stopped right away\"    Ferrous Sulfate Nausea and Vomiting    Food Difficulty breathing     cilantro    Indomethacin      indocin sensativity \"Severe h.a\"    Seasonal Allergies Other (See Comments) and Difficulty breathing     Philip Gold Aug-Sept, rag weed, sneezing    Sulfa Antibiotics     Thiopental Sodium      PENTOTHAL/rigidity and fight response    Animal Dander Difficulty breathing and Rash     sneezing,resp. distress    Bupropion Anxiety    Tylenol [Acetaminophen] Rash         Current Outpatient Medications:     Abatacept (ORENCIA CLICKJECT) 125 MG/ML SOAJ auto-injector, Inject 1 mL (125 mg) Subcutaneous every 7 days . Hold for any infection and seek medical attention., Disp: 4 mL, Rfl: 5    calcium carb-cholecalciferol 600-500 MG-UNIT CAPS, Take 1,200 mg by mouth daily, Disp: , Rfl:     cloNIDine (CATAPRES) 0.2 MG tablet, Take 0.2 mg by mouth At Bedtime, Disp: , Rfl:     clotrimazole (LOTRIMIN) 1 % external cream, APPLY TOPICALLY TWO TIMES A DAY, Disp: 30 g, Rfl: 3    cyanocobalamin (VITAMIN B-12) 500 MCG SUBL sublingual tablet, Place 500 mcg under the tongue every other day, Disp: , Rfl:     cycloSPORINE (RESTASIS) 0.05 % ophthalmic emulsion, Place 1 drop into both eyes 2 times daily , Disp: , Rfl:     denosumab (PROLIA) 60 MG/ML SOSY injection, , Disp: , Rfl:     diclofenac (VOLTAREN) 1 % topical gel, Apply up to 2 grams of 1% gel to hands up to 4 times daily as needed for joint pain (maximum: 8 g per upper extremity per day), Disp: 200 g, Rfl: 2    ferrous sulfate (FE TABS) 325 (65 Fe) MG EC tablet, Take 1 tablet (325 mg) by mouth every other day, Disp: 45 tablet, Rfl: 0    gabapentin (NEURONTIN) 300 MG capsule, Take 1-2 capsules (300-600 mg) by mouth nightly as needed " for neuropathic pain, Disp: 180 capsule, Rfl: 1    hydroxychloroquine (PLAQUENIL) 200 MG tablet, Hydroxychloroquine 200mg daily; and an additional 200mg every other day.  Yearly eye exam, including 10-2 VF and SD-OCT, required., Disp: 135 tablet, Rfl: 1    hydrOXYzine (ATARAX) 50 MG tablet, Take 50 mg by mouth 1 1/2-2 tabs at bedtime, Disp: , Rfl:     lisdexamfetamine (VYVANSE) 30 MG capsule, Take 30 mg by mouth every morning, Disp: , Rfl:     lisinopril (ZESTRIL) 10 MG tablet, Take 1 tablet (10 mg) by mouth daily, Disp: 90 tablet, Rfl: 3    methylPREDNISolone (MEDROL DOSEPAK) 4 MG tablet therapy pack, Follow Package Directions, Disp: 21 tablet, Rfl: 0    montelukast (SINGULAIR) 10 MG tablet, TAKE 1 TABLET BY MOUTH ONCE DAILY AS NEEDED SEASONALLY (AUGUST AND SEPTEMBER), Disp: 90 tablet, Rfl: 3    naproxen sodium (ANAPROX) 220 MG tablet, Take 220-440 mg by mouth daily as needed for moderate pain, Disp: , Rfl:     nystatin (MYCOSTATIN) 079945 UNIT/GM external powder, Apply topically 3 times daily as needed, Disp: 60 g, Rfl: 1    polyethylene glycol (MIRALAX) 17 GM/Dose powder, Take 1 capful by mouth daily as needed , Disp: , Rfl:     Psyllium (FIBER) 0.52 G CAPS, 1 tabs in am and pm, Disp: 540 capsule, Rfl:     sennosides (SENOKOT) 8.6 MG tablet, Take 2 tablets by mouth 2 times daily, Disp: , Rfl:     tenofovir alafenamide fumarate (VEMLIDY) 25 MG tablet, Take 1 tablet (25 mg) by mouth daily with food (dispense only in the original container)., Disp: 90 tablet, Rfl: 3    Vitamin D3 (CHOLECALCIFEROL) 25 mcg (1000 units) tablet, Take 1 tablet (25 mcg) by mouth daily, Disp: 90 tablet, Rfl: 1      Care Coordination Start Date: 4/8/2024   Frequency of Care Coordination: Monthly, more frequent as needed   Form Last Updated: 04/25/2024

## 2024-04-25 NOTE — Clinical Note
Rolanda only  as you placed referral:  Current Medical Concerns:  pt has chronic pain that is not well managed.  She is wanting to find an option between her narcotic pain med and Aleve.     Education Provided to patient: discussed using Aleve one pill every 8-12 hours vs 2 one time per day. Reviewed limits noted on drugs.com, [employer] and other website (1 every 8-12 hours and no more then 2 in a 12 hour period and 3 in 24 hour period) along with encouraging her to verify this with her provider.  Discussed that if she continues to wait until she can not manage the pain before taking two she is always going to be playing catch up.  Reviewed non medical and topical approaches such as ointments and heat/ice.  She has some challenges with ointments as she gets cuts on her hands often.  Reinforced good moisturization of her hands will also help reduce dry, tight, cracked skin.   Resources for transportation sent

## 2024-04-25 NOTE — PROGRESS NOTES
Clinic Care Coordination Contact  Clinic Care Coordination Contact  OUTREACH    Referral Information:  Referral Source: PCP    Primary Diagnosis: Psychosocial    Chief Complaint   Patient presents with    Clinic Care Coordination - Initial     SW CC        [place] Utilization: no concerns  Clinic Utilization  Difficulty keeping appointments:: No  Compliance Concerns: No  No-Show Concerns: No  No PCP office visit in Past Year: No  Utilization      No Show Count (past year)  1             ED Visits  2             Hospital Admissions  0                    Current as of: 4/25/2024  3:52 AM                Clinical Concerns:  Current Medical Concerns:  pt has chronic pain that is not well managed.  She is wanting to find an option between her narcotic pain med and Aleve.    Current Behavioral Concerns: pt sees counselor and psychiatry and feels well supported with no concerns.     Education Provided to patient: discussed using Aleve one pill every 8-12 hours vs 2 one time per day. Reviewed limits noted on drugs.com, [employer] and other website (1 every 8-12 hours and no more then 2 in a 12 hour period and 3 in 24 hour period) along with encouraging her to verify this with her provider.  Discussed that if she continues to wait until she can not manage the pain before taking two she is always going to be playing catch up.  Reviewed non medical and topical approaches such as ointments and heat/ice.  She has some challenges with ointments as she gets cuts on her hands often.  Reinforced good moisturization of her hands will also help reduce dry, tight, cracked skin.     Encouraged pt to reach out to provider for options.  Goal created.     Pain  Pain (GOAL):: Yes  Type: Chronic (>3mo)  Location of chronic pain:: lower back, left hip, sciatica, fingers, hands, arms, elbows, toes, neck, shoulders  Radiating: Yes  Location pain radiates to: Legs, arms, shoulders, back  Progression: Worsening  Description of pain: Aching, Gnawing,  Numb, Penetrating, Sharp, Stabbing, Tender, Throbbing  Chronic pain severity:: 7  Limitation of routine activities due to chronic pain:: Yes  Description: Able to do moderate activities  Alleviating Factors: Rest, Heat, Exercise, Stretching, Pain Medication  Aggravating Factors: Positioning, Other  Health Maintenance Reviewed: Due/Overdue   Health Maintenance Due   Topic Date Due    COLORECTAL CANCER SCREENING  01/08/2024       Clinical Pathway: None    Medication Management:  Medication review status: Medications reviewed and no changes reported per patient.        Only challenge is trying to get to the pharmacy when she does not have transportation.  Discussed mail order options and goal set for her to reach out to insurance for options.      Functional Status:  Dependent ADLs:: Independent, Ambulation-cane  Dependent IADLs:: Cleaning, Transportation  Bed or wheelchair confined:: No  Mobility Status: Independent w/Device  Fallen 2 or more times in the past year?: (!) Yes  Any fall with injury in the past year?: Yes    Living Situation:  Current living arrangement:: I live alone, I live in a private home  Type of residence:: Private home - stairs    Lifestyle & Psychosocial Needs:    Social Determinants of Health     Food Insecurity: Low Risk  (4/10/2024)    Food Insecurity     Within the past 12 months, did you worry that your food would run out before you got money to buy more?: No     Within the past 12 months, did the food you bought just not last and you didn t have money to get more?: No   Depression: Not at risk (4/17/2024)    PHQ-2     PHQ-2 Score: 0   Housing Stability: Low Risk  (4/10/2024)    Housing Stability     Do you have housing? : Yes     Are you worried about losing your housing?: No   Tobacco Use: Medium Risk (4/17/2024)    Patient History     Smoking Tobacco Use: Former     Smokeless Tobacco Use: Never     Passive Exposure: Never   Financial Resource Strain: Low Risk  (4/10/2024)    Financial  Resource Strain     Within the past 12 months, have you or your family members you live with been unable to get utilities (heat, electricity) when it was really needed?: No   Alcohol Use: Not At Risk (4/10/2024)    AUDIT-C     Frequency of Alcohol Consumption: Never     Average Number of Drinks: Patient does not drink     Frequency of Binge Drinking: Never   Transportation Needs: High Risk (4/10/2024)    Transportation Needs     Within the past 12 months, has lack of transportation kept you from medical appointments, getting your medicines, non-medical meetings or appointments, work, or from getting things that you need?: Yes   Physical Activity: Sufficiently Active (4/10/2024)    Exercise Vital Sign     Days of Exercise per Week: 7 days     Minutes of Exercise per Session: 60 min   Interpersonal Safety: Low Risk  (11/16/2023)    Interpersonal Safety     Do you feel physically and emotionally safe where you currently live?: Yes     Within the past 12 months, have you been hit, slapped, kicked or otherwise physically hurt by someone?: No     Within the past 12 months, have you been humiliated or emotionally abused in other ways by your partner or ex-partner?: No   Stress: Stress Concern Present (4/10/2024)    [employer] - Occupational Stress Questionnaire     Feeling of Stress : Very much   Social Connections: Unknown (4/10/2024)    Social Connection and Isolation Panel [NHANES]     Frequency of Communication with Friends and Family: Once a week     Frequency of Social Gatherings with Friends and Family: Patient declined     Attends [employer] Services: Patient declined     Active Member of Clubs or Organizations: Patient declined     Attends Club or Organization Meetings: 1 to 4 times per year     Marital Status:    Health Literacy: Not on file     Diet:: Low cholesterol, Low saturated fat, No added salt, Other  Inadequate nutrition (GOAL):: Yes  Tube Feeding: No  Inadequate  activity/exercise (GOAL):: Yes  Significant changes in sleep pattern (GOAL): Yes  Transportation means:: Family, Friend, Public transportation     Islam or spiritual beliefs that impact treatment:: No  Mental health DX:: Yes  Mental health DX how managed:: Medication, Outpatient Counseling, Psychiatrist, Supplements  Mental health management concern (GOAL):: No  Chemical Dependency Status: Past Concern  Chemical Dependency Management: AA  Informal Support system:: Family, Friends, Neighbors        Patient noted that many of her challenges are related to limited transportation options.  She is wanting to get a hybrid vehicle and there is a wait list on that, of which she is on.  She uses tricap yet often there is not a ride option for her.  She has looked into other transportation and the cost is high.  Will send resources and goal set.  Pt wants to get her health care directive updated and a goal was created.     Pt noted that she does not have very large social supports yet also is happy staying at home.     Reviewed that CHW will call out monthly to check in on progress of goals.  Pt was in agreement.      Resources and Interventions:  Current Resources:      Community Resources: Housekeeping/Chore Agency, Infusion Services, OP Infusion, OP Mental Health, Other (see comment)  Supplies Currently Used at Home: Nutritional Supplements, Wound Care Supplies, Gloves, Other  Equipment Currently Used at Home: cane, straight, tub bench  Employment Status: retired         Advance Care Plan/Directive  Advanced Care Plans/Directives on file:: Yes  Status of record:: On file but Validation not complete - referral to [facility]  Discussed with patient/caregiver:: Other (Comment)    Referrals Placed: Transportation, Community Resources       Care Plan:  Care Plan: Chronic Pain       Problem: Chronic Pain is Not Self-Managed       Goal: Demonstrate improved self-management of chronic pain       Start Date: 4/25/2024  Expected End Date: 4/25/2025    Note:     Barriers: pain  Strengths: working with providers and able to ask questions  Patient expressed understanding of goal: yes  Action steps to achieve this goal:  1. I will reach out to providers and attend appointments to get the best pain management options  2. I will take medications as prescribed  3. I will work on spacing out my over the counter medications vs taking once per day  4. I will continue to look into non medical approaches to manage my pain                              Care Plan: Transportation       Problem: Lack of transportation       Goal: Establish reliable transportation       Start Date: 4/25/2024 Expected End Date: 10/25/2024    Note:     Barriers: availability of the vehicle I want  Strengths: name on list for option I want  Patient expressed understanding of goal: yes  Action steps to achieve this goal:  1. I will continue to check in on the wait list and consider other options to add to the list  2. I will look into transportation options that are sent to me for help until I get a vehicle                                Care Plan: health care directive       Problem: HP GENERAL PROBLEM       Goal: I will update my health care directive       Start Date: 4/25/2024 Expected End Date: 10/25/2024    Note:     Barriers: none right now  Strengths: has one completed to update  Patient expressed understanding of goal: yes  Action steps to achieve this goal:  1. I will I will review my current health care directive and update  2. I will get it notarize  3. I will email it to [email or web address]                              Care Plan: Medication mail order       Problem: Medication Adherence       Goal: I want to find a mail order or better option for getting my meds       Start Date: 4/25/2024 Expected End Date: 10/25/2024    Note:     Barriers: lack of transportation  Strengths: desire to find alternative ways  Patient expressed understanding of goal:  yes  Action steps to achieve this goal:  1. I will reach out to my insurance company for mail order options  2. I will have my prescriptions sent to the mail order company                                  Patient/Caregiver understanding: pt to work on each goal steps/tasks       Future Appointments                In 1 month David John MD [facility]    In 2 months [facility]    In 2 months Monica Murillo APRN CNS [facility]    In 3 months Apolinar Cho MD [facility]    In 4 months [facility]    In 4 months Tirstan Chua PA-C [facility] Hepatology Clinic [facility]    In 5 months PH LAB [facility]    In 5 months PH INFUSION CHAIR 1; PH INFUSION NURSE [facility] Infusion Services [facility]    In 7 months Tanika Clark MD [facility] [place]            Plan: will send care plan via VitaPortal and resources via VitaPortal message.  CHW to call in one month and SW to complete 6 week chart reviews.     TATYANA Valdes  [facility] Primary Care - Care Coordinator   4/25/2024   1:27 PM  [phone]

## 2024-04-26 ENCOUNTER — MYC MEDICAL ADVICE (OUTPATIENT)
Dept: FAMILY MEDICINE | Facility: OTHER | Age: 74
End: 2024-04-26
Payer: COMMERCIAL

## 2024-04-26 DIAGNOSIS — M79.7 FIBROMYALGIA: ICD-10-CM

## 2024-04-26 DIAGNOSIS — M54.16 LUMBAR RADICULOPATHY: ICD-10-CM

## 2024-04-26 DIAGNOSIS — M54.50 ACUTE LEFT-SIDED LOW BACK PAIN WITHOUT SCIATICA: Primary | ICD-10-CM

## 2024-04-26 RX ORDER — OXYCODONE HYDROCHLORIDE 5 MG/1
5 TABLET ORAL EVERY 6 HOURS PRN
Qty: 6 TABLET | Refills: 0 | Status: SHIPPED | OUTPATIENT
Start: 2024-04-26 | End: 2024-04-29

## 2024-04-26 RX ORDER — GABAPENTIN 300 MG/1
300 CAPSULE ORAL 3 TIMES DAILY
Qty: 180 CAPSULE | Refills: 0 | Status: SHIPPED | OUTPATIENT
Start: 2024-04-26 | End: 2024-07-15

## 2024-04-30 ENCOUNTER — DOCUMENTATION ONLY (OUTPATIENT)
Dept: OTHER | Facility: CLINIC | Age: 74
End: 2024-04-30
Payer: COMMERCIAL

## 2024-05-02 ENCOUNTER — TELEPHONE (OUTPATIENT)
Dept: RHEUMATOLOGY | Facility: CLINIC | Age: 74
End: 2024-05-02
Payer: COMMERCIAL

## 2024-05-02 NOTE — TELEPHONE ENCOUNTER
PA Initiation    Medication: ORENCIA CLICKJECT 125 MG/ML SC SOAJ  Insurance Company: [employer] - Phone [phone] Fax [phone]  Pharmacy Filling the Rx: [facility] - [place], [place] - [facility] [address]  Filling Pharmacy Phone: [phone]  Filling Pharmacy Fax: [phone]  Start Date: 5/2/2024  GADIEL (Key: [id number])    [email or web address]        Thank You,     Rosa Deal CPhT  Specialty Pharmacy Clinic [facility] Specialty  [email or web address]  [email or web address]  Phone: [phone]  Fax: [phone]

## 2024-05-02 NOTE — TELEPHONE ENCOUNTER
Prior Authorization Approval    Medication: ORENCIA CLICKJECT 125 MG/ML SC SOAJ  Authorization Effective Date: 2/2/2024  Authorization Expiration Date: 5/2/2025  Approved Dose/Quantity: 4 ML / 28 day  Reference #: YANAKILAH (Key: [id number])   Insurance Company: [employer] - Phone [phone] Fax [phone]  Expected CoPay: $ 0  CoPay Card Available: Other (see comments)    Financial Assistance Needed: [email or web address]   Which Pharmacy is filling the prescription: [facility] - [place], [place] - [address]   Pharmacy Notified: renewal  Patient Notified: renewal          Thank You,     Rosa Deal [facility]  Specialty Pharmacy Clinic [facility] Specialty  [email or web address]  [email or web address]  Phone: [phone]  Fax: [phone]

## 2024-05-06 ENCOUNTER — HOSPITAL ENCOUNTER (OUTPATIENT)
Dept: MRI IMAGING | Facility: CLINIC | Age: 74
Discharge: HOME | End: 2024-05-06
Attending: FAMILY MEDICINE | Admitting: FAMILY MEDICINE
Payer: COMMERCIAL

## 2024-05-06 DIAGNOSIS — M54.50 ACUTE LEFT-SIDED LOW BACK PAIN WITHOUT SCIATICA: ICD-10-CM

## 2024-05-06 PROCEDURE — 72148 MRI LUMBAR SPINE W/O DYE: CPT

## 2024-05-10 DIAGNOSIS — G89.29 CHRONIC BILATERAL LOW BACK PAIN WITHOUT SCIATICA: Primary | ICD-10-CM

## 2024-05-10 DIAGNOSIS — M54.50 CHRONIC BILATERAL LOW BACK PAIN WITHOUT SCIATICA: Primary | ICD-10-CM

## 2024-05-16 ENCOUNTER — OFFICE VISIT (OUTPATIENT)
Dept: NEUROSURGERY | Facility: CLINIC | Age: 74
End: 2024-05-16
Attending: FAMILY MEDICINE
Payer: COMMERCIAL

## 2024-05-16 ENCOUNTER — ANCILLARY PROCEDURE (OUTPATIENT)
Dept: GENERAL RADIOLOGY | Facility: CLINIC | Age: 74
End: 2024-05-16
Attending: STUDENT IN AN ORGANIZED HEALTH CARE EDUCATION/TRAINING PROGRAM
Payer: COMMERCIAL

## 2024-05-16 VITALS
DIASTOLIC BLOOD PRESSURE: 73 MMHG | HEIGHT: 63 IN | WEIGHT: 140 LBS | HEART RATE: 50 BPM | SYSTOLIC BLOOD PRESSURE: 135 MMHG | BODY MASS INDEX: 24.8 KG/M2

## 2024-05-16 DIAGNOSIS — G89.29 CHRONIC BILATERAL LOW BACK PAIN WITHOUT SCIATICA: ICD-10-CM

## 2024-05-16 DIAGNOSIS — M54.50 CHRONIC BILATERAL LOW BACK PAIN WITHOUT SCIATICA: ICD-10-CM

## 2024-05-16 DIAGNOSIS — M54.50 CHRONIC BILATERAL LOW BACK PAIN WITHOUT SCIATICA: Primary | ICD-10-CM

## 2024-05-16 DIAGNOSIS — M54.16 LUMBAR RADICULOPATHY: ICD-10-CM

## 2024-05-16 DIAGNOSIS — G89.29 CHRONIC BILATERAL LOW BACK PAIN WITHOUT SCIATICA: Primary | ICD-10-CM

## 2024-05-16 PROCEDURE — 99203 OFFICE O/P NEW LOW 30 MIN: CPT | Performed by: STUDENT IN AN ORGANIZED HEALTH CARE EDUCATION/TRAINING PROGRAM

## 2024-05-16 PROCEDURE — 72120 X-RAY BEND ONLY L-S SPINE: CPT | Performed by: PREVENTIVE MEDICINE

## 2024-05-16 PROCEDURE — 72082 X-RAY EXAM ENTIRE SPI 2/3 VW: CPT | Mod: XS | Performed by: PREVENTIVE MEDICINE

## 2024-05-16 NOTE — NURSING NOTE
"Niesha Adhikari's goals for this visit include:   Chief Complaint   Patient presents with    New Patient     Radiculopathy         She requests these members of her care team be copied on today's visit information: yes    PCP: Tanika Clark    Referring Provider:  Tanika Clark MD  [address]  [place] [address]    /73 (BP Location: Right arm, Patient Position: Sitting, Cuff Size: Adult Regular)   Pulse 50   Ht 1.6 m (5' 3\")   Wt 63.5 kg (140 lb)   [facility] 11/27/2003   BMI 24.80 kg/m      Do you need any medication refills at today's visit? No  KHADIJAH García., CMA ([facility])      "

## 2024-05-16 NOTE — LETTER
5/16/2024         RE: Niesha Adhikari  [address] [facility] [address]        Dear Colleague,    Thank you for referring your patient, Niesha Adhikari, to the [facility] [place]. Please see a copy of my visit note below.    HPI:  73-year-old female with new onset low back pain in the left lower back to the buttock radiating around to the anterior pelvis.  This started when leaning forward a few weeks ago and she felt a pop at that time.  She has not noticed any radiating pain down the leg.  She denies any weakness.  She does notice some tingling in the anterior abdomen associated with this.  She did also note new onset rectal bleeding at the time of the pain as well.  She denies any right-sided symptoms.  She denies any recent physical therapy or any injections into her spine.  She does have a history of osteoporosis and rheumatoid arthritis.  Current Outpatient Medications   Medication Sig Dispense Refill     Abatacept (ORENCIA CLICKJECT) 125 MG/ML SOAJ auto-injector Inject 1 mL (125 mg) Subcutaneous every 7 days . Hold for any infection and seek medical attention. 4 mL 5     calcium carb-cholecalciferol 600-500 MG-UNIT CAPS Take 1,200 mg by mouth daily       cloNIDine (CATAPRES) 0.2 MG tablet Take 0.2 mg by mouth At Bedtime       clotrimazole (LOTRIMIN) 1 % external cream APPLY TOPICALLY TWO TIMES A DAY 30 g 3     cyanocobalamin (VITAMIN B-12) 500 MCG SUBL sublingual tablet Place 500 mcg under the tongue every other day       cycloSPORINE (RESTASIS) 0.05 % ophthalmic emulsion Place 1 drop into both eyes 2 times daily        denosumab (PROLIA) 60 MG/ML SOSY injection        diclofenac (VOLTAREN) 1 % topical gel Apply up to 2 grams of 1% gel to hands up to 4 times daily as needed for joint pain (maximum: 8 g per upper extremity per day) 200 g 2     ferrous sulfate (FE TABS) 325 (65 Fe) MG EC tablet Take 1 tablet (325 mg) by mouth every other day 45 tablet 0      "gabapentin (NEURONTIN) 300 MG capsule Take 1 capsule (300 mg) by mouth 3 times daily May take 600 mg at night 180 capsule 0     hydroxychloroquine (PLAQUENIL) 200 MG tablet Hydroxychloroquine 200mg daily; and an additional 200mg every other day.  Yearly eye exam, including 10-2 VF and SD-OCT, required. 135 tablet 1     hydrOXYzine (ATARAX) 50 MG tablet Take 50 mg by mouth 1 1/2-2 tabs at bedtime       lisdexamfetamine (VYVANSE) 30 MG capsule Take 30 mg by mouth every morning       lisinopril (ZESTRIL) 10 MG tablet Take 1 tablet (10 mg) by mouth daily 90 tablet 3     montelukast (SINGULAIR) 10 MG tablet TAKE 1 TABLET BY MOUTH ONCE DAILY AS NEEDED SEASONALLY (AUGUST AND SEPTEMBER) 90 tablet 3     naproxen sodium (ANAPROX) 220 MG tablet Take 220-440 mg by mouth daily as needed for moderate pain       nystatin (MYCOSTATIN) 954531 UNIT/GM external powder Apply topically 3 times daily as needed 60 g 1     polyethylene glycol (MIRALAX) 17 GM/Dose powder Take 1 capful by mouth daily as needed        Psyllium (FIBER) 0.52 G CAPS 1 tabs in am and pm 540 capsule      sennosides (SENOKOT) 8.6 MG tablet Take 2 tablets by mouth 2 times daily       tenofovir alafenamide fumarate (VEMLIDY) 25 MG tablet Take 1 tablet (25 mg) by mouth daily with food (dispense only in the original container). 90 tablet 3     Vitamin D3 (CHOLECALCIFEROL) 25 mcg (1000 units) tablet Take 1 tablet (25 mcg) by mouth daily 90 tablet 1     No current facility-administered medications for this visit.      Physical Exam:  Vital signs:      BP: 135/73 Pulse: 50           Height: 160 cm (5' 3\") Weight: 63.5 kg (140 lb)  Estimated body mass index is 24.8 kg/m  as calculated from the following:    Height as of this encounter: 1.6 m (5' 3\").    Weight as of this encounter: 63.5 kg (140 lb).  She has full strength in her bilateral lower extremities.  Sensation is intact light touch throughout.  No clonus.  No pain with anterior pelvis loading ADINA test or specific " pain over the greater trochanter.  Most pain is in the left buttock into the soft tissue.  Results Reviewed:  I personally reviewed scoliosis films showing the following parameters:  LL 48* (L4-S1 22*) PI 59* PT 20* C7-S1 SB 5.6cm  I personally viewed the images of an MRI of the lumbar spine.  This shows multilevel lumbar spondylosis most significant at L3-4 L4-5 and L5-S1.  There does appear to be some lateral recess stenosis on the right at L3-4 and possibly in the right at L4-5.  No significant foraminal or central canal stenosis throughout the lumbar spine.  Assessment:  73-year-old female with left low back pain which I believe is most likely myofascial in origin.  I do not see a specific cause on her imaging to correlate with the symptoms.  It is interesting that her bleeding started at the same time as the pain which could indicate a referred pain but this would need to be a retroperitoneal origin most likely to create the pain posteriorly.  She will be seeing GI to workup this but I do believe it is most likely myofascial in origin.  Plan:  I would recommend physical therapy for right now as she does note that the pain does seem to be getting better over time.  Should she not notice any significant improvement or worsening we could look at doing an epidural steroid injection in the future.  I do not see any specific target right now that would coincide with her symptoms.  Should this get worse or to develop new pain we could consider something like an EMG in the future for further diagnosis.  She can follow-up with us as needed.    Jamel Torres MD      Again, thank you for allowing me to participate in the care of your patient.        Sincerely,        Jamel Torres MD

## 2024-05-16 NOTE — PROGRESS NOTES
HPI:  73-year-old female with new onset low back pain in the left lower back to the buttock radiating around to the anterior pelvis.  This started when leaning forward a few weeks ago and she felt a pop at that time.  She has not noticed any radiating pain down the leg.  She denies any weakness.  She does notice some tingling in the anterior abdomen associated with this.  She did also note new onset rectal bleeding at the time of the pain as well.  She denies any right-sided symptoms.  She denies any recent physical therapy or any injections into her spine.  She does have a history of osteoporosis and rheumatoid arthritis.  Current Outpatient Medications   Medication Sig Dispense Refill    Abatacept (ORENCIA CLICKJECT) 125 MG/ML SOAJ auto-injector Inject 1 mL (125 mg) Subcutaneous every 7 days . Hold for any infection and seek medical attention. 4 mL 5    calcium carb-cholecalciferol 600-500 MG-UNIT CAPS Take 1,200 mg by mouth daily      cloNIDine (CATAPRES) 0.2 MG tablet Take 0.2 mg by mouth At Bedtime      clotrimazole (LOTRIMIN) 1 % external cream APPLY TOPICALLY TWO TIMES A DAY 30 g 3    cyanocobalamin (VITAMIN B-12) 500 MCG SUBL sublingual tablet Place 500 mcg under the tongue every other day      cycloSPORINE (RESTASIS) 0.05 % ophthalmic emulsion Place 1 drop into both eyes 2 times daily       denosumab (PROLIA) 60 MG/ML SOSY injection       diclofenac (VOLTAREN) 1 % topical gel Apply up to 2 grams of 1% gel to hands up to 4 times daily as needed for joint pain (maximum: 8 g per upper extremity per day) 200 g 2    ferrous sulfate (FE TABS) 325 (65 Fe) MG EC tablet Take 1 tablet (325 mg) by mouth every other day 45 tablet 0    gabapentin (NEURONTIN) 300 MG capsule Take 1 capsule (300 mg) by mouth 3 times daily May take 600 mg at night 180 capsule 0    hydroxychloroquine (PLAQUENIL) 200 MG tablet Hydroxychloroquine 200mg daily; and an additional 200mg every other day.  Yearly eye exam, including 10-2 VF and  "SD-OCT, required. 135 tablet 1    hydrOXYzine (ATARAX) 50 MG tablet Take 50 mg by mouth 1 1/2-2 tabs at bedtime      lisdexamfetamine (VYVANSE) 30 MG capsule Take 30 mg by mouth every morning      lisinopril (ZESTRIL) 10 MG tablet Take 1 tablet (10 mg) by mouth daily 90 tablet 3    montelukast (SINGULAIR) 10 MG tablet TAKE 1 TABLET BY MOUTH ONCE DAILY AS NEEDED SEASONALLY (AUGUST AND SEPTEMBER) 90 tablet 3    naproxen sodium (ANAPROX) 220 MG tablet Take 220-440 mg by mouth daily as needed for moderate pain      nystatin (MYCOSTATIN) 858812 UNIT/GM external powder Apply topically 3 times daily as needed 60 g 1    polyethylene glycol (MIRALAX) 17 GM/Dose powder Take 1 capful by mouth daily as needed       Psyllium (FIBER) 0.52 G CAPS 1 tabs in am and pm 540 capsule     sennosides (SENOKOT) 8.6 MG tablet Take 2 tablets by mouth 2 times daily      tenofovir alafenamide fumarate (VEMLIDY) 25 MG tablet Take 1 tablet (25 mg) by mouth daily with food (dispense only in the original container). 90 tablet 3    Vitamin D3 (CHOLECALCIFEROL) 25 mcg (1000 units) tablet Take 1 tablet (25 mcg) by mouth daily 90 tablet 1     No current facility-administered medications for this visit.      Physical Exam:  Vital signs:      BP: 135/73 Pulse: 50           Height: 160 cm (5' 3\") Weight: 63.5 kg (140 lb)  Estimated body mass index is 24.8 kg/m  as calculated from the following:    Height as of this encounter: 1.6 m (5' 3\").    Weight as of this encounter: 63.5 kg (140 lb).  She has full strength in her bilateral lower extremities.  Sensation is intact light touch throughout.  No clonus.  No pain with anterior pelvis loading ADINA test or specific pain over the greater trochanter.  Most pain is in the left buttock into the soft tissue.  Results Reviewed:  I personally reviewed scoliosis films showing the following parameters:  LL 48* (L4-S1 22*) PI 59* PT 20* C7-S1 SB 5.6cm  I personally viewed the images of an MRI of the lumbar spine.  " This shows multilevel lumbar spondylosis most significant at L3-4 L4-5 and L5-S1.  There does appear to be some lateral recess stenosis on the right at L3-4 and possibly in the right at L4-5.  No significant foraminal or central canal stenosis throughout the lumbar spine.  Assessment:  73-year-old female with left low back pain which I believe is most likely myofascial in origin.  I do not see a specific cause on her imaging to correlate with the symptoms.  It is interesting that her bleeding started at the same time as the pain which could indicate a referred pain but this would need to be a retroperitoneal origin most likely to create the pain posteriorly.  She will be seeing GI to workup this but I do believe it is most likely myofascial in origin.  Plan:  I would recommend physical therapy for right now as she does note that the pain does seem to be getting better over time.  Should she not notice any significant improvement or worsening we could look at doing an epidural steroid injection in the future.  I do not see any specific target right now that would coincide with her symptoms.  Should this get worse or to develop new pain we could consider something like an EMG in the future for further diagnosis.  She can follow-up with us as needed.    Jamel Torres MD

## 2024-05-24 ENCOUNTER — PATIENT OUTREACH (OUTPATIENT)
Dept: CARE COORDINATION | Facility: CLINIC | Age: 74
End: 2024-05-24
Payer: COMMERCIAL

## 2024-05-24 NOTE — PROGRESS NOTES
Clinic Care Coordination Contact  [facility]/Voicemail    Clinical Data: Care Coordinator Outreach    Outreach Documentation Number of Outreach Attempt   5/24/2024   1:04 PM 1       Left message on patient's voicemail with call back information and requested return call.    Plan: Care Coordinator will try to reach patient again in 10 business days.    LUCAS Barraza  [phone]  [facility]

## 2024-06-03 ENCOUNTER — THERAPY VISIT (OUTPATIENT)
Dept: PHYSICAL THERAPY | Facility: CLINIC | Age: 74
End: 2024-06-03
Attending: STUDENT IN AN ORGANIZED HEALTH CARE EDUCATION/TRAINING PROGRAM
Payer: COMMERCIAL

## 2024-06-03 DIAGNOSIS — G89.29 CHRONIC BILATERAL LOW BACK PAIN WITHOUT SCIATICA: ICD-10-CM

## 2024-06-03 DIAGNOSIS — M54.50 CHRONIC BILATERAL LOW BACK PAIN WITHOUT SCIATICA: ICD-10-CM

## 2024-06-03 PROCEDURE — 97530 THERAPEUTIC ACTIVITIES: CPT | Mod: GP | Performed by: PHYSICAL THERAPIST

## 2024-06-03 PROCEDURE — 97161 PT EVAL LOW COMPLEX 20 MIN: CPT | Mod: GP | Performed by: PHYSICAL THERAPIST

## 2024-06-03 NOTE — PROGRESS NOTES
PHYSICAL THERAPY EVALUATION  Type of Visit: Evaluation    See electronic medical record for Abuse and Falls Screening details.    Subjective       Presenting condition or subjective complaint: On April 9, 2024, while gently bending at waist, something in back left hip area snapped & I was in severe pain for more than a month. Pt was seen by PT for 3 visits 10/19/24 but had to stop due to covid and also did not have a car. She did see a neurosurgeon and was told she is not a surgery candidate. Taking gabapentin for sx and also has pain meds. Pt continues to do run/walking at home and walks her dogs.   Date of onset: 04/09/24    Relevant medical history: Arthritis; Cancer; Concussions; Dizziness; Fibromyalgia; Hepatitis; High blood pressure; History of fractures; Non-healing wounds; Osteoporosis; Pain at night or rest; Rheumatoid arthritis; Sleep disorder like apnea   Dates & types of surgery: See MyChart    Prior diagnostic imaging/testing results: MRI; X-ray   MRI: IMPRESSION:  1.  Compromised evaluation due to lack of cross registration of axial and sagittal sequences.  2.  At L4-L5, there is severe right neural foraminal stenosis with compression of the exiting right L4 nerve root.  3.  At L3-L4, there is moderate-to-severe right lateral recess stenosis with impingement upon the descending right L4 nerve root.  4.  At L2-L3, there is moderate left neural foraminal stenosis. A left-sided disc protrusion contacts the descending left L3 nerve root.  5.  Questionable nodularity of the cauda equina nerve roots at the L4 level. Follow-up with contrast-enhanced lumbar spine MRI is recommended for further evaluation.  Prior therapy history for the same diagnosis, illness or injury: No      Prior Level of Function  Transfers: Independent  Ambulation: Independent, was using a cane up until 2 weeks ago  ADL: Independent  IADL: Driving, Finances, Housekeeping, Laundry, Meal preparation, Medication management, Work, Yard  work    Living Environment  Social support: Alone   Type of home: House; 1 level; Basement   Stairs to enter the home: Yes 3 Is there a railing: Yes   Ramp: No   Stairs inside the home: Yes 12 Is there a railing: Yes   Help at home: Home management tasks (cooking, cleaning)  Equipment owned: Straight Cane     Employment: No    Hobbies/Interests: Wildlife photography, writing, book compilation, reading, literary research, running, hiking, biking, walking, piano, environmental & library philanthropy, etc.    Patient goals for therapy: Stand for more than 5 minutes without pain, yard work, maintain my home & property, daily lifting, daily exercise, squatting, bending    Pain assessment:  see below   % of day patient spends sitting or on her feet during the day?  Sitting: 10% Standin%  Lifting: Yes    Pain Location: LBP, bilateral buttocks and lateral hips L>R, L groin numb    Visual Analog Scale:  Low back average pain intensity: 3-4/10  Low back worst pain intensity: 10/10    Lower extremity average pain intensity: 1-2/10  Lower extremity worst pain intensity: 6-7/10 for L lat low back/hip area    History:  Onset Date: 24 sx exacerbation  Onset Cause: reached in sons car  Onset Symptoms: severe L LBP  Symptom Change: improved over the last week  Constant Symptoms: LBP, buttocks  Intermittent Symptoms: L groin  Prior Treatment: PT 3 visits, included US  LBP History: chronic  Accidents: falls every 2 months    Symptom Frequency:   Low Back: 100%  Lower Extremity: not constant    Functional Comparison:  Bending: worse  Rising: worse  Sitting: better  Standing: worst  Walking: worse  Lying: better, Worst position when dogs are on her  AM: worst  Midday: better  PM: worse    Overall Functional Level 55% of Normal  Sitting tolerance: 45'  Standin'  Walking: up to 2 hours    Objective     Physical Exam:    Sitting Posture: kyphotic lumbar spine, keeps neck in slight right neck side bent posture. In sitting 1/10  "low back pain across low back, denied LE sx  Standing Posture:  Fair  Standing Lordosis:  reduced  Standing Scoliosis: none     Range of Motion loss:   Flexion: wnl  Extension: 50%  Deviation Noted: none    Special Tests:  Neurological:   Sensation:   MMT: 5/5 MMT seated B ankle DF and eversion  HPI/SI:   Other Info: 30\" sit to stand functional strength test x 15 reps (10-15 normal for age), 5 times sit to stand 9.5\"   Pt with antalgic gait, unsteady at times, narrow base of support with B stance phase of gait 30-40% of her steps    Static/Dynamic Force Analysis/Directional Preference Exam:    Symptoms prior to testin/10 sx LBP across low back in sitting. In standing 5/10 LBP. Deferred further movement testing today.     Standing:  Flexion (x1):   Flexion (3x10):   Extension (x1):   Extension (3x10):     Hook Lying:  Flexion (x1):   Flexion (3x10):     Prone:  Prone on Elbows:   Extension (x1):   Extension (3x10):          Assessment & Plan   CLINICAL IMPRESSIONS  Medical Diagnosis: Chronic bilateral low back pain without sciatica    Treatment Diagnosis: mechanical low back pain   Impression/Assessment: Patient is a 73 year old female with low back, buttock and hip pain and fall history complaints.  The following significant findings have been identified: Pain, Decreased ROM/flexibility, Decreased strength, Impaired balance, Impaired gait, Impaired muscle performance, Decreased activity tolerance, and Impaired posture. These impairments interfere with their ability to perform self care tasks, work tasks, recreational activities, household chores, driving , household mobility, and community mobility as compared to previous level of function.     Clinical Decision Making (Complexity):  Clinical Presentation: Stable/Uncomplicated  Clinical Presentation Rationale: based on medical and personal factors listed in PT evaluation  Clinical Decision Making (Complexity): Low complexity    PLAN OF CARE  Treatment " Interventions:  Modalities: E-stim, Mechanical Traction, Ultrasound  Interventions: Gait Training, Manual Therapy, Neuromuscular Re-education, Therapeutic Activity, Therapeutic Exercise, Aquatic Therapy    Long Term Goals     PT Goal 1  Goal Identifier: Home program  Goal Description: pt will have good understanding of home program and strategies to address her chronic LBP and stenosis issues so she can have improved standing toleration with adls  Target Date: 08/03/24  PT Goal 2  Goal Identifier: Function  Goal Description: pt will note a decrease in LBP and carry over to improved functional level as measured by KEVIN score decrease from 51% to 40% or less  Target Date: 08/03/24      Frequency of Treatment: 1x per week  Duration of Treatment: 90 days, decrease as able    Recommended Referrals to Other Professionals:   Education Assessment:   Learner/Method: Patient;Listening;Demonstration;Pictures/Video;No Barriers to Learning  Education Comments: home program    Risks and benefits of evaluation/treatment have been explained.   Patient/Family/caregiver agrees with Plan of Care.     Evaluation Time:     PT Eval, Low Complexity Minutes (13820): 30       Signing Clinician: Ronald Rodríguez PT      [facility]                                                                                   OUTPATIENT PHYSICAL THERAPY      PLAN OF TREATMENT FOR OUTPATIENT REHABILITATION   Patient's Last Name, First Name, Niesha Norwood YOB: 1950   Provider's Name   [facility]   Medical Record No.  [id number]     Onset Date: 04/09/24  Start of Care Date: 06/03/24     Medical Diagnosis:  Chronic bilateral low back pain without sciatica      PT Treatment Diagnosis:  mechanical low back pain Plan of Treatment  Frequency/Duration: 1x per week/ 90 days, decrease as able    Certification date from 06/03/24 to 08/31/24         See note for plan of treatment details  and functional goals     Ronald Rodríguez, PT                         I CERTIFY THE NEED FOR THESE SERVICES FURNISHED UNDER        THIS PLAN OF TREATMENT AND WHILE UNDER MY CARE     (Physician attestation of this document indicates review and certification of the therapy plan).              Referring Provider:  Jamel Torres    Initial Assessment  See Epic Evaluation- Start of Care Date: 06/03/24

## 2024-06-04 ENCOUNTER — PATIENT OUTREACH (OUTPATIENT)
Dept: CARE COORDINATION | Facility: CLINIC | Age: 74
End: 2024-06-04
Payer: COMMERCIAL

## 2024-06-04 NOTE — PROGRESS NOTES
Clinic Care Coordination Contact  [facility]/Voicemail    Clinical Data: Care Coordinator Outreach    Outreach Documentation Number of Outreach Attempt   5/24/2024   1:04 PM 1   6/4/2024   3:20 PM 2       Left message on patient's voicemail with call back information and requested return call.    Plan: Care Coordinator will send unable to contact letter with care coordinator contact information via [employer]. Care Coordinator will try to reach patient again in 1 month.    LUCAS Barraza  [phone]  [facility] Care Resource [facility]

## 2024-06-04 NOTE — PROGRESS NOTES
Clinic Care Coordination Contact  Care Coordination Clinician Chart Review    Situation: Patient chart reviewed by Care Coordinator.       Background: Care Coordination Program started: 4/8/2024. Initial assessment completed and patient-centered care plan(s) were developed with participation from patient. Lead CC handed patient off to CHW for continued outreaches.       Assessment: Per chart review, patient outreach completed by CC CHW on 5/24/24 yet did not reach pt and plans to attempt again.  Patient is potentially working to accomplish goal(s). Patient's goal(s) appropriate and relevant at this time. Patient is not due for updated Plan of Care.  Assessments will be completed annually or as needed/with change of patient status.      Care Plan: Chronic Pain       Problem: Chronic Pain is Not Self-Managed       Goal: Demonstrate improved self-management of chronic pain       Start Date: 4/25/2024 Expected End Date: 4/25/2025    Note:     Barriers: pain  Strengths: working with providers and able to ask questions  Patient expressed understanding of goal: yes  Action steps to achieve this goal:  1. I will reach out to providers and attend appointments to get the best pain management options  2. I will take medications as prescribed  3. I will work on spacing out my over the counter medications vs taking once per day  4. I will continue to look into non medical approaches to manage my pain                              Care Plan: Transportation       Problem: Lack of transportation       Goal: Establish reliable transportation       Start Date: 4/25/2024 Expected End Date: 10/25/2024    Note:     Barriers: availability of the vehicle I want  Strengths: name on list for option I want  Patient expressed understanding of goal: yes  Action steps to achieve this goal:  1. I will continue to check in on the wait list and consider other options to add to the list  2. I will look into transportation options that are sent to me  for help until I get a vehicle                                Care Plan: health care directive       Problem: HP GENERAL PROBLEM       Goal: I will update my health care directive       Start Date: 4/25/2024 Expected End Date: 10/25/2024    Note:     Barriers: none right now  Strengths: has one completed to update  Patient expressed understanding of goal: yes  Action steps to achieve this goal:  1. I will I will review my current health care directive and update  2. I will get it notarize  3. I will email it to [email or web address]                              Care Plan: Medication mail order       Problem: Medication Adherence       Goal: I want to find a mail order or better option for getting my meds       Start Date: 4/25/2024 Expected End Date: 10/25/2024    Note:     Barriers: lack of transportation  Strengths: desire to find alternative ways  Patient expressed understanding of goal: yes  Action steps to achieve this goal:  1. I will reach out to my insurance company for mail order options  2. I will have my prescriptions sent to the mail order company                                     Plan/Recommendations: The patient will continue working with Care Coordination to achieve goal(s) as above. CHW will continue outreaches at minimum every 30 days and will involve Lead CC as needed or if patient is ready to move to Maintenance. Lead CC will continue to monitor CHW outreaches and patient's progress to goal(s) every 6 weeks.     Plan of Care updated and sent to patient: No    TATYANA Valdes   [facility] - Care Coordination  [facility]   [phone]

## 2024-06-13 ENCOUNTER — OFFICE VISIT (OUTPATIENT)
Dept: ORTHOPEDICS | Facility: CLINIC | Age: 74
End: 2024-06-13
Payer: COMMERCIAL

## 2024-06-13 ENCOUNTER — TELEPHONE (OUTPATIENT)
Dept: ORTHOPEDICS | Facility: CLINIC | Age: 74
End: 2024-06-13

## 2024-06-13 VITALS
DIASTOLIC BLOOD PRESSURE: 72 MMHG | WEIGHT: 140 LBS | BODY MASS INDEX: 24.8 KG/M2 | SYSTOLIC BLOOD PRESSURE: 115 MMHG | RESPIRATION RATE: 18 BRPM | HEIGHT: 63 IN | HEART RATE: 93 BPM

## 2024-06-13 DIAGNOSIS — G56.03 BILATERAL CARPAL TUNNEL SYNDROME: ICD-10-CM

## 2024-06-13 DIAGNOSIS — M65.332 TRIGGER MIDDLE FINGER OF LEFT HAND: Primary | ICD-10-CM

## 2024-06-13 DIAGNOSIS — G56.02 CARPAL TUNNEL SYNDROME OF LEFT WRIST: ICD-10-CM

## 2024-06-13 PROCEDURE — 99213 OFFICE O/P EST LOW 20 MIN: CPT | Performed by: ORTHOPAEDIC SURGERY

## 2024-06-13 NOTE — PROGRESS NOTES
Niesha Adhikari is a 73 year old female who is seen in follow up  for complaint of numbness of bilateral hands, especially with use of the hand and at night.  All fingers are involved.  She has diagnosis of rheumatoid arthritis and has multiple joint pains.  Hand Pain is dull, achy, with occasional sharp pain .    She has had symptoms for 6 years.    Small finger is involved.  She also has trigger finger of long fingers bilaterally.  Prior treatment used: Wrist splint - yes-night rigid splints and flexible daytime splints.  NSAID: - no, using rheumatoid arthritis regimen.    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      PAST MEDICAL HISTORY:  Diabetes mellitus negative, hypothyroid negative, positive rheumatoid arthritis..    EMG has been performed.  In 2018 and again in 2022.  Shows mild bilateral carpal tunnel syndrome.    Past Medical History:   Diagnosis Date    ABUSE BY SPOUSE/PARTNER 07/27/2005    Degeneration of lumbar or lumbosacral intervertebral disc     DDD L5/S1    Depressive disorder     Esophageal reflux 1/28/2005    HELICOBACTER PYLORI INFECTION 01/28/2005    Hepatitis C - Cured. Achieved SVR in 2017     History of blood transfusion     Hypertension     Malignant neoplasm (H)     ACIN    Osteoporosis     Other and unspecified alcohol dependence, unspecified drinking behavior     Sober as 1/21/1987    Other malaise and fatigue        Past Surgical History:   Procedure Laterality Date    BIOPSY ANAL CANAL  1/21/13    [facility]     BREAST BIOPSY, RT/LT Left 1975    Breat Biopsy RT/LT    COLONOSCOPY  8/25/2009    COLONOSCOPY  2/14/2011    COLONOSCOPY performed by CRISTIN LAGUNAS at  GI    COLONOSCOPY N/A 1/8/2019    Procedure: Colonscopy, Biopsies by Biopsy;  Surgeon: Omega Talavera MD;  Location:  GI    CYSTOSCOPY  2/28/2011    CYSTOSCOPY performed by CAYLA FLOR at  OR    ENDOSCOPY  05/21/12    Upper GI - [facility]    ENT SURGERY  1965    GI  SURGERY  06/25/12     UGI ENDOSCOPY DIAG W BIOPSY  10/01/09    HEMORRHOIDECTOMY  06/25/12    [facility]    LAPAROSCOPIC SALPINGO-OOPHORECTOMY  2/28/2011    LAPAROSCOPIC SALPINGO-OOPHORECTOMY performed by CAYLA FLOR at  OR    TONSILLECTOMY & ADENOIDECTOMY  1965    [facility] NONSPECIFIC PROCEDURE  1965    Removed bone left index finger knuckle, casts broken bones    ZZ COLONOSCOPY W/WO BRUSH/WASH  08/22/05    [facility], SIMPLE EXAM  08/08/07       Family History   Problem Relation Age of Onset    Hypertension Mother     Breast Cancer Mother     Coronary Artery Disease Mother     Cerebrovascular Disease Mother     Kidney Disease Mother     Obesity Mother     Hypertension Father         Father    Lymphoma Father     Glaucoma Father     Other Cancer Father         Lymphoma    Genetic Disorder Father         Glaucoma    Obesity Father     C.A.D. Sister         MI at age 63    Hypertension Sister     Gastrointestinal Disease Sister         gallbladder    Hyperlipidemia Sister     Cerebrovascular Disease Sister     Circulatory Sister         brain aneurysm at 63    Genitourinary Problems Sister         1 kidney/bladder    Hypertension Sister     Obesity Sister     Coronary Artery Disease Sister         had valve surgery, MI, CHF    Coronary Artery Disease Brother     Hypertension Brother     Hyperlipidemia Brother     Cerebrovascular Disease Maternal Grandmother     Hypertension Maternal Grandmother     Cerebrovascular Disease Paternal Grandmother     Hypertension Paternal Grandmother     Glaucoma Paternal Grandfather     Genetic Disorder Paternal Grandfather         Glaucoma    Blood Disease Son         Lymes/7/11    Hypertension Son     Obesity Son     Hypertension Son     Breast Cancer Maternal Aunt     Ovarian Cancer Maternal Aunt     Unknown/Adopted Paternal Uncle     Obesity Niece     Obesity Niece     Liver Cancer Cousin     Coronary Artery Disease Other 49        niece    Diabetes Other          1st cousin    Breast Cancer Other     Obesity Other     Obesity Other     Hypertension Other         Aunts    Obesity Other         Uncle    Hypertension Other         Uncle    Coronary Artery Disease Sister     Coronary Artery Disease Brother     Hypertension Brother     Hyperlipidemia Brother     Coronary Artery Disease Nephew         Nephew    Hypertension Niece         [place]ieces    Hypertension Other         Nephew    Cerebrovascular Disease Other         Aunts    Obesity Other         Nephew       Social History     Socioeconomic History    Marital status:      Spouse name: Not on file    Number of children: Not on file    Years of education: Not on file    Highest education level: Not on file   Occupational History    Not on file   Tobacco Use    Smoking status: Former     Current packs/day: 0.00     Average packs/day: 0.8 packs/day for 10.0 years (7.5 ttl pk-yrs)     Types: Cigarettes     Start date: 1968     Quit date: 1978     Years since quittin.6     Passive exposure: Never    Smokeless tobacco: Never    Tobacco comments:     Quit 45 years ago   Vaping Use    Vaping status: Never Used   Substance and Sexual Activity    Alcohol use: No    Drug use: No    Sexual activity: Not Currently     Partners: Male     Birth control/protection: Surgical     Comment: Not necessary, no sex   Other Topics Concern    Parent/sibling w/ CABG, MI or angioplasty before 65F 55M? Yes     Comment: Mother, brother, sister     Service No    Blood Transfusions No    Caffeine Concern No    Occupational Exposure No    Hobby Hazards No    Sleep Concern No    Stress Concern Yes    Weight Concern Yes    Special Diet No    Back Care No    Exercise No    Bike Helmet Yes    Seat Belt Yes    Self-Exams Yes   Social History Narrative    Not on file     Social Determinants of Health     Financial Resource Strain: Low Risk  (4/10/2024)    Financial Resource Strain     Within the past 12 months, have you or your  family members you live with been unable to get utilities (heat, electricity) when it was really needed?: No   Food Insecurity: Low Risk  (4/10/2024)    Food Insecurity     Within the past 12 months, did you worry that your food would run out before you got money to buy more?: No     Within the past 12 months, did the food you bought just not last and you didn t have money to get more?: No   Transportation Needs: High Risk (4/10/2024)    Transportation Needs     Within the past 12 months, has lack of transportation kept you from medical appointments, getting your medicines, non-medical meetings or appointments, work, or from getting things that you need?: Yes   Physical Activity: Sufficiently Active (4/10/2024)    Exercise Vital Sign     Days of Exercise per Week: 7 days     Minutes of Exercise per Session: 60 min   Stress: Stress Concern Present (4/10/2024)    [employer] [place] [employer] - Occupational Stress Questionnaire     Feeling of Stress : Very much   Social Connections: Unknown (4/10/2024)    Social Connection and Isolation Panel [NHANES]     Frequency of Communication with Friends and Family: Once a week     Frequency of Social Gatherings with Friends and Family: Patient declined     Attends Church Services: Patient declined     Active Member of Clubs or Organizations: Patient declined     Attends Club or Organization Meetings: 1 to 4 times per year     Marital Status:    Interpersonal Safety: Low Risk  (11/16/2023)    Interpersonal Safety     Do you feel physically and emotionally safe where you currently live?: Yes     Within the past 12 months, have you been hit, slapped, kicked or otherwise physically hurt by someone?: No     Within the past 12 months, have you been humiliated or emotionally abused in other ways by your partner or ex-partner?: No   Housing Stability: Low Risk  (4/10/2024)    Housing Stability     Do you have housing? : Yes     Are you worried about losing your  housing?: No       Current Outpatient Medications   Medication Sig Dispense Refill    Abatacept (ORENCIA CLICKJECT) 125 MG/ML SOAJ auto-injector Inject 1 mL (125 mg) Subcutaneous every 7 days . Hold for any infection and seek medical attention. 4 mL 5    calcium carb-cholecalciferol 600-500 MG-UNIT CAPS Take 1,200 mg by mouth daily      cloNIDine (CATAPRES) 0.2 MG tablet Take 0.2 mg by mouth At Bedtime      clotrimazole (LOTRIMIN) 1 % external cream APPLY TOPICALLY TWO TIMES A DAY 30 g 3    cyanocobalamin (VITAMIN B-12) 500 MCG SUBL sublingual tablet Place 500 mcg under the tongue every other day      cycloSPORINE (RESTASIS) 0.05 % ophthalmic emulsion Place 1 drop into both eyes 2 times daily       denosumab (PROLIA) 60 MG/ML SOSY injection       diclofenac (VOLTAREN) 1 % topical gel Apply up to 2 grams of 1% gel to hands up to 4 times daily as needed for joint pain (maximum: 8 g per upper extremity per day) 200 g 2    ferrous sulfate (FE TABS) 325 (65 Fe) MG EC tablet Take 1 tablet (325 mg) by mouth every other day 45 tablet 0    gabapentin (NEURONTIN) 300 MG capsule Take 1 capsule (300 mg) by mouth 3 times daily May take 600 mg at night 180 capsule 0    hydroxychloroquine (PLAQUENIL) 200 MG tablet Hydroxychloroquine 200mg daily; and an additional 200mg every other day.  Yearly eye exam, including 10-2 VF and SD-OCT, required. 135 tablet 1    hydrOXYzine (ATARAX) 50 MG tablet Take 50 mg by mouth 1 1/2-2 tabs at bedtime      lisdexamfetamine (VYVANSE) 30 MG capsule Take 30 mg by mouth every morning      lisinopril (ZESTRIL) 10 MG tablet Take 1 tablet (10 mg) by mouth daily 90 tablet 3    montelukast (SINGULAIR) 10 MG tablet TAKE 1 TABLET BY MOUTH ONCE DAILY AS NEEDED SEASONALLY (AUGUST AND SEPTEMBER) 90 tablet 3    naproxen sodium (ANAPROX) 220 MG tablet Take 220-440 mg by mouth daily as needed for moderate pain      nystatin (MYCOSTATIN) 933524 UNIT/GM external powder Apply topically 3 times daily as needed 60 g 1  "   polyethylene glycol (MIRALAX) 17 GM/Dose powder Take 1 capful by mouth daily as needed       Psyllium (FIBER) 0.52 G CAPS 1 tabs in am and pm 540 capsule     sennosides (SENOKOT) 8.6 MG tablet Take 2 tablets by mouth 2 times daily      tenofovir alafenamide fumarate (VEMLIDY) 25 MG tablet Take 1 tablet (25 mg) by mouth daily with food (dispense only in the original container). 90 tablet 3    Vitamin D3 (CHOLECALCIFEROL) 25 mcg (1000 units) tablet Take 1 tablet (25 mcg) by mouth daily 90 tablet 1       Allergies   Allergen Reactions    Telaprevir Other (See Comments) and Rash     Rectal bleeding, anemia    Abilify Discmelt Other (See Comments)     Disoriented    Antivert [Meclizine Hcl]     Chamomile     Compazine     Diphenhydramine Nausea     And abdominal pain    Duloxetine Hcl Other (See Comments)     Disoriented, trouble sleeping    Effexor [Venlafaxine] Other (See Comments)     Disoriented, trouble sleeping    Elavil [Amitriptyline Hcl] Other (See Comments)     \"didn't feel right on it-med was stopped right away\"    Ferrous Sulfate Nausea and Vomiting    Food Difficulty breathing     cilantro    Indomethacin      indocin sensativity \"Severe h.a\"    Seasonal Allergies Other (See Comments) and Difficulty breathing     Philip Gold Aug-Sept, rag weed, sneezing    Sulfa Antibiotics     Thiopental Sodium      PENTOTHAL/rigidity and fight response    Animal Dander Difficulty breathing and Rash     sneezing,resp. distress    Bupropion Anxiety    Tylenol [Acetaminophen] Rash       REVIEW OF SYSTEMS:  CONSTITUTIONAL:  NEGATIVE for fever, chills, change in weight, not feeling tired  SKIN:  NEGATIVE for worrisome rashes, no skin lumps, no skin ulcers and no non-healing wounds  EYES:  NEGATIVE for vision changes or irritation.  ENT/MOUTH:  NEGATIVE.  No hearing loss, no hoarseness, no difficulty swallowing.  RESP:  NEGATIVE. No cough or shortness of breath.  BREAST:  NEGATIVE for masses, tenderness or discharge  CV:  " "NEGATIVE for chest pain, palpitations or peripheral edema  GI:  NEGATIVE for nausea, abdominal pain, heartburn, or change in bowel habits  :  Negative. No dysuria, no hematuria  MUSCULOSKELETAL:  See HPI above  NEURO:  NEGATIVE . No headaches, no dizziness,  no numbness  ENDOCRINE:  NEGATIVE for temperature intolerance, skin/hair changes  HEME/ALLERGY/IMMUNE:  NEGATIVE for bleeding problems  PSYCHIATRIC:  NEGATIVE. no anxiety, no depression.          O: She appears well,   Vitals: /72   Pulse 93   Resp 18   Ht 1.6 m (5' 3\")   Wt 63.5 kg (140 lb)   LMP 11/27/2003   BMI 24.80 kg/m    BMI= Body mass index is 24.8 kg/m .   Cervical spine has full range of motion without pain.  Full range of motion of shoulder, elbows, wrists and fingers.  All joints have some pain, especially index and long metacarpal phalangeal joint.  Bilateral  long finger has trigger finger.  Hand exam revealed     Right: reduced sensation along entire hand, + Tinel's sign, no thenar atrophy, no weakness of thumb/pinky pincer grasp.  Left: reduced sensation along entire hand, + Tinel's sign, no thenar atrophy, no weakness of thumb/pinky pincer grasp   strength: Right normal, Left normal.  Positive trigger finger of bilateral  long finger - mild.    A: Bilateral carpal tunnel syndrome.    2. Bilateral  long trigger finger.    P: Explanation of median nerve entrapment is given.  The treatment spectrum is discussed, including conservative treatment with night splint, NSAID, activity modification, and possible surgical release if the symptoms are persistent and severe.   She has used splints and anti-inflammatories.  We discussed options of conservative versus surgical treatment.  Will proceed with left carpal tunnel release and long trigger finger release with IV regional block.  Risks, benefits, potential complications and alternatives were discussed.   Once she sees how the left does, she will consider the right.  "
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
## 2024-06-13 NOTE — PATIENT INSTRUCTIONS
Surgery:  left carpal tunnel release, long trigger finger release.    Preop physical with primary physician is needed within 30 days of the surgery.  Nothing to eat or drink for 8 hours before surgery.  For same day surgery you need a ride.  Someone should stay with you for 12 hours afterward.  Stop blood thinners 5 days before surgery (aspirin, warfarin, anti-inflammatories).      Surgery schedulers will call you to schedule surgery.    If you don't get a call in the next few days, then call [phone] to schedule your surgery for [place] or [phone] to schedule for [place].

## 2024-06-17 ENCOUNTER — HOSPITAL ENCOUNTER (OUTPATIENT)
Facility: CLINIC | Age: 74
Discharge: HOME | End: 2024-06-17
Attending: SPECIALIST | Admitting: SPECIALIST
Payer: COMMERCIAL

## 2024-06-17 ENCOUNTER — ANESTHESIA (OUTPATIENT)
Dept: GASTROENTEROLOGY | Facility: CLINIC | Age: 74
End: 2024-06-17
Payer: COMMERCIAL

## 2024-06-17 ENCOUNTER — ANESTHESIA EVENT (OUTPATIENT)
Dept: GASTROENTEROLOGY | Facility: CLINIC | Age: 74
End: 2024-06-17
Payer: COMMERCIAL

## 2024-06-17 VITALS
HEART RATE: 66 BPM | TEMPERATURE: 97.7 F | SYSTOLIC BLOOD PRESSURE: 116 MMHG | WEIGHT: 140 LBS | RESPIRATION RATE: 18 BRPM | BODY MASS INDEX: 24.8 KG/M2 | DIASTOLIC BLOOD PRESSURE: 77 MMHG | OXYGEN SATURATION: 100 %

## 2024-06-17 LAB — COLONOSCOPY: NORMAL

## 2024-06-17 PROCEDURE — 250N000011 HC RX IP 250 OP 636: Mod: JZ | Performed by: NURSE ANESTHETIST, CERTIFIED REGISTERED

## 2024-06-17 PROCEDURE — 258N000003 HC RX IP 258 OP 636: Mod: JZ | Performed by: NURSE ANESTHETIST, CERTIFIED REGISTERED

## 2024-06-17 PROCEDURE — 45378 DIAGNOSTIC COLONOSCOPY: CPT | Performed by: SPECIALIST

## 2024-06-17 PROCEDURE — 250N000009 HC RX 250: Performed by: NURSE ANESTHETIST, CERTIFIED REGISTERED

## 2024-06-17 PROCEDURE — 370N000017 HC ANESTHESIA TECHNICAL FEE, PER MIN: Performed by: SPECIALIST

## 2024-06-17 RX ORDER — LIDOCAINE 40 MG/G
CREAM TOPICAL
Status: DISCONTINUED | OUTPATIENT
Start: 2024-06-17 | End: 2024-06-17 | Stop reason: HOSPADM

## 2024-06-17 RX ORDER — ONDANSETRON 2 MG/ML
4 INJECTION INTRAMUSCULAR; INTRAVENOUS EVERY 30 MIN PRN
Status: CANCELLED | OUTPATIENT
Start: 2024-06-17

## 2024-06-17 RX ORDER — PROPOFOL 10 MG/ML
INJECTION, EMULSION INTRAVENOUS PRN
Status: DISCONTINUED | OUTPATIENT
Start: 2024-06-17 | End: 2024-06-17

## 2024-06-17 RX ORDER — SODIUM CHLORIDE, SODIUM LACTATE, POTASSIUM CHLORIDE, CALCIUM CHLORIDE 600; 310; 30; 20 MG/100ML; MG/100ML; MG/100ML; MG/100ML
INJECTION, SOLUTION INTRAVENOUS CONTINUOUS PRN
Status: DISCONTINUED | OUTPATIENT
Start: 2024-06-17 | End: 2024-06-17

## 2024-06-17 RX ORDER — NALOXONE HYDROCHLORIDE 0.4 MG/ML
0.1 INJECTION, SOLUTION INTRAMUSCULAR; INTRAVENOUS; SUBCUTANEOUS
Status: CANCELLED | OUTPATIENT
Start: 2024-06-17

## 2024-06-17 RX ORDER — LIDOCAINE HYDROCHLORIDE 20 MG/ML
INJECTION, SOLUTION INFILTRATION; PERINEURAL PRN
Status: DISCONTINUED | OUTPATIENT
Start: 2024-06-17 | End: 2024-06-17

## 2024-06-17 RX ORDER — DEXAMETHASONE SODIUM PHOSPHATE 10 MG/ML
4 INJECTION, SOLUTION INTRAMUSCULAR; INTRAVENOUS
Status: CANCELLED | OUTPATIENT
Start: 2024-06-17

## 2024-06-17 RX ORDER — ONDANSETRON 4 MG/1
4 TABLET, ORALLY DISINTEGRATING ORAL EVERY 30 MIN PRN
Status: CANCELLED | OUTPATIENT
Start: 2024-06-17

## 2024-06-17 RX ORDER — SODIUM CHLORIDE, SODIUM LACTATE, POTASSIUM CHLORIDE, CALCIUM CHLORIDE 600; 310; 30; 20 MG/100ML; MG/100ML; MG/100ML; MG/100ML
INJECTION, SOLUTION INTRAVENOUS CONTINUOUS
Status: DISCONTINUED | OUTPATIENT
Start: 2024-06-17 | End: 2024-06-17 | Stop reason: HOSPADM

## 2024-06-17 RX ORDER — PROPOFOL 10 MG/ML
INJECTION, EMULSION INTRAVENOUS CONTINUOUS PRN
Status: DISCONTINUED | OUTPATIENT
Start: 2024-06-17 | End: 2024-06-17

## 2024-06-17 RX ADMIN — LIDOCAINE HYDROCHLORIDE 25 MG: 20 INJECTION, SOLUTION INFILTRATION; PERINEURAL at 09:55

## 2024-06-17 RX ADMIN — FAMOTIDINE 20 MG: 10 INJECTION, SOLUTION INTRAVENOUS at 09:47

## 2024-06-17 RX ADMIN — PROPOFOL 60 MG: 10 INJECTION, EMULSION INTRAVENOUS at 09:55

## 2024-06-17 RX ADMIN — SODIUM CHLORIDE, POTASSIUM CHLORIDE, SODIUM LACTATE AND CALCIUM CHLORIDE: 600; 310; 30; 20 INJECTION, SOLUTION INTRAVENOUS at 09:49

## 2024-06-17 RX ADMIN — PROPOFOL 40 MG: 10 INJECTION, EMULSION INTRAVENOUS at 10:00

## 2024-06-17 RX ADMIN — PROPOFOL 150 MCG/KG/MIN: 10 INJECTION, EMULSION INTRAVENOUS at 09:55

## 2024-06-17 ASSESSMENT — ACTIVITIES OF DAILY LIVING (ADL)
ADLS_ACUITY_SCORE: 35
ADLS_ACUITY_SCORE: 35

## 2024-06-17 ASSESSMENT — LIFESTYLE VARIABLES: TOBACCO_USE: 1
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
## 2024-06-17 NOTE — ANESTHESIA CARE TRANSFER NOTE
Patient: Niesha Adhikari    Procedure: Procedure(s):  Colonoscopy       Diagnosis: Rectal bleeding [K62.5]  Diagnosis Additional Information: No value filed.    Anesthesia Type:   MAC     Note:    Oropharynx: oropharynx clear of all foreign objects and spontaneously breathing  Level of Consciousness: drowsy  Oxygen Supplementation: face mask    Independent Airway: airway patency satisfactory and stable  Dentition: dentition unchanged  Vital Signs Stable: post-procedure vital signs reviewed and stable  Report to RN Given: handoff report given  Patient transferred to: Phase II    Handoff Report: Identifed the Patient, Identified the Reponsible Provider, Reviewed the pertinent medical history, Discussed the surgical course, Reviewed Intra-OP anesthesia mangement and issues during anesthesia, Set expectations for post-procedure period and Allowed opportunity for questions and acknowledgement of understanding      Vitals:  Vitals Value Taken Time   BP     Temp     Pulse     Resp     SpO2         Electronically Signed By: YOSEPH Smyth CRNA  June 17, 2024  10:16 AM

## 2024-06-17 NOTE — DISCHARGE INSTRUCTIONS
[facility]    Home Care Following Endoscopy          Activity:  You have just undergone an endoscopic procedure usually performed with conscious sedation.  Do not work or operate machinery (including a car) for at least 12 hours.      Diet:  Return to the diet you were on before your procedure but eat lightly for the first 12-24 hours.  Drink plenty of water.  Resume any regular medications unless otherwise advised by your physician.  Please begin any new medication prescribed as a result of your procedure as directed by your physician.   Pain:  You may take Tylenol as needed for pain.  Expected Recovery:  You can expect some mild abdominal fullness and/or discomfort due to the air used to inflate your intestinal tract. I encourage you to walk and attempt to pass this air as soon as possible.    Call Your Physician if You Have:    After Colonoscopy:  Worsening persisting abdominal pain which is worse with activity.  Fevers (>101 degrees F), chills or shakes.  Passage of continued blood with bowel movements.     Any questions or concerns about your recovery, please call [phone] or after hours [phone] ([phone]) Nurse Advice Line.    Follow-up Care:  You did NOT have polyps/biopsy tissue sample(s) removed.  The polyps/biopsy tissue sample(s) will be sent to pathology.

## 2024-06-17 NOTE — ANESTHESIA POSTPROCEDURE EVALUATION
Patient: Niesha Adhikari    Procedure: Procedure(s):  Colonoscopy       Anesthesia Type:  MAC    Note:  Disposition: Outpatient   Postop Pain Control: Uneventful            Sign Out: Well controlled pain   PONV: No   Neuro/Psych: Uneventful            Sign Out: Acceptable/Baseline neuro status   Airway/Respiratory: Uneventful            Sign Out: Acceptable/Baseline resp. status   CV/Hemodynamics: Uneventful            Sign Out: Acceptable CV status   Other NRE: NONE   DID A NON-ROUTINE EVENT OCCUR? No    Event details/Postop Comments:  Pt was happy with anesthesia care.  No complications.  I will follow up with the pt if needed.           Last vitals:  Vitals:    06/17/24 0918   BP: (!) 143/92   Pulse: 66   Resp: 18   Temp: 97.7  F (36.5  C)   SpO2: 98%       Electronically Signed By: YOSEPH Smyht CRNA  June 17, 2024  10:15 AM

## 2024-06-17 NOTE — ANESTHESIA PREPROCEDURE EVALUATION
Anesthesia Pre-Procedure Evaluation    Patient: Niesha Adhikari   MRN: [id number] : 1950        Procedure : Procedure(s):  Colonoscopy          Past Medical History:   Diagnosis Date    ABUSE BY SPOUSE/PARTNER 2005    Degeneration of lumbar or lumbosacral intervertebral disc     DDD L5/S1    Depressive disorder     Esophageal reflux 2005    HELICOBACTER PYLORI INFECTION 2005    Hepatitis C - Cured. Achieved SVR in 2017     History of blood transfusion     Hypertension     Malignant neoplasm (H)     ACIN    Osteoporosis     Other and unspecified alcohol dependence, unspecified drinking behavior     Sober as 1987    Other malaise and fatigue       Past Surgical History:   Procedure Laterality Date    BIOPSY ANAL CANAL  13    [facility]     BREAST BIOPSY, RT/LT Left 1975    Breat Biopsy RT/LT    COLONOSCOPY  2009    COLONOSCOPY  2011    COLONOSCOPY performed by CRISTIN LAGUNAS at  GI    COLONOSCOPY N/A 2019    Procedure: Colonscopy, Biopsies by Biopsy;  Surgeon: Omega Talavera MD;  Location:  GI    CYSTOSCOPY  2011    CYSTOSCOPY performed by CAYLA FLOR at  OR    ENDOSCOPY  12    [facility]    ENT SURGERY  1965    GI SURGERY  12     UGI ENDOSCOPY DIAG W BIOPSY  10/01/09    HEMORRHOIDECTOMY  12    [facility]    LAPAROSCOPIC SALPINGO-OOPHORECTOMY  2011    LAPAROSCOPIC SALPINGO-OOPHORECTOMY performed by CAYLA FLOR at  OR    TONSILLECTOMY & ADENOIDECTOMY      [facility] NONSPECIFIC PROCEDURE      Removed bone left index finger knuckle, casts broken bones    [facility] COLONOSCOPY W/WO BRUSH/WASH  05    [facility] UGI ENDOSCOPY, SIMPLE EXAM  07      Allergies   Allergen Reactions    Telaprevir Other (See Comments) and Rash     Rectal bleeding, anemia    Abilify Discmelt Other (See Comments)     Disoriented    Antivert [Meclizine Hcl]     Chamomile     Compazine     Diphenhydramine  "Nausea     And abdominal pain    Duloxetine Hcl Other (See Comments)     Disoriented, trouble sleeping    Effexor [Venlafaxine] Other (See Comments)     Disoriented, trouble sleeping    Elavil [Amitriptyline Hcl] Other (See Comments)     \"didn't feel right on it-med was stopped right away\"    Ferrous Sulfate Nausea and Vomiting    Food Difficulty breathing     cilantro    Indomethacin      indocin sensativity \"Severe h.a\"    Seasonal Allergies Other (See Comments) and Difficulty breathing     Philip Gold Aug-Sept, rag weed, sneezing    Sulfa Antibiotics     Thiopental Sodium      PENTOTHAL/rigidity and fight response    Animal Dander Difficulty breathing and Rash     sneezing,resp. distress    Bupropion Anxiety    Tylenol [Acetaminophen] Rash      Social History     Tobacco Use    Smoking status: Former     Current packs/day: 0.00     Average packs/day: 0.8 packs/day for 10.0 years (7.5 ttl pk-yrs)     Types: Cigarettes     Start date: 1968     Quit date: 1978     Years since quittin.6     Passive exposure: Never    Smokeless tobacco: Never    Tobacco comments:     Quit 45 years ago   Substance Use Topics    Alcohol use: No      Wt Readings from Last 1 Encounters:   24 63.5 kg (140 lb)        Anesthesia Evaluation            ROS/MED HX  ENT/Pulmonary:     (+)                tobacco use, Past use,                       Neurologic:     (+)      migraines,                          Cardiovascular:     (+) Dyslipidemia hypertension- -   -  - -                                      METS/Exercise Tolerance:     Hematologic:  - neg hematologic  ROS     Musculoskeletal: Comment: Fibromyalgia   (+)  arthritis,             GI/Hepatic:     (+) GERD,                   Renal/Genitourinary:       Endo:  - neg endo ROS     Psychiatric/Substance Use:     (+) psychiatric history anxiety and depression       Infectious Disease:  - neg infectious disease ROS     Malignancy:  - neg malignancy ROS     Other:      " "      Physical Exam    Airway  airway exam normal      Mallampati: II   TM distance: > 3 FB   Neck ROM: full   Mouth opening: > 3 cm    Respiratory Devices and Support         Dental           Cardiovascular   cardiovascular exam normal       Rhythm and rate: regular and normal     Pulmonary   pulmonary exam normal        breath sounds clear to auscultation           OUTSIDE LABS:  CBC:   Lab Results   Component Value Date    WBC 3.7 (L) 04/17/2024    WBC 3.5 (L) 04/08/2024    HGB 11.7 04/17/2024    HGB 11.5 (L) 04/08/2024    HCT 38.8 04/17/2024    HCT 37.0 04/08/2024     04/17/2024     04/08/2024     BMP:   Lab Results   Component Value Date     (L) 10/10/2023     09/06/2023    POTASSIUM 4.1 10/10/2023    POTASSIUM 4.7 09/06/2023    CHLORIDE 98 10/10/2023    CHLORIDE 100 09/06/2023    CO2 24 10/10/2023    CO2 28 09/06/2023    BUN 10.1 10/10/2023    BUN 10.4 09/06/2023    CR 0.98 (H) 04/08/2024    CR 0.89 12/27/2023    GLC 97 04/17/2024    GLC 97 10/10/2023     COAGS:   Lab Results   Component Value Date    PTT 32 03/09/2010    INR 1.20 (H) 09/06/2023     POC: No results found for: \"BGM\", \"HCG\", \"HCGS\"  HEPATIC:   Lab Results   Component Value Date    ALBUMIN 4.4 04/08/2024    PROTTOTAL 6.6 12/27/2023    ALT 17 12/27/2023    AST 36 12/27/2023    ALKPHOS 75 12/27/2023    BILITOTAL 0.2 12/27/2023     OTHER:   Lab Results   Component Value Date    A1C 5.8 07/19/2005    ELIZABETH 10.3 (H) 04/08/2024    PHOS 4.0 12/05/2006    MAG 2.1 05/16/2022    LIPASE 99 08/19/2013    TSH 1.45 04/17/2024    T4 0.90 03/29/2011    CRP <2.9 01/12/2023    SED 8 12/27/2023       Anesthesia Plan    ASA Status:  3    NPO Status:  NPO Appropriate    Anesthesia Type: MAC.     - Reason for MAC: straight local not clinically adequate   Induction: Intravenous, Propofol.   Maintenance: TIVA.        Consents    Anesthesia Plan(s) and associated risks, benefits, and realistic alternatives discussed. Questions answered and " patient/representative(s) expressed understanding.     - Discussed:     - Discussed with:  Patient            Postoperative Care       PONV prophylaxis: Background Propofol Infusion     Comments:    Other Comments: The risks and benefits of anesthesia, and the alternatives where applicable, have been discussed with the patient, and they wish to proceed.              YOSEPH Smyth CRNA    I have reviewed the pertinent notes and labs in the chart from the past 30 days and (re)examined the patient.  Any updates or changes from those notes are reflected in this note.

## 2024-06-18 ENCOUNTER — PATIENT OUTREACH (OUTPATIENT)
Dept: CARE COORDINATION | Facility: CLINIC | Age: 74
End: 2024-06-18
Payer: COMMERCIAL

## 2024-06-18 ENCOUNTER — THERAPY VISIT (OUTPATIENT)
Dept: PHYSICAL THERAPY | Facility: CLINIC | Age: 74
End: 2024-06-18
Attending: STUDENT IN AN ORGANIZED HEALTH CARE EDUCATION/TRAINING PROGRAM
Payer: COMMERCIAL

## 2024-06-18 DIAGNOSIS — G89.29 CHRONIC BILATERAL LOW BACK PAIN WITHOUT SCIATICA: Primary | ICD-10-CM

## 2024-06-18 DIAGNOSIS — M54.50 CHRONIC BILATERAL LOW BACK PAIN WITHOUT SCIATICA: Primary | ICD-10-CM

## 2024-06-18 PROCEDURE — 97110 THERAPEUTIC EXERCISES: CPT | Mod: GP | Performed by: PHYSICAL THERAPIST

## 2024-06-18 PROCEDURE — 97530 THERAPEUTIC ACTIVITIES: CPT | Mod: GP | Performed by: PHYSICAL THERAPIST

## 2024-06-24 DIAGNOSIS — E61.1 IRON DEFICIENCY: ICD-10-CM

## 2024-06-24 NOTE — TELEPHONE ENCOUNTER
Medication:   Ferosul 325 MG Tabs (65 FE)  Last written on:   04/08/2024  Quantity:   45    Refills:   0    Last office visit:   04/08/2024  Next office visit:   08/12/2024  Last labs:   04/08/2024

## 2024-06-25 RX ORDER — FERROUS SULFATE 325(65) MG
325 TABLET, DELAYED RELEASE (ENTERIC COATED) ORAL EVERY OTHER DAY
Qty: 45 TABLET | Refills: 0 | Status: SHIPPED | OUTPATIENT
Start: 2024-06-25 | End: 2024-08-12

## 2024-06-25 NOTE — TELEPHONE ENCOUNTER
Type of surgery: RELEASE, CARPAL TUNNEL, long trigger finger release   Location of surgery: [facility]  Date and time of surgery: 8/23  Surgeon: Dora  Pre-Op Appt Date: 7/26  Post-Op Appt Date: 9/3   Packet sent out: Yes  Pre-cert/Authorization completed:  Not Applicable  Date: na

## 2024-07-01 ENCOUNTER — THERAPY VISIT (OUTPATIENT)
Dept: PHYSICAL THERAPY | Facility: CLINIC | Age: 74
End: 2024-07-01
Attending: STUDENT IN AN ORGANIZED HEALTH CARE EDUCATION/TRAINING PROGRAM
Payer: COMMERCIAL

## 2024-07-01 DIAGNOSIS — M54.50 CHRONIC BILATERAL LOW BACK PAIN WITHOUT SCIATICA: Primary | ICD-10-CM

## 2024-07-01 DIAGNOSIS — G89.29 CHRONIC BILATERAL LOW BACK PAIN WITHOUT SCIATICA: Primary | ICD-10-CM

## 2024-07-01 PROCEDURE — 97110 THERAPEUTIC EXERCISES: CPT | Mod: GP | Performed by: PHYSICAL THERAPIST

## 2024-07-01 PROCEDURE — 97530 THERAPEUTIC ACTIVITIES: CPT | Mod: GP | Performed by: PHYSICAL THERAPIST

## 2024-07-01 PROCEDURE — 97112 NEUROMUSCULAR REEDUCATION: CPT | Mod: GP | Performed by: PHYSICAL THERAPIST

## 2024-07-02 ENCOUNTER — PATIENT OUTREACH (OUTPATIENT)
Dept: CARE COORDINATION | Facility: CLINIC | Age: 74
End: 2024-07-02
Payer: COMMERCIAL

## 2024-07-02 NOTE — PROGRESS NOTES
Clinic Care Coordination Contact  [facility]/Voicemail    Clinical Data: Care Coordinator Outreach    Outreach Documentation Number of Outreach Attempt   5/24/2024   1:04 PM 1   6/4/2024   3:20 PM 2   7/2/2024   2:49 PM 3       Left message on patient's voicemail with call back information and requested return call.    Plan: Care Coordinator will do no further outreaches at this time.    LUCAS Barraza  [phone]  [facility] Resource [facility]

## 2024-07-02 NOTE — PROGRESS NOTES
Clinic Care Coordination Contact    Pt has not responded to phone calls [facility] and [facility] letter sent.  Will close and send disenroll letter via [email or web address].     TATYANA Valdes   [facility] - Care Coordination  [facility]   [phone]

## 2024-07-02 NOTE — LETTER
[facility] CARE COORDINATION  [address]  [place] [address]    July 2, 2024    Niesha Adhikari  [id number] [address] [address]      Dear Niesha,    I have been unsuccessful in reaching you since our last contact. At this time the Care Coordination team will make no further attempts to reach you, however this does not change your ability to continue receiving care from your providers at your primary care clinic. If you need additional support from a care coordinator in the future please contact LUCAS Morse [phone] or JULIETA Frank at [phone].    All of us at [facility] are invested in your health and are here to assist you in meeting your goals.     Sincerely,    TATYANA Valdes   [facility] - Care Coordination  [facility]   [phone]

## 2024-07-15 DIAGNOSIS — M54.50 ACUTE LEFT-SIDED LOW BACK PAIN WITHOUT SCIATICA: ICD-10-CM

## 2024-07-15 DIAGNOSIS — M79.7 FIBROMYALGIA: ICD-10-CM

## 2024-07-15 RX ORDER — GABAPENTIN 300 MG/1
CAPSULE ORAL
Qty: 180 CAPSULE | Refills: 0 | Status: SHIPPED | OUTPATIENT
Start: 2024-07-15 | End: 2024-08-29

## 2024-07-16 ENCOUNTER — PATIENT OUTREACH (OUTPATIENT)
Dept: CARE COORDINATION | Facility: CLINIC | Age: 74
End: 2024-07-16
Payer: COMMERCIAL

## 2024-07-18 ENCOUNTER — THERAPY VISIT (OUTPATIENT)
Dept: PHYSICAL THERAPY | Facility: CLINIC | Age: 74
End: 2024-07-18
Attending: STUDENT IN AN ORGANIZED HEALTH CARE EDUCATION/TRAINING PROGRAM
Payer: COMMERCIAL

## 2024-07-18 DIAGNOSIS — G89.29 CHRONIC BILATERAL LOW BACK PAIN WITHOUT SCIATICA: Primary | ICD-10-CM

## 2024-07-18 DIAGNOSIS — M54.50 CHRONIC BILATERAL LOW BACK PAIN WITHOUT SCIATICA: Primary | ICD-10-CM

## 2024-07-18 PROCEDURE — 97110 THERAPEUTIC EXERCISES: CPT | Mod: GP | Performed by: PHYSICAL THERAPIST

## 2024-07-18 PROCEDURE — 97530 THERAPEUTIC ACTIVITIES: CPT | Mod: GP | Performed by: PHYSICAL THERAPIST

## 2024-07-18 PROCEDURE — 97112 NEUROMUSCULAR REEDUCATION: CPT | Mod: GP | Performed by: PHYSICAL THERAPIST

## 2024-07-19 ENCOUNTER — PATIENT OUTREACH (OUTPATIENT)
Dept: CARE COORDINATION | Facility: CLINIC | Age: 74
End: 2024-07-19

## 2024-07-19 ENCOUNTER — IMMUNIZATION (OUTPATIENT)
Dept: FAMILY MEDICINE | Facility: CLINIC | Age: 74
End: 2024-07-19
Payer: COMMERCIAL

## 2024-07-19 PROCEDURE — 90480 ADMN SARSCOV2 VAC 1/ONLY CMP: CPT

## 2024-07-19 PROCEDURE — 91320 SARSCV2 VAC 30MCG TRS-SUC IM: CPT

## 2024-07-19 NOTE — PROGRESS NOTES
THORACIC SURGERY FOLLOW UP VISIT      I saw Ms. Niesha Adhikari in follow-up today. The clinical summary follows:     CHIEF COMPLAINT  Anterior mediastinal nodule  INTERVAL STUDIES  CT chest 07/22/2024: final report pending at time of clinic visit. To my review it appears stable.        Past Medical History:   Diagnosis Date    ABUSE BY SPOUSE/PARTNER 07/27/2005    Degeneration of lumbar or lumbosacral intervertebral disc     DDD L5/S1    Depressive disorder     Esophageal reflux 1/28/2005    HELICOBACTER PYLORI INFECTION 01/28/2005    Hepatitis C - Cured. Achieved SVR in 2017     History of blood transfusion     Hypertension     Malignant neoplasm (H)     ACIN    Osteoporosis     Other and unspecified alcohol dependence, unspecified drinking behavior     Sober as 1/21/1987    Other malaise and fatigue       Past Surgical History:   Procedure Laterality Date    BIOPSY ANAL CANAL  1/21/13    [facility]     BREAST BIOPSY, RT/LT Left 1975    Breat Biopsy RT/LT    COLONOSCOPY  8/25/2009    COLONOSCOPY  2/14/2011    COLONOSCOPY performed by CRISTIN LAGUNAS at  GI    COLONOSCOPY N/A 1/8/2019    Procedure: Colonscopy, Biopsies by Biopsy;  Surgeon: Omega Talavera MD;  Location:  GI    COLONOSCOPY N/A 6/17/2024    Procedure: Colonoscopy;  Surgeon: Omega Talavera MD;  Location:  GI    CYSTOSCOPY  2/28/2011    CYSTOSCOPY performed by CAYLA FLOR at  OR    ENDOSCOPY  05/21/12    [facility]    ENT SURGERY  1965    GI SURGERY  06/25/12     UGI ENDOSCOPY DIAG W BIOPSY  10/01/09    HEMORRHOIDECTOMY  06/25/12    [facility]    LAPAROSCOPIC SALPINGO-OOPHORECTOMY  2/28/2011    LAPAROSCOPIC SALPINGO-OOPHORECTOMY performed by CAYLA FLOR at  OR    TONSILLECTOMY & ADENOIDECTOMY  1965    [facility] NONSPECIFIC PROCEDURE  1965    Removed bone left index finger knuckle, casts broken bones    [facility] COLONOSCOPY W/WO BRUSH/WASH  08/22/05    [facility] UGI ENDOSCOPY, SIMPLE EXAM  08/08/07       Social History     Socioeconomic History    Marital status:      Spouse name: Not on file    Number of children: Not on file    Years of education: Not on file    Highest education level: Not on file   Occupational History    Not on file   Tobacco Use    Smoking status: Former     Current packs/day: 0.00     Average packs/day: 0.8 packs/day for 10.0 years (7.5 ttl pk-yrs)     Types: Cigarettes     Start date: 1968     Quit date: 1978     Years since quittin.7     Passive exposure: Never    Smokeless tobacco: Never    Tobacco comments:     Quit 45 years ago   Vaping Use    Vaping status: Never Used   Substance and Sexual Activity    Alcohol use: No    Drug use: No    Sexual activity: Not Currently     Partners: Male     Birth control/protection: Surgical     Comment: Not necessary, no sex   Other Topics Concern    Parent/sibling w/ CABG, MI or angioplasty before 65F 55M? Yes     Comment: Mother, brother, sister     Service No    Blood Transfusions No    Caffeine Concern No    Occupational Exposure No    Hobby Hazards No    Sleep Concern No    Stress Concern Yes    Weight Concern Yes    Special Diet No    Back Care No    Exercise No    Bike Helmet Yes    Seat Belt Yes    Self-Exams Yes   Social History Narrative    Not on file     Social Determinants of Health     Financial Resource Strain: Low Risk  (4/10/2024)    Financial Resource Strain     Within the past 12 months, have you or your family members you live with been unable to get utilities (heat, electricity) when it was really needed?: No   Food Insecurity: Low Risk  (4/10/2024)    Food Insecurity     Within the past 12 months, did you worry that your food would run out before you got money to buy more?: No     Within the past 12 months, did the food you bought just not last and you didn t have money to get more?: No   Transportation Needs: High Risk (4/10/2024)    Transportation Needs     Within the past 12 months, has lack  of transportation kept you from medical appointments, getting your medicines, non-medical meetings or appointments, work, or from getting things that you need?: Yes   Physical Activity: Sufficiently Active (4/10/2024)    Exercise Vital Sign     Days of Exercise per Week: 7 days     Minutes of Exercise per Session: 60 min   Stress: Stress Concern Present (4/10/2024)    [employer] - Occupational Stress Questionnaire     Feeling of Stress : Very much   Social Connections: Unknown (4/10/2024)    Social Connection and Isolation Panel [NHANES]     Frequency of Communication with Friends and Family: Once a week     Frequency of Social Gatherings with Friends and Family: Patient declined     Attends Orthodox Services: Patient declined     Active Member of Clubs or Organizations: Patient declined     Attends Club or Organization Meetings: 1 to 4 times per year     Marital Status:    Interpersonal Safety: Low Risk  (11/16/2023)    Interpersonal Safety     Do you feel physically and emotionally safe where you currently live?: Yes     Within the past 12 months, have you been hit, slapped, kicked or otherwise physically hurt by someone?: No     Within the past 12 months, have you been humiliated or emotionally abused in other ways by your partner or ex-partner?: No   Housing Stability: Low Risk  (4/10/2024)    Housing Stability     Do you have housing? : Yes     Are you worried about losing your housing?: No      SUBJECTIVE  Niesha is doing well. She has some back pain but that is not new. She denies fevers, night sweats, chills, unexplained weight loss or vision changes. She is having cataract surgery later this summer as well as carpal tunnel surgery this summer.     OBJECTIVE  BP (!) 143/66 (BP Location: Right arm, Patient Position: Sitting, Cuff Size: Adult Regular)   Pulse 63   Temp 98.1  F (36.7  C) (Oral)   Resp 16   LMP 11/27/2003   SpO2 100%      From a personal perspective,  she just started reading [place] Bus to [place]. It is about an orphaned boy who is sent to live with some relatives in [place].    AYDEE Scherer is a 73 year-old female with an anterior mediastinal nodule that was found in May 2023 during work up following a fall.     PLAN  I spent 15 min on the date of the encounter in chart review, patient visit, review of tests, documentation and/or discussion with other providers about the issues documented above. I reviewed the plan as follows:  Follow up with me in 1 year with chest CT prior  Necessary Tests & Appointments: chest CT  All questions were answered and the patient and present family were in agreement with the plan.  I appreciate the opportunity to participate in the care of your patient and will keep you updated.  Sincerely,

## 2024-07-22 ENCOUNTER — ANCILLARY PROCEDURE (OUTPATIENT)
Dept: CT IMAGING | Facility: CLINIC | Age: 74
End: 2024-07-22
Attending: CLINICAL NURSE SPECIALIST
Payer: COMMERCIAL

## 2024-07-22 ENCOUNTER — ONCOLOGY VISIT (OUTPATIENT)
Dept: SURGERY | Facility: CLINIC | Age: 74
End: 2024-07-22
Attending: CLINICAL NURSE SPECIALIST
Payer: COMMERCIAL

## 2024-07-22 VITALS
HEART RATE: 63 BPM | DIASTOLIC BLOOD PRESSURE: 66 MMHG | TEMPERATURE: 98.1 F | OXYGEN SATURATION: 100 % | SYSTOLIC BLOOD PRESSURE: 143 MMHG | RESPIRATION RATE: 16 BRPM

## 2024-07-22 DIAGNOSIS — D49.7 NEOPLASM OF UNSPECIFIED BEHAVIOR OF ENDOCRINE GLANDS AND OTHER PARTS OF NERVOUS SYSTEM: ICD-10-CM

## 2024-07-22 DIAGNOSIS — J98.59 MEDIASTINAL MASS: ICD-10-CM

## 2024-07-22 DIAGNOSIS — J98.59 MEDIASTINAL MASS: Primary | ICD-10-CM

## 2024-07-22 DIAGNOSIS — R93.89 ABNORMAL FINDING ON CT SCAN: Primary | ICD-10-CM

## 2024-07-22 LAB
CREAT BLD-MCNC: 0.8 MG/DL (ref 0.5–1)
EGFRCR SERPLBLD CKD-EPI 2021: >60 ML/MIN/1.73M2

## 2024-07-22 PROCEDURE — G0463 HOSPITAL OUTPT CLINIC VISIT: HCPCS | Performed by: CLINICAL NURSE SPECIALIST

## 2024-07-22 PROCEDURE — 82565 ASSAY OF CREATININE: CPT | Performed by: PATHOLOGY

## 2024-07-22 PROCEDURE — 99213 OFFICE O/P EST LOW 20 MIN: CPT | Performed by: CLINICAL NURSE SPECIALIST

## 2024-07-22 PROCEDURE — 71260 CT THORAX DX C+: CPT | Mod: GC | Performed by: RADIOLOGY

## 2024-07-22 RX ORDER — IOPAMIDOL 755 MG/ML
69 INJECTION, SOLUTION INTRAVASCULAR ONCE
Status: COMPLETED | OUTPATIENT
Start: 2024-07-22 | End: 2024-07-22

## 2024-07-22 RX ADMIN — IOPAMIDOL 69 ML: 755 INJECTION, SOLUTION INTRAVASCULAR at 09:07

## 2024-07-22 ASSESSMENT — PAIN SCALES - GENERAL: PAINLEVEL: NO PAIN (0)

## 2024-07-22 NOTE — DISCHARGE INSTRUCTIONS
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
## 2024-07-22 NOTE — NURSING NOTE
"Oncology Rooming Note    July 22, 2024 9:27 AM   Niesha Adhikari is a 73 year old female who presents for:    Chief Complaint   Patient presents with    Oncology Clinic Visit     Mediastinal mass     Initial Vitals: BP (!) 143/66 (BP Location: Right arm, Patient Position: Sitting, Cuff Size: Adult Regular)   Pulse 63   Temp 98.1  F (36.7  C) (Oral)   Resp 16   LMP 11/27/2003   SpO2 100%  Estimated body mass index is 24.8 kg/m  as calculated from the following:    Height as of 6/13/24: 1.6 m (5' 3\").    Weight as of 6/17/24: 63.5 kg (140 lb). There is no height or weight on file to calculate BSA.  No Pain (0) Comment: Data Unavailable   Patient's last menstrual period was 11/27/2003.  Allergies reviewed: Yes  Medications reviewed: Yes    Medications: Medication refills not needed today.  Pharmacy name entered into [employer]:    [facility] [place] [address] [facility] - [place], [place] - [address] [facility]    Frailty Screening:   Is the patient here for a new oncology consult visit in cancer care? 2. No      Clinical concerns: Pt reports no new concerns.       Mery Landeros, EMT   "

## 2024-07-26 ENCOUNTER — OFFICE VISIT (OUTPATIENT)
Dept: FAMILY MEDICINE | Facility: OTHER | Age: 74
End: 2024-07-26
Payer: COMMERCIAL

## 2024-07-26 VITALS
DIASTOLIC BLOOD PRESSURE: 64 MMHG | OXYGEN SATURATION: 99 % | TEMPERATURE: 97.9 F | HEART RATE: 62 BPM | RESPIRATION RATE: 16 BRPM | SYSTOLIC BLOOD PRESSURE: 138 MMHG

## 2024-07-26 DIAGNOSIS — G47.419 PRIMARY NARCOLEPSY WITHOUT CATAPLEXY: ICD-10-CM

## 2024-07-26 DIAGNOSIS — G56.03 BILATERAL CARPAL TUNNEL SYNDROME: ICD-10-CM

## 2024-07-26 DIAGNOSIS — L30.9 ECZEMA, UNSPECIFIED TYPE: ICD-10-CM

## 2024-07-26 DIAGNOSIS — F33.1 MODERATE RECURRENT MAJOR DEPRESSION (H): ICD-10-CM

## 2024-07-26 DIAGNOSIS — J30.2 OTHER SEASONAL ALLERGIC RHINITIS: ICD-10-CM

## 2024-07-26 DIAGNOSIS — R92.8 ABNORMAL FINDING ON BREAST IMAGING: ICD-10-CM

## 2024-07-26 DIAGNOSIS — E61.1 IRON DEFICIENCY: ICD-10-CM

## 2024-07-26 DIAGNOSIS — I10 BENIGN ESSENTIAL HYPERTENSION: ICD-10-CM

## 2024-07-26 DIAGNOSIS — M06.09 RHEUMATOID ARTHRITIS OF MULTIPLE SITES WITH NEGATIVE RHEUMATOID FACTOR (H): ICD-10-CM

## 2024-07-26 DIAGNOSIS — D51.0 PERNICIOUS ANEMIA: ICD-10-CM

## 2024-07-26 DIAGNOSIS — Z01.818 PREOP GENERAL PHYSICAL EXAM: Primary | ICD-10-CM

## 2024-07-26 LAB
CHOLEST SERPL-MCNC: 182 MG/DL
ERYTHROCYTE [DISTWIDTH] IN BLOOD BY AUTOMATED COUNT: 16.3 % (ref 10–15)
FASTING STATUS PATIENT QL REPORTED: NO
HCT VFR BLD AUTO: 41.3 % (ref 35–47)
HDLC SERPL-MCNC: 54 MG/DL
HGB BLD-MCNC: 12.8 G/DL (ref 11.7–15.7)
IRON BINDING CAPACITY (ROCHE): 412 UG/DL (ref 240–430)
IRON SATN MFR SERPL: 11 % (ref 15–46)
IRON SERPL-MCNC: 47 UG/DL (ref 37–145)
LDLC SERPL CALC-MCNC: 109 MG/DL
MCH RBC QN AUTO: 26.4 PG (ref 26.5–33)
MCHC RBC AUTO-ENTMCNC: 31 G/DL (ref 31.5–36.5)
MCV RBC AUTO: 85 FL (ref 78–100)
NONHDLC SERPL-MCNC: 128 MG/DL
PLATELET # BLD AUTO: 137 10E3/UL (ref 150–450)
RBC # BLD AUTO: 4.84 10E6/UL (ref 3.8–5.2)
TRIGL SERPL-MCNC: 96 MG/DL
VIT B12 SERPL-MCNC: 510 PG/ML (ref 232–1245)
WBC # BLD AUTO: 5.8 10E3/UL (ref 4–11)

## 2024-07-26 PROCEDURE — 80061 LIPID PANEL: CPT | Performed by: FAMILY MEDICINE

## 2024-07-26 PROCEDURE — 99214 OFFICE O/P EST MOD 30 MIN: CPT | Performed by: FAMILY MEDICINE

## 2024-07-26 PROCEDURE — 36415 COLL VENOUS BLD VENIPUNCTURE: CPT | Performed by: FAMILY MEDICINE

## 2024-07-26 PROCEDURE — 83550 IRON BINDING TEST: CPT | Performed by: FAMILY MEDICINE

## 2024-07-26 PROCEDURE — 82607 VITAMIN B-12: CPT | Performed by: FAMILY MEDICINE

## 2024-07-26 PROCEDURE — 83540 ASSAY OF IRON: CPT | Performed by: FAMILY MEDICINE

## 2024-07-26 PROCEDURE — 85027 COMPLETE CBC AUTOMATED: CPT | Performed by: FAMILY MEDICINE

## 2024-07-26 RX ORDER — TRIAMCINOLONE ACETONIDE 1 MG/G
CREAM TOPICAL 2 TIMES DAILY
Qty: 15 G | Refills: 0 | Status: SHIPPED | OUTPATIENT
Start: 2024-07-26

## 2024-07-26 RX ORDER — MONTELUKAST SODIUM 10 MG/1
TABLET ORAL
Qty: 90 TABLET | Refills: 3 | Status: SHIPPED | OUTPATIENT
Start: 2024-07-26

## 2024-07-26 ASSESSMENT — PATIENT HEALTH QUESTIONNAIRE - PHQ9
10. IF YOU CHECKED OFF ANY PROBLEMS, HOW DIFFICULT HAVE THESE PROBLEMS MADE IT FOR YOU TO DO YOUR WORK, TAKE CARE OF THINGS AT HOME, OR GET ALONG WITH OTHER PEOPLE: VERY DIFFICULT
SUM OF ALL RESPONSES TO PHQ QUESTIONS 1-9: 4
SUM OF ALL RESPONSES TO PHQ QUESTIONS 1-9: 4

## 2024-07-26 ASSESSMENT — PAIN SCALES - GENERAL: PAINLEVEL: NO PAIN (0)
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
## 2024-08-05 ENCOUNTER — ANCILLARY PROCEDURE (OUTPATIENT)
Dept: ULTRASOUND IMAGING | Facility: CLINIC | Age: 74
End: 2024-08-05
Attending: FAMILY MEDICINE
Payer: COMMERCIAL

## 2024-08-05 ENCOUNTER — ANCILLARY PROCEDURE (OUTPATIENT)
Dept: MAMMOGRAPHY | Facility: CLINIC | Age: 74
End: 2024-08-05
Attending: CLINICAL NURSE SPECIALIST
Payer: COMMERCIAL

## 2024-08-05 DIAGNOSIS — R92.8 ABNORMAL FINDING ON BREAST IMAGING: ICD-10-CM

## 2024-08-05 DIAGNOSIS — D49.7 NEOPLASM OF UNSPECIFIED BEHAVIOR OF ENDOCRINE GLANDS AND OTHER PARTS OF NERVOUS SYSTEM: ICD-10-CM

## 2024-08-05 DIAGNOSIS — R93.89 ABNORMAL FINDING ON CT SCAN: ICD-10-CM

## 2024-08-05 PROCEDURE — G0279 TOMOSYNTHESIS, MAMMO: HCPCS

## 2024-08-05 PROCEDURE — 77066 DX MAMMO INCL CAD BI: CPT

## 2024-08-05 PROCEDURE — 76642 ULTRASOUND BREAST LIMITED: CPT | Mod: RT

## 2024-08-12 ENCOUNTER — OFFICE VISIT (OUTPATIENT)
Dept: RHEUMATOLOGY | Facility: CLINIC | Age: 74
End: 2024-08-12
Payer: COMMERCIAL

## 2024-08-12 VITALS
SYSTOLIC BLOOD PRESSURE: 152 MMHG | RESPIRATION RATE: 16 BRPM | OXYGEN SATURATION: 99 % | HEART RATE: 57 BPM | DIASTOLIC BLOOD PRESSURE: 75 MMHG | BODY MASS INDEX: 24.84 KG/M2 | WEIGHT: 140.2 LBS

## 2024-08-12 DIAGNOSIS — M06.09 RHEUMATOID ARTHRITIS OF MULTIPLE SITES WITH NEGATIVE RHEUMATOID FACTOR (H): Primary | ICD-10-CM

## 2024-08-12 DIAGNOSIS — Z92.89 PERSONAL HISTORY OF OTHER MEDICAL TREATMENT: ICD-10-CM

## 2024-08-12 DIAGNOSIS — G56.03 BILATERAL CARPAL TUNNEL SYNDROME: ICD-10-CM

## 2024-08-12 DIAGNOSIS — E61.1 IRON DEFICIENCY: ICD-10-CM

## 2024-08-12 DIAGNOSIS — M81.0 AGE-RELATED OSTEOPOROSIS WITHOUT CURRENT PATHOLOGICAL FRACTURE: ICD-10-CM

## 2024-08-12 LAB
CALCIUM SERPL-MCNC: 9.4 MG/DL (ref 8.8–10.4)
VIT D+METAB SERPL-MCNC: 75 NG/ML (ref 20–50)

## 2024-08-12 PROCEDURE — 36415 COLL VENOUS BLD VENIPUNCTURE: CPT | Performed by: INTERNAL MEDICINE

## 2024-08-12 PROCEDURE — 99214 OFFICE O/P EST MOD 30 MIN: CPT | Performed by: INTERNAL MEDICINE

## 2024-08-12 PROCEDURE — G2211 COMPLEX E/M VISIT ADD ON: HCPCS | Performed by: INTERNAL MEDICINE

## 2024-08-12 PROCEDURE — 82306 VITAMIN D 25 HYDROXY: CPT | Performed by: INTERNAL MEDICINE

## 2024-08-12 PROCEDURE — 82310 ASSAY OF CALCIUM: CPT | Performed by: INTERNAL MEDICINE

## 2024-08-12 RX ORDER — EPINEPHRINE 1 MG/ML
0.3 INJECTION, SOLUTION, CONCENTRATE INTRAVENOUS EVERY 5 MIN PRN
OUTPATIENT
Start: 2024-10-12

## 2024-08-12 RX ORDER — ALBUTEROL SULFATE 0.83 MG/ML
2.5 SOLUTION RESPIRATORY (INHALATION)
OUTPATIENT
Start: 2024-10-12

## 2024-08-12 RX ORDER — FERROUS SULFATE 325(65) MG
325 TABLET, DELAYED RELEASE (ENTERIC COATED) ORAL EVERY OTHER DAY
Qty: 45 TABLET | Refills: 0 | Status: SHIPPED | OUTPATIENT
Start: 2024-08-12

## 2024-08-12 RX ORDER — ALBUTEROL SULFATE 90 UG/1
1-2 AEROSOL, METERED RESPIRATORY (INHALATION)
Start: 2024-10-12

## 2024-08-12 RX ORDER — ABATACEPT 125 MG/ML
125 INJECTION, SOLUTION SUBCUTANEOUS
Qty: 4 ML | Refills: 4 | Status: SHIPPED | OUTPATIENT
Start: 2024-08-12

## 2024-08-12 RX ORDER — METHYLPREDNISOLONE SODIUM SUCCINATE 125 MG/2ML
125 INJECTION, POWDER, LYOPHILIZED, FOR SOLUTION INTRAMUSCULAR; INTRAVENOUS
Start: 2024-10-12

## 2024-08-12 RX ORDER — DIPHENHYDRAMINE HYDROCHLORIDE 50 MG/ML
50 INJECTION INTRAMUSCULAR; INTRAVENOUS
Start: 2024-10-12

## 2024-08-12 RX ORDER — MEPERIDINE HYDROCHLORIDE 25 MG/ML
25 INJECTION INTRAMUSCULAR; INTRAVENOUS; SUBCUTANEOUS EVERY 30 MIN PRN
OUTPATIENT
Start: 2024-10-12

## 2024-08-12 NOTE — NURSING NOTE
RAPID3 (0-30) Cumulative Score  10.0          RAPID3 Weighted Score (divide #4 by 3 and that is the weighted score)  3.3

## 2024-08-12 NOTE — PROGRESS NOTES
Rheumatology Clinic Visit      Niesha Adhikari MRN# [id number]   YOB: 1950 Age: 74 year old      Date of visit: 8/12/24   PCP: Dr. Tanika Clark  Hepatologist: Dr. Peres at [facility] in [place]  Ophthalmology: Dr. Higgins, [facility]    Chief Complaint   Patient presents with:  Rheumatoid Arthritis: Physical therapy is going good. Still has back pain and hands hurt. Concerned about carpal surgery on right hand, wonders if it will make her RA worse. Iron is helping, no side effects. Wonders if she should increase.    Assessment and Plan     1. Rheumatoid arthritis: Hepatitis C related arthralgias versus rheumatoid arthritis (RF and CCP negative); hepatitis C has since been cleared. Initially with symmetric synovitis on exam and morning stiffness for more than one hour. NSAIDs and Tylenol associated with rash.  She did well with hydroxychloroquine 400 mg daily for a while but more arthritis symptoms so SSZ was added but associated with cytopenia and GI upset so that was stopped.  Avoiding MTX, leflunomide because of hepatitis C hx and +HBV core.  Preferentially avoid Jakinibs because of hepatitis C history and +HBV core. She has established with gastroenterology and is on tenofovir for hepatitis B reactivation prophylaxis.  Previously on Humira (5/8/2020-2/2023).  Currently on Enbrel (started 2/2023-6/5/2023, injection site reactions, no improvement since changing from Humira to Enbrel).  Note that she had been doing well but then in January 2023 was having more pain in the right hand with mild synovial hypertrophy but no clearly active synovitis.  She had findings that were more consistent with carpal tunnel syndrome.  An MRI of the right hand showed synovitis at the right second and third MCP and tenosynovitis of the extensor tendons of the right third finger.  Therefore, changed from Humira to Enbrel with no improvement and exam remained unchanged; was also having injection  "site reactions with Enbrel.  Therefore, changed to Orencia on 6/5/2023.  Doing well at this time; no synovitis on exam and no injection site issues.  Chronic illness, progressive.    - Continue hydroxychloroquine 300 mg daily (last eye exam was performed 2/1/2022 at [facility]; yearly eye exam required and she says that it is scheduled)  - Continue Orencia 125mg SQ once every 7 days    2. Osteoporosis: Previously treated with Fosamax (GERD), PO Boniva (reportedly ineffective), and IV Boniva (reportedly effective). Prolia was being considered by a previous rheumatologist but not used because she wanted \"clearance\" from the patient's gastroenterologist because she has hepatitis C; I do not see a contraindication for Prolia in the setting of hepatitis C. The patient reports that her gastroenterologist reported no contraindication for Prolia either.  She had not been on osteoporosis treatment for at least 2 years before restarting Boniva.  Boniva was restarted with the first dose given on 12/5/2017.  2021 DEXA showed improvement and Boniva was continued until the last dose that was on 1/12/2023.  1/16/2023 DEXA shows osteoporosis, with reduced bone mineral density compared to the previous, about the hips and lumbar spine.  Changed to Prolia with the first dose received on 4/12/2023.  Vitamin D reduced previously because of elevated vitamin D..   Chronic illness, stable.    - Continue vitamin D 1000 IU daily  - Calcium 1200 mg daily  - Continue Prolia 60mg SQ every 6 months (received at the infusion center)  - Labs today: Calcium, vitamin D    3.  Lower back pain and history left hip pain: Ms. Adhikari had a CT pelvis on 11/16/2018 that showed degenerative changes of the L-spine.   Improves with PT exercises, but often doesn't do them.  Has had difficulty getting back to physical therapy.  Doing exercises on her own at home that are helpful.    4. HBV core ab positive: On hepatitis B prophylaxis from " gastroenterology.  Continue to follow with gastroenterology.    5. Bilateral carpal tunnel syndrome: EMG/NCS showed mild carpal tunnel syndrome.  Planning for carpal tunnel surgery with Dr. John    Perioperative DMARD management:  - Continue hydroxychloroquine perioperatively  - Orencia should not be taken at least 2 weeks prior to the  surgery; and restarted no sooner than 2 weeks after the surgery is completed and only in the absence of infection, absence of delayed wound healing, and only after given approval to restart by the surgeon.    6.  Bilateral hand osteoarthritis: Reviewed the diagnosis treatment options.  Start Voltaren gel as needed.  Risks and side effects of Voltaren gel were reviewed in detail today.  - diclofenac (VOLTAREN) 1 % topical gel; Apply up to 2 grams of 1% gel to hands up to 4 times daily as needed for joint pain (maximum: 8 g per upper extremity per day)      7.  Vaccinations: Vaccinations reviewed with Ms. Azulandrea.    - Influenza: encouraged yearly vaccination  - [id number]: up to date  - [id number]: up to date  - Shingrix: Up to date  - COVID19: Advised keeping up-to-date, and to hold Orencia for 1-2 weeks afterward     8.  Iron deficiency anemia: Iron deficiency based on previous labs.  Hemoglobin improved with iron supplementation: ferrous sulfate 325 mg every other day.  She will follow long-term with her primary care provider for iron deficiency anemia.  - Continue ferrous sulfate 325 mg every other day  - Labs in 4 months: CBC, ferritin, iron    9. Elevated blood pressure:  Niesha to follow up with Primary Care provider regarding elevated blood pressure.     Total minutes spent in evaluation with patient, review of pertinent lab tests and chart notes, and documentation: 22  The longitudinal plan of care for the rheumatology problem(s) were addressed during this visit.  Due to added complexity of care, we will continue to support the patient and the subsequent management of this  condition with ongoing continuity of care.       Ms. Adhikari verbalized agreement with and understanding of the rational for the diagnosis and treatment plan.  All questions were answered to best of my ability and the patient's satisfaction. Ms. Adhikari was advised to contact the clinic with any questions that may arise after the clinic visit.      Thank you for involving me in the care of the patient    Return to clinic: 4 months    HPI   Niesha Adhikari is a 74 year old female with a medical history significant for hepatitis C, hemorrhoids, narcolepsy, fibromyalgia, migraines, anxiety, pernicious anemia, GERD, allergic rhinitis, and osteoporosis who presents for follow-up of inflammatory arthritis.    1/31/2023: Was seen by orthopedic surgery where she was told that she may benefit from carpal tunnel surgery bilaterally, and left third digit trigger finger surgery.  She has hesitant to go through with this because she questions if her symptoms are more arthritis related.  Larger nodules over the right second and third MCPs on the left.  Bony hypertrophy at the DIPs.  Occasional pain at the bilateral first CMC joints, right first MCP, and wrist, but pain is worse with activity and improves with rest, not associated with swelling or increased warmth, and only last for a few seconds at a time.  Still with numbness and tingling in both hands that she says affects all of her fingers, is worse at night, and sometimes wakes her up at night.    2/20/2023: Having more pain and stiffness in the bilateral second and third MCPs and PIPs that is worse in the morning and improves with time and activity.  Occasionally has a zapping sensation in her fingers.  The zapping sensation typically resolves after a few seconds and does not occur for more than a few times each day, if at all.  Having more difficulty grasping items due to pain and stiffness at the MCPs and would like to use prednisone.  States that she has anxiety but  prednisone does not worsen her anxiety.    6/5/2023: Swelling at the MCPs, worse on the right hand.  Still with numbness and tingling in the fingers that is intermittent.  States the pain at the MCPs is worse at night and wakes her up from sleep about 2-3 times per night.  Felt better when she was on prednisone was able to sleep through the night while on prednisone but does not wish to restart prednisone because she does not like the potential side effects.  Fell doing yard work and broke several ribs.  Found to have a nodule behind the sternum and is going to have additional work-up for this with imaging at the direction of another clinic; the nodule was found incidentally when imaging was done to find the rib fractures.    9/18/2023: Still with numbness and tingling in her hands that occurs intermittently, sometimes also radiating proximally from the wrist.  Joints without swelling, increased warmth, or overlying erythema.  Pain at the fingers diffusely when she has the numbness and tingling.  No injection site issues with Orencia.  Occasional left elbow pain over the olecranon process but no swelling, increased warmth, or overlying erythema and not an issue at this time.  Chronic low back pain and would like to go back to physical therapy for a refresher.    4/8/2024: modafinil was stopped because it was thought to be keeping her up at night and this has worsened her sleep, and hydroxyzine was replaced with gabapentin and she says that she thinks that gabapentin is a placebo (taking gabapentin 300 mg TID); she says that she plans to follow-up with the prescribing providers regarding these medications.  Planning to have a sleep study.  [place] and having issues with transportation reliability; she plans to discuss with her insurance.  Hasn't made it to her eye appointment or see the dentist or go to physical therapy.  Had to reschedule her eye exam for Plaquenil toxicity monitoring.  Reports that the  combination of Plaquenil and Orencia is very effective.  Arthritis well-controlled.  No joint pain or swelling.  Morning stiffness for no more than 10 minutes.    Today, 8/12/2024: Planning for carpal tunnel surgery on the left, then eventually on the right.  Planning for bilateral cataract surgeries.  Arthritis is doing well.  No joint pain or swelling.  Morning stiffness for no more than 10 minutes.  Feels like iron supplementation has been helpful; has more energy.  Has started kayaking.    Denies fevers, chills, nausea, vomiting, diarrhea. No abdominal pain. No chest pain/pressure, palpitations, or shortness of breath. No LE swelling. No neck pain. No oral or nasal sores.  No rash.     Tobacco: quit in 1978  EtOH: none  Drugs: none  Occupation: retired    ROS   12 point review of system was completed and negative except as noted in the HPI     Active Problem List     Patient Active Problem List   Diagnosis    Allergic rhinitis    Pernicious anemia    Anxiety state    Migraine    Essential and other specified forms of tremor    Fibromyalgia    Moderate recurrent major depression (H)    Narcolepsy    Internal hemorrhoids with other complication    AIN (anal intraepithelial neoplasia) anal canal    Osteoporosis    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)    Benign essential hypertension    Alcohol dependence in remission (H)    Personal history of other medical treatment    Constipation    DDD (degenerative disc disease), cervical    Iron deficiency    Fatigue    Trigger middle finger of left hand    Bilateral carpal tunnel syndrome    Chronic bilateral low back pain without sciatica     Past Medical History     Past Medical History:   Diagnosis Date    ABUSE BY SPOUSE/PARTNER 07/27/2005    Degeneration of lumbar or lumbosacral intervertebral disc     DDD L5/S1    Depressive disorder     Esophageal reflux 1/28/2005    HELICOBACTER PYLORI INFECTION 01/28/2005    Hepatitis C - Cured.  "Achieved SVR in 2017     History of blood transfusion     Hypertension     Malignant neoplasm (H)     ACIN    Osteoporosis     Other and unspecified alcohol dependence, unspecified drinking behavior     Sober as 1/21/1987    Other malaise and fatigue      Past Surgical History     Past Surgical History:   Procedure Laterality Date    BIOPSY ANAL CANAL  1/21/13    [facility]     BREAST BIOPSY, RT/LT Left 1975    Breat Biopsy RT/LT    COLONOSCOPY  8/25/2009    COLONOSCOPY  2/14/2011    COLONOSCOPY performed by CRISTIN LAGUNAS at  GI    COLONOSCOPY N/A 1/8/2019    Procedure: Colonscopy, Biopsies by Biopsy;  Surgeon: Omega Talavera MD;  Location:  GI    COLONOSCOPY N/A 6/17/2024    Procedure: Colonoscopy;  Surgeon: Omega Talavera MD;  Location:  GI    CYSTOSCOPY  2/28/2011    CYSTOSCOPY performed by CAYLA FLOR at  OR    ENDOSCOPY  05/21/12    [facility]    ENT SURGERY  1965    GI SURGERY  06/25/12     UGI ENDOSCOPY DIAG W BIOPSY  10/01/09    HEMORRHOIDECTOMY  06/25/12    [facility]    LAPAROSCOPIC SALPINGO-OOPHORECTOMY  2/28/2011    LAPAROSCOPIC SALPINGO-OOPHORECTOMY performed by CAYLA FLOR at  OR    TONSILLECTOMY & ADENOIDECTOMY  1965    [facility] NONSPECIFIC PROCEDURE  1965    Removed bone left index finger knuckle, casts broken bones    [facility] COLONOSCOPY W/WO BRUSH/WASH  08/22/05    [facility] UGI ENDOSCOPY, SIMPLE EXAM  08/08/07     Allergy     Allergies   Allergen Reactions    Telaprevir Other (See Comments) and Rash     Rectal bleeding, anemia    Abilify Discmelt Other (See Comments)     Disoriented    Antivert [Meclizine Hcl]     Chamomile     Compazine     Diphenhydramine Nausea     And abdominal pain    Duloxetine Hcl Other (See Comments)     Disoriented, trouble sleeping    Effexor [Venlafaxine] Other (See Comments)     Disoriented, trouble sleeping    Elavil [Amitriptyline Hcl] Other (See Comments)     \"didn't feel right on it-med was stopped right " "away\"    Ferrous Sulfate Nausea and Vomiting     Pt reports taking low dose iron and tolerates without problem    Food Difficulty breathing     cilantro    Indomethacin      indocin sensativity \"Severe h.a\"    Seasonal Allergies Other (See Comments) and Difficulty breathing     Philip Gold Aug-Sept, rag weed, sneezing    Sulfa Antibiotics     Thiopental Sodium      PENTOTHAL/rigidity and fight response    Animal Dander Difficulty breathing and Rash     sneezing,resp. distress    Bupropion Anxiety    Tylenol [Acetaminophen] Rash     Current Medication List     Current Outpatient Medications   Medication Sig Dispense Refill    Abatacept (ORENCIA CLICKJECT) 125 MG/ML SOAJ auto-injector Inject 1 mL (125 mg) Subcutaneous every 7 days . Hold for any infection and seek medical attention. 4 mL 5    calcium carb-cholecalciferol 600-500 MG-UNIT CAPS Take 1,200 mg by mouth daily      cloNIDine (CATAPRES) 0.2 MG tablet Take 0.2 mg by mouth At Bedtime      clotrimazole (LOTRIMIN) 1 % external cream APPLY TOPICALLY TWO TIMES A DAY 30 g 3    cycloSPORINE (RESTASIS) 0.05 % ophthalmic emulsion Place 1 drop into both eyes 2 times daily       denosumab (PROLIA) 60 MG/ML SOSY injection       diclofenac (VOLTAREN) 1 % topical gel Apply up to 2 grams of 1% gel to hands up to 4 times daily as needed for joint pain (maximum: 8 g per upper extremity per day) 200 g 2    ferrous sulfate (FE TABS) 325 (65 Fe) MG EC tablet Take 1 tablet (325 mg) by mouth every other day 45 tablet 0    gabapentin (NEURONTIN) 300 MG capsule TAKE ONE CAPSULE BY MOUTH THREE TIMES A DAY - MAY TAKE TWO CAPSULES BY MOUTH EVERY NIGHT AT BEDTIME 180 capsule 0    hydroxychloroquine (PLAQUENIL) 200 MG tablet Hydroxychloroquine 200mg daily; and an additional 200mg every other day.  Yearly eye exam, including 10-2 VF and SD-OCT, required. 135 tablet 1    lisdexamfetamine (VYVANSE) 30 MG capsule Take 30 mg by mouth every morning      lisinopril (ZESTRIL) 10 MG tablet Take 1 " tablet (10 mg) by mouth daily 90 tablet 3    montelukast (SINGULAIR) 10 MG tablet TAKE 1 TABLET BY MOUTH ONCE DAILY AS NEEDED SEASONALLY (AUGUST AND SEPTEMBER) 90 tablet 3    naproxen sodium (ANAPROX) 220 MG tablet Take 220-440 mg by mouth daily as needed for moderate pain      nystatin (MYCOSTATIN) 344746 UNIT/GM external powder Apply topically 3 times daily as needed 60 g 1    polyethylene glycol (MIRALAX) 17 GM/Dose powder Take 1 capful by mouth daily as needed       Psyllium (FIBER) 0.52 G CAPS 1 tabs in am and pm 540 capsule     sennosides (SENOKOT) 8.6 MG tablet Take 2 tablets by mouth 2 times daily      tenofovir alafenamide fumarate (VEMLIDY) 25 MG tablet Take 1 tablet (25 mg) by mouth daily with food (dispense only in the original container). 90 tablet 3    triamcinolone (KENALOG) 0.1 % external cream Apply topically 2 times daily Use to to 14 days at a time 15 g 0    Vitamin D3 (CHOLECALCIFEROL) 25 mcg (1000 units) tablet Take 1 tablet (25 mcg) by mouth daily 90 tablet 1     No current facility-administered medications for this visit.         Social History   See HPI    Family History     Family History   Problem Relation Age of Onset    Hypertension Mother     Breast Cancer Mother     Coronary Artery Disease Mother     Cerebrovascular Disease Mother     Kidney Disease Mother     Obesity Mother     Hypertension Father         Father    Lymphoma Father     Glaucoma Father     Other Cancer Father         Lymphoma    Genetic Disorder Father         Glaucoma    Obesity Father     C.A.D. Sister         MI at age 63    Hypertension Sister     Gastrointestinal Disease Sister         gallbladder    Hyperlipidemia Sister     Cerebrovascular Disease Sister     Circulatory Sister         brain aneurysm at 63    Genitourinary Problems Sister         1 kidney/bladder    Hypertension Sister     Obesity Sister     Coronary Artery Disease Sister         had valve surgery, MI, CHF    Coronary Artery Disease Brother      "Hypertension Brother     Hyperlipidemia Brother     Cerebrovascular Disease Maternal Grandmother     Hypertension Maternal Grandmother     Cerebrovascular Disease Paternal Grandmother     Hypertension Paternal Grandmother     Glaucoma Paternal Grandfather     Genetic Disorder Paternal Grandfather         Glaucoma    Blood Disease Son         Lymes/7/11    Hypertension Son     Obesity Son     Hypertension Son     Breast Cancer Maternal Aunt     Ovarian Cancer Maternal Aunt     Unknown/Adopted Paternal Uncle     Obesity Niece     Obesity Niece     Liver Cancer Cousin     Coronary Artery Disease Other 49        niece    Diabetes Other         1st cousin    Breast Cancer Other     Obesity Other     Obesity Other     Hypertension Other         Aunts    Obesity Other         Uncle    Hypertension Other         Uncle    Coronary Artery Disease Sister     Coronary Artery Disease Brother     Hypertension Brother     Hyperlipidemia Brother     Coronary Artery Disease Nephew         Nephew    Hypertension Niece         [place]    Hypertension Other         Nephew    Cerebrovascular Disease Other         Aunts    Obesity Other         Nephew     Physical Exam     Temp Readings from Last 3 Encounters:   07/26/24 97.9  F (36.6  C) (Temporal)   07/22/24 98.1  F (36.7  C) (Oral)   06/17/24 97.7  F (36.5  C) (Oral)     BP Readings from Last 5 Encounters:   08/12/24 (!) 152/75   07/26/24 138/64   07/22/24 (!) 143/66   06/17/24 116/77   06/13/24 115/72     Pulse Readings from Last 1 Encounters:   08/12/24 57     Resp Readings from Last 1 Encounters:   08/12/24 16     Estimated body mass index is 24.84 kg/m  as calculated from the following:    Height as of 6/13/24: 1.6 m (5' 3\").    Weight as of this encounter: 63.6 kg (140 lb 3.2 oz).    GEN: NAD.   HEENT:  Anicteric, noninjected sclera. No obvious external lesions of the ear and nose. Hearing intact.  PULM: No increased work of breathing.   MSK: MCPs and PIPs without synovial " swelling or tenderness to palpation.  Heberden's nodes present.  Philippe's nodes present.  DIPs nontender to palpation.  Wrists without swelling or tenderness to palpation.  Elbows and shoulders without swelling or tenderness to palpation.  Knees, ankles, and MTPs without swelling or tenderness to palpation.    SKIN: No rash or jaundice seen  PSYCH: Alert. Appropriate.       Labs / Imaging (select studies)     RF/CCP  Recent Labs   Lab Test 06/07/17  0955   CCPIGG 1   RHF <20     CBC  Recent Labs   Lab Test 07/26/24  1121 04/17/24  1021 04/08/24  0915 12/27/23  0710 10/10/23  2015 09/10/21  0933 06/07/21  0811 05/21/21  1145 01/06/21  1303 09/04/20  0756 03/04/20  0752 02/17/20  0810   WBC 5.8 3.7* 3.5* 3.2* 5.3   < > 3.0*   < > 4.4 4.1   < > 3.8*   RBC 4.84 4.88 4.81 4.40 4.57   < > 4.50   < > 5.01 4.88   < > 4.70   HGB 12.8 11.7 11.5* 10.8* 11.1*   < > 13.1   < > 14.8 14.4   < > 14.3   HCT 41.3 38.8 37.0 35.6 36.2   < > 40.0   < > 44.9 43.9   < > 42.8   MCV 85 80 77* 81 79   < > 89   < > 90 90   < > 91   RDW 16.3* 16.8* 15.7* 15.5* 15.7*   < > 12.7   < > 13.3 12.5   < > 13.4   * 167 174 139* 158   < > 132*   < > 157 140*   < > 140*   MCH 26.4* 24.0* 23.9* 24.5* 24.3*   < > 29.1   < > 29.5 29.5   < > 30.4   MCHC 31.0* 30.2* 31.1* 30.3* 30.7*   < > 32.8   < > 33.0 32.8   < > 33.4   NEUTROPHIL  --   --  45 48 69   < > 32.9  --  46.1 38.9   < > 44.5   LYMPH  --   --  34 32 20   < > 43.4  --  37.0 31.9   < > 35.7   MONOCYTE  --   --  12 12 8   < > 13.5  --  11.2 10.5   < > 14.6   EOSINOPHIL  --   --  7 7 3   < > 9.5  --  5.5 17.8   < > 4.7   BASOPHIL  --   --  1 0 0   < > 0.7  --  0.2 0.7   < > 0.5   ANEU  --   --   --   --   --   --  1.0*  --  2.0 1.6   < > 1.7   ALYM  --   --   --   --   --   --  1.3  --  1.6 1.3   < > 1.4   FREDY  --   --   --   --   --   --  0.4  --  0.5 0.4   < > 0.6   AEOS  --   --   --   --   --   --  0.3  --  0.2  --   --  0.2   ABAS  --   --   --   --   --   --  0.0  --  0.0 0.0   < >  0.0   ANEUTAUTO  --   --  1.6 1.6 3.6   < >  --   --   --   --   --   --    ALYMPAUTO  --   --  1.2 1.0 1.1   < >  --   --   --   --   --   --    AMONOAUTO  --   --  0.4 0.4 0.4   < >  --   --   --   --   --   --    AEOSAUTO  --   --  0.3 0.2 0.1   < >  --   --   --   --   --   --    ABSBASO  --   --  0.0 0.0 0.0   < >  --   --   --   --   --   --     < > = values in this interval not displayed.     CMP  Recent Labs   Lab Test 07/22/24  0900 04/17/24  1021 04/08/24  0915 12/27/23  0702 10/10/23  2015 09/06/23  0832 05/30/23  1139 09/08/21  0806 06/07/21  0811 02/16/21  0842 01/06/21  1303   NA  --   --   --   --  133* 139 138   < >  --  134  --    POTASSIUM  --   --   --   --  4.1 4.7 4.4   < >  --  4.7  --    CHLORIDE  --   --   --   --  98 100 102   < >  --  100  --    CO2  --   --   --   --  24 28 26   < >  --  31  --    ANIONGAP  --   --   --   --  11 11 10   < >  --  3  --    GLC  --  97  --   --  97 83 91   < >  --  94  --    BUN  --   --   --   --  10.1 10.4 11.7   < >  --  10  --    CR 0.8  --  0.98* 0.89 0.71 0.80 0.79   < > 0.76 0.70 0.65   GFRESTIMATED >60  --  61 69 90 78 80   < > 80 88 >90   GFRESTBLACK  --   --   --   --   --   --   --   --  >90 >90 >90   ELIZABETH  --   --  10.3*  --  9.5 9.7 9.7   < > 8.7 10.0 9.7   BILITOTAL  --   --   --  0.2 0.4 0.3  --    < > 0.5  --  0.5   ALBUMIN  --   --  4.4 4.1 4.4 4.2  --    < > 3.8  --  4.1   PROTTOTAL  --   --   --  6.6 6.8 6.5  --    < > 6.7*  --  7.6   ALKPHOS  --   --   --  75 69 78  --    < > 79  --  82   AST  --   --   --  36 27 27  --    < > 22  --  22   ALT  --   --   --  17 16 14  --    < > 20  --  22    < > = values in this interval not displayed.     Calcium/VitaminD  Recent Labs   Lab Test 04/08/24  0915 12/27/23  0702 10/10/23  2015 09/06/23  0832 04/12/23  0817 01/12/23  0818   ELIZABETH 10.3*  --  9.5 9.7   < > 9.2   VITDT 92* 120*  --   --   --  59    < > = values in this interval not displayed.     ESR/CRP  Recent Labs   Lab Test 12/27/23  0703  12/27/23  0702 09/06/23  0832 01/12/23  0818 02/14/22  1121 02/14/22  1121 06/07/21  0811   SED 8  --  7 6   < > 6 5   CRP  --   --   --  <2.9  --  <2.9 <2.9   CRPI  --  <3.00 <3.00  --   --   --   --     < > = values in this interval not displayed.     Lipid Panel  Recent Labs   Lab Test 07/26/24  1121 08/09/19  1228 11/25/16  1217   CHOL 182 209* 276*   TRIG 96 74 88   HDL 54 82 65   * 112* 193*   NHDL 128 127 211*     Hepatitis B  Recent Labs   Lab Test 09/06/23  0832 05/16/22  1044 06/30/17  0925 06/07/17  0955   AUSAB  --   --   --  0.65   HBCAB  --   --   --  Reactive   A reactive result indicates acute, chronic or past/resolved hepatitis B   infection.  *   HBCM  --   --   --  Nonreactive   A nonreactive result suggests lack of recent exposure to the virus in the   preceding 6 months.     HEPBANG  --   --   --  Nonreactive   HBQLOG  --   --  Not Calculated  --    HBQRES Not Detected Not Detected HBV DNA Not Detected   The LUIS ARMANDO AmpliPrep/LUIS ARMANDO TaqMan HBV Test is an FDA-approved in vitro nucleic   acid amplification test for the quantitation of HBV DNA in human plasma (EDTA   plasma) or serum using the LUIS ARMANDO AmpliPrep Instrument for automated viral   nucleic acid extraction and the LUIS ARMANDO TaqMan Analyzer or [id number] TaqMan for the   automated Real Time PCR amplification and detection of viral nucleic acid   target.   Titer results are reported in International Units/mL (IU/mL) using the 1st WHO   International standard for HBV for Nucleic Acid Amplification based assays.    --      Hepatitis C  Recent Labs   Lab Test 01/20/20  0741 06/07/17  0955   HCVAB  --  Reactive   A reactive result indicates one of the following 1) current HCV infection 2)   past HCV infection that has resolved or 3) false positivity. The CDC recommends   that a reactive result should be followed by Nucleic acid testing for HCV RNA.  If HCV RNA is detected, that indicates current HCV infection. If HCV RNA is not   detected, that  indicates either past, resolved HCV infection, or false HCV   antibody positivity.   Assay performance characteristics have not been established for newborns,   infants, and children  *   HCVRNA HCV RNA Not Detected HCV RNA Not Detected   The LUIS ARMANDO AmpliPrep/LUIS ARMANDO TaqMan HCV Test is an FDA-approved in vitro nucleic   acid amplification test for the quantitation of HCV RNA in human plasma (ETDA   plasma) or serum using the LUIS ARMANDO AmpliPrep Instrument for automated viral   nucleic acid extraction and the LUIS ARMANDO TaqMan Analyzer or LUIS ARMANDO TaqMan for   automated Real Time PCR amplification and detection of the viral nucleic acid   target.   Titer results are reported in International Units/mL (IU/mL) using the 1st WHO   International standard for HCV for Nucleic Acid Amplification based assays.       Lyme ab screening  Recent Labs   Lab Test 10/10/23  2015 08/09/19  1228 05/11/17  0830   LYMEGM 0.03 0.05 <0.01  Negative, Absence of detectable Borrelia burdorferi antibodies. A negative   result does not exclude the possibility of Borrelia burgdorferi infection. If   early Lyme disease is suspected, a second sample should be collected and tested   2 to 4 weeks later.       Tuberculosis Screening  Recent Labs   Lab Test 01/12/23  0818 10/14/21  1546 05/05/20  1322   TBRES Negative Negative Negative     Immunization History     Immunization History   Administered Date(s) Administered    COVID-19 12+ (2023-24) (Pfizer) 10/17/2023, 04/15/2024, 07/19/2024    COVID-19 Bivalent 12+ (Pfizer) 12/21/2022    COVID-19 Bivalent 18+ (Moderna) 06/21/2023    COVID-19 Monovalent 18+ (Moderna) 03/10/2021, 04/07/2021, 09/03/2021    COVID-19 Monovalent Booster 18+ (Moderna) 03/02/2022, 07/07/2022    HEPA 04/18/2000, 09/26/2000    HPV 08/13/2012, 09/25/2012, 01/24/2013    HepB 11/15/2011, 12/19/2011    Hepatitis A (ADULT 19+) 04/17/2024    Hepatitis B, Adult 10/28/2022    Influenza (H1N1) 01/07/2010    Influenza (High Dose) 3 valent vaccine  08/30/2018, 09/26/2019    Influenza (IIV3) PF 01/03/2005, 10/16/2006, 11/14/2007, 10/28/2008, 09/29/2009, 09/27/2010, 10/04/2011, 09/25/2012, 09/21/2015    Influenza Vaccine 65+ (Fluzone HD) 09/14/2020, 09/24/2021, 09/29/2022, 10/17/2023    Influenza Vaccine >6 months,quad, PF 09/26/2013, 10/06/2014, 10/06/2016, 09/08/2017    Pneumo Conj 13-V (2010&after) 10/06/2016    Pneumococcal 23 valent 11/15/2011, 10/17/2017    RSV Vaccine (Abrysvo) 10/17/2023    TD,PF 7+ (Tenivac) 04/18/2000, 07/07/2004    TDAP (Adacel,Boostrix) 05/23/2006    TDAP Vaccine (Boostrix) 09/21/2015    Zoster recombinant adjuvanted (SHINGRIX) 06/14/2018, 08/24/2018    Zoster vaccine, live 09/21/2015          Chart documentation done in part with Dragon Voice recognition Software. Although reviewed after completion, some word and grammatical error may remain.    Apolinar Cho MD

## 2024-08-12 NOTE — PATIENT INSTRUCTIONS
For the carpal tunnel surgeries:  Orencia should not be taken at least 2 weeks prior to the  surgery; and restarted no sooner than 2 weeks after the surgery is completed and only in the absence of infection, absence of delayed wound healing, and only after given approval to restart by the surgeon.      --------------------------------------------------------------    RHEUMATOLOGY    [address] [address]    Phone number: [phone]  Fax number: [phone]    If you need a medication refill, please contact us as you may need lab work and/or a follow up visit prior to your refill.      Thank you for choosing [facility]!    Anabella Garcia CMA Rheumatology

## 2024-08-14 PROBLEM — M65.332 TRIGGER MIDDLE FINGER OF LEFT HAND: Status: ACTIVE | Noted: 2022-12-27

## 2024-08-14 PROBLEM — G56.02 CARPAL TUNNEL SYNDROME OF LEFT WRIST: Status: ACTIVE | Noted: 2022-12-27

## 2024-08-15 ENCOUNTER — DOCUMENTATION ONLY (OUTPATIENT)
Dept: OTHER | Facility: CLINIC | Age: 74
End: 2024-08-15
Payer: COMMERCIAL

## 2024-08-22 ENCOUNTER — ANESTHESIA EVENT (OUTPATIENT)
Dept: SURGERY | Facility: CLINIC | Age: 74
End: 2024-08-22
Payer: COMMERCIAL

## 2024-08-22 ASSESSMENT — LIFESTYLE VARIABLES: TOBACCO_USE: 1

## 2024-08-23 ENCOUNTER — HOSPITAL ENCOUNTER (OUTPATIENT)
Facility: CLINIC | Age: 74
Discharge: HOME | End: 2024-08-23
Attending: ORTHOPAEDIC SURGERY | Admitting: ORTHOPAEDIC SURGERY
Payer: COMMERCIAL

## 2024-08-23 ENCOUNTER — ANESTHESIA (OUTPATIENT)
Dept: SURGERY | Facility: CLINIC | Age: 74
End: 2024-08-23
Payer: COMMERCIAL

## 2024-08-23 VITALS
OXYGEN SATURATION: 97 % | TEMPERATURE: 97.9 F | RESPIRATION RATE: 18 BRPM | DIASTOLIC BLOOD PRESSURE: 72 MMHG | HEART RATE: 54 BPM | SYSTOLIC BLOOD PRESSURE: 121 MMHG

## 2024-08-23 DIAGNOSIS — M65.332 TRIGGER MIDDLE FINGER OF LEFT HAND: ICD-10-CM

## 2024-08-23 DIAGNOSIS — G56.02 CARPAL TUNNEL SYNDROME OF LEFT WRIST: Primary | ICD-10-CM

## 2024-08-23 PROCEDURE — 250N000011 HC RX IP 250 OP 636: Performed by: NURSE ANESTHETIST, CERTIFIED REGISTERED

## 2024-08-23 PROCEDURE — 710N000012 HC RECOVERY PHASE 2, PER MINUTE: Performed by: ORTHOPAEDIC SURGERY

## 2024-08-23 PROCEDURE — 272N000001 HC OR GENERAL SUPPLY STERILE: Performed by: ORTHOPAEDIC SURGERY

## 2024-08-23 PROCEDURE — 250N000009 HC RX 250: Performed by: NURSE ANESTHETIST, CERTIFIED REGISTERED

## 2024-08-23 PROCEDURE — 258N000003 HC RX IP 258 OP 636: Performed by: NURSE ANESTHETIST, CERTIFIED REGISTERED

## 2024-08-23 PROCEDURE — 370N000017 HC ANESTHESIA TECHNICAL FEE, PER MIN: Performed by: ORTHOPAEDIC SURGERY

## 2024-08-23 PROCEDURE — 26055 INCISE FINGER TENDON SHEATH: CPT | Mod: F2 | Performed by: ORTHOPAEDIC SURGERY

## 2024-08-23 PROCEDURE — 64721 CARPAL TUNNEL SURGERY: CPT | Mod: 51 | Performed by: ORTHOPAEDIC SURGERY

## 2024-08-23 PROCEDURE — 999N000141 HC STATISTIC PRE-PROCEDURE NURSING ASSESSMENT: Performed by: ORTHOPAEDIC SURGERY

## 2024-08-23 PROCEDURE — 360N000075 HC SURGERY LEVEL 2, PER MIN: Performed by: ORTHOPAEDIC SURGERY

## 2024-08-23 PROCEDURE — 250N000011 HC RX IP 250 OP 636: Performed by: ORTHOPAEDIC SURGERY

## 2024-08-23 RX ORDER — SODIUM CHLORIDE, SODIUM LACTATE, POTASSIUM CHLORIDE, CALCIUM CHLORIDE 600; 310; 30; 20 MG/100ML; MG/100ML; MG/100ML; MG/100ML
INJECTION, SOLUTION INTRAVENOUS CONTINUOUS
Status: DISCONTINUED | OUTPATIENT
Start: 2024-08-23 | End: 2024-08-23 | Stop reason: HOSPADM

## 2024-08-23 RX ORDER — FENTANYL CITRATE 50 UG/ML
50 INJECTION, SOLUTION INTRAMUSCULAR; INTRAVENOUS
Status: CANCELLED | OUTPATIENT
Start: 2024-08-23

## 2024-08-23 RX ORDER — ONDANSETRON 2 MG/ML
4 INJECTION INTRAMUSCULAR; INTRAVENOUS EVERY 30 MIN PRN
Status: CANCELLED | OUTPATIENT
Start: 2024-08-23

## 2024-08-23 RX ORDER — NALOXONE HYDROCHLORIDE 0.4 MG/ML
0.1 INJECTION, SOLUTION INTRAMUSCULAR; INTRAVENOUS; SUBCUTANEOUS
Status: CANCELLED | OUTPATIENT
Start: 2024-08-23

## 2024-08-23 RX ORDER — ONDANSETRON 4 MG/1
4 TABLET, ORALLY DISINTEGRATING ORAL EVERY 30 MIN PRN
Status: CANCELLED | OUTPATIENT
Start: 2024-08-23

## 2024-08-23 RX ORDER — PROPOFOL 10 MG/ML
INJECTION, EMULSION INTRAVENOUS CONTINUOUS PRN
Status: DISCONTINUED | OUTPATIENT
Start: 2024-08-23 | End: 2024-08-23

## 2024-08-23 RX ORDER — HYDROCODONE BITARTRATE AND ACETAMINOPHEN 5; 325 MG/1; MG/1
.5-1 TABLET ORAL EVERY 4 HOURS PRN
Qty: 12 TABLET | Refills: 0 | Status: SHIPPED | OUTPATIENT
Start: 2024-08-23 | End: 2024-09-04

## 2024-08-23 RX ORDER — CEFAZOLIN SODIUM/WATER 2 G/20 ML
2 SYRINGE (ML) INTRAVENOUS
Status: COMPLETED | OUTPATIENT
Start: 2024-08-23 | End: 2024-08-23

## 2024-08-23 RX ORDER — ONDANSETRON 2 MG/ML
INJECTION INTRAMUSCULAR; INTRAVENOUS PRN
Status: DISCONTINUED | OUTPATIENT
Start: 2024-08-23 | End: 2024-08-23

## 2024-08-23 RX ORDER — LIDOCAINE HYDROCHLORIDE 20 MG/ML
INJECTION, SOLUTION INFILTRATION; PERINEURAL PRN
Status: DISCONTINUED | OUTPATIENT
Start: 2024-08-23 | End: 2024-08-23

## 2024-08-23 RX ORDER — OXYCODONE HYDROCHLORIDE 5 MG/1
10 TABLET ORAL
Status: CANCELLED | OUTPATIENT
Start: 2024-08-23

## 2024-08-23 RX ORDER — FENTANYL CITRATE 50 UG/ML
INJECTION, SOLUTION INTRAMUSCULAR; INTRAVENOUS PRN
Status: DISCONTINUED | OUTPATIENT
Start: 2024-08-23 | End: 2024-08-23

## 2024-08-23 RX ORDER — BUPIVACAINE HYDROCHLORIDE 2.5 MG/ML
INJECTION, SOLUTION INFILTRATION; PERINEURAL PRN
Status: DISCONTINUED | OUTPATIENT
Start: 2024-08-23 | End: 2024-08-23 | Stop reason: HOSPADM

## 2024-08-23 RX ORDER — OXYCODONE HYDROCHLORIDE 5 MG/1
5 TABLET ORAL
Status: CANCELLED | OUTPATIENT
Start: 2024-08-23

## 2024-08-23 RX ORDER — CEFAZOLIN SODIUM/WATER 2 G/20 ML
2 SYRINGE (ML) INTRAVENOUS SEE ADMIN INSTRUCTIONS
Status: DISCONTINUED | OUTPATIENT
Start: 2024-08-23 | End: 2024-08-23 | Stop reason: HOSPADM

## 2024-08-23 RX ORDER — LIDOCAINE HYDROCHLORIDE 5 MG/ML
INJECTION, SOLUTION INFILTRATION; INTRAVENOUS PRN
Status: DISCONTINUED | OUTPATIENT
Start: 2024-08-23 | End: 2024-08-23

## 2024-08-23 RX ADMIN — FENTANYL CITRATE 50 MCG: 50 INJECTION INTRAMUSCULAR; INTRAVENOUS at 07:26

## 2024-08-23 RX ADMIN — ONDANSETRON 4 MG: 2 INJECTION INTRAMUSCULAR; INTRAVENOUS at 07:55

## 2024-08-23 RX ADMIN — LIDOCAINE HYDROCHLORIDE 50 ML: 5 INJECTION, SOLUTION INFILTRATION at 07:39

## 2024-08-23 RX ADMIN — Medication 2 G: at 07:21

## 2024-08-23 RX ADMIN — MIDAZOLAM 1 MG: 1 INJECTION INTRAMUSCULAR; INTRAVENOUS at 07:31

## 2024-08-23 RX ADMIN — LIDOCAINE HYDROCHLORIDE 20 MG: 20 INJECTION, SOLUTION INFILTRATION; PERINEURAL at 07:43

## 2024-08-23 RX ADMIN — MIDAZOLAM 1 MG: 1 INJECTION INTRAMUSCULAR; INTRAVENOUS at 07:26

## 2024-08-23 RX ADMIN — LIDOCAINE HYDROCHLORIDE 1 ML: 10 INJECTION, SOLUTION EPIDURAL; INFILTRATION; INTRACAUDAL; PERINEURAL at 06:48

## 2024-08-23 RX ADMIN — PROPOFOL 75 MCG/KG/MIN: 10 INJECTION, EMULSION INTRAVENOUS at 07:43

## 2024-08-23 RX ADMIN — SODIUM CHLORIDE, POTASSIUM CHLORIDE, SODIUM LACTATE AND CALCIUM CHLORIDE: 600; 310; 30; 20 INJECTION, SOLUTION INTRAVENOUS at 06:48

## 2024-08-23 ASSESSMENT — ACTIVITIES OF DAILY LIVING (ADL)
ADLS_ACUITY_SCORE: 35
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
## 2024-08-23 NOTE — ANESTHESIA CARE TRANSFER NOTE
Patient: Niesha Adhikari    Procedure: Procedure(s):  RELEASE, CARPAL TUNNEL,  long trigger finger release       Diagnosis: Trigger middle finger of left hand [M65.332]  Carpal tunnel syndrome of left wrist [G56.02]  Diagnosis Additional Information: No value filed.    Anesthesia Type:   MAC     Note:    Oropharynx: oropharynx clear of all foreign objects and spontaneously breathing  Level of Consciousness: drowsy  Oxygen Supplementation: room air    Independent Airway: airway patency satisfactory and stable  Dentition: dentition unchanged  Vital Signs Stable: post-procedure vital signs reviewed and stable  Report to RN Given: handoff report given  Patient transferred to: Phase II    Handoff Report: Identifed the Patient, Identified the Reponsible Provider, Reviewed the pertinent medical history, Discussed the surgical course, Reviewed Intra-OP anesthesia mangement and issues during anesthesia, Set expectations for post-procedure period and Allowed opportunity for questions and acknowledgement of understanding  Vitals:  Vitals Value Taken Time   /72 08/23/24 0827   Temp 96.08  F (35.6  C) 08/23/24 0829   Pulse 53 08/23/24 0827   Resp     SpO2 95 % 08/23/24 0829   Vitals shown include unfiled device data.    Electronically Signed By: YOSEPH Montoya CRNA  August 23, 2024  8:29 AM

## 2024-08-23 NOTE — DISCHARGE INSTRUCTIONS
Carpal Tunnel Release and trigger finger release discharge Instructions                                     [phone]   Bone and Joint Service Line for issues or concerns    Home Prescriptions:  Tylenol and Ibuprofen as needed.      General Care:  After surgery you may feel tired/sleepy. This is normal. Please have someone stay with you for 24 hours after surgery. You should avoid driving until released by your physician to do so. If you have any question along the way please contact the office. If you feel it is an issue cannot wait for normal office hours, contact the on-call physician.    Bandages:   Keep this bandage/splint clean and dry. You can always loosen the ace wrap if it feels too tight or you continue to have numbness or tingling.  Keep your bandage and splint on until follow-up appointment.      Bathing:  Do not submerge, scrub or soak your incisions in water such as a bath or pool. Keep your splint/bandage clean and dry. Keep your incisions/splint covered with a sealed bandage when bathing. Saran wrap and a bag works well.     Follow up:  Your follow up appointment should already be scheduled. If its not, please call the office to verify an appointment about 10-14 days after surgery.     Diet:  Start with non-alcoholic liquids at first, particularly water or sports drinks after surgery. Progress to bland foods such as crackers and bread and finally to your normal diet if you have no problems.     Pain control:  Take your pain medications as prescribed (if prescribed). These medications may make you sleepy. Do not drive, operate equipment, or drink alcohol when taking these.  You may take Tylenol (Generic name is acetaminophen) as directed on the bottle in place of the prescribed pain medications as your pain gets better. You may take NSAIDs (Motrin, Ibuprofen, Aleve, Naproxen) as directed on the bottle for minor discomfort. If the medications cause a reaction such as nausea or skin rash, stop  taking them and contact your doctor. Please plan accordingly, pain medications will not be re-filled on the weekends or at night. Call the office during the day if you need more medications.    Physical Therapy/Occupational Therapy:  At your first post-operative visit, your doctor will direct you on your personal therapy program if needed.     Activity:  Keep your hand elevated for the first couple days after surgery. Avoid lifting, pushing, and or pulling with the operated hand.       Normal findings after surgery:  Numbness and tenderness around the incisions is normal.  You may have bruising around the incisions.  Low grade fevers less than 100.5 degrees Fahrenheit are normal.     When to call the Office:  Temperature greater than 101.5 degrees Fahreheit.  Fever, chills, and increasing pain in the hand and wrist.  Excessive drainage from the incisions that include bright red blood.  Drainage from the incisions sites that appear yellow, pus-like, or foul smelling.  Persistent nausea or vomiting.  Any other effects you feel are significant.

## 2024-08-23 NOTE — BRIEF OP NOTE
[facility]    Brief Operative Note    Pre-operative diagnosis: Trigger middle finger of left hand [M65.332]  Carpal tunnel syndrome of left wrist [G56.02]  Post-operative diagnosis Same as pre-operative diagnosis    Procedure: RELEASE, CARPAL TUNNEL,, Left - Wrist  long trigger finger release, Left - Finger    Surgeon: Surgeons and Role:     * David John MD - Primary     * Abner Montes De Oca PA-C - Assisting  Anesthesia: Thunderbolt Block   Estimated Blood Loss: 0 mL from 8/23/2024  7:28 AM to 8/23/2024  8:25 AM      Drains: None  Specimens: * No specimens in log *  Findings:   Compressed median nerve and long trigger finger.  Complications: None.  Implants: * No implants in log *

## 2024-08-23 NOTE — ANESTHESIA POSTPROCEDURE EVALUATION
Patient: Niesha Adhikari    Procedure: Procedure(s):  RELEASE, CARPAL TUNNEL,  long trigger finger release       Anesthesia Type:  MAC    Note:  Disposition: Outpatient   Postop Pain Control: Uneventful            Sign Out: Well controlled pain   PONV: No   Neuro/Psych: Uneventful            Sign Out: Acceptable/Baseline neuro status   Airway/Respiratory: Uneventful            Sign Out: Acceptable/Baseline resp. status   CV/Hemodynamics: Uneventful            Sign Out: Acceptable CV status   Other NRE: NONE   DID A NON-ROUTINE EVENT OCCUR? No    Event details/Postop Comments:  Pt was happy with anesthesia care.  No complications.  I will follow up with the pt if needed.       Last vitals:  Vitals Value Taken Time   /72 08/23/24 0845   Temp 95.9  F (35.5  C) 08/23/24 0835   Pulse 54 08/23/24 0845   Resp 18 08/23/24 0845   SpO2 98 % 08/23/24 0859   Vitals shown include unfiled device data.    Electronically Signed By: YOSEPH Montoya CRNA  August 23, 2024  11:38 AM

## 2024-08-23 NOTE — OP NOTE
OPERATIVE REPORT    DATE OF SERVICE:  8/23/2024    PREOPERATIVE DIAGNOSIS  Left carpal tunnel syndrome   Left middle finger trigger finger    POSTOPERATIVE DIAGNOSIS  Left carpal tunnel syndrome  Left middle finger trigger finger    NAME OF OPERATION  1. Left carpal tunnel release  2. Left middle finger trigger finger release    SURGEON  David John MD    ASSISTANT: Abner Montes De Oca PA-C     SPECIMENS REMOVED: None    ESTIMATED BLOOD LOSS: 0 cc    FINDINGS: Compressed median nerve in carpal tunnel  Left middle finger trigger finger    HISTORY  Niesha Adhikari is a 74 year old female with severe left carpal tunnel syndrome and left middle finger trigger finger.  She has positive EMG, positive Tinel.  She has failed conservative treatment, and presents now for left carpal tunnel release and left middle finger trigger finger release  .  Risks, benefits, potential complications and alternatives were discussed.    SURGERY  After a smooth IV regional block, the patient's left arm was prepped and draped in sterile fashion.  A pause was performed for patient verification.   A longitudinal incision was made in the thenar palmar crease and carried down through subcutaneous tissue on the ulnar side of the palmaris longus fascia.  The transverse carpal ligament was identified and divided sharply.  Beneath this the median nerve was noted to be significantly narrowed.  Synovium was teased away from the nerve.  The nerve was now seen to be fully decompressed.  Transverse incision was made at the base of the middle finger, carried down bluntly to expose the A1 pulley.  Pulley was divided sharply.  We explored to make sure the tendon was completely freed at this point. The wound was irrigated with saline, and subcutaneous tissue was injected with 0.25% Marcaine.  Skin edges were closed with interrupted 5-0 nylon suture.  Sterile dressings were applied.  A volar splint was applied.  The patient was taken to the recovery room in  stable condition.    FLORY SOMMERS MD      MR#: [id number]   : 1950   ADMIT DATE: 2024

## 2024-08-28 DIAGNOSIS — M79.7 FIBROMYALGIA: ICD-10-CM

## 2024-08-28 DIAGNOSIS — M54.50 ACUTE LEFT-SIDED LOW BACK PAIN WITHOUT SCIATICA: ICD-10-CM

## 2024-08-28 NOTE — PROGRESS NOTES
DISCHARGE  Reason for Discharge: Patient has failed to schedule further appointments.  PT has not been notified of any problems or questions since last visit.    Equipment Issued: none    Discharge Plan: Patient to continue home program.    Referring Provider:  Jamel Torres     07/18/24 0500   Appointment Info   Signing clinician's name / credentials Chiki Rodríguez PT   Total/Authorized Visits 4   Medical Diagnosis Chronic bilateral low back pain without sciatica   PT Tx Diagnosis mechanical low back pain   Quick Adds Certification   Progress Note/Certification   Start of Care Date 06/03/24   Onset of illness/injury or Date of Surgery 04/09/24   Therapy Frequency 1x per week   Predicted Duration 90 days, decrease as able   Certification date from 06/03/24   Certification date to 08/31/24   GOALS   PT Goals 2   PT Goal 1   Goal Identifier Home program   Goal Description pt will have good understanding of home program and strategies to address her chronic LBP and stenosis issues so she can have improved standing toleration with adls   Goal Progress still only about 1' standing tolerance   Target Date 08/03/24   PT Goal 2   Goal Identifier Function   Goal Description pt will note a decrease in LBP and carry over to improved functional level as measured by KEVIN score decrease from 51% to 40% or less   Goal Progress not done today but likely about the same   Target Date 08/03/24   Subjective Report   Subjective Report Has been working abdominals. Has been kayaking and makes low back a little sore but hands also go numb. Has been lifting things in yard and mowing ditch. Has been more aware of keeping wider AMANDA with ambulation. Will be having B carpal tunnel surgeries and B cataracts before October so has a lot going on. Pt will try things on own for now but keep chart open, sees provider in August.   Objective Measures   Objective Measures Objective Measure 1;Objective Measure 2;Objective Measure 3;Objective Measure 4    Objective Measure 1   Objective Measure Lilo/KEVIN (at eval on 6/3/24 1,2,low/51%)   Details not done today   Objective Measure 2   Objective Measure Standing tolerance (1 minute at [facility] on 6/3/24)   Details about a minute   Objective Measure 3   Objective Measure Average pain level ( low back 3-4/10 at eval on 6/3/24)   Details 3-4/10   Objective Measure 4   Objective Measure Frequency of pain (at eval constant LBP and B buttocks, intermittent L groin sx)   Details Constant low back and buttocks   Treatment Interventions (PT)   Interventions Therapeutic Activity;Therapeutic Procedure/Exercise;Neuromuscular Re-education   Therapeutic Procedure/Exercise   Therapeutic Procedures: strength, endurance, ROM, flexibility minutes (64883) 15   Ther Proc 1 - Details Educated in pool walking as an option if walking/jogging on land, encouraged pt to use community pool for a form of exercise for walking forward, backward and side stepping. Encouraged PREP, pt only doing 0 to 2x at most. Reviewed abdominal bracing in sitting.   Skilled Intervention instruction   Patient Response/Progress reports understanding, encouragement needed   Therapeutic Activity   Therapeutic Activities: dynamic activities to improve functional performance minutes (63403) 15   Ther Act 1 - Details Pt had questions about proper body mechanics with kayaking so got long wooden pole and practiced keeping neutral spine and core stab and short lever arms. Reviewed proper body mech with adls and reasons why she has the sx, imaging findings.   Skilled Intervention instruction   Patient Response/Progress reports understanding   Neuromuscular Re-education   Neuromuscular re-ed of mvmt, balance, coord, kinesthetic sense, posture, proprioception minutes (52846) 15   Neuro Re-ed 1 - Details Re ed with mirror feedback, VCs, manual and demonstration cues for keeping upper traps more relaxed and better scap stab and neutral spine as pt tends to overfire upper traps.    Skilled Intervention instruction   Patient Response/Progress reports understanding   Education   Learner/Method Patient;Listening;Demonstration;Pictures/Video;No Barriers to Learning   Education Comments home program   Plan   Home program proper ambulation, PREP x 5-15' at least 3x per day, proper body mechanics, back care strategies, pelvic clock/abdominal bracing, pool walking for ex   Plan for next session keep open until 9/1/24 or so   Comments   Comments pt has strategies to use and home program   Total Session Time   Timed Code Treatment Minutes 45   Total Treatment Time (sum of timed and untimed services) 45

## 2024-08-29 RX ORDER — GABAPENTIN 300 MG/1
CAPSULE ORAL
Qty: 180 CAPSULE | Refills: 0 | Status: SHIPPED | OUTPATIENT
Start: 2024-08-29 | End: 2024-09-24

## 2024-09-03 ENCOUNTER — OFFICE VISIT (OUTPATIENT)
Dept: ORTHOPEDICS | Facility: CLINIC | Age: 74
End: 2024-09-03
Payer: COMMERCIAL

## 2024-09-03 ENCOUNTER — TELEPHONE (OUTPATIENT)
Dept: ORTHOPEDICS | Facility: CLINIC | Age: 74
End: 2024-09-03

## 2024-09-03 VITALS
RESPIRATION RATE: 18 BRPM | DIASTOLIC BLOOD PRESSURE: 83 MMHG | BODY MASS INDEX: 24.8 KG/M2 | HEIGHT: 63 IN | HEART RATE: 79 BPM | SYSTOLIC BLOOD PRESSURE: 153 MMHG | WEIGHT: 140 LBS

## 2024-09-03 DIAGNOSIS — M65.331 TRIGGER MIDDLE FINGER OF RIGHT HAND: ICD-10-CM

## 2024-09-03 DIAGNOSIS — G56.02 CARPAL TUNNEL SYNDROME OF LEFT WRIST: Primary | ICD-10-CM

## 2024-09-03 DIAGNOSIS — G56.01 CARPAL TUNNEL SYNDROME OF RIGHT WRIST: ICD-10-CM

## 2024-09-03 DIAGNOSIS — Z98.890 S/P CARPAL TUNNEL RELEASE: ICD-10-CM

## 2024-09-03 DIAGNOSIS — M65.332 TRIGGER MIDDLE FINGER OF LEFT HAND: ICD-10-CM

## 2024-09-03 PROCEDURE — 99024 POSTOP FOLLOW-UP VISIT: CPT | Performed by: ORTHOPAEDIC SURGERY

## 2024-09-03 NOTE — LETTER
9/3/2024      Niesha Adhikari  [address] [facility] [address]      Dear Colleague,    Thank you for referring your patient, Niesha Adhikari, to the [facility]. Please see a copy of my visit note below.    Follow up left carpal tunnel release on 08/23/24.  Splint is removed.  Wound is healing well.   Sutures are removed.  She has minimal numbness of fingers.    Start scar massage with vitamin-E cream.  Use night splint for 4 weeks.  Splint provided today.  Resume activity as tolerated.  Return to clinic 4 weeks      Again, thank you for allowing me to participate in the care of your patient.        Sincerely,        David John MD

## 2024-09-03 NOTE — PATIENT INSTRUCTIONS
Niesha to follow up with Primary Care provider regarding elevated blood pressure.    Start scar massage with vitamin-E cream.  Use night splint for 4 weeks.  Splint provided today.  Resume activity as tolerated.  Return to clinic 4 weeks

## 2024-09-03 NOTE — PROGRESS NOTES
Follow up left carpal tunnel release on 08/23/24.  Splint is removed.  Wound is healing well.   Sutures are removed.  She has minimal numbness of fingers.    Start scar massage with vitamin-E cream.  Use night splint for 4 weeks.  Splint provided today.  Resume activity as tolerated.  Return to clinic 4 weeks    She now wants to set up right carpal tunnel release, long trigger finger release.  Risks, benefits, potential complications and alternatives were discussed.

## 2024-09-04 ENCOUNTER — OFFICE VISIT (OUTPATIENT)
Dept: FAMILY MEDICINE | Facility: OTHER | Age: 74
End: 2024-09-04
Payer: COMMERCIAL

## 2024-09-04 VITALS
HEIGHT: 63 IN | DIASTOLIC BLOOD PRESSURE: 76 MMHG | OXYGEN SATURATION: 98 % | TEMPERATURE: 98.2 F | WEIGHT: 138.5 LBS | RESPIRATION RATE: 20 BRPM | SYSTOLIC BLOOD PRESSURE: 128 MMHG | HEART RATE: 62 BPM | BODY MASS INDEX: 24.54 KG/M2

## 2024-09-04 DIAGNOSIS — Z01.818 PREOP EXAMINATION: Primary | ICD-10-CM

## 2024-09-04 DIAGNOSIS — R76.8 HEPATITIS B CORE ANTIBODY POSITIVE: ICD-10-CM

## 2024-09-04 DIAGNOSIS — H26.9 CATARACT OF BOTH EYES, UNSPECIFIED CATARACT TYPE: ICD-10-CM

## 2024-09-04 DIAGNOSIS — M80.00XD OSTEOPOROSIS WITH CURRENT PATHOLOGICAL FRACTURE WITH ROUTINE HEALING, UNSPECIFIED OSTEOPOROSIS TYPE, SUBSEQUENT ENCOUNTER: ICD-10-CM

## 2024-09-04 DIAGNOSIS — I10 BENIGN ESSENTIAL HYPERTENSION: ICD-10-CM

## 2024-09-04 DIAGNOSIS — G47.419 PRIMARY NARCOLEPSY WITHOUT CATAPLEXY: ICD-10-CM

## 2024-09-04 DIAGNOSIS — Z79.899 HIGH RISK MEDICATION USE: ICD-10-CM

## 2024-09-04 DIAGNOSIS — M06.09 RHEUMATOID ARTHRITIS OF MULTIPLE SITES WITH NEGATIVE RHEUMATOID FACTOR (H): ICD-10-CM

## 2024-09-04 PROBLEM — M65.331 TRIGGER MIDDLE FINGER OF RIGHT HAND: Status: RESOLVED | Noted: 2024-09-03 | Resolved: 2024-09-04

## 2024-09-04 PROBLEM — G56.02 CARPAL TUNNEL SYNDROME OF LEFT WRIST: Status: RESOLVED | Noted: 2022-12-27 | Resolved: 2024-09-04

## 2024-09-04 PROBLEM — G56.01 CARPAL TUNNEL SYNDROME OF RIGHT WRIST: Status: RESOLVED | Noted: 2024-09-03 | Resolved: 2024-09-04

## 2024-09-04 PROBLEM — M65.332 TRIGGER MIDDLE FINGER OF LEFT HAND: Status: RESOLVED | Noted: 2022-12-27 | Resolved: 2024-09-04

## 2024-09-04 LAB
ANION GAP SERPL CALCULATED.3IONS-SCNC: 10 MMOL/L (ref 7–15)
BUN SERPL-MCNC: 10.8 MG/DL (ref 8–23)
CALCIUM SERPL-MCNC: 10.4 MG/DL (ref 8.8–10.4)
CHLORIDE SERPL-SCNC: 102 MMOL/L (ref 98–107)
CREAT SERPL-MCNC: 0.86 MG/DL (ref 0.51–0.95)
EGFRCR SERPLBLD CKD-EPI 2021: 71 ML/MIN/1.73M2
ERYTHROCYTE [DISTWIDTH] IN BLOOD BY AUTOMATED COUNT: 15.4 % (ref 10–15)
GLUCOSE SERPL-MCNC: 89 MG/DL (ref 70–99)
HCO3 SERPL-SCNC: 28 MMOL/L (ref 22–29)
HCT VFR BLD AUTO: 42.3 % (ref 35–47)
HGB BLD-MCNC: 13.3 G/DL (ref 11.7–15.7)
INR PPP: 1.14 (ref 0.85–1.15)
MCH RBC QN AUTO: 27.5 PG (ref 26.5–33)
MCHC RBC AUTO-ENTMCNC: 31.4 G/DL (ref 31.5–36.5)
MCV RBC AUTO: 88 FL (ref 78–100)
PLATELET # BLD AUTO: 127 10E3/UL (ref 150–450)
POTASSIUM SERPL-SCNC: 4.4 MMOL/L (ref 3.4–5.3)
RBC # BLD AUTO: 4.83 10E6/UL (ref 3.8–5.2)
SODIUM SERPL-SCNC: 140 MMOL/L (ref 135–145)
WBC # BLD AUTO: 4 10E3/UL (ref 4–11)

## 2024-09-04 PROCEDURE — 85027 COMPLETE CBC AUTOMATED: CPT | Performed by: FAMILY MEDICINE

## 2024-09-04 PROCEDURE — G2211 COMPLEX E/M VISIT ADD ON: HCPCS | Performed by: FAMILY MEDICINE

## 2024-09-04 PROCEDURE — 36415 COLL VENOUS BLD VENIPUNCTURE: CPT | Performed by: FAMILY MEDICINE

## 2024-09-04 PROCEDURE — 87517 HEPATITIS B DNA QUANT: CPT | Performed by: FAMILY MEDICINE

## 2024-09-04 PROCEDURE — 99214 OFFICE O/P EST MOD 30 MIN: CPT | Performed by: FAMILY MEDICINE

## 2024-09-04 PROCEDURE — 85610 PROTHROMBIN TIME: CPT | Mod: GA | Performed by: FAMILY MEDICINE

## 2024-09-04 PROCEDURE — 80048 BASIC METABOLIC PNL TOTAL CA: CPT | Performed by: FAMILY MEDICINE

## 2024-09-04 ASSESSMENT — PAIN SCALES - GENERAL: PAINLEVEL: NO PAIN (0)
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
## 2024-09-05 ENCOUNTER — HOSPITAL ENCOUNTER (OUTPATIENT)
Facility: CLINIC | Age: 74
Discharge: HOME | End: 2024-09-05
Attending: STUDENT IN AN ORGANIZED HEALTH CARE EDUCATION/TRAINING PROGRAM | Admitting: STUDENT IN AN ORGANIZED HEALTH CARE EDUCATION/TRAINING PROGRAM

## 2024-09-05 ENCOUNTER — HOSPITAL ENCOUNTER (OUTPATIENT)
Facility: CLINIC | Age: 74
Discharge: HOME | End: 2024-09-05
Attending: STUDENT IN AN ORGANIZED HEALTH CARE EDUCATION/TRAINING PROGRAM | Admitting: STUDENT IN AN ORGANIZED HEALTH CARE EDUCATION/TRAINING PROGRAM
Payer: COMMERCIAL

## 2024-09-05 ENCOUNTER — ANESTHESIA (OUTPATIENT)
Dept: SURGERY | Facility: CLINIC | Age: 74
End: 2024-09-05
Payer: COMMERCIAL

## 2024-09-05 ENCOUNTER — ANESTHESIA EVENT (OUTPATIENT)
Dept: SURGERY | Facility: CLINIC | Age: 74
End: 2024-09-05
Payer: COMMERCIAL

## 2024-09-05 VITALS
OXYGEN SATURATION: 99 % | BODY MASS INDEX: 24.54 KG/M2 | HEIGHT: 63 IN | DIASTOLIC BLOOD PRESSURE: 79 MMHG | RESPIRATION RATE: 18 BRPM | HEART RATE: 56 BPM | WEIGHT: 138.5 LBS | TEMPERATURE: 96.7 F | SYSTOLIC BLOOD PRESSURE: 116 MMHG

## 2024-09-05 PROCEDURE — 250N000011 HC RX IP 250 OP 636: Performed by: NURSE ANESTHETIST, CERTIFIED REGISTERED

## 2024-09-05 PROCEDURE — 370N000004 HC ANESTHESIA CATARACT PACKAGE: Performed by: STUDENT IN AN ORGANIZED HEALTH CARE EDUCATION/TRAINING PROGRAM

## 2024-09-05 PROCEDURE — 250N000009 HC RX 250: Performed by: STUDENT IN AN ORGANIZED HEALTH CARE EDUCATION/TRAINING PROGRAM

## 2024-09-05 PROCEDURE — 250N000011 HC RX IP 250 OP 636: Performed by: STUDENT IN AN ORGANIZED HEALTH CARE EDUCATION/TRAINING PROGRAM

## 2024-09-05 PROCEDURE — V2787 ASTIGMATISM-CORRECT FUNCTION: HCPCS | Performed by: STUDENT IN AN ORGANIZED HEALTH CARE EDUCATION/TRAINING PROGRAM

## 2024-09-05 PROCEDURE — 360N000007 HC CATARACT SURGICAL PACKAGE: Performed by: STUDENT IN AN ORGANIZED HEALTH CARE EDUCATION/TRAINING PROGRAM

## 2024-09-05 PROCEDURE — 761N000008 HC RECOVERY CATRACT PACKAGE: Performed by: STUDENT IN AN ORGANIZED HEALTH CARE EDUCATION/TRAINING PROGRAM

## 2024-09-05 PROCEDURE — V2632 POST CHMBR INTRAOCULAR LENS: HCPCS | Performed by: STUDENT IN AN ORGANIZED HEALTH CARE EDUCATION/TRAINING PROGRAM

## 2024-09-05 DEVICE — IMPLANTABLE DEVICE: Type: IMPLANTABLE DEVICE | Site: EYE | Status: FUNCTIONAL

## 2024-09-05 RX ORDER — ONDANSETRON 4 MG/1
4 TABLET, ORALLY DISINTEGRATING ORAL EVERY 30 MIN PRN
Status: DISCONTINUED | OUTPATIENT
Start: 2024-09-05 | End: 2024-09-05 | Stop reason: HOSPADM

## 2024-09-05 RX ORDER — PROPARACAINE HYDROCHLORIDE 5 MG/ML
1 SOLUTION/ DROPS OPHTHALMIC ONCE
Status: DISCONTINUED | OUTPATIENT
Start: 2024-09-05 | End: 2024-09-05 | Stop reason: HOSPADM

## 2024-09-05 RX ORDER — NALOXONE HYDROCHLORIDE 0.4 MG/ML
0.1 INJECTION, SOLUTION INTRAMUSCULAR; INTRAVENOUS; SUBCUTANEOUS
Status: DISCONTINUED | OUTPATIENT
Start: 2024-09-05 | End: 2024-09-05 | Stop reason: HOSPADM

## 2024-09-05 RX ORDER — TROPICAMIDE 10 MG/ML
1 SOLUTION/ DROPS OPHTHALMIC
Status: COMPLETED | OUTPATIENT
Start: 2024-09-05 | End: 2024-09-05

## 2024-09-05 RX ORDER — ONDANSETRON 2 MG/ML
4 INJECTION INTRAMUSCULAR; INTRAVENOUS EVERY 30 MIN PRN
Status: DISCONTINUED | OUTPATIENT
Start: 2024-09-05 | End: 2024-09-05 | Stop reason: HOSPADM

## 2024-09-05 RX ORDER — FENTANYL CITRATE 50 UG/ML
INJECTION, SOLUTION INTRAMUSCULAR; INTRAVENOUS PRN
Status: DISCONTINUED | OUTPATIENT
Start: 2024-09-05 | End: 2024-09-05

## 2024-09-05 RX ORDER — MOXIFLOXACIN 5 MG/ML
1 SOLUTION/ DROPS OPHTHALMIC
Status: COMPLETED | OUTPATIENT
Start: 2024-09-05 | End: 2024-09-05

## 2024-09-05 RX ORDER — DICLOFENAC SODIUM 1 MG/ML
1 SOLUTION/ DROPS OPHTHALMIC
Status: COMPLETED | OUTPATIENT
Start: 2024-09-05 | End: 2024-09-05

## 2024-09-05 RX ORDER — PROPARACAINE HYDROCHLORIDE 5 MG/ML
1 SOLUTION/ DROPS OPHTHALMIC ONCE
Status: COMPLETED | OUTPATIENT
Start: 2024-09-05 | End: 2024-09-05

## 2024-09-05 RX ORDER — PREDNISOLONE/MOXIFLO/NEPAFENAC 1-0.5-0.1%
SUSPENSION, DROPS(FINAL DOSAGE FORM)(ML) OPHTHALMIC (EYE) PRN
Status: DISCONTINUED | OUTPATIENT
Start: 2024-09-05 | End: 2024-09-05 | Stop reason: HOSPADM

## 2024-09-05 RX ORDER — PHENYLEPHRINE HYDROCHLORIDE 25 MG/ML
1 SOLUTION/ DROPS OPHTHALMIC
Status: COMPLETED | OUTPATIENT
Start: 2024-09-05 | End: 2024-09-05

## 2024-09-05 RX ORDER — DEXAMETHASONE SODIUM PHOSPHATE 10 MG/ML
4 INJECTION, SOLUTION INTRAMUSCULAR; INTRAVENOUS
Status: DISCONTINUED | OUTPATIENT
Start: 2024-09-05 | End: 2024-09-05 | Stop reason: HOSPADM

## 2024-09-05 RX ADMIN — DICLOFENAC SODIUM 1 DROP: 1 SOLUTION OPHTHALMIC at 07:14

## 2024-09-05 RX ADMIN — PROPARACAINE HYDROCHLORIDE 1 DROP: 5 SOLUTION/ DROPS OPHTHALMIC at 07:13

## 2024-09-05 RX ADMIN — DICLOFENAC SODIUM 1 DROP: 1 SOLUTION OPHTHALMIC at 07:28

## 2024-09-05 RX ADMIN — MOXIFLOXACIN 1 DROP: 5 SOLUTION/ DROPS OPHTHALMIC at 07:28

## 2024-09-05 RX ADMIN — TROPICAMIDE 1 DROP: 10 SOLUTION/ DROPS OPHTHALMIC at 07:14

## 2024-09-05 RX ADMIN — PHENYLEPHRINE HYDROCHLORIDE 1 DROP: 25 SOLUTION/ DROPS OPHTHALMIC at 07:21

## 2024-09-05 RX ADMIN — MOXIFLOXACIN 1 DROP: 5 SOLUTION/ DROPS OPHTHALMIC at 07:22

## 2024-09-05 RX ADMIN — MOXIFLOXACIN 1 DROP: 5 SOLUTION/ DROPS OPHTHALMIC at 07:14

## 2024-09-05 RX ADMIN — TROPICAMIDE 1 DROP: 10 SOLUTION/ DROPS OPHTHALMIC at 07:28

## 2024-09-05 RX ADMIN — FENTANYL CITRATE 50 MCG: 50 INJECTION INTRAMUSCULAR; INTRAVENOUS at 08:38

## 2024-09-05 RX ADMIN — MIDAZOLAM 1 MG: 1 INJECTION INTRAMUSCULAR; INTRAVENOUS at 08:38

## 2024-09-05 RX ADMIN — DICLOFENAC SODIUM 1 DROP: 1 SOLUTION OPHTHALMIC at 07:22

## 2024-09-05 RX ADMIN — TROPICAMIDE 1 DROP: 10 SOLUTION/ DROPS OPHTHALMIC at 07:23

## 2024-09-05 RX ADMIN — PHENYLEPHRINE HYDROCHLORIDE 1 DROP: 25 SOLUTION/ DROPS OPHTHALMIC at 07:28

## 2024-09-05 RX ADMIN — PHENYLEPHRINE HYDROCHLORIDE 1 DROP: 25 SOLUTION/ DROPS OPHTHALMIC at 07:14

## 2024-09-05 ASSESSMENT — ACTIVITIES OF DAILY LIVING (ADL)
ADLS_ACUITY_SCORE: 35
ADLS_ACUITY_SCORE: 35
ADLS_ACUITY_SCORE: 33

## 2024-09-05 ASSESSMENT — LIFESTYLE VARIABLES: TOBACCO_USE: 1
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
## 2024-09-05 NOTE — ANESTHESIA POSTPROCEDURE EVALUATION
Patient: Niesha Adhikari    Procedure: Procedure(s):  PHACOEMULSIFICATION, CATARACT, WITH INTRAOCULAR LENS IMPLANT, TORIC LENS right       Anesthesia Type:  MAC    Note:  Disposition: Outpatient   Postop Pain Control: Uneventful            Sign Out: Well controlled pain   PONV: No   Neuro/Psych: Uneventful            Sign Out: Acceptable/Baseline neuro status   Airway/Respiratory: Uneventful            Sign Out: Acceptable/Baseline resp. status   CV/Hemodynamics: Uneventful            Sign Out: Acceptable CV status; No obvious hypovolemia; No obvious fluid overload   Other NRE: NONE   DID A NON-ROUTINE EVENT OCCUR? No           Last vitals:  Vitals Value Taken Time   /69 09/05/24 0920   Temp     Pulse 49 09/05/24 0920   Resp 18 09/05/24 0911   SpO2 99 % 09/05/24 0929   Vitals shown include unfiled device data.    Electronically Signed By: YOSEPH Thomas CRNA  September 5, 2024  9:30 AM

## 2024-09-05 NOTE — BRIEF OP NOTE
[facility]    Brief Operative Note    Pre-operative diagnosis: Cataract, Right Eye  Post-operative diagnosis Same as pre-operative diagnosis    Procedure: PHACOEMULSIFICATION, CATARACT, WITH INTRAOCULAR LENS IMPLANT, TORIC LENS right, Right - Eye    Surgeon: Surgeons and Role:     * Primo Garcia MD - Primary  Anesthesia: MAC with Topical   Estimated Blood Loss: None    Drains: None  Specimens: * No specimens in log *  Findings:   None.  Complications: None.  Implants:   Implant Name Type Inv. Item Serial No.  Lot No. LRB No. Used Action   TECNIS TORIC II 1 PIECE IOL [id number]  17.5   [id number]   Right 1 Implanted

## 2024-09-05 NOTE — OP NOTE
[facility]  Ophthalmology Operative Note    PREOPERATIVE DIAGNOSIS: Cataract, Right eye.     POSTOPERATIVE DIAGNOSIS: Cataract, Right eye.     OPERATION: Cataract extraction with placement of posterior chamber intraocular lens in the Right eye.     ANESTHESIA: MAC combined with topical     INDICATIONS FOR PROCEDURE: Niesha Adhikari was seen in the [facility] for decreased visual acuity in the Right eye. The patient was found to have a visually significant cataract in the Right eye. The risks, benefits, alternatives and goals of cataract extraction were discussed with the patient, and after adequate discussion the patient understood and agreed to these, and a signed informed consent was obtained prior to the procedure.     DESCRIPTION OF PROCEDURE: After proper patient identification, topical anesthesia was applied to the Right eye. The patient was then sat upright and marks were placed along the corneal limbus at 3, 6, 9, and 12 o'clock to facilitate toric lens alignment. The patient was then brought to the operating room and the Right eye was prepped and draped in the usual sterile fashion for intraocular surgery. A lid speculum was placed in the Right eye. A paracentesis was then created and the anterior chamber was filled with 1% non-preserved lidocaine followed by Endocoat. A clear corneal incision was then created temporally using a 2.4mm keratome. A continuous curvilinear capsulorrhexis was then created using a cystotome and Utrata forceps. Hydrodissection was carried out with BSS on a Flanagan cannula and the lens rotated freely within the capsular bag. Phacoemulsification was then carried out using the divide and conquer technique. Residual cortical material was removed using the I&A handpiece. The capsular bag was then filled with Healon and a [id number] +17.5 diopter intraocular lens was then injected into the capsular bag. The lens was rotated into proper position  along the 002 degree axis. The lens showed good centration and stability. Residual viscoelastic was removed using the I&A handpiece. The wound was then hydrated and the anterior chamber reformed. Intracameral Moxifloxacin was then injected into the anterior chamber. The wounds were then checked and found to be sealed. Topical Prednisolone drops were placed in the patient's Right eye followed by a Santacruz shield over the top of this. The patient tolerated the procedure well without complications and was told to follow up in the clinic in the next postoperative day.     Implant Name Type Inv. Item Serial No.  Lot No. LRB No. Used Action   TECNIS TORIC II 1 PIECE IOL [id number]  17.5   [id number]   Right 1 Implanted        Primo Garcia MD

## 2024-09-05 NOTE — ANESTHESIA CARE TRANSFER NOTE
Patient: Niesha Adhikari    Procedure: Procedure(s):  PHACOEMULSIFICATION, CATARACT, WITH INTRAOCULAR LENS IMPLANT, TORIC LENS right       Diagnosis: Cataract [H26.9]  Diagnosis Additional Information: No value filed.    Anesthesia Type:   MAC     Note:    Oropharynx: spontaneously breathing  Level of Consciousness: awake  Oxygen Supplementation: room air    Independent Airway: airway patency satisfactory and stable  Dentition: dentition unchanged  Vital Signs Stable: post-procedure vital signs reviewed and stable  Report to RN Given: handoff report given  Patient transferred to: Phase II    Handoff Report: Identifed the Patient, Identified the Reponsible Provider, Reviewed the pertinent medical history, Discussed the surgical course, Reviewed Intra-OP anesthesia mangement and issues during anesthesia, Set expectations for post-procedure period and Allowed opportunity for questions and acknowledgement of understanding      Vitals:  Vitals Value Taken Time   /96 09/05/24 0911   Temp     Pulse 51 09/05/24 0910   Resp 18 09/05/24 0911   SpO2 97 % 09/05/24 0911   Vitals shown include unfiled device data.    Electronically Signed By: YOSEPH Thomas CRNA  September 5, 2024  9:14 AM

## 2024-09-06 PROBLEM — G56.01 CARPAL TUNNEL SYNDROME OF RIGHT WRIST: Status: ACTIVE | Noted: 2024-09-03

## 2024-09-06 PROBLEM — M65.331 TRIGGER MIDDLE FINGER OF RIGHT HAND: Status: ACTIVE | Noted: 2024-09-03

## 2024-09-06 LAB — HBV DNA SERPL NAA+PROBE-ACNC: NOT DETECTED IU/ML

## 2024-09-11 ENCOUNTER — MYC REFILL (OUTPATIENT)
Dept: GASTROENTEROLOGY | Facility: CLINIC | Age: 74
End: 2024-09-11
Payer: COMMERCIAL

## 2024-09-11 DIAGNOSIS — R76.8 HEPATITIS B CORE ANTIBODY POSITIVE: ICD-10-CM

## 2024-09-11 DIAGNOSIS — M80.00XD OSTEOPOROSIS WITH CURRENT PATHOLOGICAL FRACTURE WITH ROUTINE HEALING, UNSPECIFIED OSTEOPOROSIS TYPE, SUBSEQUENT ENCOUNTER: ICD-10-CM

## 2024-09-11 DIAGNOSIS — Z79.899 HIGH RISK MEDICATION USE: ICD-10-CM

## 2024-09-13 ENCOUNTER — IMMUNIZATION (OUTPATIENT)
Dept: FAMILY MEDICINE | Facility: CLINIC | Age: 74
End: 2024-09-13
Payer: COMMERCIAL

## 2024-09-13 DIAGNOSIS — Z23 ENCOUNTER FOR IMMUNIZATION: ICD-10-CM

## 2024-09-13 DIAGNOSIS — Z23 NEED FOR PROPHYLACTIC VACCINATION AND INOCULATION AGAINST INFLUENZA: Primary | ICD-10-CM

## 2024-09-13 PROCEDURE — 99207 PR NO CHARGE NURSE ONLY: CPT

## 2024-09-13 PROCEDURE — G0008 ADMIN INFLUENZA VIRUS VAC: HCPCS

## 2024-09-13 PROCEDURE — 90662 IIV NO PRSV INCREASED AG IM: CPT

## 2024-09-13 NOTE — PROGRESS NOTES
Prior to immunization administration, verified patients identity using patient s name and date of birth. Please see Immunization Activity for additional information.     Screening Questionnaire for Adult Immunization    Are you sick today?   No   Do you have allergies to medications, food, a vaccine component or latex?   No   Have you ever had a serious reaction after receiving a vaccination?   No   Do you have a long-term health problem with heart, lung, kidney, or metabolic disease (e.g., diabetes), asthma, a blood disorder, no spleen, complement component deficiency, a cochlear implant, or a spinal fluid leak?  Are you on long-term aspirin therapy?   No   Do you have cancer, leukemia, HIV/AIDS, or any other immune system problem?   No   Do you have a parent, brother, or sister with an immune system problem?   No   In the past 3 months, have you taken medications that affect  your immune system, such as prednisone, other steroids, or anticancer drugs; drugs for the treatment of rheumatoid arthritis, Crohn s disease, or psoriasis; or have you had radiation treatments?   No   Have you had a seizure, or a brain or other nervous system problem?   No   During the past year, have you received a transfusion of blood or blood    products, or been given immune (gamma) globulin or antiviral drug?   No   For women: Are you pregnant or is there a chance you could become       pregnant during the next month?   No   Have you received any vaccinations in the past 4 weeks?   No     Immunization questionnaire answers were all negative.    I have reviewed the following standing orders:   This patient is due and qualifies for the Influenza vaccine.    Click here for Influenza Vaccine Standing Order    I have reviewed the vaccines inclusion and exclusion criteria; No concerns regarding eligibility.     Patient instructed to remain in clinic for 15 minutes afterwards, and to report any adverse reactions.     Screening performed by  Daniela Caputo MA on 9/13/2024 at 9:34 AM.

## 2024-09-16 ENCOUNTER — OFFICE VISIT (OUTPATIENT)
Dept: GASTROENTEROLOGY | Facility: CLINIC | Age: 74
End: 2024-09-16
Attending: PHYSICIAN ASSISTANT
Payer: COMMERCIAL

## 2024-09-16 VITALS
HEART RATE: 57 BPM | RESPIRATION RATE: 18 BRPM | DIASTOLIC BLOOD PRESSURE: 79 MMHG | OXYGEN SATURATION: 98 % | TEMPERATURE: 98.9 F | SYSTOLIC BLOOD PRESSURE: 129 MMHG

## 2024-09-16 DIAGNOSIS — R76.8 HEPATITIS B CORE ANTIBODY POSITIVE: ICD-10-CM

## 2024-09-16 DIAGNOSIS — Z79.899 HIGH RISK MEDICATION USE: ICD-10-CM

## 2024-09-16 DIAGNOSIS — M80.00XD OSTEOPOROSIS WITH CURRENT PATHOLOGICAL FRACTURE WITH ROUTINE HEALING, UNSPECIFIED OSTEOPOROSIS TYPE, SUBSEQUENT ENCOUNTER: ICD-10-CM

## 2024-09-16 PROCEDURE — 99214 OFFICE O/P EST MOD 30 MIN: CPT | Mod: 24 | Performed by: PHYSICIAN ASSISTANT

## 2024-09-16 PROCEDURE — G0463 HOSPITAL OUTPT CLINIC VISIT: HCPCS | Performed by: PHYSICIAN ASSISTANT

## 2024-09-16 ASSESSMENT — PAIN SCALES - GENERAL: PAINLEVEL: MODERATE PAIN (4)

## 2024-09-16 NOTE — NURSING NOTE
"Chief Complaint   Patient presents with    RECHECK     Hep C with SVR, Hep B core positive      Vital signs:  Temp: 98.9  F (37.2  C) Temp src: Oral BP: 129/79 Pulse: 57   Resp: 18 SpO2: 98 %       Weight:  (delined)  Estimated body mass index is 24.53 kg/m  as calculated from the following:    Height as of 9/5/24: 1.6 m (5' 3\").    Weight as of 9/5/24: 62.8 kg (138 lb 8 oz).      Pily Aguilar, [facility]  9/16/2024 10:32 AM    "

## 2024-09-16 NOTE — PROGRESS NOTES
Hepatology Follow-up Clinic note  Niesha Adhikari   Date of Birth 1950      Reason for follow-up: Positive Hep B core antibody          Assessment/plan:   Niesha Adhikari is a 74 year old female with  with history of Hepatitis C who achieved SVR in 2017 and positive Hep B core antibody with low surface antibody titer who has been maintained on Hep B antiviral for reactivation prophylaxis. She has been compliant with medication. No recent hepatic panel. Renal function remains normal.  FibroScan in 2022 showing Stage 0-1, 4.1 kilopascals.      # Positive Hep B core antibody, low surface antibody titer with golimumab:  - Continue tenofovir alafenamide 25 mg (from tenofovir disoproxil) daily given history of osteoporosis and recent fracture.   - BMP, Hepatic panel, CBC, INR in the near future.      # Follow-up in clinic in in one year     Tristan Chua PA-C   [facility]    Total time for E/M services performed on the date of the encounter 30 minutes.  This included review of previous: clinic visits, hospital records, lab results, imaging studies, and procedural documentation. Time also includes patient visit, documentation and discussion with other providers.  The findings from this review are summarized in the above note.     -----------------------------------------------------       HPI:   Niesha Adhikari is a 74 year old female presenting for follow-up.     Hep B core antibody, low surface antibody   - Risk factors for reactivation: possible Humira start  Treatment: Tenfovir DF   FibroScan in 7/2022 showing Stage 0-1, 4.1 kilopascals      History of Hep C, achieved SVR in 2017  Genotype 1  Liver biopsy: 2012, Stage 1   - Telapravir/PEG interferon + Ribavirin stopped due to skin reaction  - Tx: Harvoni x 12 weeks, achieved SVR    Patient was last seen by me on 9/11/2023.   Recent hospitalizations or ER visits  New medications:     She had surgery for cataracts and carpal tunnel. Taking  Tenofovir alafenomide without any problems. She had colonoscopy 6/2024, recall recommeded for 3-5 years.     Appetite is good. Weight is down a bit, on purpose.   Walking more. Patient denies jaundice, lower extremity edema, abdominal distension or confusion.      Patient also denies melena, hematochezia or hematemesis.  Patient denies chills.Get flushed at cheeks with heat. No sweats or chills.     No alcohol since 1987.       Previous work-up:   Lab Results   Component Value Date    HEPBANG Nonreactive 06/07/2017    HBCAB (A) 06/07/2017     Reactive   A reactive result indicates acute, chronic or past/resolved hepatitis B   infection.      AUSAB 0.65 06/07/2017    HCVAB (A) 06/07/2017     Reactive   A reactive result indicates one of the following 1) current HCV infection 2)   past HCV infection that has resolved or 3) false positivity. The CDC recommends   that a reactive result should be followed by Nucleic acid testing for HCV RNA.  If HCV RNA is detected, that indicates current HCV infection. If HCV RNA is not   detected, that indicates either past, resolved HCV infection, or false HCV   antibody positivity.   Assay performance characteristics have not been established for newborns,   infants, and children      HCVRNA HCV RNA Not Detected 01/20/2020    HCVRNA  06/07/2017     HCV RNA Not Detected   The LU[employer] ARMANDO AmpliPrep/LUIS ARMANDO TaqMan HCV Test is an FDA-approved in vitro nucleic   acid amplification test for the quantitation of HCV RNA in human plasma (ETDA   plasma) or serum using the LUIS ARMANDO AmpliPrep Instrument for automated viral   nucleic acid extraction and the LUIS ARMANDO TaqMan Analyzer or LU[employer] ARMANDO TaqMan for   automated Real Time PCR amplification and detection of the viral nucleic acid   target.   Titer results are reported in International Units/mL (IU/mL) using the 1st WHO   International standard for HCV for Nucleic Acid Amplification based assays.      MARTÍNEZ 16 04/08/2024    IRONSAT 11 (L) 07/26/2024    IGA 75  12/05/2006    TSH 1.45 04/17/2024    CHOL 182 07/26/2024    HDL 54 07/26/2024     (H) 07/26/2024    TRIG 96 07/26/2024    A1C 5.8 07/19/2005      Lab Results   Component Value Date    SPECDES  01/19/2024     Anal pap/swab  [id number]      LDRESULTS  01/19/2024     RESULTS (L1 region): NEGATIVE FOR HPV DNA         Recent Labs   Lab Test 12/27/23  0702 10/10/23  2015 09/06/23  0832 01/12/23  0818 05/16/22  1044 02/14/22  1121 09/10/21  0933 06/07/21  0811 01/06/21  1303 09/04/20  0756   ALKPHOS 75 69 78 80 110 107 86 79 82 78   ALT 17 16 14 21 22 23 25 20 22 26   AST 36 27 27 21 22 27 25 22 22 25   BILITOTAL 0.2 0.4 0.3 0.4 0.4 0.5 0.6 0.5 0.5 0.5      Medical hx Surgical hx   Past Medical History:   Diagnosis Date    ABUSE BY SPOUSE/PARTNER 07/27/2005    Degeneration of lumbar or lumbosacral intervertebral disc     Depressive disorder     Esophageal reflux 01/28/2005    HELICOBACTER PYLORI INFECTION 01/28/2005    Hepatitis C - Cured. Achieved SVR in 2017     History of blood transfusion     Hypertension     Malignant neoplasm (H)     Osteoporosis     Other and unspecified alcohol dependence, unspecified drinking behavior     Other malaise and fatigue     Past Surgical History:   Procedure Laterality Date    BIOPSY ANAL CANAL  1/21/13    [facility]     BREAST BIOPSY, RT/LT Left 1975    Breat Biopsy RT/LT    COLONOSCOPY  8/25/2009    COLONOSCOPY  2/14/2011    COLONOSCOPY performed by CRISTIN LAGUNAS at  GI    COLONOSCOPY N/A 1/8/2019    Procedure: Colonscopy, Biopsies by Biopsy;  Surgeon: Omega Talavera MD;  Location:  GI    COLONOSCOPY N/A 6/17/2024    Procedure: Colonoscopy;  Surgeon: Omega Talavera MD;  Location:  GI    CYSTOSCOPY  2/28/2011    CYSTOSCOPY performed by CAYLA FLOR at  OR    ENDOSCOPY  05/21/12    Upper GI - [facility] [place]    ENT SURGERY  1965    GI SURGERY  06/25/12     UGI ENDOSCOPY DIAG W BIOPSY  10/01/09    HEMORRHOIDECTOMY  06/25/12    [facility]  [facility]    LAPAROSCOPIC SALPINGO-OOPHORECTOMY  2/28/2011    LAPAROSCOPIC SALPINGO-OOPHORECTOMY performed by CAYLA FLOR at [facility] OR    PHACOEMULSIFICATION CLEAR CORNEA WITH TORIC INTRAOCULAR LENS IMPLANT Right 9/5/2024    Procedure: PHACOEMULSIFICATION, CATARACT, WITH INTRAOCULAR LENS IMPLANT, TORIC LENS right;  Surgeon: Primo Garcia MD;  Location: [facility] OR    RELEASE CARPAL TUNNEL Left 8/23/2024    Procedure: Left carpal tunnel release;  Surgeon: David John MD;  Location: [facility] OR    RELEASE TRIGGER FINGER Left 8/23/2024    Procedure: Left middle finger trigger finger release;  Surgeon: David John MD;  Location: [facility] OR    TONSILLECTOMY & ADENOIDECTOMY  1965    Z NONSPECIFIC PROCEDURE  1965    Removed bone left index finger knuckle, casts broken bones    ZZ COLONOSCOPY W/WO BRUSH/WASH  08/22/05    ZZ UGI ENDOSCOPY, SIMPLE EXAM  08/08/07                 Medications:     Current Outpatient Medications   Medication Sig Dispense Refill    Abatacept (ORENCIA CLICKJECT) 125 MG/ML SOAJ auto-injector Inject 1 mL (125 mg) subcutaneously every 7 days . Hold for any infection and seek medical attention. 4 mL 4    calcium carb-cholecalciferol 600-500 MG-UNIT CAPS Take 1,200 mg by mouth daily      cloNIDine (CATAPRES) 0.2 MG tablet Take 0.2 mg by mouth At Bedtime      cycloSPORINE (RESTASIS) 0.05 % ophthalmic emulsion Place 1 drop into both eyes 2 times daily       denosumab (PROLIA) 60 MG/ML SOSY injection       ferrous sulfate (FE TABS) 325 (65 Fe) MG EC tablet Take 1 tablet (325 mg) by mouth every other day 45 tablet 0    gabapentin (NEURONTIN) 300 MG capsule TAKE ONE CAPSULE BY MOUTH THREE TIMES A DAY - MAY TAKE TWO CAPSULES BY MOUTH EVERY NIGHT AT BEDTIME 180 capsule 0    hydroxychloroquine (PLAQUENIL) 200 MG tablet Hydroxychloroquine 200mg daily; and an additional 200mg every other day.  Yearly eye exam, including 10-2 VF and SD-OCT, required. 135 tablet 1    lisdexamfetamine (VYVANSE) 30 MG  "capsule Take 30 mg by mouth every morning      lisinopril (ZESTRIL) 10 MG tablet Take 1 tablet (10 mg) by mouth daily 90 tablet 3    montelukast (SINGULAIR) 10 MG tablet TAKE 1 TABLET BY MOUTH ONCE DAILY AS NEEDED SEASONALLY (AUGUST AND SEPTEMBER) 90 tablet 3    Psyllium (FIBER) 0.52 G CAPS 1 tabs in am and pm 540 capsule     sennosides (SENOKOT) 8.6 MG tablet Take 2 tablets by mouth 2 times daily      tenofovir alafenamide fumarate (VEMLIDY) 25 MG tablet Take 1 tablet (25 mg) by mouth daily. with food (dispense only in the original container). 90 tablet 3    Vitamin D3 (CHOLECALCIFEROL) 25 mcg (1000 units) tablet Take 1 tablet (25 mcg) by mouth daily 90 tablet 1    diclofenac (VOLTAREN) 1 % topical gel Apply up to 2 grams of 1% gel to hands up to 4 times daily as needed for joint pain (maximum: 8 g per upper extremity per day) (Patient not taking: Reported on 8/28/2024) 200 g 2    triamcinolone (KENALOG) 0.1 % external cream Apply topically 2 times daily Use to to 14 days at a time (Patient not taking: Reported on 9/4/2024) 15 g 0     No current facility-administered medications for this visit.            Allergies:     Allergies   Allergen Reactions    Telaprevir Other (See Comments) and Rash     Rectal bleeding, anemia    Abilify Discmelt Other (See Comments)     Disoriented    Thiopental Sodium      PENTOTHAL/rigidity and fight response    Antivert [Meclizine Hcl]     Chamomile     Compazine     Diphenhydramine Nausea     And abdominal pain    Duloxetine Hcl Other (See Comments)     Disoriented, trouble sleeping    Effexor [Venlafaxine] Other (See Comments)     Disoriented, trouble sleeping    Elavil [Amitriptyline Hcl] Other (See Comments)     \"didn't feel right on it-med was stopped right away\"    Food Difficulty breathing     cilantro    Indomethacin      indocin sensativity \"Severe h.a\"    Seasonal Allergies Other (See Comments) and Difficulty breathing     Philip Gold Aug-Sept, rag weed, sneezing    Sulfa " Antibiotics     Animal Dander Difficulty breathing and Rash     sneezing,resp. distress    Bupropion Anxiety    Tylenol [Acetaminophen] Rash            Review of Systems:   10 points ROS was obtained and highlighted in the HPI, otherwise negative.          Physical Exam:   /79 (BP Location: Right arm, Patient Position: Sitting, Cuff Size: Adult Regular)   Pulse 57   Temp 98.9  F (37.2  C) (Oral)   Resp 18   LMP 11/27/2003   SpO2 98%     Gen- well, NAD, A+Ox3, normal color  Lym- no palpable LAD  Abd- soft, nontender. No organomegaly   Extr- hands normal, no CARROLL  Skin- no rash or jaundice  Neuro- no asterixis  Psych- normal mood           Data:   Reviewed in person and significant for:    Lab Results   Component Value Date     09/04/2024     02/16/2021      Lab Results   Component Value Date    POTASSIUM 4.4 09/04/2024    POTASSIUM 4.3 05/16/2022    POTASSIUM 4.7 02/16/2021     Lab Results   Component Value Date    CHLORIDE 102 09/04/2024    CHLORIDE 109 05/16/2022    CHLORIDE 100 02/16/2021     Lab Results   Component Value Date    CO2 28 09/04/2024    CO2 29 05/16/2022    CO2 31 02/16/2021     Lab Results   Component Value Date    BUN 10.8 09/04/2024    BUN 13 05/16/2022    BUN 10 02/16/2021     Lab Results   Component Value Date    CR 0.86 09/04/2024    CR 0.76 06/07/2021       Lab Results   Component Value Date    WBC 4.0 09/04/2024    WBC 3.0 06/07/2021     Lab Results   Component Value Date    HGB 13.3 09/04/2024    HGB 13.1 06/07/2021     Lab Results   Component Value Date    HCT 42.3 09/04/2024    HCT 40.0 06/07/2021     Lab Results   Component Value Date    MCV 88 09/04/2024    MCV 89 06/07/2021     Lab Results   Component Value Date     09/04/2024     06/07/2021       Lab Results   Component Value Date    AST 36 12/27/2023    AST 22 06/07/2021     Lab Results   Component Value Date    ALT 17 12/27/2023    ALT 20 06/07/2021     Lab Results   Component Value Date     BILICONJ 0.0 03/14/2012      Lab Results   Component Value Date    BILITOTAL 0.2 12/27/2023    BILITOTAL 0.5 06/07/2021       Lab Results   Component Value Date    ALBUMIN 4.4 04/08/2024    ALBUMIN 3.6 01/12/2023    ALBUMIN 3.8 06/07/2021     Lab Results   Component Value Date    PROTTOTAL 6.6 12/27/2023    PROTTOTAL 6.7 06/07/2021      Lab Results   Component Value Date    ALKPHOS 75 12/27/2023    ALKPHOS 79 06/07/2021       Lab Results   Component Value Date    INR 1.14 09/04/2024    INR 1.02 08/19/2013       Chronic Hepatitis C     A series of at least 10 Vibration Controlled Transient Elastography (VCTETM) measurements was performed by placing the probe M over the center of the liver parenchyma and mechanically inducing a 50 Hertz shear wave.     Each resulting VCTETM measurement was analyzed to determine shear wave propagation speed and calculate the equivalent liver stiffness.     All measurements were reviewed by the  and physician fortechnical accuracy. Data variability across the acquired measurements was quantified with IQR/Median Percentage.     FINDINGS:     The median liver stiffness was 4.1 kPa with IQR/Median percentage of 21 %.     The measure CAP ultrasound attenuation rate value was 231 dB/m.     IMPRESSION:      1. Estimated liver fibrosis is Stage 0-1   2. Steatosis Grade S1      The results of the FibroScan examination were assessed by taking into account the quality of the measurement thumbnails, number of measurements, and IQR/Median ratio. I have personally reviewed the examination and initial interpretation, and I agree with the findings.     Tristan Chua PA-C
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
## 2024-09-24 DIAGNOSIS — M54.50 ACUTE LEFT-SIDED LOW BACK PAIN WITHOUT SCIATICA: ICD-10-CM

## 2024-09-24 DIAGNOSIS — M79.7 FIBROMYALGIA: ICD-10-CM

## 2024-09-24 RX ORDER — GABAPENTIN 300 MG/1
CAPSULE ORAL
Qty: 180 CAPSULE | Refills: 0 | Status: SHIPPED | OUTPATIENT
Start: 2024-09-24

## 2024-10-01 ENCOUNTER — OFFICE VISIT (OUTPATIENT)
Dept: FAMILY MEDICINE | Facility: OTHER | Age: 74
End: 2024-10-01
Payer: COMMERCIAL

## 2024-10-01 VITALS
WEIGHT: 138 LBS | OXYGEN SATURATION: 96 % | DIASTOLIC BLOOD PRESSURE: 70 MMHG | HEART RATE: 70 BPM | SYSTOLIC BLOOD PRESSURE: 122 MMHG | BODY MASS INDEX: 24.45 KG/M2 | TEMPERATURE: 98.7 F | RESPIRATION RATE: 18 BRPM | HEIGHT: 63 IN

## 2024-10-01 DIAGNOSIS — G47.419 PRIMARY NARCOLEPSY WITHOUT CATAPLEXY: ICD-10-CM

## 2024-10-01 DIAGNOSIS — M06.09 RHEUMATOID ARTHRITIS OF MULTIPLE SITES WITH NEGATIVE RHEUMATOID FACTOR (H): ICD-10-CM

## 2024-10-01 DIAGNOSIS — H26.9 CATARACT, UNSPECIFIED CATARACT TYPE, UNSPECIFIED LATERALITY: ICD-10-CM

## 2024-10-01 DIAGNOSIS — Z01.818 PREOP GENERAL PHYSICAL EXAM: Primary | ICD-10-CM

## 2024-10-01 DIAGNOSIS — I10 BENIGN ESSENTIAL HYPERTENSION: ICD-10-CM

## 2024-10-01 PROCEDURE — 99214 OFFICE O/P EST MOD 30 MIN: CPT | Performed by: FAMILY MEDICINE

## 2024-10-01 ASSESSMENT — PAIN SCALES - GENERAL: PAINLEVEL: MODERATE PAIN (4)

## 2024-10-01 NOTE — PROGRESS NOTES
Preoperative Evaluation  [address]  [facility] [address]  Phone: [phone]  Primary Provider: Tanika Clark MD  Pre-op Performing Provider: Tanika Clark MD  Oct 1, 2024             9/26/2024   Surgical Information   What procedure is being done? Cataract right eye   Facility or Hospital where procedure/surgery will be performed: [place], [place]   Who is doing the procedure / surgery? Dr. Primo Garcia   Date of surgery / procedure: October 3, 2024   Time of surgery / procedure: 7:30 a.m.   Where do you plan to recover after surgery? at home with family        Fax number for surgical facility: Note does not need to be faxed, will be available electronically in Epic.    Assessment & Plan     The proposed surgical procedure is considered LOW risk.    Preop general physical exam    Cataract, unspecified cataract type, unspecified laterality    Benign essential hypertension  Well controlled    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)  Back on Orencia, follows with Rheumatology     Primary narcolepsy without cataplexy  Sleep study pushed out until 2025      Antiplatelet or Anticoagulation Medication Instructions   - Bleeding risk is low for this procedure (e.g. dental, skin, cataract).    Additional Medication Instructions  Take all scheduled medications on the day of surgery    Recommendation  Approval given to proceed with proposed procedure, without further diagnostic evaluation.    Oksana Scherer is a 74 year old, presenting for the following:  Pre-Op Exam          10/1/2024     8:16 AM   Additional Questions   Roomed by lula WEST related to upcoming procedure: cataract        9/26/2024   Pre-Op Questionnaire   Have you ever had a heart attack or stroke? No   Have you ever had surgery on your heart or blood vessels, such as a stent placement, a coronary artery  bypass, or surgery on an artery in your head, neck, heart, or legs? No   Do you have chest pain with activity? No   Do you have a history of heart failure? No   Do you currently have a cold, bronchitis or symptoms of other infection? No   Do you have a cough, shortness of breath, or wheezing? No   Do you or anyone in your family have previous history of blood clots? (!) YES mother   Do you or does anyone in your family have a serious bleeding problem such as prolonged bleeding following surgeries or cuts? No   Have you ever had problems with anemia or been told to take iron pills? (!) YES--has a history of anemia, last hemoglobin was normal, on an iron supplement    Have you had any abnormal blood loss such as black, tarry or bloody stools, or abnormal vaginal bleeding? (!) YES - rectal bleeding chronic with diarrhea, follows with colorectal and just had colonoscopy    Have you ever had a blood transfusion? (!) YES   Have you ever had a transfusion reaction? No   Are you willing to have a blood transfusion if it is medically needed before, during, or after your surgery? Yes   Have you or any of your relatives ever had problems with anesthesia? No   Do you have sleep apnea, excessive snoring or daytime drowsiness? (!) YES--narcolepsy   Do you have a CPAP machine? (!) NO    Do you have any artifical heart valves or other implanted medical devices like a pacemaker, defibrillator, or continuous glucose monitor? No   Do you have artificial joints? No   Are you allergic to latex? No        Health Care Directive  Patient does not have a Health Care Directive or Living Will: Patient states has Advance Directive and will bring in a copy to clinic.    Preoperative Review of    reviewed - controlled substances reflected in medication list.      Patient Active Problem List    Diagnosis Date Noted    S/P carpal tunnel release 09/03/2024     Priority: Medium    Carpal tunnel syndrome of right wrist 09/03/2024     Priority:  Medium    Trigger middle finger of right hand 09/03/2024     Priority: Medium    Chronic bilateral low back pain without sciatica 11/02/2023     Priority: Medium    Iron deficiency 07/05/2022     Priority: Medium    Constipation 08/09/2019     Priority: Medium    DDD (degenerative disc disease), cervical 08/09/2019     Priority: Medium    Fatigue 01/02/2019     Priority: Medium    Personal history of other medical treatment 12/05/2018     Priority: Medium     Alendronate (GERD per record review), Boniva PO (Ineffective per record review), Boniva IV (Effective per record review)      Alcohol dependence in remission (H) 11/16/2018     Priority: Medium    Benign essential hypertension 09/01/2017     Priority: Medium    Osteoporosis 06/07/2017     Priority: Medium    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) 06/07/2017     Priority: Medium    AIN (anal intraepithelial neoplasia) anal canal 09/25/2012     Priority: Medium    Internal hemorrhoids with other complication 10/13/2011     Priority: Medium    Narcolepsy 08/15/2011     Priority: Medium    Moderate recurrent major depression (H) 05/05/2010     Priority: Medium    Fibromyalgia 07/10/2009     Priority: Medium    Essential and other specified forms of tremor 06/30/2006     Priority: Medium    Migraine 06/05/2006     Priority: Medium    Pernicious anemia 07/27/2005     Priority: Medium    Anxiety state 07/27/2005     Priority: Medium    Allergic rhinitis 06/15/2002     Priority: Medium      Past Medical History:   Diagnosis Date    ABUSE BY SPOUSE/PARTNER 07/27/2005    Degeneration of lumbar or lumbosacral intervertebral disc     DDD L5/S1    Depressive disorder     Esophageal reflux 01/28/2005    HELICOBACTER PYLORI INFECTION 01/28/2005    Hepatitis C - Cured. Achieved SVR in 2017     History of blood transfusion     Hypertension     Malignant neoplasm (H)     ACIN    Osteoporosis     Other and unspecified alcohol dependence,  unspecified drinking behavior     Sober as 1/21/1987    Other malaise and fatigue      Past Surgical History:   Procedure Laterality Date    BIOPSY ANAL CANAL  1/21/13    [facility]     BREAST BIOPSY, RT/LT Left 1975    Breat Biopsy RT/LT    COLONOSCOPY  8/25/2009    COLONOSCOPY  2/14/2011    COLONOSCOPY performed by CRISTIN LAGUNAS at  GI    COLONOSCOPY N/A 1/8/2019    Procedure: Colonscopy, Biopsies by Biopsy;  Surgeon: Omega Talavera MD;  Location:  GI    COLONOSCOPY N/A 6/17/2024    Procedure: Colonoscopy;  Surgeon: Omega Talavera MD;  Location:  GI    CYSTOSCOPY  2/28/2011    CYSTOSCOPY performed by CAYLA FLOR at  OR    ENDOSCOPY  05/21/12    [facility] - [facility]    ENT SURGERY  1965    GI SURGERY  06/25/12     UGI ENDOSCOPY DIAG W BIOPSY  10/01/09    HEMORRHOIDECTOMY  06/25/12    [facility]    LAPAROSCOPIC SALPINGO-OOPHORECTOMY  2/28/2011    LAPAROSCOPIC SALPINGO-OOPHORECTOMY performed by CAYLA FLOR at  OR    PHACOEMULSIFICATION CLEAR CORNEA WITH TORIC INTRAOCULAR LENS IMPLANT Right 9/5/2024    Procedure: PHACOEMULSIFICATION, CATARACT, WITH INTRAOCULAR LENS IMPLANT, TORIC LENS right;  Surgeon: Primo Garcia MD;  Location: [facility] OR    RELEASE CARPAL TUNNEL Left 8/23/2024    Procedure: Left carpal tunnel release;  Surgeon: David John MD;  Location: [facility] OR    RELEASE TRIGGER FINGER Left 8/23/2024    Procedure: Left middle finger trigger finger release;  Surgeon: David John MD;  Location:  OR    TONSILLECTOMY & ADENOIDECTOMY  1965    [facility] NONSPECIFIC PROCEDURE  1965    Removed bone left index finger knuckle, casts broken bones    [facility] COLONOSCOPY W/WO BRUSH/WASH  08/22/05    [facility] UGI ENDOSCOPY, SIMPLE EXAM  08/08/07     Current Outpatient Medications   Medication Sig Dispense Refill    Abatacept (ORENCIA CLICKJECT) 125 MG/ML SOAJ auto-injector Inject 1 mL (125 mg) subcutaneously every 7 days . Hold for any infection and seek  medical attention. 4 mL 4    calcium carb-cholecalciferol 600-500 MG-UNIT CAPS Take 1,200 mg by mouth daily      cloNIDine (CATAPRES) 0.2 MG tablet Take 0.2 mg by mouth At Bedtime      cycloSPORINE (RESTASIS) 0.05 % ophthalmic emulsion Place 1 drop into both eyes 2 times daily       denosumab (PROLIA) 60 MG/ML SOSY injection       diclofenac (VOLTAREN) 1 % topical gel Apply up to 2 grams of 1% gel to hands up to 4 times daily as needed for joint pain (maximum: 8 g per upper extremity per day) 200 g 2    ferrous sulfate (FE TABS) 325 (65 Fe) MG EC tablet Take 1 tablet (325 mg) by mouth every other day 45 tablet 0    gabapentin (NEURONTIN) 300 MG capsule TAKE ONE CAPSULE BY MOUTH THREE TIMES A DAY, MAY TAKE TWO CAPSULES BY MOUTH EVERY NIGHT AT BEDTIME 180 capsule 0    hydroxychloroquine (PLAQUENIL) 200 MG tablet Hydroxychloroquine 200mg daily; and an additional 200mg every other day.  Yearly eye exam, including 10-2 VF and SD-OCT, required. 135 tablet 1    lisdexamfetamine (VYVANSE) 30 MG capsule Take 30 mg by mouth every morning      lisinopril (ZESTRIL) 10 MG tablet Take 1 tablet (10 mg) by mouth daily 90 tablet 3    montelukast (SINGULAIR) 10 MG tablet TAKE 1 TABLET BY MOUTH ONCE DAILY AS NEEDED SEASONALLY (AUGUST AND SEPTEMBER) 90 tablet 3    Psyllium (FIBER) 0.52 G CAPS 1 tabs in am and pm 540 capsule     sennosides (SENOKOT) 8.6 MG tablet Take 2 tablets by mouth 2 times daily      tenofovir alafenamide fumarate (VEMLIDY) 25 MG tablet Take 1 tablet (25 mg) by mouth daily. with food (dispense only in the original container). 90 tablet 3    triamcinolone (KENALOG) 0.1 % external cream Apply topically 2 times daily Use to to 14 days at a time 15 g 0    Vitamin D3 (CHOLECALCIFEROL) 25 mcg (1000 units) tablet Take 1 tablet (25 mcg) by mouth daily 90 tablet 1       Allergies   Allergen Reactions    Telaprevir Other (See Comments) and Rash     Rectal bleeding, anemia    Abilify Discmelt Other (See Comments)      "Disoriented    Thiopental Sodium      PENTOTHAL/rigidity and fight response    Antivert [Meclizine Hcl]     Chamomile     Compazine     Diphenhydramine Nausea     And abdominal pain    Duloxetine Hcl Other (See Comments)     Disoriented, trouble sleeping    Effexor [Venlafaxine] Other (See Comments)     Disoriented, trouble sleeping    Elavil [Amitriptyline Hcl] Other (See Comments)     \"didn't feel right on it-med was stopped right away\"    Food Difficulty breathing     cilantro    Indomethacin      indocin sensativity \"Severe h.a\"    Seasonal Allergies Other (See Comments) and Difficulty breathing     Philip Gold Aug-Sept, rag weed, sneezing    Sulfa Antibiotics     Animal Dander Difficulty breathing and Rash     sneezing,resp. distress    Bupropion Anxiety    Tylenol [Acetaminophen] Rash        Social History     Tobacco Use    Smoking status: Former     Current packs/day: 0.00     Average packs/day: 0.8 packs/day for 10.0 years (7.5 ttl pk-yrs)     Types: Cigarettes     Start date: 1968     Quit date: 1978     Years since quittin.9     Passive exposure: Never    Smokeless tobacco: Never    Tobacco comments:     Quit 46 years ago   Substance Use Topics    Alcohol use: No       History   Drug Use No             Review of Systems  CONSTITUTIONAL: NEGATIVE for fever, chills, change in weight  INTEGUMENTARY/SKIN: NEGATIVE for worrisome rashes, moles or lesions  Eyes:  cataract  ENT/MOUTH: NEGATIVE for ear, mouth and throat problems  RESP: NEGATIVE for significant cough or SOB  CV: NEGATIVE for chest pain, palpitations or peripheral edema  GI: NEGATIVE for nausea, abdominal pain, heartburn, or change in bowel habits  : NEGATIVE for frequency, dysuria, or hematuria  MUSCULOSKELETAL: NEGATIVE for significant arthralgias or myalgia  NEURO: NEGATIVE for weakness, dizziness or paresthesias  ENDOCRINE: NEGATIVE for temperature intolerance, skin/hair changes  HEME: NEGATIVE for bleeding problems  PSYCHIATRIC: " "NEGATIVE for changes in mood or affect    Objective    /70 (BP Location: Left arm, Patient Position: Sitting, Cuff Size: Adult Regular)   Pulse 70   Temp 98.7  F (37.1  C) (Temporal)   Resp 18   Ht 1.6 m (5' 3\")   Wt 62.6 kg (138 lb)   LMP 11/27/2003   SpO2 96%   BMI 24.45 kg/m     Estimated body mass index is 24.45 kg/m  as calculated from the following:    Height as of this encounter: 1.6 m (5' 3\").    Weight as of this encounter: 62.6 kg (138 lb).  Physical Exam  GENERAL: alert and no distress  EYES: Eyes grossly normal to inspection, PERRL and conjunctivae and sclerae normal  HENT: ear canals and TM's normal, nose and mouth without ulcers or lesions  NECK: no adenopathy, no asymmetry, masses, or scars  RESP: lungs clear to auscultation - no rales, rhonchi or wheezes  CV: regular rate and rhythm, normal S1 S2, no S3 or S4, no murmur, click or rub, no peripheral edema  ABDOMEN: soft, nontender, no hepatosplenomegaly, no masses and bowel sounds normal  MS: no gross musculoskeletal defects noted, no edema  SKIN: no suspicious lesions or rashes  NEURO: Normal strength and tone, mentation intact and speech normal  PSYCH: mentation appears normal, affect normal/bright    Recent Labs   Lab Test 09/04/24  1017 07/26/24  1121 07/22/24  0900 12/27/23  0702 10/10/23  2015   HGB 13.3 12.8  --    < > 11.1*   * 137*  --    < > 158   INR 1.14  --   --   --   --      --   --   --  133*   POTASSIUM 4.4  --   --   --  4.1   CR 0.86  --  0.8   < > 0.71    < > = values in this interval not displayed.        Diagnostics  No labs were ordered during this visit.   No EKG required for low risk surgery (cataract, skin procedure, breast biopsy, etc).    Revised Cardiac Risk Index (RCRI)  The patient has the following serious cardiovascular risks for perioperative complications:   - No serious cardiac risks = 0 points     RCRI Interpretation: 0 points: Class I (very low risk - 0.4% complication rate)     "     Signed Electronically by: Tanika Clark MD  A copy of this evaluation report is provided to the requesting physician.

## 2024-10-03 ENCOUNTER — ANESTHESIA EVENT (OUTPATIENT)
Dept: SURGERY | Facility: CLINIC | Age: 74
End: 2024-10-03
Payer: COMMERCIAL

## 2024-10-03 ENCOUNTER — HOSPITAL ENCOUNTER (OUTPATIENT)
Facility: CLINIC | Age: 74
Discharge: HOME | End: 2024-10-03
Attending: STUDENT IN AN ORGANIZED HEALTH CARE EDUCATION/TRAINING PROGRAM | Admitting: STUDENT IN AN ORGANIZED HEALTH CARE EDUCATION/TRAINING PROGRAM
Payer: COMMERCIAL

## 2024-10-03 ENCOUNTER — ANESTHESIA (OUTPATIENT)
Dept: SURGERY | Facility: CLINIC | Age: 74
End: 2024-10-03
Payer: COMMERCIAL

## 2024-10-03 VITALS
WEIGHT: 138 LBS | HEART RATE: 48 BPM | DIASTOLIC BLOOD PRESSURE: 84 MMHG | SYSTOLIC BLOOD PRESSURE: 148 MMHG | TEMPERATURE: 97.7 F | RESPIRATION RATE: 18 BRPM | BODY MASS INDEX: 24.45 KG/M2 | OXYGEN SATURATION: 99 %

## 2024-10-03 PROCEDURE — V2787 ASTIGMATISM-CORRECT FUNCTION: HCPCS | Performed by: STUDENT IN AN ORGANIZED HEALTH CARE EDUCATION/TRAINING PROGRAM

## 2024-10-03 PROCEDURE — 250N000011 HC RX IP 250 OP 636: Performed by: NURSE ANESTHETIST, CERTIFIED REGISTERED

## 2024-10-03 PROCEDURE — 761N000008 HC RECOVERY CATRACT PACKAGE: Performed by: STUDENT IN AN ORGANIZED HEALTH CARE EDUCATION/TRAINING PROGRAM

## 2024-10-03 PROCEDURE — 360N000007 HC CATARACT SURGICAL PACKAGE: Performed by: STUDENT IN AN ORGANIZED HEALTH CARE EDUCATION/TRAINING PROGRAM

## 2024-10-03 PROCEDURE — V2632 POST CHMBR INTRAOCULAR LENS: HCPCS | Performed by: STUDENT IN AN ORGANIZED HEALTH CARE EDUCATION/TRAINING PROGRAM

## 2024-10-03 PROCEDURE — 370N000004 HC ANESTHESIA CATARACT PACKAGE: Performed by: STUDENT IN AN ORGANIZED HEALTH CARE EDUCATION/TRAINING PROGRAM

## 2024-10-03 PROCEDURE — 250N000011 HC RX IP 250 OP 636: Performed by: STUDENT IN AN ORGANIZED HEALTH CARE EDUCATION/TRAINING PROGRAM

## 2024-10-03 PROCEDURE — 250N000009 HC RX 250: Performed by: STUDENT IN AN ORGANIZED HEALTH CARE EDUCATION/TRAINING PROGRAM

## 2024-10-03 DEVICE — IMPLANTABLE DEVICE: Type: IMPLANTABLE DEVICE | Site: EYE | Status: FUNCTIONAL

## 2024-10-03 RX ORDER — DICLOFENAC SODIUM 1 MG/ML
1 SOLUTION/ DROPS OPHTHALMIC
Status: COMPLETED | OUTPATIENT
Start: 2024-10-03 | End: 2024-10-03

## 2024-10-03 RX ORDER — TROPICAMIDE 10 MG/ML
1 SOLUTION/ DROPS OPHTHALMIC
Status: COMPLETED | OUTPATIENT
Start: 2024-10-03 | End: 2024-10-03

## 2024-10-03 RX ORDER — MOXIFLOXACIN 5 MG/ML
1 SOLUTION/ DROPS OPHTHALMIC
Status: COMPLETED | OUTPATIENT
Start: 2024-10-03 | End: 2024-10-03

## 2024-10-03 RX ORDER — NALOXONE HYDROCHLORIDE 0.4 MG/ML
0.1 INJECTION, SOLUTION INTRAMUSCULAR; INTRAVENOUS; SUBCUTANEOUS
Status: CANCELLED | OUTPATIENT
Start: 2024-10-03

## 2024-10-03 RX ORDER — PROPARACAINE HYDROCHLORIDE 5 MG/ML
1 SOLUTION/ DROPS OPHTHALMIC ONCE
Status: COMPLETED | OUTPATIENT
Start: 2024-10-03 | End: 2024-10-03

## 2024-10-03 RX ORDER — DEXAMETHASONE SODIUM PHOSPHATE 10 MG/ML
4 INJECTION, SOLUTION INTRAMUSCULAR; INTRAVENOUS
Status: CANCELLED | OUTPATIENT
Start: 2024-10-03

## 2024-10-03 RX ORDER — PHENYLEPHRINE HYDROCHLORIDE 25 MG/ML
1 SOLUTION/ DROPS OPHTHALMIC
Status: COMPLETED | OUTPATIENT
Start: 2024-10-03 | End: 2024-10-03

## 2024-10-03 RX ORDER — ONDANSETRON 4 MG/1
4 TABLET, ORALLY DISINTEGRATING ORAL EVERY 30 MIN PRN
Status: CANCELLED | OUTPATIENT
Start: 2024-10-03

## 2024-10-03 RX ORDER — PROPARACAINE HYDROCHLORIDE 5 MG/ML
1 SOLUTION/ DROPS OPHTHALMIC ONCE
Status: DISCONTINUED | OUTPATIENT
Start: 2024-10-03 | End: 2024-10-03 | Stop reason: HOSPADM

## 2024-10-03 RX ORDER — ONDANSETRON 2 MG/ML
4 INJECTION INTRAMUSCULAR; INTRAVENOUS EVERY 30 MIN PRN
Status: CANCELLED | OUTPATIENT
Start: 2024-10-03

## 2024-10-03 RX ORDER — POVIDONE-IODINE 5 %
1 SOLUTION, NON-ORAL OPHTHALMIC (EYE) ONCE
Status: DISCONTINUED | OUTPATIENT
Start: 2024-10-03 | End: 2024-10-03 | Stop reason: HOSPADM

## 2024-10-03 RX ADMIN — TROPICAMIDE 1 DROP: 10 SOLUTION/ DROPS OPHTHALMIC at 07:49

## 2024-10-03 RX ADMIN — MOXIFLOXACIN 1 DROP: 5 SOLUTION/ DROPS OPHTHALMIC at 07:49

## 2024-10-03 RX ADMIN — DICLOFENAC SODIUM 1 DROP: 1 SOLUTION OPHTHALMIC at 07:55

## 2024-10-03 RX ADMIN — MIDAZOLAM 2 MG: 1 INJECTION INTRAMUSCULAR; INTRAVENOUS at 09:00

## 2024-10-03 RX ADMIN — PHENYLEPHRINE HYDROCHLORIDE 1 DROP: 25 SOLUTION/ DROPS OPHTHALMIC at 07:55

## 2024-10-03 RX ADMIN — MOXIFLOXACIN 1 DROP: 5 SOLUTION/ DROPS OPHTHALMIC at 07:59

## 2024-10-03 RX ADMIN — MOXIFLOXACIN 1 DROP: 5 SOLUTION/ DROPS OPHTHALMIC at 07:55

## 2024-10-03 RX ADMIN — PHENYLEPHRINE HYDROCHLORIDE 1 DROP: 25 SOLUTION/ DROPS OPHTHALMIC at 07:59

## 2024-10-03 RX ADMIN — TROPICAMIDE 1 DROP: 10 SOLUTION/ DROPS OPHTHALMIC at 07:59

## 2024-10-03 RX ADMIN — DICLOFENAC SODIUM 1 DROP: 1 SOLUTION OPHTHALMIC at 07:59

## 2024-10-03 RX ADMIN — DICLOFENAC SODIUM 1 DROP: 1 SOLUTION OPHTHALMIC at 07:49

## 2024-10-03 RX ADMIN — PHENYLEPHRINE HYDROCHLORIDE 1 DROP: 25 SOLUTION/ DROPS OPHTHALMIC at 07:49

## 2024-10-03 RX ADMIN — PROPARACAINE HYDROCHLORIDE 1 DROP: 5 SOLUTION/ DROPS OPHTHALMIC at 07:48

## 2024-10-03 RX ADMIN — TROPICAMIDE 1 DROP: 10 SOLUTION/ DROPS OPHTHALMIC at 07:55

## 2024-10-03 ASSESSMENT — LIFESTYLE VARIABLES: TOBACCO_USE: 1

## 2024-10-03 ASSESSMENT — ACTIVITIES OF DAILY LIVING (ADL)
ADLS_ACUITY_SCORE: 35

## 2024-10-03 NOTE — ANESTHESIA CARE TRANSFER NOTE
Patient: Niesha Adhikari    Procedure: Procedure(s):  PHACOEMULSIFICATION, CATARACT, WITH INTRAOCULAR LENS IMPLANT, TORIC LENS       Diagnosis: Cataract [H26.9]  Diagnosis Additional Information: No value filed.    Anesthesia Type:   MAC     Note:    Oropharynx: spontaneously breathing  Level of Consciousness: awake  Oxygen Supplementation: room air    Independent Airway: airway patency satisfactory and stable  Dentition: dentition unchanged  Vital Signs Stable: post-procedure vital signs reviewed and stable  Report to RN Given: handoff report given  Patient transferred to: Phase II    Handoff Report: Identifed the Patient, Identified the Reponsible Provider, Reviewed the pertinent medical history, Discussed the surgical course, Reviewed Intra-OP anesthesia mangement and issues during anesthesia, Set expectations for post-procedure period and Allowed opportunity for questions and acknowledgement of understanding      Vitals:  Vitals Value Taken Time   /68 10/03/24 0930   Temp     Pulse 47 10/03/24 0930   Resp     SpO2 95 % 10/03/24 0929   Vitals shown include unfiled device data.    Electronically Signed By: YOSEPH Thomas CRNA  October 3, 2024  9:39 AM

## 2024-10-03 NOTE — ANESTHESIA POSTPROCEDURE EVALUATION
Patient: Niesha Adhikari    Procedure: Procedure(s):  PHACOEMULSIFICATION, CATARACT, WITH INTRAOCULAR LENS IMPLANT, TORIC LENS       Anesthesia Type:  MAC    Note:  Anesthesia Post Evaluation    Last vitals:  Vitals Value Taken Time   /84 10/03/24 0950   Temp     Pulse 48 10/03/24 0950   Resp     SpO2 99 % 10/03/24 0954   Vitals shown include unfiled device data.    Electronically Signed By: YOSEPH Thomas CRNA  October 3, 2024  2:31 PM

## 2024-10-03 NOTE — BRIEF OP NOTE
[facility]    Brief Operative Note    Pre-operative diagnosis: Cataract, Left Eye  Post-operative diagnosis Same as pre-operative diagnosis    Procedure: PHACOEMULSIFICATION, CATARACT, WITH INTRAOCULAR LENS IMPLANT, TORIC LENS, Left - Eye    Surgeon: Surgeons and Role:     * Primo Garcia MD - Primary  Anesthesia: MAC with Topical   Estimated Blood Loss: 0 mL from 10/3/2024  9:03 AM to 10/3/2024  9:28 AM      Drains: None  Specimens: * No specimens in log *  Findings:   None.  Complications: None.  Implants:   Implant Name Type Inv. Item Serial No.  Lot No. LRB No. Used Action   Tecnis Toric II  IOL +19.0D SE [id number]   [id number]   Left 1 Implanted

## 2024-10-03 NOTE — OP NOTE
[facility]  Ophthalmology Operative Note    PREOPERATIVE DIAGNOSIS: Cataract, Left eye.     POSTOPERATIVE DIAGNOSIS: Cataract, Left eye.     OPERATION: Cataract extraction with placement of posterior chamber intraocular lens in the Left eye.     ANESTHESIA: MAC combined with topical     INDICATIONS FOR PROCEDURE: Niesha Adhikari was seen in the [facility] for decreased visual acuity in the Left eye. The patient was found to have a visually significant cataract in the Left eye. The risks, benefits, alternatives and goals of cataract extraction were discussed with the patient, and after adequate discussion the patient understood and agreed to these, and a signed informed consent was obtained prior to the procedure.     DESCRIPTION OF PROCEDURE: After proper patient identification, topical anesthesia was applied to the Left eye. The patient was met in the pre-operative holding area and sat upright. Salcedo were placed along the corneal limbus at the 3, 6, 9 and 12 o'clock positions to facilitate toric lens alignment. The patient was then brought to the operating room and the Left eye was prepped and draped in the usual sterile fashion for intraocular surgery. A lid speculum was placed in the Left eye. A paracentesis was then created and the anterior chamber was filled with 1% non-preserved lidocaine followed by Endocoat. A clear corneal incision was then created temporally using a 2.4mm keratome. A continuous curvilinear capsulorrhexis was then created using a cystotome and Utrata forceps. Hydrodissection was carried out with BSS on a Flanagan cannula and the lens rotated freely within the capsular bag. Phacoemulsification was then carried out using the divide and conquer technique. Residual cortical material was removed using the I&A handpiece. The capsular bag was then filled with Healon. A toric marker was used to place marks along the corneal limbus at the 14-degree axis.  Next, the [id number] +19.0 diopter intraocular lens was then injected into the capsular bag. The lens was rotated into proper position along the 14 degree meridian. The lens showed good centration and stability. Residual viscoelastic was removed using the I&A handpiece. The wound was then hydrated and the anterior chamber reformed. Intracameral Moxifloxacin was then injected into the anterior chamber. The wounds were then checked and found to be sealed. Topical Prednisolone drops were placed in the patient's Left eye followed by a Santacruz shield over the top of this. The patient tolerated the procedure well without complications and was told to follow up in the clinic in the next postoperative day.     Implant Name Type Inv. Item Serial No.  Lot No. LRB No. Used Action   Tecnis Toric II  IOL +19.0D SE [id number]   [id number]   Left 1 Implanted        Primo Garcia MD
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
## 2024-10-05 ENCOUNTER — HOSPITAL ENCOUNTER (OUTPATIENT)
Facility: CLINIC | Age: 74
End: 2024-10-05
Attending: STUDENT IN AN ORGANIZED HEALTH CARE EDUCATION/TRAINING PROGRAM | Admitting: STUDENT IN AN ORGANIZED HEALTH CARE EDUCATION/TRAINING PROGRAM

## 2024-10-09 ENCOUNTER — OFFICE VISIT (OUTPATIENT)
Dept: FAMILY MEDICINE | Facility: OTHER | Age: 74
End: 2024-10-09
Attending: FAMILY MEDICINE
Payer: COMMERCIAL

## 2024-10-09 VITALS
TEMPERATURE: 98 F | RESPIRATION RATE: 11 BRPM | DIASTOLIC BLOOD PRESSURE: 78 MMHG | HEIGHT: 63 IN | WEIGHT: 137.8 LBS | SYSTOLIC BLOOD PRESSURE: 127 MMHG | BODY MASS INDEX: 24.41 KG/M2 | OXYGEN SATURATION: 99 % | HEART RATE: 60 BPM

## 2024-10-09 DIAGNOSIS — G56.01 CARPAL TUNNEL SYNDROME OF RIGHT WRIST: ICD-10-CM

## 2024-10-09 DIAGNOSIS — I10 BENIGN ESSENTIAL HYPERTENSION: ICD-10-CM

## 2024-10-09 DIAGNOSIS — Z01.818 PREOP GENERAL PHYSICAL EXAM: Primary | ICD-10-CM

## 2024-10-09 DIAGNOSIS — M06.09 RHEUMATOID ARTHRITIS OF MULTIPLE SITES WITH NEGATIVE RHEUMATOID FACTOR (H): ICD-10-CM

## 2024-10-09 PROCEDURE — 99214 OFFICE O/P EST MOD 30 MIN: CPT | Performed by: FAMILY MEDICINE

## 2024-10-09 ASSESSMENT — PAIN SCALES - GENERAL: PAINLEVEL: MODERATE PAIN (4)

## 2024-10-09 NOTE — PATIENT INSTRUCTIONS
How to Take Your Medication Before Surgery  Preoperative Medication Instructions   Antiplatelet or Anticoagulation Medication Instructions   - Patient is on no antiplatelet or anticoagulation medications.    Additional Medication Instructions   - ACE/ARB: DO NOT TAKE on day of surgery (minimum 11 hours for general anesthesia). --Lisinopril   - naproxen (Aleve, Naprosyn): DO NOT TAKE 4 days before surgery.        Patient Education   Preparing for Your Surgery  For Adults  Getting started  In most cases, a nurse will call to review your health history and instructions. They will give you an arrival time based on your scheduled surgery time. Please be ready to share:  Your doctor's clinic name and phone number  Your medical, surgical, and anesthesia history  A list of allergies and sensitivities  A list of medicines, including herbal treatments and over-the-counter drugs  Whether the patient has a legal guardian (ask how to send us the papers in advance)  Note: You may not receive a call if you were seen at our PAC (Preoperative Assessment Center).  Please tell us if you're pregnant--or if there's any chance you might be pregnant. Some surgeries may injure a fetus (unborn baby), so they require a pregnancy test. Surgeries that are safe for a fetus don't always need a test, and you can choose whether to have one.   Preparing for surgery  Within 10 to 30 days of surgery: Have a pre-op exam (sometimes called an H&P, or History and Physical). This can be done at a clinic or pre-operative center.  If you're having a , you may not need this exam. Talk to your care team.  At your pre-op exam, talk to your care team about all medicines you take. (This includes CBD oil and any drugs, such as THC, marijuana, and other forms of cannabis.) If you need to stop any medicine before surgery, ask when to start taking it again.  This is for your safety. Many medicines and drugs can make you bleed too much during surgery. Some  change how well surgery (anesthesia) drugs work.  Call your insurance company to let them know you're having surgery. (If you don't have insurance, call [phone].)  Call your clinic if there's any change in your health. This includes a scrape or scratch near the surgery site, or any signs of a cold (sore throat, runny nose, cough, rash, fever).  Eating and drinking guidelines  For your safety: Unless your surgeon tells you otherwise, follow the guidelines below.  Eat and drink as normal until 8 hours before you arrive for surgery. After that, no food or milk. You can spit out gum when you arrive.  Drink clear liquids until 2 hours before you arrive. These are liquids you can see through, like water, Gatorade, and Propel Water. They also include plain black coffee and tea (no cream or milk).  No alcohol for 24 hours before you arrive. The night before surgery, stop any drinks that contain THC.  If your care team tells you to take medicine on the morning of surgery, it's okay to take it with a sip of water. No other medicines or drugs are allowed (including CBD oil)--follow your care team's instructions.  If you have questions the day of surgery, call your hospital or surgery center.   Preventing infection  Shower or bathe the night before and the morning of surgery. Follow the instructions your clinic gave you. (If no instructions, use regular soap.)  Don't shave or clip hair near your surgery site. We'll remove the hair if needed.  Don't smoke or vape the morning of surgery. No chewing tobacco for 6 hours before you arrive. A nicotine patch is okay. You may spit out nicotine gum when you arrive.  For some surgeries, the surgeon will tell you to fully quit smoking and nicotine.  We will make every effort to keep you safe from infection. We will:  Clean our hands often with soap and water (or an alcohol-based hand rub).  Clean the skin at your surgery site with a special soap that kills germs.  Give you a special  gown to keep you warm. (Cold raises the risk of infection.)  Wear hair covers, masks, gowns, and gloves during surgery.  Give antibiotic medicine, if prescribed. Not all surgeries need this medicine.  What to bring on the day of surgery  Photo ID and insurance card  Copy of your health care directive, if you have one  Glasses and hearing aids (bring cases)  You can't wear contacts during surgery  Inhaler and eye drops, if you use them (tell us about these when you arrive)  CPAP machine or breathing device, if you use them  A few personal items, if spending the night  If you have . . .  A pacemaker, ICD (cardiac defibrillator), or other implant: Bring the ID card.  An implanted stimulator: Bring the remote control.  A legal guardian: Bring a copy of the certified (court-stamped) guardianship papers.  Please remove any jewelry, including body piercings. Leave jewelry and other valuables at home.  If you're going home the day of surgery  You must have a responsible adult drive you home. They should stay with you overnight as well.  If you don't have someone to stay with you, and you aren't safe to go home alone, we may keep you overnight. Insurance often won't pay for this.  After surgery  If it's hard to control your pain or you need more pain medicine, please call your surgeon's office.  Questions?   If you have any questions for your care team, list them here:   ____________________________________________________________________________________________________________________________________________________________________________________________________________________________________________________________  For informational purposes only. Not to replace the advice of your health care provider. Copyright   2003, 2019 [facility]. All rights reserved. Clinically reviewed by Reyes Guillaume MD. [employer] [id number] - REV 08/24.

## 2024-10-09 NOTE — PROGRESS NOTES
Preoperative Evaluation  [address]  [facility] [address]  Phone: [phone]  Primary Provider: Tanika Clark MD  Pre-op Performing Provider: Tanika Clark MD  Oct 9, 2024             10/8/2024   Surgical Information   What procedure is being done? Carpel tunnel & trigger finger right hand & fingers   Facility or Hospital where procedure/surgery will be performed: [facility]   Who is doing the procedure / surgery? Dr. David Cruz   Date of surgery / procedure: November 1, 2024   Time of surgery / procedure: a.m.   Where do you plan to recover after surgery? at home with family        Fax number for surgical facility: Note does not need to be faxed, will be available electronically in Epic.    Assessment & Plan     The proposed surgical procedure is considered LOW risk.    Preop general physical exam    Carpal tunnel syndrome of right wrist    Benign essential hypertension  Well-controlled    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)  Follows with rheumatology.  Plans on stopping Orencia 2 weeks prior to surgery and hold it until 2 weeks after surgery    Antiplatelet or Anticoagulation Medication Instructions   - Patient is on no antiplatelet or anticoagulation medications.    Additional Medication Instructions   - ACE/ARB: DO NOT TAKE on day of surgery (minimum 11 hours for general anesthesia).   - DMARDs --rheumatology recommended stopping Orencia 2 weeks prior to surgery and holding until 2 weeks after surgery    - naproxen (Aleve, Naprosyn): DO NOT TAKE 4 days before surgery.     Recommendation  Approval given to proceed with proposed procedure, without further diagnostic evaluation.    Oksana Scherer is a 74 year old, presenting for the following:  Pre-Op Exam          10/9/2024     8:07 AM   Additional Questions   Roomed by gwen   Accompanied by self     HPI related to upcoming  procedure: right carpal tunnel syndrome         10/8/2024   Pre-Op Questionnaire   Have you ever had a heart attack or stroke? No   Have you ever had surgery on your heart or blood vessels, such as a stent placement, a coronary artery bypass, or surgery on an artery in your head, neck, heart, or legs? No   Do you have chest pain with activity? No   Do you have a history of heart failure? No   Do you currently have a cold, bronchitis or symptoms of other infection? No   Do you have a cough, shortness of breath, or wheezing? No   Do you or anyone in your family have previous history of blood clots? (!) YES--mother   Do you or does anyone in your family have a serious bleeding problem such as prolonged bleeding following surgeries or cuts? No   Have you ever had problems with anemia or been told to take iron pills? (!) YES --has a history of anemia, last hemoglobin was normal, on an iron supplement    Have you had any abnormal blood loss such as black, tarry or bloody stools, or abnormal vaginal bleeding? (!) YES  - rectal bleeding chronic with diarrhea, follows with colorectal and just had colonoscopy    Have you ever had a blood transfusion? (!) YES   Have you ever had a transfusion reaction? No   Are you willing to have a blood transfusion if it is medically needed before, during, or after your surgery? Yes   Have you or any of your relatives ever had problems with anesthesia? No   Do you have sleep apnea, excessive snoring or daytime drowsiness? (!) YES--narcolepsy   Do you have a CPAP machine? (!) NO    Do you have any artifical heart valves or other implanted medical devices like a pacemaker, defibrillator, or continuous glucose monitor? No   Do you have artificial joints? No   Are you allergic to latex? No        Health Care Directive  Patient does not have a Health Care Directive or Living Will: Patient states has Advance Directive and will bring in a copy to clinic.    Preoperative Review of    reviewed  - controlled substances reflected in medication list.      Patient Active Problem List    Diagnosis Date Noted    S/P carpal tunnel release 09/03/2024     Priority: Medium    Carpal tunnel syndrome of right wrist 09/03/2024     Priority: Medium    Trigger middle finger of right hand 09/03/2024     Priority: Medium    Chronic bilateral low back pain without sciatica 11/02/2023     Priority: Medium    Iron deficiency 07/05/2022     Priority: Medium    Constipation 08/09/2019     Priority: Medium    DDD (degenerative disc disease), cervical 08/09/2019     Priority: Medium    Fatigue 01/02/2019     Priority: Medium    Personal history of other medical treatment 12/05/2018     Priority: Medium     Alendronate (GERD per record review), Boniva PO (Ineffective per record review), Boniva IV (Effective per record review)      Alcohol dependence in remission (H) 11/16/2018     Priority: Medium    Benign essential hypertension 09/01/2017     Priority: Medium    Osteoporosis 06/07/2017     Priority: Medium    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) 06/07/2017     Priority: Medium    AIN (anal intraepithelial neoplasia) anal canal 09/25/2012     Priority: Medium    Internal hemorrhoids with other complication 10/13/2011     Priority: Medium    Narcolepsy 08/15/2011     Priority: Medium    Moderate recurrent major depression (H) 05/05/2010     Priority: Medium    Fibromyalgia 07/10/2009     Priority: Medium    Essential and other specified forms of tremor 06/30/2006     Priority: Medium    Migraine 06/05/2006     Priority: Medium    Pernicious anemia 07/27/2005     Priority: Medium    Anxiety state 07/27/2005     Priority: Medium    Allergic rhinitis 06/15/2002     Priority: Medium      Past Medical History:   Diagnosis Date    ABUSE BY SPOUSE/PARTNER 07/27/2005    Degeneration of lumbar or lumbosacral intervertebral disc     DDD L5/S1    Depressive disorder     Esophageal reflux 01/28/2005     HELICOBACTER PYLORI INFECTION 01/28/2005    Hepatitis C - Cured. Achieved SVR in 2017     History of blood transfusion     Hypertension     Malignant neoplasm (H)     ACIN    Osteoporosis     Other and unspecified alcohol dependence, unspecified drinking behavior     Sober as 1/21/1987    Other malaise and fatigue      Past Surgical History:   Procedure Laterality Date    BIOPSY ANAL CANAL  1/21/13    [facility]     BREAST BIOPSY, RT/LT Left 1975    Breat Biopsy RT/LT    COLONOSCOPY  8/25/2009    COLONOSCOPY  2/14/2011    COLONOSCOPY performed by CRISTIN LAGUNAS at  GI    COLONOSCOPY N/A 1/8/2019    Procedure: Colonscopy, Biopsies by Biopsy;  Surgeon: Omega Talavera MD;  Location:  GI    COLONOSCOPY N/A 6/17/2024    Procedure: Colonoscopy;  Surgeon: Omega Talavera MD;  Location:  GI    CYSTOSCOPY  2/28/2011    CYSTOSCOPY performed by CAYLA FLOR at  OR    ENDOSCOPY  05/21/12    [facility] GI [facility]    ENT SURGERY  1965    GI SURGERY  06/25/12     UGI ENDOSCOPY DIAG W BIOPSY  10/01/09    HEMORRHOIDECTOMY  06/25/12    [facility]    LAPAROSCOPIC SALPINGO-OOPHORECTOMY  2/28/2011    LAPAROSCOPIC SALPINGO-OOPHORECTOMY performed by CAYLA FLOR at  OR    PHACOEMULSIFICATION CLEAR CORNEA WITH TORIC INTRAOCULAR LENS IMPLANT Right 9/5/2024    Procedure: PHACOEMULSIFICATION, CATARACT, WITH INTRAOCULAR LENS IMPLANT, TORIC LENS right;  Surgeon: Primo Garcia MD;  Location:  OR    PHACOEMULSIFICATION CLEAR CORNEA WITH TORIC INTRAOCULAR LENS IMPLANT Left 10/3/2024    Procedure: PHACOEMULSIFICATION, CATARACT, WITH INTRAOCULAR LENS IMPLANT, TORIC LENS, LEFT;  Surgeon: Primo Garcia MD;  Location: [facility] OR    RELEASE CARPAL TUNNEL Left 8/23/2024    Procedure: Left carpal tunnel release;  Surgeon: David John MD;  Location: [facility] OR    RELEASE TRIGGER FINGER Left 8/23/2024    Procedure: Left middle finger trigger finger release;  Surgeon: David John  MD Isac;  Location: [facility] OR    TONSILLECTOMY & ADENOIDECTOMY  1965    [facility] NONSPECIFIC PROCEDURE  1965    Removed bone left index finger knuckle, casts broken bones    [facility] COLONOSCOPY W/WO BRUSH/WASH  08/22/05    [facility] UGI ENDOSCOPY, SIMPLE EXAM  08/08/07     Current Outpatient Medications   Medication Sig Dispense Refill    Abatacept (ORENCIA CLICKJECT) 125 MG/ML SOAJ auto-injector Inject 1 mL (125 mg) subcutaneously every 7 days . Hold for any infection and seek medical attention. 4 mL 4    calcium carb-cholecalciferol 600-500 MG-UNIT CAPS Take 1,200 mg by mouth daily      cloNIDine (CATAPRES) 0.2 MG tablet Take 0.2 mg by mouth At Bedtime      cycloSPORINE (RESTASIS) 0.05 % ophthalmic emulsion Place 1 drop into both eyes 2 times daily       denosumab (PROLIA) 60 MG/ML SOSY injection       diclofenac (VOLTAREN) 1 % topical gel Apply up to 2 grams of 1% gel to hands up to 4 times daily as needed for joint pain (maximum: 8 g per upper extremity per day) 200 g 2    ferrous sulfate (FE TABS) 325 (65 Fe) MG EC tablet Take 1 tablet (325 mg) by mouth every other day 45 tablet 0    gabapentin (NEURONTIN) 300 MG capsule TAKE ONE CAPSULE BY MOUTH THREE TIMES A DAY, MAY TAKE TWO CAPSULES BY MOUTH EVERY NIGHT AT BEDTIME 180 capsule 0    hydroxychloroquine (PLAQUENIL) 200 MG tablet Hydroxychloroquine 200mg daily; and an additional 200mg every other day.  Yearly eye exam, including 10-2 VF and SD-OCT, required. 135 tablet 1    lisdexamfetamine (VYVANSE) 30 MG capsule Take 30 mg by mouth every morning      lisinopril (ZESTRIL) 10 MG tablet Take 1 tablet (10 mg) by mouth daily 90 tablet 3    montelukast (SINGULAIR) 10 MG tablet TAKE 1 TABLET BY MOUTH ONCE DAILY AS NEEDED SEASONALLY (AUGUST AND SEPTEMBER) 90 tablet 3    Psyllium (FIBER) 0.52 G CAPS 1 tabs in am and pm 540 capsule     sennosides (SENOKOT) 8.6 MG tablet Take 2 tablets by mouth 2 times daily      tenofovir alafenamide fumarate (VEMLIDY) 25 MG tablet Take 1 tablet (25  "mg) by mouth daily. with food (dispense only in the original container). 90 tablet 3    triamcinolone (KENALOG) 0.1 % external cream Apply topically 2 times daily Use to to 14 days at a time 15 g 0    Vitamin D3 (CHOLECALCIFEROL) 25 mcg (1000 units) tablet Take 1 tablet (25 mcg) by mouth daily 90 tablet 1       Allergies   Allergen Reactions    Telaprevir Other (See Comments) and Rash     Rectal bleeding, anemia    Abilify Discmelt Other (See Comments)     Disoriented    Thiopental Sodium      PENTOTHAL/rigidity and fight response    Antivert [Meclizine Hcl]     Chamomile     Compazine     Diphenhydramine Nausea     And abdominal pain    Duloxetine Hcl Other (See Comments)     Disoriented, trouble sleeping    Effexor [Venlafaxine] Other (See Comments)     Disoriented, trouble sleeping    Elavil [Amitriptyline Hcl] Other (See Comments)     \"didn't feel right on it-med was stopped right away\"    Food Difficulty breathing     cilantro    Indomethacin      indocin sensativity \"Severe h.a\"    Seasonal Allergies Other (See Comments) and Difficulty breathing     Philip Gold Aug-Sept, rag weed, sneezing    Sulfa Antibiotics     Animal Dander Difficulty breathing and Rash     sneezing,resp. distress    Bupropion Anxiety    Tylenol [Acetaminophen] Rash        Social History     Tobacco Use    Smoking status: Former     Current packs/day: 0.00     Average packs/day: 0.8 packs/day for 10.0 years (7.5 ttl pk-yrs)     Types: Cigarettes     Start date: 1968     Quit date: 1978     Years since quittin.9     Passive exposure: Never    Smokeless tobacco: Never    Tobacco comments:     Quit 46 years ago   Substance Use Topics    Alcohol use: No       History   Drug Use No             Review of Systems  CONSTITUTIONAL: NEGATIVE for fever, chills, change in weight  INTEGUMENTARY/SKIN: NEGATIVE for worrisome rashes, moles or lesions  EYES: NEGATIVE for vision changes or irritation  ENT/MOUTH: NEGATIVE for ear, mouth and " "throat problems  RESP: NEGATIVE for significant cough or SOB  CV: NEGATIVE for chest pain, palpitations or peripheral edema  GI: NEGATIVE for nausea, abdominal pain, heartburn, or change in bowel habits  : NEGATIVE for frequency, dysuria, or hematuria  MUSCULOSKELETAL: NEGATIVE for significant arthralgias or myalgia  NEURO: numbness/tingling right hand  ENDOCRINE: NEGATIVE for temperature intolerance, skin/hair changes  HEME: NEGATIVE for bleeding problems  PSYCHIATRIC: NEGATIVE for changes in mood or affect    Objective    /78   Pulse 60   Temp 98  F (36.7  C) (Temporal)   Resp 11   Ht 1.6 m (5' 3\")   Wt 62.5 kg (137 lb 12.8 oz)   LMP 11/27/2003   SpO2 99%   BMI 24.41 kg/m     Estimated body mass index is 24.41 kg/m  as calculated from the following:    Height as of this encounter: 1.6 m (5' 3\").    Weight as of this encounter: 62.5 kg (137 lb 12.8 oz).  Physical Exam  GENERAL: alert and no distress  EYES: Eyes grossly normal to inspection, PERRL and conjunctivae and sclerae normal  HENT: ear canals and TM's normal, nose and mouth without ulcers or lesions  NECK: no adenopathy, no asymmetry, masses, or scars  RESP: lungs clear to auscultation - no rales, rhonchi or wheezes  CV: regular rate and rhythm, normal S1 S2, no S3 or S4, no murmur, click or rub, no peripheral edema  ABDOMEN: soft, nontender, no hepatosplenomegaly, no masses and bowel sounds normal  MS: no gross musculoskeletal defects noted, no edema  SKIN: no suspicious lesions or rashes  NEURO: Normal strength and tone, mentation intact and speech normal  PSYCH: mentation appears normal, affect normal/bright    Recent Labs   Lab Test 09/04/24  1017 07/26/24  1121 07/22/24  0900 12/27/23  0702 10/10/23  2015   HGB 13.3 12.8  --    < > 11.1*   * 137*  --    < > 158   INR 1.14  --   --   --   --      --   --   --  133*   POTASSIUM 4.4  --   --   --  4.1   CR 0.86  --  0.8   < > 0.71    < > = values in this interval not displayed. "        Diagnostics  No labs were ordered during this visit.   No EKG required for low risk surgery (cataract, skin procedure, breast biopsy, etc).    Revised Cardiac Risk Index (RCRI)  The patient has the following serious cardiovascular risks for perioperative complications:   - No serious cardiac risks = 0 points     RCRI Interpretation: 0 points: Class I (very low risk - 0.4% complication rate)         Signed Electronically by: Tanika Clark MD  A copy of this evaluation report is provided to the requesting physician.

## 2024-10-14 ENCOUNTER — LAB (OUTPATIENT)
Dept: INFUSION THERAPY | Facility: CLINIC | Age: 74
End: 2024-10-14
Attending: FAMILY MEDICINE
Payer: COMMERCIAL

## 2024-10-14 ENCOUNTER — INFUSION THERAPY VISIT (OUTPATIENT)
Dept: INFUSION THERAPY | Facility: CLINIC | Age: 74
End: 2024-10-14
Attending: INTERNAL MEDICINE
Payer: COMMERCIAL

## 2024-10-14 VITALS — HEART RATE: 55 BPM | SYSTOLIC BLOOD PRESSURE: 130 MMHG | DIASTOLIC BLOOD PRESSURE: 80 MMHG | OXYGEN SATURATION: 98 %

## 2024-10-14 DIAGNOSIS — Z92.89 PERSONAL HISTORY OF OTHER MEDICAL TREATMENT: Primary | ICD-10-CM

## 2024-10-14 DIAGNOSIS — Z92.89 PERSONAL HISTORY OF OTHER MEDICAL TREATMENT: ICD-10-CM

## 2024-10-14 DIAGNOSIS — M81.0 AGE-RELATED OSTEOPOROSIS WITHOUT CURRENT PATHOLOGICAL FRACTURE: Primary | ICD-10-CM

## 2024-10-14 DIAGNOSIS — M81.0 AGE-RELATED OSTEOPOROSIS WITHOUT CURRENT PATHOLOGICAL FRACTURE: ICD-10-CM

## 2024-10-14 LAB
CALCIUM SERPL-MCNC: 10 MG/DL (ref 8.8–10.4)
CREAT SERPL-MCNC: 0.86 MG/DL (ref 0.51–0.95)
EGFRCR SERPLBLD CKD-EPI 2021: 71 ML/MIN/1.73M2
HOLD SPECIMEN: NORMAL

## 2024-10-14 PROCEDURE — 82310 ASSAY OF CALCIUM: CPT | Performed by: INTERNAL MEDICINE

## 2024-10-14 PROCEDURE — 82565 ASSAY OF CREATININE: CPT | Performed by: INTERNAL MEDICINE

## 2024-10-14 PROCEDURE — 250N000011 HC RX IP 250 OP 636: Mod: JZ | Performed by: INTERNAL MEDICINE

## 2024-10-14 PROCEDURE — 36415 COLL VENOUS BLD VENIPUNCTURE: CPT | Performed by: INTERNAL MEDICINE

## 2024-10-14 PROCEDURE — 99207 PR NO CHARGE LOS: CPT

## 2024-10-14 PROCEDURE — 96372 THER/PROPH/DIAG INJ SC/IM: CPT | Performed by: INTERNAL MEDICINE

## 2024-10-14 RX ORDER — EPINEPHRINE 1 MG/ML
0.3 INJECTION, SOLUTION INTRAMUSCULAR; SUBCUTANEOUS EVERY 5 MIN PRN
OUTPATIENT
Start: 2025-04-12

## 2024-10-14 RX ORDER — MEPERIDINE HYDROCHLORIDE 25 MG/ML
25 INJECTION INTRAMUSCULAR; INTRAVENOUS; SUBCUTANEOUS EVERY 30 MIN PRN
OUTPATIENT
Start: 2025-04-12

## 2024-10-14 RX ORDER — DIPHENHYDRAMINE HYDROCHLORIDE 50 MG/ML
50 INJECTION INTRAMUSCULAR; INTRAVENOUS
Start: 2025-04-12

## 2024-10-14 RX ORDER — METHYLPREDNISOLONE SODIUM SUCCINATE 125 MG/2ML
125 INJECTION INTRAMUSCULAR; INTRAVENOUS
Start: 2025-04-12

## 2024-10-14 RX ORDER — ALBUTEROL SULFATE 0.83 MG/ML
2.5 SOLUTION RESPIRATORY (INHALATION)
OUTPATIENT
Start: 2025-04-12

## 2024-10-14 RX ORDER — ALBUTEROL SULFATE 90 UG/1
1-2 INHALANT RESPIRATORY (INHALATION)
Start: 2025-04-12

## 2024-10-14 RX ADMIN — DENOSUMAB 60 MG: 60 INJECTION SUBCUTANEOUS at 14:13

## 2024-10-14 ASSESSMENT — PAIN SCALES - GENERAL: PAINLEVEL: MODERATE PAIN (4)

## 2024-10-14 NOTE — PROGRESS NOTES
Infusion Nursing Note:  Niesah Adhikari presents today for Prolia.    Patient seen by provider today: No   present during visit today: Not Applicable.    Note: Assessment performed by Aranza BANUELOS RN prior to injection today. .      Intravenous Access:  No Intravenous access/labs at this visit.    Treatment Conditions:  Lab Results   Component Value Date     09/04/2024    POTASSIUM 4.4 09/04/2024    MAG 2.1 05/16/2022    CR 0.86 10/14/2024    ELIZABETH 10.0 10/14/2024    BILITOTAL 0.2 12/27/2023    ALBUMIN 4.4 04/08/2024    ALT 17 12/27/2023    AST 36 12/27/2023       Results reviewed, labs MET treatment parameters, ok to proceed with treatment.      Post Infusion Assessment:  Patient tolerated injection without incident.  Site patent and intact, free from redness, edema or discomfort.       Discharge Plan:   Patient discharged in stable condition accompanied by: self.  Departure Mode: Ambulatory.      Paris Garcia MA

## 2024-10-23 ENCOUNTER — ALLIED HEALTH/NURSE VISIT (OUTPATIENT)
Dept: FAMILY MEDICINE | Facility: OTHER | Age: 74
End: 2024-10-23
Payer: COMMERCIAL

## 2024-10-23 DIAGNOSIS — Z23 ENCOUNTER FOR IMMUNIZATION: Primary | ICD-10-CM

## 2024-10-23 DIAGNOSIS — Z23 NEED FOR PROPHYLACTIC VACCINATION AGAINST HEPATITIS A: ICD-10-CM

## 2024-10-23 PROCEDURE — 91320 SARSCV2 VAC 30MCG TRS-SUC IM: CPT

## 2024-10-23 PROCEDURE — 99207 PR NO CHARGE NURSE ONLY: CPT

## 2024-10-23 PROCEDURE — 90480 ADMN SARSCOV2 VAC 1/ONLY CMP: CPT

## 2024-10-23 NOTE — PROGRESS NOTES
Prior to immunization administration, verified patients identity using patient s name and date of birth. Please see Immunization Activity for additional information.     Is the patient's temperature normal (100.5 or less)? Yes     Patient MEETS CRITERIA. PROCEED with vaccine administration.      Patient instructed to remain in clinic for 15 minutes afterwards, and to report any adverse reactions.      Link to Ancillary Visit Immunization Standing Orders SmartSet     Screening performed by Cathryn Chung MA on 10/23/2024 at 10:04 AM.

## 2024-10-29 ENCOUNTER — ANESTHESIA EVENT (OUTPATIENT)
Dept: SURGERY | Facility: AMBULATORY SURGERY CENTER | Age: 74
End: 2024-10-29
Payer: COMMERCIAL

## 2024-10-29 ASSESSMENT — LIFESTYLE VARIABLES: TOBACCO_USE: 1

## 2024-11-01 ENCOUNTER — ANESTHESIA (OUTPATIENT)
Dept: SURGERY | Facility: AMBULATORY SURGERY CENTER | Age: 74
End: 2024-11-01
Payer: COMMERCIAL

## 2024-11-01 ENCOUNTER — HOSPITAL ENCOUNTER (OUTPATIENT)
Facility: AMBULATORY SURGERY CENTER | Age: 74
Discharge: HOME | End: 2024-11-01
Attending: ORTHOPAEDIC SURGERY | Admitting: ORTHOPAEDIC SURGERY
Payer: COMMERCIAL

## 2024-11-01 VITALS
BODY MASS INDEX: 24.27 KG/M2 | WEIGHT: 137 LBS | SYSTOLIC BLOOD PRESSURE: 117 MMHG | OXYGEN SATURATION: 97 % | HEART RATE: 61 BPM | DIASTOLIC BLOOD PRESSURE: 66 MMHG | RESPIRATION RATE: 16 BRPM | TEMPERATURE: 96.9 F

## 2024-11-01 DIAGNOSIS — G56.01 CARPAL TUNNEL SYNDROME OF RIGHT WRIST: Primary | ICD-10-CM

## 2024-11-01 PROCEDURE — 64721 CARPAL TUNNEL SURGERY: CPT | Performed by: ANESTHESIOLOGY

## 2024-11-01 PROCEDURE — G8918 PT W/O PREOP ORDER IV AB PRO: HCPCS

## 2024-11-01 PROCEDURE — 64721 CARPAL TUNNEL SURGERY: CPT | Performed by: NURSE ANESTHETIST, CERTIFIED REGISTERED

## 2024-11-01 PROCEDURE — 99100 ANES PT EXTEME AGE<1 YR&>70: CPT | Performed by: NURSE ANESTHETIST, CERTIFIED REGISTERED

## 2024-11-01 PROCEDURE — 99100 ANES PT EXTEME AGE<1 YR&>70: CPT | Performed by: ANESTHESIOLOGY

## 2024-11-01 PROCEDURE — G8907 PT DOC NO EVENTS ON DISCHARG: HCPCS

## 2024-11-01 PROCEDURE — 64721 CARPAL TUNNEL SURGERY: CPT | Mod: 79 | Performed by: ORTHOPAEDIC SURGERY

## 2024-11-01 PROCEDURE — 64721 CARPAL TUNNEL SURGERY: CPT | Mod: RT

## 2024-11-01 RX ORDER — OXYCODONE HYDROCHLORIDE 5 MG/1
10 TABLET ORAL
Status: DISCONTINUED | OUTPATIENT
Start: 2024-11-01 | End: 2024-11-02 | Stop reason: HOSPADM

## 2024-11-01 RX ORDER — HYDROCODONE BITARTRATE AND ACETAMINOPHEN 5; 325 MG/1; MG/1
.5-1 TABLET ORAL EVERY 4 HOURS PRN
Qty: 10 TABLET | Refills: 0 | Status: SHIPPED | OUTPATIENT
Start: 2024-11-01

## 2024-11-01 RX ORDER — ONDANSETRON 4 MG/1
4 TABLET, ORALLY DISINTEGRATING ORAL EVERY 30 MIN PRN
Status: DISCONTINUED | OUTPATIENT
Start: 2024-11-01 | End: 2024-11-02 | Stop reason: HOSPADM

## 2024-11-01 RX ORDER — NALOXONE HYDROCHLORIDE 0.4 MG/ML
0.1 INJECTION, SOLUTION INTRAMUSCULAR; INTRAVENOUS; SUBCUTANEOUS
Status: DISCONTINUED | OUTPATIENT
Start: 2024-11-01 | End: 2024-11-02 | Stop reason: HOSPADM

## 2024-11-01 RX ORDER — ONDANSETRON 2 MG/ML
4 INJECTION INTRAMUSCULAR; INTRAVENOUS EVERY 30 MIN PRN
Status: DISCONTINUED | OUTPATIENT
Start: 2024-11-01 | End: 2024-11-02 | Stop reason: HOSPADM

## 2024-11-01 RX ORDER — DEXAMETHASONE SODIUM PHOSPHATE 4 MG/ML
4 INJECTION, SOLUTION INTRA-ARTICULAR; INTRALESIONAL; INTRAMUSCULAR; INTRAVENOUS; SOFT TISSUE
Status: DISCONTINUED | OUTPATIENT
Start: 2024-11-01 | End: 2024-11-02 | Stop reason: HOSPADM

## 2024-11-01 RX ORDER — OXYCODONE HYDROCHLORIDE 5 MG/1
5 TABLET ORAL
Status: DISCONTINUED | OUTPATIENT
Start: 2024-11-01 | End: 2024-11-02 | Stop reason: HOSPADM

## 2024-11-01 RX ORDER — LIDOCAINE 40 MG/G
CREAM TOPICAL
Status: DISCONTINUED | OUTPATIENT
Start: 2024-11-01 | End: 2024-11-02 | Stop reason: HOSPADM

## 2024-11-01 RX ORDER — FENTANYL CITRATE 50 UG/ML
50 INJECTION, SOLUTION INTRAMUSCULAR; INTRAVENOUS EVERY 5 MIN PRN
Status: DISCONTINUED | OUTPATIENT
Start: 2024-11-01 | End: 2024-11-02 | Stop reason: HOSPADM

## 2024-11-01 RX ORDER — CEFAZOLIN SODIUM 2 G/50ML
2 SOLUTION INTRAVENOUS SEE ADMIN INSTRUCTIONS
Status: DISCONTINUED | OUTPATIENT
Start: 2024-11-01 | End: 2024-11-02 | Stop reason: HOSPADM

## 2024-11-01 RX ORDER — FENTANYL CITRATE 50 UG/ML
INJECTION, SOLUTION INTRAMUSCULAR; INTRAVENOUS PRN
Status: DISCONTINUED | OUTPATIENT
Start: 2024-11-01 | End: 2024-11-01

## 2024-11-01 RX ORDER — BUPIVACAINE HYDROCHLORIDE 2.5 MG/ML
INJECTION, SOLUTION INFILTRATION; PERINEURAL PRN
Status: DISCONTINUED | OUTPATIENT
Start: 2024-11-01 | End: 2024-11-01 | Stop reason: HOSPADM

## 2024-11-01 RX ORDER — FENTANYL CITRATE 50 UG/ML
25 INJECTION, SOLUTION INTRAMUSCULAR; INTRAVENOUS EVERY 5 MIN PRN
Status: DISCONTINUED | OUTPATIENT
Start: 2024-11-01 | End: 2024-11-02 | Stop reason: HOSPADM

## 2024-11-01 RX ORDER — FENTANYL CITRATE 50 UG/ML
25 INJECTION, SOLUTION INTRAMUSCULAR; INTRAVENOUS
Status: DISCONTINUED | OUTPATIENT
Start: 2024-11-01 | End: 2024-11-02 | Stop reason: HOSPADM

## 2024-11-01 RX ORDER — SODIUM CHLORIDE, SODIUM LACTATE, POTASSIUM CHLORIDE, CALCIUM CHLORIDE 600; 310; 30; 20 MG/100ML; MG/100ML; MG/100ML; MG/100ML
INJECTION, SOLUTION INTRAVENOUS CONTINUOUS
Status: DISCONTINUED | OUTPATIENT
Start: 2024-11-01 | End: 2024-11-02 | Stop reason: HOSPADM

## 2024-11-01 RX ORDER — LIDOCAINE HYDROCHLORIDE 5 MG/ML
INJECTION, SOLUTION INFILTRATION; PERINEURAL PRN
Status: DISCONTINUED | OUTPATIENT
Start: 2024-11-01 | End: 2024-11-01

## 2024-11-01 RX ORDER — PROPOFOL 10 MG/ML
INJECTION, EMULSION INTRAVENOUS PRN
Status: DISCONTINUED | OUTPATIENT
Start: 2024-11-01 | End: 2024-11-01

## 2024-11-01 RX ORDER — ACETAMINOPHEN 325 MG/1
975 TABLET ORAL ONCE
Status: DISCONTINUED | OUTPATIENT
Start: 2024-11-01 | End: 2024-11-02 | Stop reason: HOSPADM

## 2024-11-01 RX ORDER — CEFAZOLIN SODIUM 2 G/50ML
2 SOLUTION INTRAVENOUS
Status: COMPLETED | OUTPATIENT
Start: 2024-11-01 | End: 2024-11-01

## 2024-11-01 RX ADMIN — CEFAZOLIN SODIUM 2 G: 2 SOLUTION INTRAVENOUS at 07:34

## 2024-11-01 RX ADMIN — PROPOFOL 150 MCG/KG/MIN: 10 INJECTION, EMULSION INTRAVENOUS at 07:38

## 2024-11-01 RX ADMIN — FENTANYL CITRATE 50 MCG: 50 INJECTION, SOLUTION INTRAMUSCULAR; INTRAVENOUS at 07:36

## 2024-11-01 RX ADMIN — SODIUM CHLORIDE, SODIUM LACTATE, POTASSIUM CHLORIDE, CALCIUM CHLORIDE: 600; 310; 30; 20 INJECTION, SOLUTION INTRAVENOUS at 07:01

## 2024-11-01 RX ADMIN — PROPOFOL 50 MG: 10 INJECTION, EMULSION INTRAVENOUS at 07:36

## 2024-11-01 RX ADMIN — LIDOCAINE HYDROCHLORIDE 35 ML: 5 INJECTION, SOLUTION INFILTRATION; PERINEURAL at 07:41

## 2024-11-01 NOTE — ANESTHESIA POSTPROCEDURE EVALUATION
Patient: Niesha Adhikari    Procedure: Procedure(s):  RELEASE, CARPAL TUNNEL,       Anesthesia Type:  IV Regional Anesthesia    Note:  Disposition: Outpatient   Postop Pain Control: Uneventful            Sign Out: Well controlled pain   PONV: No   Neuro/Psych: Uneventful            Sign Out: Acceptable/Baseline neuro status   Airway/Respiratory: Uneventful            Sign Out: Acceptable/Baseline resp. status   CV/Hemodynamics: Uneventful            Sign Out: Acceptable CV status; No obvious hypovolemia; No obvious fluid overload   Other NRE: NONE   DID A NON-ROUTINE EVENT OCCUR?            Last vitals:  Vitals Value Taken Time   /66 11/01/24 0900   Temp 96.9  F (36.1  C) 11/01/24 0900   Pulse     Resp 16 11/01/24 0900   SpO2 97 % 11/01/24 0900       Electronically Signed By: Michael Dorsey MD  November 1, 2024  10:48 AM

## 2024-11-01 NOTE — ANESTHESIA CARE TRANSFER NOTE
Patient: Niesha Adhikari    Procedure: Procedure(s):  RELEASE, CARPAL TUNNEL,       Diagnosis: Carpal tunnel syndrome of right wrist [G56.01]  Trigger middle finger of right hand [M65.331]  Diagnosis Additional Information: No value filed.    Anesthesia Type:   IV Regional Anesthesia     Note:    Oropharynx: oropharynx clear of all foreign objects and spontaneously breathing  Level of Consciousness: drowsy  Oxygen Supplementation: face mask    Independent Airway: airway patency satisfactory and stable  Dentition: dentition unchanged  Vital Signs Stable: post-procedure vital signs reviewed and stable  Report to RN Given: handoff report given  Patient transferred to: Phase II  Comments: Sleepy but arouses. Opens eyes to voice  Handoff Report: Identifed the Patient, Identified the Reponsible Provider, Reviewed the pertinent medical history, Discussed the surgical course, Reviewed Intra-OP anesthesia mangement and issues during anesthesia, Set expectations for post-procedure period and Allowed opportunity for questions and acknowledgement of understanding    Vitals:  Vitals Value Taken Time   BP     Temp     Pulse     Resp     SpO2         Electronically Signed By: OYSEPH Betancourt CRNA  November 1, 2024  8:22 AM

## 2024-11-01 NOTE — DISCHARGE INSTRUCTIONS
Carpal Tunnel Release Discharge Instructions                                     [phone]   Bone and Joint Service Line for issues or concerns    Home Prescriptions:  Tylenol and Ibuprofen as needed.      General Care:  After surgery you may feel tired/sleepy. This is normal. Please have someone stay with you for 24 hours after surgery. You should avoid driving until released by your physician to do so. If you have any question along the way please contact the office. If you feel it is an issue cannot wait for normal office hours, contact the on-call physician.    Bandages:   Keep this bandage/splint clean and dry. You can always loosen the ace wrap if it feels too tight or you continue to have numbness or tingling.  Keep your bandage and splint on until follow-up appointment.      Bathing:  Do not submerge, scrub or soak your incision in water such as a bath or pool. Keep your splint/bandage clean and dry. Keep your incision/splint covered with a sealed bandage when bathing. Saran wrap and a bag works well.     Follow up:  Your follow up appointment should already be scheduled. If its not, please call the office to verify an appointment about 10-14 days after surgery.     Diet:  Start with non-alcoholic liquids at first, particularly water or sports drinks after surgery. Progress to bland foods such as crackers and bread and finally to your normal diet if you have no problems.     Pain control:  Take your pain medications as prescribed (if prescribed). These medications may make you sleepy. Do not drive, operate equipment, or drink alcohol when taking these.  You may take Tylenol (Generic name is acetaminophen) as directed on the bottle in place of the prescribed pain medications as your pain gets better. You may take NSAIDs (Motrin, Ibuprofen, Aleve, Naproxen) as directed on the bottle for minor discomfort. If the medications cause a reaction such as nausea or skin rash, stop taking them and contact your  doctor. Please plan accordingly, pain medications will not be re-filled on the weekends or at night. Call the office during the day if you need more medications.    Physical Therapy/Occupational Therapy:  At your first post-operative visit, your doctor will direct you on your personal therapy program if needed.     Activity:  Keep your hand elevated for the first couple days after surgery. Avoid lifting, pushing, and or pulling with the operated hand.       Normal findings after surgery:  Numbness and tenderness around the incisions is normal.  You may have bruising around the incisions.  Low grade fevers less than 100.5 degrees Fahrenheit are normal.     When to call the Office:  Temperature greater than 101.5 degrees Fahreheit.  Fever, chills, and increasing pain in the hand and wrist.  Excessive drainage from the incisions that include bright red blood.  Drainage from the incisions sites that appear yellow, pus-like, or foul smelling.  Persistent nausea or vomiting.  Any other effects you feel are significant.

## 2024-11-01 NOTE — OP NOTE
OPERATIVE REPORT    DATE OF SERVICE:  2024    PREOPERATIVE DIAGNOSIS  Right carpal tunnel syndrome     POSTOPERATIVE DIAGNOSIS  Right carpal tunnel syndrome    NAME OF OPERATION  1. Right carpal tunnel release     SURGEON  David Sommers MD    ASSISTANT: Abner Montes De Oca PA-C       SPECIMENS REMOVED: None    ESTIMATED BLOOD LOSS: Minimal    FINDINGS: Compressed median nerve in carpal tunnel    HISTORY  Niesha Adhikari is a 74 year old female with severe right carpal tunnel syndrome.  She has positive EMG, positive Tinel.  She has failed conservative treatment, and presents now for right carpal tunnel release.  Risks, benefits, potential complications and alternatives were discussed.    SURGERY  After a smooth IV regional block, the patient's right arm was prepped and draped in sterile fashion.  A pause was performed for patient verification.   A longitudinal incision was made in the thenar palmar crease and carried down through subcutaneous tissue on the ulnar side of the palmaris longus fascia.  The transverse carpal ligament was identified and divided sharply.  Beneath this the median nerve was noted to be significantly narrowed.  Synovium was teased away from the nerve.  The nerve was now seen to be fully decompressed.  The wound was irrigated with saline, and subcutaneous tissue was injected with 0.25% Marcaine.  Skin edges were closed with interrupted 5-0 nylon suture.  Sterile dressings were applied.  A volar splint was applied.  The patient was taken to the recovery room in stable condition.    DAVID SOMMERS MD      MR#: [id number]   : 1950   ADMIT DATE: 2024

## 2024-11-01 NOTE — BRIEF OP NOTE
[facility]    Brief Operative Note    Pre-operative diagnosis: Carpal tunnel syndrome of right wrist [G56.01]  Trigger middle finger of right hand [M65.331]  Post-operative diagnosis Same as pre-operative diagnosis    Procedure: RELEASE, CARPAL TUNNEL,, Right - Wrist    Surgeon: Surgeons and Role:     * David John MD - Primary     * Abner Montes De Oca PA-C  Anesthesia: [place] Block   Estimated Blood Loss: 1 mL from 11/1/2024  7:33 AM to 11/1/2024  8:17 AM      Drains: None  Specimens: * No specimens in log *  Findings:   Compressed median nerve, right .  Complications: None.  Implants: * No implants in log *

## 2024-11-09 DIAGNOSIS — I10 BENIGN ESSENTIAL HYPERTENSION: ICD-10-CM

## 2024-11-11 RX ORDER — LISINOPRIL 10 MG/1
10 TABLET ORAL DAILY
Qty: 90 TABLET | Refills: 1 | Status: SHIPPED | OUTPATIENT
Start: 2024-11-11

## 2024-11-12 DIAGNOSIS — E61.1 IRON DEFICIENCY: ICD-10-CM

## 2024-11-12 NOTE — TELEPHONE ENCOUNTER
Medication:   Ferosul 325 (65 FE) MG  Last written on:   08/12/2024  Quantity:   45    Refills:   0    Last office visit:   08/12/2024  Next office visit:   12/09/2024  Last labs:   08/12/2024

## 2024-11-14 ENCOUNTER — OFFICE VISIT (OUTPATIENT)
Dept: ORTHOPEDICS | Facility: CLINIC | Age: 74
End: 2024-11-14
Payer: COMMERCIAL

## 2024-11-14 VITALS
WEIGHT: 137 LBS | DIASTOLIC BLOOD PRESSURE: 94 MMHG | SYSTOLIC BLOOD PRESSURE: 151 MMHG | RESPIRATION RATE: 18 BRPM | HEART RATE: 65 BPM | HEIGHT: 63 IN | BODY MASS INDEX: 24.27 KG/M2

## 2024-11-14 DIAGNOSIS — G56.01 CARPAL TUNNEL SYNDROME OF RIGHT WRIST: Primary | ICD-10-CM

## 2024-11-14 DIAGNOSIS — Z98.890 S/P CARPAL TUNNEL RELEASE: ICD-10-CM

## 2024-11-14 PROCEDURE — 99024 POSTOP FOLLOW-UP VISIT: CPT | Performed by: ORTHOPAEDIC SURGERY

## 2024-11-14 NOTE — PROGRESS NOTES
Follow up right carpal tunnel release on 11/1/24.  Splint is removed.  Wound is healing well.   Sutures are removed.  She  has no numbness of fingers.    Start scar massage with vitamin-E cream.  Use night splint for 4 weeks.    Resume activity as tolerated.  Return to clinic 4 weeks

## 2024-11-14 NOTE — PATIENT INSTRUCTIONS
Start scar massage with vitamin-E cream.  Use night splint for 4 weeks.    Resume activity as tolerated.  Return to clinic 4 weeks

## 2024-11-14 NOTE — LETTER
11/14/2024      Niesha Adhikari  [address] [facility] [address]      Dear Colleague,    Thank you for referring your patient, Niesha Adhikari, to the [facility]. Please see a copy of my visit note below.    Follow up right carpal tunnel release on 11/1/24.  Splint is removed.  Wound is healing well.   Sutures are removed.  She  has no numbness of fingers.    Start scar massage with vitamin-E cream.  Use night splint for 4 weeks.    Resume activity as tolerated.  Return to clinic 4 weeks    Again, thank you for allowing me to participate in the care of your patient.        Sincerely,        David John MD

## 2024-11-15 RX ORDER — FERROUS SULFATE 325(65) MG
325 TABLET, DELAYED RELEASE (ENTERIC COATED) ORAL EVERY OTHER DAY
Qty: 45 TABLET | Refills: 0 | Status: SHIPPED | OUTPATIENT
Start: 2024-11-15

## 2024-11-15 NOTE — TELEPHONE ENCOUNTER
Per Dr. Cho's visit note patient is to reduce Hydroxychloroquine to 200 mg a day.  Anabella Garcia CMA  9/1/2017 12:32 PM    
Reason for call:  Med question  Patient called regarding (reason for call): prescription- Hydroxychloroquine-what is the correct Quantity and directions?  Additional comments: should be twice a day according to the patient and past usage      Phone number to reach patient:  Other phone number:  [phone]*    Best Time:  Before 530 today or Tues 8-530    Can we leave a detailed message on this number?  NO  
Reduce to hydroxychloroquine to 200 mg daily  Spoke with Pharmacist to confirm.     aKllie Black RN  
Episodes of diarrhea (more than 3 times a day), or if you are unable to tolerate food or fluids

## 2024-11-22 PROBLEM — G56.01 CARPAL TUNNEL SYNDROME OF RIGHT WRIST: Status: RESOLVED | Noted: 2024-09-03 | Resolved: 2024-11-22

## 2024-11-22 PROBLEM — M65.331 TRIGGER MIDDLE FINGER OF RIGHT HAND: Status: RESOLVED | Noted: 2024-09-03 | Resolved: 2024-11-22

## 2024-11-26 ENCOUNTER — TELEPHONE (OUTPATIENT)
Dept: SURGERY | Facility: CLINIC | Age: 74
End: 2024-11-26
Payer: COMMERCIAL

## 2024-11-26 NOTE — TELEPHONE ENCOUNTER
Sent Mychart (1st Attempt) for the patient to call back and schedule the following:    Appointment type: Ret Patient   Provider: Charlotte Steen NP  Return date: 01/2025  Specialty phone number: [phone]  Additional appointment(s) needed: n/a  Additonal Notes: anal pap

## 2024-12-02 ENCOUNTER — LAB (OUTPATIENT)
Dept: LAB | Facility: OTHER | Age: 74
End: 2024-12-02
Payer: COMMERCIAL

## 2024-12-02 DIAGNOSIS — E61.1 IRON DEFICIENCY: ICD-10-CM

## 2024-12-02 LAB
BASOPHILS # BLD AUTO: 0 10E3/UL (ref 0–0.2)
BASOPHILS NFR BLD AUTO: 1 %
EOSINOPHIL # BLD AUTO: 0.2 10E3/UL (ref 0–0.7)
EOSINOPHIL NFR BLD AUTO: 4 %
ERYTHROCYTE [DISTWIDTH] IN BLOOD BY AUTOMATED COUNT: 13.5 % (ref 10–15)
FERRITIN SERPL-MCNC: 28 NG/ML (ref 11–328)
HCT VFR BLD AUTO: 45.3 % (ref 35–47)
HGB BLD-MCNC: 14.7 G/DL (ref 11.7–15.7)
IMM GRANULOCYTES # BLD: 0 10E3/UL
IMM GRANULOCYTES NFR BLD: 0 %
IRON BINDING CAPACITY (ROCHE): 402 UG/DL (ref 240–430)
IRON SATN MFR SERPL: 17 % (ref 15–46)
IRON SERPL-MCNC: 67 UG/DL (ref 37–145)
LYMPHOCYTES # BLD AUTO: 1.2 10E3/UL (ref 0.8–5.3)
LYMPHOCYTES NFR BLD AUTO: 27 %
MCH RBC QN AUTO: 29.3 PG (ref 26.5–33)
MCHC RBC AUTO-ENTMCNC: 32.5 G/DL (ref 31.5–36.5)
MCV RBC AUTO: 90 FL (ref 78–100)
MONOCYTES # BLD AUTO: 0.4 10E3/UL (ref 0–1.3)
MONOCYTES NFR BLD AUTO: 9 %
NEUTROPHILS # BLD AUTO: 2.8 10E3/UL (ref 1.6–8.3)
NEUTROPHILS NFR BLD AUTO: 59 %
PLATELET # BLD AUTO: 140 10E3/UL (ref 150–450)
RBC # BLD AUTO: 5.02 10E6/UL (ref 3.8–5.2)
WBC # BLD AUTO: 4.6 10E3/UL (ref 4–11)

## 2024-12-02 PROCEDURE — 85025 COMPLETE CBC W/AUTO DIFF WBC: CPT

## 2024-12-02 PROCEDURE — 83550 IRON BINDING TEST: CPT

## 2024-12-02 PROCEDURE — 36415 COLL VENOUS BLD VENIPUNCTURE: CPT

## 2024-12-02 PROCEDURE — 83540 ASSAY OF IRON: CPT

## 2024-12-02 PROCEDURE — 82728 ASSAY OF FERRITIN: CPT

## 2024-12-09 ENCOUNTER — OFFICE VISIT (OUTPATIENT)
Dept: RHEUMATOLOGY | Facility: CLINIC | Age: 74
End: 2024-12-09
Payer: COMMERCIAL

## 2024-12-09 VITALS
SYSTOLIC BLOOD PRESSURE: 124 MMHG | DIASTOLIC BLOOD PRESSURE: 76 MMHG | HEART RATE: 64 BPM | WEIGHT: 138.6 LBS | OXYGEN SATURATION: 96 % | BODY MASS INDEX: 24.56 KG/M2

## 2024-12-09 DIAGNOSIS — M06.09 RHEUMATOID ARTHRITIS OF MULTIPLE SITES WITH NEGATIVE RHEUMATOID FACTOR (H): Primary | ICD-10-CM

## 2024-12-09 DIAGNOSIS — Z92.29 HISTORY OF BISPHOSPHONATE THERAPY: ICD-10-CM

## 2024-12-09 DIAGNOSIS — G89.29 CHRONIC BILATERAL LOW BACK PAIN WITHOUT SCIATICA: ICD-10-CM

## 2024-12-09 DIAGNOSIS — E55.9 VITAMIN D DEFICIENCY: ICD-10-CM

## 2024-12-09 DIAGNOSIS — E61.1 IRON DEFICIENCY: ICD-10-CM

## 2024-12-09 DIAGNOSIS — M77.11 RIGHT LATERAL EPICONDYLITIS: ICD-10-CM

## 2024-12-09 DIAGNOSIS — M81.0 AGE-RELATED OSTEOPOROSIS WITHOUT CURRENT PATHOLOGICAL FRACTURE: ICD-10-CM

## 2024-12-09 DIAGNOSIS — M54.50 CHRONIC BILATERAL LOW BACK PAIN WITHOUT SCIATICA: ICD-10-CM

## 2024-12-09 DIAGNOSIS — Z78.0 POST-MENOPAUSAL: ICD-10-CM

## 2024-12-09 PROCEDURE — 99214 OFFICE O/P EST MOD 30 MIN: CPT | Performed by: INTERNAL MEDICINE

## 2024-12-09 PROCEDURE — G2211 COMPLEX E/M VISIT ADD ON: HCPCS | Performed by: INTERNAL MEDICINE

## 2024-12-09 RX ORDER — VITAMIN B COMPLEX
25 TABLET ORAL DAILY
Qty: 90 TABLET | Refills: 1 | Status: SHIPPED | OUTPATIENT
Start: 2024-12-09

## 2024-12-09 RX ORDER — HYDROXYCHLOROQUINE SULFATE 200 MG/1
TABLET, FILM COATED ORAL
Qty: 135 TABLET | Refills: 1 | Status: SHIPPED | OUTPATIENT
Start: 2024-12-09

## 2024-12-09 RX ORDER — FERROUS SULFATE 325(65) MG
325 TABLET, DELAYED RELEASE (ENTERIC COATED) ORAL EVERY OTHER DAY
Qty: 45 TABLET | Refills: 2 | Status: SHIPPED | OUTPATIENT
Start: 2024-12-09

## 2024-12-09 RX ORDER — ABATACEPT 125 MG/ML
125 INJECTION, SOLUTION SUBCUTANEOUS
Qty: 4 ML | Refills: 4 | Status: SHIPPED | OUTPATIENT
Start: 2024-12-09

## 2024-12-09 NOTE — PROGRESS NOTES
Rheumatology Clinic Visit      Niesha Adhikari MRN# [id number]   YOB: 1950 Age: 74 year old      Date of visit: 12/09/24   PCP: Dr. Tanika Clark  Hepatologist: Dr. Peres at [facility] in [place]  Ophthalmology: Dr. Higgins, [facility]    Chief Complaint   Patient presents with:  RECHECK: RA - continued pain right hand/wrist/arm despite surgery, nodule left middle       Assessment and Plan     1. Rheumatoid arthritis: Hepatitis C related arthralgias versus rheumatoid arthritis (RF and CCP negative); hepatitis C has since been cleared. Initially with symmetric synovitis on exam and morning stiffness for more than one hour. NSAIDs and Tylenol associated with rash.  She did well with hydroxychloroquine 400 mg daily for a while but more arthritis symptoms so SSZ was added but associated with cytopenia and GI upset so that was stopped.  Avoiding MTX, leflunomide because of hepatitis C hx and +HBV core.  Preferentially avoid Jakinibs because of hepatitis C history and +HBV core. She has established with gastroenterology and is on tenofovir for hepatitis B reactivation prophylaxis.  Previously on Humira (5/8/2020-2/2023).  Currently on Enbrel (started 2/2023-6/5/2023, injection site reactions, no improvement since changing from Humira to Enbrel).  Note that she had been doing well but then in January 2023 was having more pain in the right hand with mild synovial hypertrophy but no clearly active synovitis.  She had findings that were more consistent with carpal tunnel syndrome.  An MRI of the right hand showed synovitis at the right second and third MCP and tenosynovitis of the extensor tendons of the right third finger.  Therefore, changed from Humira to Enbrel with no improvement and exam remained unchanged; was also having injection site reactions with Enbrel.  Therefore, changed to Orencia on 6/5/2023.  Doing well at this time; no synovitis on exam and no injection site issues.   "Chronic illness, progressive.    - Continue hydroxychloroquine 300 mg daily (last eye exam was performed 2/1/2022 at [facility]; yearly eye exam required and she says that it has been completed; request ophthalmology records today)  - Continue Orencia 125mg SQ once every 7 days  - Labs in 4 months: CBC, Creatinine, Hepatic Panel, ESR, CRP    2. Osteoporosis: Previously treated with Fosamax (GERD), PO Boniva (reportedly ineffective), and IV Boniva (reportedly effective). Prolia was being considered by a previous rheumatologist but not used because she wanted \"clearance\" from the patient's gastroenterologist because she has hepatitis C; I do not see a contraindication for Prolia in the setting of hepatitis C. The patient reports that her gastroenterologist reported no contraindication for Prolia either.  She had not been on osteoporosis treatment for at least 2 years before restarting Boniva.  Boniva was restarted with the first dose given on 12/5/2017.  2021 DEXA showed improvement and Boniva was continued until the last dose that was on 1/12/2023.  1/16/2023 DEXA shows osteoporosis, with reduced bone mineral density compared to the previous, about the hips and lumbar spine.  Changed to Prolia with the first dose received on 4/12/2023.  Vitamin D reduced previously because of elevated vitamin D..   Chronic illness, stable.    - Continue vitamin D 1000 IU daily  - Calcium 1200 mg daily  - Continue Prolia 60mg SQ every 6 months (received at the infusion center)  - DEXA ordered, to be performed on or after 1/16/2025 at the [facility] location in [place], [place], where she had her last DEXA performed  - Labs in 4 months: Calcium, vitamin D    3.  Lower back pain and history left hip pain: Ms. Adhikari had a CT pelvis on 11/16/2018 that showed degenerative changes of the L-spine.   Improves with PT exercises, but often doesn't do them.  Has had difficulty getting back to physical therapy.  Doing " exercises on her own at home are helpful but she stopped doing the exercises because of the carpal tunnel surgery; she would like to return to physical therapy  - Physical therapy referral    4. HBV core ab positive: On hepatitis B prophylaxis from gastroenterology.  Continue to follow with gastroenterology.    5. Bilateral carpal tunnel syndrome: EMG/NCS showed mild carpal tunnel syndrome.  Had carpal tunnel surgery but still symptomatic; following with surgery.    6.  Bilateral hand osteoarthritis: Reviewed the diagnosis treatment options.  Start Voltaren gel as needed.  Risks and side effects of Voltaren gel were reviewed in detail today.  - diclofenac (VOLTAREN) 1 % topical gel; Apply up to 2 grams of 1% gel to hands up to 4 times daily as needed for joint pain (maximum: 8 g per upper extremity per day)      7.  Right lateral epicondylitis: Reviewed the diagnosis and treatment options.  Advised rest, avoidance of aggravating activities, tennis elbow compression strap as needed, topical Voltaren gel as needed, and icing the area as needed.  May also benefit from physical therapy and since she is already going for her back I will include this on the referral.  - Physical therapy referral    8.  Vaccinations: Vaccinations reviewed with Ms. Adhikari.    - Influenza: encouraged yearly vaccination  - [id number]: up to date  - [id number]: up to date  - Shingrix: Up to date  - COVID19: Advised keeping up-to-date, and to hold Orencia for 1-2 weeks afterward     9.  Iron deficiency anemia: Iron deficiency based on previous labs.  Hemoglobin improved with iron supplementation: ferrous sulfate 325 mg every other day.  She will follow long-term with her primary care provider for iron deficiency anemia.  - Continue ferrous sulfate 325 mg every other day    Total minutes spent in evaluation with patient, review of pertinent lab tests and chart notes, and documentation: 26  The longitudinal plan of care for the rheumatology  problem(s) were addressed during this visit.  Due to added complexity of care, we will continue to support the patient and the subsequent management of this condition with ongoing continuity of care.       Ms. Adhikari verbalized agreement with and understanding of the rational for the diagnosis and treatment plan.  All questions were answered to best of my ability and the patient's satisfaction. Ms. Adhikari was advised to contact the clinic with any questions that may arise after the clinic visit.      Thank you for involving me in the care of the patient    Return to clinic: 4 months    HPI   Niesha Adhikari is a 74 year old female with a medical history significant for hepatitis C, hemorrhoids, narcolepsy, fibromyalgia, migraines, anxiety, pernicious anemia, GERD, allergic rhinitis, and osteoporosis who presents for follow-up of inflammatory arthritis.    1/31/2023: Was seen by orthopedic surgery where she was told that she may benefit from carpal tunnel surgery bilaterally, and left third digit trigger finger surgery.  She has hesitant to go through with this because she questions if her symptoms are more arthritis related.  Larger nodules over the right second and third MCPs on the left.  Bony hypertrophy at the DIPs.  Occasional pain at the bilateral first CMC joints, right first MCP, and wrist, but pain is worse with activity and improves with rest, not associated with swelling or increased warmth, and only last for a few seconds at a time.  Still with numbness and tingling in both hands that she says affects all of her fingers, is worse at night, and sometimes wakes her up at night.    2/20/2023: Having more pain and stiffness in the bilateral second and third MCPs and PIPs that is worse in the morning and improves with time and activity.  Occasionally has a zapping sensation in her fingers.  The zapping sensation typically resolves after a few seconds and does not occur for more than a few times each day,  if at all.  Having more difficulty grasping items due to pain and stiffness at the MCPs and would like to use prednisone.  States that she has anxiety but prednisone does not worsen her anxiety.    6/5/2023: Swelling at the MCPs, worse on the right hand.  Still with numbness and tingling in the fingers that is intermittent.  States the pain at the MCPs is worse at night and wakes her up from sleep about 2-3 times per night.  Felt better when she was on prednisone was able to sleep through the night while on prednisone but does not wish to restart prednisone because she does not like the potential side effects.  Fell doing yard work and broke several ribs.  Found to have a nodule behind the sternum and is going to have additional work-up for this with imaging at the direction of another clinic; the nodule was found incidentally when imaging was done to find the rib fractures.    9/18/2023: Still with numbness and tingling in her hands that occurs intermittently, sometimes also radiating proximally from the wrist.  Joints without swelling, increased warmth, or overlying erythema.  Pain at the fingers diffusely when she has the numbness and tingling.  No injection site issues with Orencia.  Occasional left elbow pain over the olecranon process but no swelling, increased warmth, or overlying erythema and not an issue at this time.  Chronic low back pain and would like to go back to physical therapy for a refresher.    4/8/2024: modafinil was stopped because it was thought to be keeping her up at night and this has worsened her sleep, and hydroxyzine was replaced with gabapentin and she says that she thinks that gabapentin is a placebo (taking gabapentin 300 mg TID); she says that she plans to follow-up with the prescribing providers regarding these medications.  Planning to have a sleep study.  [place] having issues with transportation reliability; she plans to discuss with her insurance.  Hasn't made it  to her eye appointment or see the dentist or go to physical therapy.  Had to reschedule her eye exam for Plaquenil toxicity monitoring.  Reports that the combination of Plaquenil and Orencia is very effective.  Arthritis well-controlled.  No joint pain or swelling.  Morning stiffness for no more than 10 minutes.    8/12/2024: Planning for carpal tunnel surgery on the left, then eventually on the right.  Planning for bilateral cataract surgeries.  Arthritis is doing well.  No joint pain or swelling.  Morning stiffness for no more than 10 minutes.  Feels like iron supplementation has been helpful; has more energy.  Has started kayaking.    Today, 12/9/2024: More energy and generally feeling better since taking iron supplementation.  Joints are doing well except for the bilateral first CMC joints no worse with activity and improves with rest.  Right lateral epicondyle pain worse with activity and improved with rest; no swelling.  Prolia taking without any side effects.  Chronic low back pain worse since she stopped doing physical therapy exercises; she stopped doing exercises because of carpal tunnel surgery but she would like to restart the exercises and requests physical therapy referral.  RA controlled.    Denies fevers, chills, nausea, vomiting, diarrhea. No abdominal pain. No chest pain/pressure, palpitations, or shortness of breath. No LE swelling. No neck pain. No oral or nasal sores.  No rash.     Tobacco: quit in 1978  EtOH: none  Drugs: none  Occupation: retired    ROS   12 point review of system was completed and negative except as noted in the HPI     Active Problem List     Patient Active Problem List   Diagnosis    Allergic rhinitis    Pernicious anemia    Anxiety state    Migraine    Essential and other specified forms of tremor    Fibromyalgia    Moderate recurrent major depression (H)    Narcolepsy    Internal hemorrhoids with other complication    AIN (anal intraepithelial neoplasia) anal canal     Osteoporosis    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)    Benign essential hypertension    Alcohol dependence in remission (H)    Personal history of other medical treatment    Constipation    DDD (degenerative disc disease), cervical    Iron deficiency    Fatigue    Chronic bilateral low back pain without sciatica    S/P carpal tunnel release     Past Medical History     Past Medical History:   Diagnosis Date    ABUSE BY SPOUSE/PARTNER 07/27/2005    Degeneration of lumbar or lumbosacral intervertebral disc     DDD L5/S1    Depressive disorder     Esophageal reflux 01/28/2005    HELICOBACTER PYLORI INFECTION 01/28/2005    Hepatitis C - Cured. Achieved SVR in 2017     History of blood transfusion     Hypertension     Malignant neoplasm (H)     ACIN    Osteoporosis     Other and unspecified alcohol dependence, unspecified drinking behavior     Sober as 1/21/1987    Other malaise and fatigue      Past Surgical History     Past Surgical History:   Procedure Laterality Date    BIOPSY ANAL CANAL  01/21/2013    [facility]     BREAST BIOPSY, RT/LT Left 1975    Breat Biopsy RT/LT    COLONOSCOPY  08/25/2009    COLONOSCOPY  02/14/2011    COLONOSCOPY performed by CRISTIN LAGUNAS at  GI    COLONOSCOPY N/A 01/08/2019    Procedure: Colonscopy, Biopsies by Biopsy;  Surgeon: Omega Talavera MD;  Location:  GI    COLONOSCOPY N/A 06/17/2024    Procedure: Colonoscopy;  Surgeon: Omega Talavera MD;  Location:  GI    CYSTOSCOPY  02/28/2011    CYSTOSCOPY performed by CAYLA FLOR at  OR    ENDOSCOPY  05/21/2012    Upper GI - [facility]    ENT SURGERY  1965    EYE SURGERY  09/05/24    10/03/24    GI SURGERY  06/25/2012     UGI ENDOSCOPY DIAG W BIOPSY  10/01/2009    HEMORRHOIDECTOMY  06/25/2012    [facility]    LAPAROSCOPIC SALPINGO-OOPHORECTOMY  02/28/2011    LAPAROSCOPIC SALPINGO-OOPHORECTOMY performed by CAYLA FLOR at  OR    ORTHOPEDIC SURGERY   "08/23/24 11/01/24    PHACOEMULSIFICATION CLEAR CORNEA WITH TORIC INTRAOCULAR LENS IMPLANT Right 09/05/2024    Procedure: PHACOEMULSIFICATION, CATARACT, WITH INTRAOCULAR LENS IMPLANT, TORIC LENS right;  Surgeon: Primo Garcia MD;  Location: [facility] OR    PHACOEMULSIFICATION CLEAR CORNEA WITH TORIC INTRAOCULAR LENS IMPLANT Left 10/03/2024    Procedure: PHACOEMULSIFICATION, CATARACT, WITH INTRAOCULAR LENS IMPLANT, TORIC LENS, LEFT;  Surgeon: Primo Garcia MD;  Location: [facility] OR    RELEASE CARPAL TUNNEL Left 08/23/2024    Procedure: Left carpal tunnel release;  Surgeon: David John MD;  Location: [facility] OR    RELEASE CARPAL TUNNEL Right 11/01/2024    Procedure: RELEASE, CARPAL TUNNEL,;  Surgeon: David John MD;  Location: [facility] OR    RELEASE TRIGGER FINGER Left 08/23/2024    Procedure: Left middle finger trigger finger release;  Surgeon: David John MD;  Location: [facility] OR    TONSILLECTOMY & ADENOIDECTOMY  1965    [facility] NONSPECIFIC PROCEDURE  1965    Removed bone left index finger knuckle, casts broken bones    ZZ COLONOSCOPY W/WO BRUSH/WASH  08/22/2005    [facility], SIMPLE EXAM  08/08/2007     Allergy     Allergies   Allergen Reactions    Telaprevir Other (See Comments) and Rash     Rectal bleeding, anemia    Abilify Discmelt Other (See Comments)     Disoriented    Thiopental Sodium      PENTOTHAL/rigidity and fight response    Antivert [Meclizine Hcl]     Chamomile     Compazine     Diphenhydramine Nausea     And abdominal pain    Duloxetine Hcl Other (See Comments)     Disoriented, trouble sleeping    Effexor [Venlafaxine] Other (See Comments)     Disoriented, trouble sleeping    Elavil [Amitriptyline Hcl] Other (See Comments)     \"didn't feel right on it-med was stopped right away\"    Food Difficulty breathing     cilantro    Indomethacin      indocin sensativity \"Severe h.a\"    Seasonal Allergies Other (See Comments) and Difficulty breathing     Philip Gold Aug-Sept, rag weed, " sneezing    Sulfa Antibiotics     Animal Dander Difficulty breathing and Rash     sneezing,resp. distress    Bupropion Anxiety    Tylenol [Acetaminophen] Rash     Current Medication List     Current Outpatient Medications   Medication Sig Dispense Refill    Abatacept (ORENCIA CLICKJECT) 125 MG/ML SOAJ auto-injector Inject 1 mL (125 mg) subcutaneously every 7 days. . Hold for any infection and seek medical attention. 4 mL 4    calcium carb-cholecalciferol 600-500 MG-UNIT CAPS Take 1,200 mg by mouth daily      cloNIDine (CATAPRES) 0.2 MG tablet Take 0.2 mg by mouth At Bedtime      cycloSPORINE (RESTASIS) 0.05 % ophthalmic emulsion Place 1 drop into both eyes 2 times daily       denosumab (PROLIA) 60 MG/ML SOSY injection       diclofenac (VOLTAREN) 1 % topical gel Apply up to 2 grams of 1% gel to hands up to 4 times daily as needed for joint pain (maximum: 8 g per upper extremity per day) 200 g 2    ferrous sulfate (FE TABS) 325 (65 Fe) MG EC tablet Take 1 tablet (325 mg) by mouth every other day. 45 tablet 2    gabapentin (NEURONTIN) 300 MG capsule TAKE ONE CAPSULE BY MOUTH THREE TIMES A DAY, MAY TAKE TWO CAPSULES BY MOUTH EVERY NIGHT AT BEDTIME 360 capsule 3    hydroxychloroquine (PLAQUENIL) 200 MG tablet Hydroxychloroquine 200mg daily; and an additional 200mg every other day.  Yearly eye exam, including 10-2 VF and SD-OCT, required. 135 tablet 1    lisdexamfetamine (VYVANSE) 30 MG capsule Take 30 mg by mouth every morning      lisinopril (ZESTRIL) 10 MG tablet TAKE ONE TABLET BY MOUTH ONCE DAILY 90 tablet 1    Psyllium (FIBER) 0.52 G CAPS 1 tabs in am and pm 540 capsule     sennosides (SENOKOT) 8.6 MG tablet Take 2 tablets by mouth 2 times daily      tenofovir alafenamide fumarate (VEMLIDY) 25 MG tablet Take 1 tablet (25 mg) by mouth daily. with food (dispense only in the original container). 90 tablet 3    triamcinolone (KENALOG) 0.1 % external cream Apply topically 2 times daily Use to to 14 days at a time 15 g 0     Vitamin D3 (CHOLECALCIFEROL) 25 mcg (1000 units) tablet Take 1 tablet (25 mcg) by mouth daily. 90 tablet 1    HYDROcodone-acetaminophen (NORCO) 5-325 MG tablet Take 0.5-1 tablets by mouth every 4 hours as needed for moderate to severe pain. (Patient not taking: Reported on 12/9/2024) 10 tablet 0    montelukast (SINGULAIR) 10 MG tablet TAKE 1 TABLET BY MOUTH ONCE DAILY AS NEEDED SEASONALLY (AUGUST AND SEPTEMBER) (Patient not taking: Reported on 12/9/2024) 90 tablet 3     No current facility-administered medications for this visit.         Social History   See HPI    Family History     Family History   Problem Relation Age of Onset    Hypertension Mother     Breast Cancer Mother     Coronary Artery Disease Mother     Cerebrovascular Disease Mother     Kidney Disease Mother     Obesity Mother     Hypertension Father         Father    Lymphoma Father     Glaucoma Father     Other Cancer Father         Lymphoma    Genetic Disorder Father         Glaucoma    Obesity Father     C.A.D. Sister         MI at age 63    Hypertension Sister     Gastrointestinal Disease Sister         gallbladder    Hyperlipidemia Sister     Cerebrovascular Disease Sister     Circulatory Sister         brain aneurysm at 63    Genitourinary Problems Sister         1 kidney/bladder    Hypertension Sister     Obesity Sister     Coronary Artery Disease Sister         had valve surgery, MI, CHF    Coronary Artery Disease Brother     Hypertension Brother     Hyperlipidemia Brother     Cerebrovascular Disease Maternal Grandmother     Hypertension Maternal Grandmother     Cerebrovascular Disease Paternal Grandmother     Hypertension Paternal Grandmother     Glaucoma Paternal Grandfather     Genetic Disorder Paternal Grandfather         Glaucoma    Blood Disease Son         Lymes/7/11    Hypertension Son     Obesity Son     Hypertension Son     Breast Cancer Maternal Aunt     Ovarian Cancer Maternal Aunt     Unknown/Adopted Paternal Uncle     Obesity  "Niece     Obesity Niece     Liver Cancer Cousin     Coronary Artery Disease Other 49        niece    Diabetes Other         1st cousin    Breast Cancer Other     Obesity Other     Obesity Other     Hypertension Other         Aunts    Obesity Other         Uncle    Hypertension Other         Uncle    Coronary Artery Disease Sister     Coronary Artery Disease Brother     Hypertension Brother     Hyperlipidemia Brother     Coronary Artery Disease Nephew         Nephew    Hypertension Niece         [place]ie[place]    Hypertension Other         Nephew    Cerebrovascular Disease Other         Aunts    Obesity Other         Nephew     Physical Exam     Temp Readings from Last 3 Encounters:   11/22/24 98.1  F (36.7  C) (Temporal)   11/01/24 96.9  F (36.1  C) (Temporal)   10/09/24 98  F (36.7  C) (Temporal)     BP Readings from Last 5 Encounters:   12/09/24 124/76   11/22/24 109/71   11/14/24 (!) 151/94   11/01/24 117/66   10/14/24 130/80     Pulse Readings from Last 1 Encounters:   12/09/24 64     Resp Readings from Last 1 Encounters:   11/14/24 18     Estimated body mass index is 24.56 kg/m  as calculated from the following:    Height as of 11/22/24: 1.6 m (5' 2.99\").    Weight as of this encounter: 62.9 kg (138 lb 9.6 oz).    GEN: NAD.   HEENT:  Anicteric, noninjected sclera. No obvious external lesions of the ear and nose. Hearing intact.  PULM: No increased work of breathing.   MSK: MCPs and PIPs without synovial swelling or tenderness to palpation.  Heberden's nodes present.  Philippe's nodes present.  DIPs nontender to palpation.  Wrists without swelling or tenderness to palpation.  Mild squaring and tenderness palpation of bilateral first CMC joints; no effusion, increased warmth, or overlying erythema.  Right elbow tender to palpation over the lateral epicondyle but no swelling, increased warmth, or overlying erythema; pain is reproduced with wrist extension against resistance.  Left elbow without swelling or tenderness to " palpation.  Shoulders without swelling or tenderness to palpation.  Knees, ankles, and MTPs without swelling or tenderness to palpation.    SKIN: No rash or jaundice seen  PSYCH: Alert. Appropriate.       Labs / Imaging (select studies)     RF/CCP  Recent Labs   Lab Test 06/07/17  0955   CCPIGG 1   RHF <20     CBC  Recent Labs   Lab Test 12/02/24  1323 09/04/24  1017 07/26/24  1121 04/17/24  1021 04/08/24  0915 12/27/23  0710 09/10/21  0933 06/07/21  0811 05/21/21  1145 01/06/21  1303 09/04/20  0756 03/04/20  0752 02/17/20  0810   WBC 4.6 4.0 5.8   < > 3.5* 3.2*   < > 3.0*   < > 4.4 4.1   < > 3.8*   RBC 5.02 4.83 4.84   < > 4.81 4.40   < > 4.50   < > 5.01 4.88   < > 4.70   HGB 14.7 13.3 12.8   < > 11.5* 10.8*   < > 13.1   < > 14.8 14.4   < > 14.3   HCT 45.3 42.3 41.3   < > 37.0 35.6   < > 40.0   < > 44.9 43.9   < > 42.8   MCV 90 88 85   < > 77* 81   < > 89   < > 90 90   < > 91   RDW 13.5 15.4* 16.3*   < > 15.7* 15.5*   < > 12.7   < > 13.3 12.5   < > 13.4   * 127* 137*   < > 174 139*   < > 132*   < > 157 140*   < > 140*   MCH 29.3 27.5 26.4*   < > 23.9* 24.5*   < > 29.1   < > 29.5 29.5   < > 30.4   MCHC 32.5 31.4* 31.0*   < > 31.1* 30.3*   < > 32.8   < > 33.0 32.8   < > 33.4   NEUTROPHIL 59  --   --   --  45 48   < > 32.9  --  46.1 38.9   < > 44.5   LYMPH 27  --   --   --  34 32   < > 43.4  --  37.0 31.9   < > 35.7   MONOCYTE 9  --   --   --  12 12   < > 13.5  --  11.2 10.5   < > 14.6   EOSINOPHIL 4  --   --   --  7 7   < > 9.5  --  5.5 17.8   < > 4.7   BASOPHIL 1  --   --   --  1 0   < > 0.7  --  0.2 0.7   < > 0.5   ANEU  --   --   --   --   --   --   --  1.0*  --  2.0 1.6   < > 1.7   ALYM  --   --   --   --   --   --   --  1.3  --  1.6 1.3   < > 1.4   FREDY  --   --   --   --   --   --   --  0.4  --  0.5 0.4   < > 0.6   AEOS  --   --   --   --   --   --   --  0.3  --  0.2  --   --  0.2   ABAS  --   --   --   --   --   --   --  0.0  --  0.0 0.0   < > 0.0   ANEUTAUTO 2.8  --   --   --  1.6 1.6   < >  --   --    --   --   --   --    ALYMPAUTO 1.2  --   --   --  1.2 1.0   < >  --   --   --   --   --   --    AMONOAUTO 0.4  --   --   --  0.4 0.4   < >  --   --   --   --   --   --    AEOSAUTO 0.2  --   --   --  0.3 0.2   < >  --   --   --   --   --   --    ABSBASO 0.0  --   --   --  0.0 0.0   < >  --   --   --   --   --   --     < > = values in this interval not displayed.     CMP  Recent Labs   Lab Test 10/14/24  1330 09/04/24  1017 08/12/24  1132 07/22/24  0900 04/17/24  1021 04/08/24  0915 12/27/23  0702 10/10/23  2015 09/06/23  0832 05/30/23  1139 04/12/23  0817 01/12/23  0818 06/10/22  0816 05/16/22  1044 09/08/21  0806 06/07/21  0811 02/16/21  0842 01/06/21  1303 12/04/20  0850   NA  --  140  --   --   --   --   --  133* 139 138  --   --   --  142   < >  --  134  --   --    POTASSIUM  --  4.4  --   --   --   --   --  4.1 4.7 4.4  --   --   --  4.3   < >  --  4.7  --   --    CHLORIDE  --  102  --   --   --   --   --  98 100 102  --   --   --  109   < >  --  100  --   --    CO2  --  28  --   --   --   --   --  24 28 26  --   --   --  29   < >  --  31  --   --    ANIONGAP  --  10  --   --   --   --   --  11 11 10  --   --   --  4   < >  --  3  --   --    GLC  --  89  --   --  97  --   --  97 83 91  --   --   --  89   < >  --  94  --   --    BUN  --  10.8  --   --   --   --   --  10.1 10.4 11.7  --   --   --  13   < >  --  10  --   --    CR 0.86 0.86  --  0.8  --  0.98* 0.89 0.71 0.80 0.79   < > 0.79   < > 0.64   < > 0.76 0.70 0.65 0.77   GFRESTIMATED 71 71  --  >60  --  61 69 90 78 80   < > 80   < > >90   < > 80 88 >90 78   GFRESTBLACK  --   --   --   --   --   --   --   --   --   --   --   --   --   --   --  >90 >90 >90 >90   ELIZABETH 10.0 10.4 9.4  --   --  10.3*  --  9.5 9.7 9.7   < > 9.2   < > 8.9   < > 8.7 10.0 9.7 9.7   BILITOTAL  --   --   --   --   --   --  0.2 0.4 0.3  --   --  0.4  --  0.4   < > 0.5  --  0.5  --    ALBUMIN  --   --   --   --   --  4.4 4.1 4.4 4.2  --    < > 3.6  --  3.8   < > 3.8  --  4.1  --     PROTTOTAL  --   --   --   --   --   --  6.6 6.8 6.5  --   --  6.5*  --  6.6*   < > 6.7*  --  7.6  --    ALKPHOS  --   --   --   --   --   --  75 69 78  --   --  80  --  110   < > 79  --  82  --    AST  --   --   --   --   --   --  36 27 27  --   --  21  --  22   < > 22  --  22  --    ALT  --   --   --   --   --   --  17 16 14  --   --  21  --  22   < > 20  --  22  --     < > = values in this interval not displayed.     Iron Studies  Recent Labs   Lab Test 12/02/24  1323 07/26/24  1121 04/08/24  0915 05/30/23  1139   MARTÍNEZ 28  --  16 14   IRON 67 47 44 35*    412 451* 423   IRONSAT 17 11* 10* 8*     Calcium/VitaminD  Recent Labs   Lab Test 10/14/24  1330 09/04/24  1017 08/12/24  1132 04/08/24  0915 12/27/23  0702   ELIZABETH 10.0 10.4 9.4 10.3*  --    VITDT  --   --  75* 92* 120*     ESR/CRP  Recent Labs   Lab Test 12/27/23  0703 12/27/23  0702 09/06/23  0832 01/12/23  0818 02/14/22  1121 02/14/22  1121 06/07/21  0811   SED 8  --  7 6   < > 6 5   CRP  --   --   --  <2.9  --  <2.9 <2.9   CRPI  --  <3.00 <3.00  --   --   --   --     < > = values in this interval not displayed.     TSH/T4  Recent Labs   Lab Test 04/17/24  1021 05/30/23  1139 08/31/22  1253   TSH 1.45 2.06 1.63     Lipid Panel  Recent Labs   Lab Test 07/26/24  1121 08/09/19  1228 11/25/16  1217   CHOL 182 209* 276*   TRIG 96 74 88   HDL 54 82 65   * 112* 193*   NHDL 128 127 211*     Hepatitis B  Recent Labs   Lab Test 06/30/17  0925 06/07/17  0955   AUSAB  --  0.65   HBCAB  --  Reactive   A reactive result indicates acute, chronic or past/resolved hepatitis B   infection.  *   HBCM  --  Nonreactive   A nonreactive result suggests lack of recent exposure to the virus in the   preceding 6 months.     HEPBANG  --  Nonreactive   HBQLOG Not Calculated  --      Hepatitis C  Recent Labs   Lab Test 01/20/20  0741 06/07/17  0955   HCVAB  --  Reactive   A reactive result indicates one of the following 1) current HCV infection 2)   past HCV infection that  has resolved or 3) false positivity. The CDC recommends   that a reactive result should be followed by Nucleic acid testing for HCV RNA.  If HCV RNA is detected, that indicates current HCV infection. If HCV RNA is not   detected, that indicates either past, resolved HCV infection, or false HCV   antibody positivity.   Assay performance characteristics have not been established for newborns,   infants, and children  *   HCVRNA HCV RNA Not Detected HCV RNA Not Detected   The LUIS ARMANDO AmpliPrep/LUIS ARMANDO TaqMan HCV Test is an FDA-approved in vitro nucleic   acid amplification test for the quantitation of HCV RNA in human plasma (ETDA   plasma) or serum using the LUIS ARMANDO AmpliPrep Instrument for automated viral   nucleic acid extraction and the LUIS ARMANDO TaqMan Analyzer or [employer] BioCode TaqMan for   automated Real Time PCR amplification and detection of the viral nucleic acid   target.   Titer results are reported in International Units/mL (IU/mL) using the 1st WHO   International standard for HCV for Nucleic Acid Amplification based assays.       Lyme ab screening  Recent Labs   Lab Test 10/10/23  2015 08/09/19  1228 05/11/17  0830   LYMEGM 0.03 0.05 <0.01  Negative, Absence of detectable Borrelia burdorferi antibodies. A negative   result does not exclude the possibility of Borrelia burgdorferi infection. If   early Lyme disease is suspected, a second sample should be collected and tested   2 to 4 weeks later.         Tuberculosis Screening  Recent Labs   Lab Test 01/12/23  0818 10/14/21  1546 05/05/20  1322   TBRES Negative Negative Negative       Immunization History     Immunization History   Administered Date(s) Administered    COVID-19 12+ (Pfizer) 10/17/2023, 04/15/2024, 07/19/2024, 10/23/2024    COVID-19 Bivalent 12+ (Pfizer) 12/21/2022    COVID-19 Bivalent 18+ (Moderna) 06/21/2023    COVID-19 Monovalent 18+ (Moderna) 03/10/2021, 04/07/2021, 09/03/2021    COVID-19 Monovalent Booster 18+ (Moderna) 03/02/2022, 07/07/2022    HEPA  04/18/2000, 09/26/2000    HPV 08/13/2012, 09/25/2012, 01/24/2013    HepB 11/15/2011, 12/19/2011    Hepatitis A (ADULT 19+) 04/17/2024    Hepatitis B, Adult 10/28/2022    Influenza (H1N1) 01/07/2010    Influenza (High Dose) Trivalent,PF (Fluzone) 08/30/2018, 09/26/2019, 09/13/2024    Influenza (IIV3) PF 01/03/2005, 10/16/2006, 11/14/2007, 10/28/2008, 09/29/2009, 09/27/2010, 10/04/2011, 09/25/2012, 09/21/2015    Influenza Vaccine 65+ (Fluzone HD) 09/14/2020, 09/24/2021, 09/29/2022, 10/17/2023    Influenza Vaccine >6 months,quad, PF 09/26/2013, 10/06/2014, 10/06/2016, 09/08/2017    Pneumo Conj 13-V (2010&after) 10/06/2016    Pneumococcal 23 valent 11/15/2011, 10/17/2017    RSV Vaccine (Abrysvo) 10/17/2023    TD,PF 7+ (Tenivac) 04/18/2000, 07/07/2004    TDAP (Adacel,Boostrix) 05/23/2006    TDAP Vaccine (Boostrix) 09/21/2015    Zoster recombinant adjuvanted (SHINGRIX) 06/14/2018, 08/24/2018    Zoster vaccine, live 09/21/2015          Chart documentation done in part with Dragon Voice recognition Software. Although reviewed after completion, some word and grammatical error may remain.    Apolinar Cho MD

## 2024-12-21 ENCOUNTER — HEALTH MAINTENANCE LETTER (OUTPATIENT)
Age: 74
End: 2024-12-21

## 2024-12-24 ENCOUNTER — OFFICE VISIT (OUTPATIENT)
Dept: SURGERY | Facility: CLINIC | Age: 74
End: 2024-12-24
Payer: COMMERCIAL

## 2024-12-24 VITALS — HEART RATE: 67 BPM | SYSTOLIC BLOOD PRESSURE: 163 MMHG | OXYGEN SATURATION: 98 % | DIASTOLIC BLOOD PRESSURE: 81 MMHG

## 2024-12-24 DIAGNOSIS — K62.82 AIN (ANAL INTRAEPITHELIAL NEOPLASIA) ANAL CANAL: Primary | ICD-10-CM

## 2024-12-24 DIAGNOSIS — K62.5 RECTAL BLEEDING: ICD-10-CM

## 2024-12-24 PROCEDURE — G0452 MOLECULAR PATHOLOGY INTERPR: HCPCS | Mod: 26 | Performed by: PATHOLOGY

## 2024-12-24 PROCEDURE — 87624 HPV HI-RISK TYP POOLED RSLT: CPT | Performed by: NURSE PRACTITIONER

## 2024-12-24 PROCEDURE — 88112 CYTOPATH CELL ENHANCE TECH: CPT | Mod: TC | Performed by: NURSE PRACTITIONER

## 2024-12-24 ASSESSMENT — PAIN SCALES - GENERAL: PAINLEVEL_OUTOF10: MILD PAIN (2)

## 2024-12-24 NOTE — NURSING NOTE
Chief Complaint   Patient presents with    Follow Up       Vitals:    12/24/24 1009   BP: (!) 163/81   BP Location: Left arm   Patient Position: Sitting   Cuff Size: Adult Regular   Pulse: 67   SpO2: 98%       There is no height or weight on file to calculate BMI.    Jorge Navarro EMT-P
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
## 2024-12-26 LAB
PATH REPORT.COMMENTS IMP SPEC: ABNORMAL
PATH REPORT.COMMENTS IMP SPEC: YES
PATH REPORT.FINAL DX SPEC: ABNORMAL
PATH REPORT.GROSS SPEC: ABNORMAL
PATH REPORT.MICROSCOPIC SPEC OTHER STN: ABNORMAL
PATH REPORT.RELEVANT HX SPEC: ABNORMAL

## 2025-01-06 ENCOUNTER — THERAPY VISIT (OUTPATIENT)
Dept: PHYSICAL THERAPY | Facility: CLINIC | Age: 75
End: 2025-01-06
Attending: INTERNAL MEDICINE
Payer: COMMERCIAL

## 2025-01-06 DIAGNOSIS — G89.29 CHRONIC BILATERAL LOW BACK PAIN WITHOUT SCIATICA: ICD-10-CM

## 2025-01-06 DIAGNOSIS — M77.11 RIGHT LATERAL EPICONDYLITIS: ICD-10-CM

## 2025-01-06 DIAGNOSIS — M54.50 CHRONIC BILATERAL LOW BACK PAIN WITHOUT SCIATICA: ICD-10-CM

## 2025-01-06 PROCEDURE — 97530 THERAPEUTIC ACTIVITIES: CPT | Mod: GP | Performed by: PHYSICAL THERAPIST

## 2025-01-06 PROCEDURE — 97140 MANUAL THERAPY 1/> REGIONS: CPT | Mod: GP | Performed by: PHYSICAL THERAPIST

## 2025-01-06 PROCEDURE — 97161 PT EVAL LOW COMPLEX 20 MIN: CPT | Mod: GP | Performed by: PHYSICAL THERAPIST

## 2025-01-06 NOTE — PROGRESS NOTES
PHYSICAL THERAPY EVALUATION  Type of Visit: Evaluation       Fall Risk Screen:  Fall screen completed by: PT  Have you fallen 2 or more times in the past year?: Yes  Have you fallen and had an injury in the past year?: No  Is patient a fall risk?: No    Subjective         Presenting condition or subjective complaint: (Patient-Rptd) Lower back pain left side, wrist, arm & hand pain primarily right arm, wrist & hand. LBP bothers her greater than R UE pain. LBP increase around September when she was more active raking, hauling wood, putting things away for the winter. Also had an increase in R elbow/UE pain that originally comes from when she jammed her R UE coming down a hill when running and tripped over a chain in 1978 or 79. Has benefited from PT in the past including .  Date of onset: 09/01/24 (approximate increase in LBP)    Relevant medical history: (Patient-Rptd) Anemia; Arthritis; Bladder or bowel problems; Cancer; Concussions; Hepatitis; High blood pressure; History of fractures; Osteoporosis; Pain at night or rest; Rheumatoid arthritis   Dates & types of surgery: (Patient-Rptd) [facility]    Prior diagnostic imaging/testing results: (Patient-Rptd) MRI; CT scan; X-ray; EMG; Bone scan   X ray: IMPRESSION: Five lumbar type vertebral body numbering convention. L1-L2 trace retrolisthesis. Otherwise preserved vertebral body alignment. Preserved vertebral alignment in flexion, extension positioning. Multilevel disc height loss, causing endplate   degenerative change in the lower lumbar spine predominantly. Standing radiographs are notable for mild convex right mid lumbar lateral curvature. Otherwise preserved vertebral body alignment throughout. Preserved vertebral body heights. The visualized   extraspinal structures are nonfocal.  Lumbar MRI: IMPRESSION:  1.  Compromised evaluation due to lack of cross registration of axial and sagittal sequences.  2.  At L4-L5, there is severe right neural foraminal stenosis  with compression of the exiting right L4 nerve root.  3.  At L3-L4, there is moderate-to-severe right lateral recess stenosis with impingement upon the descending right L4 nerve root.  4.  At L2-L3, there is moderate left neural foraminal stenosis. A left-sided disc protrusion contacts the descending left L3 nerve root.  5.  Questionable nodularity of the cauda equina nerve roots at the L4 level. Follow-up with contrast-enhanced lumbar spine MRI is recommended for further evaluation  Prior therapy history for the same diagnosis, illness or injury: (Patient-Rptd) Yes (Patient-Rptd) Physical therapy in 6325-5013 prior to other required surgeries.  Physical therapy in years past.    Prior Level of Function  Transfers: Independent  Ambulation: Independent  ADL: Independent  IADL: Driving, Finances, Housekeeping, Laundry, Meal preparation, Medication management, Yard work    Living Environment  Social support: (Patient-Rptd) Alone   Type of home: (Patient-Rptd) House   Stairs to enter the home: (Patient-Rptd) Yes (Patient-Rptd) 2 Is there a railing: (Patient-Rptd) Yes     Ramp: (Patient-Rptd) No   Stairs inside the home: (Patient-Rptd) Yes (Patient-Rptd) 12 Is there a railing: (Patient-Rptd) Yes     Help at home: (Patient-Rptd) Home management tasks (cooking, cleaning)  Equipment owned: (Patient-Rptd) Straight Cane     Employment: (Patient-Rptd) Not Applicable    Hobbies/Interests: (Patient-Rptd) Reading, photography, literary research, writing, running, biking, hiking, book club, open binta reading, kayaking. etc.    Patient goals for therapy: (Patient-Rptd) Stand for more than 5 - 10 minutes without pain.  Reduce level of pain overall.  Not wake up with lower back pain.    Pain assessment: See objective evaluation for additional pain details     Objective   LUMBAR SPINE EVALUATION  PAIN: Pain Level at Rest: 0/10  Pain Level with Use: 9/10  Pain Location: lumbar spine, elbow, wrist, hand, and L LBP more than R, R UE  sharp pain at times greater than L proximal 3rd finger area  Pain Frequency: intermittent or 75% L low back  Pain is Exacerbated By: standing, lying on her L side with neck bent wrong increases LBP, lifting too much, pt not sure what causes intermittent R UE sharp pain  Pain is Relieved By: otc medications, rest, and sitting, walking  Pain Progression: Unchanged  INTEGUMENTARY (edema, incisions): WFL  POSTURE:  sits with neck slightly flexed, reduced lumbar lordosis  GAIT:   Weightbearing Status:   Assistive Device(s): None  Gait Deviations: WFL  BALANCE/PROPRIOCEPTION:  has decreased standing and SLS balance in past but not tested today  WEIGHTBEARING ALIGNMENT:   NON-WEIGHTBEARING ALIGNMENT:    ROM: AROM WFL  PELVIC/SI SCREEN:   STRENGTH:  able to transfer sit to stand using just LE's    MYOTOMES:   DTR S:   CORD SIGNS:   DERMATOMES:   NEURAL TENSION:   FLEXIBILITY:   LUMBAR/HIP Special Tests: In sitting 0/10 LBP. No pain with sit to stand. 3/10 L LBP in standing. 0/10 in supine with LE's on bolster.    PELVIS/SI SPECIAL TESTS:   FUNCTIONAL TESTS:  slow and painful and some vertigo complaints with sit to supine transfer and with supine to sit to stand some vertigo  PALPATION:  ST tightness/restriction R anterior wrist area and nodule over L proximal 3rd finger area  SPINAL SEGMENTAL CONCLUSIONS:       Assessment & Plan   CLINICAL IMPRESSIONS  Medical Diagnosis: Chronic bilateral low back pain without sciatica, Right lateral epicondylitis    Treatment Diagnosis: mechanical low back pain, right elbow/forearm pain   Impression/Assessment: Patient is a 74 year old female with Left LBP and R elbow/forearm pain complaints.  The following significant findings have been identified: Pain, Decreased ROM/flexibility, Decreased strength, Impaired balance, Impaired muscle performance, Decreased activity tolerance, and Impaired posture. These impairments interfere with their ability to perform self care tasks, work tasks,  recreational activities, household chores, household mobility, and community mobility as compared to previous level of function.     Clinical Decision Making (Complexity):  Clinical Presentation: Stable/Uncomplicated  Clinical Presentation Rationale: based on medical and personal factors listed in PT evaluation  Clinical Decision Making (Complexity): Low complexity    PLAN OF CARE  Treatment Interventions:  Modalities: Ultrasound  Interventions: Manual Therapy, Neuromuscular Re-education, Therapeutic Activity, Therapeutic Exercise    Long Term Goals     PT Goal 1  Goal Identifier: Home Program  Goal Description: pt to have good understanding and compliance of home program to decrease chronic back and elbow pain and carry over to improved standing tolerance and R UE use with adls  Target Date: 03/06/25  PT Goal 2  Goal Identifier: Function  Goal Description: pt to note a decrease in LBP and carry over to improved functional level as measured by KEVIN score decrease from 37.78% to 30% or less  Target Date: 03/06/25      Frequency of Treatment: 1x per week  Duration of Treatment: 90 days, decrease as able    Recommended Referrals to Other Professionals:   Education Assessment:   Learner/Method: Patient;Listening;Demonstration;No Barriers to Learning  Education Comments: home program    Risks and benefits of evaluation/treatment have been explained.   Patient/Family/caregiver agrees with Plan of Care.     Evaluation Time:     PT Eval, Low Complexity Minutes (52691): 20       Signing Clinician: Ronald Rodríguez, PT        [facility]                                                                                   OUTPATIENT PHYSICAL THERAPY      PLAN OF TREATMENT FOR OUTPATIENT REHABILITATION   Patient's Last Name, First Name, Niesha Norwood YOB: 1950   Provider's Name   [facility]   Medical Record No.  [id number]     Onset Date: 09/01/24  (approximate increase in LBP)  Start of Care Date: 01/06/25     Medical Diagnosis:  Chronic bilateral low back pain without sciatica, Right lateral epicondylitis      PT Treatment Diagnosis:  mechanical low back pain, right elbow/forearm pain Plan of Treatment  Frequency/Duration: 1x per week/ 90 days, decrease as able    Certification date from 01/06/25 to 04/05/25         See note for plan of treatment details and functional goals     Ronald Rodríguez, PT                         I CERTIFY THE NEED FOR THESE SERVICES FURNISHED UNDER        THIS PLAN OF TREATMENT AND WHILE UNDER MY CARE     (Physician attestation of this document indicates review and certification of the therapy plan).              Referring Provider:  Apolinar Cho    Initial Assessment  See Epic Evaluation- Start of Care Date: 01/06/25

## 2025-01-20 ENCOUNTER — THERAPY VISIT (OUTPATIENT)
Dept: PHYSICAL THERAPY | Facility: CLINIC | Age: 75
End: 2025-01-20
Attending: INTERNAL MEDICINE
Payer: COMMERCIAL

## 2025-01-20 DIAGNOSIS — G89.29 CHRONIC BILATERAL LOW BACK PAIN WITHOUT SCIATICA: Primary | ICD-10-CM

## 2025-01-20 DIAGNOSIS — M54.50 CHRONIC BILATERAL LOW BACK PAIN WITHOUT SCIATICA: Primary | ICD-10-CM

## 2025-01-20 PROCEDURE — 97140 MANUAL THERAPY 1/> REGIONS: CPT | Mod: GP | Performed by: PHYSICAL THERAPIST

## 2025-01-20 PROCEDURE — 97035 APP MDLTY 1+ULTRASOUND EA 15: CPT | Mod: GP | Performed by: PHYSICAL THERAPIST

## 2025-01-25 ENCOUNTER — HEALTH MAINTENANCE LETTER (OUTPATIENT)
Age: 75
End: 2025-01-25

## 2025-01-27 ENCOUNTER — MYC MEDICAL ADVICE (OUTPATIENT)
Dept: RHEUMATOLOGY | Facility: CLINIC | Age: 75
End: 2025-01-27
Payer: COMMERCIAL

## 2025-01-27 ENCOUNTER — THERAPY VISIT (OUTPATIENT)
Dept: PHYSICAL THERAPY | Facility: CLINIC | Age: 75
End: 2025-01-27
Attending: INTERNAL MEDICINE
Payer: COMMERCIAL

## 2025-01-27 DIAGNOSIS — M54.50 CHRONIC BILATERAL LOW BACK PAIN WITHOUT SCIATICA: Primary | ICD-10-CM

## 2025-01-27 DIAGNOSIS — G89.29 CHRONIC BILATERAL LOW BACK PAIN WITHOUT SCIATICA: Primary | ICD-10-CM

## 2025-01-27 PROCEDURE — 97140 MANUAL THERAPY 1/> REGIONS: CPT | Mod: GP | Performed by: PHYSICAL THERAPIST

## 2025-01-27 PROCEDURE — 97035 APP MDLTY 1+ULTRASOUND EA 15: CPT | Mod: GP | Performed by: PHYSICAL THERAPIST

## 2025-01-27 PROCEDURE — 97530 THERAPEUTIC ACTIVITIES: CPT | Mod: GP | Performed by: PHYSICAL THERAPIST

## 2025-02-05 ENCOUNTER — THERAPY VISIT (OUTPATIENT)
Dept: PHYSICAL THERAPY | Facility: CLINIC | Age: 75
End: 2025-02-05
Attending: INTERNAL MEDICINE
Payer: COMMERCIAL

## 2025-02-05 DIAGNOSIS — G89.29 CHRONIC BILATERAL LOW BACK PAIN WITHOUT SCIATICA: Primary | ICD-10-CM

## 2025-02-05 DIAGNOSIS — M54.50 CHRONIC BILATERAL LOW BACK PAIN WITHOUT SCIATICA: Primary | ICD-10-CM

## 2025-02-05 PROCEDURE — 97035 APP MDLTY 1+ULTRASOUND EA 15: CPT | Mod: GP | Performed by: PHYSICAL THERAPIST

## 2025-02-05 PROCEDURE — 97140 MANUAL THERAPY 1/> REGIONS: CPT | Mod: GP | Performed by: PHYSICAL THERAPIST

## 2025-02-09 ENCOUNTER — MYC MEDICAL ADVICE (OUTPATIENT)
Dept: RHEUMATOLOGY | Facility: CLINIC | Age: 75
End: 2025-02-09
Payer: COMMERCIAL

## 2025-02-13 ENCOUNTER — OFFICE VISIT (OUTPATIENT)
Dept: FAMILY MEDICINE | Facility: OTHER | Age: 75
End: 2025-02-13
Payer: COMMERCIAL

## 2025-02-13 ENCOUNTER — ANCILLARY PROCEDURE (OUTPATIENT)
Dept: GENERAL RADIOLOGY | Facility: OTHER | Age: 75
End: 2025-02-13
Attending: FAMILY MEDICINE
Payer: COMMERCIAL

## 2025-02-13 VITALS
TEMPERATURE: 98.7 F | HEART RATE: 64 BPM | BODY MASS INDEX: 24.45 KG/M2 | OXYGEN SATURATION: 99 % | SYSTOLIC BLOOD PRESSURE: 136 MMHG | DIASTOLIC BLOOD PRESSURE: 80 MMHG | WEIGHT: 138 LBS | RESPIRATION RATE: 18 BRPM

## 2025-02-13 DIAGNOSIS — M06.09 RHEUMATOID ARTHRITIS OF MULTIPLE SITES WITH NEGATIVE RHEUMATOID FACTOR (H): ICD-10-CM
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
## 2025-02-13 DIAGNOSIS — R07.81 RIB PAIN ON RIGHT SIDE: ICD-10-CM

## 2025-02-13 DIAGNOSIS — F10.21 ALCOHOL DEPENDENCE IN REMISSION (H): ICD-10-CM

## 2025-02-13 ASSESSMENT — PAIN SCALES - GENERAL: PAINLEVEL_OUTOF10: MODERATE PAIN (5)

## 2025-02-13 NOTE — PROGRESS NOTES
Assessment & Plan     Fall, initial encounter/Rib pain on right side  No obvious fracture on x-ray although radiology review is pending. She is using, naproxen and gabapentin for pain.  Will have her try lidocaine patch on the area.  Discussed if pain is worsening instead of improving could always consider chest CT to evaluate the ribs further.      - XR Ribs & Chest Right G/E 3 Views; Future    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)  She follows with rheumatology and is currently on Orencia and Plaquenil    Alcohol dependence in remission (H)  Remote history, no current concern      Subjective   Niesha is a 74 year old, presenting for the following health issues:  Fall        2/13/2025     8:03 AM   Additional Questions   Roomed by lula     History of Present Illness       Reason for visit:  Outside I fell hard onto frozen ground on right side, rib cage, arm, elbow.  Symptom onset:  1-3 days ago  Symptoms include:  Pain on right side rib cage, arm, elbow & below arm pit & mobility limited.  Symptom intensity:  Moderate  Symptom progression:  Staying the same  Had these symptoms before:  Yes  Has tried/received treatment for these symptoms:  Yes  Previous treatment was successful:  Yes  Prior treatment description:  Deep breathing to work lungs, alleve, rest, limit lifting, twisting, certain tasks.  What makes it worse:  Lifting, push/pulling, turning, pressure against right side, sudden moves, sneezing, blowing nose, rising from couch, reaching over head, standing for >10 min., etc.  What makes it better:  Sitting on firm surface with back straight & support under knees, rest, alleve, taking warm shower, staying warm, using cane, bending knees when doing tasks, paying attention to movement.   She is taking medications regularly.     5 days ago patient was outside shoveling her driveway.  She states she was out there for 5 and half hours.  She then went and got the mail and  when she came back she tripped on a rock and fell on her right side.  She states her right arm was outstretched as she was holding onto the mail.  She did not lose consciousness.  She did not hit her head.  She was able to get up.  She states that since then she has been having discomfort in her right lateral chest wall just below her armpit.  She also gets a little bit of pain into the back.  She denies shortness of breath or cough.  She has been trying to do deep breathing as she has a history of fractured ribs in the past remembered she needed to do that.  She notes pain, coughing, moving her right arm.  She also states she hurt her right elbow and forearm but denies any problems with movement.      Objective    /80   Pulse 64   Temp 98.7  F (37.1  C) (Temporal)   Resp 18   Wt 62.6 kg (138 lb)   LMP 11/27/2003   SpO2 99%   BMI 24.45 kg/m    Body mass index is 24.45 kg/m .  Physical Exam   Gen:  no respiratory distress  Chest/CV: S1 and S2 normal, no murmurs, clicks, gallops or rubs. Regular rate and rhythm. Chest is clear; no wheezes or rales.  Right lateral chest wall just below the axilla is tender to palpation.    Xray - Reviewed and interpreted by me.  No obvious fracture  Radiology review pending        Signed Electronically by: Tanika Clark MD

## 2025-03-24 ENCOUNTER — TELEPHONE (OUTPATIENT)
Dept: RHEUMATOLOGY | Facility: CLINIC | Age: 75
End: 2025-03-24
Payer: COMMERCIAL

## 2025-03-24 NOTE — TELEPHONE ENCOUNTER
PA Initiation    Medication: ORENCIA CLICKJECT 125 MG/ML SC SOAJ  Insurance Company: [employer] RX - Phone [phone] Fax [phone]  Pharmacy Filling the Rx: [facility] - [place], [place] - [facility] [address]  Filling Pharmacy Phone: [phone]  Filling Pharmacy Fax: [phone]  Start Date: 3/24/2025  GADIEL ([place]: [id number])        Thank You,     Rosa Deal CPhT  [facility] Special[facility]  [email or web address]  [email or web address]  Phone: [phone]  Fax: [phone]

## 2025-03-24 NOTE — TELEPHONE ENCOUNTER
Prior Authorization Approval    Medication: ORENCIA CLICKJECT 125 MG/ML SC SOAJ  Authorization Effective Date: 1/1/2025  Authorization Expiration Date: 3/24/2026  Approved Dose/Quantity: 4 ML per 28-day supply  Reference #: [id number] (Key: [id number])   Insurance Company: [employer] RX - Phone [phone] Fax [phone]  Expected CoPay: $ 60  CoPay Card Available: No    Financial Assistance Needed: N/A  Which Pharmacy is filling the prescription: [facility] - [address]  Pharmacy Notified: renewal  Patient Notified: renewal          Thank You,     Isabel Deal Manish  [facility] Specialty  [email or web address]  Phone: [phone]  Fax: [phone]

## 2025-04-03 ENCOUNTER — LAB (OUTPATIENT)
Dept: LAB | Facility: OTHER | Age: 75
End: 2025-04-03
Payer: COMMERCIAL

## 2025-04-03 ENCOUNTER — IMMUNIZATION (OUTPATIENT)
Dept: FAMILY MEDICINE | Facility: OTHER | Age: 75
End: 2025-04-03
Payer: COMMERCIAL

## 2025-04-03 DIAGNOSIS — Z23 HIGH PRIORITY FOR 2019-NCOV VACCINE: Primary | ICD-10-CM

## 2025-04-03 DIAGNOSIS — M06.09 RHEUMATOID ARTHRITIS OF MULTIPLE SITES WITH NEGATIVE RHEUMATOID FACTOR (H): ICD-10-CM

## 2025-04-03 DIAGNOSIS — M81.0 AGE-RELATED OSTEOPOROSIS WITHOUT CURRENT PATHOLOGICAL FRACTURE: ICD-10-CM

## 2025-04-03 LAB
ALBUMIN SERPL BCG-MCNC: 4.1 G/DL (ref 3.5–5.2)
ALP SERPL-CCNC: 65 U/L (ref 40–150)
ALT SERPL W P-5'-P-CCNC: 15 U/L (ref 0–50)
AST SERPL W P-5'-P-CCNC: 27 U/L (ref 0–45)
BASOPHILS # BLD AUTO: 0 10E3/UL (ref 0–0.2)
BASOPHILS NFR BLD AUTO: 1 %
BILIRUB DIRECT SERPL-MCNC: 0.15 MG/DL (ref 0–0.3)
BILIRUB SERPL-MCNC: 0.4 MG/DL
CALCIUM SERPL-MCNC: 10.5 MG/DL (ref 8.8–10.4)
CREAT SERPL-MCNC: 0.89 MG/DL (ref 0.51–0.95)
CRP SERPL-MCNC: <3 MG/L
EGFRCR SERPLBLD CKD-EPI 2021: 68 ML/MIN/1.73M2
EOSINOPHIL # BLD AUTO: 0.2 10E3/UL (ref 0–0.7)
EOSINOPHIL NFR BLD AUTO: 6 %
ERYTHROCYTE [DISTWIDTH] IN BLOOD BY AUTOMATED COUNT: 12.8 % (ref 10–15)
ERYTHROCYTE [SEDIMENTATION RATE] IN BLOOD BY WESTERGREN METHOD: 7 MM/HR (ref 0–30)
HCT VFR BLD AUTO: 42.3 % (ref 35–47)
HGB BLD-MCNC: 13.9 G/DL (ref 11.7–15.7)
IMM GRANULOCYTES # BLD: 0 10E3/UL
IMM GRANULOCYTES NFR BLD: 0 %
LYMPHOCYTES # BLD AUTO: 1.1 10E3/UL (ref 0.8–5.3)
LYMPHOCYTES NFR BLD AUTO: 30 %
MCH RBC QN AUTO: 29.7 PG (ref 26.5–33)
MCHC RBC AUTO-ENTMCNC: 32.9 G/DL (ref 31.5–36.5)
MCV RBC AUTO: 90 FL (ref 78–100)
MONOCYTES # BLD AUTO: 0.3 10E3/UL (ref 0–1.3)
MONOCYTES NFR BLD AUTO: 9 %
NEUTROPHILS # BLD AUTO: 1.9 10E3/UL (ref 1.6–8.3)
NEUTROPHILS NFR BLD AUTO: 54 %
PLATELET # BLD AUTO: 138 10E3/UL (ref 150–450)
PROT SERPL-MCNC: 6.7 G/DL (ref 6.4–8.3)
RBC # BLD AUTO: 4.68 10E6/UL (ref 3.8–5.2)
WBC # BLD AUTO: 3.6 10E3/UL (ref 4–11)

## 2025-04-05 LAB
GAMMA INTERFERON BACKGROUND BLD IA-ACNC: 0.05 IU/ML
M TB IFN-G BLD-IMP: NEGATIVE
M TB IFN-G CD4+ BCKGRND COR BLD-ACNC: 9.95 IU/ML
MITOGEN IGNF BCKGRD COR BLD-ACNC: 0 IU/ML
MITOGEN IGNF BCKGRD COR BLD-ACNC: 0.04 IU/ML
QUANTIFERON MITOGEN: 10 IU/ML
QUANTIFERON NIL TUBE: 0.05 IU/ML
QUANTIFERON TB1 TUBE: 0.09 IU/ML
QUANTIFERON TB2 TUBE: 0.05

## 2025-04-07 ENCOUNTER — OFFICE VISIT (OUTPATIENT)
Dept: RHEUMATOLOGY | Facility: CLINIC | Age: 75
End: 2025-04-07
Payer: COMMERCIAL

## 2025-04-07 VITALS
SYSTOLIC BLOOD PRESSURE: 116 MMHG | HEART RATE: 60 BPM | DIASTOLIC BLOOD PRESSURE: 71 MMHG | OXYGEN SATURATION: 99 % | RESPIRATION RATE: 16 BRPM | WEIGHT: 137 LBS | BODY MASS INDEX: 24.27 KG/M2

## 2025-04-07 DIAGNOSIS — M81.0 AGE-RELATED OSTEOPOROSIS WITHOUT CURRENT PATHOLOGICAL FRACTURE: ICD-10-CM

## 2025-04-07 DIAGNOSIS — M06.09 RHEUMATOID ARTHRITIS OF MULTIPLE SITES WITH NEGATIVE RHEUMATOID FACTOR (H): Primary | ICD-10-CM

## 2025-04-07 DIAGNOSIS — Z92.89 PERSONAL HISTORY OF OTHER MEDICAL TREATMENT: ICD-10-CM

## 2025-04-07 DIAGNOSIS — M25.512 CHRONIC LEFT SHOULDER PAIN: ICD-10-CM

## 2025-04-07 DIAGNOSIS — E61.1 IRON DEFICIENCY: ICD-10-CM

## 2025-04-07 DIAGNOSIS — E55.9 VITAMIN D DEFICIENCY: ICD-10-CM

## 2025-04-07 DIAGNOSIS — G89.29 CHRONIC LEFT SHOULDER PAIN: ICD-10-CM

## 2025-04-07 PROCEDURE — 99214 OFFICE O/P EST MOD 30 MIN: CPT | Performed by: INTERNAL MEDICINE

## 2025-04-07 PROCEDURE — G2211 COMPLEX E/M VISIT ADD ON: HCPCS | Performed by: INTERNAL MEDICINE

## 2025-04-07 PROCEDURE — 3074F SYST BP LT 130 MM HG: CPT | Performed by: INTERNAL MEDICINE

## 2025-04-07 PROCEDURE — 1126F AMNT PAIN NOTED NONE PRSNT: CPT | Performed by: INTERNAL MEDICINE

## 2025-04-07 PROCEDURE — 3078F DIAST BP <80 MM HG: CPT | Performed by: INTERNAL MEDICINE

## 2025-04-07 RX ORDER — VITAMIN B COMPLEX
25 TABLET ORAL DAILY
Qty: 90 TABLET | Refills: 2 | Status: SHIPPED | OUTPATIENT
Start: 2025-04-07

## 2025-04-07 RX ORDER — HYDROXYCHLOROQUINE SULFATE 200 MG/1
TABLET, FILM COATED ORAL
Qty: 135 TABLET | Refills: 2 | Status: SHIPPED | OUTPATIENT
Start: 2025-04-07

## 2025-04-07 RX ORDER — ABATACEPT 125 MG/ML
125 INJECTION, SOLUTION SUBCUTANEOUS
Qty: 4 ML | Refills: 4 | Status: SHIPPED | OUTPATIENT
Start: 2025-04-07

## 2025-04-07 ASSESSMENT — PAIN SCALES - GENERAL: PAINLEVEL_OUTOF10: NO PAIN (0)

## 2025-04-07 NOTE — NURSING NOTE
RAPID3 (0-30) Cumulative Score  19.5          RAPID3 Weighted Score (divide #4 by 3 and that is the weighted score)  6.5

## 2025-04-07 NOTE — PROGRESS NOTES
Rheumatology Clinic Visit      Niesha Adhikari MRN# [id number]   YOB: 1950 Age: 74 year old      Date of visit: 4/07/25   PCP: Dr. Tanika Clark  Hepatologist: Dr. Peres at [facility] in [place]  Ophthalmology: Dr. Higgins, [facility]    Chief Complaint   Patient presents with:  RECHECK: RA  Bilateral hand pain, stiffness and difficulty using her hands gradually getting worse.   bilateral feet/toe numbness and tingling  Chronic bilateral low back pain- PT has been helping      Assessment and Plan     1. Rheumatoid arthritis: Hepatitis C related arthralgias versus rheumatoid arthritis (RF and CCP negative); hepatitis C has since been cleared. Initially with symmetric synovitis on exam and morning stiffness for more than one hour. NSAIDs and Tylenol associated with rash.  She did well with hydroxychloroquine 400 mg daily for a while but more arthritis symptoms so SSZ was added but associated with cytopenia and GI upset so that was stopped.  Avoiding MTX, leflunomide because of hepatitis C hx and +HBV core.  Preferentially avoid Jakinibs because of hepatitis C history and +HBV core. She has established with gastroenterology and is on tenofovir for hepatitis B reactivation prophylaxis.  Previously on Humira (5/8/2020-2/2023).  Currently on Enbrel (started 2/2023-6/5/2023, injection site reactions, no improvement since changing from Humira to Enbrel).  Note that she had been doing well but then in January 2023 was having more pain in the right hand with mild synovial hypertrophy but no clearly active synovitis.  She had findings that were more consistent with carpal tunnel syndrome.  An MRI of the right hand showed synovitis at the right second and third MCP and tenosynovitis of the extensor tendons of the right third finger.  Therefore, changed from Humira to Enbrel with no improvement and exam remained unchanged; was also having injection site reactions with Enbrel.  Therefore,  "changed to Orencia on 6/5/2023.  Doing well at this time; no synovitis on exam and no injection site issues.  Chronic illness, progressive.    - Continue hydroxychloroquine 300 mg daily (last eye exam was performed 2/1/2022 at [facility]; yearly eye exam required and she says that it has been completed; request ophthalmology records today)  - Continue Orencia 125mg SQ once every 7 days  - Labs in 4 months: CBC, Creatinine, Hepatic Panel, ESR, CRP    2. Osteoporosis: Previously treated with Fosamax (GERD), PO Boniva (reportedly ineffective), and IV Boniva (reportedly effective). Prolia was being considered by a previous rheumatologist but not used because she wanted \"clearance\" from the patient's gastroenterologist because she has hepatitis C; I do not see a contraindication for Prolia in the setting of hepatitis C. The patient reports that her gastroenterologist reported no contraindication for Prolia either.  She had not been on osteoporosis treatment for at least 2 years before restarting Boniva.  Boniva was restarted with the first dose given on 12/5/2017.  2021 DEXA showed improvement and Boniva was continued until the last dose that was on 1/12/2023.  1/16/2023 DEXA shows osteoporosis, with reduced bone mineral density compared to the previous, about the hips and lumbar spine.  Changed to Prolia with the first dose received on 4/12/2023.  Vitamin D reduced previously because of elevated vitamin D but then she accidentally increased vitamin D from 1000 IU daily to 2000 IU daily; she just realized this error she made and is reducing vitamin D to 1000 IU daily   chronic illness, stable.    - Take as prescribed: vitamin D 1000 IU daily  - Calcium 1200 mg daily  - Continue Prolia 60mg SQ every 6 months (received at the infusion center)  - DEXA ordered previously and I again advised that she call to schedule to have completed at the [facility] location in [place], [place], where she had her last " DEXA performed  - Labs in 4 months: Calcium, vitamin D, PTH    3.  Lower back pain and history left hip pain: Ms. Adhikari had a CT pelvis on 11/16/2018 that showed degenerative changes of the L-spine.   Improves with PT exercises and has been to physical therapy, and encouraged that she continues the exercises on her own at home.    4. HBV core ab positive: On hepatitis B prophylaxis from gastroenterology.  Continue to follow with gastroenterology.    5. Bilateral carpal tunnel syndrome: EMG/NCS showed mild carpal tunnel syndrome.  Had carpal tunnel surgery but still symptomatic and anticipates improvement with more time postoperative.    6.  Bilateral hand osteoarthritis: Reviewed the diagnosis treatment options.  Start Voltaren gel as needed.  Risks and side effects of Voltaren gel were reviewed in detail today.  - diclofenac (VOLTAREN) 1 % topical gel; Apply up to 2 grams of 1% gel to hands up to 4 times daily as needed for joint pain (maximum: 8 g per upper extremity per day)      7.  Right lateral epicondylitis: Improved with physical therapy    8.  Chronic left shoulder pain: Exam consistent with a chronic pain syndrome and impingement syndrome.  Also mildly tender to palpation of the acromioclavicular joint.  Advised adding physical therapy.  - Physical therapy referral    9.  Vaccinations: Vaccinations reviewed with Ms. Adhikari.    - Influenza: encouraged yearly vaccination  - [id number]: up to date  - [id number]: up to date  - Shingrix: Up to date  - COVID19: Advised keeping up-to-date, and to hold Orencia for 1-2 weeks afterward     10.  Iron deficiency anemia: Iron deficiency based on previous labs.  Hemoglobin improved with iron supplementation: ferrous sulfate 325 mg every other day.  She will follow long-term with her primary care provider for iron deficiency anemia.  - Continue ferrous sulfate 325 mg every other day  - Labs in 4 months: CBC, ferritin, iron    Total minutes spent in evaluation with  patient, review of pertinent lab tests and chart notes, and documentation: 26  The longitudinal plan of care for the rheumatology problem(s) were addressed during this visit.  Due to added complexity of care, we will continue to support the patient and the subsequent management of this condition with ongoing continuity of care.       Ms. Adhikair verbalized agreement with and understanding of the rational for the diagnosis and treatment plan.  All questions were answered to best of my ability and the patient's satisfaction. Ms. Adhikari was advised to contact the clinic with any questions that may arise after the clinic visit.      Thank you for involving me in the care of the patient    Return to clinic: 4 months    HPI   Niesha Adhikari is a 74 year old female with a medical history significant for hepatitis C, hemorrhoids, narcolepsy, fibromyalgia, migraines, anxiety, pernicious anemia, GERD, allergic rhinitis, and osteoporosis who presents for follow-up of inflammatory arthritis.    1/31/2023: Was seen by orthopedic surgery where she was told that she may benefit from carpal tunnel surgery bilaterally, and left third digit trigger finger surgery.  She has hesitant to go through with this because she questions if her symptoms are more arthritis related.  Larger nodules over the right second and third MCPs on the left.  Bony hypertrophy at the DIPs.  Occasional pain at the bilateral first CMC joints, right first MCP, and wrist, but pain is worse with activity and improves with rest, not associated with swelling or increased warmth, and only last for a few seconds at a time.  Still with numbness and tingling in both hands that she says affects all of her fingers, is worse at night, and sometimes wakes her up at night.    2/20/2023: Having more pain and stiffness in the bilateral second and third MCPs and PIPs that is worse in the morning and improves with time and activity.  Occasionally has a zapping sensation in  her fingers.  The zapping sensation typically resolves after a few seconds and does not occur for more than a few times each day, if at all.  Having more difficulty grasping items due to pain and stiffness at the MCPs and would like to use prednisone.  States that she has anxiety but prednisone does not worsen her anxiety.    6/5/2023: Swelling at the MCPs, worse on the right hand.  Still with numbness and tingling in the fingers that is intermittent.  States the pain at the MCPs is worse at night and wakes her up from sleep about 2-3 times per night.  Felt better when she was on prednisone was able to sleep through the night while on prednisone but does not wish to restart prednisone because she does not like the potential side effects.  Fell doing yard work and broke several ribs.  Found to have a nodule behind the sternum and is going to have additional work-up for this with imaging at the direction of another clinic; the nodule was found incidentally when imaging was done to find the rib fractures.    9/18/2023: Still with numbness and tingling in her hands that occurs intermittently, sometimes also radiating proximally from the wrist.  Joints without swelling, increased warmth, or overlying erythema.  Pain at the fingers diffusely when she has the numbness and tingling.  No injection site issues with Orencia.  Occasional left elbow pain over the olecranon process but no swelling, increased warmth, or overlying erythema and not an issue at this time.  Chronic low back pain and would like to go back to physical therapy for a refresher.    4/8/2024: modafinil was stopped because it was thought to be keeping her up at night and this has worsened her sleep, and hydroxyzine was replaced with gabapentin and she says that she thinks that gabapentin is a placebo (taking gabapentin 300 mg TID); she says that she plans to follow-up with the prescribing providers regarding these medications.  Planning to have a sleep  study.  [place] having issues with transportation reliability; she plans to discuss with her insurance.  Hasn't made it to her eye appointment or see the dentist or go to physical therapy.  Had to reschedule her eye exam for Plaquenil toxicity monitoring.  Reports that the combination of Plaquenil and Orencia is very effective.  Arthritis well-controlled.  No joint pain or swelling.  Morning stiffness for no more than 10 minutes.    8/12/2024: Planning for carpal tunnel surgery on the left, then eventually on the right.  Planning for bilateral cataract surgeries.  Arthritis is doing well.  No joint pain or swelling.  Morning stiffness for no more than 10 minutes.  Feels like iron supplementation has been helpful; has more energy.  Has started kayaking.    12/9/2024: More energy and generally feeling better since taking iron supplementation.  Joints are doing well except for the bilateral first CMC joints no worse with activity and improves with rest.  Right lateral epicondyle pain worse with activity and improved with rest; no swelling.  Prolia taking without any side effects.  Chronic low back pain worse since she stopped doing physical therapy exercises; she stopped doing exercises because of carpal tunnel surgery but she would like to restart the exercises and requests physical therapy referral.  RA controlled.    Today, 4/7/2025: Accidentally increased vitamin D to be 2000 IU daily so she is going to reduce this to 1000 IU daily.  Left shoulder pain worse with reaching above her head, and with increased activity but she is stable to move her arm in all directions; no shoulder swelling.  Other joints are doing well.    Denies fevers, chills, nausea, vomiting, diarrhea. No abdominal pain. No chest pain/pressure, palpitations, or shortness of breath. No LE swelling. No neck pain. No oral or nasal sores.  No rash.     Tobacco: quit in 1978  EtOH: none  Drugs: none  Occupation: retired    ROS   12 point  review of system was completed and negative except as noted in the HPI     Active Problem List     Patient Active Problem List   Diagnosis    Allergic rhinitis    Pernicious anemia    Anxiety state    Migraine    Essential and other specified forms of tremor    Fibromyalgia    Mild recurrent major depression    Narcolepsy    Internal hemorrhoids with other complication    AIN (anal intraepithelial neoplasia) anal canal    Osteoporosis    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)    Benign essential hypertension    Alcohol dependence in remission (H)    Personal history of other medical treatment    Constipation    DDD (degenerative disc disease), cervical    Iron deficiency    Fatigue    Chronic bilateral low back pain without sciatica    S/P carpal tunnel release    Right lateral epicondylitis     Past Medical History     Past Medical History:   Diagnosis Date    ABUSE BY SPOUSE/PARTNER 07/27/2005    Degeneration of lumbar or lumbosacral intervertebral disc     DDD L5/S1    Depressive disorder     Esophageal reflux 01/28/2005    HELICOBACTER PYLORI INFECTION 01/28/2005    Hepatitis C - Cured. Achieved SVR in 2017     History of blood transfusion     Hypertension     Malignant neoplasm (H)     ACIN    Osteoporosis     Other and unspecified alcohol dependence, unspecified drinking behavior     Sober as 1/21/1987    Other malaise and fatigue      Past Surgical History     Past Surgical History:   Procedure Laterality Date    BIOPSY ANAL CANAL  01/21/2013    [facility]     BREAST BIOPSY, RT/LT Left 1975    Breat Biopsy RT/LT    COLONOSCOPY  08/25/2009    COLONOSCOPY  02/14/2011    COLONOSCOPY performed by CRISTIN LGAUNAS at  GI    COLONOSCOPY N/A 01/08/2019    Procedure: Colonscopy, Biopsies by Biopsy;  Surgeon: Omega Talavera MD;  Location:  GI    COLONOSCOPY N/A 06/17/2024    Procedure: Colonoscopy;  Surgeon: Omega Talavera MD;  Location:  GI    CYSTOSCOPY   02/28/2011    CYSTOSCOPY performed by CAYLA FLOR at  OR    ENDOSCOPY  05/21/2012    Upper GI - [facility]    ENT SURGERY  1965    EYE SURGERY  09/05/24    10/03/24    GI SURGERY  06/25/2012     UGI ENDOSCOPY DIAG W BIOPSY  10/01/2009    HEMORRHOIDECTOMY  06/25/2012    [facility]    LAPAROSCOPIC SALPINGO-OOPHORECTOMY  02/28/2011    LAPAROSCOPIC SALPINGO-OOPHORECTOMY performed by CAYLA FLOR at  OR    ORTHOPEDIC SURGERY  08/23/24 11/01/24    PHACOEMULSIFICATION CLEAR CORNEA WITH TORIC INTRAOCULAR LENS IMPLANT Right 09/05/2024    Procedure: PHACOEMULSIFICATION, CATARACT, WITH INTRAOCULAR LENS IMPLANT, TORIC LENS right;  Surgeon: Primo Garcia MD;  Location: [facility] OR    PHACOEMULSIFICATION CLEAR CORNEA WITH TORIC INTRAOCULAR LENS IMPLANT Left 10/03/2024    Procedure: PHACOEMULSIFICATION, CATARACT, WITH INTRAOCULAR LENS IMPLANT, TORIC LENS, LEFT;  Surgeon: Primo Garcia MD;  Location: [facility] OR    RELEASE CARPAL TUNNEL Left 08/23/2024    Procedure: Left carpal tunnel release;  Surgeon: David John MD;  Location: [facility] OR    RELEASE CARPAL TUNNEL Right 11/01/2024    Procedure: RELEASE, CARPAL TUNNEL,;  Surgeon: David John MD;  Location: [facility] OR    RELEASE TRIGGER FINGER Left 08/23/2024    Procedure: Left middle finger trigger finger release;  Surgeon: David John MD;  Location: [facility] OR    TONSILLECTOMY & ADENOIDECTOMY  1965    [facility] NONSPECIFIC PROCEDURE  1965    Removed bone left index finger knuckle, casts broken bones    [facility] COLONOSCOPY W/WO BRUSH/WASH  08/22/2005    [facility] UGI ENDOSCOPY, SIMPLE EXAM  08/08/2007     Allergy     Allergies   Allergen Reactions    Telaprevir Other (See Comments) and Rash     Rectal bleeding, anemia    Abilify Discmelt Other (See Comments)     Disoriented    Thiopental Sodium      PENTOTHAL/rigidity and fight response    Antivert [Meclizine Hcl]     Chamomile     Compazine     Diphenhydramine Nausea     And abdominal  "pain    Duloxetine Hcl Other (See Comments)     Disoriented, trouble sleeping    Effexor [Venlafaxine] Other (See Comments)     Disoriented, trouble sleeping    Elavil [Amitriptyline Hcl] Other (See Comments)     \"didn't feel right on it-med was stopped right away\"    Food Difficulty breathing     cilantro    Indomethacin      indocin sensativity \"Severe h.a\"    Seasonal Allergies Other (See Comments) and Difficulty breathing     Philip Gold Aug-Sept, rag weed, sneezing    Sulfa Antibiotics     Animal Dander Difficulty breathing and Rash     sneezing,resp. distress    Bupropion Anxiety    Tylenol [Acetaminophen] Rash     Current Medication List     Current Outpatient Medications   Medication Sig Dispense Refill    Abatacept (ORENCIA CLICKJECT) 125 MG/ML SOAJ auto-injector Inject 1 mL (125 mg) subcutaneously every 7 days. . Hold for any infection and seek medical attention. 4 mL 4    calcium carb-cholecalciferol 600-500 MG-UNIT CAPS Take 1,200 mg by mouth daily      cloNIDine (CATAPRES) 0.2 MG tablet Take 0.2 mg by mouth At Bedtime      cycloSPORINE (RESTASIS) 0.05 % ophthalmic emulsion Place 1 drop into both eyes 2 times daily       denosumab (PROLIA) 60 MG/ML SOSY injection       diclofenac (VOLTAREN) 1 % topical gel Apply up to 2 grams of 1% gel to hands up to 4 times daily as needed for joint pain (maximum: 8 g per upper extremity per day) 200 g 2    ferrous sulfate (FE TABS) 325 (65 Fe) MG EC tablet Take 1 tablet (325 mg) by mouth every other day. 45 tablet 2    gabapentin (NEURONTIN) 300 MG capsule TAKE ONE CAPSULE BY MOUTH THREE TIMES A DAY, MAY TAKE TWO CAPSULES BY MOUTH EVERY NIGHT AT BEDTIME 360 capsule 3    hydroxychloroquine (PLAQUENIL) 200 MG tablet Hydroxychloroquine 200mg daily; and an additional 200mg every other day.  Yearly eye exam, including 10-2 VF and SD-OCT, required. 135 tablet 1    lisdexamfetamine (VYVANSE) 30 MG capsule Take 30 mg by mouth every morning      lisinopril (ZESTRIL) 10 MG tablet " TAKE ONE TABLET BY MOUTH ONCE DAILY 90 tablet 1    montelukast (SINGULAIR) 10 MG tablet TAKE 1 TABLET BY MOUTH ONCE DAILY AS NEEDED SEASONALLY (AUGUST AND SEPTEMBER) 90 tablet 3    Psyllium (FIBER) 0.52 G CAPS 1 tabs in am and pm 540 capsule     sennosides (SENOKOT) 8.6 MG tablet Take 2 tablets by mouth 2 times daily      tenofovir alafenamide fumarate (VEMLIDY) 25 MG tablet Take 1 tablet (25 mg) by mouth daily. with food (dispense only in the original container). 90 tablet 3    triamcinolone (KENALOG) 0.1 % external cream Apply topically 2 times daily Use to to 14 days at a time 15 g 0    Vitamin D3 (CHOLECALCIFEROL) 25 mcg (1000 units) tablet Take 1 tablet (25 mcg) by mouth daily. 90 tablet 1     No current facility-administered medications for this visit.         Social History   See HPI    Family History     Family History   Problem Relation Age of Onset    Hypertension Mother     Breast Cancer Mother     Coronary Artery Disease Mother     Cerebrovascular Disease Mother     Kidney Disease Mother     Obesity Mother     Hypertension Father         Father    Lymphoma Father     Glaucoma Father     Other Cancer Father         Lymphoma    Genetic Disorder Father         Glaucoma    Obesity Father     C.A.D. Sister         MI at age 63    Hypertension Sister     Gastrointestinal Disease Sister         gallbladder    Hyperlipidemia Sister     Cerebrovascular Disease Sister     Circulatory Sister         brain aneurysm at 63    Genitourinary Problems Sister         1 kidney/bladder    Hypertension Sister     Obesity Sister     Coronary Artery Disease Sister         had valve surgery, MI, CHF    Coronary Artery Disease Brother     Hypertension Brother     Hyperlipidemia Brother     Cerebrovascular Disease Maternal Grandmother     Hypertension Maternal Grandmother     Cerebrovascular Disease Paternal Grandmother     Hypertension Paternal Grandmother     Glaucoma Paternal Grandfather     Genetic Disorder Paternal  "Grandfather         Glaucoma    Blood Disease Son         Lymes/7/11    Hypertension Son     Obesity Son     Hypertension Son     Breast Cancer Maternal Aunt     Ovarian Cancer Maternal Aunt     Unknown/Adopted Paternal Uncle     Obesity Niece     Obesity Niece     Liver Cancer Cousin     Coronary Artery Disease Other 49        niece    Diabetes Other         1st cousin    Breast Cancer Other     Obesity Other     Obesity Other     Hypertension Other         Aunts    Obesity Other         Uncle    Hypertension Other         Uncle    Coronary Artery Disease Sister     Coronary Artery Disease Brother     Hypertension Brother     Hyperlipidemia Brother     Coronary Artery Disease Nephew         Nephew    Hypertension Niece         [place]    Hypertension Other         Nephew    Cerebrovascular Disease Other         Aunts    Obesity Other         Nephew     Physical Exam     Temp Readings from Last 3 Encounters:   02/13/25 98.7  F (37.1  C) (Temporal)   11/22/24 98.1  F (36.7  C) (Temporal)   11/01/24 96.9  F (36.1  C) (Temporal)     BP Readings from Last 5 Encounters:   04/07/25 116/71   02/13/25 136/80   12/24/24 (!) 163/81   12/09/24 124/76   11/22/24 109/71     Pulse Readings from Last 1 Encounters:   04/07/25 60     Resp Readings from Last 1 Encounters:   04/07/25 16     Estimated body mass index is 24.27 kg/m  as calculated from the following:    Height as of 11/22/24: 1.6 m (5' 2.99\").    Weight as of this encounter: 62.1 kg (137 lb).    GEN: NAD.   HEENT:  Anicteric, noninjected sclera. No obvious external lesions of the ear and nose. Hearing intact.  CV: S1, S2.  RRR.  No murmurs or rubs  PULM: No increased work of breathing.  CTA bilaterally  MSK: MCPs and PIPs without synovial swelling or tenderness to palpation.  Heberden's nodes present.  Philippe's nodes present.  DIPs nontender to palpation.  Wrists without swelling or tenderness to palpation.  Mild squaring and tenderness palpation of bilateral first CMC " joints; no effusion, increased warmth, or overlying erythema.  Elbows without swelling or tenderness to palpation.  Shoulder is tender to palpation over the trapezius, on the posterior aspect of the shoulders and pain with abduction on the left above 90 degrees; also tender to palpation over the acromioclavicular joint on the left; no swelling, increased warmth, or overlying erythema; normal range of motion.   Knees, ankles, and MTPs without swelling or tenderness to palpation.    SKIN: No rash or jaundice seen  PSYCH: Alert. Appropriate.       Labs / Imaging (select studies)     RF/CCP  Recent Labs   Lab Test 06/07/17  0955   CCPIGG 1   RHF <20     CBC  Recent Labs   Lab Test 04/03/25  1413 12/02/24  1323 09/04/24  1017 04/17/24  1021 04/08/24  0915 09/10/21  0933 06/07/21  0811 05/21/21  1145 01/06/21  1303 09/04/20  0756 03/04/20  0752 02/17/20  0810   WBC 3.6* 4.6 4.0   < > 3.5*   < > 3.0*   < > 4.4 4.1   < > 3.8*   RBC 4.68 5.02 4.83   < > 4.81   < > 4.50   < > 5.01 4.88   < > 4.70   HGB 13.9 14.7 13.3   < > 11.5*   < > 13.1   < > 14.8 14.4   < > 14.3   HCT 42.3 45.3 42.3   < > 37.0   < > 40.0   < > 44.9 43.9   < > 42.8   MCV 90 90 88   < > 77*   < > 89   < > 90 90   < > 91   RDW 12.8 13.5 15.4*   < > 15.7*   < > 12.7   < > 13.3 12.5   < > 13.4   * 140* 127*   < > 174   < > 132*   < > 157 140*   < > 140*   MCH 29.7 29.3 27.5   < > 23.9*   < > 29.1   < > 29.5 29.5   < > 30.4   MCHC 32.9 32.5 31.4*   < > 31.1*   < > 32.8   < > 33.0 32.8   < > 33.4   NEUTROPHIL 54 59  --   --  45   < > 32.9  --  46.1 38.9   < > 44.5   LYMPH 30 27  --   --  34   < > 43.4  --  37.0 31.9   < > 35.7   MONOCYTE 9 9  --   --  12   < > 13.5  --  11.2 10.5   < > 14.6   EOSINOPHIL 6 4  --   --  7   < > 9.5  --  5.5 17.8   < > 4.7   BASOPHIL 1 1  --   --  1   < > 0.7  --  0.2 0.7   < > 0.5   ANEU  --   --   --   --   --   --  1.0*  --  2.0 1.6   < > 1.7   ALYM  --   --   --   --   --   --  1.3  --  1.6 1.3   < > 1.4   FREDY  --   --    --   --   --   --  0.4  --  0.5 0.4   < > 0.6   AEOS  --   --   --   --   --   --  0.3  --  0.2  --   --  0.2   ABAS  --   --   --   --   --   --  0.0  --  0.0 0.0   < > 0.0   ANEUTAUTO 1.9 2.8  --   --  1.6   < >  --   --   --   --   --   --    ALYMPAUTO 1.1 1.2  --   --  1.2   < >  --   --   --   --   --   --    AMONOAUTO 0.3 0.4  --   --  0.4   < >  --   --   --   --   --   --    AEOSAUTO 0.2 0.2  --   --  0.3   < >  --   --   --   --   --   --    ABSBASO 0.0 0.0  --   --  0.0   < >  --   --   --   --   --   --     < > = values in this interval not displayed.     [facility]  Recent Labs   Lab Test 04/03/25  1413 10/14/24  1330 09/04/24  1017 07/22/24  0900 04/17/24  1021 04/08/24  0915 12/27/23  0702 10/10/23  2015 09/06/23  0832 09/08/21  0806 06/07/21  0811 02/16/21  0842 01/06/21  1303   NA  --   --  140  --   --   --   --  133* 139   < >  --  134  --    POTASSIUM  --   --  4.4  --   --   --   --  4.1 4.7   < >  --  4.7  --    CHLORIDE  --   --  102  --   --   --   --  98 100   < >  --  100  --    CO2  --   --  28  --   --   --   --  24 28   < >  --  31  --    ANIONGAP  --   --  10  --   --   --   --  11 11   < >  --  3  --    GLC  --   --  89  --  97  --   --  97 83   < >  --  94  --    BUN  --   --  10.8  --   --   --   --  10.1 10.4   < >  --  10  --    CR 0.89 0.86 0.86   < >  --  0.98* 0.89 0.71 0.80   < > 0.76 0.70 0.65   GFRESTIMATED 68 71 71   < >  --  61 69 90 78   < > 80 88 >90   GFRESTBLACK  --   --   --   --   --   --   --   --   --   --  >90 >90 >90   ELIZABETH 10.5* 10.0 10.4   < >  --  10.3*  --  9.5 9.7   < > 8.7 10.0 9.7   BILITOTAL 0.4  --   --   --   --   --  0.2 0.4 0.3   < > 0.5  --  0.5   ALBUMIN 4.1  --   --   --   --  4.4 4.1 4.4 4.2   < > 3.8  --  4.1   PROTTOTAL 6.7  --   --   --   --   --  6.6 6.8 6.5   < > 6.7*  --  7.6   ALKPHOS 65  --   --   --   --   --  75 69 78   < > 79  --  82   AST 27  --   --   --   --   --  36 27 27   < > 22  --  22   ALT 15  --   --   --   --   --  17 16 14   < > 20   --  22    < > = values in this interval not displayed.     Calcium/VitaminD  Recent Labs   Lab Test 04/03/25  1413 10/14/24  1330 09/04/24  1017 08/12/24  1132 04/08/24  0915   ELIZABETH 10.5* 10.0 10.4 9.4 10.3*   VITDT 60*  --   --  75* 92*     ESR/CRP  Recent Labs   Lab Test 04/03/25  1413 12/27/23  0703 12/27/23  0702 09/06/23  0832 01/12/23  0818 02/14/22  1121 02/14/22  1121 06/07/21  0811   SED 7 8  --  7 6   < > 6 5   CRP  --   --   --   --  <2.9  --  <2.9 <2.9   CRPI <3.00  --  <3.00 <3.00  --   --   --   --     < > = values in this interval not displayed.     Lipid Panel  Recent Labs   Lab Test 07/26/24  1121 08/09/19  1228   CHOL 182 209*   TRIG 96 74   HDL 54 82   * 112*   NHDL 128 127     Hepatitis B  Recent Labs   Lab Test 09/04/24  1017 09/06/23  0832 05/16/22  1044 06/30/17  0925 06/07/17  0955   AUSAB  --   --   --   --  0.65   HBCAB  --   --   --   --  Reactive   A reactive result indicates acute, chronic or past/resolved hepatitis B   infection.  *   HBCM  --   --   --   --  Nonreactive   A nonreactive result suggests lack of recent exposure to the virus in the   preceding 6 months.     HEPBANG  --   --   --   --  Nonreactive   HBQLOG  --   --   --  Not Calculated  --    HBQRES Not Detected Not Detected Not Detected HBV DNA Not Detected   The [employer] AmpliPrep/[employer]O TaqMan HBV Test is an FDA-approved in vitro nucleic   acid amplification test for the quantitation of HBV DNA in human plasma (EDTA   plasma) or serum using the [employer] AmpliPrep Instrument for automated viral   nucleic acid extraction and the [employer] TaqMan Analyzer or [employer] TaqMan for the   automated Real Time PCR amplification and detection of viral nucleic acid   target.   Titer results are reported in International Units/mL (IU/mL) using the 1st WHO   International standard for HBV for Nucleic Acid Amplification based assays.    --      Hepatitis C  Recent Labs   Lab Test 01/20/20  0741 06/07/17  0955   HCVAB  --  Reactive   A reactive  result indicates one of the following 1) current HCV infection 2)   past HCV infection that has resolved or 3) false positivity. The CDC recommends   that a reactive result should be followed by Nucleic acid testing for HCV RNA.  If HCV RNA is detected, that indicates current HCV infection. If HCV RNA is not   detected, that indicates either past, resolved HCV infection, or false HCV   antibody positivity.   Assay performance characteristics have not been established for newborns,   infants, and children  *   HCVRNA HCV RNA Not Detected HCV RNA Not Detected   The [employer]O AmpliPrep/LUIS ARMANDO TaqMan HCV Test is an FDA-approved in vitro nucleic   acid amplification test for the quantitation of HCV RNA in human plasma (ETDA   plasma) or serum using the LUIS [employer]O AmpliPrep Instrument for automated viral   nucleic acid extraction and the [employer] ARMANDO TaqMan Analyzer or [employer] TaqMan for   automated Real Time PCR amplification and detection of the viral nucleic acid   target.   Titer results are reported in International Units/mL (IU/mL) using the 1st WHO   International standard for HCV for Nucleic Acid Amplification based assays.       Lyme ab screening  Recent Labs   Lab Test 10/10/23  2015 08/09/19  1228 05/11/17  0830   LYMEGM 0.03 0.05 <0.01  Negative, Absence of detectable Borrelia burdorferi antibodies. A negative   result does not exclude the possibility of Borrelia burgdorferi infection. If   early Lyme disease is suspected, a second sample should be collected and tested   2 to 4 weeks later.       Tuberculosis Screening  Recent Labs   Lab Test 04/03/25  1413 01/12/23  0818 10/14/21  1546   TBRES Negative Negative Negative     Immunization History     Immunization History   Administered Date(s) Administered    COVID-19 12+ (Pfizer) 10/17/2023, 04/15/2024, 07/19/2024, 10/23/2024, 04/03/2025    COVID-19 Bivalent 12+ (Pfizer) 12/21/2022    COVID-19 Bivalent 18+ (Moderna) 06/21/2023    COVID-19 Monovalent 18+ (Moderna) 03/10/2021,  04/07/2021, 09/03/2021    COVID-19 Monovalent Booster 18+ (Moderna) 03/02/2022, 07/07/2022    HEPA 04/18/2000, 09/26/2000    HPV 08/13/2012, 09/25/2012, 01/24/2013    HepB 11/15/2011, 12/19/2011    Hepatitis A (VAQTA)(ADULT 19+) 04/17/2024    Hepatitis B, Adult (Energix-B/Recombivax HB) 10/28/2022    Influenza (H1N1) 01/07/2010    Influenza (High Dose) Trivalent,PF (Fluzone) 08/30/2018, 09/26/2019, 09/13/2024    Influenza (IIV3) PF 01/03/2005, 10/16/2006, 11/14/2007, 10/28/2008, 09/29/2009, 09/27/2010, 10/04/2011, 09/25/2012, 09/21/2015    Influenza Vaccine 65+ (Fluzone HD) 09/14/2020, 09/24/2021, 09/29/2022, 10/17/2023    Influenza Vaccine >6 months,quad, PF 09/26/2013, 10/06/2014, 10/06/2016, 09/08/2017    Pneumo Conj 13-V (2010&after) 10/06/2016    Pneumococcal 23 valent 11/15/2011, 10/17/2017    RSV (Abrysvo) 10/17/2023    TD,PF 7+ (Tenivac) 04/18/2000, 07/07/2004    TDAP (Adacel,Boostrix) 05/23/2006    TDAP Vaccine (Boostrix) 09/21/2015    Zoster recombinant adjuvanted (Shingrix) 06/14/2018, 08/24/2018    Zoster vaccine, live 09/21/2015          Chart documentation done in part with Dragon Voice recognition Software. Although reviewed after completion, some word and grammatical error may remain.    Apolinar Cho MD

## 2025-04-14 ENCOUNTER — INFUSION THERAPY VISIT (OUTPATIENT)
Dept: INFUSION THERAPY | Facility: CLINIC | Age: 75
End: 2025-04-14
Attending: INTERNAL MEDICINE
Payer: COMMERCIAL

## 2025-04-14 VITALS — HEART RATE: 59 BPM | SYSTOLIC BLOOD PRESSURE: 150 MMHG | OXYGEN SATURATION: 99 % | DIASTOLIC BLOOD PRESSURE: 79 MMHG

## 2025-04-14 DIAGNOSIS — M81.0 AGE-RELATED OSTEOPOROSIS WITHOUT CURRENT PATHOLOGICAL FRACTURE: Primary | ICD-10-CM

## 2025-04-14 DIAGNOSIS — Z92.89 PERSONAL HISTORY OF OTHER MEDICAL TREATMENT: ICD-10-CM

## 2025-04-14 DIAGNOSIS — Z92.89 PERSONAL HISTORY OF OTHER MEDICAL TREATMENT: Primary | ICD-10-CM

## 2025-04-14 DIAGNOSIS — M81.0 AGE-RELATED OSTEOPOROSIS WITHOUT CURRENT PATHOLOGICAL FRACTURE: ICD-10-CM

## 2025-04-14 LAB
CALCIUM SERPL-MCNC: 11.1 MG/DL (ref 8.8–10.4)
CREAT SERPL-MCNC: 0.94 MG/DL (ref 0.51–0.95)
EGFRCR SERPLBLD CKD-EPI 2021: 64 ML/MIN/1.73M2
HOLD SPECIMEN: NORMAL
HOLD SPECIMEN: NORMAL

## 2025-04-14 PROCEDURE — 96372 THER/PROPH/DIAG INJ SC/IM: CPT | Performed by: INTERNAL MEDICINE

## 2025-04-14 PROCEDURE — 36415 COLL VENOUS BLD VENIPUNCTURE: CPT | Performed by: INTERNAL MEDICINE

## 2025-04-14 PROCEDURE — 82565 ASSAY OF CREATININE: CPT | Performed by: INTERNAL MEDICINE

## 2025-04-14 PROCEDURE — 250N000011 HC RX IP 250 OP 636: Mod: JZ | Performed by: INTERNAL MEDICINE

## 2025-04-14 PROCEDURE — 82310 ASSAY OF CALCIUM: CPT | Performed by: INTERNAL MEDICINE

## 2025-04-14 PROCEDURE — 99207 PR NO CHARGE LOS: CPT

## 2025-04-14 RX ORDER — EPINEPHRINE 1 MG/ML
0.3 INJECTION, SOLUTION INTRAMUSCULAR; SUBCUTANEOUS EVERY 5 MIN PRN
OUTPATIENT
Start: 2025-10-09

## 2025-04-14 RX ORDER — ALBUTEROL SULFATE 0.83 MG/ML
2.5 SOLUTION RESPIRATORY (INHALATION)
OUTPATIENT
Start: 2025-10-09

## 2025-04-14 RX ORDER — METHYLPREDNISOLONE SODIUM SUCCINATE 125 MG/2ML
125 INJECTION INTRAMUSCULAR; INTRAVENOUS
Start: 2025-10-09

## 2025-04-14 RX ORDER — MEPERIDINE HYDROCHLORIDE 25 MG/ML
25 INJECTION INTRAMUSCULAR; INTRAVENOUS; SUBCUTANEOUS EVERY 30 MIN PRN
OUTPATIENT
Start: 2025-10-09

## 2025-04-14 RX ORDER — DIPHENHYDRAMINE HYDROCHLORIDE 50 MG/ML
50 INJECTION, SOLUTION INTRAMUSCULAR; INTRAVENOUS
Start: 2025-10-09

## 2025-04-14 RX ORDER — ALBUTEROL SULFATE 90 UG/1
1-2 INHALANT RESPIRATORY (INHALATION)
Start: 2025-10-09

## 2025-04-14 RX ADMIN — DENOSUMAB 60 MG: 60 INJECTION SUBCUTANEOUS at 12:02

## 2025-04-14 ASSESSMENT — PAIN SCALES - GENERAL: PAINLEVEL_OUTOF10: MODERATE PAIN (4)

## 2025-04-14 NOTE — PROGRESS NOTES
Infusion Nursing Note:  Niesha Adhikari presents today for Prolia.    Patient seen by provider today: No   present during visit today: Not Applicable.    Note: Assessment performed by Deana TROY RN prior to injection today. .      Intravenous Access:  No Intravenous access/labs at this visit.    Treatment Conditions:  Not Applicable.      Post Infusion Assessment:  Patient tolerated injection without incident.  Site patent and intact, free from redness, edema or discomfort.       Discharge Plan:   Patient discharged in stable condition accompanied by: self.  Departure Mode: Ambulatory.      Paris Garcia MA

## 2025-04-15 ENCOUNTER — NURSE TRIAGE (OUTPATIENT)
Dept: FAMILY MEDICINE | Facility: OTHER | Age: 75
End: 2025-04-15
Payer: COMMERCIAL

## 2025-04-15 NOTE — TELEPHONE ENCOUNTER
Patient's dog was malled by a bear. He had several large wounds.    She picked dog up with uncovered hands. She is immune compromised.    She was handling dog's blood.     She did wash her hands well, but blood got into her cuts.    She is wondering if there is anything she should be concerned about?    Nothing in protocols please advise.    Lluvia Galicia RN on 4/15/2025 at 9:37 AM        Additional Information   Negative: Major bleeding (actively dripping or spurting) that can't be stopped   Negative: Sounds like a life-threatening emergency to the triager   Negative: Human bite   Negative: Any break in skin from BITE (e.g., cut, puncture, or scratch) and WILD animal at risk for RABIES (e.g., bat, raccoon, galdamez, skunk, coyote, other carnivores; see Background for list)   Negative: Any break in skin from BITE (e.g., cut, puncture, or scratch) and PET animal (e.g., dog, cat, or ferret) at risk for RABIES (e.g., sick, stray, unprovoked bite, developing country)   Negative: Any break in skin from BITE (e.g., cut, puncture, or scratch) and monkey   Negative: Cut (length > 1/8 inch or 3 mm) or skin tear and any animal  (Exception: Superficial scratch that doesn't go through dermis.)   Negative: Bleeding won't stop after 10 minutes of direct pressure (using correct technique)   Negative: EXPOSURE of non-intact skin (e.g., exposed person has dermatitis, abrasion, wound) with animal BODY FLUID (e.g., licking, blood, brain, saliva) and animal at high-risk for RABIES (e.g., bat, raccoon, galdamez, skunk, coyote, other carnivores)   Negative: Sounds like a serious bite injury to the triager    Protocols used: Animal Bite-A-OH

## 2025-04-15 NOTE — MR AVS SNAPSHOT
After Visit Summary   2/6/2018    Niesha Adhikari    MRN: [id number]           Patient Information     Date Of Birth          1950        Visit Information        Provider Department      2/6/2018 9:20 AM Charlotte Rouse MD [facility]        Today's Diagnoses     Closed nondisplaced fracture of middle phalanx of right index finger, initial encounter    -  1      Care Instructions    Today's Plan of Care:  -Continue splint and buddy tape  -Ice or ice massage 15-20 minutes for pain relief or swelling as needed  -Patient's preferred OTC medication as directed on packaging.    Follow Up: 4 weeks or sooner if symptoms fail to improve or worsen. Please call with any questions or concerns.             Follow-ups after your visit        Your next 10 appointments already scheduled     Mar 05, 2018  8:00 AM CST   Level 1 with NL INFUSION CHAIR 5   [facility] ([facility])    [address] [place] [id number]   [phone]            Mar 07, 2018  8:00 AM CST   Return Visit with Apolinar Cho MD   [facility] ([facility])    [address] [place] [address]  [place] [id number] [phone]              Who to contact     If you have questions or need follow up information about today's clinic visit or your schedule please contact [facility] directly at [phone].  Normal or non-critical lab and imaging results will be communicated to you by MyChart, letter or phone within 4 business days after the clinic has received the results. If you do not hear from us within 7 days, please contact the clinic through MyChart or phone. If you have a critical or abnormal lab result, we will notify you by phone as soon as possible.  Submit refill requests through [employer] or call your pharmacy and they will forward the refill request to us. Please allow 3 business days for your refill to be completed.     Occupational Therapy Progress Note      Visit Type: Progress Note- Daily Treatment Note  Visit: 27  Referring Provider: Jose Juan Mancuso MD  Medical Diagnosis (from order): Z86.73 - History of stroke     SUBJECTIVE                                                                                                               Pt reports having increased RIGHT upper trap discomfort LEFT spasticity; reports feeling improving LEFT wrist pronation at rest.  Seen [phone] this date  Current Functional Limitations: Pt reports improved efficacy in bathing with use of LEFT UE to wash RIGHT side.  Increased use of LEFT as functional stabilizer in container management and fasteners  Reduced LEFT scapular mobility with reduced active shoulder forward flexion and patient with persistent compensatory strategies (abduction into scapular elevation)      Pain / Symptoms  - Patient denies pain / symptoms.  - Pain rating (out of 10): Current: 5      OBJECTIVE                                                                                                                      Range of Motion (ROM)   (degrees unless noted; active unless noted; norms in ( ); negative=lacking to 0, positive=beyond 0)  Shoulder:    - Flexion (180):         Left:90     Passive: 140    - Abduction (180):         Left: 95   Passive: 130    - External Rotation:       - at 0°:             Left: 35       - at 90° (90):             Left: Passive: 40  Comments: Elbow pain reported in January '2025 resolved at PN 4/15/25    Strength  (out of 5 unless noted, standard test position unless noted)   Shoulder:     - Flexion:          Left: 3-          Right: 5     - Extension:          Right: 5    - Abduction:         Left: 3-  Elbow/Forearm:    - Flexion:         Left: 4+         Right: 5     - Extension:         Left: 4+         Right: 5   Wrist:    - Flexion:         Left: 4    - Extension:         Left: 4  : (lbs)    - Neutral:         Left: Trial(s): 34.9,  "      Additional Information About Your Visit        MyChart Information     [employer] gives you secure access to your electronic health record. If you see a primary care provider, you can also send messages to your care team and make appointments. If you have questions, please call your primary care clinic.  If you do not have a primary care provider, please call [phone] and they will assist you.        Care EveryWhere ID     This is your Care EveryWhere ID. This could be used by other organizations to access your [facility] medical records  [id number]        Your Vitals Were     Pulse Height Last Period BMI (Body Mass Index)          62 5' 4.02\" (1.626 m) 11/27/2003 25.05 kg/m2         Blood Pressure from Last 3 Encounters:   02/06/18 125/77   12/05/17 134/70   11/29/17 137/87    Weight from Last 3 Encounters:   02/06/18 146 lb (66.2 kg)   01/30/18 146 lb (66.2 kg)   12/05/17 142 lb (64.4 kg)               Primary Care Provider Office Phone # Fax #    Tanika F MD Eduardo [phone]       [address]  [facility] [address]        Equal Access to Services     WADE STAUFFER AH: Hadii chely maurero Solucyali, waaxda luqadaha, qaybta kaalmada adeegyada, ramses mcnulty . So [facility] [place] [phone].    ATENCIÓN: Si habla español, tiene a salazar disposición servicios gratuitos de asistencia lingüística. Екатеринаame al [phone].    We comply with applicable federal civil rights laws and [place] laws. We do not discriminate on the basis of race, color, national origin, age, disability, sex, sexual orientation, or gender identity.            Thank you!     Thank you for choosing [facility]  for your care. Our goal is always to provide you with excellent care. Hearing back from our patients is one way we can continue to improve our services. Please take a few minutes to complete the written survey that you may receive in the mail after your visit with us. Thank " 25.7, 30.2, Average: 30.27         Right: Trial(s): 62.9, 58.3, 64.3, Average: 61.83     norms: 50-54 years, male: left 84.9-118.9, right 95.5-131.7; female: left 46.6-68, right 54.2-77.4  Pinch: (lbs)    - Lateral:          Left: Trial(s): 12, 13, 12, Average: 12.33         Right: Trial(s): 26, 25, 26, Average: 25.67    Lateral pinch norms: 50-54 years, male: left 21.9-30.3, right 22.3-31.1; female: left 13.4-18.8, right 14.2-19.2      Coordination  Box and Block (blocks)       Left: 26;       Right: 56;   LEFT box and blocks - partition removed               Treatment     Therapeutic Exercise  Cervical flexion and lateral rotation  Consistent cueing for pace, postural control  3 x 5    Scapular depression + retraction  3 sets x 8    Neuromuscular Re-Education  MH pack preceding trigger point release   10 minutes for relief    Upper trap release with emphasis on rhomboid stretch and scapular pronation  Pt reports relief of discomfort s/p intervention    Activities of Daily Living/Self Care  Self care charges for discussion / update of ADL performance    Skilled input: verbal instruction/cues, tactile instruction/cues, facilitation and posture correction    Writer verbally educated and received verbal consent for hand placement, positioning of patient, and techniques to be performed today from patient for therapist position for techniques as described above and how they are pertinent to the patient's plan of care.  Home Exercise Program  Access Code: [email or web address]  URL: [email or web address]  Date: 04/15/2025  Prepared by: SHEREE RUVALCABA    Program Notes  Try not to \"hike\" up LEFT shoulder during the exercises; keep shoulder down away from ear.You don't have to complete every exercise every day.    Exercises  - Seated Shoulder Flexion Towel Slide at Table Top  - 1 x daily - 5 x weekly - 3 sets - 10 reps  - Seated Shoulder Abduction Towel Slide at Table Top  - 1 x daily - 5 x weekly - 3  sets - 10 reps  - Seated Scapular Retraction  - 1 x daily - 5 x weekly - 3 sets - 10 reps  - Seated Alternating Shoulder Forward Press with Resistance  - 1 x daily - 5 x weekly - 3 sets - 10 reps  - Putty Squeezes  - 1 x daily - 5 x weekly - 3 sets - 10 reps  - Rolling Putty on Table  - 1 x daily - 5 x weekly - 3 sets - 10 reps  - Finger Pinch and Pull with Putty  - 1 x daily - 5 x weekly - 3 sets - 10 reps  - Seated Cervical Sidebending Stretch  - 1 x daily - 7 x weekly - 3 sets - 10 reps  - Supine Chin Tuck  - 1 x daily - 7 x weekly - 3 sets - 10 reps  - Seated Cervical Rotation AROM  - 1 x daily - 7 x weekly - 3 sets - 10 reps  - Forearm Supination with Dumbbell  - 1 x daily - 7 x weekly - 3 sets - 10 reps  - Standing Scapular Depression  - 1 x daily - 7 x weekly - 3 sets - 10 reps  - Seated Cervical Flexion AROM  - 1 x daily - 7 x weekly - 3 sets - 10 reps      ASSESSMENT                                                                                                            Gains in skilled therapy to date as expected in LEFT hand function,  and pinch strength, dynamic standing balance and tolerance; Remains limited by postural control, upper trap pain/discomfort.  Patient attends therapy as recommended.  Patient challenged with recommended HEP due to variability in carryover of postural control/technique and pace.  Progress toward discharge/long term goals (see below for specific status updates): good progress  Medically necessary skilled therapy interventions continue to be required to allow the patient to maximize their functional abilities as noted above and as noted in goal status  Appropriate for remaining plan of care - recommended 30 total OT.  Pain/symptoms after session (out of 10): 0  Education:   - Results of above outlined education: Verbalizes understanding and Needs reinforcement    PLAN                                                                                                           you!             Your Updated Medication List - Protect others around you: Learn how to safely use, store and throw away your medicines at www.disposemymeds.org.          This list is accurate as of 2/6/18 10:09 AM.  Always use your most recent med list.                   Brand Name Dispense Instructions for use Diagnosis    clotrimazole 1 % cream    LOTRIMIN    30 g    Apply topically 2 times daily    Cutaneous candidiasis       cyanocobalamin 1000 MCG tablet    vitamin  B-12     Take by mouth daily        cycloSPORINE 0.05 % ophthalmic emulsion    RESTASIS     1 drop 2 times daily        Fiber 0.52 G Caps     540 capsule    2 tabs in am and pm        hydrocortisone 1 % ointment     28 g    APPLY SPARINGLY TO AFFECTED AREA THREE TIMES A DAY FOR 14 DAYS    Rash and nonspecific skin eruption       hydroxychloroquine 200 MG tablet    PLAQUENIL    135 tablet    Hydroxychloroquine 200mg in the AM and 100mg in the PM.    Inflammatory polyarthropathy (H)       ibuprofen 200 MG tablet    ADVIL/MOTRIN     Take 200-600 mg by mouth nightly as needed        lisinopril 10 MG tablet    PRINIVIL/ZESTRIL    90 tablet    TAKE 1 TABLET BY MOUTH DAILY    Essential hypertension       montelukast 10 MG tablet    SINGULAIR    90 tablet    TAKE ONE TABLET BY MOUTH EVERY DAY AS NEEDED SEASONALLY (AUGUST AND SEPTEMBER)    Other seasonal allergic rhinitis       nystatin 349170 UNIT/GM Powd    MYCOSTATIN    60 g    Apply topically 3 times daily as needed    Intertrigo       PROVIGIL PO      Take 100 mg by mouth Takes 1 1/2 tabs bid (150 mg bid)        sertraline 100 MG tablet    ZOLOFT     Take 100 mg by mouth daily Takes 2 at bedtime        vitamin D 39356 UNIT capsule    ERGOCALCIFEROL    24 capsule    Take 1 capsule (50,000 Units) by mouth every Monday and Friday.    Vitamin D deficiency, Other osteoporosis, unspecified pathological fracture presence       VYVANSE 20 MG capsule   Generic drug:  lisdexamfetamine     30 capsule    Take 20                  Extend frequency and duration per below.  Frequency / Duration  1 times per week tapering as patient progresses for 12 weeks for an estimated total of 30 visits    Suggestions for next session as indicated: Progress per plan of care    Goals  Long Term Goals: to be met by end of plan of care   1. Pt will increase proximal shoulder strength by 1/2 MMT to improve upper body self care tasks. Status: partially met ongoing  2. Pt will increase left shoulder range of motion by 5-10 degrees to improve overhead activities of daily living tasks. Status: partially met  progressing  3. Pt will increase LEFT  strength by 6-8lb via dynamometer in order to improve functional inclusion for ADL and container management  Status: met Met 4/15/25  4. Pt will increase LEFT box and blocks results by 5-7 blocks in order to demonstrate improved fastener management and fine motor coordination.Status: met Met 4/15 - IE 21, PN 26  5.  Pt will don / doff dynamic resting hand splint (LEFT) modified independentStatus: met       Therapy procedure time and total treatment time can be found documented on the Time Entry flowsheet   mg by mouth every morning    Primary narcolepsy without cataplexy

## 2025-04-15 NOTE — TELEPHONE ENCOUNTER
RN called and relayed provider response below, verbalizes understanding.     Monika Bautista, RN, BSN

## 2025-04-15 NOTE — TELEPHONE ENCOUNTER
Monitor her cuts for signs of infection and if so, then she needs to be seen.  Otherwise, routine cares

## 2025-04-17 ENCOUNTER — ANCILLARY PROCEDURE (OUTPATIENT)
Dept: GENERAL RADIOLOGY | Facility: OTHER | Age: 75
End: 2025-04-17
Attending: FAMILY MEDICINE
Payer: COMMERCIAL

## 2025-04-17 ENCOUNTER — OFFICE VISIT (OUTPATIENT)
Dept: FAMILY MEDICINE | Facility: OTHER | Age: 75
End: 2025-04-17
Payer: COMMERCIAL

## 2025-04-17 VITALS
BODY MASS INDEX: 25.21 KG/M2 | RESPIRATION RATE: 15 BRPM | HEART RATE: 65 BPM | OXYGEN SATURATION: 98 % | WEIGHT: 137 LBS | TEMPERATURE: 97.9 F | SYSTOLIC BLOOD PRESSURE: 110 MMHG | DIASTOLIC BLOOD PRESSURE: 70 MMHG | HEIGHT: 62 IN

## 2025-04-17 DIAGNOSIS — M79.672 LEFT FOOT PAIN: ICD-10-CM

## 2025-04-17 DIAGNOSIS — S92.355A CLOSED NONDISPLACED FRACTURE OF FIFTH METATARSAL BONE OF LEFT FOOT, INITIAL ENCOUNTER: Primary | ICD-10-CM

## 2025-04-17 PROBLEM — M54.16 LUMBAR RADICULOPATHY: Status: ACTIVE | Noted: 2025-04-17

## 2025-04-17 RX ORDER — METHYLPREDNISOLONE 4 MG/1
TABLET ORAL
Qty: 21 TABLET | Refills: 0 | Status: CANCELLED | OUTPATIENT
Start: 2025-04-17

## 2025-04-17 NOTE — PROGRESS NOTES
Assessment & Plan     Closed nondisplaced fracture of fifth metatarsal bone of left foot, initial encounter  Patient was given a walking boot.  Will refer her to podiatry.    - XR Foot Left G/E 3 Views; Future  - Orthopedic  Referral; Future      Subjective   Niesha is a 74 year old, presenting for the following health issues:  Foot Pain (left)      4/17/2025     9:13 AM   Additional Questions   Roomed by lula     History of Present Illness       Reason for visit:  Foot pain  Symptom onset:  3-7 days ago  Symptoms include:  Left foot pain, difficulty walking, foot doesn't seems to be working with left leg. Did not fall, trip or injure.  This has not happened before..  Symptom intensity:  Moderate  Symptom progression:  Staying the same  Had these symptoms before:  No  What makes it worse:  Walking, putting weight on foot. Left foot is slightly swollen, my slippers & shoes all feel tight & when I put them on, there is pain.  What makes it better:  Not walking on left foot or walking only on the heel, going barefoot inside w/o slipper, using cane.  Am taking Aleve for the pain, helps a little. She is missing 1 dose(s) of medications per week.  She is not taking prescribed medications regularly due to remembering to take.        Patient states a week ago she woke up in the morning and had left foot pain.  She denies any injury.  She denies any fall.  She states sometimes she gets leg cramps in the middle night needs to get up and walk them out.  She describes the pain as being on the lateral side of her foot and feels like a sharp and burning pain.  She also feels like her shoes and slippers are too tight although she cannot visualize any swelling.  She denies any skin changes.  She does state that she has numbness of the toes of her bilateral feet.  She does have known degenerative changes in her back and has had bulging disks in her back evidenced by an MRI of her lumbar spine a year ago.  She tells me  "when she is walking that she feels like her left foot does not know what it needs to do.  She also tells me she has pain in her left lower back and lateral hip.  She does have known osteoporosis and is being treated with Prolia.      Objective    /70 (BP Location: Right arm, Patient Position: Sitting, Cuff Size: Adult Regular)   Pulse 65   Temp 97.9  F (36.6  C) (Temporal)   Resp 15   Ht 1.575 m (5' 2\")   Wt 62.1 kg (137 lb)   LMP 11/27/2003   SpO2 98%   BMI 25.06 kg/m    Body mass index is 25.06 kg/m .  Physical Exam   Gen: no apparent distress  Left foot:  no swelling, pedal pulses palpable, no skin changes, capillary refill is 2 seconds.  Does have numbness to direct touch of her toes but not to the rest of her foot.  Strength is 5 out of 5 bilateral lower.  Able to heel walk and toe walk.  Left hip: full range of motion, no tenderness over trochanteric area    Xray - Reviewed and interpreted by me.  Left fifth metatarsal fracture, Radiology review pending        Signed Electronically by: Tanika Clark MD    "

## 2025-04-19 ENCOUNTER — MYC MEDICAL ADVICE (OUTPATIENT)
Dept: RHEUMATOLOGY | Facility: CLINIC | Age: 75
End: 2025-04-19
Payer: COMMERCIAL

## 2025-04-23 ENCOUNTER — ANCILLARY PROCEDURE (OUTPATIENT)
Dept: GENERAL RADIOLOGY | Facility: CLINIC | Age: 75
End: 2025-04-23
Attending: PODIATRIST
Payer: COMMERCIAL

## 2025-04-23 ENCOUNTER — OFFICE VISIT (OUTPATIENT)
Dept: PODIATRY | Facility: CLINIC | Age: 75
End: 2025-04-23
Attending: FAMILY MEDICINE
Payer: COMMERCIAL

## 2025-04-23 VITALS
SYSTOLIC BLOOD PRESSURE: 140 MMHG | WEIGHT: 137 LBS | BODY MASS INDEX: 25.06 KG/M2 | HEART RATE: 65 BPM | DIASTOLIC BLOOD PRESSURE: 80 MMHG

## 2025-04-23 DIAGNOSIS — S92.355A CLOSED NONDISPLACED FRACTURE OF FIFTH METATARSAL BONE OF LEFT FOOT, INITIAL ENCOUNTER: ICD-10-CM

## 2025-04-23 PROCEDURE — 73630 X-RAY EXAM OF FOOT: CPT | Mod: LT | Performed by: RADIOLOGY
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
## 2025-04-23 NOTE — PATIENT INSTRUCTIONS
"We wish you continued good healing. If you have any questions or concerns, please do not hesitate to contact us at  [phone]    [employer] (secure e-mail communication and access to your chart) to send a message or to make an appointment.    Please remember to call and schedule a follow up appointment if one was recommended at your earliest convenience.     PODIATRY CLINIC HOURS  TELEPHONE NUMBER    Dr. Michel CHAVESPBENEDICT FACFAS        Clinics:  [facility]  Tuesday 1PM-6PM  [place]  Wednesday 745AM-330PM  [place]  Monday 2nd,4th  830AM-4PM  Thursday/Friday 745AM-230PM     [facility] APPOINTMENTS  [phone]    [place] APPOINTMENTS  [phone]          If you need a medication refill, please contact us you may need lab work and/or a follow up visit prior to your refill (i.e. Antifungal medications).  If MRI needed please call Imaging at [phone]   HOW DO I GET MY KNEE SCOOTER? Knee scooters can be picked up at ANY Medical Supply stores with your knee scooter Prescription.  OR  Bring your signed prescription to an [facility] showroom.   Set up an appointment for your custom Orthotics. Call any Orthotics locations call [phone]         [employer] / CAM WALKING BOOT INSTRUCTIONS  - Do NOT drive with CAM walker on. This is due to safety and legal issues.   - Do NOT wear the CAM walker on long car/train rides or on an airplane.  - Remove the CAM walker several times a day and do ankle range of motion (ROM) exercises/wiggle toes.  - It is recommended that a thick-soled shoe be worn on the other foot to offset any created leg length issue.    - You can purchase an \"even up\" on [employer] to place under the other shoe to help too.  - The boot does not have to be worn at night.   - There is an increased risk of developing a blood clot with lower extremity immobilization. ROM exercises and knee-high compression " (tenso /ACE wrap) is recommended to lower that risk.   - You should seek medical attention if you experience calf swelling and/or pain, chest pain, or shortness of breath.   If you need to return or exchange the boot, please contact [facility] @ [phone]     MEDICAL SUPPLY [facility] [place]  Phone [phone]  Fax [phone]    [place]  [phone]    [place]  [phone]    [place]  [phone]    [place]  [phone]    [place]  [phone]    [place]  [phone]    [facility]  TERRY Cortés  [address] [place] [address]  Phone: [phone]  Hours: Monday - Friday: 8:30-5    [place], [place]  [address] [place] [address]  Phone: [phone]  Fax: [phone]  Hours: Monday - Friday: 8-5:30, Saturday: 9-2    [employer]  [address]  [place] [address]   [facility]   [phone]    [facility]  [address] [place] [address]  [phone]    [place]  [address] [facility]  [place] [address] [phone]

## 2025-04-23 NOTE — PROGRESS NOTES
"Subjective:    Pt is seen today in consult from Dr. Clark for 5th met fx left foot.  On around 4/10/2025 patient started having pain on left foot.  Points to base of fifth metatarsal.  Pain aggravated by activity and relieved by rest.  Had swelling.  Denies any history of trauma or injury.  Had leg cramp at night and had to walk this off but did not injure this.  Denies erythema ecchymosis weakness increased deformity.  Patient has history of rheumatoid arthritis.  Has history of osteoporosis.  [place]  She has a dog she walks daily.      ROS:  see above         Allergies   Allergen Reactions    Telaprevir Other (See Comments) and Rash     Rectal bleeding, anemia    Abilify Discmelt Other (See Comments)     Disoriented    Thiopental Sodium      PENTOTHAL/rigidity and fight response    Antivert [Meclizine Hcl]     Chamomile     Compazine     Diphenhydramine Nausea     And abdominal pain    Duloxetine Hcl Other (See Comments)     Disoriented, trouble sleeping    Effexor [Venlafaxine] Other (See Comments)     Disoriented, trouble sleeping    Elavil [Amitriptyline Hcl] Other (See Comments)     \"didn't feel right on it-med was stopped right away\"    Food Difficulty breathing     cilantro    Indomethacin      indocin sensativity \"Severe h.a\"    Seasonal Allergies Other (See Comments) and Difficulty breathing     Philip Gold Aug-Sept, rag weed, sneezing    Sulfa Antibiotics     Animal Dander Difficulty breathing and Rash     sneezing,resp. distress    Bupropion Anxiety    Tylenol [Acetaminophen] Rash       Current Outpatient Medications   Medication Sig Dispense Refill    Abatacept (ORENCIA CLICKJECT) 125 MG/ML SOAJ auto-injector Inject 1 mL (125 mg) subcutaneously every 7 days. . Hold for any infection and seek medical attention. 4 mL 4    calcium carb-cholecalciferol 600-500 MG-UNIT CAPS Take 1,200 mg by mouth daily      cloNIDine (CATAPRES) 0.2 MG tablet Take 0.2 mg by mouth At Bedtime   "    cycloSPORINE (RESTASIS) 0.05 % ophthalmic emulsion Place 1 drop into both eyes 2 times daily       denosumab (PROLIA) 60 MG/ML SOSY injection       diclofenac (VOLTAREN) 1 % topical gel Apply up to 2 grams of 1% gel to hands up to 4 times daily as needed for joint pain (maximum: 8 g per upper extremity per day) 200 g 2    ferrous sulfate (FE TABS) 325 (65 Fe) MG EC tablet Take 1 tablet (325 mg) by mouth every other day. 45 tablet 2    gabapentin (NEURONTIN) 300 MG capsule TAKE ONE CAPSULE BY MOUTH THREE TIMES A DAY, MAY TAKE TWO CAPSULES BY MOUTH EVERY NIGHT AT BEDTIME 360 capsule 3    hydroxychloroquine (PLAQUENIL) 200 MG tablet Hydroxychloroquine 200mg daily; and an additional 200mg every other day.  Yearly eye exam, including 10-2 VF and SD-OCT, required. 135 tablet 2    lisdexamfetamine (VYVANSE) 30 MG capsule Take 30 mg by mouth every morning      lisinopril (ZESTRIL) 10 MG tablet TAKE ONE TABLET BY MOUTH ONCE DAILY 90 tablet 1    montelukast (SINGULAIR) 10 MG tablet TAKE 1 TABLET BY MOUTH ONCE DAILY AS NEEDED SEASONALLY (AUGUST AND SEPTEMBER) 90 tablet 3    Psyllium (FIBER) 0.52 G CAPS 1 tabs in am and pm 540 capsule     sennosides (SENOKOT) 8.6 MG tablet Take 2 tablets by mouth 2 times daily      tenofovir alafenamide fumarate (VEMLIDY) 25 MG tablet Take 1 tablet (25 mg) by mouth daily. with food (dispense only in the original container). 90 tablet 3    triamcinolone (KENALOG) 0.1 % external cream Apply topically 2 times daily Use to to 14 days at a time 15 g 0    Vitamin D3 (CHOLECALCIFEROL) 25 mcg (1000 units) tablet Take 1 tablet (25 mcg) by mouth daily. 90 tablet 2       Patient Active Problem List   Diagnosis    Allergic rhinitis    Pernicious anemia    Anxiety state    Migraine    Essential and other specified forms of tremor    Fibromyalgia    Mild recurrent major depression    Narcolepsy    Internal hemorrhoids with other complication    AIN (anal intraepithelial neoplasia) anal canal     Osteoporosis    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)    Benign essential hypertension    Alcohol dependence in remission (H)    Personal history of other medical treatment    Constipation    DDD (degenerative disc disease), cervical    Iron deficiency    Fatigue    Chronic bilateral low back pain without sciatica    S/P carpal tunnel release    Right lateral epicondylitis    Lumbar radiculopathy       Past Medical History:   Diagnosis Date    ABUSE BY SPOUSE/PARTNER 07/27/2005    Arthritis     Degeneration of lumbar or lumbosacral intervertebral disc     DDD L5/S1    Depressive disorder     Esophageal reflux 01/28/2005    HELICOBACTER PYLORI INFECTION 01/28/2005    Hepatitis C - Cured. Achieved SVR in 2017     History of blood transfusion     Hypertension     Malignant neoplasm (H)     ACIN    Osteoporosis     Other and unspecified alcohol dependence, unspecified drinking behavior     Sober as 1/21/1987    Other malaise and fatigue        Past Surgical History:   Procedure Laterality Date    BIOPSY ANAL CANAL  01/21/2013    [facility]     BREAST BIOPSY, RT/LT Left 1975    Breat Biopsy RT/LT    COLONOSCOPY  08/25/2009    COLONOSCOPY  02/14/2011    COLONOSCOPY performed by CRISTIN LAGUNAS at  GI    COLONOSCOPY N/A 01/08/2019    Procedure: Colonscopy, Biopsies by Biopsy;  Surgeon: Omega Talavera MD;  Location:  GI    COLONOSCOPY N/A 06/17/2024    Procedure: Colonoscopy;  Surgeon: Omega Talavera MD;  Location:  GI    CYSTOSCOPY  02/28/2011    CYSTOSCOPY performed by CAYLA FLOR at  OR    ENDOSCOPY  05/21/2012    Upper GI - [facility]    ENT SURGERY  1965    EYE SURGERY  09/05/24    10/03/24    GI SURGERY  06/25/2012     UGI ENDOSCOPY DIAG W BIOPSY  10/01/2009    HEMORRHOIDECTOMY  06/25/2012    [facility]    LAPAROSCOPIC SALPINGO-OOPHORECTOMY  02/28/2011    LAPAROSCOPIC SALPINGO-OOPHORECTOMY performed by CAYLA FLOR at  OR     ORTHOPEDIC SURGERY  08/23/24 11/01/24    PHACOEMULSIFICATION CLEAR CORNEA WITH TORIC INTRAOCULAR LENS IMPLANT Right 09/05/2024    Procedure: PHACOEMULSIFICATION, CATARACT, WITH INTRAOCULAR LENS IMPLANT, TORIC LENS right;  Surgeon: Primo Garcia MD;  Location: [facility] OR    PHACOEMULSIFICATION CLEAR CORNEA WITH TORIC INTRAOCULAR LENS IMPLANT Left 10/03/2024    Procedure: PHACOEMULSIFICATION, CATARACT, WITH INTRAOCULAR LENS IMPLANT, TORIC LENS, LEFT;  Surgeon: Primo Garcia MD;  Location: [facility] OR    RELEASE CARPAL TUNNEL Left 08/23/2024    Procedure: Left carpal tunnel release;  Surgeon: David John MD;  Location: [facility] OR    RELEASE CARPAL TUNNEL Right 11/01/2024    Procedure: RELEASE, CARPAL TUNNEL,;  Surgeon: David John MD;  Location: [facility] OR    RELEASE TRIGGER FINGER Left 08/23/2024    Procedure: Left middle finger trigger finger release;  Surgeon: David John MD;  Location: [facility] OR    TONSILLECTOMY & ADENOIDECTOMY  1965    [facility] NONSPECIFIC PROCEDURE  1965    Removed bone left index finger knuckle, casts broken bones    ZZ COLONOSCOPY W/WO BRUSH/WASH  08/22/2005    ZZ UGI ENDOSCOPY, SIMPLE EXAM  08/08/2007       Family History   Problem Relation Age of Onset    Hypertension Mother     Breast Cancer Mother     Coronary Artery Disease Mother     Cerebrovascular Disease Mother     Kidney Disease Mother     Obesity Mother     Hypertension Father         Father    Lymphoma Father     Glaucoma Father     Other Cancer Father         Lymphoma    Genetic Disorder Father         Glaucoma    Obesity Father     C.A.D. Sister         MI at age 63    Hypertension Sister     Gastrointestinal Disease Sister         gallbladder    Hyperlipidemia Sister     Cerebrovascular Disease Sister     Circulatory Sister         brain aneurysm at 63    Genitourinary Problems Sister         1 kidney/bladder    Hypertension Sister     Obesity Sister     Coronary Artery Disease Sister         had valve  surgery, MI, CHF    Coronary Artery Disease Brother     Hypertension Brother     Hyperlipidemia Brother     Cerebrovascular Disease Maternal Grandmother     Hypertension Maternal Grandmother     Cerebrovascular Disease Paternal Grandmother     Hypertension Paternal Grandmother     Glaucoma Paternal Grandfather     Genetic Disorder Paternal Grandfather         Glaucoma    Blood Disease Son         Lymes/    Hypertension Son     Obesity Son     Hypertension Son     Breast Cancer Maternal Aunt     Ovarian Cancer Maternal Aunt     Unknown/Adopted Paternal Uncle     Obesity Niece     Obesity Niece     Liver Cancer Cousin     Coronary Artery Disease Other 49        niece    Diabetes Other         1st cousin    Breast Cancer Other     Obesity Other     Obesity Other     Hypertension Other         Aunts    Obesity Other         Uncle    Hypertension Other         Uncle    Coronary Artery Disease Sister     Coronary Artery Disease Brother     Hypertension Brother     Hyperlipidemia Brother     Coronary Artery Disease Nephew         Nephew    Hypertension Niece         [place]ie[place]    Hypertension Other         Nephew    Cerebrovascular Disease Other         Aunts    Obesity Other         Nephew       Social History     Tobacco Use    Smoking status: Former     Current packs/day: 0.00     Average packs/day: 0.8 packs/day for 10.0 years (7.5 ttl pk-yrs)     Types: Cigarettes     Start date: 1968     Quit date: 1978     Years since quittin.5     Passive exposure: Never    Smokeless tobacco: Never    Tobacco comments:     Quit 46 years ago   Substance Use Topics    Alcohol use: No         Exam:    Vitals: BP (!) 140/80   Pulse 65   Wt 62.1 kg (137 lb)   LMP 2003   BMI 25.06 kg/m    BMI: Body mass index is 25.06 kg/m .  Height: Data Unavailable    Constitutional/ general:  Pt is in no apparent distress, appears well-nourished.  Cooperative with history and physical exam.     Psych:  The patient answered  questions appropriately.  Normal affect.  Seems to have reasonable expectations, in terms of treatment.     Vascular:  positive pedal pulses.  CFT < 3 sec.   negative pedal hair growth.    Neuro:  Alert and oriented x 3. Coordinated gait.  Light touch sensation is intact     Derm: Normal texture and turgor.  No erythema, ecchymosis, or cyanosis.      Musculoskeletal:    Pain upon palpation to base of left 5th metatarsal.  Edema and echymosis noted.  No erythema.      Radiographic Exam:  X-Ray Findings:  I personally reviewed the films.  Fracture left foot at the metaphyseal diaphyseal junction.  No increase in fracture gap since last x-ray    Assessment: Left fifth metatarsal Morrison fracture    Plan:  X-rays personally reviewed.  Discussed etiology and treatment options with the patient in detail.  Explained that this is a slow healing fracture with increased risk of delayed or nonunion.  She is also at risk because of her age, osteoporosis, RA at not having this heal.  Best way to heal this is to be nonweightbearing.  We wrote a prescription for an I walk.  Discussed how plantarflex CAM Walker will offload this better.  We dispensed 1 today.  If she has to walk will go slow and try to keep forefoot offloaded.  When not walking she can do range of motion of her ankle.  She does not need to sleep with the boot.  F/U 2 wks for repeat x-ray.    Thank you for allowing me participate in the care of this patient.        Michel Gaviria DPM, FACFAS

## 2025-04-24 DIAGNOSIS — J98.59 MEDIASTINAL MASS: Primary | ICD-10-CM

## 2025-04-25 ASSESSMENT — ACTIVITIES OF DAILY LIVING (ADL)
HOS_ADL_ITEM_SCORE_TOTAL: 52
HEAVY_WORK: MODERATE DIFFICULTY
WALKING_INITIALLY: NO DIFFICULTY AT ALL
STANDING FOR 15 MINUTES: EXTREME DIFFICULTY
LIGHT_TO_MODERATE_WORK: MODERATE DIFFICULTY
GOING UP 1 FLIGHT OF STAIRS: MODERATE DIFFICULTY
HOS_ADL_HIGHEST_POTENTIAL_SCORE: 68
TWISTING/PIVOTING ON INVOLVED LEG: SLIGHT DIFFICULTY
ROLLING OVER IN BED: SLIGHT DIFFICULTY
HOS_ADL_SCORE(%): 76.47
WALKING_DOWN_STEEP_HILLS: NO DIFFICULTY AT ALL
STEPPING UP AND DOWN CURBS: SLIGHT DIFFICULTY
WALKING_UP_STEEP_HILLS: NO DIFFICULTY AT ALL
DEEP SQUATTING: SLIGHT DIFFICULTY
WALKING_15_MINUTES_OR_GREATER: NO DIFFICULTY AT ALL
PUTTING ON SOCKS AND SHOES: SLIGHT DIFFICULTY
RECREATIONAL ACTIVITIES: MODERATE DIFFICULTY
GETTING_INTO_AND_OUT_OF_A_BATHTUB: NO DIFFICULTY AT ALL
WALKING_APPROXIMATELY_10_MINUTES: NO DIFFICULTY AT ALL
SITTING FOR 15 MINUTES: NO DIFFICULTY AT ALL
GETTING INTO AND OUT OF AN AVERAGE CAR: NO DIFFICULTY AT ALL
GOING DOWN 1 FLIGHT OF STAIRS: SLIGHT DIFFICULTY

## 2025-04-28 ENCOUNTER — HOSPITAL ENCOUNTER (OUTPATIENT)
Dept: BONE DENSITY | Facility: CLINIC | Age: 75
Discharge: HOME | End: 2025-04-28
Attending: INTERNAL MEDICINE | Admitting: INTERNAL MEDICINE
Payer: COMMERCIAL

## 2025-04-28 DIAGNOSIS — M81.0 AGE-RELATED OSTEOPOROSIS WITHOUT CURRENT PATHOLOGICAL FRACTURE: ICD-10-CM

## 2025-04-28 DIAGNOSIS — Z78.0 POST-MENOPAUSAL: ICD-10-CM

## 2025-04-28 DIAGNOSIS — M06.09 RHEUMATOID ARTHRITIS OF MULTIPLE SITES WITH NEGATIVE RHEUMATOID FACTOR (H): ICD-10-CM

## 2025-04-28 DIAGNOSIS — Z92.29 HISTORY OF BISPHOSPHONATE THERAPY: ICD-10-CM

## 2025-04-28 PROCEDURE — 77080 DXA BONE DENSITY AXIAL: CPT

## 2025-04-28 ASSESSMENT — ACTIVITIES OF DAILY LIVING (ADL)
WHEN_LYING_ON_THE_INVOLVED_SIDE: 4
HEAVY_WORK: MODERATE DIFFICULTY
GOING_DOWN_1_FLIGHT_OF_STAIRS: SLIGHT DIFFICULTY
LIFTING_A_BAG_OF_GROCERIES_TO_WAIST_LEVEL: NO DIFFICULTY
PREPARING_FOOD: A LITTLE BIT OF DIFFICULTY
HOW_WOULD_YOU_RATE_YOUR_CURRENT_LEVEL_OF_FUNCTION_DURING_YOUR_SPORTS_RELATED_ACTIVITIES_FROM_0_TO_100_WITH_100_BEING_YOUR_LEVEL_OF_FUNCTION_PRIOR_TO_YOUR_HIP_PROBLEM_AND_0_BEING_THE_INABILITY_TO_PERFORM_ANY_OF_YOUR_USUAL_DAILY_ACTIVITIES?: 40
CLEANING: MODERATE DIFFICULTY
UEFI_TOTAL_SCORE: 48
DRESS: A LITTLE BIT OF DIFFICULTY
GETTING_INTO_AND_OUT_OF_AN_AVERAGE_CAR: NO DIFFICULTY AT ALL
LOW_IMPACT_ACTIVITIES_LIKE_FAST_WALKING: NO DIFFICULTY AT ALL
SPORTS_HIGHEST_POTENTIAL_SCORE: 32
PERFORMING_LIGHT_ACTIVITIES_AROUND_YOUR_HOME: A LITTLE BIT OF DIFFICULTY
TOUCHING_THE_BACK_OF_YOUR_NECK: 0
RUNNING_ONE_MILE: SLIGHT DIFFICULTY
SITTING_FOR_15_MINUTES: NO DIFFICULTY AT ALL
PUTTING_ON_AN_UNDERSHIRT_OR_A_PULLOVER_SWEATER: 2
UEFI_TOTAL_SCORE/80: .6
RUNNING_ON_UNEVEN_GROUND: MODERATE DIFFICULTY
WALKING_A_MILE: NO DIFFICULTY
STEPPING_UP_AND_DOWN_CURBS: SLIGHT DIFFICULTY
PUTTING_ON_YOUR_SHOES_OR_SOCKS: MODERATE DIFFICULTY
SHOPPING: MODERATE DIFFICULTY
SPORTS_TOTAL_ITEM_SCORE: INCOMPLETE
GETTING_INTO_OR_OUT_OF_A_CAR: A LITTLE BIT OF DIFFICULTY
LAUNDERING_CLOTHES: MODERATE DIFFICULTY
WALKING_FOR_APPROXIMATELY_10_MINUTES: NO DIFFICULTY AT ALL
TWISTING/PIVOTING_ON_INVOLVED_LEG: SLIGHT DIFFICULTY
PUTTING_ON_A_SHIRT_THAT_BUTTONS_DOWN_THE_FRONT: 3
REMOVING_SOMETHING_FROM_YOUR_BACK_POCKET: 2
PLEASE_INDICATE_YOR_PRIMARY_REASON_FOR_REFERRAL_TO_THERAPY:: ELBOW
AT_ITS_WORST?: 7
WALKING_INITIALLY: NO DIFFICULTY AT ALL
SLEEPING: EXTREME DIFFICULTY
GETTING_INTO_AND_OUT_OF_A_BATH: A LITTLE BIT OF DIFFICULTY
ANY_OF_YOUR_USUAL_WORK,_HOUSEWORK_OR_SCHOOL_ACTIVITIES: QUITE A BIT OF DIFFICULTY
DEEP_SQUATTING: SLIGHT DIFFICULTY
WALKING_BETWEEN_ROOMS: A LITTLE BIT OF DIFFICULTY
CARRYING_A_SMALL_SUITCASE_WITH_YOUR_AFFECTED_LIMB: A LITTLE BIT OF DIFFICULTY
WASHING_YOUR_BACK: 2
SITTING_FOR_1_HOUR: NO DIFFICULTY
SPORTS_SCORE(%): INCOMPLETE
GOING_UP_1_FLIGHT_OF_STAIRS: MODERATE DIFFICULTY
PLACING_AN_OBJECT_ONTO,_OR_REMOVING_IT_FROM_AN_OVERHEAD_SHELF: MODERATE DIFFICULTY
ROLLING_OVER_IN_BED: A LITTLE BIT OF DIFFICULTY
THROWING_A_BALL: NO DIFFICULTY
LEFS_RAW_SCORE: 0
PUSHING_UP_ON_YOUR_HANDS: A LITTLE BIT OF DIFFICULTY
GOING_UP_OR_DOWN_10_STAIRS: QUITE A BIT OF DIFFICULTY
WASHING_YOUR_HAIR?: 0
WALKING_UP_STEEP_HILLS: NO DIFFICULTY AT ALL
LIFTING_AN_OBJECT,_LIKE_A_BAG_OF_GROCERIES_FROM_THE_FLOOR: EXTREME DIFFICULTY OR UNABLE TO PERFORM ACTIVITY
ABILITY_TO_PERFORM_ACTIVITY_WITH_YOUR_NORMAL_TECHNIQUE: SLIGHT DIFFICULTY
REACHING_FOR_SOMETHING_ON_A_HIGH_SHELF: 3
RECREATIONAL_ACTIVITIES: MODERATE DIFFICULTY
WALKING_15_MINUTES_OR_GREATER: NO DIFFICULTY AT ALL
CARRYING_A_HEAVY_OBJECT_OF_10_POUNDS: 4
GETTING_INTO_AND_OUT_OF_A_BATHTUB: NO DIFFICULTY AT ALL
ADL_TOTAL_ITEM_SCORE: 0
STANDING_FOR_15_MINUTES: EXTREME DIFFICULTY
WALKING_DOWN_STEEP_HILLS: NO DIFFICULTY AT ALL
VACUUMING,_SWEEPING,_OR_RAKING: A LITTLE BIT OF DIFFICULTY
ROLLING_OVER_IN_BED: SLIGHT DIFFICULTY
OPENING_A_JAR: EXTREME DIFFICULTY
YOUR_USUAL_HOBBIES,_RECREATIONAL_OR_SPORTING_ACTIVITIES: QUITE A BIT OF DIFFICULTY
ABILITY_TO_PARTICIPATE_IN_YOUR_DESIRED_SPORT_AS_LONG_AS_YOU_WOULD_LIKE: MODERATE DIFFICULTY
DOING_UP_BUTTONS: MODERATE DIFFICULTY
TYING_OR_LACING_SHOES: A LITTLE BIT OF DIFFICULTY
PUTTING_ON_SOCKS_AND_SHOES: SLIGHT DIFFICULTY
USING_TOOLS_OR_APPLIANCES: QUITE A BIT OF DIFFICULTY
SQUATTING: QUITE A BIT OF DIFFICULTY
PLACING_AN_OBJECT_ON_A_HIGH_SHELF: 2
PLEASE_INDICATE_YOR_PRIMARY_REASON_FOR_REFERRAL_TO_THERAPY:: SHOULDER
OPENING_DOORS: NO DIFFICULTY
RUNNING_ON_EVEN_GROUND: MODERATE DIFFICULTY
LEFS_SCORE(%): 0
HOW_WOULD_YOU_RATE_YOUR_CURRENT_LEVEL_OF_FUNCTION?: ABNORMAL
ADL_COUNT: 17
JUMPING: MODERATE DIFFICULTY
PLEASE_INDICATE_YOR_PRIMARY_REASON_FOR_REFERRAL_TO_THERAPY:: HIP
LIGHT_TO_MODERATE_WORK: MODERATE DIFFICULTY
ADL_SCORE(%): 0
SPORTS_COUNT: 8
PLEASE_INDICATE_YOR_PRIMARY_REASON_FOR_REFERRAL_TO_THERAPY:: FOOT AND/OR ANKLE
PERFORMING_HEAVY_ACTIVITIES_AROUND_YOUR_HOME: MODERATE DIFFICULTY
MAKING_SHARP_TURNS_WHILE_RUNNING_FAST: MODERATE DIFFICULTY
PUTTING_ON_YOUR_PANTS: 3
STANDING_FOR_1_HOUR: EXTREME DIFFICULTY OR UNABLE TO PERFORM ACTIVITY
ADL_HIGHEST_POTENTIAL_SCORE: 68
PUSHING_WITH_THE_INVOLVED_ARM: 4
WALKING_2_BLOCKS: NO DIFFICULTY
YOUR_USUAL_HOBBIES,_RECREATIONAL_OR_SPORTING_ACTIVITIES: QUITE A BIT OF DIFFICULTY
CUTTING/LATERAL_MOVEMENTS: SLIGHT DIFFICULTY
STARTING_AND_STOPPING_QUICKLY: SLIGHT DIFFICULTY
DRIVING: NO DIFFICULTY
ANY_OF_YOUR_USUAL_WORK,_HOUSEWORK_OR_SCHOOL_ACTIVITIES: QUITE A BIT OF DIFFICULTY
WASHING_YOUR_HAIR_OR_SCALP: NO DIFFICULTY

## 2025-05-05 DIAGNOSIS — I10 BENIGN ESSENTIAL HYPERTENSION: ICD-10-CM

## 2025-05-05 RX ORDER — LISINOPRIL 10 MG/1
10 TABLET ORAL DAILY
Qty: 90 TABLET | Refills: 1 | Status: SHIPPED | OUTPATIENT
Start: 2025-05-05

## 2025-05-07 ENCOUNTER — OFFICE VISIT (OUTPATIENT)
Dept: PODIATRY | Facility: CLINIC | Age: 75
End: 2025-05-07
Payer: COMMERCIAL

## 2025-05-07 ENCOUNTER — ANCILLARY PROCEDURE (OUTPATIENT)
Dept: GENERAL RADIOLOGY | Facility: CLINIC | Age: 75
End: 2025-05-07
Attending: PODIATRIST
Payer: COMMERCIAL

## 2025-05-07 DIAGNOSIS — S92.355A CLOSED NONDISPLACED FRACTURE OF FIFTH METATARSAL BONE OF LEFT FOOT, INITIAL ENCOUNTER: ICD-10-CM

## 2025-05-07 DIAGNOSIS — S92.355A CLOSED NONDISPLACED FRACTURE OF FIFTH METATARSAL BONE OF LEFT FOOT, INITIAL ENCOUNTER: Primary | ICD-10-CM

## 2025-05-07 PROCEDURE — 73630 X-RAY EXAM OF FOOT: CPT | Mod: LT | Performed by: RADIOLOGY

## 2025-05-07 NOTE — PATIENT INSTRUCTIONS
We wish you continued good healing. If you have any questions or concerns, please do not hesitate to contact us at  [phone]    [facility] (secure e-mail communication and access to your chart) to send a message or to make an appointment.    Please remember to call and schedule a follow up appointment if one was recommended at your earliest convenience.     PODIATRY CLINIC HOURS  TELEPHONE NUMBER    Dr. Michel CHAVESPBENEDICT FACFAS        Clinics:  [facility]   [place]  Tuesday 1PM-6PM  [place]  Wednesday 745AM-330PM  [place]  Monday 2nd,4th  830AM-4PM  Thursday/Friday 745AM-230PM     [place] APPOINTMENTS  [phone]    [place] APPOINTMENTS  [phone]          If you need a medication refill, please contact us you may need lab work and/or a follow up visit prior to your refill (i.e. Antifungal medications).  If MRI needed please call Imaging at [phone]   HOW DO I GET MY KNEE SCOOTER? Knee scooters can be picked up at ANY Medical Supply stores with your knee scooter Prescription.  OR  Bring your signed prescription to an [facility] showroom.   Set up an appointment for your custom Orthotics. Call any Orthotics locations call [phone]

## 2025-05-07 NOTE — PROGRESS NOTES
"Subjective:    4/23/25   Pt is seen today in consult from Dr. Clark for 5th met fx left foot.  On around 4/10/2025 patient started having pain on left foot.  Points to base of fifth metatarsal.  Pain aggravated by activity and relieved by rest.  Had swelling.  Denies any history of trauma or injury.  Had leg cramp at night and had to walk this off but did not injure this.  Denies erythema ecchymosis weakness increased deformity.  Patient has history of rheumatoid arthritis.  Has history of osteoporosis.  [place]  She has a dog she walks daily.    5/7/25 patient is 3 weeks 6 days status post left fifth metatarsal Morrison fracture on around 4/10/2025.   Has been walking in cam walker.  Foot feeling better.  Pain and swelling decreasing.  Has no other new complaints.  Unfortunately her dog just passed away.         ROS:  see above         Allergies   Allergen Reactions    Telaprevir Other (See Comments) and Rash     Rectal bleeding, anemia    Abilify Discmelt Other (See Comments)     Disoriented    Thiopental Sodium      PENTOTHAL/rigidity and fight response    Antivert [Meclizine Hcl]     Chamomile     Compazine     Diphenhydramine Nausea     And abdominal pain    Duloxetine Hcl Other (See Comments)     Disoriented, trouble sleeping    Effexor [Venlafaxine] Other (See Comments)     Disoriented, trouble sleeping    Elavil [Amitriptyline Hcl] Other (See Comments)     \"didn't feel right on it-med was stopped right away\"    Food Difficulty breathing     cilantro    Indomethacin      indocin sensativity \"Severe h.a\"    Seasonal Allergies Other (See Comments) and Difficulty breathing     Philip Gold Aug-Sept, rag weed, sneezing    Sulfa Antibiotics     Animal Dander Difficulty breathing and Rash     sneezing,resp. distress    Bupropion Anxiety    Tylenol [Acetaminophen] Rash       Current Outpatient Medications   Medication Sig Dispense Refill    Abatacept (ORENCIA CLICKJECT) 125 MG/ML SOAJ " auto-injector Inject 1 mL (125 mg) subcutaneously every 7 days. . Hold for any infection and seek medical attention. 4 mL 4    calcium carb-cholecalciferol 600-500 MG-UNIT CAPS Take 1,200 mg by mouth daily      cloNIDine (CATAPRES) 0.2 MG tablet Take 0.2 mg by mouth At Bedtime      cycloSPORINE (RESTASIS) 0.05 % ophthalmic emulsion Place 1 drop into both eyes 2 times daily       denosumab (PROLIA) 60 MG/ML SOSY injection       diclofenac (VOLTAREN) 1 % topical gel Apply up to 2 grams of 1% gel to hands up to 4 times daily as needed for joint pain (maximum: 8 g per upper extremity per day) 200 g 2    ferrous sulfate (FE TABS) 325 (65 Fe) MG EC tablet Take 1 tablet (325 mg) by mouth every other day. 45 tablet 2    gabapentin (NEURONTIN) 300 MG capsule TAKE ONE CAPSULE BY MOUTH THREE TIMES A DAY, MAY TAKE TWO CAPSULES BY MOUTH EVERY NIGHT AT BEDTIME 360 capsule 3    hydroxychloroquine (PLAQUENIL) 200 MG tablet Hydroxychloroquine 200mg daily; and an additional 200mg every other day.  Yearly eye exam, including 10-2 VF and SD-OCT, required. 135 tablet 2    lisdexamfetamine (VYVANSE) 30 MG capsule Take 30 mg by mouth every morning      lisinopril (ZESTRIL) 10 MG tablet TAKE ONE TABLET BY MOUTH ONCE DAILY 90 tablet 1    montelukast (SINGULAIR) 10 MG tablet TAKE 1 TABLET BY MOUTH ONCE DAILY AS NEEDED SEASONALLY (AUGUST AND SEPTEMBER) 90 tablet 3    Psyllium (FIBER) 0.52 G CAPS 1 tabs in am and pm 540 capsule     sennosides (SENOKOT) 8.6 MG tablet Take 2 tablets by mouth 2 times daily      tenofovir alafenamide fumarate (VEMLIDY) 25 MG tablet Take 1 tablet (25 mg) by mouth daily. with food (dispense only in the original container). 90 tablet 3    triamcinolone (KENALOG) 0.1 % external cream Apply topically 2 times daily Use to to 14 days at a time 15 g 0    Vitamin D3 (CHOLECALCIFEROL) 25 mcg (1000 units) tablet Take 1 tablet (25 mcg) by mouth daily. 90 tablet 2       Patient Active Problem List   Diagnosis    Allergic  rhinitis    Pernicious anemia    Anxiety state    Migraine    Essential and other specified forms of tremor    Fibromyalgia    Mild recurrent major depression    Narcolepsy    Internal hemorrhoids with other complication    AIN (anal intraepithelial neoplasia) anal canal    Osteoporosis    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)    Benign essential hypertension    Alcohol dependence in remission (H)    Personal history of other medical treatment    Constipation    DDD (degenerative disc disease), cervical    Iron deficiency    Fatigue    Chronic bilateral low back pain without sciatica    S/P carpal tunnel release    Right lateral epicondylitis    Lumbar radiculopathy       Past Medical History:   Diagnosis Date    ABUSE BY SPOUSE/PARTNER 07/27/2005    Arthritis     Degeneration of lumbar or lumbosacral intervertebral disc     DDD L5/S1    Depressive disorder     Esophageal reflux 01/28/2005    HELICOBACTER PYLORI INFECTION 01/28/2005    Hepatitis C - Cured. Achieved SVR in 2017     History of blood transfusion     Hypertension     Malignant neoplasm (H)     ACIN    Osteoporosis     Other and unspecified alcohol dependence, unspecified drinking behavior     Sober as 1/21/1987    Other malaise and fatigue        Past Surgical History:   Procedure Laterality Date    BIOPSY ANAL CANAL  01/21/2013    [facility]     BREAST BIOPSY, RT/LT Left 1975    Breat Biopsy RT/LT    COLONOSCOPY  08/25/2009    COLONOSCOPY  02/14/2011    COLONOSCOPY performed by CRISTIN LAGUNAS at  GI    COLONOSCOPY N/A 01/08/2019    Procedure: Colonscopy, Biopsies by Biopsy;  Surgeon: Omega Talavera MD;  Location:  GI    COLONOSCOPY N/A 06/17/2024    Procedure: Colonoscopy;  Surgeon: Omega Talavera MD;  Location:  GI    CYSTOSCOPY  02/28/2011    CYSTOSCOPY performed by CAYLA FLOR at  OR    ENDOSCOPY  05/21/2012    Upper GI - [facility]    ENT SURGERY  1965    EYE SURGERY   09/05/24    10/03/24    GI SURGERY  06/25/2012     UGI ENDOSCOPY DIAG W BIOPSY  10/01/2009    HEMORRHOIDECTOMY  06/25/2012    [facility]    LAPAROSCOPIC SALPINGO-OOPHORECTOMY  02/28/2011    LAPAROSCOPIC SALPINGO-OOPHORECTOMY performed by CAYLA FLOR at  OR    ORTHOPEDIC SURGERY  08/23/24 11/01/24    PHACOEMULSIFICATION CLEAR CORNEA WITH TORIC INTRAOCULAR LENS IMPLANT Right 09/05/2024    Procedure: PHACOEMULSIFICATION, CATARACT, WITH INTRAOCULAR LENS IMPLANT, TORIC LENS right;  Surgeon: Primo Garcia MD;  Location: [facility] OR    PHACOEMULSIFICATION CLEAR CORNEA WITH TORIC INTRAOCULAR LENS IMPLANT Left 10/03/2024    Procedure: PHACOEMULSIFICATION, CATARACT, WITH INTRAOCULAR LENS IMPLANT, TORIC LENS, LEFT;  Surgeon: Primo Garcia MD;  Location: [facility] OR    RELEASE CARPAL TUNNEL Left 08/23/2024    Procedure: Left carpal tunnel release;  Surgeon: David John MD;  Location: [facility] OR    RELEASE CARPAL TUNNEL Right 11/01/2024    Procedure: RELEASE, CARPAL TUNNEL,;  Surgeon: David John MD;  Location: [facility] OR    RELEASE TRIGGER FINGER Left 08/23/2024    Procedure: Left middle finger trigger finger release;  Surgeon: David John MD;  Location: [facility] OR    TONSILLECTOMY & ADENOIDECTOMY  1965    [facility] NONSPECIFIC PROCEDURE  1965    Removed bone left index finger knuckle, casts broken bones    [facility]  [facility]    [facility] SIM[facility]  08/08/2007       Family History   Problem Relation Age of Onset    Hypertension Mother     Breast Cancer Mother     Coronary Artery Disease Mother     Cerebrovascular Disease Mother     Kidney Disease Mother     Obesity Mother     Hypertension Father         Father    Lymphoma Father     Glaucoma Father     Other Cancer Father         Lymphoma    Genetic Disorder Father         Glaucoma    Obesity Father     C.A.D. Sister         MI at age 63    Hypertension Sister     Gastrointestinal Disease Sister          gallbladder    Hyperlipidemia Sister     Cerebrovascular Disease Sister     Circulatory Sister         brain aneurysm at 63    Genitourinary Problems Sister         1 kidney/bladder    Hypertension Sister     Obesity Sister     Coronary Artery Disease Sister         had valve surgery, MI, CHF    Coronary Artery Disease Brother     Hypertension Brother     Hyperlipidemia Brother     Cerebrovascular Disease Maternal Grandmother     Hypertension Maternal Grandmother     Cerebrovascular Disease Paternal Grandmother     Hypertension Paternal Grandmother     Glaucoma Paternal Grandfather     Genetic Disorder Paternal Grandfather         Glaucoma    Blood Disease Son         Lymes/    Hypertension Son     Obesity Son     Hypertension Son     Breast Cancer Maternal Aunt     Ovarian Cancer Maternal Aunt     Unknown/Adopted Paternal Uncle     Obesity Niece     Obesity Niece     Liver Cancer Cousin     Coronary Artery Disease Other 49        niece    Diabetes Other         1st cousin    Breast Cancer Other     Obesity Other     Obesity Other     Hypertension Other         Aunts    Obesity Other         Uncle    Hypertension Other         Uncle    Coronary Artery Disease Sister     Coronary Artery Disease Brother     Hypertension Brother     Hyperlipidemia Brother     Coronary Artery Disease Nephew         Nephew    Hypertension Niece         [place]    Hypertension Other         Nephew    Cerebrovascular Disease Other         Aunts    Obesity Other         Nephew       Social History     Tobacco Use    Smoking status: Former     Current packs/day: 0.00     Average packs/day: 0.8 packs/day for 10.0 years (7.5 ttl pk-yrs)     Types: Cigarettes     Start date: 1968     Quit date: 1978     Years since quittin.5     Passive exposure: Never    Smokeless tobacco: Never    Tobacco comments:     Quit 46 years ago   Substance Use Topics    Alcohol use: No         Exam:    Vitals: LMP 2003   BMI: There is no  height or weight on file to calculate BMI.  Height: Data Unavailable    Constitutional/ general:  Pt is in no apparent distress, appears well-nourished.  Cooperative with history and physical exam.     Psych:  The patient answered questions appropriately.  Normal affect.  Seems to have reasonable expectations, in terms of treatment.     Vascular:  positive pedal pulses.  CFT < 3 sec.   negative pedal hair growth.    Neuro:  Alert and oriented x 3. Coordinated gait.  Light touch sensation is intact     Derm: Normal texture and turgor.  No erythema, ecchymosis, or cyanosis.      Musculoskeletal:    Today minimal pain upon palpation to base of left 5th metatarsal.  Edema very slight.  No erythema or ecchymosis.  With gently loading fifth met head no pain today.    Radiographic Exam:  X-Ray Findings:  I personally reviewed the films.  Fracture left foot at the metaphyseal diaphyseal junction.  No increase in fracture gap since last x-ray    Assessment: Left fifth metatarsal Morrison fracture    Plan:  X-rays taken today of left foot.  Fracture stable.  Continue walking boot.  Again nonweightbearing would be best especially with the a forementioned risk factors but [place].  Will follow-up with one of my colleagues in approximately 3 weeks to reevaluate as I will be going on sabtical.      Michel Gaviria DPM, FACFAS
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
## 2025-05-09 ENCOUNTER — RESULTS FOLLOW-UP (OUTPATIENT)
Dept: RHEUMATOLOGY | Facility: CLINIC | Age: 75
End: 2025-05-09

## 2025-05-12 ENCOUNTER — THERAPY VISIT (OUTPATIENT)
Dept: PHYSICAL THERAPY | Facility: CLINIC | Age: 75
End: 2025-05-12
Attending: INTERNAL MEDICINE
Payer: COMMERCIAL

## 2025-05-12 DIAGNOSIS — G89.29 CHRONIC LEFT SHOULDER PAIN: ICD-10-CM

## 2025-05-12 DIAGNOSIS — M25.512 CHRONIC LEFT SHOULDER PAIN: ICD-10-CM

## 2025-05-12 PROCEDURE — 97112 NEUROMUSCULAR REEDUCATION: CPT | Mod: GP | Performed by: PHYSICAL THERAPIST

## 2025-05-12 PROCEDURE — 97110 THERAPEUTIC EXERCISES: CPT | Mod: GP | Performed by: PHYSICAL THERAPIST

## 2025-05-12 PROCEDURE — 97161 PT EVAL LOW COMPLEX 20 MIN: CPT | Mod: GP | Performed by: PHYSICAL THERAPIST

## 2025-05-12 NOTE — PROGRESS NOTES
"PHYSICAL THERAPY EVALUATION  Type of Visit: Evaluation       Fall Risk Screen:  Fall screen completed by: PT  Have you fallen 2 or more times in the past year?: Yes  Have you fallen and had an injury in the past year?: Yes  Is patient receiving Physical Therapy Services?: Yes    Subjective         Presenting condition or subjective complaint: At 1st, ongoing issues with left back hip, hands & right elbow, new issues: left shoulder & broken left foot metatarsal.  Boot for foot is making hip hurt more & creating imbalance issues.  Standing, using stairs, lifting heavy or awkward items.   Pt has PT orders for L shoulder. About 3 months ago she notes more \"popping\" in the L shoulder. Pt has had a lot of stress lately losing both of her dogs. Pt now wearing L boot for L 5th metatarsal injury (Prince Fx), started wearing in April 17th. Will have follow up in 2-3 weeks in [place].   Date of onset: 02/12/25    Relevant medical history: Anemia; Arthritis; Bladder or bowel problems; Cancer; Concussions; Fibromyalgia; Hepatitis; High blood pressure; History of fractures; Non-healing wounds; Osteoporosis; Rheumatoid arthritis   Dates & types of surgery: On file in MyChart &/or medical archives files    Prior diagnostic imaging/testing results: MRI; CT scan; X-ray; Bone scan     Prior therapy history for the same diagnosis, illness or injury: Yes Began in early 2024, late 2024 & again in 2025    Prior Level of Function  Transfers: Independent  Ambulation: Independent but has a cane but not using since the boot and it is better for her to use her arms to balance.  ADL: Independent  IADL: Driving, Finances, Housekeeping, Laundry, Meal preparation, Medication management, Yard work    Living Environment  Social support: Alone   Type of home: House; Basement   Stairs to enter the home: Yes 3 Is there a railing: Yes     Ramp: No   Stairs inside the home: Yes 12 Is there a railing: Yes     Help at home: Home management tasks " (cooking, cleaning)  Equipment owned: Straight Cane     Employment: Not Applicable    Hobbies/Interests: walk, run, hike, kayak, bike, dance, wildlife photography, plant trees, maintain certified wildlife habitat sanctuary, read, research, environmental advocacy, friends, family, dogs, learning, writing, poetry & reading groups, etc.    Patient goals for therapy: Open all types of containers, use stairs safely, not feel like I'm going to fall, walk, run, plant trees, carry groceries & similar.  Do tasks independently without needing to find & pay for help    Pain assessment: Pain present  See objective evaluation for additional pain details     Objective   SHOULDER EVALUATION  PAIN: Pain Level at Rest: 0/10  Pain Level with Use: 7/10  Pain Location: neck and B upper trap tightness, L scapula/thoracic, L shoulder pain. Notes B hand/finger sx that when bad can spread sx up entire UE's  Pain Frequency: constant neck and upper trap tightness, intermittent L shoulder pain and UE sx  Pain is Exacerbated By: lifting, elevation, pushing, pulling, lying on side, see spadi  Pain is Relieved By: rest  Pain Progression: Unchanged  INTEGUMENTARY (edema, incisions):   POSTURE: poor sitting posture, keeps neck in R side bent posture, guarding with L upper trap, holds L shoulder hiked routinely. Pt wearing L ankle boot so uneven in standing.   GAIT:   Weightbearing Status: WBAT  Assistive Device(s): None  Gait Deviations: Antalgic  BALANCE/PROPRIOCEPTION: pt has been seen in past for this  WEIGHTBEARING ALIGNMENT:   ROM:   (Degrees) Left AROM Left PROM Right AROM  Right PROM   Shoulder Flexion 148 1/10 pain  168    Shoulder Extension       Shoulder Abduction 160 1/10 pain  180    Shoulder Adduction       Shoulder Internal Rotation wnl, 1/10 pain  wnl    Shoulder External Rotation 72 1/10 pain  68 1/10 pain    Shoulder Horizontal Abduction       Shoulder Horizontal Adduction       Shoulder Flexion ER       Shoulder Flexion IR        Elbow Extension       Elbow Flexion       Pain:   End feel:   STRENGTH: L shoulder IR 4+/5, L ER 4/5, R 5/5  FLEXIBILITY:   SPECIAL TESTS:   PALPATION:   JOINT MOBILITY:   CERVICAL SCREEN: may assess more in future    Assessment & Plan   CLINICAL IMPRESSIONS  Medical Diagnosis: Chronic left shoulder pain    Treatment Diagnosis: Left shoulder pain, impingement syndrome, neck pain   Impression/Assessment: Patient is a 74 year old female with Left shoulder pain complaints but also has balance issues, neck and back pain.  The following significant findings have been identified: Pain, Decreased ROM/flexibility, Decreased strength, Impaired balance, Impaired gait, Impaired muscle performance, Decreased activity tolerance, and Impaired posture. These impairments interfere with their ability to perform self care tasks, work tasks, recreational activities, household chores, household mobility, and community mobility as compared to previous level of function.     Clinical Decision Making (Complexity):  Clinical Presentation: Stable/Uncomplicated  Clinical Presentation Rationale: based on medical and personal factors listed in PT evaluation  Clinical Decision Making (Complexity): Low complexity    PLAN OF CARE  Treatment Interventions:  Modalities: Ultrasound, if needed  Interventions: Manual Therapy, Neuromuscular Re-education, Therapeutic Activity, Therapeutic Exercise    Long Term Goals     PT Goal 1  Goal Identifier: Strength  Goal Description: pt to improve L shoulder MMT ER to 5/5 so she can have improved L shoulder strength with doing household and outdoor chores.  Target Date: 07/12/25  PT Goal 2  Goal Identifier: Function  Goal Description: Pt will note a decrease in L shoulder pain and improve ROM and strength and carry over to improved functional level as measured by spadi score decrease from 27.69% to 12% or less  Target Date: 08/09/25      Frequency of Treatment: 1x per week, decrease as able  Duration of  Treatment: 90 days, then re assess    Recommended Referrals to Other Professionals:   Education Assessment:   Learner/Method: Patient;Listening;Demonstration;No Barriers to Learning;Reading;Pictures/Video  Education Comments: home program    Risks and benefits of evaluation/treatment have been explained.   Patient/Family/caregiver agrees with Plan of Care.     Evaluation Time:     PT Eval, Low Complexity Minutes (98835): 20       Signing Clinician: GAETANO Dawson [facility]                                                                                   OUTPATIENT PHYSICAL THERAPY      PLAN OF TREATMENT FOR OUTPATIENT REHABILITATION   Patient's Last Name, First Name, JACINTAWIL  ZeynepNiesha  M YOB: 1950   Provider's Name   [facility]   Medical Record No.  [id number]     Onset Date: 02/12/25  Start of Care Date: 05/12/25     Medical Diagnosis:  Chronic left shoulder pain      PT Treatment Diagnosis:  Left shoulder pain, impingement syndrome, neck pain Plan of Treatment  Frequency/Duration: 1x per week, decrease as able/ 90 days, then re assess    Certification date from 05/12/25 to 08/09/25         See note for plan of treatment details and functional goals     Ronald Rodríguez, PT                         I CERTIFY THE NEED FOR THESE SERVICES FURNISHED UNDER        THIS PLAN OF TREATMENT AND WHILE UNDER MY CARE     (Physician attestation of this document indicates review and certification of the therapy plan).              Referring Provider:  Apolinar Cho    Initial Assessment  See Epic Evaluation- Start of Care Date: 05/12/25

## 2025-05-21 ENCOUNTER — TELEPHONE (OUTPATIENT)
Dept: RHEUMATOLOGY | Facility: CLINIC | Age: 75
End: 2025-05-21
Payer: COMMERCIAL

## 2025-05-21 NOTE — TELEPHONE ENCOUNTER
MARIA ESTHER, appointment on 8/4/2025 needs to be rescheduled as Dr. Cho will be out of clinic. May use LEN spot. If patient asks they may do video visit.    Anabella Garcia CMA Rheumatology  5/21/2025

## 2025-05-24 ENCOUNTER — HEALTH MAINTENANCE LETTER (OUTPATIENT)
Age: 75
End: 2025-05-24

## 2025-05-29 ENCOUNTER — ANCILLARY PROCEDURE (OUTPATIENT)
Dept: GENERAL RADIOLOGY | Facility: CLINIC | Age: 75
End: 2025-05-29
Attending: PODIATRIST
Payer: COMMERCIAL

## 2025-05-29 ENCOUNTER — OFFICE VISIT (OUTPATIENT)
Dept: PODIATRY | Facility: CLINIC | Age: 75
End: 2025-05-29
Attending: PODIATRIST
Payer: COMMERCIAL

## 2025-05-29 DIAGNOSIS — S92.355A CLOSED NONDISPLACED FRACTURE OF FIFTH METATARSAL BONE OF LEFT FOOT, INITIAL ENCOUNTER: ICD-10-CM

## 2025-05-29 DIAGNOSIS — M79.671 RIGHT FOOT PAIN: Primary | ICD-10-CM

## 2025-05-29 DIAGNOSIS — S92.355S CLOSED NONDISPLACED FRACTURE OF FIFTH METATARSAL BONE OF LEFT FOOT, SEQUELA: ICD-10-CM

## 2025-05-29 PROCEDURE — 73630 X-RAY EXAM OF FOOT: CPT | Mod: RT | Performed by: RADIOLOGY

## 2025-05-29 PROCEDURE — 73630 X-RAY EXAM OF FOOT: CPT | Mod: LT | Performed by: RADIOLOGY
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
## 2025-05-29 NOTE — NURSING NOTE
"Niesha Adhikari's chief complaint for this visit includes:  Chief Complaint   Patient presents with    Left Foot - RECHECK, Follow Up     Fx follow-up      PCP: Tanika Clark    Referring Provider:  Michel Gaviria DPM  [address] [place] [address]    [facility] 11/27/2003   Data Unavailable        Allergies   Allergen Reactions    Telaprevir Other (See Comments) and Rash     Rectal bleeding, anemia    Abilify Discmelt Other (See Comments)     Disoriented    Thiopental Sodium      PENTOTHAL/rigidity and fight response    Antivert [Meclizine Hcl]     Chamomile     Compazine     Diphenhydramine Nausea     And abdominal pain    Duloxetine Hcl Other (See Comments)     Disoriented, trouble sleeping    Effexor [Venlafaxine] Other (See Comments)     Disoriented, trouble sleeping    Elavil [Amitriptyline Hcl] Other (See Comments)     \"didn't feel right on it-med was stopped right away\"    Food Difficulty breathing     cilantro    Indomethacin      indocin sensativity \"Severe h.a\"    Seasonal Allergies Other (See Comments) and Difficulty breathing     Philip Gold Aug-Sept, rag weed, sneezing    Sulfa Antibiotics     Animal Dander Difficulty breathing and Rash     sneezing,resp. distress    Bupropion Anxiety    Tylenol [Acetaminophen] Rash         Do you need any medication refills at today's visit?      "

## 2025-05-30 ENCOUNTER — RESULTS FOLLOW-UP (OUTPATIENT)
Dept: SURGERY | Facility: CLINIC | Age: 75
End: 2025-05-30

## 2025-06-18 ENCOUNTER — OFFICE VISIT (OUTPATIENT)
Dept: PODIATRY | Facility: CLINIC | Age: 75
End: 2025-06-18
Payer: COMMERCIAL

## 2025-06-18 ENCOUNTER — ANCILLARY PROCEDURE (OUTPATIENT)
Dept: GENERAL RADIOLOGY | Facility: CLINIC | Age: 75
End: 2025-06-18
Attending: PODIATRIST
Payer: COMMERCIAL

## 2025-06-18 DIAGNOSIS — S92.355S CLOSED NONDISPLACED FRACTURE OF FIFTH METATARSAL BONE OF LEFT FOOT, SEQUELA: Primary | ICD-10-CM

## 2025-06-18 PROCEDURE — 73630 X-RAY EXAM OF FOOT: CPT | Mod: LT | Performed by: RADIOLOGY

## 2025-06-18 NOTE — NURSING NOTE
"Niesha Adhikari's chief complaint for this visit includes:  Chief Complaint   Patient presents with    RECHECK     Fracture follow up     PCP: Tanika Clark    Referring Provider:  Referred Self, MD  No address on file    [facility] 11/27/2003   Data Unavailable     Do you need any medication refills at today's visit? NO    Allergies   Allergen Reactions    Telaprevir Other (See Comments) and Rash     Rectal bleeding, anemia    Abilify Discmelt Other (See Comments)     Disoriented    Thiopental Sodium      PENTOTHAL/rigidity and fight response    Antivert [Meclizine Hcl]     Chamomile     Compazine     Diphenhydramine Nausea     And abdominal pain    Duloxetine Hcl Other (See Comments)     Disoriented, trouble sleeping    Effexor [Venlafaxine] Other (See Comments)     Disoriented, trouble sleeping    Elavil [Amitriptyline Hcl] Other (See Comments)     \"didn't feel right on it-med was stopped right away\"    Food Difficulty breathing     cilantro    Indomethacin      indocin sensativity \"Severe h.a\"    Seasonal Allergies Other (See Comments) and Difficulty breathing     Philip Gold Aug-Sept, rag weed, sneezing    Sulfa Antibiotics     Animal Dander Difficulty breathing and Rash     sneezing,resp. distress    Bupropion Anxiety    Tylenol [Acetaminophen] Rash       Loni Duncan LPN  "

## 2025-06-18 NOTE — PROGRESS NOTES
Past Medical History:   Diagnosis Date    ABUSE BY SPOUSE/PARTNER 07/27/2005    Arthritis     Degeneration of lumbar or lumbosacral intervertebral disc     DDD L5/S1    Depressive disorder     Esophageal reflux 01/28/2005    HELICOBACTER PYLORI INFECTION 01/28/2005    Hepatitis C - Cured. Achieved SVR in 2017     History of blood transfusion     Hypertension     Malignant neoplasm (H)     ACIN    Osteoporosis     Other and unspecified alcohol dependence, unspecified drinking behavior     Sober as 1/21/1987    Other malaise and fatigue      Patient Active Problem List   Diagnosis    Allergic rhinitis    Pernicious anemia    Anxiety state    Migraine    Essential and other specified forms of tremor    Fibromyalgia    Mild recurrent major depression    Narcolepsy    Internal hemorrhoids with other complication    AIN (anal intraepithelial neoplasia) anal canal    Osteoporosis    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)    Benign essential hypertension    Alcohol dependence in remission (H)    Personal history of other medical treatment    Constipation    DDD (degenerative disc disease), cervical    Iron deficiency    Fatigue    Chronic bilateral low back pain without sciatica    S/P carpal tunnel release    Right lateral epicondylitis    Lumbar radiculopathy    Chronic left shoulder pain     Past Surgical History:   Procedure Laterality Date    BIOPSY ANAL CANAL  01/21/2013    [facility]     BREAST BIOPSY, RT/LT Left 1975    Breat Biopsy RT/LT    COLONOSCOPY  08/25/2009    COLONOSCOPY  02/14/2011    COLONOSCOPY performed by CRISTIN LAGUNAS at  GI    COLONOSCOPY N/A 01/08/2019    Procedure: Colonscopy, Biopsies by Biopsy;  Surgeon: Omega Talavera MD;  Location:  GI    COLONOSCOPY N/A 06/17/2024    Procedure: Colonoscopy;  Surgeon: Omega Talavera MD;  Location:  GI    CYSTOSCOPY  02/28/2011    CYSTOSCOPY performed by CAYLA FLOR at  OR    ENDOSCOPY  05/21/2012     Upper GI - [facility] [place]    ENT SURGERY  1965    EYE SURGERY  09/05/24    10/03/24    GI SURGERY  06/25/2012     UGI ENDOSCOPY DIAG W BIOPSY  10/01/2009    HEMORRHOIDECTOMY  06/25/2012    [facility]    LAPAROSCOPIC SALPINGO-OOPHORECTOMY  02/28/2011    LAPAROSCOPIC SALPINGO-OOPHORECTOMY performed by CAYLA FLOR at  OR    ORTHOPEDIC SURGERY  08/23/24 11/01/24    PHACOEMULSIFICATION CLEAR CORNEA WITH TORIC INTRAOCULAR LENS IMPLANT Right 09/05/2024    Procedure: PHACOEMULSIFICATION, CATARACT, WITH INTRAOCULAR LENS IMPLANT, TORIC LENS right;  Surgeon: Primo Garcia MD;  Location: [facility] OR    PHACOEMULSIFICATION CLEAR CORNEA WITH TORIC INTRAOCULAR LENS IMPLANT Left 10/03/2024    Procedure: PHACOEMULSIFICATION, CATARACT, WITH INTRAOCULAR LENS IMPLANT, TORIC LENS, LEFT;  Surgeon: Primo Garcia MD;  Location: [facility] OR    RELEASE CARPAL TUNNEL Left 08/23/2024    Procedure: Left carpal tunnel release;  Surgeon: David John MD;  Location: [facility] OR    RELEASE CARPAL TUNNEL Right 11/01/2024    Procedure: RELEASE, CARPAL TUNNEL,;  Surgeon: David John MD;  Location: [facility] OR    RELEASE TRIGGER FINGER Left 08/23/2024    Procedure: Left middle finger trigger finger release;  Surgeon: David John MD;  Location: [facility] OR    TONSILLECTOMY & ADENOIDECTOMY  1965    [facility] NONSPECIFIC PROCEDURE  1965    Removed bone left index finger knuckle, casts broken bones    [facility] COLONOSCOPY W/WO BRUSH/WASH  08/22/2005    [facility] UGI ENDOSCOPY, SIMPLE EXAM  08/08/2007     Social History     Socioeconomic History    Marital status:      Spouse name: Not on file    Number of children: Not on file    Years of education: Not on file    Highest education level: Not on file   Occupational History    Not on file   Tobacco Use    Smoking status: Former     Current packs/day: 0.00     Average packs/day: 0.8 packs/day for 10.0 years (7.5 ttl pk-yrs)     Types: Cigarettes     Start date:  1968     Quit date: 1978     Years since quittin.6     Passive exposure: Never    Smokeless tobacco: Never    Tobacco comments:     Quit 46 years ago   Vaping Use    Vaping status: Never Used   Substance and Sexual Activity    Alcohol use: No    Drug use: No    Sexual activity: Not Currently     Partners: Male     Birth control/protection: Other     Comment: Not necessary, no sex   Other Topics Concern    Parent/sibling w/ CABG, MI or angioplasty before 65F 55M? Yes     Comment: Mother, brother, sister     Service No    Blood Transfusions No    Caffeine Concern No    Occupational Exposure No    Hobby Hazards No    Sleep Concern No    Stress Concern Yes    Weight Concern Yes    Special Diet No    Back Care No    Exercise No    Bike Helmet Yes    Seat Belt Yes    Self-Exams Yes   Social History Narrative    Not on file     Social Drivers of Health     Financial Resource Strain: Low Risk  (2024)    Financial Resource Strain     Within the past 12 months, have you or your family members you live with been unable to get utilities (heat, electricity) when it was really needed?: No   Food Insecurity: Low Risk  (2024)    Food Insecurity     Within the past 12 months, did you worry that your food would run out before you got money to buy more?: No     Within the past 12 months, did the food you bought just not last and you didn t have money to get more?: No   Transportation Needs: High Risk (2024)    Transportation Needs     Within the past 12 months, has lack of transportation kept you from medical appointments, getting your medicines, non-medical meetings or appointments, work, or from getting things that you need?: Yes   Physical Activity: Sufficiently Active (2024)    Exercise Vital Sign     Days of Exercise per Week: 7 days     Minutes of Exercise per Session: 80 min   Stress: Stress Concern Present (2024)    [employer] - Occupational  Stress Questionnaire     Feeling of Stress : Very much   Social Connections: Unknown (11/19/2024)    Social Connection and Isolation Panel [NHANES]     Frequency of Communication with Friends and Family: Not on file     Frequency of Social Gatherings with Friends and Family: Once a week     Attends [employer] Services: Not on file     Active Member of Clubs or Organizations: Not on file     Attends Club or Organization Meetings: Not on file     Marital Status: Not on file   Interpersonal Safety: Low Risk  (11/22/2024)    Interpersonal Safety     Do you feel physically and emotionally safe where you currently live?: Yes     Within the past 12 months, have you been hit, slapped, kicked or otherwise physically hurt by someone?: No     Within the past 12 months, have you been humiliated or emotionally abused in other ways by your partner or ex-partner?: No   Housing Stability: Low Risk  (11/19/2024)    Housing Stability     Do you have housing? : Yes     Are you worried about losing your housing?: No     Family History   Problem Relation Age of Onset    Hypertension Mother     Breast Cancer Mother     Coronary Artery Disease Mother     Cerebrovascular Disease Mother     Kidney Disease Mother     Obesity Mother     Hypertension Father         Father    Lymphoma Father     Glaucoma Father     Other Cancer Father         Lymphoma    Genetic Disorder Father         Glaucoma    Obesity Father     C.A.D. Sister         MI at age 63    Hypertension Sister     Gastrointestinal Disease Sister         gallbladder    Hyperlipidemia Sister     Cerebrovascular Disease Sister     Circulatory Sister         brain aneurysm at 63    Genitourinary Problems Sister         1 kidney/bladder    Hypertension Sister     Obesity Sister     Coronary Artery Disease Sister         had valve surgery, MI, CHF    Coronary Artery Disease Brother     Hypertension Brother     Hyperlipidemia Brother     Cerebrovascular Disease Maternal Grandmother      Hypertension Maternal Grandmother     Cerebrovascular Disease Paternal Grandmother     Hypertension Paternal Grandmother     Glaucoma Paternal Grandfather     Genetic Disorder Paternal Grandfather         Glaucoma    Blood Disease Son         Lymes/7/11    Hypertension Son     Obesity Son     Hypertension Son     Breast Cancer Maternal Aunt     Ovarian Cancer Maternal Aunt     Unknown/Adopted Paternal Uncle     Obesity Niece     Obesity Niece     Liver Cancer Cousin     Coronary Artery Disease Other 49        niece    Diabetes Other         1st cousin    Breast Cancer Other     Obesity Other     Obesity Other     Hypertension Other         Aunts    Obesity Other         Uncle    Hypertension Other         Uncle    Coronary Artery Disease Sister     Coronary Artery Disease Brother     Hypertension Brother     Hyperlipidemia Brother     Coronary Artery Disease Nephew         Nephew    Hypertension Niece         [place]    Hypertension Other         Nephew    Cerebrovascular Disease Other         Aunts    Obesity Other         Nephew         Subjective findings- 74-year-old returns clinic for left fifth metatarsal base fracture and right foot pain.  She relates the left foot is doing well does not hurt.  She relates she is getting right hip pain and right foot pain.  Relates the right foot pain is moved more to the fifth MPJ area.  Relates to no specific injury.    Objective findings- Vascular status intact left.  Left foot there is no pain on palpation, no pain on range of motion, no erythema, no edema, no drainage, no odor, no calor.  X-rays 3 views left foot compared to previous x-rays and reviewed with patient in clinic today with increased bony callus and cortical healing compared to previous x-rays.    Assessment and plan- Left fifth metatarsal fracture.  Right fifth metatarsal pain.  Diagnosis and treatment options discussed with the patient.  Left foot x-rays ordered, reviewed and use discussed with the patient.   Will have her gradually decrease the cam boot use over the next 1 to 2 weeks and increase activity gradually.  She relates she already sees physical therapy for her shoulder so she will talk to them about her foot as well.  Her expectation is that the right foot will improve as we discontinue the cam boot.  Previous notes reviewed.  Return to clinic and see me or Dr. Gaviria in 6 to 8 weeks.                                              Low level of medical decision making.

## 2025-06-18 NOTE — LETTER
6/18/2025      Niesha Adhikari  [address] [facility] [address]      Dear Colleague,    Thank you for referring your patient, Niesha Adhikari, to the [facility]. Please see a copy of my visit note below.    Past Medical History:   Diagnosis Date    ABUSE BY SPOUSE/PARTNER 07/27/2005    Arthritis     Degeneration of lumbar or lumbosacral intervertebral disc     DDD L5/S1    Depressive disorder     Esophageal reflux 01/28/2005    HELICOBACTER PYLORI INFECTION 01/28/2005    Hepatitis C - Cured. Achieved SVR in 2017     History of blood transfusion     Hypertension     Malignant neoplasm (H)     ACIN    Osteoporosis     Other and unspecified alcohol dependence, unspecified drinking behavior     Sober as 1/21/1987    Other malaise and fatigue      Patient Active Problem List   Diagnosis    Allergic rhinitis    Pernicious anemia    Anxiety state    Migraine    Essential and other specified forms of tremor    Fibromyalgia    Mild recurrent major depression    Narcolepsy    Internal hemorrhoids with other complication    AIN (anal intraepithelial neoplasia) anal canal    Osteoporosis    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)    Benign essential hypertension    Alcohol dependence in remission (H)    Personal history of other medical treatment    Constipation    DDD (degenerative disc disease), cervical    Iron deficiency    Fatigue    Chronic bilateral low back pain without sciatica    S/P carpal tunnel release    Right lateral epicondylitis    Lumbar radiculopathy    Chronic left shoulder pain     Past Surgical History:   Procedure Laterality Date    BIOPSY ANAL CANAL  01/21/2013    [facility]     BREAST BIOPSY, RT/LT Left 1975    Breat Biopsy RT/LT    COLONOSCOPY  08/25/2009    COLONOSCOPY  02/14/2011    COLONOSCOPY performed by CRISTIN LAGUNAS at  GI    COLONOSCOPY N/A 01/08/2019    Procedure: Colonscopy, Biopsies by Biopsy;  Surgeon:  Omega Talavera MD;  Location:  GI    COLONOSCOPY N/A 06/17/2024    Procedure: Colonoscopy;  Surgeon: Omega Talavera MD;  Location:  GI    CYSTOSCOPY  02/28/2011    CYSTOSCOPY performed by CAYLA FLOR at  OR    ENDOSCOPY  05/21/2012    Upper GI - [facility]    ENT SURGERY  1965    EYE SURGERY  09/05/24    10/03/24    GI SURGERY  06/25/2012     UGI ENDOSCOPY DIAG W BIOPSY  10/01/2009    HEMORRHOIDECTOMY  06/25/2012    [facility]    LAPAROSCOPIC SALPINGO-OOPHORECTOMY  02/28/2011    LAPAROSCOPIC SALPINGO-OOPHORECTOMY performed by CAYLA FLOR at  OR    ORTHOPEDIC SURGERY  08/23/24 11/01/24    PHACOEMULSIFICATION CLEAR CORNEA WITH TORIC INTRAOCULAR LENS IMPLANT Right 09/05/2024    Procedure: PHACOEMULSIFICATION, CATARACT, WITH INTRAOCULAR LENS IMPLANT, TORIC LENS right;  Surgeon: Primo Garcia MD;  Location: [facility]    PHACOEMULSIFICATION CLEAR CORNEA WITH TORIC INTRAOCULAR LENS IMPLANT Left 10/03/2024    Procedure: PHACOEMULSIFICATION, CATARACT, WITH INTRAOCULAR LENS IMPLANT, TORIC LENS, LEFT;  Surgeon: Primo Garcia MD;  Location: [facility] OR    RELEASE CARPAL TUNNEL Left 08/23/2024    Procedure: Left carpal tunnel release;  Surgeon: David John MD;  Location: [facility] OR    RELEASE CARPAL TUNNEL Right 11/01/2024    Procedure: RELEASE, CARPAL TUNNEL,;  Surgeon: David John MD;  Location: [facility] OR    RELEASE TRIGGER FINGER Left 08/23/2024    Procedure: Left middle finger trigger finger release;  Surgeon: David John MD;  Location:  OR    TONSILLECTOMY & ADENOIDECTOMY  1965    [facility] NONSPECIFIC PROCEDURE  1965    Removed bone left index finger knuckle, casts broken bones    [facility] COLONOSCOPY W/WO BRUSH/WASH  08/22/2005    [facility] UGI ENDOSCOPY, SIMPLE EXAM  08/08/2007     Social History     Socioeconomic History    Marital status:      Spouse name: Not on file    Number of children: Not on file    Years of education: Not on file    Highest  education level: Not on file   Occupational History    Not on file   Tobacco Use    Smoking status: Former     Current packs/day: 0.00     Average packs/day: 0.8 packs/day for 10.0 years (7.5 ttl pk-yrs)     Types: Cigarettes     Start date: 1968     Quit date: 1978     Years since quittin.6     Passive exposure: Never    Smokeless tobacco: Never    Tobacco comments:     Quit 46 years ago   Vaping Use    Vaping status: Never Used   Substance and Sexual Activity    Alcohol use: No    Drug use: No    Sexual activity: Not Currently     Partners: Male     Birth control/protection: Other     Comment: Not necessary, no sex   Other Topics Concern    Parent/sibling w/ CABG, MI or angioplasty before 65F 55M? Yes     Comment: Mother, brother, sister     Service No    Blood Transfusions No    Caffeine Concern No    Occupational Exposure No    Hobby Hazards No    Sleep Concern No    Stress Concern Yes    Weight Concern Yes    Special Diet No    Back Care No    Exercise No    Bike Helmet Yes    Seat Belt Yes    Self-Exams Yes   Social History Narrative    Not on file     Social Drivers of Health     Financial Resource Strain: Low Risk  (2024)    Financial Resource Strain     Within the past 12 months, have you or your family members you live with been unable to get utilities (heat, electricity) when it was really needed?: No   Food Insecurity: Low Risk  (2024)    Food Insecurity     Within the past 12 months, did you worry that your food would run out before you got money to buy more?: No     Within the past 12 months, did the food you bought just not last and you didn t have money to get more?: No   Transportation Needs: High Risk (2024)    Transportation Needs     Within the past 12 months, has lack of transportation kept you from medical appointments, getting your medicines, non-medical meetings or appointments, work, or from getting things that you need?: Yes   Physical Activity:  Sufficiently Active (11/19/2024)    Exercise Vital Sign     Days of Exercise per Week: 7 days     Minutes of Exercise per Session: 80 min   Stress: Stress Concern Present (11/19/2024)    [employer] [place] [employer] - Occupational Stress Questionnaire     Feeling of Stress : Very much   Social Connections: Unknown (11/19/2024)    Social Connection and Isolation Panel [NHANES]     Frequency of Communication with Friends and Family: Not on file     Frequency of Social Gatherings with Friends and Family: Once a week     Attends [employer] Services: Not on file     Active Member of Clubs or Organizations: Not on file     Attends Club or Organization Meetings: Not on file     Marital Status: Not on file   Interpersonal Safety: Low Risk  (11/22/2024)    Interpersonal Safety     Do you feel physically and emotionally safe where you currently live?: Yes     Within the past 12 months, have you been hit, slapped, kicked or otherwise physically hurt by someone?: No     Within the past 12 months, have you been humiliated or emotionally abused in other ways by your partner or ex-partner?: No   Housing Stability: Low Risk  (11/19/2024)    Housing Stability     Do you have housing? : Yes     Are you worried about losing your housing?: No     Family History   Problem Relation Age of Onset    Hypertension Mother     Breast Cancer Mother     Coronary Artery Disease Mother     Cerebrovascular Disease Mother     Kidney Disease Mother     Obesity Mother     Hypertension Father         Father    Lymphoma Father     Glaucoma Father     Other Cancer Father         Lymphoma    Genetic Disorder Father         Glaucoma    Obesity Father     C.A.D. Sister         MI at age 63    Hypertension Sister     Gastrointestinal Disease Sister         gallbladder    Hyperlipidemia Sister     Cerebrovascular Disease Sister     Circulatory Sister         brain aneurysm at 63    Genitourinary Problems Sister         1 kidney/bladder     Hypertension Sister     Obesity Sister     Coronary Artery Disease Sister         had valve surgery, MI, CHF    Coronary Artery Disease Brother     Hypertension Brother     Hyperlipidemia Brother     Cerebrovascular Disease Maternal Grandmother     Hypertension Maternal Grandmother     Cerebrovascular Disease Paternal Grandmother     Hypertension Paternal Grandmother     Glaucoma Paternal Grandfather     Genetic Disorder Paternal Grandfather         Glaucoma    Blood Disease Son         Lymes/7/11    Hypertension Son     Obesity Son     Hypertension Son     Breast Cancer Maternal Aunt     Ovarian Cancer Maternal Aunt     Unknown/Adopted Paternal Uncle     Obesity Niece     Obesity Niece     Liver Cancer Cousin     Coronary Artery Disease Other 49        niece    Diabetes Other         1st cousin    Breast Cancer Other     Obesity Other     Obesity Other     Hypertension Other         Aunts    Obesity Other         Uncle    Hypertension Other         Uncle    Coronary Artery Disease Sister     Coronary Artery Disease Brother     Hypertension Brother     Hyperlipidemia Brother     Coronary Artery Disease Nephew         Nephew    Hypertension Niece         [place]    Hypertension Other         Nephew    Cerebrovascular Disease Other         Aunts    Obesity Other         Nephew         Subjective findings- 74-year-old returns clinic for left fifth metatarsal base fracture and right foot pain.  She relates the left foot is doing well does not hurt.  She relates she is getting right hip pain and right foot pain.  Relates the right foot pain is moved more to the fifth MPJ area.  Relates to no specific injury.    Objective findings- Vascular status intact left.  Left foot there is no pain on palpation, no pain on range of motion, no erythema, no edema, no drainage, no odor, no calor.  X-rays 3 views left foot compared to previous x-rays and reviewed with patient in clinic today with increased bony callus and cortical  healing compared to previous x-rays.    Assessment and plan- Left fifth metatarsal fracture.  Right fifth metatarsal pain.  Diagnosis and treatment options discussed with the patient.  Left foot x-rays ordered, reviewed and use discussed with the patient.  Will have her gradually decrease the cam boot use over the next 1 to 2 weeks and increase activity gradually.  She relates she already sees physical therapy for her shoulder so she will talk to them about her foot as well.  Her expectation is that the right foot will improve as we discontinue the cam boot.  Previous notes reviewed.  Return to clinic and see me or Dr. Gaviria in 6 to 8 weeks.              Low level of medical decision making.        Again, thank you for allowing me to participate in the care of your patient.        Sincerely,        Alvarez Moses DPM    Electronically signed

## 2025-06-23 ENCOUNTER — THERAPY VISIT (OUTPATIENT)
Dept: PHYSICAL THERAPY | Facility: CLINIC | Age: 75
End: 2025-06-23
Attending: INTERNAL MEDICINE
Payer: COMMERCIAL

## 2025-06-23 DIAGNOSIS — G89.29 CHRONIC LEFT SHOULDER PAIN: Primary | ICD-10-CM

## 2025-06-23 DIAGNOSIS — M25.512 CHRONIC LEFT SHOULDER PAIN: Primary | ICD-10-CM

## 2025-06-23 PROCEDURE — 97530 THERAPEUTIC ACTIVITIES: CPT | Mod: GP | Performed by: PHYSICAL THERAPIST

## 2025-06-23 PROCEDURE — 97112 NEUROMUSCULAR REEDUCATION: CPT | Mod: GP | Performed by: PHYSICAL THERAPIST

## 2025-06-25 ENCOUNTER — OFFICE VISIT (OUTPATIENT)
Dept: FAMILY MEDICINE | Facility: OTHER | Age: 75
End: 2025-06-25
Payer: COMMERCIAL

## 2025-06-25 VITALS
SYSTOLIC BLOOD PRESSURE: 132 MMHG | TEMPERATURE: 97.1 F | DIASTOLIC BLOOD PRESSURE: 70 MMHG | HEART RATE: 52 BPM | RESPIRATION RATE: 16 BRPM | OXYGEN SATURATION: 98 %

## 2025-06-25 DIAGNOSIS — R07.89 CHEST PRESSURE: ICD-10-CM

## 2025-06-25 DIAGNOSIS — I10 BENIGN ESSENTIAL HYPERTENSION: Primary | ICD-10-CM

## 2025-06-25 LAB
ANION GAP SERPL CALCULATED.3IONS-SCNC: 6 MMOL/L (ref 7–15)
BUN SERPL-MCNC: 13.7 MG/DL (ref 8–23)
CALCIUM SERPL-MCNC: 9.8 MG/DL (ref 8.8–10.4)
CHLORIDE SERPL-SCNC: 104 MMOL/L (ref 98–107)
CREAT SERPL-MCNC: 0.77 MG/DL (ref 0.51–0.95)
EGFRCR SERPLBLD CKD-EPI 2021: 81 ML/MIN/1.73M2
GLUCOSE SERPL-MCNC: 88 MG/DL (ref 70–99)
HCO3 SERPL-SCNC: 30 MMOL/L (ref 22–29)
POTASSIUM SERPL-SCNC: 4.6 MMOL/L (ref 3.4–5.3)
SODIUM SERPL-SCNC: 140 MMOL/L (ref 135–145)
TROPONIN T SERPL HS-MCNC: 22 NG/L
TSH SERPL DL<=0.005 MIU/L-ACNC: 1.4 UIU/ML (ref 0.3–4.2)

## 2025-06-25 PROCEDURE — 80048 BASIC METABOLIC PNL TOTAL CA: CPT | Performed by: FAMILY MEDICINE

## 2025-06-25 PROCEDURE — 84443 ASSAY THYROID STIM HORMONE: CPT | Performed by: FAMILY MEDICINE

## 2025-06-25 PROCEDURE — 3075F SYST BP GE 130 - 139MM HG: CPT | Performed by: FAMILY MEDICINE

## 2025-06-25 PROCEDURE — 36415 COLL VENOUS BLD VENIPUNCTURE: CPT | Performed by: FAMILY MEDICINE

## 2025-06-25 PROCEDURE — 82384 ASSAY THREE CATECHOLAMINES: CPT | Mod: 90 | Performed by: FAMILY MEDICINE

## 2025-06-25 PROCEDURE — 3078F DIAST BP <80 MM HG: CPT | Performed by: FAMILY MEDICINE

## 2025-06-25 PROCEDURE — 99000 SPECIMEN HANDLING OFFICE-LAB: CPT | Performed by: FAMILY MEDICINE

## 2025-06-25 PROCEDURE — 99214 OFFICE O/P EST MOD 30 MIN: CPT | Performed by: FAMILY MEDICINE

## 2025-06-25 PROCEDURE — 84484 ASSAY OF TROPONIN QUANT: CPT | Performed by: FAMILY MEDICINE

## 2025-06-25 PROCEDURE — 83835 ASSAY OF METANEPHRINES: CPT | Mod: 90 | Performed by: FAMILY MEDICINE

## 2025-06-25 RX ORDER — LISINOPRIL 20 MG/1
20 TABLET ORAL DAILY
Qty: 90 TABLET | Refills: 0 | Status: SHIPPED | OUTPATIENT
Start: 2025-06-25

## 2025-06-25 ASSESSMENT — PATIENT HEALTH QUESTIONNAIRE - PHQ9
10. IF YOU CHECKED OFF ANY PROBLEMS, HOW DIFFICULT HAVE THESE PROBLEMS MADE IT FOR YOU TO DO YOUR WORK, TAKE CARE OF THINGS AT HOME, OR GET ALONG WITH OTHER PEOPLE: SOMEWHAT DIFFICULT
SUM OF ALL RESPONSES TO PHQ QUESTIONS 1-9: 4
SUM OF ALL RESPONSES TO PHQ QUESTIONS 1-9: 4

## 2025-06-25 NOTE — PROGRESS NOTES
Assessment & Plan     Benign essential hypertension  Patient having intermittent elevated blood pressures a couple times a week over the last month.  These are associated with chest pressure and headache.  Had considered possibly related to taking clonidine only at night and not twice a day however she denies that there is a time cycle with her blood pressure elevations.  Also need to consider with the headache, elevated blood pressure possible pheochromocytoma and will screen with plasma catecholamines and metanephrines.  Did discuss if these are abnormal would then consider 24-hour urine.  In the meantime we will increase her lisinopril from 10 mg daily to 20 mg daily and she will follow-up with me in 1 month.    - lisinopril (ZESTRIL) 20 MG tablet; Take 1 tablet (20 mg) by mouth daily.  - Basic metabolic panel  (Ca, Cl, CO2, Creat, Gluc, K, Na, BUN)  - TSH with free T4 reflex  - Metanephrines Plasma Free  - Catecholamines Fractionated    Chest pressure  Discussed that I am concerned with her having chest pressure with exertion as well as with her elevated blood pressure readings that this may be an indicator of heart disease and angina.  Will obtain troponin.  Did discuss if troponin was elevated I would have her go to the emergency department.  At this time recommend stress test.  Patient is in a walking boot and would not be able to do a treadmill.    - NM Lexiscan stress test; Future  - Troponin T, High Sensitivity      Subjective   Niesha is a 74 year old, presenting for the following health issues:  Hypertension      6/25/2025     9:20 AM   Additional Questions   Roomed by ulla     History of Present Illness       Hypertension: She presents for follow up of hypertension.  She does check blood pressure  regularly outside of the clinic. Outside blood pressures have been over 140/90. She follows a low salt diet.     She eats 4 or more servings of fruits and vegetables daily.She consumes 0 sweetened  beverage(s) daily.She exercises with enough effort to increase her heart rate 60 or more minutes per day.  She exercises with enough effort to increase her heart rate 7 days per week.   She is taking medications regularly.        A month ago she was not feeling well.  She had a headache.  She felt like there was an anvil sitting on her chest.  She denies shortness of breath.  She denied palpitations.  She checked her blood pressure and it was in the 190s over 120s.  She decided to sit down and it came down into the 170s over 120s.  About 3 hours later it came down into the 150s over 90s.  She decided she did not want to go to the emergency department because he has been under a lot of stress and also had a lot of pain.  Since then she has noticed elevated blood pressure 2-3 times a week.  It has not gotten as high as that day but can get into the 170s over 90s.  When the blood pressure gets elevated she does notice the pressure on her chest.  She also notices the chest pressure with exertion.  She states that sometimes her blood pressure cuff gives her readings of an irregular heartbeat which happens about once a week.  She denies palpitations.  She denies shortness of breath.  She does state that there is a family history of heart disease.  She takes clonidine at nighttime for her mood which is prescribed by psychiatry.  She denies that her elevated blood pressures happen at any certain time of the day and states they can happen at any time.  She currently is in a walking boot secondary to a left foot fracture.  She chronically admits to poor sleep.      Objective    /70 (BP Location: Right arm, Patient Position: Sitting, Cuff Size: Adult Regular)   Pulse 52   Temp 97.1  F (36.2  C) (Temporal)   Resp 16   LMP 11/27/2003   SpO2 98%   There is no height or weight on file to calculate BMI.  Physical Exam   Gen: no apparent distress  NECK: no adenopathy, no asymmetry, no masses  Chest: clear to auscultation  without wheeze, rale or rhonchi  Cor: regular rate and rhythm without murmur  Ext: Walking boot on the left lower leg  Psych: Alert and oriented times 3; coherent speech, normal   rate and volume, able to articulate logical thoughts, able   to abstract reason, no tangential thoughts, no hallucinations   or delusions  Her affect is neutral        Signed Electronically by: Tanika Clark MD

## 2025-06-26 ENCOUNTER — APPOINTMENT (OUTPATIENT)
Dept: GENERAL RADIOLOGY | Facility: CLINIC | Age: 75
End: 2025-06-26
Attending: EMERGENCY MEDICINE
Payer: COMMERCIAL

## 2025-06-26 ENCOUNTER — HOSPITAL ENCOUNTER (EMERGENCY)
Facility: CLINIC | Age: 75
Discharge: HOME | End: 2025-06-26
Attending: EMERGENCY MEDICINE
Payer: COMMERCIAL

## 2025-06-26 ENCOUNTER — APPOINTMENT (OUTPATIENT)
Dept: CT IMAGING | Facility: CLINIC | Age: 75
End: 2025-06-26
Attending: EMERGENCY MEDICINE
Payer: COMMERCIAL

## 2025-06-26 ENCOUNTER — RESULTS FOLLOW-UP (OUTPATIENT)
Dept: FAMILY MEDICINE | Facility: OTHER | Age: 75
End: 2025-06-26
Payer: COMMERCIAL

## 2025-06-26 VITALS
TEMPERATURE: 97.3 F | BODY MASS INDEX: 24.27 KG/M2 | WEIGHT: 137 LBS | HEIGHT: 63 IN | SYSTOLIC BLOOD PRESSURE: 163 MMHG | HEART RATE: 63 BPM | DIASTOLIC BLOOD PRESSURE: 94 MMHG | RESPIRATION RATE: 10 BRPM | OXYGEN SATURATION: 99 %

## 2025-06-26 DIAGNOSIS — R07.9 CHEST PAIN, UNSPECIFIED TYPE: ICD-10-CM

## 2025-06-26 DIAGNOSIS — R51.9 NONINTRACTABLE HEADACHE, UNSPECIFIED CHRONICITY PATTERN, UNSPECIFIED HEADACHE TYPE: ICD-10-CM

## 2025-06-26 DIAGNOSIS — I10 HTN, GOAL BELOW 140/90: ICD-10-CM

## 2025-06-26 LAB
ALBUMIN SERPL BCG-MCNC: 4.1 G/DL (ref 3.5–5.2)
ALP SERPL-CCNC: 64 U/L (ref 40–150)
ALT SERPL W P-5'-P-CCNC: 16 U/L (ref 0–50)
ANION GAP SERPL CALCULATED.3IONS-SCNC: 8 MMOL/L (ref 7–15)
AST SERPL W P-5'-P-CCNC: 27 U/L (ref 0–45)
ATRIAL RATE - MUSE: 54 BPM
ATRIAL RATE - MUSE: 65 BPM
BASOPHILS # BLD AUTO: 0 10E3/UL (ref 0–0.2)
BASOPHILS NFR BLD AUTO: 0 %
BILIRUB SERPL-MCNC: 0.4 MG/DL
BUN SERPL-MCNC: 13.2 MG/DL (ref 8–23)
CALCIUM SERPL-MCNC: 11 MG/DL (ref 8.8–10.4)
CHLORIDE SERPL-SCNC: 103 MMOL/L (ref 98–107)
CREAT SERPL-MCNC: 0.76 MG/DL (ref 0.51–0.95)
DIASTOLIC BLOOD PRESSURE - MUSE: NORMAL MMHG
DIASTOLIC BLOOD PRESSURE - MUSE: NORMAL MMHG
EGFRCR SERPLBLD CKD-EPI 2021: 82 ML/MIN/1.73M2
EOSINOPHIL # BLD AUTO: 0.3 10E3/UL (ref 0–0.7)
EOSINOPHIL NFR BLD AUTO: 6 %
ERYTHROCYTE [DISTWIDTH] IN BLOOD BY AUTOMATED COUNT: 12.9 % (ref 10–15)
GLUCOSE SERPL-MCNC: 98 MG/DL (ref 70–99)
HCO3 SERPL-SCNC: 29 MMOL/L (ref 22–29)
HCT VFR BLD AUTO: 41.9 % (ref 35–47)
HGB BLD-MCNC: 14.4 G/DL (ref 11.7–15.7)
HOLD SPECIMEN: NORMAL
IMM GRANULOCYTES # BLD: 0 10E3/UL
IMM GRANULOCYTES NFR BLD: 0 %
INTERPRETATION ECG - MUSE: NORMAL
INTERPRETATION ECG - MUSE: NORMAL
LYMPHOCYTES # BLD AUTO: 0.9 10E3/UL (ref 0.8–5.3)
LYMPHOCYTES NFR BLD AUTO: 20 %
MAGNESIUM SERPL-MCNC: 2.1 MG/DL (ref 1.7–2.3)
MCH RBC QN AUTO: 30.6 PG (ref 26.5–33)
MCHC RBC AUTO-ENTMCNC: 34.4 G/DL (ref 31.5–36.5)
MCV RBC AUTO: 89 FL (ref 78–100)
MONOCYTES # BLD AUTO: 0.4 10E3/UL (ref 0–1.3)
MONOCYTES NFR BLD AUTO: 8 %
NEUTROPHILS # BLD AUTO: 3.1 10E3/UL (ref 1.6–8.3)
NEUTROPHILS NFR BLD AUTO: 66 %
NRBC # BLD AUTO: 0 10E3/UL
NRBC BLD AUTO-RTO: 0 /100
NT-PROBNP SERPL-MCNC: 388 PG/ML (ref 0–353)
P AXIS - MUSE: 26 DEGREES
P AXIS - MUSE: 69 DEGREES
PLATELET # BLD AUTO: 136 10E3/UL (ref 150–450)
POTASSIUM SERPL-SCNC: 4 MMOL/L (ref 3.4–5.3)
PR INTERVAL - MUSE: 106 MS
PR INTERVAL - MUSE: 184 MS
PROT SERPL-MCNC: 6.7 G/DL (ref 6.4–8.3)
QRS DURATION - MUSE: 86 MS
QRS DURATION - MUSE: 88 MS
QT - MUSE: 402 MS
QT - MUSE: 430 MS
QTC - MUSE: 407 MS
QTC - MUSE: 418 MS
R AXIS - MUSE: -11 DEGREES
R AXIS - MUSE: -3 DEGREES
RBC # BLD AUTO: 4.7 10E6/UL (ref 3.8–5.2)
SODIUM SERPL-SCNC: 140 MMOL/L (ref 135–145)
SYSTOLIC BLOOD PRESSURE - MUSE: NORMAL MMHG
SYSTOLIC BLOOD PRESSURE - MUSE: NORMAL MMHG
T AXIS - MUSE: 1 DEGREES
T AXIS - MUSE: 5 DEGREES
TROPONIN T SERPL HS-MCNC: 14 NG/L
TROPONIN T SERPL HS-MCNC: 15 NG/L
VENTRICULAR RATE- MUSE: 54 BPM
VENTRICULAR RATE- MUSE: 65 BPM
WBC # BLD AUTO: 4.7 10E3/UL (ref 4–11)

## 2025-06-26 PROCEDURE — 84484 ASSAY OF TROPONIN QUANT: CPT | Performed by: EMERGENCY MEDICINE

## 2025-06-26 PROCEDURE — 99284 EMERGENCY DEPT VISIT MOD MDM: CPT | Performed by: EMERGENCY MEDICINE

## 2025-06-26 PROCEDURE — 70450 CT HEAD/BRAIN W/O DYE: CPT

## 2025-06-26 PROCEDURE — 70496 CT ANGIOGRAPHY HEAD: CPT

## 2025-06-26 PROCEDURE — 99285 EMERGENCY DEPT VISIT HI MDM: CPT | Mod: 25

## 2025-06-26 PROCEDURE — 85025 COMPLETE CBC W/AUTO DIFF WBC: CPT | Performed by: EMERGENCY MEDICINE

## 2025-06-26 PROCEDURE — 250N000013 HC RX MED GY IP 250 OP 250 PS 637: Performed by: EMERGENCY MEDICINE

## 2025-06-26 PROCEDURE — 83880 ASSAY OF NATRIURETIC PEPTIDE: CPT | Performed by: EMERGENCY MEDICINE

## 2025-06-26 PROCEDURE — 83735 ASSAY OF MAGNESIUM: CPT | Performed by: EMERGENCY MEDICINE

## 2025-06-26 PROCEDURE — 250N000011 HC RX IP 250 OP 636: Performed by: EMERGENCY MEDICINE

## 2025-06-26 PROCEDURE — 93005 ELECTROCARDIOGRAM TRACING: CPT

## 2025-06-26 PROCEDURE — 93005 ELECTROCARDIOGRAM TRACING: CPT | Mod: 76

## 2025-06-26 PROCEDURE — 71046 X-RAY EXAM CHEST 2 VIEWS: CPT

## 2025-06-26 PROCEDURE — 36415 COLL VENOUS BLD VENIPUNCTURE: CPT | Performed by: EMERGENCY MEDICINE

## 2025-06-26 PROCEDURE — 250N000009 HC RX 250: Performed by: EMERGENCY MEDICINE

## 2025-06-26 PROCEDURE — 84460 ALANINE AMINO (ALT) (SGPT): CPT | Performed by: EMERGENCY MEDICINE

## 2025-06-26 PROCEDURE — 96374 THER/PROPH/DIAG INJ IV PUSH: CPT | Mod: 59

## 2025-06-26 RX ORDER — KETOROLAC TROMETHAMINE 15 MG/ML
15 INJECTION, SOLUTION INTRAMUSCULAR; INTRAVENOUS ONCE
Status: COMPLETED | OUTPATIENT
Start: 2025-06-26 | End: 2025-06-26

## 2025-06-26 RX ORDER — CLONIDINE HYDROCHLORIDE 0.2 MG/1
0.2 TABLET ORAL 2 TIMES DAILY
Qty: 60 TABLET | Refills: 0 | Status: SHIPPED | OUTPATIENT
Start: 2025-06-26 | End: 2025-06-28

## 2025-06-26 RX ORDER — CLONIDINE HYDROCHLORIDE 0.1 MG/1
0.2 TABLET ORAL ONCE
Status: COMPLETED | OUTPATIENT
Start: 2025-06-26 | End: 2025-06-26

## 2025-06-26 RX ORDER — IOPAMIDOL 755 MG/ML
500 INJECTION, SOLUTION INTRAVASCULAR ONCE
Status: COMPLETED | OUTPATIENT
Start: 2025-06-26 | End: 2025-06-26

## 2025-06-26 RX ADMIN — SODIUM CHLORIDE 100 ML: 9 INJECTION, SOLUTION INTRAVENOUS at 15:26

## 2025-06-26 RX ADMIN — IOPAMIDOL 67 ML: 755 INJECTION, SOLUTION INTRAVENOUS at 15:26

## 2025-06-26 RX ADMIN — CLONIDINE HYDROCHLORIDE 0.2 MG: 0.1 TABLET ORAL at 13:02

## 2025-06-26 RX ADMIN — KETOROLAC TROMETHAMINE 15 MG: 15 INJECTION, SOLUTION INTRAMUSCULAR; INTRAVENOUS at 16:49

## 2025-06-26 ASSESSMENT — ACTIVITIES OF DAILY LIVING (ADL)
ADLS_ACUITY_SCORE: 41

## 2025-06-26 ASSESSMENT — COLUMBIA-SUICIDE SEVERITY RATING SCALE - C-SSRS
6. HAVE YOU EVER DONE ANYTHING, STARTED TO DO ANYTHING, OR PREPARED TO DO ANYTHING TO END YOUR LIFE?: NO
1. IN THE PAST MONTH, HAVE YOU WISHED YOU WERE DEAD OR WISHED YOU COULD GO TO SLEEP AND NOT WAKE UP?: NO
2. HAVE YOU ACTUALLY HAD ANY THOUGHTS OF KILLING YOURSELF IN THE PAST MONTH?: NO

## 2025-06-26 NOTE — ED PROVIDER NOTES
History     Chief Complaint   Patient presents with    Abnormal Labs     HPI  Niesha Adhikari is a 74 year old female who ***        Clinic visit 6/25/2025:    A month ago she was not feeling well.  She had a headache.  She felt like there was an anvil sitting on her chest.  She denies shortness of breath.  She denied palpitations.  She checked her blood pressure and it was in the 190s over 120s.  She decided to sit down and it came down into the 170s over 120s.  About 3 hours later it came down into the 150s over 90s.  She decided she did not want to go to the emergency department because he has been under a lot of stress and also had a lot of pain.  Since then she has noticed elevated blood pressure 2-3 times a week.  It has not gotten as high as that day but can get into the 170s over 90s.  When the blood pressure gets elevated she does notice the pressure on her chest.  She also notices the chest pressure with exertion.  She states that sometimes her blood pressure cuff gives her readings of an irregular heartbeat which happens about once a week.  She denies palpitations.  She denies shortness of breath.  She does state that there is a family history of heart disease.  She takes clonidine at nighttime for her mood which is prescribed by psychiatry.  She denies that her elevated blood pressures happen at any certain time of the day and states they can happen at any time.  She currently is in a walking boot secondary to a left foot fracture.  She chronically admits to poor sleep.     Benign essential hypertension  Patient having intermittent elevated blood pressures a couple times a week over the last month.  These are associated with chest pressure and headache.  Had considered possibly related to taking clonidine only at night and not twice a day however she denies that there is a time cycle with her blood pressure elevations.  Also need to consider with the headache, elevated blood pressure possible  "pheochromocytoma and will screen with plasma catecholamines and metanephrines.  Did discuss if these are abnormal would then consider 24-hour urine.  In the meantime we will increase her lisinopril from 10 mg daily to 20 mg daily and she will follow-up with me in 1 month.     - lisinopril (ZESTRIL) 20 MG tablet; Take 1 tablet (20 mg) by mouth daily.  - Basic metabolic panel  (Ca, Cl, CO2, Creat, Gluc, K, Na, BUN)  - TSH with free T4 reflex  - Metanephrines Plasma Free  - Catecholamines Fractionated     Chest pressure  Discussed that I am concerned with her having chest pressure with exertion as well as with her elevated blood pressure readings that this may be an indicator of heart disease and angina.  Will obtain troponin.  Did discuss if troponin was elevated I would have her go to the emergency department.  At this time recommend stress test.  Patient is in a walking boot and would not be able to do a treadmill.     - NM Lexiscan stress test; Future  - Troponin T, High Sensitivity    Allergies:  Allergies   Allergen Reactions    Telaprevir Other (See Comments) and Rash     Rectal bleeding, anemia    Abilify Discmelt Other (See Comments)     Disoriented    Thiopental Sodium      PENTOTHAL/rigidity and fight response    Antivert [Meclizine Hcl]     Chamomile     Compazine     Diphenhydramine Nausea     And abdominal pain    Duloxetine Hcl Other (See Comments)     Disoriented, trouble sleeping    Effexor [Venlafaxine] Other (See Comments)     Disoriented, trouble sleeping    Elavil [Amitriptyline Hcl] Other (See Comments)     \"didn't feel right on it-med was stopped right away\"    Food Difficulty breathing     cilantro    Indomethacin      indocin sensativity \"Severe h.a\"    Seasonal Allergies Other (See Comments) and Difficulty breathing     Philip Gold Aug-Sept, rag weed, sneezing    Sulfa Antibiotics     Animal Dander Difficulty breathing and Rash     sneezing,resp. distress    Bupropion Anxiety    Tylenol " [Acetaminophen] Rash       Problem List:    Patient Active Problem List    Diagnosis Date Noted    Chronic left shoulder pain 05/12/2025     Priority: Medium    Lumbar radiculopathy 04/17/2025     Priority: Medium    Right lateral epicondylitis 01/06/2025     Priority: Medium    S/P carpal tunnel release 09/03/2024     Priority: Medium    Chronic bilateral low back pain without sciatica 11/02/2023     Priority: Medium    Iron deficiency 07/05/2022     Priority: Medium    Constipation 08/09/2019     Priority: Medium    DDD (degenerative disc disease), cervical 08/09/2019     Priority: Medium    Fatigue 01/02/2019     Priority: Medium    Personal history of other medical treatment 12/05/2018     Priority: Medium     Alendronate (GERD per record review), Boniva PO (Ineffective per record review), Boniva IV (Effective per record review)      Alcohol dependence in remission (H) 11/16/2018     Priority: Medium    Benign essential hypertension 09/01/2017     Priority: Medium    Osteoporosis 06/07/2017     Priority: Medium    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) 06/07/2017     Priority: Medium    AIN (anal intraepithelial neoplasia) anal canal 09/25/2012     Priority: Medium    Internal hemorrhoids with other complication 10/13/2011     Priority: Medium    Narcolepsy 08/15/2011     Priority: Medium    Mild recurrent major depression 05/05/2010     Priority: Medium    Fibromyalgia 07/10/2009     Priority: Medium    Essential and other specified forms of tremor 06/30/2006     Priority: Medium    Migraine 06/05/2006     Priority: Medium    Pernicious anemia 07/27/2005     Priority: Medium    Anxiety state 07/27/2005     Priority: Medium    Allergic rhinitis 06/15/2002     Priority: Medium        Past Medical History:    Past Medical History:   Diagnosis Date    ABUSE BY SPOUSE/PARTNER 07/27/2005    Arthritis     Degeneration of lumbar or lumbosacral intervertebral disc     Depressive  disorder     Esophageal reflux 01/28/2005    HELICOBACTER PYLORI INFECTION 01/28/2005    Hepatitis C - Cured. Achieved SVR in 2017     History of blood transfusion     Hypertension     Malignant neoplasm (H)     Osteoporosis     Other and unspecified alcohol dependence, unspecified drinking behavior     Other malaise and fatigue        Past Surgical History:    Past Surgical History:   Procedure Laterality Date    BIOPSY ANAL CANAL  01/21/2013    [facility]     BREAST BIOPSY, RT/LT Left 1975    Breat Biopsy RT/LT    COLONOSCOPY  08/25/2009    COLONOSCOPY  02/14/2011    COLONOSCOPY performed by CRISTIN LAGUNAS at  GI    COLONOSCOPY N/A 01/08/2019    Procedure: Colonscopy, Biopsies by Biopsy;  Surgeon: Omega Talavera MD;  Location:  GI    COLONOSCOPY N/A 06/17/2024    Procedure: Colonoscopy;  Surgeon: Omega Talavera MD;  Location:  GI    CYSTOSCOPY  02/28/2011    CYSTOSCOPY performed by CAYLA FLOR at  OR    ENDOSCOPY  05/21/2012    [facility] [place]    ENT SURGERY  1965    EYE SURGERY  09/05/24    10/03/24    GI SURGERY  06/25/2012     UGI ENDOSCOPY DIAG W BIOPSY  10/01/2009    HEMORRHOIDECTOMY  06/25/2012    [facility]    LAPAROSCOPIC SALPINGO-OOPHORECTOMY  02/28/2011    LAPAROSCOPIC SALPINGO-OOPHORECTOMY performed by CAYLA FLOR at  OR    ORTHOPEDIC SURGERY  08/23/24 11/01/24    PHACOEMULSIFICATION CLEAR CORNEA WITH TORIC INTRAOCULAR LENS IMPLANT Right 09/05/2024    Procedure: PHACOEMULSIFICATION, CATARACT, WITH INTRAOCULAR LENS IMPLANT, TORIC LENS right;  Surgeon: Primo Garcia MD;  Location:  OR    PHACOEMULSIFICATION CLEAR CORNEA WITH TORIC INTRAOCULAR LENS IMPLANT Left 10/03/2024    Procedure: PHACOEMULSIFICATION, CATARACT, WITH INTRAOCULAR LENS IMPLANT, TORIC LENS, LEFT;  Surgeon: Primo Garcia MD;  Location: [facility] OR    RELEASE CARPAL TUNNEL Left 08/23/2024    Procedure: Left carpal tunnel release;  Surgeon: David John,  MD;  Location: [facility] OR    RELEASE CARPAL TUNNEL Right 11/01/2024    Procedure: RELEASE, CARPAL TUNNEL,;  Surgeon: David John MD;  Location: [facility] OR    RELEASE TRIGGER FINGER Left 08/23/2024    Procedure: Left middle finger trigger finger release;  Surgeon: David John MD;  Location: [facility] OR    TONSILLECTOMY & ADENOIDECTOMY  1965    Z NONSPECIFIC PROCEDURE  1965    Removed bone left index finger knuckle, casts broken bones    ZZHC COLONOSCOPY W/WO BRUSH/WASH  08/22/2005    ZZHC UGI ENDOSCOPY, SIMPLE EXAM  08/08/2007       Family History:    Family History   Problem Relation Age of Onset    Hypertension Mother     Breast Cancer Mother     Coronary Artery Disease Mother     Cerebrovascular Disease Mother     Kidney Disease Mother     Obesity Mother     Hypertension Father         Father    Lymphoma Father     Glaucoma Father     Other Cancer Father         Lymphoma    Genetic Disorder Father         Glaucoma    Obesity Father     C.A.D. Sister         MI at age 63    Hypertension Sister     Gastrointestinal Disease Sister         gallbladder    Hyperlipidemia Sister     Cerebrovascular Disease Sister     Circulatory Sister         brain aneurysm at 63    Genitourinary Problems Sister         1 kidney/bladder    Hypertension Sister     Obesity Sister     Coronary Artery Disease Sister         had valve surgery, MI, CHF    Coronary Artery Disease Brother     Hypertension Brother     Hyperlipidemia Brother     Cerebrovascular Disease Maternal Grandmother     Hypertension Maternal Grandmother     Cerebrovascular Disease Paternal Grandmother     Hypertension Paternal Grandmother     Glaucoma Paternal Grandfather     Genetic Disorder Paternal Grandfather         Glaucoma    Blood Disease Son         Lymes/7/11    Hypertension Son     Obesity Son     Hypertension Son     Breast Cancer Maternal Aunt     Ovarian Cancer Maternal Aunt     Unknown/Adopted Paternal Uncle     Obesity Niece     Obesity Niece      Liver Cancer Cousin     Coronary Artery Disease Other 49        niece    Diabetes Other         1st cousin    Breast Cancer Other     Obesity Other     Obesity Other     Hypertension Other         Aunts    Obesity Other         Uncle    Hypertension Other         Uncle    Coronary Artery Disease Sister     Coronary Artery Disease Brother     Hypertension Brother     Hyperlipidemia Brother     Coronary Artery Disease Nephew         Nephew    Hypertension Niece         [place]    Hypertension Other         Nephew    Cerebrovascular Disease Other         Aunts    Obesity Other         Nephew       Social History:  Marital Status:   [4]  Social History     Tobacco Use    Smoking status: Former     Current packs/day: 0.00     Average packs/day: 0.8 packs/day for 10.0 years (7.5 ttl pk-yrs)     Types: Cigarettes     Start date: 1968     Quit date: 1978     Years since quittin.6     Passive exposure: Never    Smokeless tobacco: Never    Tobacco comments:     Quit 46 years ago   Vaping Use    Vaping status: Never Used   Substance Use Topics    Alcohol use: No    Drug use: No        Medications:    Abatacept (ORENCIA CLICKJECT) 125 MG/ML SOAJ auto-injector  calcium carb-cholecalciferol 600-500 MG-UNIT CAPS  cloNIDine (CATAPRES) 0.2 MG tablet  cycloSPORINE (RESTASIS) 0.05 % ophthalmic emulsion  denosumab (PROLIA) 60 MG/ML SOSY injection  diclofenac (VOLTAREN) 1 % topical gel  ferrous sulfate (FE TABS) 325 (65 Fe) MG EC tablet  gabapentin (NEURONTIN) 300 MG capsule  hydroxychloroquine (PLAQUENIL) 200 MG tablet  lisdexamfetamine (VYVANSE) 30 MG capsule  lisinopril (ZESTRIL) 20 MG tablet  montelukast (SINGULAIR) 10 MG tablet  Psyllium (FIBER) 0.52 G CAPS  sennosides (SENOKOT) 8.6 MG tablet  tenofovir alafenamide fumarate (VEMLIDY) 25 MG tablet  triamcinolone (KENALOG) 0.1 % external cream  Vitamin D3 (CHOLECALCIFEROL) 25 mcg (1000 units) tablet          Review of Systems    Physical Exam   BP: (!)  "185/107  Pulse: 74  Temp: 97.3  F (36.3  C)  Resp: 18  Height: 160 cm (5' 3\")  Weight: 62.1 kg (137 lb)  SpO2: 100 %      Physical Exam    ED Course        Procedures         {EKG done?:820673}    Critical Care time:  {none or minutes:904018}  {Trauma Activation or Fall?:324055}  {Sepsis/Septic Shock/Stemi/Stroke:852503::\"None\"}         Recent Results (from the past 24 hours)   EKG 12-lead, tracing only   Result Value Ref Range    Systolic Blood Pressure  mmHg    Diastolic Blood Pressure  mmHg    Ventricular Rate 65 BPM    Atrial Rate 65 BPM    WA Interval 184 ms    QRS Duration 86 ms     ms    QTc 418 ms    P Axis 69 degrees    R AXIS -11 degrees    T Axis 1 degrees    Interpretation ECG       Sinus rhythm with marked sinus arrhythmia  Otherwise normal ECG  No previous ECGs available         Medications - No data to display    Assessments & Plan (with Medical Decision Making)     I have reviewed the nursing notes.    I have reviewed the findings, diagnosis, plan and need for follow up with the patient.  {ED Addendum:154910::\" \"}        Medical Decision Making  The patient's presentation was of {[facility] Problem:556934}.    The patient's evaluation involved:  {[facility] Data:095812}    The patient's management necessitated {[facility] Management:572134}.        New Prescriptions    No medications on file       Final diagnoses:   None       6/26/2025   [facility] EMERGENCY DEPT    " healthy-appearing older female sitting in the bed   HENT:      Head: Normocephalic.      Nose: Nose normal.      Mouth/Throat:      Mouth: Mucous membranes are moist.      Pharynx: Oropharynx is clear.   Eyes:      General: No scleral icterus.     Extraocular Movements: Extraocular movements intact.      Conjunctiva/sclera: Conjunctivae normal.   Cardiovascular:      Rate and Rhythm: Normal rate and regular rhythm.      Pulses: Normal pulses.      Heart sounds: Normal heart sounds.   Pulmonary:      Effort: Pulmonary effort is normal.      Breath sounds: Normal breath sounds.   Abdominal:      Palpations: Abdomen is soft.      Tenderness: There is no abdominal tenderness.   Musculoskeletal:      Cervical back: Normal range of motion and neck supple.      Comments: Left lower extremity in a brace/boot   Skin:     General: Skin is warm and dry.      Coloration: Skin is not jaundiced or pale.      Findings: No bruising or rash.   Neurological:      General: No focal deficit present.      Mental Status: She is alert and oriented to person, place, and time.   Psychiatric:         Mood and Affect: Mood normal.         Behavior: Behavior normal.         ED Course (with Medical Decision Making)    Pt seen and examined by me.  RN and EPIC notes reviewed.       Patient presenting with a mildly elevated troponin from a one-time blood draw yesterday from clinic.  She has a headache and elevated blood pressure.  She denies any current chest pain.  Previous chest pain was earlier this month around June 1.    EKG was done.  This showed sinus rhythm with sinus arrhythmia.  Rate is 65.  There is no obvious abnormal ST segments.  Reviewed by myself at 1033.    Patient has remained hypertensive in the ED.  She notes she took her lisinopril 20 mg earlier today.  She usually takes a clonidine at bedtime.  This apparently is not for blood pressure control but was prescribed by her mental health provider for sleep.    CBC shows normal  white count, hemoglobin.  Platelets very slightly low at 136.  This is consistent with previous.  Magnesium normal  CMP basically normal  Troponin today is 15.  BNP very slightly above normal at 388    Patient was monitored.  2-hour troponin 14.    Her blood pressure has been variable.  She is having a headache so I gave her a dose of clonidine here in the ED.  Initially she noted that her headache was better but then her blood pressure went up again.  She noted her headache was severe.  Head CT was ordered.    CT of the head shows no acute intracranial process.  CTA of the head and neck without acute abnormalities.    Results reviewed with the patient.    I also spoke with patient primary care provider, Dr. Clark.  Plan will be to continue patient on lisinopril 20 mg a day.  I am going to add clonidine 0.2 mg twice daily.  Continue to closely monitor.  She has an appointment for a stress test in 1 week.  I also sent a cardiology referral.    I did do a second EKG because patient was noted to have some irregularities in her heart rate on the monitor.  She does have a marked sinus arrhythmia with sinus bradycardia at 54.  Short IA interval noted although there is some variability.  No ST segment changes.  Reviewed by myself at 1449.    I think all is in order at this point and I do not think patient requires admission.  However, if at anytime she has increased pain, shortness of breath, or any other worsening symptoms or concerns I would like her to return promptly to the ED.          Procedures  Recent Results (from the past 24 hours)   EKG 12-lead, tracing only   Result Value Ref Range    Systolic Blood Pressure  mmHg    Diastolic Blood Pressure  mmHg    Ventricular Rate 65 BPM    Atrial Rate 65 BPM    IA Interval 184 ms    QRS Duration 86 ms     ms    QTc 418 ms    P Axis 69 degrees    R AXIS -11 degrees    T Axis 1 degrees    Interpretation ECG       Sinus rhythm with marked sinus  arrhythmia  Otherwise normal ECG  No previous ECGs available  Confirmed by SEE ED PROVIDER NOTE FOR, ECG INTERPRETATION ([id number]),  Shannan Lujan ([id number]) on 6/26/2025 1:48:13 PM     CBC with Platelets & Differential    Narrative    The following orders were created for panel order CBC with Platelets & Differential.  Procedure                               Abnormality         Status                     ---------                               -----------         ------                     CBC with platelets and ...[[id number]]  Abnormal            Final result                 Please view results for these tests on the individual orders.   Troponin T, High Sensitivity   Result Value Ref Range    Troponin T, High Sensitivity 15 (H) <=14 ng/L   [place] Draw    Narrative    The following orders were created for panel order [place] Draw.  Procedure                               Abnormality         Status                     ---------                               -----------         ------                     Extra Blue Top Tube[[id number]]                             Final result                 Please view results for these tests on the individual orders.   Comprehensive metabolic panel   Result Value Ref Range    Sodium 140 135 - 145 mmol/L    Potassium 4.0 3.4 - 5.3 mmol/L    Carbon Dioxide (CO2) 29 22 - 29 mmol/L    Anion Gap 8 7 - 15 mmol/L    Urea Nitrogen 13.2 8.0 - 23.0 mg/dL    Creatinine 0.76 0.51 - 0.95 mg/dL    GFR Estimate 82 >60 mL/min/1.73m2    Calcium 11.0 (H) 8.8 - 10.4 mg/dL    Chloride 103 98 - 107 mmol/L    Glucose 98 70 - 99 mg/dL    Alkaline Phosphatase 64 40 - 150 U/L    AST 27 0 - 45 U/L    ALT 16 0 - 50 U/L    Protein Total 6.7 6.4 - 8.3 g/dL    Albumin 4.1 3.5 - 5.2 g/dL    Bilirubin Total 0.4 <=1.2 mg/dL   Magnesium   Result Value Ref Range    Magnesium 2.1 1.7 - 2.3 mg/dL   CBC with platelets and differential   Result Value Ref Range    WBC Count 4.7 4.0 - 11.0 10e3/uL    RBC Count 4.70  3.80 - 5.20 10e6/uL    Hemoglobin 14.4 11.7 - 15.7 g/dL    Hematocrit 41.9 35.0 - 47.0 %    MCV 89 78 - 100 fL    MCH 30.6 26.5 - 33.0 pg    MCHC 34.4 31.5 - 36.5 g/dL    RDW 12.9 10.0 - 15.0 %    Platelet Count 136 (L) 150 - 450 10e3/uL    % Neutrophils 66 %    % Lymphocytes 20 %    % Monocytes 8 %    % Eosinophils 6 %    % Basophils 0 %    % Immature Granulocytes 0 %    NRBCs per 100 WBC 0 <1 /100    Absolute Neutrophils 3.1 1.6 - 8.3 10e3/uL    Absolute Lymphocytes 0.9 0.8 - 5.3 10e3/uL    Absolute Monocytes 0.4 0.0 - 1.3 10e3/uL    Absolute Eosinophils 0.3 0.0 - 0.7 10e3/uL    Absolute Basophils 0.0 0.0 - 0.2 10e3/uL    Absolute Immature Granulocytes 0.0 <=0.4 10e3/uL    Absolute NRBCs 0.0 10e3/uL   Extra Blue Top Tube   Result Value Ref Range    Hold Specimen JIC    NT-proBNP   Result Value Ref Range    NT-proBNP 388 (H) 0 - 353 pg/mL   Chest XR,  PA & LAT    Narrative    EXAM: XR CHEST 2 VIEWS  [facility]  DATE: 6/26/2025    INDICATION: chest pain  COMPARISON: 2/13/2025      Impression    IMPRESSION: No focal airspace opacities, pleural effusion or pneumothorax. The cardiac and mediastinal silhouettes are normal. Old healed right rib fractures.   Troponin T, High Sensitivity   Result Value Ref Range    Troponin T, High Sensitivity 14 <=14 ng/L   EKG 12-lead, tracing only   Result Value Ref Range    Systolic Blood Pressure  mmHg    Diastolic Blood Pressure  mmHg    Ventricular Rate 54 BPM    Atrial Rate 54 BPM    MO Interval 106 ms    QRS Duration 88 ms     ms    QTc 407 ms    P Axis 26 degrees    R AXIS -3 degrees    T Axis 5 degrees    Interpretation ECG       Sinus bradycardia with marked sinus arrhythmia with short MO  Otherwise normal ECG  When compared with ECG of 26-Jun-2025 10:31,  MO interval has decreased  Confirmed by SEE ED PROVIDER NOTE FOR, ECG INTERPRETATION ([id number]),  Shane Rios ([id number]) on 6/27/2025 1:16:36 AM     Head CT w/o contrast     Narrative    EXAM: CT HEAD W/O CONTRAST, CTA HEAD NECK W CONTRAST  LOCATION: [facility]  DATE: 6/26/2025    INDICATION: Hypertension, headache  COMPARISON: None.  CONTRAST: 67mL, Isovue 370  TECHNIQUE: Head and neck CT angiogram with IV contrast. Noncontrast head CT followed by axial helical CT images of the head and neck vessels obtained during the arterial phase of intravenous contrast administration. Axial 2D reconstructed images and   multiplanar 3D MIP reconstructed images of the head and neck vessels were performed by the technologist. Dose reduction techniques were used. All stenosis measurements made according to NASCET criteria unless otherwise specified.    FINDINGS:   NONCONTRAST HEAD CT:   INTRACRANIAL CONTENTS: No intracranial hemorrhage, extraaxial collection, or mass effect.  No CT evidence of acute infarct. Normal parenchymal attenuation. Normal ventricles and sulci.     VISUALIZED ORBITS/SINUSES/MASTOIDS: No intraorbital abnormality. No paranasal sinus mucosal disease. No middle ear or mastoid effusion.    BONES/SOFT TISSUES: No acute abnormality.    HEAD CTA:  ANTERIOR CIRCULATION: Calcified atherosclerotic. plaques in bilateral carotid siphons. No stenosis/occlusion, aneurysm, or high flow vascular malformation. Fetal origin of the right posterior cerebral artery from the anterior circulation.    POSTERIOR CIRCULATION: No stenosis/occlusion, aneurysm, or high flow vascular malformation. Dominant right vertebral artery supplies the basilar artery with a small left vertebral artery supplying the left posterior inferior cerebellar artery (PICA).     DURAL VENOUS SINUSES: Expected enhancement of the major dural venous sinuses.    NECK CTA:  RIGHT CAROTID: No measurable stenosis or dissection.    LEFT CAROTID: No measurable stenosis or dissection.    VERTEBRAL ARTERIES: No focal stenosis or dissection. Balanced vertebral arteries.    AORTIC ARCH: Classic aortic arch  anatomy with no significant stenosis at the origin of the great vessels.    NONVASCULAR STRUCTURES: Unremarkable.      Impression    IMPRESSION:   HEAD CT:  No acute intracranial process.    HEAD CTA:  Patent major intracranial arteries without large vessel occlusion, high-grade stenosis or aneurysm. Mild intracranial atherosclerosis.     NECK CTA:  Patent major cervical arteries without high-grade stenosis or dissection.     CTA Head Neck with Contrast    Narrative    EXAM: CT HEAD W/O CONTRAST, CTA HEAD NECK W CONTRAST  LOCATION: [facility]  DATE: 6/26/2025    INDICATION: Hypertension, headache  COMPARISON: None.  CONTRAST: 67mL, Isovue 370  TECHNIQUE: Head and neck CT angiogram with IV contrast. Noncontrast head CT followed by axial helical CT images of the head and neck vessels obtained during the arterial phase of intravenous contrast administration. Axial 2D reconstructed images and   multiplanar 3D MIP reconstructed images of the head and neck vessels were performed by the technologist. Dose reduction techniques were used. All stenosis measurements made according to NASCET criteria unless otherwise specified.    FINDINGS:   NONCONTRAST HEAD CT:   INTRACRANIAL CONTENTS: No intracranial hemorrhage, extraaxial collection, or mass effect.  No CT evidence of acute infarct. Normal parenchymal attenuation. Normal ventricles and sulci.     VISUALIZED ORBITS/SINUSES/MASTOIDS: No intraorbital abnormality. No paranasal sinus mucosal disease. No middle ear or mastoid effusion.    BONES/SOFT TISSUES: No acute abnormality.    HEAD CTA:  ANTERIOR CIRCULATION: Calcified atherosclerotic. plaques in bilateral carotid siphons. No stenosis/occlusion, aneurysm, or high flow vascular malformation. Fetal origin of the right posterior cerebral artery from the anterior circulation.    POSTERIOR CIRCULATION: No stenosis/occlusion, aneurysm, or high flow vascular malformation. Dominant right vertebral  artery supplies the basilar artery with a small left vertebral artery supplying the left posterior inferior cerebellar artery (PICA).     DURAL VENOUS SINUSES: Expected enhancement of the major dural venous sinuses.    NECK CTA:  RIGHT CAROTID: No measurable stenosis or dissection.    LEFT CAROTID: No measurable stenosis or dissection.    VERTEBRAL ARTERIES: No focal stenosis or dissection. Balanced vertebral arteries.    AORTIC ARCH: Classic aortic arch anatomy with no significant stenosis at the origin of the great vessels.    NONVASCULAR STRUCTURES: Unremarkable.      Impression    IMPRESSION:   HEAD CT:  No acute intracranial process.    HEAD CTA:  Patent major intracranial arteries without large vessel occlusion, high-grade stenosis or aneurysm. Mild intracranial atherosclerosis.     NECK CTA:  Patent major cervical arteries without high-grade stenosis or dissection.         Medications   cloNIDine (CATAPRES) tablet 0.2 mg (0.2 mg Oral $Given 6/26/25 1302)   iopamidol (ISOVUE-370) solution 500 mL (67 mLs Intravenous $Given 6/26/25 1526)   sodium chloride 0.9 % bag for CT scan flush (100 mLs Intravenous $Given 6/26/25 1526)   ketorolac (TORADOL) injection 15 mg (15 mg Intravenous $Given 6/26/25 1649)       Assessments & Plan   I have reviewed the findings, diagnosis, plan and need for follow up with the patient.  Discharge Medication List as of 6/26/2025  5:31 PM          Final diagnoses:   HTN, goal below 140/90   Chest pain, unspecified type   Nonintractable headache, unspecified chronicity pattern, unspecified headache type     Disposition: Patient discharged home in stable condition.  Plan as above.  Return for concerns.     Note: Chart documentation done in part with Dragon Voice Recognition software. Although reviewed after completion, some word and grammatical errors may remain.     6/26/2025   [facility] EMERGENCY DEPT       Verito Ayoub MD  06/27/25 7998

## 2025-06-26 NOTE — TELEPHONE ENCOUNTER
RN did receive call back.  Reviewed message from provider below. Patient verbalized understanding is agreeable. She will go in to the ED now.

## 2025-06-26 NOTE — ED TRIAGE NOTES
PT was sent in by her provider fro having a troponin af 22 yesterday. She has been having high BP, and chest pressure for about a month.

## 2025-06-26 NOTE — DISCHARGE INSTRUCTIONS
Increase your clonidine to twice daily.    Continue your lisinopril 20 mg daily.    Follow-up on Thursday next week with your stress test.    In the meantime, if you have any increased chest pain, please return to the ED.    If your home blood pressures are starting to trend quite low, you can go back to half dose lisinopril.    There is evidence that nonsteroidal anti-inflammatory agent such as Aleve and ibuprofen can increase blood pressure.  This may be contributing to your current elevated blood pressures.    I hope that a lot of your stressors start to decrease and you get some good sleep and have a great weekend!

## 2025-06-26 NOTE — TELEPHONE ENCOUNTER
Patient Contact    Attempt # 1    Was call answered?  No.  Left message on voicemail with information to call me back.    Upon call back, please inform patient of the following message from provider:  6/26/25  7:25 AM  Result Note  Patient seen yesterday and had been complaining about intermittent elevated blood pressures with chest pressure happening several times a week.  Her troponin is elevated.  We had discussed yesterday if her troponin was elevated I recommend that she be evaluated in the ER for her heart.  Please let patient know that her troponin is elevated I would recommend that she be evaluated in the emergency department.  Troponin T, High Sensitivity; TSH with free T4 reflex; Basic metabolic panel  (Ca, Cl, CO2, Creat, Gluc, K, Na, BUN)      Joceline Amador BSN, RN

## 2025-06-27 LAB
ATRIAL RATE - MUSE: 54 BPM
DIASTOLIC BLOOD PRESSURE - MUSE: NORMAL MMHG
INTERPRETATION ECG - MUSE: NORMAL
P AXIS - MUSE: 26 DEGREES
PR INTERVAL - MUSE: 106 MS
QRS DURATION - MUSE: 88 MS
QT - MUSE: 430 MS
QTC - MUSE: 407 MS
R AXIS - MUSE: -3 DEGREES
SYSTOLIC BLOOD PRESSURE - MUSE: NORMAL MMHG
T AXIS - MUSE: 5 DEGREES
VENTRICULAR RATE- MUSE: 54 BPM

## 2025-06-28 ENCOUNTER — APPOINTMENT (OUTPATIENT)
Dept: GENERAL RADIOLOGY | Facility: CLINIC | Age: 75
End: 2025-06-28
Attending: FAMILY MEDICINE
Payer: COMMERCIAL

## 2025-06-28 ENCOUNTER — HOSPITAL ENCOUNTER (EMERGENCY)
Facility: CLINIC | Age: 75
Discharge: HOME | End: 2025-06-28
Attending: FAMILY MEDICINE | Admitting: FAMILY MEDICINE
Payer: COMMERCIAL

## 2025-06-28 VITALS
DIASTOLIC BLOOD PRESSURE: 103 MMHG | RESPIRATION RATE: 13 BRPM | SYSTOLIC BLOOD PRESSURE: 143 MMHG | OXYGEN SATURATION: 98 % | BODY MASS INDEX: 24.27 KG/M2 | HEIGHT: 63 IN | TEMPERATURE: 97.4 F | HEART RATE: 70 BPM | WEIGHT: 137 LBS

## 2025-06-28 DIAGNOSIS — F41.9 ANXIETY: ICD-10-CM

## 2025-06-28 DIAGNOSIS — R07.89 ATYPICAL CHEST PAIN: ICD-10-CM

## 2025-06-28 LAB
ALBUMIN SERPL BCG-MCNC: 3.8 G/DL (ref 3.5–5.2)
ALP SERPL-CCNC: 67 U/L (ref 40–150)
ALT SERPL W P-5'-P-CCNC: 16 U/L (ref 0–50)
ANION GAP SERPL CALCULATED.3IONS-SCNC: 11 MMOL/L (ref 7–15)
AST SERPL W P-5'-P-CCNC: 34 U/L (ref 0–45)
ATRIAL RATE - MUSE: 71 BPM
BASOPHILS # BLD AUTO: 0 10E3/UL (ref 0–0.2)
BASOPHILS NFR BLD AUTO: 1 %
BILIRUB SERPL-MCNC: 0.4 MG/DL
BUN SERPL-MCNC: 11.1 MG/DL (ref 8–23)
CALCIUM SERPL-MCNC: 9.4 MG/DL (ref 8.8–10.4)
CHLORIDE SERPL-SCNC: 105 MMOL/L (ref 98–107)
CREAT SERPL-MCNC: 0.69 MG/DL (ref 0.51–0.95)
DIASTOLIC BLOOD PRESSURE - MUSE: NORMAL MMHG
EGFRCR SERPLBLD CKD-EPI 2021: >90 ML/MIN/1.73M2
EOSINOPHIL # BLD AUTO: 0.3 10E3/UL (ref 0–0.7)
EOSINOPHIL NFR BLD AUTO: 6 %
ERYTHROCYTE [DISTWIDTH] IN BLOOD BY AUTOMATED COUNT: 13.2 % (ref 10–15)
GLUCOSE SERPL-MCNC: 93 MG/DL (ref 70–99)
HCO3 SERPL-SCNC: 24 MMOL/L (ref 22–29)
HCT VFR BLD AUTO: 41.9 % (ref 35–47)
HGB BLD-MCNC: 14.1 G/DL (ref 11.7–15.7)
IMM GRANULOCYTES # BLD: 0 10E3/UL
IMM GRANULOCYTES NFR BLD: 1 %
INTERPRETATION ECG - MUSE: NORMAL
LIPASE SERPL-CCNC: 25 U/L (ref 13–60)
LYMPHOCYTES # BLD AUTO: 1.1 10E3/UL (ref 0.8–5.3)
LYMPHOCYTES NFR BLD AUTO: 26 %
MCH RBC QN AUTO: 30.3 PG (ref 26.5–33)
MCHC RBC AUTO-ENTMCNC: 33.7 G/DL (ref 31.5–36.5)
MCV RBC AUTO: 90 FL (ref 78–100)
MONOCYTES # BLD AUTO: 0.4 10E3/UL (ref 0–1.3)
MONOCYTES NFR BLD AUTO: 10 %
NEUTROPHILS # BLD AUTO: 2.5 10E3/UL (ref 1.6–8.3)
NEUTROPHILS NFR BLD AUTO: 57 %
NRBC # BLD AUTO: 0 10E3/UL
NRBC BLD AUTO-RTO: 0 /100
NT-PROBNP SERPL-MCNC: 121 PG/ML (ref 0–353)
P AXIS - MUSE: 66 DEGREES
PLATELET # BLD AUTO: 125 10E3/UL (ref 150–450)
POTASSIUM SERPL-SCNC: 4.4 MMOL/L (ref 3.4–5.3)
PR INTERVAL - MUSE: 182 MS
PROT SERPL-MCNC: 6.6 G/DL (ref 6.4–8.3)
QRS DURATION - MUSE: 92 MS
QT - MUSE: 418 MS
QTC - MUSE: 454 MS
R AXIS - MUSE: 65 DEGREES
RBC # BLD AUTO: 4.65 10E6/UL (ref 3.8–5.2)
SODIUM SERPL-SCNC: 140 MMOL/L (ref 135–145)
SYSTOLIC BLOOD PRESSURE - MUSE: NORMAL MMHG
T AXIS - MUSE: 28 DEGREES
TROPONIN T SERPL HS-MCNC: 11 NG/L
TROPONIN T SERPL HS-MCNC: 12 NG/L
VENTRICULAR RATE- MUSE: 71 BPM
WBC # BLD AUTO: 4.3 10E3/UL (ref 4–11)

## 2025-06-28 PROCEDURE — 99284 EMERGENCY DEPT VISIT MOD MDM: CPT | Performed by: FAMILY MEDICINE

## 2025-06-28 PROCEDURE — 83880 ASSAY OF NATRIURETIC PEPTIDE: CPT | Performed by: FAMILY MEDICINE

## 2025-06-28 PROCEDURE — 93010 ELECTROCARDIOGRAM REPORT: CPT | Performed by: FAMILY MEDICINE

## 2025-06-28 PROCEDURE — 36415 COLL VENOUS BLD VENIPUNCTURE: CPT | Performed by: FAMILY MEDICINE

## 2025-06-28 PROCEDURE — 99285 EMERGENCY DEPT VISIT HI MDM: CPT | Mod: 25

## 2025-06-28 PROCEDURE — 71045 X-RAY EXAM CHEST 1 VIEW: CPT

## 2025-06-28 PROCEDURE — 93005 ELECTROCARDIOGRAM TRACING: CPT

## 2025-06-28 PROCEDURE — 85025 COMPLETE CBC W/AUTO DIFF WBC: CPT | Performed by: FAMILY MEDICINE

## 2025-06-28 PROCEDURE — 84484 ASSAY OF TROPONIN QUANT: CPT | Performed by: FAMILY MEDICINE

## 2025-06-28 PROCEDURE — 80053 COMPREHEN METABOLIC PANEL: CPT | Performed by: FAMILY MEDICINE

## 2025-06-28 PROCEDURE — 83690 ASSAY OF LIPASE: CPT | Performed by: FAMILY MEDICINE

## 2025-06-28 RX ORDER — HYDROXYCHLOROQUINE SULFATE 200 MG/1
200 TABLET, FILM COATED ORAL EVERY MORNING
COMMUNITY

## 2025-06-28 RX ORDER — TRIAMCINOLONE ACETONIDE 1 MG/G
CREAM TOPICAL 2 TIMES DAILY PRN
COMMUNITY

## 2025-06-28 RX ORDER — MONTELUKAST SODIUM 10 MG/1
10 TABLET ORAL
COMMUNITY

## 2025-06-28 RX ORDER — HYDROXYZINE HYDROCHLORIDE 25 MG/1
25 TABLET, FILM COATED ORAL EVERY 8 HOURS PRN
Qty: 10 TABLET | Refills: 0 | Status: SHIPPED | OUTPATIENT
Start: 2025-06-28

## 2025-06-28 RX ORDER — GABAPENTIN 300 MG/1
300 CAPSULE ORAL
COMMUNITY

## 2025-06-28 RX ORDER — GABAPENTIN 300 MG/1
300 CAPSULE ORAL 3 TIMES DAILY
COMMUNITY

## 2025-06-28 RX ORDER — HYDROXYCHLOROQUINE SULFATE 200 MG/1
200 TABLET, FILM COATED ORAL EVERY OTHER DAY
COMMUNITY

## 2025-06-28 RX ORDER — CLONIDINE HYDROCHLORIDE 0.2 MG/1
0.2 TABLET ORAL 2 TIMES DAILY
COMMUNITY

## 2025-06-28 ASSESSMENT — ACTIVITIES OF DAILY LIVING (ADL)
ADLS_ACUITY_SCORE: 41

## 2025-06-28 ASSESSMENT — COLUMBIA-SUICIDE SEVERITY RATING SCALE - C-SSRS
6. HAVE YOU EVER DONE ANYTHING, STARTED TO DO ANYTHING, OR PREPARED TO DO ANYTHING TO END YOUR LIFE?: NO
2. HAVE YOU ACTUALLY HAD ANY THOUGHTS OF KILLING YOURSELF IN THE PAST MONTH?: NO
1. IN THE PAST MONTH, HAVE YOU WISHED YOU WERE DEAD OR WISHED YOU COULD GO TO SLEEP AND NOT WAKE UP?: NO

## 2025-06-28 NOTE — ED TRIAGE NOTES
"Patient arrives with chest pressure that started this morning. Patient report \"it feels like a small anvil on my chest\". Patient also reports \"feeling disoriented\". Patient is Aox4 but reports she did something weird with the trash this morning.      Triage Assessment (Adult)       Row Name 06/28/25 1147          Triage Assessment    Airway WDL WDL        Respiratory WDL    Respiratory WDL WDL        Skin Circulation/Temperature WDL    Skin Circulation/Temperature WDL WDL        Cardiac WDL    Cardiac WDL X;all        Chest Pain Assessment    Chest Pain Location midsternal     Character pressure        Peripheral/Neurovascular WDL    Peripheral Neurovascular WDL WDL        Cognitive/Neuro/Behavioral WDL    Cognitive/Neuro/Behavioral WDL WDL                     " - - -

## 2025-06-28 NOTE — ED PROVIDER NOTES
"  History     Chief Complaint   Patient presents with    Chest Pain     HPI  Niesha Adhikari is a 74 year old female who presents with chest pain that came back again this morning.  Patient describes it as an  anvil sitting on her chest and just a lot of heavy pressure.  Patient was seen just 2 days ago for the same problem and had a workup, they thought it was secondary his blood pressure.  She was told to come back if her pressure got high again.  She states that this morning she was getting readings in the 170s.  Patient denies any nausea any vomiting.  Patient states that she has not been sleeping well recently has been under a lot of stress and anxiety.  She is not on any as needed medications for this.    Allergies:  Allergies   Allergen Reactions    Telaprevir Other (See Comments) and Rash     Rectal bleeding, anemia    Abilify Discmelt Other (See Comments)     Disoriented    Thiopental Sodium      PENTOTHAL/rigidity and fight response    Antivert [Meclizine Hcl]     Chamomile     Compazine     Diphenhydramine Nausea     And abdominal pain    Duloxetine Hcl Other (See Comments)     Disoriented, trouble sleeping    Effexor [Venlafaxine] Other (See Comments)     Disoriented, trouble sleeping    Elavil [Amitriptyline Hcl] Other (See Comments)     \"didn't feel right on it-med was stopped right away\"    Food Difficulty breathing     cilantro    Indomethacin      indocin sensativity \"Severe h.a\"    Seasonal Allergies Other (See Comments) and Difficulty breathing     Philip Gold Aug-Sept, rag weed, sneezing    Sulfa Antibiotics     Animal Dander Difficulty breathing and Rash     sneezing,resp. distress    Bupropion Anxiety    Tylenol [Acetaminophen] Rash       Problem List:    Patient Active Problem List    Diagnosis Date Noted    Chronic left shoulder pain 05/12/2025     Priority: Medium    Lumbar radiculopathy 04/17/2025     Priority: Medium    Right lateral epicondylitis 01/06/2025     Priority: Medium    S/P " carpal tunnel release 09/03/2024     Priority: Medium    Chronic bilateral low back pain without sciatica 11/02/2023     Priority: Medium    Iron deficiency 07/05/2022     Priority: Medium    Constipation 08/09/2019     Priority: Medium    DDD (degenerative disc disease), cervical 08/09/2019     Priority: Medium    Fatigue 01/02/2019     Priority: Medium    Personal history of other medical treatment 12/05/2018     Priority: Medium     Alendronate (GERD per record review), Boniva PO (Ineffective per record review), Boniva IV (Effective per record review)      Alcohol dependence in remission (H) 11/16/2018     Priority: Medium    Benign essential hypertension 09/01/2017     Priority: Medium    Osteoporosis 06/07/2017     Priority: Medium    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) 06/07/2017     Priority: Medium    AIN (anal intraepithelial neoplasia) anal canal 09/25/2012     Priority: Medium    Internal hemorrhoids with other complication 10/13/2011     Priority: Medium    Narcolepsy 08/15/2011     Priority: Medium    Mild recurrent major depression 05/05/2010     Priority: Medium    Fibromyalgia 07/10/2009     Priority: Medium    Essential and other specified forms of tremor 06/30/2006     Priority: Medium    Migraine 06/05/2006     Priority: Medium    Pernicious anemia 07/27/2005     Priority: Medium    Anxiety state 07/27/2005     Priority: Medium    Allergic rhinitis 06/15/2002     Priority: Medium        Past Medical History:    Past Medical History:   Diagnosis Date    ABUSE BY SPOUSE/PARTNER 07/27/2005    Arthritis     Degeneration of lumbar or lumbosacral intervertebral disc     Depressive disorder     Esophageal reflux 01/28/2005    HELICOBACTER PYLORI INFECTION 01/28/2005    Hepatitis C - Cured. Achieved SVR in 2017     History of blood transfusion     Hypertension     Malignant neoplasm (H)     Osteoporosis     Other and unspecified alcohol dependence, unspecified  drinking behavior     Other malaise and fatigue        Past Surgical History:    Past Surgical History:   Procedure Laterality Date    BIOPSY ANAL CANAL  01/21/2013    [facility]     BREAST BIOPSY, RT/LT Left 1975    Breat Biopsy RT/LT    COLONOSCOPY  08/25/2009    COLONOSCOPY  02/14/2011    COLONOSCOPY performed by CRISTIN LAGUNAS at  GI    COLONOSCOPY N/A 01/08/2019    Procedure: Colonscopy, Biopsies by Biopsy;  Surgeon: Omega Talavera MD;  Location:  GI    COLONOSCOPY N/A 06/17/2024    Procedure: Colonoscopy;  Surgeon: Omega Talavera MD;  Location:  GI    CYSTOSCOPY  02/28/2011    CYSTOSCOPY performed by CAYLA FLOR at  OR    ENDOSCOPY  05/21/2012    Upper GI - [facility]    ENT SURGERY  1965    EYE SURGERY  09/05/24    10/03/24    GI SURGERY  06/25/2012     UGI ENDOSCOPY DIAG W BIOPSY  10/01/2009    HEMORRHOIDECTOMY  06/25/2012    [facility]    LAPAROSCOPIC SALPINGO-OOPHORECTOMY  02/28/2011    LAPAROSCOPIC SALPINGO-OOPHORECTOMY performed by CAYLA FLOR at  OR    ORTHOPEDIC SURGERY  08/23/24 11/01/24    PHACOEMULSIFICATION CLEAR CORNEA WITH TORIC INTRAOCULAR LENS IMPLANT Right 09/05/2024    Procedure: PHACOEMULSIFICATION, CATARACT, WITH INTRAOCULAR LENS IMPLANT, TORIC LENS right;  Surgeon: Primo Garcia MD;  Location: [facility] OR    PHACOEMULSIFICATION CLEAR CORNEA WITH TORIC INTRAOCULAR LENS IMPLANT Left 10/03/2024    Procedure: PHACOEMULSIFICATION, CATARACT, WITH INTRAOCULAR LENS IMPLANT, TORIC LENS, LEFT;  Surgeon: Primo Garcia MD;  Location: [facility] OR    RELEASE CARPAL TUNNEL Left 08/23/2024    Procedure: Left carpal tunnel release;  Surgeon: David John MD;  Location: [facility] OR    RELEASE CARPAL TUNNEL Right 11/01/2024    Procedure: RELEASE, CARPAL TUNNEL,;  Surgeon: David John MD;  Location: [facility] OR    RELEASE TRIGGER FINGER Left 08/23/2024    Procedure: Left middle finger trigger finger release;  Surgeon: David John  MD Isac;  Location: [facility] OR    TONSILLECTOMY & ADENOIDECTOMY  1965    [facility] NONSPECIFIC PROCEDURE  1965    Removed bone left index finger knuckle, casts broken bones    ZZ COLONOSCOPY W/WO BRUSH/WASH  08/22/2005    ZZ UGI ENDOSCOPY, SIMPLE EXAM  08/08/2007       Family History:    Family History   Problem Relation Age of Onset    Hypertension Mother     Breast Cancer Mother     Coronary Artery Disease Mother     Cerebrovascular Disease Mother     Kidney Disease Mother     Obesity Mother     Hypertension Father         Father    Lymphoma Father     Glaucoma Father     Other Cancer Father         Lymphoma    Genetic Disorder Father         Glaucoma    Obesity Father     C.A.D. Sister         MI at age 63    Hypertension Sister     Gastrointestinal Disease Sister         gallbladder    Hyperlipidemia Sister     Cerebrovascular Disease Sister     Circulatory Sister         brain aneurysm at 63    Genitourinary Problems Sister         1 kidney/bladder    Hypertension Sister     Obesity Sister     Coronary Artery Disease Sister         had valve surgery, MI, CHF    Coronary Artery Disease Brother     Hypertension Brother     Hyperlipidemia Brother     Cerebrovascular Disease Maternal Grandmother     Hypertension Maternal Grandmother     Cerebrovascular Disease Paternal Grandmother     Hypertension Paternal Grandmother     Glaucoma Paternal Grandfather     Genetic Disorder Paternal Grandfather         Glaucoma    Blood Disease Son         Lymes/7/11    Hypertension Son     Obesity Son     Hypertension Son     Breast Cancer Maternal Aunt     Ovarian Cancer Maternal Aunt     Unknown/Adopted Paternal Uncle     Obesity Niece     Obesity Niece     Liver Cancer Cousin     Coronary Artery Disease Other 49        niece    Diabetes Other         1st cousin    Breast Cancer Other     Obesity Other     Obesity Other     Hypertension Other         Aunts    Obesity Other         Uncle    Hypertension Other         Uncle    Coronary  "Artery Disease Sister     Coronary Artery Disease Brother     Hypertension Brother     Hyperlipidemia Brother     Coronary Artery Disease Nephew         Nephew    Hypertension Niece         [place]    Hypertension Other         Nephew    Cerebrovascular Disease Other         Aunts    Obesity Other         Nephew       Social History:  Marital Status:   [4]  Social History     Tobacco Use    Smoking status: Former     Current packs/day: 0.00     Average packs/day: 0.8 packs/day for 10.0 years (7.5 ttl pk-yrs)     Types: Cigarettes     Start date: 1968     Quit date: 1978     Years since quittin.6     Passive exposure: Never    Smokeless tobacco: Never    Tobacco comments:     Quit 46 years ago   Vaping Use    Vaping status: Never Used   Substance Use Topics    Alcohol use: No    Drug use: No        Medications:    Abatacept (ORENCIA) 125 MG/ML SOAJ auto-injector  calcium carb-cholecalciferol 600-500 MG-UNIT CAPS  cloNIDine (CATAPRES) 0.2 MG tablet  cycloSPORINE (RESTASIS) 0.05 % ophthalmic emulsion  denosumab (PROLIA) 60 MG/ML SOSY injection  diclofenac (VOLTAREN) 1 % topical gel  ferrous sulfate (FE TABS) 325 (65 Fe) MG EC tablet  gabapentin (NEURONTIN) 300 MG capsule  gabapentin (NEURONTIN) 300 MG capsule  hydroxychloroquine (PLAQUENIL) 200 MG tablet  hydroxychloroquine (PLAQUENIL) 200 MG tablet  hydrOXYzine HCl (ATARAX) 25 MG tablet  lisdexamfetamine (VYVANSE) 30 MG capsule  lisinopril (ZESTRIL) 20 MG tablet  montelukast (SINGULAIR) 10 MG tablet  Psyllium (FIBER) 0.52 G CAPS  sennosides (SENOKOT) 8.6 MG tablet  tenofovir alafenamide fumarate (VEMLIDY) 25 MG tablet  triamcinolone (KENALOG) 0.1 % external cream  Vitamin D3 (CHOLECALCIFEROL) 25 mcg (1000 units) tablet          Review of Systems   All other systems reviewed and are negative.      Physical Exam   BP: (!) 131/97  Pulse: 60  Temp: 97.4  F (36.3  C)  Resp: 18  Height: 160 cm (5' 3\")  Weight: 62.1 kg (137 lb)  SpO2: 99 %      Physical " Exam  Vitals and nursing note reviewed.   Constitutional:       General: She is not in acute distress.     Appearance: She is well-developed. She is not diaphoretic.   Eyes:      Conjunctiva/sclera: Conjunctivae normal.   Cardiovascular:      Rate and Rhythm: Normal rate and regular rhythm.      Heart sounds: Normal heart sounds. No murmur heard.     No friction rub. No gallop.   Pulmonary:      Effort: Pulmonary effort is normal. No respiratory distress.      Breath sounds: Normal breath sounds. No wheezing or rales.   Chest:      Chest wall: No tenderness.   Abdominal:      General: Bowel sounds are normal. There is no distension.      Palpations: Abdomen is soft. There is no mass.      Tenderness: There is no abdominal tenderness. There is no guarding.   Musculoskeletal:         General: No tenderness. Normal range of motion.      Cervical back: Normal range of motion and neck supple.   Skin:     General: Skin is warm and dry.      Findings: No rash.   Neurological:      Mental Status: She is alert and oriented to person, place, and time.   Psychiatric:         Mood and Affect: Mood is anxious.         Judgment: Judgment normal.         ED Course        Procedures         EKG Interpretation:      Interpreted by Jose Engel  Time reviewed: now   Symptoms at time of EKG: now   Rhythm: normal sinus   Rate: normal  Axis: NORMAL  Ectopy: none  Conduction: normal  ST Segments/ T Waves: No ST-T wave changes  Q Waves: none  Comparison to prior: No old EKG available    Clinical Impression: normal EKG      Recent Results (from the past 24 hours)   EKG 12-lead, tracing only   Result Value Ref Range    Systolic Blood Pressure  mmHg    Diastolic Blood Pressure  mmHg    Ventricular Rate 71 BPM    Atrial Rate 71 BPM    ID Interval 182 ms    QRS Duration 92 ms     ms    QTc 454 ms    P Axis 66 degrees    R AXIS 65 degrees    T Axis 28 degrees    Interpretation ECG       Sinus rhythm with Premature atrial  complexes  Otherwise normal ECG  When compared with ECG of 26-Jun-2025 14:47,  Premature atrial complexes are now Present  DE interval has increased  Questionable change in QRS axis     CBC with platelets differential    Narrative    The following orders were created for panel order CBC with platelets differential.  Procedure                               Abnormality         Status                     ---------                               -----------         ------                     CBC with platelets and ...[[id number]]  Abnormal            Final result                 Please view results for these tests on the individual orders.   Troponin T, High Sensitivity   Result Value Ref Range    Troponin T, High Sensitivity 12 <=14 ng/L   Comprehensive metabolic panel   Result Value Ref Range    Sodium 140 135 - 145 mmol/L    Potassium 4.4 3.4 - 5.3 mmol/L    Carbon Dioxide (CO2) 24 22 - 29 mmol/L    Anion Gap 11 7 - 15 mmol/L    Urea Nitrogen 11.1 8.0 - 23.0 mg/dL    Creatinine 0.69 0.51 - 0.95 mg/dL    GFR Estimate >90 >60 mL/min/1.73m2    Calcium 9.4 8.8 - 10.4 mg/dL    Chloride 105 98 - 107 mmol/L    Glucose 93 70 - 99 mg/dL    Alkaline Phosphatase 67 40 - 150 U/L    AST 34 0 - 45 U/L    ALT 16 0 - 50 U/L    Protein Total 6.6 6.4 - 8.3 g/dL    Albumin 3.8 3.5 - 5.2 g/dL    Bilirubin Total 0.4 <=1.2 mg/dL   Lipase   Result Value Ref Range    Lipase 25 13 - 60 U/L   NT-proBNP   Result Value Ref Range    NT-proBNP 121 0 - 353 pg/mL   CBC with platelets and differential   Result Value Ref Range    WBC Count 4.3 4.0 - 11.0 10e3/uL    RBC Count 4.65 3.80 - 5.20 10e6/uL    Hemoglobin 14.1 11.7 - 15.7 g/dL    Hematocrit 41.9 35.0 - 47.0 %    MCV 90 78 - 100 fL    MCH 30.3 26.5 - 33.0 pg    MCHC 33.7 31.5 - 36.5 g/dL    RDW 13.2 10.0 - 15.0 %    Platelet Count 125 (L) 150 - 450 10e3/uL    % Neutrophils 57 %    % Lymphocytes 26 %    % Monocytes 10 %    % Eosinophils 6 %    % Basophils 1 %    % Immature Granulocytes 1 %     NRBCs per 100 WBC 0 <1 /100    Absolute Neutrophils 2.5 1.6 - 8.3 10e3/uL    Absolute Lymphocytes 1.1 0.8 - 5.3 10e3/uL    Absolute Monocytes 0.4 0.0 - 1.3 10e3/uL    Absolute Eosinophils 0.3 0.0 - 0.7 10e3/uL    Absolute Basophils 0.0 0.0 - 0.2 10e3/uL    Absolute Immature Granulocytes 0.0 <=0.4 10e3/uL    Absolute NRBCs 0.0 10e3/uL   XR Chest Port 1 View    Narrative    EXAM: XR CHEST PORT 1 VIEW  LOCATION: [facility]  DATE: 6/28/2025    INDICATION: chest pain  COMPARISON: 6/26/2025      Impression    IMPRESSION: No acute new findings. Heart size upper limits of normal. Old right rib fractures. Lungs remain clear.   Troponin T, High Sensitivity   Result Value Ref Range    Troponin T, High Sensitivity 11 <=14 ng/L       Medications - No data to display    Labs have come back and were unremarkable including a negative initial troponin and a negative second 2-hour troponin.  I think the patient's symptoms could be related to some stress and anxiety which she does admit to having and has been under more stress recently.  She is also not been sleeping well.  We discussed treatment options including using benzodiazepines or maybe a safer option of hydroxyzine.  She like to try that that she is going to just use at night to see if that helps with sleep and also helps with anxiety.  Then have her follow-up with her doctor later on this week if things are still not improving.    Assessments & Plan (with Medical Decision Making)  Atypical chest pain, anxiety     I have reviewed the nursing notes.    I have reviewed the findings, diagnosis, plan and need for follow up with the patient.      New Prescriptions    HYDROXYZINE HCL (ATARAX) 25 MG TABLET    Take 1 tablet (25 mg) by mouth every 8 hours as needed for anxiety.       Final diagnoses:   Atypical chest pain   Anxiety       6/28/2025   [facility] EMERGENCY DEPT       Jose Engel MD  06/28/25 [id number]

## 2025-06-28 NOTE — MEDICATION SCRIBE - ADMISSION MEDICATION HISTORY
Medication Scribe Admission Medication History    Admission medication history is complete. The information provided in this note is only as accurate as the sources available at the time of the update.    Information Source(s): Patient and CareEverywhere/SureScripts via in-person    Pertinent Information:     Changes made to PTA medication list:  Added: None  Deleted: None  Changed: calcium from 1200mg daily to 600mg daily, specified patient takes clonidine at noon and bedtime, clarified hydroxychloroquine regimen, kenalog is prn.    Allergies reviewed with patient and updates made in EHR: no patient states the list is up to date/    Medication History Completed By: KATIE LUO 6/28/2025 1:00 PM    PTA Med List   Medication Sig Note Last Dose/Taking    Abatacept (ORENCIA) 125 MG/ML SOAJ auto-injector Inject 125 mg subcutaneously once a week. Sundays. Hold for any infection and seek medical attention  6/22/2025    calcium carb-cholecalciferol 600-500 MG-UNIT CAPS Take 600 mg by mouth daily.  6/28/2025 at  5:00 AM    cloNIDine (CATAPRES) 0.2 MG tablet Take 0.2 mg by mouth 2 times daily. Noon and bedtime  6/27/2025 Bedtime    cycloSPORINE (RESTASIS) 0.05 % ophthalmic emulsion Place 1 drop into both eyes 2 times daily   6/27/2025 Morning    denosumab (PROLIA) 60 MG/ML SOSY injection  6/28/2025: This is administered in clinic twice yearly More than a month    diclofenac (VOLTAREN) 1 % topical gel Apply up to 2 grams of 1% gel to hands up to 4 times daily as needed for joint pain (maximum: 8 g per upper extremity per day)  More than a month    ferrous sulfate (FE TABS) 325 (65 Fe) MG EC tablet Take 1 tablet (325 mg) by mouth every other day.  6/27/2025 Morning    gabapentin (NEURONTIN) 300 MG capsule Take 300 mg by mouth nightly as needed for neuropathic pain. Take 300mg three times daily scheduled, may take total dose of 600mg at bedtime.  6/27/2025 Bedtime    gabapentin (NEURONTIN) 300 MG capsule Take 300 mg by mouth  3 times daily. May take additional 300mg at bedtime for a total bedtime dose of 600mg.  6/28/2025 at 12:00 AM    hydroxychloroquine (PLAQUENIL) 200 MG tablet Take 200 mg by mouth every morning. And take 200mg every other night at bedtime. Yearly eye exam, including 10-2 VF and SD-OCT, required.  6/28/2025 at  5:00 AM    hydroxychloroquine (PLAQUENIL) 200 MG tablet Take 200 mg by mouth every other day. At BEDTIME, also take 200mg every morning. Yearly eye exam, including 10-2 VF and SD-OCT, required.  6/27/2025 Bedtime    lisdexamfetamine (VYVANSE) 30 MG capsule Take 30 mg by mouth every morning  6/28/2025 at  5:00 AM    lisinopril (ZESTRIL) 20 MG tablet Take 1 tablet (20 mg) by mouth daily.  6/28/2025 at  5:00 AM    montelukast (SINGULAIR) 10 MG tablet Take 10 mg by mouth nightly as needed (Seasonal allergies (August and September)).  More than a month    Psyllium (FIBER) 0.52 G CAPS Take 0.52 g by mouth 2 times daily.  6/28/2025 at  5:00 AM    sennosides (SENOKOT) 8.6 MG tablet Take 2 tablets by mouth 2 times daily  6/28/2025 at  5:00 AM    tenofovir alafenamide fumarate (VEMLIDY) 25 MG tablet Take 1 tablet (25 mg) by mouth daily. with food (dispense only in the original container).  6/28/2025 at  5:00 AM    triamcinolone (KENALOG) 0.1 % external cream Apply topically 2 times daily as needed for irritation (use up to 14 days at a time, then break).  More than a month    Vitamin D3 (CHOLECALCIFEROL) 25 mcg (1000 units) tablet Take 1 tablet (25 mcg) by mouth daily.  Past Week

## 2025-06-29 LAB
ANNOTATION COMMENT IMP: NORMAL
METANEPHS SERPL-SCNC: 0.15 NMOL/L
NORMETANEPHRINE SERPL-SCNC: 0.48 NMOL/L

## 2025-06-30 ENCOUNTER — TELEPHONE (OUTPATIENT)
Dept: FAMILY MEDICINE | Facility: OTHER | Age: 75
End: 2025-06-30
Payer: COMMERCIAL

## 2025-06-30 ENCOUNTER — PATIENT OUTREACH (OUTPATIENT)
Dept: CARE COORDINATION | Facility: CLINIC | Age: 75
End: 2025-06-30
Payer: COMMERCIAL

## 2025-06-30 LAB
CATECHOLS PLAS-IMP: ABNORMAL
DOPAMINE SERPL-MCNC: <130 PMOL/L
EPINEPH PLAS-MCNC: 201 PMOL/L
NOREPINEPH PLAS-MCNC: 4906 PMOL/L

## 2025-06-30 NOTE — TELEPHONE ENCOUNTER
Patient called stating she will not be able to  the urine collect kit today and she will pick it up tomorrow.     Patient states reason: BP concerns. Patient states her PCP is aware as she has been dealing with BP issues since June 1, 2025.    Patient had no further questions at this time.    Kary Mitchell RN on 6/30/2025 at 2:24 PM

## 2025-07-02 ENCOUNTER — PATIENT OUTREACH (OUTPATIENT)
Dept: CARE COORDINATION | Facility: CLINIC | Age: 75
End: 2025-07-02
Payer: COMMERCIAL

## 2025-07-03 ENCOUNTER — HOSPITAL ENCOUNTER (OUTPATIENT)
Dept: NUCLEAR MEDICINE | Facility: CLINIC | Age: 75
Setting detail: NUCLEAR MEDICINE
Discharge: HOME | End: 2025-07-03
Attending: FAMILY MEDICINE
Payer: COMMERCIAL

## 2025-07-03 PROCEDURE — A9502 TC99M TETROFOSMIN: HCPCS | Performed by: FAMILY MEDICINE

## 2025-07-03 PROCEDURE — 343N000001 HC RX 343 MED OP 636: Performed by: FAMILY MEDICINE

## 2025-07-03 RX ADMIN — Medication 10 MILLICURIE: at 09:58

## 2025-07-08 ENCOUNTER — HOSPITAL ENCOUNTER (OUTPATIENT)
Dept: NUCLEAR MEDICINE | Facility: CLINIC | Age: 75
Setting detail: NUCLEAR MEDICINE
Discharge: HOME | End: 2025-07-08
Attending: FAMILY MEDICINE
Payer: COMMERCIAL

## 2025-07-08 ENCOUNTER — HOSPITAL ENCOUNTER (OUTPATIENT)
Dept: CARDIOLOGY | Facility: CLINIC | Age: 75
Setting detail: NUCLEAR MEDICINE
Discharge: HOME | End: 2025-07-08
Attending: FAMILY MEDICINE
Payer: COMMERCIAL

## 2025-07-08 PROCEDURE — 93016 CV STRESS TEST SUPVJ ONLY: CPT

## 2025-07-08 PROCEDURE — 250N000011 HC RX IP 250 OP 636: Performed by: FAMILY MEDICINE

## 2025-07-08 PROCEDURE — A9502 TC99M TETROFOSMIN: HCPCS | Performed by: FAMILY MEDICINE

## 2025-07-08 PROCEDURE — 343N000001 HC RX 343 MED OP 636: Performed by: FAMILY MEDICINE

## 2025-07-08 RX ORDER — REGADENOSON 0.08 MG/ML
0.4 INJECTION, SOLUTION INTRAVENOUS ONCE
Status: COMPLETED | OUTPATIENT
Start: 2025-07-08 | End: 2025-07-08

## 2025-07-08 RX ADMIN — REGADENOSON 0.4 MG: 0.08 INJECTION, SOLUTION INTRAVENOUS at 10:57

## 2025-07-08 RX ADMIN — Medication 33 MILLICURIE: at 11:27

## 2025-07-10 ENCOUNTER — OFFICE VISIT (OUTPATIENT)
Dept: FAMILY MEDICINE | Facility: OTHER | Age: 75
End: 2025-07-10
Payer: COMMERCIAL

## 2025-07-10 VITALS
SYSTOLIC BLOOD PRESSURE: 120 MMHG | DIASTOLIC BLOOD PRESSURE: 78 MMHG | WEIGHT: 136 LBS | TEMPERATURE: 97.4 F | OXYGEN SATURATION: 98 % | RESPIRATION RATE: 14 BRPM | HEART RATE: 57 BPM | HEIGHT: 63 IN | BODY MASS INDEX: 24.1 KG/M2

## 2025-07-10 DIAGNOSIS — H69.93 DYSFUNCTION OF BOTH EUSTACHIAN TUBES: ICD-10-CM

## 2025-07-10 DIAGNOSIS — R07.9 CHEST PAIN, UNSPECIFIED TYPE: Primary | ICD-10-CM

## 2025-07-10 DIAGNOSIS — I10 BENIGN ESSENTIAL HYPERTENSION: ICD-10-CM

## 2025-07-10 DIAGNOSIS — F41.1 ANXIETY STATE: ICD-10-CM

## 2025-07-10 DIAGNOSIS — R82.5 HIGH CATECHOLAMINES: ICD-10-CM

## 2025-07-10 RX ORDER — CLONIDINE HYDROCHLORIDE 0.2 MG/1
0.2 TABLET ORAL 2 TIMES DAILY
Qty: 180 TABLET | Refills: 1 | Status: SHIPPED | OUTPATIENT
Start: 2025-07-10
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
## 2025-07-11 ENCOUNTER — TELEPHONE (OUTPATIENT)
Dept: CARDIOLOGY | Facility: CLINIC | Age: 75
End: 2025-07-11
Payer: COMMERCIAL

## 2025-07-11 NOTE — TELEPHONE ENCOUNTER
This encounter is being sent to inform the clinic that this patient has a referral from  Tanika Clark MD  for the diagnoses of Chest pain, unspecified type [R07.9]; Benign essential hypertension [I10]  and has requested that this patient be seen within 1-2 weeks and/or with any provider.  Based on the availability of our provider(s), we are unable to accommodate this request.    Were all sites offered this patient?  Yes    Does scheduling algorithm request to schedule next available?  Patient has been scheduled for the first available opening with Dr. Sandoval on 11/5/25.  We have informed the patient that the clinic will review their referral and reach out if a sooner appointment is medically necessary.      Per new referral, patient needs appointment in 1-2 weeks. Please assist patient. Thanks

## 2025-07-14 ENCOUNTER — HOSPITAL ENCOUNTER (OUTPATIENT)
Dept: CT IMAGING | Facility: CLINIC | Age: 75
Discharge: HOME | End: 2025-07-14
Attending: CLINICAL NURSE SPECIALIST | Admitting: CLINICAL NURSE SPECIALIST
Payer: COMMERCIAL

## 2025-07-14 ENCOUNTER — MYC MEDICAL ADVICE (OUTPATIENT)
Dept: FAMILY MEDICINE | Facility: OTHER | Age: 75
End: 2025-07-14
Payer: COMMERCIAL

## 2025-07-14 DIAGNOSIS — R79.89 ELEVATED TROPONIN: ICD-10-CM

## 2025-07-14 DIAGNOSIS — R07.9 CHEST PAIN, UNSPECIFIED TYPE: Primary | ICD-10-CM

## 2025-07-14 DIAGNOSIS — J98.59 MEDIASTINAL MASS: ICD-10-CM

## 2025-07-14 PROCEDURE — 71260 CT THORAX DX C+: CPT

## 2025-07-14 PROCEDURE — 250N000009 HC RX 250: Performed by: CLINICAL NURSE SPECIALIST

## 2025-07-14 PROCEDURE — 250N000011 HC RX IP 250 OP 636: Performed by: CLINICAL NURSE SPECIALIST

## 2025-07-14 RX ORDER — IOPAMIDOL 755 MG/ML
500 INJECTION, SOLUTION INTRAVASCULAR ONCE
Status: COMPLETED | OUTPATIENT
Start: 2025-07-14 | End: 2025-07-14

## 2025-07-14 RX ADMIN — SODIUM CHLORIDE 60 ML: 9 INJECTION, SOLUTION INTRAVENOUS at 09:58

## 2025-07-14 RX ADMIN — IOPAMIDOL 67 ML: 755 INJECTION, SOLUTION INTRAVENOUS at 09:58

## 2025-07-14 NOTE — TELEPHONE ENCOUNTER
Niesha called back.  She was relayed the message and has been added to the wait list for [facility].    She also wanted to be transferred to the main cardiology scheduling line to see if she could be seen sooner, but it depended on the location as well.    Isabella Bo

## 2025-07-14 NOTE — TELEPHONE ENCOUNTER
[facility] is currently booking out past requested time frame. Will have scheduling offer pt. Another location to be seen sooner if able, if pt. Prefers to be seen here than will add to wait list and update referring provider of date pt. Is scheduled to be seen.     Kassi Yan on 7/14/2025 at 7:48 AM

## 2025-07-15 ENCOUNTER — TELEPHONE (OUTPATIENT)
Dept: CARDIOLOGY | Facility: CLINIC | Age: 75
End: 2025-07-15
Payer: COMMERCIAL

## 2025-07-15 NOTE — TELEPHONE ENCOUNTER
There are currently no visits within requested time frame. Pt. Was scheduled for soonest consult.     Will route to scheduling to please offer other locations to pt.     Kassi Yan on 7/15/2025 at 3:26 PM

## 2025-07-16 ENCOUNTER — ONCOLOGY VISIT (OUTPATIENT)
Dept: SURGERY | Facility: CLINIC | Age: 75
End: 2025-07-16
Attending: CLINICAL NURSE SPECIALIST
Payer: COMMERCIAL

## 2025-07-16 VITALS
BODY MASS INDEX: 23.91 KG/M2 | HEART RATE: 64 BPM | WEIGHT: 135 LBS | OXYGEN SATURATION: 99 % | TEMPERATURE: 97.5 F | SYSTOLIC BLOOD PRESSURE: 142 MMHG | DIASTOLIC BLOOD PRESSURE: 90 MMHG | RESPIRATION RATE: 18 BRPM

## 2025-07-16 DIAGNOSIS — J98.59 MEDIASTINAL MASS: Primary | ICD-10-CM

## 2025-07-16 PROCEDURE — G0463 HOSPITAL OUTPT CLINIC VISIT: HCPCS | Performed by: CLINICAL NURSE SPECIALIST

## 2025-07-16 PROCEDURE — 99213 OFFICE O/P EST LOW 20 MIN: CPT | Performed by: CLINICAL NURSE SPECIALIST

## 2025-07-16 ASSESSMENT — PAIN SCALES - GENERAL: PAINLEVEL_OUTOF10: MILD PAIN (2)

## 2025-07-16 NOTE — TELEPHONE ENCOUNTER
Per the encounter below, it appears all sites were offered to patient. Patient scheduled for soonest location for [facility]. Will add patient to wait list for all [facility] providers and reach out to patient if a sooner opening comes up.

## 2025-07-16 NOTE — NURSING NOTE
"Oncology Rooming Note    July 16, 2025 10:56 AM   Niesha Adhikari is a 74 year old female who presents for:    Chief Complaint   Patient presents with    Oncology Clinic Visit     Mediastinal mass     Initial Vitals: BP (!) 142/90 (BP Location: Right arm, Patient Position: Sitting, Cuff Size: Adult Regular)   Pulse 64   Temp 97.5  F (36.4  C) (Oral)   Resp 18   Wt 61.2 kg (135 lb)   LMP 11/27/2003   SpO2 99%   BMI 23.91 kg/m   Estimated body mass index is 23.91 kg/m  as calculated from the following:    Height as of 7/10/25: 1.6 m (5' 3\").    Weight as of this encounter: 61.2 kg (135 lb). Body surface area is 1.65 meters squared.  Mild Pain (2) Comment: Data Unavailable   Patient's last menstrual period was 11/27/2003.  Allergies reviewed: Yes  Medications reviewed: Yes    Medications: Medication refills not needed today.  Pharmacy name entered into [employer]:    [facility] [place] [address] [facility] - [place], [place] - [address] [facility]    PHQ9:  Did this patient require a PHQ9?: No      Clinical concerns: none.       Rose Vyas              "

## 2025-07-16 NOTE — TELEPHONE ENCOUNTER
Somehow patient rescheduled her initial appointment for 11/05/25 to 07/17/25 on the ziopatch schedule via [employer]. Called patient to let her know that she is on the wrong schedule and that we need to reschedule this. I added her to an urgent spot with  on 09/22. Patient was not added to a wait list the first time but I will add her to one now.
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
## 2025-07-28 DIAGNOSIS — Z79.899 HIGH RISK MEDICATION USE: ICD-10-CM

## 2025-07-28 DIAGNOSIS — M80.00XD OSTEOPOROSIS WITH CURRENT PATHOLOGICAL FRACTURE WITH ROUTINE HEALING, UNSPECIFIED OSTEOPOROSIS TYPE, SUBSEQUENT ENCOUNTER: ICD-10-CM

## 2025-07-28 DIAGNOSIS — R76.8 HEPATITIS B CORE ANTIBODY POSITIVE: Primary | ICD-10-CM

## 2025-07-30 ENCOUNTER — LAB (OUTPATIENT)
Dept: LAB | Facility: OTHER | Age: 75
End: 2025-07-30
Payer: COMMERCIAL

## 2025-07-30 DIAGNOSIS — M81.0 AGE-RELATED OSTEOPOROSIS WITHOUT CURRENT PATHOLOGICAL FRACTURE: ICD-10-CM

## 2025-07-30 DIAGNOSIS — E61.1 IRON DEFICIENCY: ICD-10-CM

## 2025-07-30 DIAGNOSIS — E55.9 VITAMIN D DEFICIENCY: ICD-10-CM

## 2025-07-30 DIAGNOSIS — M06.09 RHEUMATOID ARTHRITIS OF MULTIPLE SITES WITH NEGATIVE RHEUMATOID FACTOR (H): ICD-10-CM

## 2025-07-30 LAB
ALBUMIN SERPL BCG-MCNC: 4.2 G/DL (ref 3.5–5.2)
ALP SERPL-CCNC: 71 U/L (ref 40–150)
ALT SERPL W P-5'-P-CCNC: 17 U/L (ref 0–50)
AST SERPL W P-5'-P-CCNC: 30 U/L (ref 0–45)
BASOPHILS # BLD AUTO: 0 10E3/UL (ref 0–0.2)
BASOPHILS NFR BLD AUTO: 1 %
BILIRUB DIRECT SERPL-MCNC: 0.14 MG/DL (ref 0–0.3)
BILIRUB SERPL-MCNC: 0.5 MG/DL
CALCIUM SERPL-MCNC: 10.6 MG/DL (ref 8.8–10.4)
CREAT SERPL-MCNC: 0.89 MG/DL (ref 0.51–0.95)
CRP SERPL-MCNC: <3 MG/L
EGFRCR SERPLBLD CKD-EPI 2021: 68 ML/MIN/1.73M2
EOSINOPHIL # BLD AUTO: 0.3 10E3/UL (ref 0–0.7)
EOSINOPHIL NFR BLD AUTO: 7 %
ERYTHROCYTE [DISTWIDTH] IN BLOOD BY AUTOMATED COUNT: 12.9 % (ref 10–15)
ERYTHROCYTE [SEDIMENTATION RATE] IN BLOOD BY WESTERGREN METHOD: 5 MM/HR (ref 0–30)
FERRITIN SERPL-MCNC: 51 NG/ML (ref 11–328)
HCT VFR BLD AUTO: 44.7 % (ref 35–47)
HGB BLD-MCNC: 14.6 G/DL (ref 11.7–15.7)
IMM GRANULOCYTES # BLD: 0 10E3/UL
IMM GRANULOCYTES NFR BLD: 0 %
IRON BINDING CAPACITY (ROCHE): 358 UG/DL (ref 240–430)
IRON SATN MFR SERPL: 20 % (ref 15–46)
IRON SERPL-MCNC: 70 UG/DL (ref 37–145)
LYMPHOCYTES # BLD AUTO: 1 10E3/UL (ref 0.8–5.3)
LYMPHOCYTES NFR BLD AUTO: 22 %
MCH RBC QN AUTO: 29.9 PG (ref 26.5–33)
MCHC RBC AUTO-ENTMCNC: 32.7 G/DL (ref 31.5–36.5)
MCV RBC AUTO: 92 FL (ref 78–100)
MONOCYTES # BLD AUTO: 0.4 10E3/UL (ref 0–1.3)
MONOCYTES NFR BLD AUTO: 10 %
NEUTROPHILS # BLD AUTO: 2.5 10E3/UL (ref 1.6–8.3)
NEUTROPHILS NFR BLD AUTO: 60 %
PLATELET # BLD AUTO: 135 10E3/UL (ref 150–450)
PROT SERPL-MCNC: 6.8 G/DL (ref 6.4–8.3)
RBC # BLD AUTO: 4.88 10E6/UL (ref 3.8–5.2)
WBC # BLD AUTO: 4.3 10E3/UL (ref 4–11)

## 2025-07-30 PROCEDURE — 86140 C-REACTIVE PROTEIN: CPT

## 2025-07-30 PROCEDURE — 36415 COLL VENOUS BLD VENIPUNCTURE: CPT

## 2025-07-30 PROCEDURE — 82728 ASSAY OF FERRITIN: CPT

## 2025-07-30 PROCEDURE — 85652 RBC SED RATE AUTOMATED: CPT

## 2025-07-30 PROCEDURE — 83540 ASSAY OF IRON: CPT

## 2025-07-30 PROCEDURE — 82306 VITAMIN D 25 HYDROXY: CPT

## 2025-07-30 PROCEDURE — 80076 HEPATIC FUNCTION PANEL: CPT

## 2025-07-30 PROCEDURE — 82310 ASSAY OF CALCIUM: CPT

## 2025-07-30 PROCEDURE — 85025 COMPLETE CBC W/AUTO DIFF WBC: CPT

## 2025-07-30 PROCEDURE — 83550 IRON BINDING TEST: CPT

## 2025-07-30 PROCEDURE — 82565 ASSAY OF CREATININE: CPT

## 2025-07-30 PROCEDURE — 83970 ASSAY OF PARATHORMONE: CPT

## 2025-07-31 LAB
PTH-INTACT SERPL-MCNC: 34 PG/ML (ref 15–65)
VIT D+METAB SERPL-MCNC: 43 NG/ML (ref 20–50)

## 2025-08-11 ENCOUNTER — MYC REFILL (OUTPATIENT)
Dept: FAMILY MEDICINE | Facility: OTHER | Age: 75
End: 2025-08-11
Payer: COMMERCIAL

## 2025-08-11 DIAGNOSIS — J30.1 SEASONAL ALLERGIC RHINITIS DUE TO POLLEN: Primary | ICD-10-CM

## 2025-08-11 RX ORDER — MONTELUKAST SODIUM 10 MG/1
10 TABLET ORAL
Qty: 30 TABLET | Refills: 1 | Status: SHIPPED | OUTPATIENT
Start: 2025-08-11

## 2025-08-13 ENCOUNTER — OFFICE VISIT (OUTPATIENT)
Dept: PODIATRY | Facility: CLINIC | Age: 75
End: 2025-08-13
Payer: COMMERCIAL

## 2025-08-13 DIAGNOSIS — M79.671 RIGHT FOOT PAIN: ICD-10-CM

## 2025-08-13 DIAGNOSIS — S92.355S CLOSED NONDISPLACED FRACTURE OF FIFTH METATARSAL BONE OF LEFT FOOT, SEQUELA: Primary | ICD-10-CM

## 2025-08-13 PROCEDURE — 1125F AMNT PAIN NOTED PAIN PRSNT: CPT | Performed by: PODIATRIST

## 2025-08-13 PROCEDURE — 99213 OFFICE O/P EST LOW 20 MIN: CPT | Performed by: PODIATRIST

## 2025-08-13 ASSESSMENT — PAIN SCALES - GENERAL: PAINLEVEL_OUTOF10: MILD PAIN (2)

## 2025-08-18 ENCOUNTER — TRANSFERRED RECORDS (OUTPATIENT)
Dept: HEALTH INFORMATION MANAGEMENT | Facility: CLINIC | Age: 75
End: 2025-08-18
Payer: COMMERCIAL

## 2025-08-19 ENCOUNTER — TELEPHONE (OUTPATIENT)
Dept: SURGERY | Facility: CLINIC | Age: 75
End: 2025-08-19
Payer: COMMERCIAL

## 2025-08-21 ENCOUNTER — HOSPITAL ENCOUNTER (OUTPATIENT)
Dept: MAMMOGRAPHY | Facility: CLINIC | Age: 75
Discharge: HOME | End: 2025-08-21
Attending: FAMILY MEDICINE
Payer: COMMERCIAL

## 2025-08-21 DIAGNOSIS — Z12.31 VISIT FOR SCREENING MAMMOGRAM: ICD-10-CM

## 2025-08-21 PROCEDURE — 77063 BREAST TOMOSYNTHESIS BI: CPT

## (undated) DEVICE — PACK HAND WRIST SOP15HWFSP

## (undated) DEVICE — ESU GROUND PAD ADULT W/CORD E7507

## (undated) DEVICE — PACK HAND WRIST FOREARM CUSTOM

## (undated) DEVICE — SOL WATER IRRIG 1000ML BOTTLE 07139-09

## (undated) DEVICE — SU ETHILON 5-0 PS-2 18" 1666H

## (undated) DEVICE — SOL WATER IRRIG 1000ML BOTTLE 2F7114

## (undated) DEVICE — GLOVE BIOGEL PI SZ 7.5 40875

## (undated) DEVICE — CAST PADDING 4" UNSTERILE 9044

## (undated) DEVICE — GLOVE PROTEGRITY 7.5 LATEX

## (undated) DEVICE — SOL NACL 0.9% IRRIG 1000ML BOTTLE 07138-09

## (undated) DEVICE — PREP CHLORAPREP 26ML TINTED ORANGE  260815

## (undated) DEVICE — TUBING SUCTION 6"X3/16" N56A

## (undated) DEVICE — ESU PENCIL SMOKE EVAC W/ROCKER SWITCH 0703-047-000

## (undated) DEVICE — BASIN SET MINOR DISP

## (undated) DEVICE — GLOVE BIOGEL PI INDICATOR 8.0 LF 41680

## (undated) DEVICE — Device

## (undated) DEVICE — CAST PADDING 4" WEBRIL UNSTERILE

## (undated) DEVICE — DRSG GAUZE 4X4" 2187

## (undated) DEVICE — ESU GROUND PAD UNIVERSAL W/O CORD

## (undated) DEVICE — RUBBERBAND 3" 2 PK STERILE C28000-020

## (undated) DEVICE — CAST PLASTER SPLINT 4X15" EXTRA FAST

## (undated) DEVICE — NDL ECLIPSE 18GA 1.5"

## (undated) DEVICE — KIT ENDO TURNOVER/PROCEDURE CARRY-ON 101822

## (undated) DEVICE — BNDG ELASTIC 4"X5YDS UNSTERILE 6611-40

## (undated) DEVICE — NDL 19GA 1.5"

## (undated) DEVICE — GLOVE BIOGEL INDICATOR 7.5 LF 41675

## (undated) DEVICE — GLOVE BIOGEL PI MICRO INDICATOR UNDERGLOVE SZ 8.0 48980

## (undated) DEVICE — SOL PRP PVP IOD .75OZ PCH PKT CNTNR STRL DYNDA2232A

## (undated) DEVICE — CAST STOCKINETTE 3" COTTON 30-7003

## (undated) DEVICE — BNDG KLING 2" 2231

## (undated) DEVICE — GLOVE BIOGEL PI MICRO SZ 7.5 48575

## (undated) RX ORDER — FERRIC SUBSULFATE 0.21 G/G
LIQUID TOPICAL
Status: DISPENSED
Start: 2024-01-19

## (undated) RX ORDER — GLYCOPYRROLATE 0.2 MG/ML
INJECTION, SOLUTION INTRAMUSCULAR; INTRAVENOUS
Status: DISPENSED
Start: 2024-08-23

## (undated) RX ORDER — LIDOCAINE HYDROCHLORIDE 10 MG/ML
INJECTION, SOLUTION EPIDURAL; INFILTRATION; INTRACAUDAL; PERINEURAL
Status: DISPENSED
Start: 2024-08-23

## (undated) RX ORDER — FENTANYL CITRATE 50 UG/ML
INJECTION, SOLUTION INTRAMUSCULAR; INTRAVENOUS
Status: DISPENSED
Start: 2024-11-01

## (undated) RX ORDER — LIDOCAINE HYDROCHLORIDE 10 MG/ML
INJECTION, SOLUTION EPIDURAL; INFILTRATION; INTRACAUDAL; PERINEURAL
Status: DISPENSED
Start: 2019-01-08

## (undated) RX ORDER — BUPIVACAINE HYDROCHLORIDE 2.5 MG/ML
INJECTION, SOLUTION INFILTRATION; PERINEURAL
Status: DISPENSED
Start: 2024-11-01

## (undated) RX ORDER — LIDOCAINE HYDROCHLORIDE 20 MG/ML
INJECTION, SOLUTION INFILTRATION; PERINEURAL
Status: DISPENSED
Start: 2024-11-01

## (undated) RX ORDER — FERRIC SUBSULFATE 0.21 G/G
LIQUID TOPICAL
Status: DISPENSED
Start: 2022-09-09

## (undated) RX ORDER — LIDOCAINE HYDROCHLORIDE AND EPINEPHRINE 10; 10 MG/ML; UG/ML
INJECTION, SOLUTION INFILTRATION; PERINEURAL
Status: DISPENSED
Start: 2022-09-09

## (undated) RX ORDER — FENTANYL CITRATE 50 UG/ML
INJECTION, SOLUTION INTRAMUSCULAR; INTRAVENOUS
Status: DISPENSED
Start: 2024-08-23

## (undated) RX ORDER — ACETIC ACID 5 %
LIQUID (ML) MISCELLANEOUS
Status: DISPENSED
Start: 2022-09-09

## (undated) RX ORDER — LIDOCAINE HYDROCHLORIDE 5 MG/ML
INJECTION, SOLUTION INFILTRATION; INTRAVENOUS
Status: DISPENSED
Start: 2024-11-01

## (undated) RX ORDER — LIDOCAINE HYDROCHLORIDE 20 MG/ML
JELLY TOPICAL
Status: DISPENSED
Start: 2024-01-19

## (undated) RX ORDER — BUPIVACAINE HYDROCHLORIDE 2.5 MG/ML
INJECTION, SOLUTION EPIDURAL; INFILTRATION; INTRACAUDAL
Status: DISPENSED
Start: 2024-08-23

## (undated) RX ORDER — FENTANYL CITRATE 50 UG/ML
INJECTION, SOLUTION INTRAMUSCULAR; INTRAVENOUS
Status: DISPENSED
Start: 2024-09-05

## (undated) RX ORDER — PROPOFOL 10 MG/ML
INJECTION, EMULSION INTRAVENOUS
Status: DISPENSED
Start: 2024-11-01

## (undated) RX ORDER — ACETIC ACID 5 %
LIQUID (ML) MISCELLANEOUS
Status: DISPENSED
Start: 2024-01-19

## (undated) RX ORDER — ONDANSETRON 2 MG/ML
INJECTION INTRAMUSCULAR; INTRAVENOUS
Status: DISPENSED
Start: 2024-08-23

## (undated) RX ORDER — KETOROLAC TROMETHAMINE 30 MG/ML
INJECTION, SOLUTION INTRAMUSCULAR; INTRAVENOUS
Status: DISPENSED
Start: 2024-08-23

## (undated) RX ORDER — CEFAZOLIN SODIUM 1 G/3ML
INJECTION, POWDER, FOR SOLUTION INTRAMUSCULAR; INTRAVENOUS
Status: DISPENSED
Start: 2024-11-01

## (undated) RX ORDER — PROPOFOL 10 MG/ML
INJECTION, EMULSION INTRAVENOUS
Status: DISPENSED
Start: 2024-08-23